# Patient Record
Sex: FEMALE | Race: WHITE | NOT HISPANIC OR LATINO | Employment: OTHER | URBAN - METROPOLITAN AREA
[De-identification: names, ages, dates, MRNs, and addresses within clinical notes are randomized per-mention and may not be internally consistent; named-entity substitution may affect disease eponyms.]

---

## 2017-01-13 ENCOUNTER — CONSULT (OUTPATIENT)
Dept: ORTHOPEDIC SURGERY | Facility: CLINIC | Age: 67
End: 2017-01-13

## 2017-01-13 VITALS — TEMPERATURE: 98.1 F | HEIGHT: 62 IN | WEIGHT: 177 LBS | BODY MASS INDEX: 32.57 KG/M2

## 2017-01-13 DIAGNOSIS — M75.121 COMPLETE TEAR OF RIGHT ROTATOR CUFF: Primary | ICD-10-CM

## 2017-01-13 PROCEDURE — 99214 OFFICE O/P EST MOD 30 MIN: CPT | Performed by: ORTHOPAEDIC SURGERY

## 2017-01-13 NOTE — PROGRESS NOTES
Patient: Kristan Galicia    YOB: 1950    Medical Record Number: [id number]    Chief Complaints:  Right shoulder pain and weakness    History of Present Illness:     66 y.o. female patient who presents for evaluation of her right shoulder.  She has a history of a previous rotator cuff repair a number of years ago.  She did well up until the past few years when she has developed gradual onset worsening shoulder pain and weakness.  Pain is now severe, constant, and aching.  She has trouble performing any tasks at or above shoulder level.  She has tried activity modifications, anti-inflammatories, and injections, none of which have provided her any significant or long-lasting relief.  She is referred to me to discuss the option of an arthroplasty.    Allergies:   Allergies   Allergen Reactions   • Aspirin Swelling   • Clarithromycin Other (See Comments)     BLISTERS AROUND MOTH  Also known as Bioxen    • Codeine Swelling   • Darvon [Propoxyphene] Swelling   • Gatifloxacin Other (See Comments)     BLISTERS AROUND MOUTH  Also known Tequine    • Levaquin [Levofloxacin] Other (See Comments)     BLISTERS AROUND MOUTH       Home Medications:    Current Outpatient Prescriptions:   •  alendronate (FOSAMAX) 70 MG tablet, Take 1 tablet by mouth Every 7 (Seven) Days., Disp: 12 tablet, Rfl: 3  •  amiodarone (PACERONE) 200 MG tablet, Take 100 mg by mouth., Disp: , Rfl:   •  atorvastatin (LIPITOR) 80 MG tablet, Take 1 tablet by mouth Daily., Disp: 30 tablet, Rfl: 5  •  baclofen (LIORESAL) 10 MG tablet, Take 1 tablet by mouth Every Night., Disp: 30 tablet, Rfl: 5  •  carvedilol (COREG) 12.5 MG tablet, Take 12.5 mg by mouth., Disp: , Rfl:   •  clindamycin (CLEOCIN) 300 MG capsule, TAKE 1 CAPSULE 3 TIMES DAILY FOR 10 DAYS., Disp: , Rfl: 0  •  dabigatran etexilate (PRADAXA) 150 MG capsu, TAKE 1 CAPSULE TWICE DAILY, Disp: , Rfl:   •  DULoxetine (CYMBALTA) 60 MG capsule, Take 1 capsule by mouth daily., Disp: 90 capsule,  Rfl: 2  •  ezetimibe (ZETIA) 10 MG tablet, Take 1 tablet by mouth Daily., Disp: 30 tablet, Rfl: 5  •  ibuprofen (ADVIL,MOTRIN) 800 MG tablet, Take 1 tablet by mouth Every 8 (Eight) Hours As Needed for moderate pain (4-6)., Disp: 60 tablet, Rfl: 1  •  levothyroxine (SYNTHROID) 50 MCG tablet, Take 1 tablet by mouth Daily., Disp: 30 tablet, Rfl: 1  •  lidocaine (LIDODERM) 5 %, Place 1 patch on the skin Every 12 (Twelve) Hours. Remove & Discard patch within 12 hours or as directed by MD, Disp: 60 patch, Rfl: 5  •  montelukast (SINGULAIR) 10 MG tablet, Take 1 tablet by mouth daily., Disp: 90 tablet, Rfl: 2  •  omeprazole (PriLOSEC) 20 MG capsule, Take 20 mg by mouth daily., Disp: , Rfl:   •  spironolactone-hydrochlorothiazide (ALDACTAZIDE) 25-25 MG tablet, Take 1 tablet by mouth daily., Disp: , Rfl:   •  traZODone (DESYREL) 50 MG tablet, Take 1 tablet by mouth every night., Disp: 30 tablet, Rfl: 5    Past Medical History   Diagnosis Date   • Anxiety    • Asthma    • Atrial fibrillation    • Cardiomyopathy      Hypertrophic Cardiomyopathy   • CHF (congestive heart failure)    • Depression    • GERD (gastroesophageal reflux disease)    • Heart attack    • Heart murmur    • History of transfusion    • Hyperlipidemia    • Migraines    • 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 Herbert Gonsales    • Appendectomy  1970   • Hysterectomy  1989     Total   • Cardiac defibrillator placement Left 1999     Dr. Ya   • Breast reconstruction Bilateral      Reduction   • Knee arthroscopy Bilateral 2014     Dr. Li   • Femur surgery Left      with metal implant   • Shoulder arthroscopy w/ rotator cuff repair Bilateral 05/20/2011    • Tonsillectomy     • Cataract extraction Bilateral    • Breast biopsy Bilateral    • Colonoscopy  8/16/2016     Procedure: COLONOSCOPY with cecum;  Surgeon: Rosalio Sheikh MD;  Location: [facility] ENDOSCOPY;  Service:    • Rotator cuff repair Right 06/10/2010   • Knee surgery Right 06/10/2010   • Cardiac catheterization  02/19/2007   • Neck surgery  08/23/2012     Anterior cervical diskectomy and fusion with VG2 allograft implants and eagle anterior plate fixation,C5-C6 C6-C7       Social History     Occupational History   •       Social History Main Topics   • Smoking status: Former Smoker     Packs/day: 0.50     Years: 17.00     Quit date: 9/8/1985   • Smokeless tobacco: Not on file   • Alcohol use No   • Drug use: No   • Sexual activity: Defer      Social History     Social History Narrative       Family History   Problem Relation Age of Onset   • Heart attack Brother    • Hyperthyroidism Mother    • Cancer Mother    • Heart disease Mother    • Osteoporosis Mother    • No Known Problems Father       car accident   • Cirrhosis Maternal Grandmother    • No Known Problems Maternal Grandfather    • No Known Problems Paternal Grandmother    • No Known Problems Paternal Grandfather    • Breast cancer Maternal Aunt        Review of Systems:      Constitutional: Denies fever, shaking or chills   Eyes: Denies change in visual acuity   HEENT: Denies nasal congestion or sore throat   Respiratory: Denies cough or shortness of breath   Cardiovascular: Denies chest pain or edema  Endocrine: Denies tremors, palpitations, intolerance of heat or cold, polyuria, polydipsia.  GI: Denies abdominal pain, nausea, vomiting, bloody stools or diarrhea  : Denies frequency, urgency, incontinence, retention, or nocturia.  Musculoskeletal: Denies numbness tingling or loss of motor function except as above  Integument: Denies rash, lesion or ulceration   Neurologic: Denies headache or focal weakness,  "deficits  Heme: Denies epistaxis, spontaneous or excessive bleeding, epistaxis, hematuria, melena, fatigue, enlarged or tender lymph nodes.      All other pertinent positives and negatives as noted above in HPI.    Physical Exam: 66 y.o. female  Vitals:    01/13/17 1058   Temp: 98.1 °F (36.7 °C)   TempSrc: Temporal Artery    Weight: 177 lb (80.3 kg)   Height: 62\" (157.5 cm)       General:  Patient is awake and alert.  Appears in no acute distress or discomfort.    Psych:  Affect and demeanor are appropriate.    Eyes:  Conjunctiva and sclera appear grossly normal.  Eyes track well and EOM seem to be intact.    Ears:  No gross abnormalities.  Hearing adequate for the exam.    Cardiovascular:  Regular rate and rhythm.    Lungs:  Good chest expansion.  Breathing unlabored.    Extremities: The right shoulder is examined.  Skin is benign.  Previous surgical scars are well-healed.  Trace bursal effusion.  No erythema or increased warmth.  Passive motion is well preserved.  Active motion is limited and uncomfortable.  4 out of 5 strength with elevation in the scapular plane and external rotation.  Negative Hornblower's and external rotation lag sign.  Good strength in the deltoid.  Normal axillary nerve sensation.  Palpable radial pulse.    Imaging:  Previous x-rays including AP, scapular Y, and axillary views of the right shoulder are reviewed.  The films are dated August 2016.  The films show her retained metal anchors in the humeral head consistent with her history of previous repair.  She has mild osteoarthritis.  Acromiohumeral interval measures normal.  CT arthrogram of the right shoulder is also reviewed along with the associated report.  She has what appears to be a large, retracted, recurrent rotator cuff tear with significant atrophy.  There is also mild associated rotator cuff tear arthropathy.    Assessment/Plan:  Right shoulder chronic irreparable rotator cuff tear with associated cuff tear arthropathy    I had " a long conversation with Mrs. Galicia about her options.  We discussed conservative treatment options as opposed to a reverse total shoulder arthroplasty and all that that would entail.  She is very frustrated by her persistent symptoms.  She has failed prolonged conservative treatment.  She wants to pursue the reverse arthroplasty.  The risks, benefits, and alternatives were discussed.  She wants to proceed with that.  I advised her that she should get cardiology clearance from Dr. Ch.  I would like to see her back for preoperative visit after she has gotten clearance.    Juan Antonio Anne MD    01/13/2017    krystle

## 2017-01-13 NOTE — MR AVS SNAPSHOT
[facility]  [phone]                    Kristan Galicia   1/13/2017 10:45 AM   Consult    Dept Phone:  [phone]   Encounter #:  [id number]    Provider:  Juan Antonio Anne MD   Department:  [facility]                Your Full Care Plan              Your Updated Medication List          This list is accurate as of: 1/13/17 11:24 AM.  Always use your most recent med list.                alendronate 70 MG tablet   Commonly known as:  FOSAMAX   Take 1 tablet by mouth Every 7 (Seven) Days.       amiodarone 200 MG tablet   Commonly known as:  PACERONE       atorvastatin 80 MG tablet   Commonly known as:  LIPITOR   Take 1 tablet by mouth Daily.       baclofen 10 MG tablet   Commonly known as:  LIORESAL   Take 1 tablet by mouth Every Night.       carvedilol 12.5 MG tablet   Commonly known as:  COREG       clindamycin 300 MG capsule   Commonly known as:  CLEOCIN       DULoxetine 60 MG capsule   Commonly known as:  CYMBALTA   Take 1 capsule by mouth daily.       ezetimibe 10 MG tablet   Commonly known as:  ZETIA   Take 1 tablet by mouth Daily.       ibuprofen 800 MG tablet   Commonly known as:  ADVIL,MOTRIN   Take 1 tablet by mouth Every 8 (Eight) Hours As Needed for moderate pain (4-6).       levothyroxine 50 MCG tablet   Commonly known as:  SYNTHROID   Take 1 tablet by mouth Daily.       lidocaine 5 %   Commonly known as:  LIDODERM   Place 1 patch on the skin Every 12 (Twelve) Hours. Remove & Discard patch within 12 hours or as directed by MD       montelukast 10 MG tablet   Commonly known as:  SINGULAIR   Take 1 tablet by mouth daily.       omeprazole 20 MG capsule   Commonly known as:  priLOSEC       PRADAXA 150 MG capsu   Generic drug:  dabigatran etexilate       spironolactone-hydrochlorothiazide 25-25 MG tablet   Commonly known as:  ALDACTAZIDE       traZODone 50 MG tablet   Commonly known as:   ROSALIA   Take 1 tablet by mouth every night.               Instructions     None    Patient Instructions History      Upcoming Appointments     Visit Type Date Time Department    CONSULT 1/13/2017 10:45 AM [facility]    FOLLOW UP 1/17/2017  3:15 PM [facility]    FOLLOW UP 2/13/2017  9:10 AM [facility]      MyChart Signup     Our records indicate that you have an active [facility] [employer] account.    You can view your After Visit Summary by going to [employer] and logging in with your [employer] username and password.  If you don't have a [employer] username and password but a parent or guardian has access to your record, the parent or guardian should login with their own [employer] username and password and access your record to view the After Visit Summary.    If you have questions, you can email [email or web address] or call [phone] to talk to our [employer] staff.  Remember, [employer] is NOT to be used for urgent needs.  For medical emergencies, dial 911.               Other Info from Your Visit           Your Appointments     Jan 17, 2017  3:15 PM EST   Follow Up with Bravo Pascual MD   [facility] (--)    [address] [place] [address]   [phone]           Arrive 15 minutes prior to appointment.            Feb 13, 2017  9:10 AM EST   Follow Up with Bravo Pascual MD   [facility] (--)    [address] [place] [address]   [phone]           Arrive 15 minutes prior to appointment.              Allergies     Aspirin Allergy Swelling    Clarithromycin Allergy Other (See Comments)    BLISTERS AROUND MOTH  Also known as Bioxen     Codeine Allergy Swelling    Darvon [Propoxyphene] Allergy Swelling    Gatifloxacin Allergy Other (See Comments)    BLISTERS AROUND MOUTH  Also known Tequine     Levaquin [Levofloxacin] Allergy Other (See Comments)  "   BLISTERS AROUND MOUTH      Reason for Visit     Right Shoulder - Pain           Vital Signs     Temperature Height Weight Body Mass Index Smoking Status       98.1 °F (36.7 °C) (Temporal Artery ) 62\" (157.5 cm) 177 lb (80.3 kg) 32.37 kg/m2 Former Smoker         "

## 2017-01-13 NOTE — LETTER
01/13/2017    Dear Dr. Ch,    This letter is in regards to Ms. Kristan Galicia whom I have been treating for a right shoulder chronic, irreparable rotator cuff tear.  She has failed conservative treatment and expressed interest in pursuing an arthroplasty.  I advised her to talk to you about getting medical clearance preoperatively given her cardiac history.  If you feel that she is an acceptable candidate for surgery, please provide a letter of medical clearance.  Thank you for your help with this matter and please feel free to contact me if you have any questions or concerns.    Sincerely,      Juan Antonio Anne M.D.

## 2017-01-16 RX ORDER — LEVOTHYROXINE SODIUM 0.05 MG/1
TABLET ORAL
Qty: 30 TABLET | Refills: 1 | Status: SHIPPED | OUTPATIENT
Start: 2017-01-16 | End: 2017-01-27 | Stop reason: SDUPTHER

## 2017-01-17 ENCOUNTER — OFFICE VISIT (OUTPATIENT)
Dept: ORTHOPEDIC SURGERY | Facility: CLINIC | Age: 67
End: 2017-01-17

## 2017-01-17 VITALS — TEMPERATURE: 97.9 F | BODY MASS INDEX: 33.42 KG/M2 | WEIGHT: 177 LBS | HEIGHT: 61 IN

## 2017-01-17 DIAGNOSIS — M17.11 ARTHRITIS OF RIGHT KNEE: ICD-10-CM

## 2017-01-17 DIAGNOSIS — IMO0002 BURSITIS/TENDONITIS, SHOULDER: ICD-10-CM

## 2017-01-17 DIAGNOSIS — M25.561 RIGHT KNEE PAIN, UNSPECIFIED CHRONICITY: Primary | ICD-10-CM

## 2017-01-17 PROCEDURE — 99213 OFFICE O/P EST LOW 20 MIN: CPT | Performed by: ORTHOPAEDIC SURGERY

## 2017-01-17 RX ORDER — MELOXICAM 7.5 MG/1
TABLET ORAL
Qty: 30 TABLET | Refills: 3 | Status: SHIPPED | OUTPATIENT
Start: 2017-01-17 | End: 2017-01-27

## 2017-01-17 RX ORDER — METHYLPREDNISOLONE 4 MG/1
TABLET ORAL
Qty: 21 TABLET | Refills: 0 | Status: SHIPPED | OUTPATIENT
Start: 2017-01-17 | End: 2017-01-27

## 2017-01-17 NOTE — MR AVS SNAPSHOT
Kristan Galicia   1/17/2017 3:15 PM   Office Visit    Dept Phone:  [phone]   Encounter #:  [id number]    Provider:  CHRISTIN Lilly   Department:  [facility]                Your Full Care Plan              Today's Medication Changes          These changes are accurate as of: 1/17/17  4:58 PM.  If you have any questions, ask your nurse or doctor.               New Medication(s)Ordered:     meloxicam 7.5 MG tablet   Commonly known as:  MOBIC   1 Oral Daily with food.   Started by:  CHRISTIN Lilly       MethylPREDNISolone 4 MG tablet   Commonly known as:  MEDROL (XU)   Use as directed by package instructions   Started by:  CHRISTIN Lilly            Where to Get Your Medications      These medications were sent to [facility]/pharmacy #[id number] - [place], [place] - [address] [phone]  - [phone]   [address], [facility] [address]     Phone:  [phone]     meloxicam 7.5 MG tablet    MethylPREDNISolone 4 MG tablet                  Your Updated Medication List          This list is accurate as of: 1/17/17  4:58 PM.  Always use your most recent med list.                alendronate 70 MG tablet   Commonly known as:  FOSAMAX   Take 1 tablet by mouth Every 7 (Seven) Days.       amiodarone 200 MG tablet   Commonly known as:  PACERONE       atorvastatin 80 MG tablet   Commonly known as:  LIPITOR   Take 1 tablet by mouth Daily.       baclofen 10 MG tablet   Commonly known as:  LIORESAL   Take 1 tablet by mouth Every Night.       carvedilol 12.5 MG tablet   Commonly known as:  COREG       clindamycin 300 MG capsule   Commonly known as:  CLEOCIN       DULoxetine 60 MG capsule   Commonly known as:  CYMBALTA   Take 1 capsule by mouth daily.       ezetimibe 10 MG tablet   Commonly known as:  ZETIA   Take 1 tablet by mouth Daily.       ibuprofen 800 MG tablet   Commonly known as:  ADVIL,MOTRIN   Take 1 tablet by mouth  Every 8 (Eight) Hours As Needed for moderate pain (4-6).       levothyroxine 50 MCG tablet   Commonly known as:  SYNTHROID, LEVOTHROID   TAKE 1 TABLET BY MOUTH DAILY.       lidocaine 5 %   Commonly known as:  LIDODERM   Place 1 patch on the skin Every 12 (Twelve) Hours. Remove & Discard patch within 12 hours or as directed by MD       meloxicam 7.5 MG tablet   Commonly known as:  MOBIC   1 Oral Daily with food.       MethylPREDNISolone 4 MG tablet   Commonly known as:  MEDROL (XU)   Use as directed by package instructions       montelukast 10 MG tablet   Commonly known as:  SINGULAIR   Take 1 tablet by mouth daily.       omeprazole 20 MG capsule   Commonly known as:  priLOSEC       PRADAXA 150 MG capsu   Generic drug:  dabigatran etexilate       spironolactone-hydrochlorothiazide 25-25 MG tablet   Commonly known as:  ALDACTAZIDE       traZODone 50 MG tablet   Commonly known as:  DESYREL   Take 1 tablet by mouth every night.               You Were Diagnosed With        Codes Comments    Right knee pain, unspecified chronicity    -  Primary ICD-10-CM: M25.561  ICD-9-CM: 719.46     Bursitis/tendonitis, shoulder     ICD-10-CM: M71.9, M67.919  ICD-9-CM: 726.10     Arthritis of right knee     ICD-10-CM: M19.90  ICD-9-CM: 716.96       Instructions     None    Patient Instructions History      Upcoming Appointments     Visit Type Date Time Department    FOLLOW UP 1/17/2017  3:15 PM [facility]    FOLLOW UP 2/13/2017  9:10 AM [facility]    INJECTION 2/17/2017  9:30 AM [facility]      [employer] Signup     Our records indicate that you have an active [employer] account.    You can view your After Visit Summary by going to [employer] and logging in with your [employer] username and password.  If you don't have a [employer] username and password but a parent or guardian has access to your record, the parent or guardian should login with their own [employer] username and password and access your record  "to view the After Visit Summary.    If you have questions, you can email [email or web address] or call [phone] to talk to our MyChart staff.  Remember, [employer] is NOT to be used for urgent needs.  For medical emergencies, dial 911.               Other Info from Your Visit           Your Appointments     Feb 13, 2017  9:10 AM EST   Follow Up with Bravo Pascual MD   [facility] (--)    [address] [place] [address]   [phone]           Arrive 15 minutes prior to appointment.            Feb 17, 2017  9:30 AM EST   Injection with Bravo Pascual MD   [facility] (--)    [address] [address]   [phone]              Allergies     Aspirin Allergy Swelling    Clarithromycin Allergy Other (See Comments)    BLISTERS AROUND MOTH  Also known as Bioxen     Codeine Allergy Swelling    Darvon [Propoxyphene] Allergy Swelling    Gatifloxacin Allergy Other (See Comments)    BLISTERS AROUND MOUTH  Also known Tequine     Levaquin [Levofloxacin] Allergy Other (See Comments)    BLISTERS AROUND MOUTH      Reason for Visit     Right Knee - Follow-up, Pain           Vital Signs     Temperature Height Weight Body Mass Index Smoking Status       97.9 °F (36.6 °C) 61\" (154.9 cm) 177 lb (80.3 kg) 33.44 kg/m2 Former Smoker       Problems and Diagnoses Noted     Arthritis of right knee    Knee pain, right    Disorder of bursae and tendons in shoulder region            "

## 2017-01-17 NOTE — PROGRESS NOTES
Patient Name: Kristan Galicia   YOB: 1950  Referring Primary Care Physician: No Known Provider      Chief Complaint:    Chief Complaint   Patient presents with   • Right Knee - Follow-up, Pain        Subjective:  This problem is not new to this examiner.     HPI:   Kristan Galicia is a pleasant 66 y.o. year old who presents today for evaluation of   Chief Complaint   Patient presents with   • Right Knee - Follow-up, Pain   .  TIMING:  The pain is described as ACUTE on CHRONIC.  LOCATION: medial joint line tenderness. AGGRAVATING FACTORS:  Is worsened by prolonged standing, sitting, walking and squatting activities.  ASSOCIATED SYMPTOMS:  swelling, tenderness, and aching. OTHER SYMPTOMS denies popping, locking or catching. RELIEVING FACTORS:  Previous treatment has included cortisone injections  OTC Tylenol  OTC meds/NSAIDS.    Medications:   Home Medications:  Current Outpatient Prescriptions on File Prior to Visit   Medication Sig   • alendronate (FOSAMAX) 70 MG tablet Take 1 tablet by mouth Every 7 (Seven) Days.   • amiodarone (PACERONE) 200 MG tablet Take 100 mg by mouth.   • atorvastatin (LIPITOR) 80 MG tablet Take 1 tablet by mouth Daily.   • baclofen (LIORESAL) 10 MG tablet Take 1 tablet by mouth Every Night.   • carvedilol (COREG) 12.5 MG tablet Take 12.5 mg by mouth.   • clindamycin (CLEOCIN) 300 MG capsule TAKE 1 CAPSULE 3 TIMES DAILY FOR 10 DAYS.   • dabigatran etexilate (PRADAXA) 150 MG capsu TAKE 1 CAPSULE TWICE DAILY   • DULoxetine (CYMBALTA) 60 MG capsule Take 1 capsule by mouth daily.   • ezetimibe (ZETIA) 10 MG tablet Take 1 tablet by mouth Daily.   • ibuprofen (ADVIL,MOTRIN) 800 MG tablet Take 1 tablet by mouth Every 8 (Eight) Hours As Needed for moderate pain (4-6).   • levothyroxine (SYNTHROID, LEVOTHROID) 50 MCG tablet TAKE 1 TABLET BY MOUTH DAILY.   • lidocaine (LIDODERM) 5 % Place 1 patch on the skin Every 12 (Twelve) Hours. Remove & Discard patch within 12 hours or as directed  by MD   • montelukast (SINGULAIR) 10 MG tablet Take 1 tablet by mouth daily.   • omeprazole (PriLOSEC) 20 MG capsule Take 20 mg by mouth daily.   • traZODone (DESYREL) 50 MG tablet Take 1 tablet by mouth every night.   • spironolactone-hydrochlorothiazide (ALDACTAZIDE) 25-25 MG tablet Take 1 tablet by mouth daily.     No current facility-administered medications on file prior to visit.      Current Medications:  Scheduled Meds:  Continuous Infusions:  No current facility-administered medications for this visit.   PRN Meds:.    I have reviewed the patient's medical history in detail and updated the computerized patient record.  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 Herbert Gonsales    • Appendectomy  1970   • Hysterectomy  1989     Total   • Cardiac defibrillator placement Left 1999     Dr. Ya   • Breast reconstruction Bilateral      Reduction   • Knee arthroscopy Bilateral 2014     Dr. Li   • Femur surgery Left      with metal implant   • Shoulder arthroscopy w/ rotator cuff repair Bilateral 05/20/2011   • Tonsillectomy     • Cataract extraction Bilateral    • Breast biopsy Bilateral    • Colonoscopy  8/16/2016     Procedure: COLONOSCOPY with cecum;  Surgeon: Rosalio Sheikh MD;  Location: [facility];   Service:    • Rotator cuff repair Right 06/10/2010   • Knee surgery Right 06/10/2010   • Cardiac catheterization  02/19/2007   • Neck surgery  08/23/2012     Anterior cervical diskectomy and fusion with VG2 allograft implants and eagle anterior plate fixation,C5-C6 C6-C7        Social History     Occupational History   •       Social History Main Topics   • Smoking status: Former Smoker     Packs/day: 0.50     Years: 17.00     Quit date: 9/8/1985   • Smokeless tobacco: Not on file   • Alcohol use No   • Drug use: No   • Sexual activity: Defer    Social History     Social History Narrative        Family History   Problem Relation Age of Onset   • Heart attack Brother    • Hyperthyroidism Mother    • Cancer Mother    • Heart disease Mother    • Osteoporosis Mother    • No Known Problems Father       car accident   • Cirrhosis Maternal Grandmother    • No Known Problems Maternal Grandfather    • No Known Problems Paternal Grandmother    • No Known Problems Paternal Grandfather    • Breast cancer Maternal Aunt          ROS: 14 point review of systems was performed and all other systems were reviewed and are negative except for documented findings in HPI and today's encounter.     Allergies:   Allergies   Allergen Reactions   • Aspirin Swelling   • Clarithromycin Other (See Comments)     BLISTERS AROUND MOTH  Also known as Bioxen    • Codeine Swelling   • Darvon [Propoxyphene] Swelling   • Gatifloxacin Other (See Comments)     BLISTERS AROUND MOUTH  Also known Tequine    • Levaquin [Levofloxacin] Other (See Comments)     BLISTERS AROUND MOUTH     Constitutional:  Denies fever, shaking or chills   Eyes:  Denies change in visual acuity   HENT:  Denies nasal congestion or sore throat   Respiratory:  Denies cough or shortness of breath   Cardiovascular:  Denies chest pain or severe LE edema   GI:  Denies abdominal pain, nausea, vomiting, bloody stools or diarrhea   Musculoskeletal:  Numbness,  tingling, or loss of motor function only as noted above in history of present illness.  : Denies painful urination or hematuria  Integument:  Denies rash, lesion or ulceration   Neurologic:  Denies headache or focal weakness  Endocrine:  Denies lymphadenopathy  Psych:  Denies confusion or change in mental status   Hem:  Denies active bleeding    Objective:    Physical Exam: 66 y.o. female  Body mass index is 33.44 kg/(m^2).  Vitals:    01/17/17 1626   Temp: 97.9 °F (36.6 °C)     Vital signs reviewed.   General Appearance:    Alert, cooperative, in no acute distress                  Eyes: conjunctiva clear  ENT: external ears and nose atraumatic  CV: no peripheral edema  Resp: normal respiratory effort  Skin: no rashes or wounds; normal turgor  Psych: mood and affect appropriate  Lymph: no nodes appreciated  Neuro: gross sensation intact  Vascular:  Palpable peripheral pulse in noted extremity  Musculoskeletal Extremities: KNEE Exam: medial joint line tenderness with crepitation, synovitis, swelling, and joint effusion right knee.    Radiology:   Imaging done previously in the office and discussed at length with the patient:    Indication: pain related symptoms,  Views: 3V AP, LAT & 40 degree PA right knee(s)   Findings: severe end-stage arthritis  Comparison views: viewed last xray done in the office.       Assessment:   Patient Active Problem List   Diagnosis   • Shoulder pain, right   • Knee pain, right   • Fall down stairs   • Atrial fibrillation   • S/P rotator cuff repair   • Vitamin D deficiency   • Vertigo   • Vasovagal syncope   • Thyroid disease   • Sleep apnea   • Palpitations   • Osteoporosis   • Ocular tumor   • Migraines   • Hyperlipidemia   • History of transfusion   • Heart murmur   • Heart attack   • GERD (gastroesophageal reflux disease)   • Depression   • CHF (congestive heart failure)   • Cardiomyopathy   • Asthma   • Anxiety   • Complete tear of right rotator cuff        Procedures       Plan:  Biomechanics of pertinent body area discussed.  Risks, benefits, alternatives, comparisons, and complications of accepted medicines, injections, recommendations, surgical procedures, and therapies explained and education provided in laymen's terms. The patient was given the opportunity to ask questions and they were answerved to their satisfaction.   Natural history and expected course discussed. Questions answered.  Advice on benefits of, and types of regular/moderate exercise.  Lifestyle measures for weight loss with biomechanical explanations for weight loss and how this affects orthopedic condition.  Medications per orders; safety, precautions, and warnings given.  30 min spent face to face with patient >25 min spent counseling about natural history and expected course of assessed complaint. Questions answered.  Rest, ice, compression, and elevation (RICE) therapy.  will send for approval for gel injection of right knee  Will try medrol dose pk for rightknee pain as it has been only 2 months since last cortisone inj. Instructed not to start meloxicam until after done with medrol dose pack, stomach precautions given.     1/17/2017  Patient was seen by CHRISTIN Carballo in the office today.

## 2017-01-26 RX ORDER — CEFAZOLIN SODIUM 2 G/100ML
2 INJECTION, SOLUTION INTRAVENOUS ONCE
Status: CANCELLED | OUTPATIENT
Start: 2017-01-26 | End: 2017-01-26

## 2017-01-27 ENCOUNTER — APPOINTMENT (OUTPATIENT)
Dept: PREADMISSION TESTING | Facility: HOSPITAL | Age: 67
End: 2017-01-27

## 2017-01-27 VITALS
TEMPERATURE: 98.3 F | OXYGEN SATURATION: 97 % | RESPIRATION RATE: 14 BRPM | HEIGHT: 61 IN | DIASTOLIC BLOOD PRESSURE: 76 MMHG | BODY MASS INDEX: 34.17 KG/M2 | WEIGHT: 181 LBS | SYSTOLIC BLOOD PRESSURE: 99 MMHG | HEART RATE: 62 BPM

## 2017-01-27 LAB
ANION GAP SERPL CALCULATED.3IONS-SCNC: 14 MMOL/L
BACTERIA UR QL AUTO: ABNORMAL /HPF
BASOPHILS # BLD AUTO: 0.06 10*3/MM3 (ref 0–0.2)
BASOPHILS NFR BLD AUTO: 0.6 % (ref 0–1.5)
BILIRUB UR QL STRIP: NEGATIVE
BUN BLD-MCNC: 20 MG/DL (ref 8–23)
BUN/CREAT SERPL: 21.3 (ref 7–25)
CALCIUM SPEC-SCNC: 8.6 MG/DL (ref 8.6–10.5)
CHLORIDE SERPL-SCNC: 101 MMOL/L (ref 98–107)
CLARITY UR: CLEAR
CO2 SERPL-SCNC: 26 MMOL/L (ref 22–29)
COLOR UR: YELLOW
CREAT BLD-MCNC: 0.94 MG/DL (ref 0.57–1)
DEPRECATED RDW RBC AUTO: 46.7 FL (ref 37–54)
EOSINOPHIL # BLD AUTO: 0.18 10*3/MM3 (ref 0–0.7)
EOSINOPHIL NFR BLD AUTO: 1.7 % (ref 0.3–6.2)
ERYTHROCYTE [DISTWIDTH] IN BLOOD BY AUTOMATED COUNT: 14.3 % (ref 11.7–13)
GFR SERPL CREATININE-BSD FRML MDRD: 60 ML/MIN/1.73
GLUCOSE BLD-MCNC: 151 MG/DL (ref 65–99)
GLUCOSE UR STRIP-MCNC: NEGATIVE MG/DL
HCT VFR BLD AUTO: 35.2 % (ref 35.6–45.5)
HGB BLD-MCNC: 11 G/DL (ref 11.9–15.5)
HGB UR QL STRIP.AUTO: NEGATIVE
HYALINE CASTS UR QL AUTO: ABNORMAL /LPF
IMM GRANULOCYTES # BLD: 0.06 10*3/MM3 (ref 0–0.03)
IMM GRANULOCYTES NFR BLD: 0.6 % (ref 0–0.5)
KETONES UR QL STRIP: NEGATIVE
LEUKOCYTE ESTERASE UR QL STRIP.AUTO: ABNORMAL
LYMPHOCYTES # BLD AUTO: 2.74 10*3/MM3 (ref 0.9–4.8)
LYMPHOCYTES NFR BLD AUTO: 25.4 % (ref 19.6–45.3)
MCH RBC QN AUTO: 28.1 PG (ref 26.9–32)
MCHC RBC AUTO-ENTMCNC: 31.3 G/DL (ref 32.4–36.3)
MCV RBC AUTO: 90 FL (ref 80.5–98.2)
MONOCYTES # BLD AUTO: 0.86 10*3/MM3 (ref 0.2–1.2)
MONOCYTES NFR BLD AUTO: 8 % (ref 5–12)
NEUTROPHILS # BLD AUTO: 6.88 10*3/MM3 (ref 1.9–8.1)
NEUTROPHILS NFR BLD AUTO: 63.7 % (ref 42.7–76)
NITRITE UR QL STRIP: NEGATIVE
PH UR STRIP.AUTO: 6 [PH] (ref 5–8)
PLATELET # BLD AUTO: 286 10*3/MM3 (ref 140–500)
PMV BLD AUTO: 9.3 FL (ref 6–12)
POTASSIUM BLD-SCNC: 3.9 MMOL/L (ref 3.5–5.2)
PROT UR QL STRIP: NEGATIVE
RBC # BLD AUTO: 3.91 10*6/MM3 (ref 3.9–5.2)
RBC # UR: ABNORMAL /HPF
REF LAB TEST METHOD: ABNORMAL
SODIUM BLD-SCNC: 141 MMOL/L (ref 136–145)
SP GR UR STRIP: 1.02 (ref 1–1.03)
SQUAMOUS #/AREA URNS HPF: ABNORMAL /HPF
UROBILINOGEN UR QL STRIP: ABNORMAL
WBC NRBC COR # BLD: 10.78 10*3/MM3 (ref 4.5–10.7)
WBC UR QL AUTO: ABNORMAL /HPF

## 2017-01-27 PROCEDURE — 36415 COLL VENOUS BLD VENIPUNCTURE: CPT

## 2017-01-27 PROCEDURE — 85025 COMPLETE CBC W/AUTO DIFF WBC: CPT | Performed by: ORTHOPAEDIC SURGERY

## 2017-01-27 PROCEDURE — 81001 URINALYSIS AUTO W/SCOPE: CPT | Performed by: ORTHOPAEDIC SURGERY

## 2017-01-27 PROCEDURE — 80048 BASIC METABOLIC PNL TOTAL CA: CPT | Performed by: ORTHOPAEDIC SURGERY

## 2017-01-27 PROCEDURE — 87086 URINE CULTURE/COLONY COUNT: CPT | Performed by: ORTHOPAEDIC SURGERY

## 2017-01-27 RX ORDER — TRAMADOL HYDROCHLORIDE 50 MG/1
50 TABLET ORAL
COMMUNITY
Start: 2017-01-13 | End: 2017-02-04 | Stop reason: HOSPADM

## 2017-01-27 RX ORDER — DABIGATRAN ETEXILATE 150 MG/1
150 CAPSULE ORAL 2 TIMES DAILY
Status: ON HOLD | COMMUNITY
End: 2017-02-03

## 2017-01-27 RX ORDER — GABAPENTIN 100 MG/1
100 CAPSULE ORAL 3 TIMES DAILY
COMMUNITY
Start: 2017-01-13 | End: 2017-06-07 | Stop reason: DRUGHIGH

## 2017-01-27 RX ORDER — LEVOTHYROXINE SODIUM 0.05 MG/1
50 TABLET ORAL EVERY MORNING
COMMUNITY
End: 2017-02-22 | Stop reason: DRUGHIGH

## 2017-01-27 NOTE — DISCHARGE INSTRUCTIONS
Take the following medications the morning of surgery with a small sip of water.        General Instructions:  • Do not eat or drink after midnight: includes water, mints, or gum. You may brush your teeth.  • Do not smoke, chew tobacco, or drink alcohol.  • Bring medications in original bottles, any inhalers and if applicable your C-PAP/ BI-PAP machine.  • Bring any papers given to you in the doctor’s office.  • Wear clean comfortable clothes and socks.  • Do not wear contact lenses or make-up.  Bring a case for your glasses if applicable.   • Bring crutches or walker if applicable.  • Leave all other valuables and jewelry at home.    If you were given a blood bank ID arm band remember to bring it with you the day of surgery.    Preventing a Surgical Site Infection:  Shower on the morning of surgery using a fresh bar of anti-bacterial soap (such as Dial) and clean washcloth.  Dry with a clean towel and dress in clean clothing.  For 2 to 3 days before surgery, avoid shaving with a razor near where you will have surgery because the razor can irritate skin and make it easier to develop an infection  Ask your surgeon if you will be receiving antibiotics prior to surgery  Make sure you, your family, and all healthcare providers clean their hands with soap and water or an alcohol based hand  before caring for you or your wound  If at all possible, quit smoking as many days before surgery as you can.    Day of surgery:  Upon arrival, a Pre-op nurse and Anesthesiologist will review your health history, obtain vital signs, and answer questions you may have.  The only belongings needed at this time will be your home medications and if applicable your C-PAP/BI-PAP machine.  If you are staying overnight your family can leave the rest of your belongings in the car and bring them to your room later.  A Pre-op nurse will start an IV and you may receive medication in preparation for surgery, including something to help you  relax.  Your family will be able to see you in the Pre-op area.  While you are in surgery your family should notify the waiting room  if they leave the waiting room area and provide a contact phone number.    Please be aware that surgery does come with discomfort.  We want to make every effort to control your discomfort so please discuss any uncontrolled symptoms with your nurse.   Your doctor will most likely have prescribed pain medications.      If you are going home after surgery you will receive individualized written care instructions before being discharged.  A responsible adult must drive you to and from the hospital on the day of your surgery and stay with you for 24 hours.    If you are staying overnight following surgery, you will be transported to your hospital room following the recovery period.  [facility] has all private rooms.    If you have any questions please call Pre-Admission Testing at [phone].  Deductibles and co-payments are collected on the day of service. Please be prepared to pay the required co-pay, deductible or deposit on the day of service as defined by your plan.    2% CHLORAHEXIDINE GLUCONATE* CLOTH  Preparing or “prepping” skin before surgery can reduce the risk of infection at the surgical site. To make the process easier, [facility] has chosen disposable cloths moistened with a rinse-free, 2% Chlorhexidine Gluconate (CHG) antiseptic solution. The steps below outline the prepping process and should be carefully followed.        Use the prep cloth on the area that is circled in the diagram             Directions Night before Surgery  1) Shower using a fresh bar of anti-bacterial soap (such as Dial) and clean washcloth.  Use a clean towel to completely dry your skin.  2) Do not use any lotions, oils or creams on your skin.  3) Open the package and remove 1 cloth, wipe your skin for 30 seconds in a circular motion.  Allow to dry for 3  minutes.  4) Repeat #3 with second cloth.  5) Do not touch your eyes, ears, or mouth with the prep cloth.  6) Allow the wet prep solution to air dry.  7) Discard the prep cloth and wash your hands with soap and water.   8) Dress in clean bed clothes and sleep on fresh clean bed sheets.   9) You may experience some temporary itching after the prep.    Directions Day of Surgery  1) Repeat steps 1,2,3,4,5,6,7, and 9.   2) Dress in clean clothes before coming to the hospital.    BACTROBAN NASAL OINTMENT  There are many germs normally in your nose. Bactroban is an ointment that will help reduce these germs. Please follow these instructions for Bactroban use:    ____Two days before surgery in the evening Date________    ____The day before surgery in the morning  Date________    ____The day before surgery in the evening              Date________    ____The day of surgery in the morning    Date________    **Squirt ½ package of Bactroban Ointment onto a cotton applicator and apply to inside of 1st nostril.  Squirt the remaining Bactroban and apply to the inside of the other nostril.    PERIDEX- ORAL:  Use only if your surgeon has ordered  Use the night before and morning of surgery - Swish, gargle, and spit - do not swallow.

## 2017-01-27 NOTE — MR AVS SNAPSHOT
Kristan Worleyner   1/27/2017 8:00 AM   Appointment    Provider:  ROSE MALCOLM 5   Department:  [facility] PREADMISSION T   Dept Phone:  [phone]                Your Full Care Plan           To Do List     1/30/2017 11:00 AM     Appointment with Juan Antonio Anne MD at [facility] ([phone])   Arrive 15 minutes prior to appointment.   [address] [id number]       2/13/2017 9:10 AM     Appointment with Bravo Pascual MD at [facility] ([phone])   Arrive 15 minutes prior to appointment.   [address] [id number]       2/17/2017 9:30 AM     Appointment with Bravo Pascual MD at [facility] ([phone])   [address] [id number]            Your Updated Medication List          This list is accurate as of: 1/27/17  9:22 AM.  Always use your most recent med list.                alendronate 70 MG tablet   Commonly known as:  FOSAMAX   Take 1 tablet by mouth Every 7 (Seven) Days.       amiodarone 200 MG tablet   Commonly known as:  PACERONE       atorvastatin 80 MG tablet   Commonly known as:  LIPITOR   Take 1 tablet by mouth Daily.       baclofen 10 MG tablet   Commonly known as:  LIORESAL   Take 1 tablet by mouth Every Night.       carvedilol 12.5 MG tablet   Commonly known as:  COREG       dabigatran etexilate 150 MG capsu   Commonly known as:  PRADAXA       gabapentin 100 MG capsule   Commonly known as:  NEURONTIN       levothyroxine 50 MCG tablet   Commonly known as:  SYNTHROID, LEVOTHROID       lidocaine 5 %   Commonly known as:  LIDODERM   Place 1 patch on the skin Every 12 (Twelve) Hours. Remove & Discard patch within 12 hours or as directed by MD       montelukast 10 MG tablet   Commonly known as:  SINGULAIR   Take 1 tablet by mouth daily.  "      omeprazole 20 MG capsule   Commonly known as:  priLOSEC       spironolactone-hydrochlorothiazide 25-25 MG tablet   Commonly known as:  ALDACTAZIDE       traMADol 50 MG tablet   Commonly known as:  ULTRAM       traZODone 50 MG tablet   Commonly known as:  DESYREL   Take 1 tablet by mouth every night.               [employer] Signup     Our records indicate that your [facility] [employer] account has been deactivated. If you would like to reactivate your account, please email [email or web address] or call [phone] to talk to our [employer] staff.             Other Info from Your Visit           Allergies     Aspirin Swelling    Clarithromycin Other (See Comments)    BLISTERS AROUND MOTH  Also known as Bioxen     Codeine Swelling    Darvon [Propoxyphene] Swelling    Gatifloxacin Other (See Comments)    BLISTERS AROUND MOUTH  Also known Tequine     Levaquin [Levofloxacin] Other (See Comments)    BLISTERS AROUND MOUTH      Vital Signs     Blood Pressure Pulse Temperature Respirations Height Weight    99/76 (BP Location: Left arm, Patient Position: Sitting) 62 98.3 °F (36.8 °C) (Oral) 14 61\" (154.9 cm) 181 lb (82.1 kg)    Oxygen Saturation Body Mass Index Smoking Status             97% 34.2 kg/m2 Former Smoker           Discharge Instructions       Take the following medications the morning of surgery with a small sip of water.        General Instructions:  • Do not eat or drink after midnight: includes water, mints, or gum. You may brush your teeth.  • Do not smoke, chew tobacco, or drink alcohol.  • Bring medications in original bottles, any inhalers and if applicable your C-PAP/ BI-PAP machine.  • Bring any papers given to you in the doctor’s office.  • Wear clean comfortable clothes and socks.  • Do not wear contact lenses or make-up.  Bring a case for your glasses if applicable.   • Bring crutches or walker if applicable.  • Leave all other valuables and jewelry at home.    If you were given a blood bank " ID arm band remember to bring it with you the day of surgery.    Preventing a Surgical Site Infection:  Shower on the morning of surgery using a fresh bar of anti-bacterial soap (such as Dial) and clean washcloth.  Dry with a clean towel and dress in clean clothing.  For 2 to 3 days before surgery, avoid shaving with a razor near where you will have surgery because the razor can irritate skin and make it easier to develop an infection  Ask your surgeon if you will be receiving antibiotics prior to surgery  Make sure you, your family, and all healthcare providers clean their hands with soap and water or an alcohol based hand  before caring for you or your wound  If at all possible, quit smoking as many days before surgery as you can.    Day of surgery:  Upon arrival, a Pre-op nurse and Anesthesiologist will review your health history, obtain vital signs, and answer questions you may have.  The only belongings needed at this time will be your home medications and if applicable your C-PAP/BI-PAP machine.  If you are staying overnight your family can leave the rest of your belongings in the car and bring them to your room later.  A Pre-op nurse will start an IV and you may receive medication in preparation for surgery, including something to help you relax.  Your family will be able to see you in the Pre-op area.  While you are in surgery your family should notify the waiting room  if they leave the waiting room area and provide a contact phone number.    Please be aware that surgery does come with discomfort.  We want to make every effort to control your discomfort so please discuss any uncontrolled symptoms with your nurse.   Your doctor will most likely have prescribed pain medications.      If you are going home after surgery you will receive individualized written care instructions before being discharged.  A responsible adult must drive you to and from the hospital on the day of your surgery  and stay with you for 24 hours.    If you are staying overnight following surgery, you will be transported to your hospital room following the recovery period.  [facility] has all private rooms.    If you have any questions please call Pre-Admission Testing at [phone].  Deductibles and co-payments are collected on the day of service. Please be prepared to pay the required co-pay, deductible or deposit on the day of service as defined by your plan.    2% CHLORAHEXIDINE GLUCONATE* CLOTH  Preparing or “prepping” skin before surgery can reduce the risk of infection at the surgical site. To make the process easier, [facility] has chosen disposable cloths moistened with a rinse-free, 2% Chlorhexidine Gluconate (CHG) antiseptic solution. The steps below outline the prepping process and should be carefully followed.        Use the prep cloth on the area that is circled in the diagram             Directions Night before Surgery  1) Shower using a fresh bar of anti-bacterial soap (such as Dial) and clean washcloth.  Use a clean towel to completely dry your skin.  2) Do not use any lotions, oils or creams on your skin.  3) Open the package and remove 1 cloth, wipe your skin for 30 seconds in a circular motion.  Allow to dry for 3 minutes.  4) Repeat #3 with second cloth.  5) Do not touch your eyes, ears, or mouth with the prep cloth.  6) Allow the wet prep solution to air dry.  7) Discard the prep cloth and wash your hands with soap and water.   8) Dress in clean bed clothes and sleep on fresh clean bed sheets.   9) You may experience some temporary itching after the prep.    Directions Day of Surgery  1) Repeat steps 1,2,3,4,5,6,7, and 9.   2) Dress in clean clothes before coming to the hospital.    BACTROBAN NASAL OINTMENT  There are many germs normally in your nose. Bactroban is an ointment that will help reduce these germs. Please follow these instructions for Bactroban use:    ____Two days  before surgery in the evening Date________    ____The day before surgery in the morning  Date________    ____The day before surgery in the evening              Date________    ____The day of surgery in the morning    Date________    **Squirt ½ package of Bactroban Ointment onto a cotton applicator and apply to inside of 1st nostril.  Squirt the remaining Bactroban and apply to the inside of the other nostril.    PERIDEX- ORAL:  Use only if your surgeon has ordered  Use the night before and morning of surgery - Swish, gargle, and spit - do not swallow.       SYMPTOMS OF A STROKE    Call 911 or have someone take you to the Emergency Department if you have any of the following:    · Sudden numbness or weakness of your face, arm or leg especially on one side of the body  · Sudden confusion, diffiiculty speaking or trouble understanding   · Changes in your vision or loss of sight in one eye  · Sudden severe headache with no known cause  · sudden dizziness, trouble walking, loss of balance or coordination    It is important to seek emergency care right away if you have further stroke symptoms. If you get emergency help quickly, the powerful clot-dissolving medicines can reduce the disabilities caused by a stroke.     For more information:    American Stroke Association  5-589-2-STROKE  www.strokeassociation.org           IF YOU SMOKE OR USE TOBACCO PLEASE READ THE FOLLOWING:    Why is smoking bad for me?  Smoking increases the risk of heart disease, lung disease, vascular disease, stroke, and cancer.     If you smoke, STOP!    If you would like more information on quitting smoking, please visit the [employer] website: [email or web address]/[email or web address]   This link will provide additional resources including the QUIT line and the Beat the Pack support groups.     For more information:    American Cancer Society  ([phone] 145-5244    American Heart Association  1-994.453.7562

## 2017-01-28 LAB — BACTERIA SPEC AEROBE CULT: NORMAL

## 2017-01-30 ENCOUNTER — OFFICE VISIT (OUTPATIENT)
Dept: ORTHOPEDIC SURGERY | Facility: CLINIC | Age: 67
End: 2017-01-30

## 2017-01-30 VITALS — TEMPERATURE: 97.5 F | WEIGHT: 177 LBS | BODY MASS INDEX: 33.42 KG/M2 | HEIGHT: 61 IN

## 2017-01-30 DIAGNOSIS — M75.121 COMPLETE TEAR OF RIGHT ROTATOR CUFF: Primary | ICD-10-CM

## 2017-01-30 PROCEDURE — S0260 H&P FOR SURGERY: HCPCS | Performed by: ORTHOPAEDIC SURGERY

## 2017-01-30 NOTE — MR AVS SNAPSHOT
Kristan Galicia   1/30/2017 11:00 AM   Office Visit    Dept Phone:  [phone]   Encounter #:  [id number]    Provider:  Juan Antonio Anne MD   Department:  [facility]                Your Full Care Plan              Your Updated Medication List          This list is accurate as of: 1/30/17 11:55 AM.  Always use your most recent med list.                alendronate 70 MG tablet   Commonly known as:  FOSAMAX   Take 1 tablet by mouth Every 7 (Seven) Days.       amiodarone 200 MG tablet   Commonly known as:  PACERONE       atorvastatin 80 MG tablet   Commonly known as:  LIPITOR   Take 1 tablet by mouth Daily.       baclofen 10 MG tablet   Commonly known as:  LIORESAL   Take 1 tablet by mouth Every Night.       BACTROBAN NASAL 2 % nasal ointment   Generic drug:  mupirocin       carvedilol 12.5 MG tablet   Commonly known as:  COREG       Chlorhexidine Gluconate 0.5 % misc       dabigatran etexilate 150 MG capsu   Commonly known as:  PRADAXA       gabapentin 100 MG capsule   Commonly known as:  NEURONTIN       levothyroxine 50 MCG tablet   Commonly known as:  SYNTHROID, LEVOTHROID       lidocaine 5 %   Commonly known as:  LIDODERM   Place 1 patch on the skin Every 12 (Twelve) Hours. Remove & Discard patch within 12 hours or as directed by MD       montelukast 10 MG tablet   Commonly known as:  SINGULAIR   Take 1 tablet by mouth daily.       omeprazole 20 MG capsule   Commonly known as:  priLOSEC       spironolactone-hydrochlorothiazide 25-25 MG tablet   Commonly known as:  ALDACTAZIDE       traMADol 50 MG tablet   Commonly known as:  ULTRAM       traZODone 50 MG tablet   Commonly known as:  DESYREL   Take 1 tablet by mouth every night.               Instructions     None    Patient Instructions History      Upcoming Appointments     Visit Type Date Time Department    FOLLOW UP 1/30/2017 11:00 AM [facility]    FOLLOW UP 2/13/2017  9:10 AM  "[facility]    INJECTION 2/17/2017  9:30 AM [facility]      MyChart Signup     Our records indicate that your [facility] [employer] account has been deactivated. If you would like to reactivate your account, please email [email or web address] or call [phone] to talk to our [email or web address] staff.             Other Info from Your Visit           Your Appointments     Feb 13, 2017  9:10 AM EST   Follow Up with Bravo Pascual MD   [facility] (--)    [address] [address]   [phone]           Arrive 15 minutes prior to appointment.            Feb 17, 2017  9:30 AM EST   Injection with Bravo Pascual MD   [facility] (--)    [address] [address]   [phone]              Allergies     Aspirin Allergy Swelling    Clarithromycin Allergy Other (See Comments)    BLISTERS AROUND MOTH  Also known as Bioxen     Codeine Allergy Swelling    Darvon [Propoxyphene] Allergy Swelling    Gatifloxacin Allergy Other (See Comments)    BLISTERS AROUND MOUTH  Also known Tequine     Levaquin [Levofloxacin] Allergy Other (See Comments)    BLISTERS AROUND MOUTH      Reason for Visit     Right Shoulder - Follow-up, Pain           Vital Signs     Temperature Height Weight Body Mass Index Smoking Status       97.5 °F (36.4 °C) (Temporal Artery ) 61\" (154.9 cm) 177 lb (80.3 kg) 33.44 kg/m2 Former Smoker         "

## 2017-01-30 NOTE — PROGRESS NOTES
History & Physical         Patient: Kristan Galicia    YOB: 1950    Medical Record Number: [id number]    Chief Complaints:   Chief Complaint   Patient presents with   • Right Shoulder - Follow-up, Pain       History of Present Illness: 66 y.o. female presents with   Chief Complaint   Patient presents with   • Right Shoulder - Follow-up, Pain   . Reports a long history of progressively worsening pain, motion loss, and dysfunction.  Describes current pain as severe.  Has failed prolonged conservative treatment.  Denies any new complaints or issues.    Allergies:   Allergies   Allergen Reactions   • Aspirin Swelling   • Clarithromycin Other (See Comments)     BLISTERS AROUND MOTH  Also known as Bioxen    • Codeine Swelling   • Darvon [Propoxyphene] Swelling   • Gatifloxacin Other (See Comments)     BLISTERS AROUND MOUTH  Also known Tequine    • Levaquin [Levofloxacin] Other (See Comments)     BLISTERS AROUND MOUTH       Medications:   Home Medications:    Current Outpatient Prescriptions:   •  alendronate (FOSAMAX) 70 MG tablet, Take 1 tablet by mouth Every 7 (Seven) Days., Disp: 12 tablet, Rfl: 3  •  amiodarone (PACERONE) 200 MG tablet, Take 100 mg by mouth Every Night., Disp: , Rfl:   •  atorvastatin (LIPITOR) 80 MG tablet, Take 1 tablet by mouth Daily. (Patient taking differently: Take 80 mg by mouth Every Night.), Disp: 30 tablet, Rfl: 5  •  carvedilol (COREG) 12.5 MG tablet, Take 12.5 mg by mouth 2 (Two) Times a Day., Disp: , Rfl:   •  dabigatran etexilate (PRADAXA) 150 MG capsu, Take 150 mg by mouth 2 (Two) Times a Day., Disp: , Rfl:   •  gabapentin (NEURONTIN) 100 MG capsule, Take 100 mg by mouth., Disp: , Rfl:   •  levothyroxine (SYNTHROID, LEVOTHROID) 50 MCG tablet, Take 50 mcg by mouth Every Morning., Disp: , Rfl:   •  lidocaine (LIDODERM) 5 %, Place 1 patch on the skin Every 12 (Twelve) Hours. Remove & Discard patch within 12 hours or as directed by MD (Patient taking differently: Place 1  patch on the skin Daily. On 12 hrs, off 12 hr), Disp: 60 patch, Rfl: 5  •  montelukast (SINGULAIR) 10 MG tablet, Take 1 tablet by mouth daily. (Patient taking differently: Take 10 mg by mouth Every Night.), Disp: 90 tablet, Rfl: 2  •  mupirocin (BACTROBAN NASAL) 2 % nasal ointment, into each nostril. As directed pre-op, Disp: , Rfl:   •  omeprazole (PriLOSEC) 20 MG capsule, Take 20 mg by mouth Every Morning., Disp: , Rfl:   •  traMADol (ULTRAM) 50 MG tablet, Take 50 mg by mouth., Disp: , Rfl:   •  traZODone (DESYREL) 50 MG tablet, Take 1 tablet by mouth every night. (Patient taking differently: Take 50 mg by mouth At Night As Needed.), Disp: 30 tablet, Rfl: 5  •  baclofen (LIORESAL) 10 MG tablet, Take 1 tablet by mouth Every Night. (Patient taking differently: Take 10 mg by mouth At Night As Needed.), Disp: 30 tablet, Rfl: 5  •  Chlorhexidine Gluconate 0.5 % misc, Apply  topically. As directed pre-op, Disp: , Rfl:   •  spironolactone-hydrochlorothiazide (ALDACTAZIDE) 25-25 MG tablet, Take 1 tablet by mouth Every Morning., Disp: , Rfl:     Past Medical History   Diagnosis Date   • Atrial fibrillation    • Cardiac defibrillator in place 01/2009     medtronic    • Cardiomyopathy      Hypertrophic Cardiomyopathy   • CHF (congestive heart failure)    • Diverticulosis      hx diverticulitis   • GERD (gastroesophageal reflux disease)    • Heart murmur    • History of depression    • History of tumor      left ocular tumor, treated with steroids   • Hyperlipidemia    • Migraines    • Osteoporosis    • Palpitations    • Sleep apnea      does not use CPAP or BiPAP, used nose strips   • Thyroid disease    • Vasovagal syncope    • Vertigo    • Vitamin D deficiency           Past Surgical History   Procedure Laterality Date   • Laparoscopic cholecystectomy  1985   • Colonoscopy  1996     Dr. Herbert Gonsales    • Appendectomy  1970   • Hysterectomy  1989     Total   • Cardiac defibrillator placement Left 1999     Dr. Ya   •  Breast reconstruction Bilateral      Reduction   • Knee arthroscopy Bilateral 2014     Dr. Li   • Femur surgery Left      with metal implant  x3   • Shoulder arthroscopy w/ rotator cuff repair Bilateral 05/20/2011   • Tonsillectomy     • Cataract extraction Bilateral    • Breast biopsy Bilateral    • Colonoscopy  8/16/2016     Procedure: COLONOSCOPY with cecum;  Surgeon: Rosalio Sheikh MD;  [facility]: [facility] ENDOSCOPY;  Service:    • Rotator cuff repair Right 06/10/2010   • Knee surgery Right 06/10/2010   • Cardiac catheterization  02/19/2007   • Neck surgery  08/23/2012     Anterior cervical diskectomy and fusion with VG2 allograft implants and eagle anterior plate fixation,C5-C6 C6-C7   • First rib resection Bilateral      and scalenectomy          Social History     Occupational History   •       Social History Main Topics   • Smoking status: Former Smoker     Packs/day: 0.50     Years: 17.00     Quit date: 9/8/1985   • Smokeless tobacco: Never Used   • Alcohol use No   • Drug use: No   • Sexual activity: Defer      Social History     Social History Narrative          Family History   Problem Relation Age of Onset   • Heart attack Brother    • Hyperthyroidism Mother    • Cancer Mother    • Heart disease Mother    • Osteoporosis Mother    • No Known Problems Father       car accident   • Cirrhosis Maternal Grandmother    • No Known Problems Maternal Grandfather    • No Known Problems Paternal Grandmother    • No Known Problems Paternal Grandfather    • Breast cancer Maternal Aunt        Review of Systems:     Constitutional:  Denies fever, shaking or chills   Eyes:  Denies change in visual acuity   HEENT:  Denies nasal congestion or sore throat   Respiratory:  Denies cough or shortness of breath   Cardiovascular:  Denies chest pain or edema   GI:  Denies abdominal pain, nausea, vomiting, bloody stools or diarrhea   Musculoskeletal:  Denies numbness tingling or loss of motor function  except as outlined above in history of present illness.  Integument:  Denies rash, lesion or ulceration   Neurologic:  Denies headache or focal weakness, deficits      All other pertinent positives and negatives as noted above in HPI.    Physical Exam: 66 y.o. female  General:  Patient is awake and alert.  Appears in no acute distress or discomfort.    Psych:  Affect and demeanor are appropriate.    Eyes:  Conjunctiva and sclera appear grossly normal.  Eyes track well and EOM seem to be intact.    Ears:  No gross abnormalities.  Hearing adequate for the exam.    Cardiovascular:  Regular rate and rhythm.    Lungs:  Good chest expansion.  Breathing unlabored.    Lymph:  No palpable adenopathy about neck or axilla.    Neck:  Supple.  Normal ROM.  Negative Spurling's for shoulder or arm pain.    Right upper extremity:  Skin benign and intact without evidence for swelling, masses or atrophy.  No palpable masses.  No focal areas of exquisite tenderness.  Shoulder ROM limited and uncomfortable, forward elevation is to 60°, external rotation 25°, internal rotation to back pocket.  Passive motion is better and she can maintain her arm at about 160° if I raise it passively..  No evident instability or apprehension.  Hornblowers negative.  ER lag sign positive.  Good strength in deltoid, wrist and hand.  Intact sensation in arm, hand.  Palpable radial pulse with brisk cap refill.    Diagnostic Tests:  Lab Results   Component Value Date    GLUCOSE 151 (H) 01/27/2017    CALCIUM 8.6 01/27/2017     01/27/2017    K 3.9 01/27/2017    CO2 26.0 01/27/2017     01/27/2017    BUN 20 01/27/2017    CREATININE 0.94 01/27/2017    EGFRIFNONA 60 (L) 01/27/2017    BCR 21.3 01/27/2017    ANIONGAP 14.0 01/27/2017     Lab Results   Component Value Date    WBC 10.78 (H) 01/27/2017    HGB 11.0 (L) 01/27/2017    HCT 35.2 (L) 01/27/2017    MCV 90.0 01/27/2017     01/27/2017     X-rays: Previous x-rays and CT scan of the right  shoulder are again reviewed.  The x-rays show a retained metal anchor consistent with her history of previous rotator cuff repair.  There are no significant glenohumeral degenerative changes.  Acromiohumeral interval looks normal.  The CT scan really tells the story.  She has what appears to be a recurrent massive retracted rotator cuff tear with moderate atrophy.    Assessment:  Right shoulder chronic, irreparable rotator cuff tear    Plan: We had a thorough discussion regarding the risks, benefits and alternatives to an arthroplasty and non-surgical management versus surgery.  I explained that surgical risks include infection, hematoma, hardware related complications including failure of fixation, loosening, fracture, persistent pain and/or loss of motion, iatrogenic nerve and/or blood vessel injury resulting in permanent weakness, numbness or dysfunction, DVT, PE, positioning related neuropraxia, and anesthesia related complications resulting in death.  We discussed the indication for a reverse as opposed to a standard total shoulder and the risks inherent to the reverse including notching, glenoid loosening, instability, and traction related neuropraxia, any of which could result in persistent pain or problems requiring further surgery.  Lastly, we discussed the rehab and all that will be expected of the patient post operatively to ensure an optimal outcome.  The patient voiced understanding of the risks, benefits, and alternative forms of treatment that were discussed and the patient consents to proceed with the reverse arthroplasty.        Date: 1/30/2017    Juan Antonio Anne MD

## 2017-02-02 ENCOUNTER — ANESTHESIA (OUTPATIENT)
Dept: PERIOP | Facility: HOSPITAL | Age: 67
End: 2017-02-02

## 2017-02-02 ENCOUNTER — ANESTHESIA EVENT (OUTPATIENT)
Dept: PERIOP | Facility: HOSPITAL | Age: 67
End: 2017-02-02

## 2017-02-02 ENCOUNTER — APPOINTMENT (OUTPATIENT)
Dept: GENERAL RADIOLOGY | Facility: HOSPITAL | Age: 67
End: 2017-02-02

## 2017-02-02 ENCOUNTER — HOSPITAL ENCOUNTER (INPATIENT)
Facility: HOSPITAL | Age: 67
LOS: 2 days | Discharge: HOME | End: 2017-02-04
Attending: ORTHOPAEDIC SURGERY | Admitting: ORTHOPAEDIC SURGERY

## 2017-02-02 DIAGNOSIS — M75.101 TEAR OF RIGHT ROTATOR CUFF, UNSPECIFIED TEAR EXTENT: Primary | ICD-10-CM

## 2017-02-02 PROCEDURE — C1776 JOINT DEVICE (IMPLANTABLE): HCPCS | Performed by: ORTHOPAEDIC SURGERY

## 2017-02-02 PROCEDURE — 0RRJ00Z REPLACEMENT OF RIGHT SHOULDER JOINT WITH REVERSE BALL AND SOCKET SYNTHETIC SUBSTITUTE, OPEN APPROACH: ICD-10-PCS | Performed by: ORTHOPAEDIC SURGERY

## 2017-02-02 PROCEDURE — 25010000002 KETOROLAC TROMETHAMINE PER 15 MG: Performed by: ORTHOPAEDIC SURGERY

## 2017-02-02 PROCEDURE — 23472 RECONSTRUCT SHOULDER JOINT: CPT | Performed by: ORTHOPAEDIC SURGERY

## 2017-02-02 PROCEDURE — 25010000003 CEFAZOLIN IN DEXTROSE 2-4 GM/100ML-% SOLUTION: Performed by: ORTHOPAEDIC SURGERY

## 2017-02-02 PROCEDURE — 25010000002 PHENYLEPHRINE PER 1 ML: Performed by: NURSE ANESTHETIST, CERTIFIED REGISTERED

## 2017-02-02 PROCEDURE — A4565 SLINGS: HCPCS | Performed by: ORTHOPAEDIC SURGERY

## 2017-02-02 PROCEDURE — 25010000002 FENTANYL CITRATE (PF) 100 MCG/2ML SOLUTION: Performed by: NURSE ANESTHETIST, CERTIFIED REGISTERED

## 2017-02-02 PROCEDURE — 25010000002 VANCOMYCIN: Performed by: ORTHOPAEDIC SURGERY

## 2017-02-02 PROCEDURE — 73020 X-RAY EXAM OF SHOULDER: CPT

## 2017-02-02 PROCEDURE — 25010000002 FENTANYL CITRATE (PF) 100 MCG/2ML SOLUTION: Performed by: ANESTHESIOLOGY

## 2017-02-02 PROCEDURE — 25010000002 MIDAZOLAM PER 1 MG: Performed by: ANESTHESIOLOGY

## 2017-02-02 PROCEDURE — 25010000002 NEOSTIGMINE 10 MG/10ML SOLUTION: Performed by: NURSE ANESTHETIST, CERTIFIED REGISTERED

## 2017-02-02 PROCEDURE — 25010000002 PROPOFOL 10 MG/ML EMULSION: Performed by: NURSE ANESTHETIST, CERTIFIED REGISTERED

## 2017-02-02 DEVICE — BASEPLT GLEN COMPR MINI W TPR ADAPTR 25: Type: IMPLANTABLE DEVICE | Status: FUNCTIONAL

## 2017-02-02 DEVICE — SCRW FIX LK HEX 4.75X20MM: Type: IMPLANTABLE DEVICE | Status: FUNCTIONAL

## 2017-02-02 DEVICE — SCRW FIX LK HEX 4.75X25MM: Type: IMPLANTABLE DEVICE | Status: FUNCTIONAL

## 2017-02-02 DEVICE — STEM HUM/SHLDR COMPREHENSIVE MINI 7X83MM: Type: IMPLANTABLE DEVICE | Status: FUNCTIONAL

## 2017-02-02 DEVICE — TRY HUM STD COBLT 44MM: Type: IMPLANTABLE DEVICE | Status: FUNCTIONAL

## 2017-02-02 DEVICE — IMPLANTABLE DEVICE: Type: IMPLANTABLE DEVICE | Status: FUNCTIONAL

## 2017-02-02 DEVICE — GLENOSPHERE VERSA DIAL FIX STD 36MM: Type: IMPLANTABLE DEVICE | Status: FUNCTIONAL

## 2017-02-02 DEVICE — SCRW COMPRNSV CNTRL 6.5X30MM REUS: Type: IMPLANTABLE DEVICE | Status: FUNCTIONAL

## 2017-02-02 DEVICE — BEAR HUM COMPRNSV ARCOMXL STD 44 36: Type: IMPLANTABLE DEVICE | Status: FUNCTIONAL

## 2017-02-02 DEVICE — SCRW FIX LK HEX 4.75X15MM: Type: IMPLANTABLE DEVICE | Status: FUNCTIONAL

## 2017-02-02 RX ORDER — GLYCOPYRROLATE 0.2 MG/ML
INJECTION INTRAMUSCULAR; INTRAVENOUS AS NEEDED
Status: DISCONTINUED | OUTPATIENT
Start: 2017-02-02 | End: 2017-02-02 | Stop reason: SURG

## 2017-02-02 RX ORDER — AMIODARONE HYDROCHLORIDE 100 MG/1
100 TABLET ORAL NIGHTLY
Status: DISCONTINUED | OUTPATIENT
Start: 2017-02-02 | End: 2017-02-04 | Stop reason: HOSPADM

## 2017-02-02 RX ORDER — ATORVASTATIN CALCIUM 80 MG/1
80 TABLET, FILM COATED ORAL NIGHTLY
Status: DISCONTINUED | OUTPATIENT
Start: 2017-02-02 | End: 2017-02-04 | Stop reason: HOSPADM

## 2017-02-02 RX ORDER — FENTANYL CITRATE 50 UG/ML
50 INJECTION, SOLUTION INTRAMUSCULAR; INTRAVENOUS
Status: DISCONTINUED | OUTPATIENT
Start: 2017-02-02 | End: 2017-02-02 | Stop reason: HOSPADM

## 2017-02-02 RX ORDER — BISACODYL 10 MG
10 SUPPOSITORY, RECTAL RECTAL DAILY PRN
Status: DISCONTINUED | OUTPATIENT
Start: 2017-02-02 | End: 2017-02-04 | Stop reason: HOSPADM

## 2017-02-02 RX ORDER — MAGNESIUM HYDROXIDE 1200 MG/15ML
LIQUID ORAL AS NEEDED
Status: DISCONTINUED | OUTPATIENT
Start: 2017-02-02 | End: 2017-02-02 | Stop reason: HOSPADM

## 2017-02-02 RX ORDER — MIDAZOLAM HYDROCHLORIDE 1 MG/ML
2 INJECTION INTRAMUSCULAR; INTRAVENOUS
Status: DISCONTINUED | OUTPATIENT
Start: 2017-02-02 | End: 2017-02-02 | Stop reason: HOSPADM

## 2017-02-02 RX ORDER — PROMETHAZINE HYDROCHLORIDE 25 MG/1
25 SUPPOSITORY RECTAL ONCE AS NEEDED
Status: DISCONTINUED | OUTPATIENT
Start: 2017-02-02 | End: 2017-02-02 | Stop reason: HOSPADM

## 2017-02-02 RX ORDER — PROMETHAZINE HYDROCHLORIDE 25 MG/ML
12.5 INJECTION, SOLUTION INTRAMUSCULAR; INTRAVENOUS ONCE AS NEEDED
Status: DISCONTINUED | OUTPATIENT
Start: 2017-02-02 | End: 2017-02-02 | Stop reason: HOSPADM

## 2017-02-02 RX ORDER — LEVOTHYROXINE SODIUM 0.05 MG/1
50 TABLET ORAL
Status: DISCONTINUED | OUTPATIENT
Start: 2017-02-03 | End: 2017-02-04 | Stop reason: HOSPADM

## 2017-02-02 RX ORDER — BUPIVACAINE HYDROCHLORIDE AND EPINEPHRINE 5; 5 MG/ML; UG/ML
INJECTION, SOLUTION EPIDURAL; INTRACAUDAL; PERINEURAL AS NEEDED
Status: DISCONTINUED | OUTPATIENT
Start: 2017-02-02 | End: 2017-02-02 | Stop reason: SURG

## 2017-02-02 RX ORDER — DIPHENHYDRAMINE HYDROCHLORIDE 50 MG/ML
25 INJECTION INTRAMUSCULAR; INTRAVENOUS EVERY 6 HOURS PRN
Status: DISCONTINUED | OUTPATIENT
Start: 2017-02-02 | End: 2017-02-04 | Stop reason: HOSPADM

## 2017-02-02 RX ORDER — SODIUM CHLORIDE 9 MG/ML
100 INJECTION, SOLUTION INTRAVENOUS CONTINUOUS
Status: DISCONTINUED | OUTPATIENT
Start: 2017-02-02 | End: 2017-02-04 | Stop reason: HOSPADM

## 2017-02-02 RX ORDER — PROPOFOL 10 MG/ML
VIAL (ML) INTRAVENOUS AS NEEDED
Status: DISCONTINUED | OUTPATIENT
Start: 2017-02-02 | End: 2017-02-02 | Stop reason: SURG

## 2017-02-02 RX ORDER — ONDANSETRON 2 MG/ML
4 INJECTION INTRAMUSCULAR; INTRAVENOUS EVERY 6 HOURS PRN
Status: DISCONTINUED | OUTPATIENT
Start: 2017-02-02 | End: 2017-02-04 | Stop reason: HOSPADM

## 2017-02-02 RX ORDER — OXYCODONE AND ACETAMINOPHEN 7.5; 325 MG/1; MG/1
2 TABLET ORAL EVERY 4 HOURS PRN
Status: DISCONTINUED | OUTPATIENT
Start: 2017-02-02 | End: 2017-02-03

## 2017-02-02 RX ORDER — MONTELUKAST SODIUM 10 MG/1
10 TABLET ORAL NIGHTLY
Status: DISCONTINUED | OUTPATIENT
Start: 2017-02-02 | End: 2017-02-04 | Stop reason: HOSPADM

## 2017-02-02 RX ORDER — GABAPENTIN 100 MG/1
100 CAPSULE ORAL NIGHTLY
Status: DISCONTINUED | OUTPATIENT
Start: 2017-02-02 | End: 2017-02-04 | Stop reason: HOSPADM

## 2017-02-02 RX ORDER — ONDANSETRON 4 MG/1
4 TABLET, FILM COATED ORAL EVERY 6 HOURS PRN
Status: DISCONTINUED | OUTPATIENT
Start: 2017-02-02 | End: 2017-02-04 | Stop reason: HOSPADM

## 2017-02-02 RX ORDER — SODIUM CHLORIDE, SODIUM LACTATE, POTASSIUM CHLORIDE, CALCIUM CHLORIDE 600; 310; 30; 20 MG/100ML; MG/100ML; MG/100ML; MG/100ML
9 INJECTION, SOLUTION INTRAVENOUS CONTINUOUS
Status: DISCONTINUED | OUTPATIENT
Start: 2017-02-02 | End: 2017-02-02 | Stop reason: HOSPADM

## 2017-02-02 RX ORDER — CARVEDILOL 12.5 MG/1
12.5 TABLET ORAL 2 TIMES DAILY
Status: DISCONTINUED | OUTPATIENT
Start: 2017-02-02 | End: 2017-02-04 | Stop reason: HOSPADM

## 2017-02-02 RX ORDER — CEFAZOLIN SODIUM 2 G/100ML
2 INJECTION, SOLUTION INTRAVENOUS ONCE
Status: COMPLETED | OUTPATIENT
Start: 2017-02-02 | End: 2017-02-02

## 2017-02-02 RX ORDER — ACETAMINOPHEN 325 MG/1
325 TABLET ORAL EVERY 4 HOURS PRN
Status: DISCONTINUED | OUTPATIENT
Start: 2017-02-02 | End: 2017-02-04 | Stop reason: HOSPADM

## 2017-02-02 RX ORDER — PROMETHAZINE HYDROCHLORIDE 25 MG/1
25 TABLET ORAL ONCE AS NEEDED
Status: DISCONTINUED | OUTPATIENT
Start: 2017-02-02 | End: 2017-02-02 | Stop reason: HOSPADM

## 2017-02-02 RX ORDER — DIPHENHYDRAMINE HCL 25 MG
25 CAPSULE ORAL EVERY 6 HOURS PRN
Status: DISCONTINUED | OUTPATIENT
Start: 2017-02-02 | End: 2017-02-04 | Stop reason: HOSPADM

## 2017-02-02 RX ORDER — MIDAZOLAM HYDROCHLORIDE 1 MG/ML
1 INJECTION INTRAMUSCULAR; INTRAVENOUS
Status: DISCONTINUED | OUTPATIENT
Start: 2017-02-02 | End: 2017-02-02 | Stop reason: HOSPADM

## 2017-02-02 RX ORDER — CEFAZOLIN SODIUM 2 G/100ML
2 INJECTION, SOLUTION INTRAVENOUS EVERY 8 HOURS
Status: COMPLETED | OUTPATIENT
Start: 2017-02-02 | End: 2017-02-03

## 2017-02-02 RX ORDER — LABETALOL HYDROCHLORIDE 5 MG/ML
5 INJECTION, SOLUTION INTRAVENOUS
Status: DISCONTINUED | OUTPATIENT
Start: 2017-02-02 | End: 2017-02-02 | Stop reason: HOSPADM

## 2017-02-02 RX ORDER — ONDANSETRON 2 MG/ML
4 INJECTION INTRAMUSCULAR; INTRAVENOUS ONCE AS NEEDED
Status: DISCONTINUED | OUTPATIENT
Start: 2017-02-02 | End: 2017-02-02 | Stop reason: HOSPADM

## 2017-02-02 RX ORDER — KETOROLAC TROMETHAMINE 30 MG/ML
30 INJECTION, SOLUTION INTRAMUSCULAR; INTRAVENOUS EVERY 6 HOURS PRN
Status: DISPENSED | OUTPATIENT
Start: 2017-02-02 | End: 2017-02-03

## 2017-02-02 RX ORDER — HYDRALAZINE HYDROCHLORIDE 20 MG/ML
5 INJECTION INTRAMUSCULAR; INTRAVENOUS
Status: DISCONTINUED | OUTPATIENT
Start: 2017-02-02 | End: 2017-02-02 | Stop reason: HOSPADM

## 2017-02-02 RX ORDER — SODIUM CHLORIDE 0.9 % (FLUSH) 0.9 %
1-10 SYRINGE (ML) INJECTION AS NEEDED
Status: DISCONTINUED | OUTPATIENT
Start: 2017-02-02 | End: 2017-02-02 | Stop reason: HOSPADM

## 2017-02-02 RX ORDER — ROCURONIUM BROMIDE 10 MG/ML
INJECTION, SOLUTION INTRAVENOUS AS NEEDED
Status: DISCONTINUED | OUTPATIENT
Start: 2017-02-02 | End: 2017-02-02 | Stop reason: SURG

## 2017-02-02 RX ORDER — BACLOFEN 10 MG/1
10 TABLET ORAL NIGHTLY
Status: DISCONTINUED | OUTPATIENT
Start: 2017-02-02 | End: 2017-02-04 | Stop reason: HOSPADM

## 2017-02-02 RX ORDER — SPIRONOLACTONE AND HYDROCHLOROTHIAZIDE 25; 25 MG/1; MG/1
1 TABLET ORAL EVERY MORNING
Status: DISCONTINUED | OUTPATIENT
Start: 2017-02-03 | End: 2017-02-04 | Stop reason: HOSPADM

## 2017-02-02 RX ORDER — FAMOTIDINE 10 MG/ML
20 INJECTION, SOLUTION INTRAVENOUS ONCE
Status: COMPLETED | OUTPATIENT
Start: 2017-02-02 | End: 2017-02-02

## 2017-02-02 RX ORDER — TRAZODONE HYDROCHLORIDE 50 MG/1
50 TABLET ORAL NIGHTLY
Status: DISCONTINUED | OUTPATIENT
Start: 2017-02-02 | End: 2017-02-04 | Stop reason: HOSPADM

## 2017-02-02 RX ORDER — NALOXONE HCL 0.4 MG/ML
0.1 VIAL (ML) INJECTION
Status: DISCONTINUED | OUTPATIENT
Start: 2017-02-02 | End: 2017-02-04 | Stop reason: HOSPADM

## 2017-02-02 RX ORDER — DOCUSATE SODIUM 100 MG/1
100 CAPSULE, LIQUID FILLED ORAL 2 TIMES DAILY PRN
Status: DISCONTINUED | OUTPATIENT
Start: 2017-02-02 | End: 2017-02-04 | Stop reason: HOSPADM

## 2017-02-02 RX ORDER — FLUMAZENIL 0.1 MG/ML
0.2 INJECTION INTRAVENOUS AS NEEDED
Status: DISCONTINUED | OUTPATIENT
Start: 2017-02-02 | End: 2017-02-02 | Stop reason: HOSPADM

## 2017-02-02 RX ORDER — ACETAMINOPHEN 325 MG/1
650 TABLET ORAL EVERY 4 HOURS PRN
Status: DISCONTINUED | OUTPATIENT
Start: 2017-02-02 | End: 2017-02-04 | Stop reason: HOSPADM

## 2017-02-02 RX ORDER — NALOXONE HCL 0.4 MG/ML
0.2 VIAL (ML) INJECTION AS NEEDED
Status: DISCONTINUED | OUTPATIENT
Start: 2017-02-02 | End: 2017-02-02 | Stop reason: HOSPADM

## 2017-02-02 RX ORDER — PANTOPRAZOLE SODIUM 40 MG/1
40 TABLET, DELAYED RELEASE ORAL
Status: DISCONTINUED | OUTPATIENT
Start: 2017-02-03 | End: 2017-02-04 | Stop reason: HOSPADM

## 2017-02-02 RX ORDER — NEOSTIGMINE METHYLSULFATE 1 MG/ML
INJECTION, SOLUTION INTRAVENOUS AS NEEDED
Status: DISCONTINUED | OUTPATIENT
Start: 2017-02-02 | End: 2017-02-02 | Stop reason: SURG

## 2017-02-02 RX ORDER — LIDOCAINE HYDROCHLORIDE 20 MG/ML
INJECTION, SOLUTION INFILTRATION; PERINEURAL AS NEEDED
Status: DISCONTINUED | OUTPATIENT
Start: 2017-02-02 | End: 2017-02-02 | Stop reason: SURG

## 2017-02-02 RX ORDER — DIPHENHYDRAMINE HYDROCHLORIDE 50 MG/ML
12.5 INJECTION INTRAMUSCULAR; INTRAVENOUS
Status: DISCONTINUED | OUTPATIENT
Start: 2017-02-02 | End: 2017-02-02 | Stop reason: HOSPADM

## 2017-02-02 RX ADMIN — PROPOFOL 170 MG: 10 INJECTION, EMULSION INTRAVENOUS at 14:23

## 2017-02-02 RX ADMIN — KETOROLAC TROMETHAMINE 30 MG: 30 INJECTION, SOLUTION INTRAMUSCULAR at 22:32

## 2017-02-02 RX ADMIN — PHENYLEPHRINE HYDROCHLORIDE 200 MCG: 10 INJECTION INTRAVENOUS at 15:21

## 2017-02-02 RX ADMIN — LIDOCAINE HYDROCHLORIDE 80 MG: 20 INJECTION, SOLUTION INFILTRATION; PERINEURAL at 14:23

## 2017-02-02 RX ADMIN — OXYCODONE HYDROCHLORIDE AND ACETAMINOPHEN 2 TABLET: 7.5; 325 TABLET ORAL at 22:32

## 2017-02-02 RX ADMIN — GLYCOPYRROLATE 0.4 MG: 0.2 INJECTION INTRAMUSCULAR; INTRAVENOUS at 15:34

## 2017-02-02 RX ADMIN — OXYCODONE HYDROCHLORIDE AND ACETAMINOPHEN 2 TABLET: 7.5; 325 TABLET ORAL at 19:00

## 2017-02-02 RX ADMIN — FENTANYL CITRATE 50 MCG: 50 INJECTION INTRAMUSCULAR; INTRAVENOUS at 13:43

## 2017-02-02 RX ADMIN — AMIODARONE HYDROCHLORIDE 100 MG: 100 TABLET ORAL at 22:35

## 2017-02-02 RX ADMIN — EPHEDRINE SULFATE 10 MG: 50 INJECTION INTRAMUSCULAR; INTRAVENOUS; SUBCUTANEOUS at 14:41

## 2017-02-02 RX ADMIN — SODIUM CHLORIDE, POTASSIUM CHLORIDE, SODIUM LACTATE AND CALCIUM CHLORIDE 9 ML/HR: 600; 310; 30; 20 INJECTION, SOLUTION INTRAVENOUS at 13:19

## 2017-02-02 RX ADMIN — TRAZODONE HYDROCHLORIDE 50 MG: 50 TABLET ORAL at 22:33

## 2017-02-02 RX ADMIN — EPHEDRINE SULFATE 10 MG: 50 INJECTION INTRAMUSCULAR; INTRAVENOUS; SUBCUTANEOUS at 14:29

## 2017-02-02 RX ADMIN — NEOSTIGMINE METHYLSULFATE 2 MG: 1 INJECTION INTRAVENOUS at 15:34

## 2017-02-02 RX ADMIN — FENTANYL CITRATE 50 MCG: 50 INJECTION INTRAMUSCULAR; INTRAVENOUS at 17:14

## 2017-02-02 RX ADMIN — CEFAZOLIN SODIUM 2 G: 2 INJECTION, SOLUTION INTRAVENOUS at 14:43

## 2017-02-02 RX ADMIN — ATORVASTATIN CALCIUM 80 MG: 80 TABLET, FILM COATED ORAL at 22:33

## 2017-02-02 RX ADMIN — FENTANYL CITRATE 50 MCG: 50 INJECTION INTRAMUSCULAR; INTRAVENOUS at 13:49

## 2017-02-02 RX ADMIN — CEFAZOLIN SODIUM 2 G: 2 INJECTION, SOLUTION INTRAVENOUS at 22:35

## 2017-02-02 RX ADMIN — VANCOMYCIN HYDROCHLORIDE 1250 MG: 1 INJECTION, POWDER, LYOPHILIZED, FOR SOLUTION INTRAVENOUS at 13:54

## 2017-02-02 RX ADMIN — PHENYLEPHRINE HYDROCHLORIDE 100 MCG: 10 INJECTION INTRAVENOUS at 15:27

## 2017-02-02 RX ADMIN — BUPIVACAINE HYDROCHLORIDE AND EPINEPHRINE BITARTRATE 30 ML: 5; .0091 INJECTION, SOLUTION EPIDURAL; INTRACAUDAL; PERINEURAL at 13:41

## 2017-02-02 RX ADMIN — MIDAZOLAM 2 MG: 1 INJECTION INTRAMUSCULAR; INTRAVENOUS at 13:19

## 2017-02-02 RX ADMIN — EPHEDRINE SULFATE 10 MG: 50 INJECTION INTRAMUSCULAR; INTRAVENOUS; SUBCUTANEOUS at 15:02

## 2017-02-02 RX ADMIN — ROCURONIUM BROMIDE 30 MG: 10 INJECTION INTRAVENOUS at 14:23

## 2017-02-02 RX ADMIN — MONTELUKAST 10 MG: 10 TABLET, FILM COATED ORAL at 22:33

## 2017-02-02 RX ADMIN — MIDAZOLAM 1 MG: 1 INJECTION INTRAMUSCULAR; INTRAVENOUS at 13:43

## 2017-02-02 RX ADMIN — PHENYLEPHRINE HYDROCHLORIDE 100 MCG: 10 INJECTION INTRAVENOUS at 15:17

## 2017-02-02 RX ADMIN — PHENYLEPHRINE HYDROCHLORIDE 200 MCG: 10 INJECTION INTRAVENOUS at 15:06

## 2017-02-02 RX ADMIN — PHENYLEPHRINE HYDROCHLORIDE 200 MCG: 10 INJECTION INTRAVENOUS at 15:11

## 2017-02-02 RX ADMIN — EPHEDRINE SULFATE 10 MG: 50 INJECTION INTRAMUSCULAR; INTRAVENOUS; SUBCUTANEOUS at 14:51

## 2017-02-02 RX ADMIN — FAMOTIDINE 20 MG: 10 INJECTION, SOLUTION INTRAVENOUS at 13:19

## 2017-02-02 RX ADMIN — MIDAZOLAM 1 MG: 1 INJECTION INTRAMUSCULAR; INTRAVENOUS at 13:49

## 2017-02-02 RX ADMIN — GABAPENTIN 100 MG: 100 CAPSULE ORAL at 22:33

## 2017-02-02 NOTE — BRIEF OP NOTE
TOTAL SHOULDER REVERSE ARTHROPLASTY  Procedure Note    Kristan Worleyner  2/2/2017    Pre-op Diagnosis:   Complete tear of right rotator cuff [M75.121]    Post-op Diagnosis:     Post-Op Diagnosis Codes:     * Complete tear of right rotator cuff [M75.121]    Procedure/CPT® Codes:      Procedure(s):  TOTAL SHOULDER REVERSE ARTHROPLASTY    Surgeon(s):  Juan Antonio Anne MD    Anesthesia: General with Block    Staff:   Circulator: Pacheco Edgar RN; Florence Earl RN  Scrub Person: Munira Emerson  Vendor Representative: Mago Naqvi  Assistant: Nabeel Noel CSA    Estimated Blood Loss: * No values recorded between 2/2/2017  2:14 PM and 2/2/2017  3:30 PM *    Specimens:                * No specimens in log *      Drains:           Findings: see dictation    Complications: none      Juan Antonio Anne MD     Date: 2/2/2017  Time: 3:30 PM

## 2017-02-02 NOTE — OP NOTE
Orthopaedic Operative Note    Facility: [facility]    Patient: Kristan Galicia  Medical Record Number: [id number]  YOB: 1950  Dictating Surgeon: Juan Antonio Anne M.D.  Primary Care Physician: No Known Provider    Date of Operation:  2/2/2017    PREOPERATIVE DIAGNOSIS:  Right shoulder chronic irreparable rotator cuff tear         POSTOPERATIVE DIAGNOSIS: Right shoulder chronic irreparable rotator cuff tear          PROCEDURES PERFORMED:     1.  Right reverse total shoulder arthroplasty     2. Tenodesis of the long head of the biceps tendon.       SURGEON: Juan Antonio Anne MD      ASSISTANT: Nabeel Noel (First Assist)      ANESTHESIA:    Regional followed by General.          COMPLICATIONS: None.       SPECIMEN: None.       ESTIMATED BLOOD LOSS: Less than 100 mL.       IMPLANTS:    1. Biomet 7 mm mini comprehensive humeral stem with 44 standard tray and 36 standard   humeral bearing.    2. Size 25 mm mini glenoid baseplate with one 6.5 mm central compression screw   and 4 peripheral 4.75 mm locking screws.    3. Size 36 mm Glenosphere.      INDICATIONS: The patient has a long history of shoulder pain and weakness   which has been nonresponse to conservative treatment.  I explained that surgical risks include infection, hematoma, hardware related complications including failure of fixation, loosening, fracture, persistent pain and/or loss of motion, iatrogenic nerve and/or blood vessel injury resulting in permanent weakness, numbness or dysfunction, DVT, PE, positioning related neuropraxia, and anesthesia related complications resulting in death.  We discussed the indication for a reverse as opposed to a standard total shoulder and the risks inherent to the reverse including notching, glenoid loosening, instability, and traction related neuropraxia, any of which could result in persistent pain or problems requiring further surgery.                        DESCRIPTION OF PROCEDURE: The  patient and operative site were identified in the   preoperative holding area. The surgical site was marked. Following this, adequate   regional anesthesia of the left upper extremity was administered. The patient   was then taken to the operating room and placed in the supine position.   Adequate general anesthesia was administered and then the patient was repositioned on   the operating table in the modified beach chair position. The head and neck were   placed in neutral alignment and all bony prominences were carefully padded and   protected. Once we had appropriate position, a timeout was taken, preoperative   antibiotics were administered. The extremity was cleaned with an alcohol   solution. A Hibiclens scrub was performed and then the extremity was prepped   with 2 ChloraPreps. I allowed this to dry for 3 minutes before the draping   procedure was carried out. Again, a timeout had been taken and preoperative   antibiotics administered prior to incision.     I fashioned an approximately 6 cm incision over the anterior aspect of the shoulder,   carried that down through the skin and subcutaneous tissues. Full-thickness medial and   lateral skin flaps were developed. The cephalic vein was dissected out and this structure   was retracted laterally. This was kept protected throughout the duration of the   case. The underlying clavipectoral fascia was divided. The deltoid muscle was   retracted laterally and the strap muscles retracted medially. The long head of   the biceps was dissected out of the groove. This was released off of its   attachment to the supraglenoid tubercle. The diseased intra-articular portion   of the biceps was removed and then the long head of the biceps was tenodesed to   the pectoralis tendon using multiple MaxBraid sutures.        The patient had a large, retracted rotator cuff tear involving the supraspinatus and infraspinatus along with the upper subscapularis.  There was also mild to  moderate arthrosis of the visible   portion of the humeral head.       The remaining subscapularis was released off the attachment to   the lesser tuberosity and the humeral head exposed. The periarticular   osteophytes were carefully removed with a rongeur. I then inserted the cutting   guide. This was pinned into position, set to 20 degrees of retroversion as referenced   off the forearm. The head cut was carried out in the typical fashion and the cut   portion of bone removed.     Once I completed that process, I directed my attention   to the glenoid. The axillary nerve was dissected out and this structure was   identified and protected. Once I had that structure protected, the anterior,   posterior, and inferior glenoid retractors were carefully positioned. Superior,   anterior, and inferior glenoid labral tissue was carefully removed to allow for   exposure of the caudal rim of the glenoid to ensure correct positioning of the   baseplate.     The centering guide for the baseplate was inserted, the center pin   was drilled. The glenoid was then reamed and prepared in typical fashion,   careful to maintain appropriate inferior tilt of the component. With the   glenoid sided preparations completed, the baseplate was impacted into position.   It seated well. It was secured with a single central compression screw along   with the 4 peripheral locking screws. The compression screw got great purchase   and the 4 locking screws were confirmed to lock very nicely into the plate. The   baseplate seemed to be well fixed.     I trialed with a 36 Glenosphere. It fit well.   I did have to adjust the offset to allow for adequate   inferior overhang to hopefully minimize the risk of notching. The trial   component was removed, final component impacted into position. It seated well.   I confirmed that was well seated by pulling up on it circumferentially with a   right angle clamp. When doing so, the entire scapula seemed to  move. With the   baseplate and glenoid well seated, I then directed my attention to the humerus.     The humerus was reamed and broached up to an 7 which fit well. The trial   component was removed, final component impacted into position, taking care to   maintain appropriate retroversion as referenced off the forearm. I trialed off   of the stem then placed the final implants. The 36 standard fit the best and restored excellent    motion and stability. There was no impingement and I did check the stability in multiple   positions of internal and external rotation with axial loading.  Once again,    the components were confirmed to be stable, and again, the shoulder   demonstrated excellent motion.       The wound was irrigated out with 500 ccs of a betadine-containing   saline solution. I then irrigated with 3 L of sterile saline mixed with bacitracin via    pulsatile lavage. I made sure that we had good hemostasis. The wound was then   sequentially closed in layers using interrupted Vicryl for the deep tissues and a running   subcuticular Monocryl stitch for the skin followed by Dermabond. Sterile   dressings were applied to the wound and the drapes withdrawn. The arm was   placed in a shoulder immobilizer. The patient was awakened, transferred to a   standard bed, and taken to the recovery room. The patient tolerated the procedure well.   There were no complications. The patient was taken to the recovery room in good   condition.          Juan Antonio Anne M.D.*   2/2/2017     cc: No Known Provider

## 2017-02-02 NOTE — H&P (VIEW-ONLY)
History & Physical         Patient: Kristan Galicia    YOB: 1950    Medical Record Number: [id number]    Chief Complaints:   Chief Complaint   Patient presents with   • Right Shoulder - Follow-up, Pain       History of Present Illness: 66 y.o. female presents with   Chief Complaint   Patient presents with   • Right Shoulder - Follow-up, Pain   . Reports a long history of progressively worsening pain, motion loss, and dysfunction.  Describes current pain as severe.  Has failed prolonged conservative treatment.  Denies any new complaints or issues.    Allergies:   Allergies   Allergen Reactions   • Aspirin Swelling   • Clarithromycin Other (See Comments)     BLISTERS AROUND MOTH  Also known as Bioxen    • Codeine Swelling   • Darvon [Propoxyphene] Swelling   • Gatifloxacin Other (See Comments)     BLISTERS AROUND MOUTH  Also known Tequine    • Levaquin [Levofloxacin] Other (See Comments)     BLISTERS AROUND MOUTH       Medications:   Home Medications:    Current Outpatient Prescriptions:   •  alendronate (FOSAMAX) 70 MG tablet, Take 1 tablet by mouth Every 7 (Seven) Days., Disp: 12 tablet, Rfl: 3  •  amiodarone (PACERONE) 200 MG tablet, Take 100 mg by mouth Every Night., Disp: , Rfl:   •  atorvastatin (LIPITOR) 80 MG tablet, Take 1 tablet by mouth Daily. (Patient taking differently: Take 80 mg by mouth Every Night.), Disp: 30 tablet, Rfl: 5  •  carvedilol (COREG) 12.5 MG tablet, Take 12.5 mg by mouth 2 (Two) Times a Day., Disp: , Rfl:   •  dabigatran etexilate (PRADAXA) 150 MG capsu, Take 150 mg by mouth 2 (Two) Times a Day., Disp: , Rfl:   •  gabapentin (NEURONTIN) 100 MG capsule, Take 100 mg by mouth., Disp: , Rfl:   •  levothyroxine (SYNTHROID, LEVOTHROID) 50 MCG tablet, Take 50 mcg by mouth Every Morning., Disp: , Rfl:   •  lidocaine (LIDODERM) 5 %, Place 1 patch on the skin Every 12 (Twelve) Hours. Remove & Discard patch within 12 hours or as directed by MD (Patient taking differently: Place 1  patch on the skin Daily. On 12 hrs, off 12 hr), Disp: 60 patch, Rfl: 5  •  montelukast (SINGULAIR) 10 MG tablet, Take 1 tablet by mouth daily. (Patient taking differently: Take 10 mg by mouth Every Night.), Disp: 90 tablet, Rfl: 2  •  mupirocin (BACTROBAN NASAL) 2 % nasal ointment, into each nostril. As directed pre-op, Disp: , Rfl:   •  omeprazole (PriLOSEC) 20 MG capsule, Take 20 mg by mouth Every Morning., Disp: , Rfl:   •  traMADol (ULTRAM) 50 MG tablet, Take 50 mg by mouth., Disp: , Rfl:   •  traZODone (DESYREL) 50 MG tablet, Take 1 tablet by mouth every night. (Patient taking differently: Take 50 mg by mouth At Night As Needed.), Disp: 30 tablet, Rfl: 5  •  baclofen (LIORESAL) 10 MG tablet, Take 1 tablet by mouth Every Night. (Patient taking differently: Take 10 mg by mouth At Night As Needed.), Disp: 30 tablet, Rfl: 5  •  Chlorhexidine Gluconate 0.5 % misc, Apply  topically. As directed pre-op, Disp: , Rfl:   •  spironolactone-hydrochlorothiazide (ALDACTAZIDE) 25-25 MG tablet, Take 1 tablet by mouth Every Morning., Disp: , Rfl:     Past Medical History   Diagnosis Date   • Atrial fibrillation    • Cardiac defibrillator in place 01/2009     medtronic    • Cardiomyopathy      Hypertrophic Cardiomyopathy   • CHF (congestive heart failure)    • Diverticulosis      hx diverticulitis   • GERD (gastroesophageal reflux disease)    • Heart murmur    • History of depression    • History of tumor      left ocular tumor, treated with steroids   • Hyperlipidemia    • Migraines    • Osteoporosis    • Palpitations    • Sleep apnea      does not use CPAP or BiPAP, used nose strips   • Thyroid disease    • Vasovagal syncope    • Vertigo    • Vitamin D deficiency           Past Surgical History   Procedure Laterality Date   • Laparoscopic cholecystectomy  1985   • Colonoscopy  1996     Dr. Herbert Gonsales    • Appendectomy  1970   • Hysterectomy  1989     Total   • Cardiac defibrillator placement Left 1999     Dr. Ya   •  Breast reconstruction Bilateral      Reduction   • Knee arthroscopy Bilateral 2014     Dr. Li   • Femur surgery Left      with metal implant  x3   • Shoulder arthroscopy w/ rotator cuff repair Bilateral 05/20/2011   • Tonsillectomy     • Cataract extraction Bilateral    • Breast biopsy Bilateral    • Colonoscopy  8/16/2016     Procedure: COLONOSCOPY with cecum;  Surgeon: Rosalio Sheikh MD;  Location: [facility];  Service:    • Rotator cuff repair Right 06/10/2010   • Knee surgery Right 06/10/2010   • Cardiac catheterization  02/19/2007   • Neck surgery  08/23/2012     Anterior cervical diskectomy and fusion with VG2 allograft implants and eagle anterior plate fixation,C5-C6 C6-C7   • First rib resection Bilateral      and scalenectomy          Social History     Occupational History   •       Social History Main Topics   • Smoking status: Former Smoker     Packs/day: 0.50     Years: 17.00     Quit date: 9/8/1985   • Smokeless tobacco: Never Used   • Alcohol use No   • Drug use: No   • Sexual activity: Defer      Social History     Social History Narrative          Family History   Problem Relation Age of Onset   • Heart attack Brother    • Hyperthyroidism Mother    • Cancer Mother    • Heart disease Mother    • Osteoporosis Mother    • No Known Problems Father       car accident   • Cirrhosis Maternal Grandmother    • No Known Problems Maternal Grandfather    • No Known Problems Paternal Grandmother    • No Known Problems Paternal Grandfather    • Breast cancer Maternal Aunt        Review of Systems:     Constitutional:  Denies fever, shaking or chills   Eyes:  Denies change in visual acuity   HEENT:  Denies nasal congestion or sore throat   Respiratory:  Denies cough or shortness of breath   Cardiovascular:  Denies chest pain or edema   GI:  Denies abdominal pain, nausea, vomiting, bloody stools or diarrhea   Musculoskeletal:  Denies numbness tingling or loss of motor function  except as outlined above in history of present illness.  Integument:  Denies rash, lesion or ulceration   Neurologic:  Denies headache or focal weakness, deficits      All other pertinent positives and negatives as noted above in HPI.    Physical Exam: 66 y.o. female  General:  Patient is awake and alert.  Appears in no acute distress or discomfort.    Psych:  Affect and demeanor are appropriate.    Eyes:  Conjunctiva and sclera appear grossly normal.  Eyes track well and EOM seem to be intact.    Ears:  No gross abnormalities.  Hearing adequate for the exam.    Cardiovascular:  Regular rate and rhythm.    Lungs:  Good chest expansion.  Breathing unlabored.    Lymph:  No palpable adenopathy about neck or axilla.    Neck:  Supple.  Normal ROM.  Negative Spurling's for shoulder or arm pain.    Right upper extremity:  Skin benign and intact without evidence for swelling, masses or atrophy.  No palpable masses.  No focal areas of exquisite tenderness.  Shoulder ROM limited and uncomfortable, forward elevation is to 60°, external rotation 25°, internal rotation to back pocket.  Passive motion is better and she can maintain her arm at about 160° if I raise it passively..  No evident instability or apprehension.  Hornblowers negative.  ER lag sign positive.  Good strength in deltoid, wrist and hand.  Intact sensation in arm, hand.  Palpable radial pulse with brisk cap refill.    Diagnostic Tests:  Lab Results   Component Value Date    GLUCOSE 151 (H) 01/27/2017    CALCIUM 8.6 01/27/2017     01/27/2017    K 3.9 01/27/2017    CO2 26.0 01/27/2017     01/27/2017    BUN 20 01/27/2017    CREATININE 0.94 01/27/2017    EGFRIFNONA 60 (L) 01/27/2017    BCR 21.3 01/27/2017    ANIONGAP 14.0 01/27/2017     Lab Results   Component Value Date    WBC 10.78 (H) 01/27/2017    HGB 11.0 (L) 01/27/2017    HCT 35.2 (L) 01/27/2017    MCV 90.0 01/27/2017     01/27/2017     X-rays: Previous x-rays and CT scan of the right  shoulder are again reviewed.  The x-rays show a retained metal anchor consistent with her history of previous rotator cuff repair.  There are no significant glenohumeral degenerative changes.  Acromiohumeral interval looks normal.  The CT scan really tells the story.  She has what appears to be a recurrent massive retracted rotator cuff tear with moderate atrophy.    Assessment:  Right shoulder chronic, irreparable rotator cuff tear    Plan: We had a thorough discussion regarding the risks, benefits and alternatives to an arthroplasty and non-surgical management versus surgery.  I explained that surgical risks include infection, hematoma, hardware related complications including failure of fixation, loosening, fracture, persistent pain and/or loss of motion, iatrogenic nerve and/or blood vessel injury resulting in permanent weakness, numbness or dysfunction, DVT, PE, positioning related neuropraxia, and anesthesia related complications resulting in death.  We discussed the indication for a reverse as opposed to a standard total shoulder and the risks inherent to the reverse including notching, glenoid loosening, instability, and traction related neuropraxia, any of which could result in persistent pain or problems requiring further surgery.  Lastly, we discussed the rehab and all that will be expected of the patient post operatively to ensure an optimal outcome.  The patient voiced understanding of the risks, benefits, and alternative forms of treatment that were discussed and the patient consents to proceed with the reverse arthroplasty.        Date: 1/30/2017    Juan Antonio Anne MD

## 2017-02-02 NOTE — ANESTHESIA PROCEDURE NOTES
Airway  Urgency: elective    Date/Time: 2/2/2017 2:26 PM  Airway not difficult    General Information and Staff    Patient location during procedure: OR  Anesthesiologist: YOSEF DODSON  CRNA: BREANNA POTTS    Indications and Patient Condition  Indications for airway management: airway protection    Preoxygenated: yes  Mask difficulty assessment: 1 - vent by mask    Final Airway Details  Final airway type: endotracheal airway      Successful airway: ETT  Cuffed: yes   Successful intubation technique: direct laryngoscopy  Endotracheal tube insertion site: oral  Blade: Vamsi  Blade size: #3  ETT size: 7.0 mm  Cormack-Lehane Classification: grade III - view of epiglottis only  Placement verified by: chest auscultation and capnometry   Cuff volume (mL): 4  Measured from: lips  ETT to lips (cm): 20  Number of attempts at approach: 1    Additional Comments  Smooth IV induction. Trachea intubated. Cuff up. Ett secured. BEBS. Dentition intact without injury. Pt with very small mouth, VC not seen.

## 2017-02-02 NOTE — PLAN OF CARE
Problem: Patient Care Overview (Adult)  Goal: Plan of Care Review  Outcome: Ongoing (interventions implemented as appropriate)    02/02/17 1303   Coping/Psychosocial Response Interventions   Plan Of Care Reviewed With patient   Patient Care Overview   Progress no change       Goal: Adult Individualization and Mutuality  Outcome: Ongoing (interventions implemented as appropriate)    02/02/17 1303   Individualization   Patient Specific Preferences Kristan       Goal: Discharge Needs Assessment  Outcome: Ongoing (interventions implemented as appropriate)    Problem: Perioperative Period (Adult)  Goal: Signs and Symptoms of Listed Potential Problems Will be Absent or Manageable (Perioperative Period)  Outcome: Ongoing (interventions implemented as appropriate)    02/02/17 1303   Perioperative Period   Problems Assessed (Perioperative Period) all   Problems Present (Perioperative Period) pain

## 2017-02-02 NOTE — ANESTHESIA POSTPROCEDURE EVALUATION
Patient: Kristan Galicia    Procedure Summary     Date Anesthesia Start Anesthesia Stop Room / Location    02/02/17 1414 1552  [facility] /  [facility]       Procedure Diagnosis Surgeon Provider    TOTAL SHOULDER REVERSE ARTHROPLASTY (Right Shoulder) Complete tear of right rotator cuff  (Complete tear of right rotator cuff [M75.121]) MD Tarik Eric MD          Anesthesia Type: general, regional  Last vitals  /58 (02/02/17 1605)    Temp 36.8 °C (98.2 °F) (02/02/17 1549)    Pulse 59 (02/02/17 1605)   Resp 16 (02/02/17 1605)    SpO2 98 % (02/02/17 1605)      Post Anesthesia Care and Evaluation    Patient location during evaluation: bedside  Level of consciousness: awake  Pain score: 1  Pain management: adequate  Airway patency: patent  Anesthetic complications: No anesthetic complications    Cardiovascular status: acceptable  Respiratory status: acceptable  Hydration status: acceptable

## 2017-02-02 NOTE — ANESTHESIA PREPROCEDURE EVALUATION
Anesthesia Evaluation     Patient summary reviewed and Nursing notes reviewed    Airway   Mallampati: II  Neck ROM: full  no difficulty expected  Dental    (+) poor dentation    Pulmonary    (+) hx of smoking, asthma, sleep apnea,   Cardiovascular   (+) valvular problems/murmurs, cardiac stents CHF,     ECG reviewed  Rhythm: regular  Rate: normal  ROS comment: Hypertrophic LV    Neuro/Psych  (+) headaches, dizziness/light headedness, syncope, psychiatric history,    GI/Hepatic/Renal/Endo    (+)  GERD,     Musculoskeletal     Abdominal    Substance History      OB/GYN          Other                             Anesthesia Plan    ASA 3     general and regional     intravenous induction   Anesthetic plan and risks discussed with patient.

## 2017-02-02 NOTE — ANESTHESIA PROCEDURE NOTES
Peripheral Block    Patient location during procedure: holding area  Start time: 2/2/2017 1:43 PM  Stop time: 2/2/2017 1:51 PM  Reason for block: at surgeon's request and post-op pain management  Performed by  Anesthesiologist: PACO LAN  Preanesthetic Checklist  Completed: patient identified, site marked, surgical consent, pre-op evaluation, timeout performed, IV checked, risks and benefits discussed and monitors and equipment checked  Peripheral Block Prep:  Sterile barriers:cap, gloves, mask and sterile barriers  Prep: ChloraPrep  Patient monitoring: blood pressure monitoring, continuous pulse oximetry and EKG  Peripheral Procedure  Sedation:yes  Guidance:ultrasound guided  Images:still images obtained  Laterality:rightBlock Type:interscalene  Injection Technique:single-shotNeedle Type:echogenic  Needle Gauge:20 G  ULTRASOUND INTERPRETATION. Using ultrasound guidance a 20 G gauge needle was placed in close proximity to the nerve, at which point, under ultrasound guidance anesthetic was injected in the area of the nerve and spread of the anesthesia was seen on ultrasound in close proximity thereto.  There were no abnormalities seen on ultrasound; a digital image was taken; and the patient tolerated the procedure with no complications.   Medications  Depomedrol:80  Local Injected:bupivacaine 0.5% with epinephrine Local Amount Injected:25mL  Post Assessment  Patient Tolerance:comfortable throughout block  Complications:no

## 2017-02-03 LAB
HCT VFR BLD AUTO: 29.9 % (ref 35.6–45.5)
HGB BLD-MCNC: 9.2 G/DL (ref 11.9–15.5)

## 2017-02-03 PROCEDURE — 25010000002 KETOROLAC TROMETHAMINE PER 15 MG: Performed by: ORTHOPAEDIC SURGERY

## 2017-02-03 PROCEDURE — 94799 UNLISTED PULMONARY SVC/PX: CPT

## 2017-02-03 PROCEDURE — 25010000003 CEFAZOLIN IN DEXTROSE 2-4 GM/100ML-% SOLUTION: Performed by: ORTHOPAEDIC SURGERY

## 2017-02-03 PROCEDURE — 85014 HEMATOCRIT: CPT | Performed by: ORTHOPAEDIC SURGERY

## 2017-02-03 PROCEDURE — 99024 POSTOP FOLLOW-UP VISIT: CPT | Performed by: ORTHOPAEDIC SURGERY

## 2017-02-03 PROCEDURE — 85018 HEMOGLOBIN: CPT | Performed by: ORTHOPAEDIC SURGERY

## 2017-02-03 PROCEDURE — 25010000002 VANCOMYCIN: Performed by: ORTHOPAEDIC SURGERY

## 2017-02-03 RX ORDER — ALENDRONATE SODIUM 70 MG/1
70 TABLET ORAL
Qty: 12 TABLET | Refills: 3
Start: 2017-02-03 | End: 2018-01-16 | Stop reason: SDUPTHER

## 2017-02-03 RX ORDER — OXYCODONE HYDROCHLORIDE 5 MG/1
15 TABLET ORAL
Status: DISCONTINUED | OUTPATIENT
Start: 2017-02-03 | End: 2017-02-04 | Stop reason: HOSPADM

## 2017-02-03 RX ORDER — DABIGATRAN ETEXILATE 150 MG/1
150 CAPSULE ORAL 2 TIMES DAILY
Qty: 60 CAPSULE
Start: 2017-02-03 | End: 2018-04-05

## 2017-02-03 RX ORDER — OXYCODONE HYDROCHLORIDE 5 MG/1
TABLET ORAL
Qty: 80 TABLET | Refills: 0
Start: 2017-02-03 | End: 2017-03-22

## 2017-02-03 RX ORDER — OXYCODONE HYDROCHLORIDE 5 MG/1
TABLET ORAL
Refills: 0
Start: 2017-02-03 | End: 2017-02-03

## 2017-02-03 RX ADMIN — LEVOTHYROXINE SODIUM 50 MCG: 50 TABLET ORAL at 06:37

## 2017-02-03 RX ADMIN — OXYCODONE HYDROCHLORIDE AND ACETAMINOPHEN 2 TABLET: 7.5; 325 TABLET ORAL at 06:34

## 2017-02-03 RX ADMIN — SPIRONOLACTONE AND HYDROCHLOROTHIAZIDE 1 TABLET: 25; 25 TABLET, FILM COATED ORAL at 08:32

## 2017-02-03 RX ADMIN — AMIODARONE HYDROCHLORIDE 100 MG: 100 TABLET ORAL at 20:34

## 2017-02-03 RX ADMIN — BACLOFEN 10 MG: 10 TABLET ORAL at 20:34

## 2017-02-03 RX ADMIN — CARVEDILOL 12.5 MG: 12.5 TABLET, FILM COATED ORAL at 08:32

## 2017-02-03 RX ADMIN — CEFAZOLIN SODIUM 2 G: 2 INJECTION, SOLUTION INTRAVENOUS at 06:34

## 2017-02-03 RX ADMIN — OXYCODONE HYDROCHLORIDE 15 MG: 5 TABLET ORAL at 20:34

## 2017-02-03 RX ADMIN — MONTELUKAST 10 MG: 10 TABLET, FILM COATED ORAL at 20:34

## 2017-02-03 RX ADMIN — MUPIROCIN: 20 OINTMENT TOPICAL at 08:32

## 2017-02-03 RX ADMIN — VANCOMYCIN HYDROCHLORIDE 1250 MG: 1 INJECTION, POWDER, LYOPHILIZED, FOR SOLUTION INTRAVENOUS at 01:59

## 2017-02-03 RX ADMIN — OXYCODONE HYDROCHLORIDE AND ACETAMINOPHEN 2 TABLET: 7.5; 325 TABLET ORAL at 02:43

## 2017-02-03 RX ADMIN — OXYCODONE HYDROCHLORIDE AND ACETAMINOPHEN 2 TABLET: 7.5; 325 TABLET ORAL at 10:39

## 2017-02-03 RX ADMIN — OXYCODONE HYDROCHLORIDE 15 MG: 5 TABLET ORAL at 13:46

## 2017-02-03 RX ADMIN — CARVEDILOL 12.5 MG: 12.5 TABLET, FILM COATED ORAL at 17:03

## 2017-02-03 RX ADMIN — ATORVASTATIN CALCIUM 80 MG: 80 TABLET, FILM COATED ORAL at 20:34

## 2017-02-03 RX ADMIN — PANTOPRAZOLE SODIUM 40 MG: 40 TABLET, DELAYED RELEASE ORAL at 06:37

## 2017-02-03 RX ADMIN — TRAZODONE HYDROCHLORIDE 50 MG: 50 TABLET ORAL at 20:34

## 2017-02-03 RX ADMIN — KETOROLAC TROMETHAMINE 30 MG: 30 INJECTION, SOLUTION INTRAMUSCULAR at 08:32

## 2017-02-03 RX ADMIN — OXYCODONE HYDROCHLORIDE 15 MG: 5 TABLET ORAL at 17:02

## 2017-02-03 RX ADMIN — GABAPENTIN 100 MG: 100 CAPSULE ORAL at 20:34

## 2017-02-03 NOTE — PLAN OF CARE
Problem: Patient Care Overview (Adult)  Goal: Plan of Care Review  Outcome: Ongoing (interventions implemented as appropriate)    02/03/17 1829   Coping/Psychosocial Response Interventions   Plan Of Care Reviewed With patient   Patient Care Overview   Progress improving   Outcome Evaluation   Outcome Summary/Follow up Plan pain better controlled today with switch to q3 pain pills, ambulating well, VSS, plan to DC       02/03/17 1829   Coping/Psychosocial Response Interventions   Plan Of Care Reviewed With patient   Patient Care Overview   Progress improving   Outcome Evaluation   Outcome Summary/Follow up Plan pain better controlled today with switch to q3 pain pills, ambulating well, VSS, plan to DC home tomorrow    home tomorrow       Goal: Adult Individualization and Mutuality  Outcome: Ongoing (interventions implemented as appropriate)    Problem: Self-Care Deficit (Adult,Obstetrics,Pediatric)  Goal: Improved Ability to Perform BADL and IADL  Outcome: Ongoing (interventions implemented as appropriate)    Problem: Fall Risk (Adult)  Goal: Absence of Falls  Outcome: Ongoing (interventions implemented as appropriate)

## 2017-02-03 NOTE — DISCHARGE SUMMARY
Date of Admission:  2/2/2017    Date of Discharge:  2/3/2017    Discharge Diagnosis: s/p Right reverse total shoulder arthroplasty    Admitting Physician: Juan Antonio Anne    Consults: none    DETAILS OF HOSPITAL STAY:  Patient is a 66 y.o. female was admitted to the floor following a reverse total shoulder arthroplasty.  Post-operatively the patient was transferred to the post-operative floor where the patient underwent physical therapy including both active and passive ROM exercises. Opioids were titrated to achieve appropriate pain management to allow for participation in mobilization exercises. Vital signs are now stable. The incision is benign without signs or symptoms of infection. Operative extremity neurovascular status remains intact. Appropriate education re: incision care, activity levels, medications, and follow up visits was completed and all questions were answered. The patient is now deemed stable for discharge to home.    Condition on Discharge:  Stable    Discharge Medications   Kristan Galicia   Home Medication Instructions ROBBIE:[id number]    Printed on:02/03/17 2841   Medication Information                      alendronate (FOSAMAX) 70 MG tablet  Take 1 tablet by mouth Every 7 (Seven) Days. Hold until can be discussed with Dr. Anne as to when to restart medication             amiodarone (PACERONE) 200 MG tablet  Take 100 mg by mouth Every Night.             atorvastatin (LIPITOR) 80 MG tablet  Take 1 tablet by mouth Daily.             baclofen (LIORESAL) 10 MG tablet  Take 1 tablet by mouth Every Night.             carvedilol (COREG) 12.5 MG tablet  Take 12.5 mg by mouth 2 (Two) Times a Day.             dabigatran etexilate (PRADAXA) 150 MG capsu  Take 1 capsule by mouth 2 (Two) Times a Day. Resume 2/4/17             gabapentin (NEURONTIN) 100 MG capsule  Take 100 mg by mouth.             levothyroxine (SYNTHROID, LEVOTHROID) 50 MCG tablet  Take 50 mcg by mouth Every Morning.              lidocaine (LIDODERM) 5 %  Place 1 patch on the skin Every 12 (Twelve) Hours. Remove & Discard patch within 12 hours or as directed by MD             montelukast (SINGULAIR) 10 MG tablet  Take 1 tablet by mouth daily.             omeprazole (PriLOSEC) 20 MG capsule  Take 20 mg by mouth Every Morning.             oxyCODONE (ROXICODONE) 5 MG immediate release tablet  Take 1-3 tabs po q 3-4 hours prn pain             spironolactone-hydrochlorothiazide (ALDACTAZIDE) 25-25 MG tablet  Take 1 tablet by mouth Every Morning.             traZODone (DESYREL) 50 MG tablet  Take 1 tablet by mouth every night.                 Discharge Diet: regular    Activity at Discharge: as tolerated    Discharge Instructions:   1)  Patient is to continue with physical therapy exercises daily and continue working with the physical therapist as ordered.  2)  Continue to follow precautions as instructed.   3)  Patient is instructed to continue use of the sling except when performing their physical therapy exercising and while dressing or showering.  4)  Continue RAFA hose daily and ice regularly.  Patient also instructed on incentive spirometer during hospitalization and encouraged to continue to use at home regularly.   5)  The dressing should be left in place. If waterproof dressing is intact the patient may shower immediately following discharge. If the dressing becomes disloged or saturated it should be changed and patient must wait until POD #5 to shower. If dressing is changed, apply dry sterile dressing after showering.  6)  Follow up appointment in 2 weeks - patient to call the office at [phone] to schedule.     Follow-up Appointments  2 weeks with Dr Omid Henry RN  02/03/17  2:07 PM     Juan Antonio Anne MD

## 2017-02-03 NOTE — PLAN OF CARE
Problem: Patient Care Overview (Adult)  Goal: Plan of Care Review  Outcome: Ongoing (interventions implemented as appropriate)    02/03/17 0503   Coping/Psychosocial Response Interventions   Plan Of Care Reviewed With patient   Patient Care Overview   Progress improving   Outcome Evaluation   Outcome Summary/Follow up Plan pt ambulates well with assist. pain well controlled.

## 2017-02-03 NOTE — PROGRESS NOTES
Orthopedic Progress Note        Patient: Kristan Galicia    Date of Admission: 2/2/2017 10:44 AM    YOB: 1950    Medical Record Number: [id number]    Attending Physician: Juan Antonio Anne MD    Systemic or Specific Complaints: Reports pain not adequately controlled.  Tolerating po well.  No complaints or problems.    Allergies:   Allergies   Allergen Reactions   • Adhesive Tape Other (See Comments)   • Aspirin Swelling   • Clarithromycin Other (See Comments)     BLISTERS AROUND MOTH  Also known as Bioxen    • Codeine Swelling   • Darvon [Propoxyphene] Swelling   • Gatifloxacin Other (See Comments)     BLISTERS AROUND MOUTH  Also known Tequine    • Levaquin [Levofloxacin] Other (See Comments)     BLISTERS AROUND MOUTH       Medications:   Current Medications:  Scheduled Meds:  amiodarone 100 mg Oral Nightly   atorvastatin 80 mg Oral Nightly   baclofen 10 mg Oral Nightly   carvedilol 12.5 mg Oral BID   gabapentin 100 mg Oral Nightly   levothyroxine 50 mcg Oral Q AM   montelukast 10 mg Oral Nightly   mupirocin  Each Nare BID   pantoprazole 40 mg Oral Q AM   spironolactone-hydrochlorothiazide 1 tablet Oral QAM   traZODone 50 mg Oral Nightly     Continuous Infusions:  sodium chloride 100 mL/hr     PRN Meds:.•  acetaminophen  •  acetaminophen  •  bisacodyl  •  diphenhydrAMINE  •  diphenhydrAMINE  •  docusate sodium  •  HYDROmorphone **AND** naloxone  •  ketorolac  •  ondansetron  •  ondansetron  •  oxyCODONE      Physical Exam: 66 y.o. female    General:  Patient is awake and alert.  Appears in no acute distress.    Extremities:  The dressing is clean, dry, and intact.  Contour of shoulder appears normal.  There is no significant edema or ecchymosis.  No evident drainage from the wound.  The arm and forearm are soft.  Able to flex fingers, but unable to extend wrist and fingers. Has sensation at the tip of thumb and along ulnar side of hand and fingers.  Nerve block still in effect.   Brisk cap  refill.    Assessment:  Percocet is helping pain, but only lasting 3 hours.  Will switch to Oxy IR.      Plan:    Continue pain control measures.  The patient seems to be doing well and can hopefully be discharged home later today if pain well controlled.  I will have physical therapy demonstrate the home exercises but they need to continue strict use of the sling until follow-up.    Date: 2/3/2017  Time: 11:54 AM    JUNE Rosenthal MD

## 2017-02-03 NOTE — PLAN OF CARE
Problem: Patient Care Overview (Adult)  Goal: Plan of Care Review  Outcome: Ongoing (interventions implemented as appropriate)    02/02/17 1911   Coping/Psychosocial Response Interventions   Plan Of Care Reviewed With patient   Patient Care Overview   Progress improving   Outcome Evaluation   Outcome Summary/Follow up Plan VSS, pain control has been an issue-block wearing off, plan to dc home tomorrow       Goal: Adult Individualization and Mutuality  Outcome: Ongoing (interventions implemented as appropriate)    Problem: Perioperative Period (Adult)  Goal: Signs and Symptoms of Listed Potential Problems Will be Absent or Manageable (Perioperative Period)  Outcome: Ongoing (interventions implemented as appropriate)    Problem: Self-Care Deficit (Adult,Obstetrics,Pediatric)  Goal: Identify Related Risk Factors and Signs and Symptoms  Outcome: Outcome(s) achieved Date Met:  02/02/17  Goal: Improved Ability to Perform BADL and IADL  Outcome: Ongoing (interventions implemented as appropriate)    Problem: Fall Risk (Adult)  Goal: Identify Related Risk Factors and Signs and Symptoms  Outcome: Outcome(s) achieved Date Met:  02/02/17  Goal: Absence of Falls  Outcome: Ongoing (interventions implemented as appropriate)

## 2017-02-03 NOTE — ADDENDUM NOTE
Addendum  created 02/02/17 3645 by Justin Koch MD    Anesthesia Review and Sign - Ready for Procedure

## 2017-02-04 VITALS
HEART RATE: 65 BPM | SYSTOLIC BLOOD PRESSURE: 108 MMHG | TEMPERATURE: 98.6 F | RESPIRATION RATE: 16 BRPM | BODY MASS INDEX: 33.42 KG/M2 | OXYGEN SATURATION: 93 % | DIASTOLIC BLOOD PRESSURE: 69 MMHG | WEIGHT: 177 LBS | HEIGHT: 61 IN

## 2017-02-04 LAB
HCT VFR BLD AUTO: 30.7 % (ref 35.6–45.5)
HGB BLD-MCNC: 9.7 G/DL (ref 11.9–15.5)

## 2017-02-04 PROCEDURE — 85018 HEMOGLOBIN: CPT | Performed by: ORTHOPAEDIC SURGERY

## 2017-02-04 PROCEDURE — 85014 HEMATOCRIT: CPT | Performed by: ORTHOPAEDIC SURGERY

## 2017-02-04 RX ADMIN — PANTOPRAZOLE SODIUM 40 MG: 40 TABLET, DELAYED RELEASE ORAL at 05:53

## 2017-02-04 RX ADMIN — LEVOTHYROXINE SODIUM 50 MCG: 50 TABLET ORAL at 05:53

## 2017-02-04 RX ADMIN — OXYCODONE HYDROCHLORIDE 15 MG: 5 TABLET ORAL at 00:02

## 2017-02-04 RX ADMIN — MUPIROCIN: 20 OINTMENT TOPICAL at 09:19

## 2017-02-04 RX ADMIN — SPIRONOLACTONE AND HYDROCHLOROTHIAZIDE 1 TABLET: 25; 25 TABLET, FILM COATED ORAL at 09:19

## 2017-02-04 RX ADMIN — CARVEDILOL 12.5 MG: 12.5 TABLET, FILM COATED ORAL at 09:19

## 2017-02-04 RX ADMIN — OXYCODONE HYDROCHLORIDE 15 MG: 5 TABLET ORAL at 09:19

## 2017-02-04 RX ADMIN — OXYCODONE HYDROCHLORIDE 15 MG: 5 TABLET ORAL at 04:17

## 2017-02-04 NOTE — NURSING NOTE
Spoke with Dr. Anne about Pt's daughters concerns. She was worried about the face and neck swelling and her breathing at discharge. Dr. Anne said to have them remove the sling and elevate arm with pillow when in bed and up in chair to reduce swelling. HE said to have pt  take 1-2 oxycodone so she wont be as sleepy. All communicated to pt and family.

## 2017-02-04 NOTE — PROGRESS NOTES
Orthopedic Progress Note        Patient: Kristan Galicia    Date of Admission: 2/2/2017 10:44 AM    YOB: 1950    Medical Record Number: [id number]    Attending Physician: Juan Antonio Anne MD    Systemic or Specific Complaints: Reports pain adequately controlled.  Tolerating po well.  No complaints or problems.  The block has worn off and she says that things are going well.    Allergies:   Allergies   Allergen Reactions   • Adhesive Tape Other (See Comments)   • Aspirin Swelling   • Clarithromycin Other (See Comments)     BLISTERS AROUND MOTH  Also known as Bioxen    • Codeine Swelling   • Darvon [Propoxyphene] Swelling   • Gatifloxacin Other (See Comments)     BLISTERS AROUND MOUTH  Also known Tequine    • Levaquin [Levofloxacin] Other (See Comments)     BLISTERS AROUND MOUTH       Medications:   Current Medications:  Scheduled Meds:  amiodarone 100 mg Oral Nightly   atorvastatin 80 mg Oral Nightly   baclofen 10 mg Oral Nightly   carvedilol 12.5 mg Oral BID   gabapentin 100 mg Oral Nightly   levothyroxine 50 mcg Oral Q AM   montelukast 10 mg Oral Nightly   mupirocin  Each Nare BID   pantoprazole 40 mg Oral Q AM   spironolactone-hydrochlorothiazide 1 tablet Oral QAM   traZODone 50 mg Oral Nightly     Continuous Infusions:  sodium chloride 100 mL/hr     PRN Meds:.•  acetaminophen  •  acetaminophen  •  bisacodyl  •  diphenhydrAMINE  •  diphenhydrAMINE  •  docusate sodium  •  HYDROmorphone **AND** naloxone  •  ondansetron  •  ondansetron  •  oxyCODONE      Physical Exam: 66 y.o. female    General:  Patient is awake and alert.  Appears in no acute distress.    Extremities:  The dressing is clean, dry, and intact.  Contour of shoulder appears normal.  There is no significant edema or ecchymosis.  No evident drainage from the wound.  The arm and forearm are soft.  Good motor and sensory function down into the lower arm and hand.  Brisk cap refill.    Assessment:  Postoperative day 2 status post right  reverse total shoulder arthroplasty    Plan:    Continue pain control measures.  The patient seems to be doing well and can hopefully be discharged home later today.  I will have physical therapy demonstrate the home exercises but they need to continue strict use of the sling until follow-up.    Date: 2/4/2017  Time: 11:39 AM    Juan Antonio Anne MD

## 2017-02-04 NOTE — NURSING NOTE
Paged Dr. Chase about daughter this having concern about her mothers breathing. Daughter informed me the pt has sleep apnea and uses a CPAP at home. Maren Chin called back and said to have family put CPAP on pt when they arrive home and spread out the time between medication. Pt and family informed.

## 2017-02-04 NOTE — PLAN OF CARE
Problem: Patient Care Overview (Adult)  Goal: Plan of Care Review  Outcome: Ongoing (interventions implemented as appropriate)    02/04/17 0456   Coping/Psychosocial Response Interventions   Plan Of Care Reviewed With patient   Patient Care Overview   Progress improving   Outcome Evaluation   Outcome Summary/Follow up Plan PAIN CONTROLLED- PT TAKING Q4H, PO INTAKE ADEQUATE, VOIDING, AMBULATING, VSS       Goal: Adult Individualization and Mutuality  Outcome: Ongoing (interventions implemented as appropriate)  Goal: Discharge Needs Assessment  Outcome: Ongoing (interventions implemented as appropriate)    Problem: Self-Care Deficit (Adult,Obstetrics,Pediatric)  Goal: Improved Ability to Perform BADL and IADL  Outcome: Ongoing (interventions implemented as appropriate)    Problem: Fall Risk (Adult)  Goal: Absence of Falls  Outcome: Ongoing (interventions implemented as appropriate)

## 2017-02-17 ENCOUNTER — OFFICE VISIT (OUTPATIENT)
Dept: ORTHOPEDIC SURGERY | Facility: CLINIC | Age: 67
End: 2017-02-17

## 2017-02-17 VITALS — WEIGHT: 177 LBS | BODY MASS INDEX: 33.42 KG/M2 | HEIGHT: 61 IN | TEMPERATURE: 97.5 F

## 2017-02-17 DIAGNOSIS — Z09 SURGERY FOLLOW-UP: ICD-10-CM

## 2017-02-17 DIAGNOSIS — Z96.611 S/P REVERSE TOTAL SHOULDER ARTHROPLASTY, RIGHT: Primary | ICD-10-CM

## 2017-02-17 PROCEDURE — 73010 X-RAY EXAM OF SHOULDER BLADE: CPT | Performed by: ORTHOPAEDIC SURGERY

## 2017-02-17 PROCEDURE — 99024 POSTOP FOLLOW-UP VISIT: CPT | Performed by: ORTHOPAEDIC SURGERY

## 2017-02-17 PROCEDURE — 73020 X-RAY EXAM OF SHOULDER: CPT | Performed by: ORTHOPAEDIC SURGERY

## 2017-02-17 RX ORDER — OXYCODONE HYDROCHLORIDE 5 MG/1
5 TABLET ORAL EVERY 4 HOURS PRN
Qty: 50 TABLET | Refills: 0 | Status: SHIPPED | OUTPATIENT
Start: 2017-02-17 | End: 2017-03-06 | Stop reason: HOSPADM

## 2017-02-17 NOTE — PROGRESS NOTES
"Kristan Galicia : 1950 MRN: [id number] DATE: 2017    DIAGNOSIS: 2 week follow up right shoulder arthroplasty      SUBJECTIVE:  Patient returns today for 2 week follow up of right shoulder replacement. Patient reports doing well with no unusual complaints.   OBJECTIVE:    Visit Vitals   • Temp 97.5 °F (36.4 °C) (Temporal Artery )   • Ht 61\" (154.9 cm)   • Wt 177 lb (80.3 kg)   • BMI 33.44 kg/m2     Family History   Problem Relation Age of Onset   • Heart attack Brother    • Hyperthyroidism Mother    • Cancer Mother    • Heart disease Mother    • Osteoporosis Mother    • No Known Problems Father       car accident   • Cirrhosis Maternal Grandmother    • No Known Problems Maternal Grandfather    • No Known Problems Paternal Grandmother    • No Known Problems Paternal Grandfather    • Breast cancer Maternal Aunt      Past Medical History   Diagnosis Date   • Atrial fibrillation    • Cardiac defibrillator in place 2009     medtronic    • Cardiomyopathy      Hypertrophic Cardiomyopathy   • CHF (congestive heart failure)    • Diverticulosis      hx diverticulitis   • GERD (gastroesophageal reflux disease)    • Heart murmur    • History of depression    • History of tumor      left ocular tumor, treated with steroids   • Hyperlipidemia    • Migraines    • Osteoporosis    • Palpitations    • Sleep apnea      does not use CPAP or BiPAP, used nose strips   • Thyroid disease    • Vasovagal syncope    • Vertigo    • Vitamin D deficiency      Past Surgical History   Procedure Laterality Date   • Laparoscopic cholecystectomy     • Colonoscopy       Dr. Herbert Gonsales    • Appendectomy  1970   • Hysterectomy       Total   • Cardiac defibrillator placement Left      Dr. Ya   • Breast reconstruction Bilateral      Reduction   • Knee arthroscopy Bilateral      Dr. Li   • Femur surgery Left      with metal implant  x3   • Shoulder arthroscopy w/ rotator cuff repair Bilateral 2011   • " Tonsillectomy     • Cataract extraction Bilateral    • Breast biopsy Bilateral    • Colonoscopy  8/16/2016     Procedure: COLONOSCOPY with cecum;  Surgeon: Rosalio Sheikh MD;  Location: [facility] ENDOSCOPY;  Service:    • Rotator cuff repair Right 06/10/2010   • Knee surgery Right 06/10/2010   • Cardiac catheterization  02/19/2007   • Neck surgery  08/23/2012     Anterior cervical diskectomy and fusion with VG2 allograft implants and eagle anterior plate fixation,C5-C6 C6-C7   • First rib resection Bilateral      and scalenectomy   • Total shoulder arthroplasty w/ distal clavicle excision Right 2/2/2017     Procedure: TOTAL SHOULDER REVERSE ARTHROPLASTY;  Surgeon: Juan Antonio Anne MD;  Location: [facility] MAIN OR;  Service:      Social History     Social History   • Marital status: Single     Spouse name: N/A   • Number of children: 1   • Years of education: N/A     Occupational History   •       Social History Main Topics   • Smoking status: Former Smoker     Packs/day: 0.50     Years: 17.00     Quit date: 9/8/1985   • Smokeless tobacco: Never Used   • Alcohol use No   • Drug use: No   • Sexual activity: Defer     Other Topics Concern   • Not on file     Social History Narrative     Exam:. The incision is well approximated. No erythema or drainage. Shoulder moves fluidly with pendulums . The arm is soft and nontender.  Good strength in hand and wrist. Intact to light touch with palpable distal pulses.     DIAGNOSTIC STUDIES  Xrays: 2 views of the right shoulder (AP, scapular Y) were ordered and reviewed for evaluation of shoulder replacement. They demonstrate a well positioned, well aligned replacement without complicating factors noted. In comparison with previous films there has been no change.    ASSESSMENT: 2 week follow up right shoulder replacement.    PLAN:    1.  Begin PT per protocol--prescription given along with 2 copies of my protocol.   2.  Continue sling until next visit.   3.   Counseled patient about appropriate activity modifications and restrictions, including no driving at this point.

## 2017-02-17 NOTE — PATIENT INSTRUCTIONS
BHAVYA Anne MD  Reverse Total Shoulder Arthroplasty      General Notes:    Please be aware that all timeframes and exercises described below are designed to serve as guidelines only.  While appropriate for the majority of patients, on occasion the therapy and progression to have to be tailored to meet certain individual needs.  Progression through the therapy protocol will be based upon the criteria described rather than adherence to a strict timeframe.  Progress will be agreed upon by the patient, therapist and physician to ensure that we are all in accordance.      Remember to ice 15-20 minutes after each therapy session.  All range of motion and strengthening exercises should be performed as tolerated.  Pain and/or increased swelling are signs that you are doing too much too soon.  If these occur, please decrease your activity level and ice.    Passive means someone will do this for you.  Active assist means you can use your other arm to perform the exercise.  Active means you can use the arm that was operated on to do this exercise.  The scapular plane is defined as 30 degrees of abduction and forward flexion with neutral rotation.    Please be aware:  There is a higher risk of dislocation following a reverse shoulder replacement than a standard shoulder replacement.  Shoulder extension past neutral and the combination of shoulder adduction and internal rotation should be avoided for 3 months after surgery.  The most common activities that cause a dislocation are tucking in a shirt or performing bathroom/personal hygiene with the operative arm.    ===================================================================    Post-operative Phase 1:  Weeks 0-6    Goals:  1.  Ensure wound healing  2.  Protect the repair  3.  Decrease pain              Page two    Brace:      Wear sling/pillow at all times except when showering or performing pendulum exercises.  You should remove the sling and perform  pendulum exercises 5 or 6 times per day for 5 to 10 minutes at a time.  For the first 6 weeks, when lying supine, please place a pillow or rolled towel behind your arm to limit extension, which places stress on the tissues on the front of your shoulder.  A good rule of thumb is that you should always be able to visualize your elbow when lying down.    Weight Bearing (WB) status:      No lifting of objects heavier than a coffee cup.  No shoulder extension or sudden reaching.  No shoulder motion behind the back.    Range of Motion (ROM):      NO ACTIVE ROM OF THE SHOULDER.  Full active ROM of elbow, wrist, and hand.  Passive supine forward flexion in scapular plane (arm moved up in front of the body) to 90 degrees for weeks 1 and 2.  Passive supine external rotation in the scapular plane (arm turned away from the side of the body) to 20 degrees.  Advance passive supine forward flexion to 140, as tolerated, at week 3.  Initiate active assist ROM at week 4.  No shoulder extension.    Therapy Exercises:      Full finger, wrist and elbow motion.  Shoulder pendulum exercises.  Supine external rotation in scapular plan to 20 degrees.  Supine passive arm elevation in scapular plane to 90 degrees (weeks 1 and 2) then progress to 140 degrees passive forward elevation in scapular plane as tolerated. Begin AAROM at week 4.  Ball squeeze exercises.  No shoulder extension.  Begin sub-maximal, sub-painful periscapular and deltoid isometrics in scapular plane at week 4.   Avoid shoulder extension when isolating posterior deltoid.    Modalities:      Icing, manual massage, galvanic stimulation for pain control.    Cardio:      Stationary bike, elliptical machine, stair stepper    Criteria for Progression to Phase 2:      Pain controlled.  Wounds healing well.  Tolerating shoulder PROM and isometrics.  Able to isometrically activate all components of deltoid and periscapular muscles in the scapular  plane.    ===================================================================      Page three    Post-operative Phase 2:  Weeks 7-8    Goals:  1.  Protect the shoulder arthroplasty  2.  Prevent shoulder stiffness  3.  Improve range of motion  4.  Begin strengthening    Brace:      Gradually discontinue use of the sling/pillow.      Weight Bearing (WB) status:      No lifting of objects heavier than a coffee cup.  No excessive stretching or sudden movements.  You may begin to use the arm for routine daily activities such as feeding, dressing, bathing and personal hygiene but continue to avoid shoulder extension.  You may raise the arm away from the body, but not while carrying anything heavier than a cup of coffee.  No forceful pushing or pulling.  No supporting your body weight with your hands.    Range of Motion (ROM):      Continue previous ROM exercises.   Advance AAROM as tolerated.  Progress from supine to sitting/standing.  Continue to avoid shoulder extension especially in adduction and internal rotation.  Begin passive internal rotation in the scapular plane but restrict to less than 45 degrees.    Therapy Exercises:      Continue previous exercises.  Advance passive and active assist ROM as described.  Periscapular, deltoid isometrics may be progressed to sub-maximal, sub-painful isotonic strengthening exercises by the end of the 8th week.  Begin gentle shoulder internal and external rotation isometrics.    Modalities:      Icing, manual massage, galvanic stimulation for pain control.    Cardio:      Stationary bike, elliptical machine, stair stepper.  Treadmill walking after week 6.    Criteria for Progression to Phase 3:      Pain controlled.  Wounds healed.  Should have 140 degrees of forward elevation and 30 to 45 degrees external rotation at side.    ===================================================================  Page four    Post-operative Phase 3:  Weeks 9-12    Goals:  1.  Protect the shoulder  arthroplasty  2.  Continue ROM gains  3.  Advance strengthening  4.  Increase functional activities    Brace:      None    Weight Bearing (WB) status:      No heavy lifting or manual labor.  No shoulder extension, excessive stretching or sudden movements.  No supporting body weight with the hands.    Range of Motion (ROM):      Continue previous passive, AAROM.  Advance to active range of motion supine forward flexion and elevation in the plane of the scapula.  Continue to avoid shoulder extension.    Therapy Exercises:      Continue all exercises listed above.  Advance ROM as described above.  Advance strengthening as tolerated with gradual progression from isometrics to bands to light weights.  Start with 1 lb. and progress to a maximum of 3 lbs.  Upper body ergometer.     Modalities:      Only as needed.  Continue to ice after therapy.    Cardio:      Can advance to straight line running by week 12.    Criteria for Progression to Phase 4:      Full non-painful range of motion.  Improving strength and ability to isotonically activate all components of the deltoid and periscapular muscles.    ===================================================================                Page five    Post-operative Phase 4:  Weeks 13-18    Goals:  1.  Protect the shoulder arthroplasty  2.  Continue gentle strengthening  3.  Add functional activities  4.  Improve neuromuscular control    Brace:  None    Weight Bearing (WB) status:      No forceful pushing or pulling.  No supporting your body weight with your hands.  No lifting greater than 5 lbs. with the operative arm.    Range of Motion (ROM):    Continue full motion with light stretching at extremes.    Therapy Exercises:      Continue previous exercises as necessary.  Advance strengthening as tolerated.  May gradually begin eccentrically resisted motion, plyometrics and proprioception exercises.    Modalities:      Icing as necessary.    Cardio:      May begin cutting  running.      Criteria for Progression to Phase 5:      Full painless ROM, strength steadily improving.    ===================================================================    Post-operative Phase 5:  3 months - 6 months    Goals:  1.  Continue to protect shoulder arthroplasty by avoiding excessively heavy weights or  excessive force  2.  Restore full strength  3.  Gradual return to full function and sport    Brace:  None  Page six    Weight Bearing (WB) status:      I recommend that reverse shoulder arthroplasty patients refrain from any heavy lifting or  impact sports long term.   This implant should be considered a salvage procedure designed to improve your pain and function for routine activities of daily living only.  Your shoulder has been replaced with a metal and plastic shoulder joint which has a finite lifetime.  This implant will wear out much faster if you participate in activities that place excessive stress on these materials.  Nonimpact activities including jogging/running, walking, and bicycling are fine.  If you have any questions as to whether or not a particular activity is safe, I will be happy to discuss this with you.    Range of Motion (ROM):  Full    Therapy Exercises:    Continue to advance strengthening, motion, and progress to advanced conditioning as tolerated.    Modalities:      As needed.    Cardio:      As tolerated.

## 2017-02-22 ENCOUNTER — OFFICE VISIT (OUTPATIENT)
Dept: FAMILY MEDICINE CLINIC | Facility: CLINIC | Age: 67
End: 2017-02-22

## 2017-02-22 ENCOUNTER — TELEPHONE (OUTPATIENT)
Dept: FAMILY MEDICINE CLINIC | Facility: CLINIC | Age: 67
End: 2017-02-22

## 2017-02-22 VITALS
BODY MASS INDEX: 35.13 KG/M2 | WEIGHT: 186.1 LBS | SYSTOLIC BLOOD PRESSURE: 116 MMHG | HEART RATE: 92 BPM | OXYGEN SATURATION: 97 % | TEMPERATURE: 98.5 F | DIASTOLIC BLOOD PRESSURE: 72 MMHG | HEIGHT: 61 IN

## 2017-02-22 DIAGNOSIS — E03.9 HYPOTHYROIDISM, UNSPECIFIED TYPE: ICD-10-CM

## 2017-02-22 DIAGNOSIS — I50.9 CHRONIC CONGESTIVE HEART FAILURE, UNSPECIFIED CONGESTIVE HEART FAILURE TYPE: ICD-10-CM

## 2017-02-22 DIAGNOSIS — E11.9 TYPE 2 DIABETES MELLITUS WITHOUT COMPLICATION, WITHOUT LONG-TERM CURRENT USE OF INSULIN (HCC): ICD-10-CM

## 2017-02-22 DIAGNOSIS — I48.91 ATRIAL FIBRILLATION, UNSPECIFIED TYPE (HCC): ICD-10-CM

## 2017-02-22 DIAGNOSIS — E78.5 HYPERLIPIDEMIA, UNSPECIFIED HYPERLIPIDEMIA TYPE: ICD-10-CM

## 2017-02-22 DIAGNOSIS — J18.9 PNEUMONIA DUE TO INFECTIOUS ORGANISM, UNSPECIFIED LATERALITY, UNSPECIFIED PART OF LUNG: ICD-10-CM

## 2017-02-22 DIAGNOSIS — M1A.0720 CHRONIC GOUT OF LEFT FOOT, UNSPECIFIED CAUSE: ICD-10-CM

## 2017-02-22 DIAGNOSIS — R06.02 SHORTNESS OF BREATH: Primary | ICD-10-CM

## 2017-02-22 DIAGNOSIS — I10 ESSENTIAL HYPERTENSION: ICD-10-CM

## 2017-02-22 LAB
ALBUMIN SERPL-MCNC: 4 G/DL (ref 3.5–5.2)
ALBUMIN/GLOB SERPL: 1.3 G/DL
ALP SERPL-CCNC: 64 U/L (ref 39–117)
ALT SERPL W P-5'-P-CCNC: 19 U/L (ref 1–33)
ANION GAP SERPL CALCULATED.3IONS-SCNC: 15.7 MMOL/L
AST SERPL-CCNC: 23 U/L (ref 1–32)
BILIRUB SERPL-MCNC: 0.6 MG/DL (ref 0.1–1.2)
BUN BLD-MCNC: 15 MG/DL (ref 8–23)
BUN/CREAT SERPL: 15 (ref 7–25)
CALCIUM SPEC-SCNC: 9.6 MG/DL (ref 8.6–10.5)
CHLORIDE SERPL-SCNC: 101 MMOL/L (ref 98–107)
CHOLEST SERPL-MCNC: 134 MG/DL (ref 0–200)
CO2 SERPL-SCNC: 25.3 MMOL/L (ref 22–29)
CREAT BLD-MCNC: 1 MG/DL (ref 0.57–1)
ERYTHROCYTE [DISTWIDTH] IN BLOOD BY AUTOMATED COUNT: 16.8 % (ref 4.5–15)
GFR SERPL CREATININE-BSD FRML MDRD: 56 ML/MIN/1.73
GLOBULIN UR ELPH-MCNC: 3.1 GM/DL
GLUCOSE BLD-MCNC: 187 MG/DL (ref 65–99)
HBA1C MFR BLD: 7.35 % (ref 4.8–5.6)
HCT VFR BLD AUTO: 30.6 % (ref 31–42)
HDLC SERPL-MCNC: 42 MG/DL (ref 40–60)
HGB BLD-MCNC: 9.6 G/DL (ref 12–18)
LDLC SERPL CALC-MCNC: 66 MG/DL (ref 0–100)
LDLC/HDLC SERPL: 1.57 {RATIO}
LYMPHOCYTES # BLD AUTO: 1.9 10*3/MM3 (ref 1.2–3.4)
LYMPHOCYTES NFR BLD AUTO: 20.1 % (ref 21–51)
MCH RBC QN AUTO: 25.4 PG (ref 26.1–33.1)
MCHC RBC AUTO-ENTMCNC: 31.3 G/DL (ref 33–37)
MCV RBC AUTO: 81.2 FL (ref 80–99)
MONOCYTES # BLD AUTO: 0.6 10*3/MM3 (ref 0.1–0.6)
MONOCYTES NFR BLD AUTO: 6.4 % (ref 2–9)
NEUTROPHILS # BLD AUTO: 6.9 10*3/MM3 (ref 1.4–6.5)
NEUTROPHILS NFR BLD AUTO: 73.5 % (ref 42–75)
NT-PROBNP SERPL-MCNC: 674.6 PG/ML (ref 5–900)
PLATELET # BLD AUTO: 533 10*3/MM3 (ref 150–450)
PMV BLD AUTO: 5.9 FL (ref 7.1–10.5)
POTASSIUM BLD-SCNC: 4.7 MMOL/L (ref 3.5–5.2)
PROT SERPL-MCNC: 7.1 G/DL (ref 6–8.5)
RBC # BLD AUTO: 3.77 10*6/MM3 (ref 4–6)
SODIUM BLD-SCNC: 142 MMOL/L (ref 136–145)
TRIGL SERPL-MCNC: 131 MG/DL (ref 0–150)
TSH SERPL DL<=0.05 MIU/L-ACNC: 24.76 MIU/ML (ref 0.27–4.2)
URATE SERPL-MCNC: 6.5 MG/DL (ref 2.4–5.7)
VLDLC SERPL-MCNC: 26.2 MG/DL (ref 5–40)
WBC NRBC COR # BLD: 9.4 10*3/MM3 (ref 4.5–10)

## 2017-02-22 PROCEDURE — 80061 LIPID PANEL: CPT | Performed by: NURSE PRACTITIONER

## 2017-02-22 PROCEDURE — 84443 ASSAY THYROID STIM HORMONE: CPT | Performed by: NURSE PRACTITIONER

## 2017-02-22 PROCEDURE — 80053 COMPREHEN METABOLIC PANEL: CPT | Performed by: NURSE PRACTITIONER

## 2017-02-22 PROCEDURE — 83036 HEMOGLOBIN GLYCOSYLATED A1C: CPT | Performed by: NURSE PRACTITIONER

## 2017-02-22 PROCEDURE — 99214 OFFICE O/P EST MOD 30 MIN: CPT | Performed by: NURSE PRACTITIONER

## 2017-02-22 PROCEDURE — 71010 XR CHEST PA: CPT | Performed by: NURSE PRACTITIONER

## 2017-02-22 PROCEDURE — 84550 ASSAY OF BLOOD/URIC ACID: CPT | Performed by: NURSE PRACTITIONER

## 2017-02-22 PROCEDURE — 83880 ASSAY OF NATRIURETIC PEPTIDE: CPT | Performed by: NURSE PRACTITIONER

## 2017-02-22 PROCEDURE — 85025 COMPLETE CBC W/AUTO DIFF WBC: CPT | Performed by: NURSE PRACTITIONER

## 2017-02-22 RX ORDER — CLINDAMYCIN HYDROCHLORIDE 300 MG/1
300 CAPSULE ORAL 3 TIMES DAILY
Qty: 30 CAPSULE | Refills: 0 | Status: SHIPPED | OUTPATIENT
Start: 2017-02-22 | End: 2017-02-22 | Stop reason: SDUPTHER

## 2017-02-22 RX ORDER — ALLOPURINOL 100 MG/1
100 TABLET ORAL DAILY
Qty: 30 TABLET | Refills: 1 | Status: SHIPPED | OUTPATIENT
Start: 2017-02-22 | End: 2017-02-24 | Stop reason: SDUPTHER

## 2017-02-22 RX ORDER — CLINDAMYCIN HYDROCHLORIDE 300 MG/1
300 CAPSULE ORAL 3 TIMES DAILY
Qty: 30 CAPSULE | Refills: 0 | Status: SHIPPED | OUTPATIENT
Start: 2017-02-22 | End: 2017-03-06 | Stop reason: HOSPADM

## 2017-02-22 RX ORDER — LEVOTHYROXINE SODIUM 0.07 MG/1
75 TABLET ORAL DAILY
Qty: 30 TABLET | Refills: 1 | Status: SHIPPED | OUTPATIENT
Start: 2017-02-22 | End: 2017-02-24 | Stop reason: SDUPTHER

## 2017-02-22 NOTE — TELEPHONE ENCOUNTER
Your thyroid level has improved but still high, increase levothyroxine 75 mcg daily and we can recheck in 6 wks erx to cecily. Chol much improved on lipitor. Your BS is still running high and I think we will need to start monitoring BS at home and discuss medication, please make FU apt 10-14 days to FU on breathing and discuss DM. Uric acid is slightly high 6.5, goal < 6 suspect pain in foot is gout, erx allopurinol 100 mg daily to help lower uric acid we can recheck level in 6 wks. Your fluid volume level with your CHF seems well controlled compared to your level in 04/16

## 2017-02-22 NOTE — PROGRESS NOTES
Subjective   Kristan Galicia is a 66 y.o. female.     History of Present Illness   C/o some wheezing worse after recently had R shoulder sgy Dr Anne approx 2 wks ago with pain 7/10 shoulder and back on gabapentin and oxycodone, thought oxycodone was originally causing breathing disruption and sedation d/t strength of medication and pain, slowly weaned down on dosage but cont SOA and wheezing, went home with daughter after sgy and used CPAP machine and improved sleeping and breathing x 1 wk, doesn't like using machine nightly d/t seal around nose and hasn't gone back to see sleep medicine specialist > 1 year, now back at her own house x 5 days with SOA wheezing no coughing, no fevers, no fevers sore throat or sinus tenderness or ear pain, with cont fatigue, weakness and dizziness, last tx bronchitis 12/16 here by me with clindamycin and tolerated well, allergic ASA, adhesive, clarithromycin, codein, darvon, gatifloxacin, levaquin  Sees cardiology Dr Ch and Dr Ya for pacemaker, with CHF and HTN, fasting today to check chol on lipitor 80 mg HS, with last labs A1C > 7 workign on diet controlled DM  With hypothyroid on levothyroxine 50 mcg daily new dx on > 6 wks due recheck today  With hx of gout on L foot now with pain in different toe and wondering if r/t gout, no falls injury or trigger event      The following portions of the patient's history were reviewed and updated as appropriate: allergies, current medications, past family history, past medical history, past social history, past surgical history and problem list.    Review of Systems   Constitutional: Positive for fatigue. Negative for fever.   HENT: Negative for congestion, ear discharge, ear pain, facial swelling, hearing loss, postnasal drip, sinus pressure, sneezing, sore throat and voice change.    Respiratory: Positive for apnea, shortness of breath and wheezing. Negative for cough and chest tightness.    Cardiovascular: Negative for chest  pain, palpitations and leg swelling.   Musculoskeletal: Positive for arthralgias and back pain.   Neurological: Positive for weakness. Negative for dizziness and headaches.   All other systems reviewed and are negative.      Objective   Physical Exam   Constitutional: She is oriented to person, place, and time. She appears well-developed and well-nourished.   HENT:   Head: Normocephalic and atraumatic.   Eyes: Conjunctivae and EOM are normal. Pupils are equal, round, and reactive to light.   Neck: Normal range of motion. Neck supple. No thyromegaly present.   Cardiovascular: Normal rate, regular rhythm and normal heart sounds.    Pulmonary/Chest: Effort normal and breath sounds normal. She has no wheezes.   Musculoskeletal: Normal range of motion. She exhibits tenderness (R shoulder with brace, L foot 2nd toe with pain, FROM).   Lymphadenopathy:     She has no cervical adenopathy.   Neurological: She is alert and oriented to person, place, and time.   Skin: Skin is warm and dry.   Psychiatric: She has a normal mood and affect. Her behavior is normal. Judgment and thought content normal.   Vitals reviewed.  xray chest 2v without comparison for SOA shows pacemaker, reconstructed R shoulder, congestion, suspect pneumonia, awaiting radiology review    Assessment/Plan   Kristan was seen today for follow-up and wheezing.    Diagnoses and all orders for this visit:    Shortness of breath  -     Cancel: XR Chest PA & Lateral  -     CBC & Differential  -     CBC Auto Differential  -     XR Chest Pa (In Office)    Pneumonia due to infectious organism, unspecified laterality, unspecified part of lung  -     Cancel: XR Chest PA & Lateral  -     XR Chest Pa (In Office)    Atrial fibrillation, unspecified type    Hypothyroidism, unspecified type  -     TSH    Type 2 diabetes mellitus without complication, without long-term current use of insulin  -     Hemoglobin A1c  -     Cancel: MicroAlbumin, Urine, Random    Chronic gout of  left foot, unspecified cause  -     Uric Acid    Hyperlipidemia, unspecified hyperlipidemia type  -     Lipid Panel    Essential hypertension  -     Comprehensive Metabolic Panel  -     Lipid Panel  -     Cancel: Urinalysis With Microscopic  -     Cancel: Urinalysis  -     Cancel: Urinalysis, Microscopic Only    Chronic congestive heart failure, unspecified congestive heart failure type  -     proBNP    Other orders  -     Discontinue: clindamycin (CLEOCIN) 300 MG capsule; Take 1 capsule by mouth 3 (Three) Times a Day. x10d  -     clindamycin (CLEOCIN) 300 MG capsule; Take 1 capsule by mouth 3 (Three) Times a Day. x10d    check labs and call with results, erx clindamycin 300 TID x 10 days and FU in clinic after abx, if breathing SOA or wheezing worsens go to ER

## 2017-02-22 NOTE — PATIENT INSTRUCTIONS
check labs and call with results, erx clindamycin 300 TID x 10 days and FU in clinic after abx, if breathing SOA or wheezing worsens go to ER

## 2017-02-23 PROCEDURE — 36415 COLL VENOUS BLD VENIPUNCTURE: CPT | Performed by: NURSE PRACTITIONER

## 2017-02-23 PROCEDURE — 83036 HEMOGLOBIN GLYCOSYLATED A1C: CPT | Performed by: NURSE PRACTITIONER

## 2017-02-24 RX ORDER — LEVOTHYROXINE SODIUM 0.07 MG/1
75 TABLET ORAL DAILY
Qty: 30 TABLET | Refills: 1 | Status: SHIPPED | OUTPATIENT
Start: 2017-02-24 | End: 2017-04-06

## 2017-02-24 RX ORDER — ALLOPURINOL 100 MG/1
100 TABLET ORAL DAILY
Qty: 30 TABLET | Refills: 1 | Status: SHIPPED | OUTPATIENT
Start: 2017-02-24 | End: 2017-04-24

## 2017-03-02 RX ORDER — TRAZODONE HYDROCHLORIDE 50 MG/1
TABLET ORAL
Qty: 30 TABLET | Refills: 2 | Status: SHIPPED | OUTPATIENT
Start: 2017-03-02 | End: 2017-12-13 | Stop reason: SDUPTHER

## 2017-03-03 ENCOUNTER — OFFICE VISIT (OUTPATIENT)
Dept: FAMILY MEDICINE CLINIC | Facility: CLINIC | Age: 67
End: 2017-03-03

## 2017-03-03 ENCOUNTER — HOSPITAL ENCOUNTER (INPATIENT)
Facility: HOSPITAL | Age: 67
LOS: 3 days | Discharge: HOME | End: 2017-03-06
Attending: INTERNAL MEDICINE | Admitting: INTERNAL MEDICINE

## 2017-03-03 ENCOUNTER — APPOINTMENT (OUTPATIENT)
Dept: GENERAL RADIOLOGY | Facility: HOSPITAL | Age: 67
End: 2017-03-03
Attending: INTERNAL MEDICINE

## 2017-03-03 VITALS
TEMPERATURE: 98.6 F | WEIGHT: 179 LBS | DIASTOLIC BLOOD PRESSURE: 70 MMHG | HEART RATE: 66 BPM | SYSTOLIC BLOOD PRESSURE: 110 MMHG | OXYGEN SATURATION: 95 % | BODY MASS INDEX: 32.94 KG/M2 | HEIGHT: 62 IN

## 2017-03-03 DIAGNOSIS — R26.2 DIFFICULTY WALKING: Primary | ICD-10-CM

## 2017-03-03 DIAGNOSIS — R06.02 SHORTNESS OF BREATH: ICD-10-CM

## 2017-03-03 DIAGNOSIS — I48.91 ATRIAL FIBRILLATION, UNSPECIFIED TYPE (HCC): ICD-10-CM

## 2017-03-03 DIAGNOSIS — E03.9 HYPOTHYROIDISM, UNSPECIFIED TYPE: ICD-10-CM

## 2017-03-03 DIAGNOSIS — R06.2 WHEEZING: ICD-10-CM

## 2017-03-03 DIAGNOSIS — Z98.890 S/P ROTATOR CUFF REPAIR: ICD-10-CM

## 2017-03-03 DIAGNOSIS — I50.9 CHRONIC CONGESTIVE HEART FAILURE, UNSPECIFIED CONGESTIVE HEART FAILURE TYPE: ICD-10-CM

## 2017-03-03 DIAGNOSIS — D64.9 LOW HEMOGLOBIN: ICD-10-CM

## 2017-03-03 DIAGNOSIS — J18.9 PNEUMONIA DUE TO INFECTIOUS ORGANISM, UNSPECIFIED LATERALITY, UNSPECIFIED PART OF LUNG: Primary | ICD-10-CM

## 2017-03-03 PROBLEM — R06.01 ORTHOPNEA: Status: ACTIVE | Noted: 2017-03-03

## 2017-03-03 PROBLEM — I42.1 IHSS (IDIOPATHIC HYPERTROPHIC SUBAORTIC STENOSIS) (HCC): Status: ACTIVE | Noted: 2017-03-03

## 2017-03-03 PROBLEM — R06.89 DYSPNEA AND RESPIRATORY ABNORMALITIES: Status: ACTIVE | Noted: 2017-03-03

## 2017-03-03 PROBLEM — R06.00 DYSPNEA AND RESPIRATORY ABNORMALITIES: Status: ACTIVE | Noted: 2017-03-03

## 2017-03-03 PROBLEM — Z95.810 AICD (AUTOMATIC CARDIOVERTER/DEFIBRILLATOR) PRESENT: Status: ACTIVE | Noted: 2017-03-03

## 2017-03-03 PROBLEM — Z79.01 ANTICOAGULANT LONG-TERM USE: Status: ACTIVE | Noted: 2017-03-03

## 2017-03-03 LAB
ALBUMIN SERPL-MCNC: 4 G/DL (ref 3.5–5.2)
ALBUMIN/GLOB SERPL: 1.3 G/DL
ALP SERPL-CCNC: 66 U/L (ref 39–117)
ALT SERPL W P-5'-P-CCNC: 32 U/L (ref 1–33)
ANION GAP SERPL CALCULATED.3IONS-SCNC: 17.3 MMOL/L
AST SERPL-CCNC: 32 U/L (ref 1–32)
BASOPHILS # BLD AUTO: 0.05 10*3/MM3 (ref 0–0.2)
BASOPHILS NFR BLD AUTO: 0.6 % (ref 0–1.5)
BILIRUB SERPL-MCNC: 0.4 MG/DL (ref 0.1–1.2)
BUN BLD-MCNC: 23 MG/DL (ref 8–23)
BUN/CREAT SERPL: 20.4 (ref 7–25)
CALCIUM SPEC-SCNC: 9.6 MG/DL (ref 8.6–10.5)
CHLORIDE SERPL-SCNC: 94 MMOL/L (ref 98–107)
CO2 SERPL-SCNC: 26.7 MMOL/L (ref 22–29)
CREAT BLD-MCNC: 1.13 MG/DL (ref 0.57–1)
DEPRECATED RDW RBC AUTO: 46.8 FL (ref 37–54)
EOSINOPHIL # BLD AUTO: 0.34 10*3/MM3 (ref 0–0.7)
EOSINOPHIL NFR BLD AUTO: 4.1 % (ref 0.3–6.2)
ERYTHROCYTE [DISTWIDTH] IN BLOOD BY AUTOMATED COUNT: 16.3 % (ref 11.7–13)
GFR SERPL CREATININE-BSD FRML MDRD: 48 ML/MIN/1.73
GLOBULIN UR ELPH-MCNC: 3.2 GM/DL
GLUCOSE BLD-MCNC: 192 MG/DL (ref 65–99)
HCT VFR BLD AUTO: 31.6 % (ref 35.6–45.5)
HGB BLD-MCNC: 9.8 G/DL (ref 11.9–15.5)
IMM GRANULOCYTES # BLD: 0.02 10*3/MM3 (ref 0–0.03)
IMM GRANULOCYTES NFR BLD: 0.2 % (ref 0–0.5)
LYMPHOCYTES # BLD AUTO: 3.14 10*3/MM3 (ref 0.9–4.8)
LYMPHOCYTES NFR BLD AUTO: 37.8 % (ref 19.6–45.3)
MCH RBC QN AUTO: 24.3 PG (ref 26.9–32)
MCHC RBC AUTO-ENTMCNC: 31 G/DL (ref 32.4–36.3)
MCV RBC AUTO: 78.2 FL (ref 80.5–98.2)
MONOCYTES # BLD AUTO: 0.44 10*3/MM3 (ref 0.2–1.2)
MONOCYTES NFR BLD AUTO: 5.3 % (ref 5–12)
NEUTROPHILS # BLD AUTO: 4.31 10*3/MM3 (ref 1.9–8.1)
NEUTROPHILS NFR BLD AUTO: 52 % (ref 42.7–76)
NRBC BLD MANUAL-RTO: 0 /100 WBC (ref 0–0)
NT-PROBNP SERPL-MCNC: 522.5 PG/ML (ref 5–900)
PLATELET # BLD AUTO: 328 10*3/MM3 (ref 140–500)
PMV BLD AUTO: 8.6 FL (ref 6–12)
POTASSIUM BLD-SCNC: 3 MMOL/L (ref 3.5–5.2)
PROCALCITONIN SERPL-MCNC: 0.07 NG/ML (ref 0.1–0.25)
PROT SERPL-MCNC: 7.2 G/DL (ref 6–8.5)
RBC # BLD AUTO: 4.04 10*6/MM3 (ref 3.9–5.2)
SODIUM BLD-SCNC: 138 MMOL/L (ref 136–145)
T4 FREE SERPL-MCNC: 1.32 NG/DL (ref 0.93–1.7)
TROPONIN T SERPL-MCNC: <0.01 NG/ML (ref 0–0.03)
TSH SERPL DL<=0.05 MIU/L-ACNC: 13.28 MIU/ML (ref 0.27–4.2)
WBC NRBC COR # BLD: 8.3 10*3/MM3 (ref 4.5–10.7)

## 2017-03-03 PROCEDURE — 84443 ASSAY THYROID STIM HORMONE: CPT | Performed by: INTERNAL MEDICINE

## 2017-03-03 PROCEDURE — 83880 ASSAY OF NATRIURETIC PEPTIDE: CPT | Performed by: INTERNAL MEDICINE

## 2017-03-03 PROCEDURE — 87040 BLOOD CULTURE FOR BACTERIA: CPT | Performed by: INTERNAL MEDICINE

## 2017-03-03 PROCEDURE — 84484 ASSAY OF TROPONIN QUANT: CPT | Performed by: INTERNAL MEDICINE

## 2017-03-03 PROCEDURE — 87581 M.PNEUMON DNA AMP PROBE: CPT | Performed by: INTERNAL MEDICINE

## 2017-03-03 PROCEDURE — 99213 OFFICE O/P EST LOW 20 MIN: CPT | Performed by: NURSE PRACTITIONER

## 2017-03-03 PROCEDURE — 87899 AGENT NOS ASSAY W/OPTIC: CPT | Performed by: INTERNAL MEDICINE

## 2017-03-03 PROCEDURE — 87449 NOS EACH ORGANISM AG IA: CPT | Performed by: INTERNAL MEDICINE

## 2017-03-03 PROCEDURE — 93005 ELECTROCARDIOGRAM TRACING: CPT | Performed by: INTERNAL MEDICINE

## 2017-03-03 PROCEDURE — 94799 UNLISTED PULMONARY SVC/PX: CPT

## 2017-03-03 PROCEDURE — 71020 HC CHEST PA AND LATERAL: CPT

## 2017-03-03 PROCEDURE — 25010000003 CEFTRIAXONE PER 250 MG: Performed by: INTERNAL MEDICINE

## 2017-03-03 PROCEDURE — 84145 PROCALCITONIN (PCT): CPT | Performed by: INTERNAL MEDICINE

## 2017-03-03 PROCEDURE — 87798 DETECT AGENT NOS DNA AMP: CPT | Performed by: INTERNAL MEDICINE

## 2017-03-03 PROCEDURE — 87633 RESP VIRUS 12-25 TARGETS: CPT | Performed by: INTERNAL MEDICINE

## 2017-03-03 PROCEDURE — 84439 ASSAY OF FREE THYROXINE: CPT | Performed by: INTERNAL MEDICINE

## 2017-03-03 PROCEDURE — 93010 ELECTROCARDIOGRAM REPORT: CPT | Performed by: INTERNAL MEDICINE

## 2017-03-03 PROCEDURE — 80053 COMPREHEN METABOLIC PANEL: CPT | Performed by: INTERNAL MEDICINE

## 2017-03-03 PROCEDURE — 85025 COMPLETE CBC W/AUTO DIFF WBC: CPT | Performed by: INTERNAL MEDICINE

## 2017-03-03 PROCEDURE — 87486 CHLMYD PNEUM DNA AMP PROBE: CPT | Performed by: INTERNAL MEDICINE

## 2017-03-03 PROCEDURE — 94640 AIRWAY INHALATION TREATMENT: CPT

## 2017-03-03 RX ORDER — TRAZODONE HYDROCHLORIDE 50 MG/1
50 TABLET ORAL NIGHTLY
Status: DISCONTINUED | OUTPATIENT
Start: 2017-03-03 | End: 2017-03-06 | Stop reason: HOSPADM

## 2017-03-03 RX ORDER — SODIUM CHLORIDE 0.9 % (FLUSH) 0.9 %
1-10 SYRINGE (ML) INJECTION AS NEEDED
Status: DISCONTINUED | OUTPATIENT
Start: 2017-03-03 | End: 2017-03-06 | Stop reason: HOSPADM

## 2017-03-03 RX ORDER — IPRATROPIUM BROMIDE AND ALBUTEROL SULFATE 2.5; .5 MG/3ML; MG/3ML
3 SOLUTION RESPIRATORY (INHALATION)
Status: DISCONTINUED | OUTPATIENT
Start: 2017-03-03 | End: 2017-03-05

## 2017-03-03 RX ORDER — DULOXETIN HYDROCHLORIDE 60 MG/1
60 CAPSULE, DELAYED RELEASE ORAL DAILY
COMMUNITY
Start: 2017-02-21 | End: 2017-05-29 | Stop reason: SDUPTHER

## 2017-03-03 RX ORDER — ONDANSETRON 2 MG/ML
4 INJECTION INTRAMUSCULAR; INTRAVENOUS EVERY 6 HOURS PRN
Status: DISCONTINUED | OUTPATIENT
Start: 2017-03-03 | End: 2017-03-06 | Stop reason: HOSPADM

## 2017-03-03 RX ORDER — LIDOCAINE 50 MG/G
1 PATCH TOPICAL DAILY PRN
Status: DISCONTINUED | OUTPATIENT
Start: 2017-03-03 | End: 2017-03-06 | Stop reason: HOSPADM

## 2017-03-03 RX ORDER — DABIGATRAN ETEXILATE 150 MG/1
150 CAPSULE ORAL 2 TIMES DAILY
Status: DISCONTINUED | OUTPATIENT
Start: 2017-03-03 | End: 2017-03-06 | Stop reason: HOSPADM

## 2017-03-03 RX ORDER — OXYCODONE HYDROCHLORIDE AND ACETAMINOPHEN 5; 325 MG/1; MG/1
1 TABLET ORAL EVERY 4 HOURS PRN
Status: DISCONTINUED | OUTPATIENT
Start: 2017-03-03 | End: 2017-03-06 | Stop reason: HOSPADM

## 2017-03-03 RX ORDER — MONTELUKAST SODIUM 10 MG/1
10 TABLET ORAL NIGHTLY
Status: DISCONTINUED | OUTPATIENT
Start: 2017-03-03 | End: 2017-03-06 | Stop reason: HOSPADM

## 2017-03-03 RX ORDER — DULOXETIN HYDROCHLORIDE 60 MG/1
60 CAPSULE, DELAYED RELEASE ORAL DAILY
Status: DISCONTINUED | OUTPATIENT
Start: 2017-03-03 | End: 2017-03-06 | Stop reason: HOSPADM

## 2017-03-03 RX ORDER — CARVEDILOL 12.5 MG/1
12.5 TABLET ORAL 2 TIMES DAILY
Status: DISCONTINUED | OUTPATIENT
Start: 2017-03-03 | End: 2017-03-06 | Stop reason: HOSPADM

## 2017-03-03 RX ORDER — CEFTRIAXONE SODIUM 1 G/50ML
1 INJECTION, SOLUTION INTRAVENOUS EVERY 24 HOURS
Status: DISCONTINUED | OUTPATIENT
Start: 2017-03-03 | End: 2017-03-04

## 2017-03-03 RX ORDER — ACETAMINOPHEN 325 MG/1
650 TABLET ORAL EVERY 4 HOURS PRN
Status: DISCONTINUED | OUTPATIENT
Start: 2017-03-03 | End: 2017-03-06 | Stop reason: HOSPADM

## 2017-03-03 RX ORDER — ALLOPURINOL 100 MG/1
100 TABLET ORAL DAILY
Status: DISCONTINUED | OUTPATIENT
Start: 2017-03-03 | End: 2017-03-06 | Stop reason: HOSPADM

## 2017-03-03 RX ORDER — BACLOFEN 10 MG/1
10 TABLET ORAL NIGHTLY PRN
Status: DISCONTINUED | OUTPATIENT
Start: 2017-03-03 | End: 2017-03-06 | Stop reason: HOSPADM

## 2017-03-03 RX ORDER — SPIRONOLACTONE AND HYDROCHLOROTHIAZIDE 25; 25 MG/1; MG/1
1 TABLET ORAL EVERY MORNING
Status: DISCONTINUED | OUTPATIENT
Start: 2017-03-04 | End: 2017-03-04

## 2017-03-03 RX ORDER — OXYCODONE HYDROCHLORIDE AND ACETAMINOPHEN 5; 325 MG/1; MG/1
2 TABLET ORAL EVERY 4 HOURS PRN
Status: DISCONTINUED | OUTPATIENT
Start: 2017-03-03 | End: 2017-03-06 | Stop reason: HOSPADM

## 2017-03-03 RX ORDER — IPRATROPIUM BROMIDE AND ALBUTEROL SULFATE 2.5; .5 MG/3ML; MG/3ML
3 SOLUTION RESPIRATORY (INHALATION) EVERY 4 HOURS PRN
Status: DISCONTINUED | OUTPATIENT
Start: 2017-03-03 | End: 2017-03-06 | Stop reason: HOSPADM

## 2017-03-03 RX ORDER — LEVOTHYROXINE SODIUM 0.07 MG/1
75 TABLET ORAL
Status: DISCONTINUED | OUTPATIENT
Start: 2017-03-04 | End: 2017-03-06 | Stop reason: HOSPADM

## 2017-03-03 RX ORDER — PANTOPRAZOLE SODIUM 40 MG/1
40 TABLET, DELAYED RELEASE ORAL
Status: DISCONTINUED | OUTPATIENT
Start: 2017-03-04 | End: 2017-03-06 | Stop reason: HOSPADM

## 2017-03-03 RX ORDER — AMIODARONE HYDROCHLORIDE 100 MG/1
100 TABLET ORAL NIGHTLY
Status: DISCONTINUED | OUTPATIENT
Start: 2017-03-03 | End: 2017-03-06 | Stop reason: HOSPADM

## 2017-03-03 RX ORDER — IPRATROPIUM BROMIDE AND ALBUTEROL SULFATE 2.5; .5 MG/3ML; MG/3ML
3 SOLUTION RESPIRATORY (INHALATION)
Status: DISCONTINUED | OUTPATIENT
Start: 2017-03-03 | End: 2017-03-06 | Stop reason: HOSPADM

## 2017-03-03 RX ORDER — NITROGLYCERIN 0.4 MG/1
0.4 TABLET SUBLINGUAL
Status: DISCONTINUED | OUTPATIENT
Start: 2017-03-03 | End: 2017-03-06 | Stop reason: HOSPADM

## 2017-03-03 RX ORDER — GABAPENTIN 100 MG/1
100 CAPSULE ORAL 3 TIMES DAILY
Status: DISCONTINUED | OUTPATIENT
Start: 2017-03-03 | End: 2017-03-06 | Stop reason: HOSPADM

## 2017-03-03 RX ORDER — ATORVASTATIN CALCIUM 80 MG/1
80 TABLET, FILM COATED ORAL NIGHTLY
Status: DISCONTINUED | OUTPATIENT
Start: 2017-03-03 | End: 2017-03-06 | Stop reason: HOSPADM

## 2017-03-03 RX ADMIN — DABIGATRAN ETEXILATE MESYLATE 150 MG: 150 CAPSULE ORAL at 21:00

## 2017-03-03 RX ADMIN — CEFTRIAXONE SODIUM 1 G: 1 INJECTION, SOLUTION INTRAVENOUS at 21:00

## 2017-03-03 RX ADMIN — DOXYCYCLINE 100 MG: 100 INJECTION, POWDER, LYOPHILIZED, FOR SOLUTION INTRAVENOUS at 20:00

## 2017-03-03 RX ADMIN — IPRATROPIUM BROMIDE AND ALBUTEROL SULFATE 3 ML: .5; 3 SOLUTION RESPIRATORY (INHALATION) at 22:58

## 2017-03-03 RX ADMIN — IPRATROPIUM BROMIDE AND ALBUTEROL SULFATE 3 ML: .5; 3 SOLUTION RESPIRATORY (INHALATION) at 20:34

## 2017-03-03 RX ADMIN — ATORVASTATIN CALCIUM 80 MG: 80 TABLET, FILM COATED ORAL at 21:13

## 2017-03-03 RX ADMIN — CARVEDILOL 12.5 MG: 12.5 TABLET, FILM COATED ORAL at 21:00

## 2017-03-03 RX ADMIN — TRAZODONE HYDROCHLORIDE 50 MG: 50 TABLET ORAL at 21:12

## 2017-03-03 RX ADMIN — AMIODARONE HYDROCHLORIDE 100 MG: 100 TABLET ORAL at 21:10

## 2017-03-03 RX ADMIN — GABAPENTIN 100 MG: 100 CAPSULE ORAL at 21:10

## 2017-03-03 RX ADMIN — MONTELUKAST 10 MG: 10 TABLET, FILM COATED ORAL at 21:10

## 2017-03-03 RX ADMIN — OXYCODONE HYDROCHLORIDE AND ACETAMINOPHEN 1 TABLET: 5; 325 TABLET ORAL at 21:06

## 2017-03-03 NOTE — PATIENT INSTRUCTIONS
called hospital will admit today and booked bed for failed outpatient pneumonia, pt to go straight to hospital, verbalized understanding and here with daughter who will drive her, will FU with her after hospital

## 2017-03-03 NOTE — PLAN OF CARE
Problem: Patient Care Overview (Adult)  Goal: Plan of Care Review  Outcome: Ongoing (interventions implemented as appropriate)    03/03/17 8604   Coping/Psychosocial Response Interventions   Plan Of Care Reviewed With patient   Patient Care Overview   Progress no change   Outcome Evaluation   Outcome Summary/Follow up Plan pt direct admit to  607, Dr. Vanegas to attend and write orders, IV placed, Regular diet , and Duo nebs orders, placed pt on o2 due to some SOA, pt up with assist x1, pt wears Cpap at night at home       Goal: Adult Individualization and Mutuality  Outcome: Ongoing (interventions implemented as appropriate)  Goal: Discharge Needs Assessment  Outcome: Ongoing (interventions implemented as appropriate)    Problem: Pneumonia (Adult)  Goal: Signs and Symptoms of Listed Potential Problems Will be Absent or Manageable (Pneumonia)  Outcome: Ongoing (interventions implemented as appropriate)    Problem: Fall Risk (Adult)  Goal: Identify Related Risk Factors and Signs and Symptoms  Outcome: Ongoing (interventions implemented as appropriate)  Goal: Absence of Falls  Outcome: Ongoing (interventions implemented as appropriate)

## 2017-03-03 NOTE — PROGRESS NOTES
Subjective   Kristan Galicia is a 66 y.o. female.     History of Present Illness   Here to FU on cont wheezing and SOA was seen 02/22/17 with CXR dx pneumonia clinically and started on clindamycin 300 TID, has taken all of medication without significant change in sx and cont fatigue, 02 now 94-95% here with daughter who is concerned, s/p R rotator cuff tear approx 1 mo ago wearing brace, states felt like retaining fluid and took water pill yesterday with weight loss 7 lbs as poor appetite, no fevers, no sinus tenderness or congestion, no sore throat, no ear pain, no one else sick at home, with INGRID not using machine, with afib and CHF  With last labs uric acid elevated now on allopurinol 100 mg and pain in foot resolved, with TSH elevated increased levothyroxine 75 mcg, with A1C 7.3 decreased from 7.5 doesn't want to start DM medication    The following portions of the patient's history were reviewed and updated as appropriate: allergies, current medications, past family history, past medical history, past social history, past surgical history and problem list.    Review of Systems   Constitutional: Positive for activity change, appetite change, fatigue and unexpected weight change. Negative for fever.   HENT: Positive for congestion. Negative for ear discharge, ear pain, facial swelling, hearing loss, postnasal drip, sinus pressure, sore throat, trouble swallowing and voice change.    Respiratory: Positive for cough, chest tightness, shortness of breath and wheezing.    Cardiovascular: Negative for chest pain, palpitations and leg swelling.   Endocrine: Negative for cold intolerance, heat intolerance, polydipsia, polyphagia and polyuria.   Musculoskeletal: Positive for arthralgias.   Neurological: Negative for dizziness and headaches.   All other systems reviewed and are negative.      Objective   Physical Exam   Constitutional: She is oriented to person, place, and time. She appears well-developed and  well-nourished.   HENT:   Head: Normocephalic and atraumatic.   Eyes: Conjunctivae and EOM are normal. Pupils are equal, round, and reactive to light.   Cardiovascular: Normal rate, regular rhythm and normal heart sounds.    Pulmonary/Chest: She has wheezes (right middle lobe with crackles throughout lung bases and short of breath after walking down hallway).   Musculoskeletal: Normal range of motion. She exhibits tenderness (R shoulder wearing brace).   Neurological: She is alert and oriented to person, place, and time.   Skin: Skin is warm and dry.   Psychiatric: She has a normal mood and affect. Her behavior is normal. Judgment and thought content normal.   Vitals reviewed.      Assessment/Plan   Kristan was seen today for wheezing.    Diagnoses and all orders for this visit:    Pneumonia due to infectious organism, unspecified laterality, unspecified part of lung    Wheezing    Shortness of breath    Low hemoglobin    Atrial fibrillation, unspecified type    Chronic congestive heart failure, unspecified congestive heart failure type    Hypothyroidism, unspecified type    S/P rotator cuff repair    called hospital will admit today and booked bed for failed outpatient pneumonia, pt to go straight to hospital, verbalized understanding and here with daughter who will drive her, will FU with her after hospital

## 2017-03-04 ENCOUNTER — APPOINTMENT (OUTPATIENT)
Dept: CARDIOLOGY | Facility: HOSPITAL | Age: 67
End: 2017-03-04

## 2017-03-04 LAB
ANION GAP SERPL CALCULATED.3IONS-SCNC: 17.6 MMOL/L
ASCENDING AORTA: 2.2 CM
B PERT DNA SPEC QL NAA+PROBE: NOT DETECTED
BH CV ECHO MEAS - ACS: 1.4 CM
BH CV ECHO MEAS - AO MAX PG (FULL): 8.1 MMHG
BH CV ECHO MEAS - AO MAX PG: 15.2 MMHG
BH CV ECHO MEAS - AO MEAN PG (FULL): 4 MMHG
BH CV ECHO MEAS - AO MEAN PG: 7 MMHG
BH CV ECHO MEAS - AO ROOT AREA (BSA CORRECTED): 1.3
BH CV ECHO MEAS - AO ROOT AREA: 4.5 CM^2
BH CV ECHO MEAS - AO ROOT DIAM: 2.4 CM
BH CV ECHO MEAS - AO V2 MAX: 195 CM/SEC
BH CV ECHO MEAS - AO V2 MEAN: 118 CM/SEC
BH CV ECHO MEAS - AO V2 VTI: 37.2 CM
BH CV ECHO MEAS - ASC AORTA: 2.2 CM
BH CV ECHO MEAS - AVA(I,A): 1.9 CM^2
BH CV ECHO MEAS - AVA(I,D): 1.9 CM^2
BH CV ECHO MEAS - AVA(V,A): 1.7 CM^2
BH CV ECHO MEAS - AVA(V,D): 1.7 CM^2
BH CV ECHO MEAS - BSA(HAYCOCK): 1.9 M^2
BH CV ECHO MEAS - BSA: 1.8 M^2
BH CV ECHO MEAS - BZI_BMI: 32.7 KILOGRAMS/M^2
BH CV ECHO MEAS - BZI_METRIC_HEIGHT: 157.5 CM
BH CV ECHO MEAS - BZI_METRIC_WEIGHT: 81.2 KG
BH CV ECHO MEAS - CONTRAST EF (2CH): 51.8 ML/M^2
BH CV ECHO MEAS - CONTRAST EF 4CH: 48.1 ML/M^2
BH CV ECHO MEAS - EDV(CUBED): 39.3 ML
BH CV ECHO MEAS - EDV(MOD-SP2): 85 ML
BH CV ECHO MEAS - EDV(MOD-SP4): 104 ML
BH CV ECHO MEAS - EDV(TEICH): 47.4 ML
BH CV ECHO MEAS - EF(CUBED): 55.3 %
BH CV ECHO MEAS - EF(MOD-SP2): 51.8 %
BH CV ECHO MEAS - EF(TEICH): 48.1 %
BH CV ECHO MEAS - ESV(CUBED): 17.6 ML
BH CV ECHO MEAS - ESV(MOD-SP2): 41 ML
BH CV ECHO MEAS - ESV(MOD-SP4): 54 ML
BH CV ECHO MEAS - ESV(TEICH): 24.6 ML
BH CV ECHO MEAS - FS: 23.5 %
BH CV ECHO MEAS - IVS/LVPW: 1.3
BH CV ECHO MEAS - IVSD: 1.9 CM
BH CV ECHO MEAS - LAT PEAK E' VEL: 5 CM/SEC
BH CV ECHO MEAS - LV DIASTOLIC VOL/BSA (35-75): 57 ML/M^2
BH CV ECHO MEAS - LV MASS(C)D: 229.5 GRAMS
BH CV ECHO MEAS - LV MASS(C)DI: 125.9 GRAMS/M^2
BH CV ECHO MEAS - LV MAX PG: 7.1 MMHG
BH CV ECHO MEAS - LV MEAN PG: 3 MMHG
BH CV ECHO MEAS - LV SYSTOLIC VOL/BSA (12-30): 29.6 ML/M^2
BH CV ECHO MEAS - LV V1 MAX: 133 CM/SEC
BH CV ECHO MEAS - LV V1 MEAN: 80.6 CM/SEC
BH CV ECHO MEAS - LV V1 VTI: 28 CM
BH CV ECHO MEAS - LVIDD: 3.4 CM
BH CV ECHO MEAS - LVIDS: 2.6 CM
BH CV ECHO MEAS - LVLD AP2: 9 CM
BH CV ECHO MEAS - LVLD AP4: 8.8 CM
BH CV ECHO MEAS - LVLS AP2: 8.4 CM
BH CV ECHO MEAS - LVLS AP4: 8.2 CM
BH CV ECHO MEAS - LVOT AREA (M): 2.5 CM^2
BH CV ECHO MEAS - LVOT AREA: 2.5 CM^2
BH CV ECHO MEAS - LVOT DIAM: 1.8 CM
BH CV ECHO MEAS - LVPWD: 1.5 CM
BH CV ECHO MEAS - MED PEAK E' VEL: 2 CM/SEC
BH CV ECHO MEAS - MV A DUR: 0.16 SEC
BH CV ECHO MEAS - MV A MAX VEL: 88.1 CM/SEC
BH CV ECHO MEAS - MV DEC SLOPE: 326 CM/SEC^2
BH CV ECHO MEAS - MV DEC TIME: 0.26 SEC
BH CV ECHO MEAS - MV E MAX VEL: 87.5 CM/SEC
BH CV ECHO MEAS - MV E/A: 0.99
BH CV ECHO MEAS - MV MAX PG: 3.6 MMHG
BH CV ECHO MEAS - MV MEAN PG: 1 MMHG
BH CV ECHO MEAS - MV P1/2T MAX VEL: 101 CM/SEC
BH CV ECHO MEAS - MV P1/2T: 90.7 MSEC
BH CV ECHO MEAS - MV V2 MAX: 94.8 CM/SEC
BH CV ECHO MEAS - MV V2 MEAN: 56.2 CM/SEC
BH CV ECHO MEAS - MV V2 VTI: 30.3 CM
BH CV ECHO MEAS - MVA P1/2T LCG: 2.2 CM^2
BH CV ECHO MEAS - MVA(P1/2T): 2.4 CM^2
BH CV ECHO MEAS - MVA(VTI): 2.4 CM^2
BH CV ECHO MEAS - PA ACC TIME: 0.1 SEC
BH CV ECHO MEAS - PA MAX PG (FULL): 3.6 MMHG
BH CV ECHO MEAS - PA MAX PG: 8.9 MMHG
BH CV ECHO MEAS - PA PR(ACCEL): 34.5 MMHG
BH CV ECHO MEAS - PA V2 MAX: 149 CM/SEC
BH CV ECHO MEAS - PULM A REVS DUR: 0.14 SEC
BH CV ECHO MEAS - PULM A REVS VEL: 25.2 CM/SEC
BH CV ECHO MEAS - PULM DIAS VEL: 62.6 CM/SEC
BH CV ECHO MEAS - PULM S/D: 0.72
BH CV ECHO MEAS - PULM SYS VEL: 45.1 CM/SEC
BH CV ECHO MEAS - PVA(V,A): 1.8 CM^2
BH CV ECHO MEAS - PVA(V,D): 1.8 CM^2
BH CV ECHO MEAS - QP/QS: 0.83
BH CV ECHO MEAS - RAP SYSTOLE: 3 MMHG
BH CV ECHO MEAS - RV MAX PG: 5.3 MMHG
BH CV ECHO MEAS - RV MEAN PG: 2 MMHG
BH CV ECHO MEAS - RV V1 MAX: 115 CM/SEC
BH CV ECHO MEAS - RV V1 MEAN: 70.2 CM/SEC
BH CV ECHO MEAS - RV V1 VTI: 25.9 CM
BH CV ECHO MEAS - RVOT AREA: 2.3 CM^2
BH CV ECHO MEAS - RVOT DIAM: 1.7 CM
BH CV ECHO MEAS - RVSP: 32.4 MMHG
BH CV ECHO MEAS - SI(AO): 92.3 ML/M^2
BH CV ECHO MEAS - SI(CUBED): 11.9 ML/M^2
BH CV ECHO MEAS - SI(LVOT): 39.1 ML/M^2
BH CV ECHO MEAS - SI(MOD-SP2): 24.1 ML/M^2
BH CV ECHO MEAS - SI(MOD-SP4): 27.4 ML/M^2
BH CV ECHO MEAS - SI(TEICH): 12.5 ML/M^2
BH CV ECHO MEAS - SUP REN AO DIAM: 1.98 CM
BH CV ECHO MEAS - SV(AO): 168.3 ML
BH CV ECHO MEAS - SV(CUBED): 21.7 ML
BH CV ECHO MEAS - SV(LVOT): 71.3 ML
BH CV ECHO MEAS - SV(MOD-SP2): 44 ML
BH CV ECHO MEAS - SV(MOD-SP4): 50 ML
BH CV ECHO MEAS - SV(RVOT): 58.8 ML
BH CV ECHO MEAS - SV(TEICH): 22.8 ML
BH CV ECHO MEAS - TAPSE (>1.6): 1.3 CM2
BH CV ECHO MEAS - TR MAX VEL: 271 CM/SEC
BH CV XLRA - RV BASE: 2.5 CM
BH CV XLRA - TDI S': 11 CM/SEC
BUN BLD-MCNC: 22 MG/DL (ref 8–23)
BUN/CREAT SERPL: 21 (ref 7–25)
C PNEUM DNA NPH QL NAA+NON-PROBE: NOT DETECTED
CALCIUM SPEC-SCNC: 9.5 MG/DL (ref 8.6–10.5)
CHLORIDE SERPL-SCNC: 90 MMOL/L (ref 98–107)
CO2 SERPL-SCNC: 26.4 MMOL/L (ref 22–29)
CREAT BLD-MCNC: 1.05 MG/DL (ref 0.57–1)
DEPRECATED RDW RBC AUTO: 48.4 FL (ref 37–54)
E/E' RATIO: 25
ERYTHROCYTE [DISTWIDTH] IN BLOOD BY AUTOMATED COUNT: 16.4 % (ref 11.7–13)
FLUAV H1 2009 PAND RNA NPH QL NAA+PROBE: NOT DETECTED
FLUAV H1 HA GENE NPH QL NAA+PROBE: NOT DETECTED
FLUAV H3 RNA NPH QL NAA+PROBE: NOT DETECTED
FLUAV SUBTYP SPEC NAA+PROBE: NOT DETECTED
FLUBV RNA ISLT QL NAA+PROBE: NOT DETECTED
GFR SERPL CREATININE-BSD FRML MDRD: 53 ML/MIN/1.73
GLUCOSE BLD-MCNC: 124 MG/DL (ref 65–99)
GLUCOSE BLDC GLUCOMTR-MCNC: 165 MG/DL (ref 70–130)
GLUCOSE BLDC GLUCOMTR-MCNC: 194 MG/DL (ref 70–130)
HADV DNA SPEC NAA+PROBE: NOT DETECTED
HCOV 229E RNA SPEC QL NAA+PROBE: NOT DETECTED
HCOV HKU1 RNA SPEC QL NAA+PROBE: NOT DETECTED
HCOV NL63 RNA SPEC QL NAA+PROBE: NOT DETECTED
HCOV OC43 RNA SPEC QL NAA+PROBE: NOT DETECTED
HCT VFR BLD AUTO: 31.6 % (ref 35.6–45.5)
HGB BLD-MCNC: 9.5 G/DL (ref 11.9–15.5)
HMPV RNA NPH QL NAA+NON-PROBE: NOT DETECTED
HPIV1 RNA SPEC QL NAA+PROBE: NOT DETECTED
HPIV2 RNA SPEC QL NAA+PROBE: NOT DETECTED
HPIV3 RNA NPH QL NAA+PROBE: NOT DETECTED
HPIV4 P GENE NPH QL NAA+PROBE: NOT DETECTED
L PNEUMO1 AG UR QL IA: NEGATIVE
LEFT ATRIUM VOLUME INDEX: 26 ML/M2
M PNEUMO IGG SER IA-ACNC: NOT DETECTED
MAGNESIUM SERPL-MCNC: 1.7 MG/DL (ref 1.6–2.4)
MCH RBC QN AUTO: 24.1 PG (ref 26.9–32)
MCHC RBC AUTO-ENTMCNC: 30.1 G/DL (ref 32.4–36.3)
MCV RBC AUTO: 80.2 FL (ref 80.5–98.2)
PLATELET # BLD AUTO: 331 10*3/MM3 (ref 140–500)
PMV BLD AUTO: 8.6 FL (ref 6–12)
POTASSIUM BLD-SCNC: 3.6 MMOL/L (ref 3.5–5.2)
RBC # BLD AUTO: 3.94 10*6/MM3 (ref 3.9–5.2)
RHINOVIRUS RNA SPEC NAA+PROBE: NOT DETECTED
RSV RNA NPH QL NAA+NON-PROBE: DETECTED
S PNEUM AG SPEC QL LA: NEGATIVE
SODIUM BLD-SCNC: 134 MMOL/L (ref 136–145)
TROPONIN T SERPL-MCNC: <0.01 NG/ML (ref 0–0.03)
WBC NRBC COR # BLD: 9.29 10*3/MM3 (ref 4.5–10.7)

## 2017-03-04 PROCEDURE — 63710000001 PREDNISONE PER 5 MG: Performed by: HOSPITALIST

## 2017-03-04 PROCEDURE — 80048 BASIC METABOLIC PNL TOTAL CA: CPT | Performed by: INTERNAL MEDICINE

## 2017-03-04 PROCEDURE — 93306 TTE W/DOPPLER COMPLETE: CPT

## 2017-03-04 PROCEDURE — 93306 TTE W/DOPPLER COMPLETE: CPT | Performed by: INTERNAL MEDICINE

## 2017-03-04 PROCEDURE — G8979 MOBILITY GOAL STATUS: HCPCS

## 2017-03-04 PROCEDURE — 94799 UNLISTED PULMONARY SVC/PX: CPT

## 2017-03-04 PROCEDURE — 84484 ASSAY OF TROPONIN QUANT: CPT | Performed by: NURSE PRACTITIONER

## 2017-03-04 PROCEDURE — 99222 1ST HOSP IP/OBS MODERATE 55: CPT | Performed by: INTERNAL MEDICINE

## 2017-03-04 PROCEDURE — 63710000001 INSULIN ASPART PER 5 UNITS: Performed by: HOSPITALIST

## 2017-03-04 PROCEDURE — 97162 PT EVAL MOD COMPLEX 30 MIN: CPT

## 2017-03-04 PROCEDURE — 83735 ASSAY OF MAGNESIUM: CPT | Performed by: NURSE PRACTITIONER

## 2017-03-04 PROCEDURE — 87070 CULTURE OTHR SPECIMN AEROBIC: CPT | Performed by: INTERNAL MEDICINE

## 2017-03-04 PROCEDURE — 85027 COMPLETE CBC AUTOMATED: CPT | Performed by: INTERNAL MEDICINE

## 2017-03-04 PROCEDURE — 87205 SMEAR GRAM STAIN: CPT | Performed by: INTERNAL MEDICINE

## 2017-03-04 PROCEDURE — 82962 GLUCOSE BLOOD TEST: CPT

## 2017-03-04 PROCEDURE — 25010000002 HYDROMORPHONE PER 4 MG: Performed by: HOSPITALIST

## 2017-03-04 PROCEDURE — G8978 MOBILITY CURRENT STATUS: HCPCS

## 2017-03-04 RX ORDER — POTASSIUM CHLORIDE 1.5 G/1.77G
40 POWDER, FOR SOLUTION ORAL ONCE
Status: COMPLETED | OUTPATIENT
Start: 2017-03-04 | End: 2017-03-04

## 2017-03-04 RX ORDER — SPIRONOLACTONE AND HYDROCHLOROTHIAZIDE 25; 25 MG/1; MG/1
1 TABLET ORAL
Status: DISCONTINUED | OUTPATIENT
Start: 2017-03-04 | End: 2017-03-06 | Stop reason: HOSPADM

## 2017-03-04 RX ORDER — DEXTROSE MONOHYDRATE 25 G/50ML
25 INJECTION, SOLUTION INTRAVENOUS
Status: DISCONTINUED | OUTPATIENT
Start: 2017-03-04 | End: 2017-03-06 | Stop reason: HOSPADM

## 2017-03-04 RX ORDER — POTASSIUM CHLORIDE 7.45 MG/ML
10 INJECTION INTRAVENOUS
Status: DISCONTINUED | OUTPATIENT
Start: 2017-03-04 | End: 2017-03-06 | Stop reason: HOSPADM

## 2017-03-04 RX ORDER — HYDROMORPHONE HYDROCHLORIDE 1 MG/ML
0.5 INJECTION, SOLUTION INTRAMUSCULAR; INTRAVENOUS; SUBCUTANEOUS
Status: DISCONTINUED | OUTPATIENT
Start: 2017-03-04 | End: 2017-03-06 | Stop reason: HOSPADM

## 2017-03-04 RX ORDER — POTASSIUM CHLORIDE 750 MG/1
40 CAPSULE, EXTENDED RELEASE ORAL AS NEEDED
Status: DISCONTINUED | OUTPATIENT
Start: 2017-03-04 | End: 2017-03-06 | Stop reason: HOSPADM

## 2017-03-04 RX ORDER — GUAIFENESIN 600 MG/1
600 TABLET, EXTENDED RELEASE ORAL 2 TIMES DAILY
Status: DISCONTINUED | OUTPATIENT
Start: 2017-03-04 | End: 2017-03-06 | Stop reason: HOSPADM

## 2017-03-04 RX ORDER — NICOTINE POLACRILEX 4 MG
15 LOZENGE BUCCAL
Status: DISCONTINUED | OUTPATIENT
Start: 2017-03-04 | End: 2017-03-06 | Stop reason: HOSPADM

## 2017-03-04 RX ORDER — PREDNISONE 20 MG/1
40 TABLET ORAL DAILY
Status: DISCONTINUED | OUTPATIENT
Start: 2017-03-04 | End: 2017-03-06 | Stop reason: HOSPADM

## 2017-03-04 RX ORDER — POTASSIUM CHLORIDE 1.5 G/1.77G
40 POWDER, FOR SOLUTION ORAL AS NEEDED
Status: DISCONTINUED | OUTPATIENT
Start: 2017-03-04 | End: 2017-03-06 | Stop reason: HOSPADM

## 2017-03-04 RX ADMIN — PREDNISONE 40 MG: 20 TABLET ORAL at 15:17

## 2017-03-04 RX ADMIN — PANTOPRAZOLE SODIUM 40 MG: 40 TABLET, DELAYED RELEASE ORAL at 06:16

## 2017-03-04 RX ADMIN — DOXYCYCLINE 100 MG: 100 INJECTION, POWDER, LYOPHILIZED, FOR SOLUTION INTRAVENOUS at 21:15

## 2017-03-04 RX ADMIN — HYDROMORPHONE HYDROCHLORIDE 0.5 MG: 1 INJECTION, SOLUTION INTRAMUSCULAR; INTRAVENOUS; SUBCUTANEOUS at 20:42

## 2017-03-04 RX ADMIN — TRAZODONE HYDROCHLORIDE 50 MG: 50 TABLET ORAL at 20:54

## 2017-03-04 RX ADMIN — IPRATROPIUM BROMIDE AND ALBUTEROL SULFATE 3 ML: .5; 3 SOLUTION RESPIRATORY (INHALATION) at 16:24

## 2017-03-04 RX ADMIN — IPRATROPIUM BROMIDE AND ALBUTEROL SULFATE 3 ML: .5; 3 SOLUTION RESPIRATORY (INHALATION) at 19:58

## 2017-03-04 RX ADMIN — GABAPENTIN 100 MG: 100 CAPSULE ORAL at 20:55

## 2017-03-04 RX ADMIN — ALLOPURINOL 100 MG: 100 TABLET ORAL at 08:15

## 2017-03-04 RX ADMIN — IPRATROPIUM BROMIDE AND ALBUTEROL SULFATE 3 ML: .5; 3 SOLUTION RESPIRATORY (INHALATION) at 12:34

## 2017-03-04 RX ADMIN — GUAIFENESIN 600 MG: 600 TABLET, EXTENDED RELEASE ORAL at 17:17

## 2017-03-04 RX ADMIN — OXYCODONE HYDROCHLORIDE AND ACETAMINOPHEN 2 TABLET: 5; 325 TABLET ORAL at 08:15

## 2017-03-04 RX ADMIN — GABAPENTIN 100 MG: 100 CAPSULE ORAL at 08:15

## 2017-03-04 RX ADMIN — DOXYCYCLINE 100 MG: 100 INJECTION, POWDER, LYOPHILIZED, FOR SOLUTION INTRAVENOUS at 08:15

## 2017-03-04 RX ADMIN — HYDROMORPHONE HYDROCHLORIDE 0.5 MG: 1 INJECTION, SOLUTION INTRAMUSCULAR; INTRAVENOUS; SUBCUTANEOUS at 10:39

## 2017-03-04 RX ADMIN — IPRATROPIUM BROMIDE AND ALBUTEROL SULFATE 3 ML: .5; 3 SOLUTION RESPIRATORY (INHALATION) at 08:37

## 2017-03-04 RX ADMIN — INSULIN ASPART 3 UNITS: 100 INJECTION, SOLUTION INTRAVENOUS; SUBCUTANEOUS at 21:16

## 2017-03-04 RX ADMIN — MONTELUKAST 10 MG: 10 TABLET, FILM COATED ORAL at 20:54

## 2017-03-04 RX ADMIN — LEVOTHYROXINE SODIUM 75 MCG: 75 TABLET ORAL at 06:16

## 2017-03-04 RX ADMIN — POTASSIUM CHLORIDE 40 MEQ: 1.5 POWDER, FOR SOLUTION ORAL at 13:35

## 2017-03-04 RX ADMIN — CARVEDILOL 12.5 MG: 12.5 TABLET, FILM COATED ORAL at 08:15

## 2017-03-04 RX ADMIN — INSULIN ASPART 2 UNITS: 100 INJECTION, SOLUTION INTRAVENOUS; SUBCUTANEOUS at 17:17

## 2017-03-04 RX ADMIN — DULOXETINE HYDROCHLORIDE 60 MG: 60 CAPSULE, DELAYED RELEASE ORAL at 08:15

## 2017-03-04 RX ADMIN — DABIGATRAN ETEXILATE MESYLATE 150 MG: 150 CAPSULE ORAL at 17:17

## 2017-03-04 RX ADMIN — SPIRONOLACTONE AND HYDROCHLOROTHIAZIDE 1 TABLET: 25; 25 TABLET, FILM COATED ORAL at 08:15

## 2017-03-04 RX ADMIN — GABAPENTIN 100 MG: 100 CAPSULE ORAL at 15:17

## 2017-03-04 RX ADMIN — CARVEDILOL 12.5 MG: 12.5 TABLET, FILM COATED ORAL at 17:17

## 2017-03-04 RX ADMIN — DABIGATRAN ETEXILATE MESYLATE 150 MG: 150 CAPSULE ORAL at 08:15

## 2017-03-04 RX ADMIN — AMIODARONE HYDROCHLORIDE 100 MG: 100 TABLET ORAL at 20:54

## 2017-03-04 RX ADMIN — OXYCODONE HYDROCHLORIDE AND ACETAMINOPHEN 2 TABLET: 5; 325 TABLET ORAL at 22:47

## 2017-03-04 RX ADMIN — OXYCODONE HYDROCHLORIDE AND ACETAMINOPHEN 2 TABLET: 5; 325 TABLET ORAL at 01:21

## 2017-03-04 RX ADMIN — ATORVASTATIN CALCIUM 80 MG: 80 TABLET, FILM COATED ORAL at 20:55

## 2017-03-04 NOTE — PLAN OF CARE
Problem: Patient Care Overview (Adult)  Goal: Plan of Care Review  Outcome: Ongoing (interventions implemented as appropriate)    03/04/17 1521   Coping/Psychosocial Response Interventions   Plan Of Care Reviewed With patient   Patient Care Overview   Progress progress toward functional goals is gradual   Outcome Evaluation   Outcome Summary/Follow up Plan ECHO done today; IV abx; vitals stable; added IV pain med; put on K protocol; H/H 9.5; orthostatic BP done; worked with PT       Goal: Adult Individualization and Mutuality  Outcome: Ongoing (interventions implemented as appropriate)  Goal: Discharge Needs Assessment  Outcome: Ongoing (interventions implemented as appropriate)    Problem: Pneumonia (Adult)  Goal: Signs and Symptoms of Listed Potential Problems Will be Absent or Manageable (Pneumonia)  Outcome: Ongoing (interventions implemented as appropriate)    Problem: Fall Risk (Adult)  Goal: Identify Related Risk Factors and Signs and Symptoms  Outcome: Ongoing (interventions implemented as appropriate)  Goal: Absence of Falls  Outcome: Ongoing (interventions implemented as appropriate)

## 2017-03-04 NOTE — CONSULTS
[facility]  Cardiology Consult Note  .  Patient Identification:  Name: Kristan Galicia  Age: 66 y.o.  Sex: female  :  1950  MRN: [id number]         Chief complaint Cough and wheezing      Requesting Physician: Dr.J Vanegas  Reason for Consultation:  CHF and IHSS      HPI  66-year-old  female who is normally followed by Dr. Ya and Dr. Hutchinson with [facility].  She reports that she began experiencing several weeks ago worsening cough and shortness of breath and was seen by her primary care provider and was diagnosed with pneumonia.  She was started on antibiotic therapy but on Friday she continued to feel poorly with worsening shortness of breath, chest heaviness, wheezing, cough, 3 pillow orthopnea, lower extremity swelling, congestion and feelings of dizziness upon standing.  Her PCP instructed her to report to [facility] for further evaluation and management.  Because of the orthopnea and swelling patient actually took an extra diuretic without much effect of her symptoms.  Recently went had anesthesia for right rotator cuff repair on 2017 and had been recovering well up until this upper respiratory infection.    Been followed by Dr. Ya for IHSS, paroxysmal A. fib with indwelling pacemaker and is on Pradaxa, vasopressor syncope, and heart failure.  It's review from the EMR reveal that she has had a history of a thin  showing normal coronary arteries.  Echo was in  showing hypertrophic cardiomyopathy with septal thickening noted without significant left ventricular outflow tract obstruction. Hx of AICD implantation  She states her last device check was 17 .  She was told that her device showed no tachycardic events however it did show some intrathoracic fluid buildup.  Her chest heaviness she felt was more due to her wheezing and shortness of breath.  It was nonradiating in nature and was not  associated with any other symptoms other than shortness of breath.  She reports feeling much improved today than on admit.  Orthopnea and swelling better.        Past Medical History:  Past Medical History   Diagnosis Date   • Atrial fibrillation    • Cardiac defibrillator in place 01/2009     medtronic    • Cardiomyopathy      Hypertrophic Cardiomyopathy   • CHF (congestive heart failure)    • Diverticulosis      hx diverticulitis   • GERD (gastroesophageal reflux disease)    • Heart murmur    • History of depression    • History of tumor      left ocular tumor, treated with steroids   • Hyperlipidemia    • Migraines    • Osteoporosis    • Palpitations    • Sleep apnea      does not use CPAP or BiPAP, used nose strips   • Thyroid disease    • Vasovagal syncope    • Vertigo    • Vitamin D deficiency      Past Surgical History:  Past Surgical History   Procedure Laterality Date   • Laparoscopic cholecystectomy  1985   • Colonoscopy  1996     Dr. Herbert Gonsales    • Appendectomy  1970   • Hysterectomy  1989     Total   • Cardiac defibrillator placement Left 1999     Dr. Ya   • Breast reconstruction Bilateral      Reduction   • Knee arthroscopy Bilateral 2014     Dr. Li   • Femur surgery Left      with metal implant  x3   • Shoulder arthroscopy w/ rotator cuff repair Bilateral 05/20/2011   • Tonsillectomy     • Cataract extraction Bilateral    • Breast biopsy Bilateral    • Colonoscopy  8/16/2016     Procedure: COLONOSCOPY with cecum;  Surgeon: Rosalio Sheikh MD;  Location: [facility] ENDOSCOPY;  Service:    • Rotator cuff repair Right 06/10/2010   • Knee surgery Right 06/10/2010   • Cardiac catheterization  02/19/2007   • Neck surgery  08/23/2012     Anterior cervical diskectomy and fusion with VG2 allograft implants and eagle anterior plate fixation,C5-C6 C6-C7   • First rib resection Bilateral      and scalenectomy   • Total shoulder arthroplasty w/ distal clavicle excision Right 2/2/2017     Procedure: TOTAL  SHOULDER REVERSE ARTHROPLASTY;  Surgeon: Juan Antonio Anne MD;  Location: [facility];  Service:    • Shoulder surgery Right       Current Meds:     Current Facility-Administered Medications:   •  acetaminophen (TYLENOL) tablet 650 mg, 650 mg, Oral, Q4H PRN, Luna Vanegas MD  •  allopurinol (ZYLOPRIM) tablet 100 mg, 100 mg, Oral, Daily, Luna Vanegas MD, 100 mg at 03/03/17 2123  •  amiodarone (PACERONE) tablet 100 mg, 100 mg, Oral, Nightly, Luna Vanegas MD, 100 mg at 03/03/17 2110  •  atorvastatin (LIPITOR) tablet 80 mg, 80 mg, Oral, Nightly, Luna Vanegas MD, 80 mg at 03/03/17 2113  •  baclofen (LIORESAL) tablet 10 mg, 10 mg, Oral, Nightly PRN, Luna Vanegas MD  •  carvedilol (COREG) tablet 12.5 mg, 12.5 mg, Oral, BID, Luna Vanegas MD, 12.5 mg at 03/03/17 2100  •  cefTRIAXone (ROCEPHIN) IVPB 1 g, 1 g, Intravenous, Q24H, 1 g at 03/03/17 2100 **AND** doxycycline (VIBRAMYCIN) 100 mg/100 mL 0.9% NS MBP, 100 mg, Intravenous, Q12H, Luna Vanegas MD, 100 mg at 03/03/17 2000  •  dabigatran etexilate (PRADAXA) capsule 150 mg, 150 mg, Oral, BID, Luna Vanegas MD, 150 mg at 03/03/17 2100  •  DULoxetine (CYMBALTA) DR capsule 60 mg, 60 mg, Oral, Daily, Luna Vanegas MD, 60 mg at 03/03/17 2124  •  gabapentin (NEURONTIN) capsule 100 mg, 100 mg, Oral, TID, Luna Vanegas MD, 100 mg at 03/03/17 2110  •  HYDROmorphone (DILAUDID) injection 0.5 mg, 0.5 mg, Intravenous, Q2H PRN, Niko Gonzalez MD  •  ipratropium-albuterol (DUO-NEB) nebulizer solution 3 mL, 3 mL, Nebulization, 4x Daily - RT, Luna Vanegas MD  •  ipratropium-albuterol (DUO-NEB) nebulizer solution 3 mL, 3 mL, Nebulization, Q4H PRN, Luna Vanegas MD  •  ipratropium-albuterol (DUO-NEB) nebulizer solution 3 mL, 3 mL, Nebulization, Q4H - RT, Luna Vanegas MD, 3 mL at 03/03/17 9586  •  levothyroxine (SYNTHROID, LEVOTHROID) tablet 75 mcg, 75 mcg, Oral, Q AM, Luna Vanegas MD, 75 mcg at 03/04/17 0616  •  lidocaine (LIDODERM) 5 % 1 patch, 1 patch,  Transdermal, Daily PRN, Luna Vanegas MD  •  montelukast (SINGULAIR) tablet 10 mg, 10 mg, Oral, Nightly, Luna Vanegas MD, 10 mg at 03/03/17 2110  •  nitroglycerin (NITROSTAT) SL tablet 0.4 mg, 0.4 mg, Sublingual, Q5 Min PRN, Luna Vanegas MD  •  ondansetron (ZOFRAN) injection 4 mg, 4 mg, Intravenous, Q6H PRN, Luna Vanegas MD  •  oxyCODONE-acetaminophen (PERCOCET) 5-325 MG per tablet 1 tablet, 1 tablet, Oral, Q4H PRN, 1 tablet at 03/03/17 2106 **OR** oxyCODONE-acetaminophen (PERCOCET) 5-325 MG per tablet 2 tablet, 2 tablet, Oral, Q4H PRN, Luna Vanegas MD, 2 tablet at 03/04/17 0121  •  pantoprazole (PROTONIX) EC tablet 40 mg, 40 mg, Oral, Q AM, Luna Vanegas MD, 40 mg at 03/04/17 0616  •  sodium chloride 0.9 % flush 1-10 mL, 1-10 mL, Intravenous, PRN, Luna Vanegas MD  •  spironolactone-hydrochlorothiazide (ALDACTAZIDE) 25-25 MG tablet 1 tablet, 1 tablet, Oral, QAM AC, Luna Vanegas MD  •  traZODone (DESYREL) tablet 50 mg, 50 mg, Oral, Nightly, Luna Vanegas MD, 50 mg at 03/03/17 2112    Allergies:  Allergies   Allergen Reactions   • Adhesive Tape Other (See Comments)   • Aspirin Swelling   • Clarithromycin Other (See Comments)     BLISTERS AROUND MOTH  Also known as Bioxen    • Codeine Swelling   • Darvon [Propoxyphene] Swelling   • Gatifloxacin Other (See Comments)     BLISTERS AROUND MOUTH  Also known Tequine    • Levaquin [Levofloxacin] Other (See Comments)     BLISTERS AROUND MOUTH     Social History:   Social History   Substance Use Topics   • Smoking status: Former Smoker     Packs/day: 0.50     Years: 17.00     Quit date: 9/8/1985   • Smokeless tobacco: Never Used   • Alcohol use No      Family History:  Family History   Problem Relation Age of Onset   • Heart attack Brother    • Hyperthyroidism Mother    • Cancer Mother    • Heart disease Mother    • Osteoporosis Mother    • No Known Problems Father       car accident   • Cirrhosis Maternal Grandmother    • No Known Problems Maternal  "Grandfather    • No Known Problems Paternal Grandmother    • No Known Problems Paternal Grandfather    • Breast cancer Maternal Aunt         Review of Systems   Constitution: Positive for decreased appetite, weakness and malaise/fatigue. Negative for chills and weight loss.   HENT: Positive for congestion. Negative for hoarse voice and nosebleeds.    Eyes: Negative for blurred vision and pain.   Cardiovascular: Positive for dyspnea on exertion, leg swelling, orthopnea and paroxysmal nocturnal dyspnea. Negative for chest pain, near-syncope, palpitations and syncope.   Respiratory: Positive for cough, shortness of breath, sleep disturbances due to breathing, sputum production and wheezing. Negative for hemoptysis.    Endocrine: Negative for cold intolerance and polydipsia.   Hematologic/Lymphatic: Negative for bleeding problem. Bruises/bleeds easily.   Skin: Negative for nail changes and rash.   Musculoskeletal: Positive for joint pain. Negative for joint swelling.   Gastrointestinal: Negative for bloating, abdominal pain, change in bowel habit, hematochezia, melena, nausea and vomiting.   Genitourinary: Negative for dysuria and hematuria.   Neurological: Positive for dizziness, light-headedness and loss of balance. Negative for focal weakness.   Psychiatric/Behavioral: Negative for altered mental status. The patient is not nervous/anxious.    Allergic/Immunologic: Negative for persistent infections.   All other systems reviewed and are negative.    Review of systems:  Pertenant positives are as mentioned in the HPI and all the other    review of systems are negative    Objective:  Visit Vitals   • /83 (BP Location: Left arm, Patient Position: Lying)   • Pulse 68   • Temp 98 °F (36.7 °C) (Oral)   • Resp 18   • Ht 62\" (157.5 cm)   • Wt 179 lb (81.2 kg)   • SpO2 98%   • BMI 32.74 kg/m2               Physical Examination: By     Physical Exam    General: No acute distress, well developed, well " nourished    Skin: Warm and dry, no diaphoresis noted;    no pallor or cyanosis   HEENT: Normal Pupills; no conjunctiva erythema; external ear and nose normal; oral mucosa moist, no xanthelasma, lips not cyanotic   Neck: Supple; no carotid bruits; no  JVD; thyroid not enlarged. Trachea mid line.   Heart: [place] not palpable, S1S2  regular rate and rhythm; + OTILIO RUSB , no rubs or gallops are auscultated.   Chest: Respirations regular, unlabored at rest, bilateral breath sounds have good air entry throughout all lung fields; fine crackles to right lobe,  No wheezing auscultated.  O2 in place.    Abdomen: Soft, obese non-tender, non-distended, positive bowel sounds, no hepatomegaly, No Bruit   Extremities: Bilateral lower extremities have no pre-tibial pitting edema; Radials pulses are palpable   Musculoskeletal:  Gait and station not observed.  MAEW.    No clubbing of the fingers   Neurological/  Psychological:  Alert and oriented; no new  motor deficits; speech and behavior appropriate;     Rest of the exam is stable     Lab Review:  Personally reviewed the labs, radiology imaging and other cardiac procedures.      Results from last 7 days  Lab Units 17  0643 17   SODIUM mmol/L 134* 138   POTASSIUM mmol/L 3.6 3.0*   CHLORIDE mmol/L 90* 94*   TOTAL CO2 mmol/L 26.4 26.7   BUN mg/dL 22 23   CREATININE mg/dL 1.05* 1.13*   CALCIUM mg/dL 9.5 9.6   BILIRUBIN mg/dL  --  0.4   ALK PHOS U/L  --  66   ALT (SGPT) U/L  --  32   AST (SGOT) U/L  --  32   GLUCOSE mg/dL 124* 192*       Results from last 7 days  Lab Units 17   TROPONIN T ng/mL <0.010         Results from last 7 days  Lab Units 1743 17   WBC 10*3/mm3 9.29 8.30   HEMOGLOBIN g/dL 9.5* 9.8*   HEMATOCRIT % 31.6* 31.6*   PLATELETS 10*3/mm3 331 328                ProBNP 522 on 3/3/17 (674 on 17)        I personally viewed and interpreted the patient's EKG/Telemetry data    EC/12/13            Echocardiogram:  Ordered and pending    4/6/16 at [facility]   Conclusions:  Severe concentric left ventricular hypertrophy is observed.1.6/1.8, HCM  Global left ventricular wall motion and contractility are within normal  limits.  The EF 4ch was calculated at 66%.  Abnormal left ventricular diastolic function is observed.  The left ventricular diastolic filling pattern is consistent with  elevated mean left atrial pressure.e/e' 28,25  A pacemaker wire is visualized in the right atrium.  There is a trace of mitral regurgitation.    10/22/12 Echo            [facility] on 2/16/07 By. Dr. Dwyer              XRAY:  )  Imaging Results (last 24 hours)     Procedure Component Value Units Date/Time    XR Chest 2 View [[id number]] Collected:  03/03/17 2000     Updated:  03/03/17 2005    Narrative:       XR CHEST 2 VW-     HISTORY: Female who is 66 years-old,  dyspnea     TECHNIQUE: Frontal and lateral views of the chest     COMPARISON: 09/17/2013     FINDINGS: Heart is mildly enlarged. Left-sided pacemaker and cardiac  leads are seen. Pulmonary vasculature is unremarkable. No focal  pulmonary consolidation, pleural effusion, or pneumothorax.  No acute  osseous process.       Impression:       No focal pulmonary consolidation. Mild cardiomegaly.  Follow-up as clinical indications persist.     This report was finalized on 3/3/2017 8:02 PM by Dr. Fabien Lynn MD.                Patient Active Problem List   Diagnosis   • Shoulder pain, right   • Knee pain, right   • Fall down stairs   • PAF (paroxysmal atrial fibrillation)   • S/P rotator cuff repair   • Vitamin D deficiency   • Vertigo   • Vasovagal syncope   • Thyroid disease   • Obstructive sleep apnea syndrome   • Palpitations   • Osteoporosis   • Ocular tumor   • Migraines   • Hyperlipidemia   • History of transfusion   • Heart murmur   • Heart attack   • GERD (gastroesophageal reflux disease)   • Depression   • CHF (congestive heart failure)   • Cardiomyopathy   •  Asthma   • Anxiety   • Arthritis of right knee   • Right rotator cuff tear   • Hypothyroidism   • Dyspnea and respiratory abnormalities   • Orthopnea   • IHSS (idiopathic hypertrophic subaortic stenosis)   • AICD (automatic cardioverter/defibrillator) present   • Anticoagulant long-term use          Assessment/ Plan :    IHSS: will recheck echo. Last one on record 2016 showing no outflow tract obstruction and EF 66%.  Will order repeat echo. Some reports of orthostatic dizziness.  Will have staff check ortho bp's.   Trop on admit < 0.01.  Will add trop to this am labs.      Hx of Hypothyroidism: Elevated TSH 13.28 on 3/3/17 with free T 4 1.32. Is on levothyroxine.  TSH Was 34.530 on 11/1/2016.     Volume overload: improved.  Hx of diastolic dysfunction.  She states less orthopnea and swelling today. Is on spironolactone and HCTZ combo from home.  Monitor labs.      Hypokalemia: K+ 3.0 on admit.  3.6 today 3/4/17.  Will need to monitor lytes in order to arrhythmias.  Check BMP and Mag in am.  Not on scheduled potassium at home.  Took lasix prior to admit.  Will give one time dose of KCL 20 meq today to try and keep K+ up around 4.0.  Labs in past as outpt showing normal K+. Will add mag to this am's lab.      PAF: is A pacing.  Is on home dose pradaxa  And amiodarone 100 mg daily and coreg 12.5 mg BID.   -130s / 70's.      Hx of ICD: device interrogation noted in care everywhere EMR 2/27/17showing no episode of Tachycardia.     PNA:  With viral respiratory virus.  IM following. Pt feeling better.        Thank you for requesting the consult and please call me if has any further questions on this consult      Discussed pt with Dr. Vazquez who saw and examined and his plan is outlined above.  CHRISTIN Kunz    Echo shows IHSS with no significant outflow obstruction.  Carmelo Vazquez MD,FACC  3/4/2017, 8:08 AM    Patient is seen and examined by me. All labs, radiology reports and cardiac procedures are  reviewed an or obtained by me. Asessment and plan is by me.  Carmelo Vazquez MD

## 2017-03-04 NOTE — H&P
Dictated    1. SOA, cough, wheeze- bacterial pneumonia, viral, chf component. Treated with clinda as outpt 2/22/17. Rales LLL>RLL    2. IHSS with orthopnea and increased LE edema. Took extra lasix at home today. Cardio is polly/tino  3. PAF, on pradaxa and amio  4. INGIRD on CPAP  5. Right rotator cuff repair 2/3/17  6. hypothy    84005

## 2017-03-04 NOTE — NURSING NOTE
Orthostatic BP completed:  Lying /50;  H 67  Standing (1 min) BP 95/56; HR 66  Standing (5 min) /60;  HR 67

## 2017-03-04 NOTE — H&P
CHIEF COMPLAINT: Cough and wheezing.     HISTORY OF PRESENT ILLNESS: This is 66-year-old woman who is unknown to our service. She was seen at the nurse practitioner office 02/22/2017 and diagnosed with pneumonia. A chest x-ray was done then. The patient cannot remember if the pneumonia was on the right or the left side. She was given clindamycin and completed that course. She felt a little bit better, but then worsened. She has had a cough productive of initially green mucus now yellow. She is coughing and wheezing. She is tired. Her O2 saturation was reported as low at 94% at the nurse practitioner office on room air. No fever or chills. No myalgias.  She is having some epigastric pain and some pain in the middle of her back with deep breathing. She has orthopnea which has developed over the last several days. She is sleeping on 3 pillows which is not normal for her. She has also noticed increased lower extremity edema in the last 5 days. She took an extra diuretic this morning, I believe she said, or possibly last night. No chest pain. No palpitations. She is having nasal drainage, which also was initially green and now yellow. She has a headache, which is worse when she coughs. It is mostly frontal.  No earache. She does not have wheezing typically. She feels short of breath. That also is not typical for her. No previous history of asthma or emphysema. No other associated symptoms or exacerbating or alleviating factors.     PAST MEDICAL HISTORY:   1. Idiopathic hypertrophic subaortic stenosis, her cardiologist is Dr. Ya and also Dr. Ch. She has an AICD device.    2. Sleep apnea on CPAP.    3. Paroxysmal atrial fibrillation.   4. Hypothyroidism.   5. She had a rotator cuff repair on the right on 02/03/2017.     HOME MEDICATIONS:  1. Fosamax 70 mg weekly.   2. Zyloprim 100 mg daily.   3. Amiodarone 100 mg at bedtime.   4. Lipitor 80 mg daily.   5. Baclofen 10 mg at bedtime p.r.n.   6. Coreg 12.5 mg  b.i.d.   7. Pradaxa 150 mg b.i.d.   8. Cymbalta 60 mg daily.   9. Neurontin 100 mg t.i.d.  10. Synthroid 75 mcg daily.   11. Lidoderm patch q.12 h.   12. Singulair 10 mg daily.   13. Prilosec 20 mg daily.   14. Oxycodone immediate release 5 mg 1-3 p.o. q.3-4 h. p.r.n. She had not used any in the last couple of days.    15. Aldactazide 25/25 take 1 daily. She took the extra dose as noted above.   16. Trazodone 50 mg at bedtime.      ALLERGIES:   1. ASPIRIN.    2. CLARITHROMYCIN.    3. CODEINE.    4. DARVON.    5. LEVAQUIN.    6. GATIFLOXACIN.     PAST SURGICAL HISTORY:  1. Cholecystectomy.    2. Hysterectomy.    3. Breast reconstruction.    4. Left hip surgery.    5. Tonsillectomy.    6. Colonoscopies.    7. Knee surgery.   8. Neck surgery (anterior cervical discectomy and fusion).     SOCIAL HISTORY: She lives alone. 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  She quit smoking in 1985 and before that had smoked about a pack a week. She does not drink alcohol.     FAMILY HISTORY: Positive for heart disease, cancer, yyperthyroidism, and cirrhosis.     REVIEW OF SYSTEMS: No sore throat. No earache. No vision changes. No hearing changes.  Headache, runny nose, and respiratory symptoms as above. Cardiac symptoms as above. No nausea or vomiting, but appetite is decreased. No abdominal pain. No heartburn. She does have arthritis aches and pains, but they are not severe. No rash or skin changes. No paresthesias. No dysuria. No polyuria. No polydipsia. Positive for orthopnea as noted above. No paresthesias. No rash. The rest of the 10-point review of systems is otherwise negative.     PHYSICAL EXAMINATION:   GENERAL: She looks older than her stated age.  Uncomfortable, but in no acute distress.  Appropriate, alert, cooperative, neatly groomed, and well-developed.   VITAL SIGNS: Temperature 98.1, pulse 62, respirations 18, blood pressure 119/54, weight is 179 pounds, O2  saturation 96% on 2 L.   HEENT: Normocephalic, atraumatic. Pupils equal, round, and reactive to light and accommodation. No nystagmus and extraocular movements intact. Lids and conjunctivae normal without ptosis or icterus. Oropharynx clear. No thrush. No masses or lesions. Dentition in fairly intact. External ears and nose are normal on inspection and hearing is intact.   NECK: Supple without lymphadenopathy, thyromegaly, JVD, or bruit. Trachea midline. Chirag JVD, but she became more short of breath lying at a 30°.    HEART: Irregular at times. She has grade 3/6 systolic murmur best heard right upper sternal border. AICD device upper lateral left chest.   LUNGS: Breath sounds are decreased. She has a few crackles at the bases, more on the left than the right. A few light rhonchi. She has bilateral light end-expiratory wheezing. No usage of accessory muscles of respiration.   ABDOMEN: Soft, nontender. Positive bowel sounds. No hepatosplenomegaly, rebound, guarding, or mass.     EXTREMITIES: Currently, no edema. No clubbing or cyanosis. No increased warmth, erythema, or effusions over the upper or lower extremity joints.   ABDOMEN: No hepatosplenomegaly, rebound, guarding, or mass.  Soft, nontender. Positive bowel sounds.   VASCULAR: Pedal, radial, and carotid pulses easily palpable and symmetric.   SKIN: No rash, ulceration, or nodule.   NEUROLOGIC: Cranial nerves are intact.  Sensation intact with the plastic filament.     DIAGNOSTIC DATA: Labs are all pending. EKG pending. Chest x-ray pending. I am unable to see the films from the chest x-ray 02/22/2017 done at the PCP office.     IMPRESSION AND PLAN:  1. Shortness of breath with cough, wheezing, and orthopnea. Differential diagnosis includes bacterial pneumonia, vital pneumonia/bronchitis, reactive airway disease, acute and chronic congestive heart failure exacerbation, or other. She does have the history of IHSS. She did take an extra diuretic earlier. There is no  edema right now. I have ordered routine labs including BNP and procalcitonin. Check chest x-ray. We will give IV diuretic if there is evidence of volume excess on chest x-ray. I have started her empirically on Rocephin and doxycycline. Check respiratory viral panel as well as sputum culture and Gram stain. Will ask her cardiologist to see her. Peak flow has been ordered. DuoNebs ordered.   2. Paroxysmal atrial fibrillation. She is on Pradaxa and amiodarone. EKG ordered.   3. Hypothyroidism. We will check TSH.   4. Sleep apnea, continue CPAP when someone brings it in for her.  5. Fairly recent right rotator cuff repair. She was to have started physical therapy last week, but could not because she was sick. I did ask PT to see her here.     Medical record reviewed.       Luna Vanegas M.D.  JH:sharlene  D:   03/03/2017 19:51:15  T:   03/03/2017 20:32:08  Job ID:   [id number]  Document ID:   [id number]  cc:   CHRISTIN Marquez

## 2017-03-04 NOTE — PROGRESS NOTES
Acute Care - Physical Therapy Initial Evaluation  [facility]     Patient Name: Kristan Galicia  : 1950  MRN: [id number]  Today's Date: 3/4/2017                Admit Date: 3/3/2017     Visit Dx:    ICD-10-CM ICD-9-CM   1. Difficulty walking R26.2 719.7     Patient Active Problem List   Diagnosis   • Shoulder pain, right   • Knee pain, right   • Fall down stairs   • PAF (paroxysmal atrial fibrillation)   • S/P rotator cuff repair   • Vitamin D deficiency   • Vertigo   • Vasovagal syncope   • Thyroid disease   • Obstructive sleep apnea syndrome   • Palpitations   • Osteoporosis   • Ocular tumor   • Migraines   • Hyperlipidemia   • History of transfusion   • Heart murmur   • Heart attack   • GERD (gastroesophageal reflux disease)   • Depression   • CHF (congestive heart failure)   • Cardiomyopathy   • Asthma   • Anxiety   • Arthritis of right knee   • Right rotator cuff tear   • Hypothyroidism   • Dyspnea and respiratory abnormalities   • Orthopnea   • IHSS (idiopathic hypertrophic subaortic stenosis)   • AICD (automatic cardioverter/defibrillator) present   • Anticoagulant long-term use     Past Medical History   Diagnosis Date   • Atrial fibrillation    • Cardiac defibrillator in place 2009     medtronic    • Cardiomyopathy      Hypertrophic Cardiomyopathy   • CHF (congestive heart failure)    • Diverticulosis      hx diverticulitis   • GERD (gastroesophageal reflux disease)    • Heart murmur    • History of depression    • History of tumor      left ocular tumor, treated with steroids   • Hyperlipidemia    • Migraines    • Osteoporosis    • Palpitations    • Sleep apnea      does not use CPAP or BiPAP, used nose strips   • Thyroid disease    • Vasovagal syncope    • Vertigo    • Vitamin D deficiency      Past Surgical History   Procedure Laterality Date   • Laparoscopic cholecystectomy     • Colonoscopy       Dr. Herbert Gonsales    • Appendectomy     • Hysterectomy       Total   • Cardiac  defibrillator placement Left 1999     Dr. Ya   • Breast reconstruction Bilateral      Reduction   • Knee arthroscopy Bilateral 2014     Dr. Li   • Femur surgery Left      with metal implant  x3   • Shoulder arthroscopy w/ rotator cuff repair Bilateral 05/20/2011   • Tonsillectomy     • Cataract extraction Bilateral    • Breast biopsy Bilateral    • Colonoscopy  8/16/2016     Procedure: COLONOSCOPY with cecum;  Surgeon: Rosalio Sheikh MD;  Location: [facility] ENDOSCOPY;  Service:    • Rotator cuff repair Right 06/10/2010   • Knee surgery Right 06/10/2010   • Cardiac catheterization  02/19/2007   • Neck surgery  08/23/2012     Anterior cervical diskectomy and fusion with VG2 allograft implants and eagle anterior plate fixation,C5-C6 C6-C7   • First rib resection Bilateral      and scalenectomy   • Total shoulder arthroplasty w/ distal clavicle excision Right 2/2/2017     Procedure: TOTAL SHOULDER REVERSE ARTHROPLASTY;  Surgeon: Juan Antonio Anne MD;  Location: [facility] OR;  Service:    • Shoulder surgery Right           PT ASSESSMENT (last 72 hours)      PT Evaluation       03/04/17 1000 03/04/17 0900    Rehab Evaluation    Document Type  evaluation  -MM    Subjective Information  pain  -MM    Patient Effort, Rehab Treatment  good  -MM    Symptoms Noted During/After Treatment  significant change in vital signs   O2 sats dropped to upper 80s after ambulation, 1 L O2  -MM    General Information    Equipment Currently Used at Home  respiratory supplies  -MM    Living Environment    Lives With  alone  -MM    Living Arrangements  condominium  -MM    Home Accessibility  no concerns  -MM    Stair Railings at Home  none  -MM    Type of Financial/Environmental Concern  none  -MM    Transportation Available  car  -MM    Clinical Impression    PT Diagnosis  Difficulty walking; s/p right reverse total shoulder.  -MM    Patient/Family Goals Statement  To return to living in her condo independently, and beginning  outpatient PT for right shoulder.  -MM    Rehab Potential  good, to achieve stated therapy goals  -MM    Vital Signs    Pre SpO2 (%)  92  -MM    O2 Delivery Pre Treatment  supplemental O2   1 L  -MM    Post SpO2 (%)  86  -MM    O2 Delivery Post Treatment  supplemental O2   1 L  -MM    Pain Assessment    Pain Assessment  0-10  -MM    Pain Score  7  -MM    Post Pain Score  7  -MM    Pain Type  Surgical pain  -MM    Pain Location  Shoulder  -MM    Pain Orientation  Right  -MM    Date Pain First Started  03/03/17   Reverse total shoulder surgery  -MM    Cognitive Assessment/Intervention    Current Cognitive/Communication Assessment  functional  -MM    Orientation Status  oriented x 4  -MM    Follows Commands/Answers Questions  100% of the time  -MM    Personal Safety  WNL/WFL  -MM    ROM (Range of Motion)    General ROM Detail  Right elbow and wrist WNLs.  Did not assess right shoulder due to recent reverse total shoulder replacement. Need Dr. Anne's protocol.  -MM    MMT (Manual Muscle Testing)    General MMT Assessment Detail  Grossly 4 to 5/5, but right shoulder not tested due to reverse total shoulder.   -MM    Bed Mobility, Assessment/Treatment    Bed Mobility, Roll Left, [place]  independent  -MM    Bed Mobility, Roll Right, [place]  independent  -MM    Bed Mob, Supine to Sit, [place]  independent  -MM    Transfer Assessment/Treatment    Transfers, Sit-Stand [place]  conditional independence  -MM    Gait Assessment/Treatment    Gait, [place] Level  contact guard assist  -MM    Gait, Distance (Feet)  160  -MM    Gait, Comment  Deviated from a straight path when ambulating.  -MM    Balance Skills Training    Sitting-Level of Assistance  Independent  -MM    Standing-Level of Assistance  Contact guard  -MM    Gait Balance-Level of Assistance  Contact guard  -MM    Positioning and Restraints    Pre-Treatment Position in bed  -MM     Post Treatment Position chair  -MM     In Chair notified  nsg;call light within reach  -MM       03/03/17 1856 03/03/17 1727    General Information    Equipment Currently Used at Home respiratory supplies   CPAP at night  -EG     Living Environment    Lives With  alone  -EG    Living Arrangements  condominium  -EG    Home Accessibility  no concerns  -EG    Stair Railings at Home  none  -EG    Type of Financial/Environmental Concern  none  -EG    Transportation Available car  -EG car  -EG      03/03/17 1718       General Information    Equipment Currently Used at Home respiratory supplies   cpap  -EG       User Key  (r) = Recorded By, (t) = Taken By, (c) = Cosigned By    Initials Name Provider Type    EG Shanae Tafoya, RN Registered Nurse     Raquel Meek, PT Physical Therapist          Physical Therapy Education     Title: PT OT SLP Therapies (Done)     Topic: Physical Therapy (Done)     Point: Mobility training (Done)    Learning Progress Summary    Learner Readiness Method Response Comment Documented by Status   Patient Acceptance E VU   03/04/17 1013 Done                      User Key     Initials Effective Dates Name Provider Type Discipline     07/05/16 -  Raquel Meek, PT Physical Therapist PT                PT Recommendation and Plan  Anticipated Discharge Disposition: home with outpatient services  Planned Therapy Interventions: gait training, strengthening  PT Frequency: daily  Plan of Care Review  Plan Of Care Reviewed With: patient  Outcome Summary/Follow up Plan: Patient received the initial PT eval.  She completed all transfers independently.  She ambulated for 160 feet without an assistive device, but exhibited  a minimal decrease in balalnce.  Contact guard assist provided.  She deviated from a straight path during ambulation.  No oxygen used during ambulation.  No particular shortness of air noted during session, but O2 sats dropped to upper 80s following the ambulation.  She rested in sitting following the ambulation with 1 L of oxygen in  place.  She would benefit from skilled physical therapy for lower extremity strengthening and gait training to enable her to return to her condo and live independently.          IP PT Goals       03/04/17 1017          Gait Training PT LTG    Gait Training Goal PT LTG, Date Established 03/04/17  -MM      Gait Training Goal PT LTG, Time to Achieve 1 wk  -MM      Gait Training Goal PT LTG, [place] Level independent  -MM      Gait Training Goal PT LTG, Distance to Achieve 400 feet  -MM      Strength Goal PT LTG    Strength Goal PT LTG, Date Established 03/04/17  -MM      Strength Goal PT LTG, Time to Achieve 1 wk  -MM      Strength Goal PT LTG, Measure to Achieve Strength of lower extremities 5/5 to enable her to return home independently.  -MM        User Key  (r) = Recorded By, (t) = Taken By, (c) = Cosigned By    Initials Name Provider Type    REJI Meek PT Physical Therapist                Outcome Measures       03/04/17 1000          How much help from another person do you currently need...    Turning from your back to your side while in flat bed without using bedrails? 4  -MM      Moving from lying on back to sitting on the side of a flat bed without bedrails? 4  -MM      Moving to and from a bed to a chair (including a wheelchair)? 3  -MM      Standing up from a chair using your arms (e.g., wheelchair, bedside chair)? 4  -MM      Climbing 3-5 steps with a railing? 3  -MM      To walk in hospital room? 3  -MM      AM-PAC 6 Clicks Score 21  -MM      Functional Assessment    Outcome Measure Options AM-PAC 6 Clicks Basic Mobility (PT)  -MM        User Key  (r) = Recorded By, (t) = Taken By, (c) = Cosigned By    Initials Name Provider Type    REJI Meek PT Physical Therapist           Time Calculation:         PT Charges       03/04/17 1023          Time Calculation    Start Time 0938  -MM      Stop Time 0958  -MM      Time Calculation (min) 20 min  -MM      PT Received On 03/04/17  -MM       PT - Next Appointment 03/05/17  -MM        User Key  (r) = Recorded By, (t) = Taken By, (c) = Cosigned By    Initials Name Provider Type    MM Raquel Meek, PT Physical Therapist          Therapy Charges for Today     Code Description Service Date Service Provider Modifiers Qty    [id number]  PT MOBILITY CURRENT 3/4/2017 Raquel Meek, PT GP, CJ 1    [id number] HC PT MOBILITY PROJECTED 3/4/2017 Raquel Meek, PT GP, CI 1    [id number]  PT EVAL MOD COMPLEXITY 1 3/4/2017 Raquel Meek, PT GP 1          PT G-Codes  PT Professional Judgement Used?: Yes  Outcome Measure Options: AM-PAC 6 Clicks Basic Mobility (PT)  Score: 21  Functional Limitation: Mobility: Walking and moving around  Mobility: Walking and Moving Around Current Status (): At least 20 percent but less than 40 percent impaired, limited or restricted  Mobility: Walking and Moving Around Goal Status (): At least 1 percent but less than 20 percent impaired, limited or restricted      Raquel Meek, PT  3/4/2017

## 2017-03-04 NOTE — PLAN OF CARE
Problem: Inpatient Physical Therapy  Goal: Gait Training Goal LTG- PT    03/04/17 1017   Gait Training PT LTG   Gait Training Goal PT LTG, Date Established 03/04/17   Gait Training Goal PT LTG, Time to Achieve 1 wk   Gait Training Goal PT LTG, [place] Level independent   Gait Training Goal PT LTG, Distance to Achieve 400 feet       Goal: Strength Goal LTG- PT    03/04/17 1017   Strength Goal PT LTG   Strength Goal PT LTG, Date Established 03/04/17   Strength Goal PT LTG, Time to Achieve 1 wk   Strength Goal PT LTG, Measure to Achieve Strength of lower extremities 5/5 to enable her to return home independently.

## 2017-03-04 NOTE — PLAN OF CARE
Problem: Patient Care Overview (Adult)  Goal: Plan of Care Review    03/04/17 1019   Coping/Psychosocial Response Interventions   Plan Of Care Reviewed With patient   Outcome Evaluation   Outcome Summary/Follow up Plan Patient received the initial PT eval. She completed all transfers independently. She ambulated for 160 feet without an assistive device, but exhibited a minimal decrease in balalnce. Contact guard assist provided. She deviated from a straight path during ambulation. No oxygen used during ambulation. No particular shortness of air noted during session, but O2 sats dropped to upper 80s following the ambulation. She rested in sitting following the ambulation with 1 L of oxygen in place. She would benefit from skilled physical therapy for lower extremity strengthening and gait training to enable her to return to her condo and live independently.

## 2017-03-04 NOTE — PROGRESS NOTES
" LOS: 1 day   Primary Care Physician: No Known Provider     Subjective  Cough little less than yesterday no sputum production, chest pain in back less.  No history of COPD or asthma.    Vital Signs  Body mass index is 32.74 kg/(m^2).  Temp:  [97.9 °F (36.6 °C)-98.6 °F (37 °C)] 98 °F (36.7 °C)  Heart Rate:  [59-68] 61  Resp:  [16-18] 18  BP: ()/(50-83) 110/60      Objective:  Vital signs: (most recent): Blood pressure 110/60, pulse 61, temperature 98 °F (36.7 °C), temperature source Oral, resp. rate 18, height 62\" (157.5 cm), weight 179 lb (81.2 kg), SpO2 95 %.    Lungs:  Normal effort.  There are wheezes (Both bases on expiration only).  No rhonchi.  (No retractions)  Heart: Irregular rhythm.  S1 normal and S2 normal.    Abdomen: Abdomen is soft.  Bowel sounds are normal.                 Results Review:    I reviewed the patient's new clinical results.      Results from last 7 days  Lab Units 03/04/17  0643 03/03/17 2038   WBC 10*3/mm3 9.29 8.30   HEMOGLOBIN g/dL 9.5* 9.8*   PLATELETS 10*3/mm3 331 328       Results from last 7 days  Lab Units 03/04/17  0643 03/03/17 2038   SODIUM mmol/L 134* 138   POTASSIUM mmol/L 3.6 3.0*   CHLORIDE mmol/L 90* 94*   TOTAL CO2 mmol/L 26.4 26.7   BUN mg/dL 22 23   CREATININE mg/dL 1.05* 1.13*   CALCIUM mg/dL 9.5 9.6   GLUCOSE mg/dL 124* 192*         Hemoglobin A1C:  Lab Results   Component Value Date    HGBA1C 7.35 (H) 02/22/2017       Glucose Range:No results found for: POCGLU    Medication Review: Yes    Physical Therapy:    Assessment/Plan     Active Hospital Problems (** Indicates Principal Problem)    Diagnosis Date Noted   • **Dyspnea and respiratory abnormalities [R06.00, R06.89] 03/03/2017   • Orthopnea [R06.01] 03/03/2017   • IHSS (idiopathic hypertrophic subaortic stenosis) [I42.1] 03/03/2017   • AICD (automatic cardioverter/defibrillator) present [Z95.810] 03/03/2017   • Anticoagulant long-term use [Z79.01] 03/03/2017   • Hypothyroidism [E03.9] 02/22/2017   • Right " rotator cuff tear [M75.101] 02/02/2017   • Obstructive sleep apnea syndrome [G47.33]    • CHF (congestive heart failure) [I50.9]    • Cardiomyopathy [I42.9]    • PAF (paroxysmal atrial fibrillation) [I48.0] 11/01/2016      Resolved Hospital Problems    Diagnosis Date Noted Date Resolved   No resolved problems to display.       Assessment & Plan  Dyspnea: RSV infection of viral panel, negative strep and Legionella, sputum looks like normal mare, currently on Rocephin and doxycycline with DuoNeb's, no evidence of pneumonia on chest x-ray, stop Rocephin continue doxycycline and prednisone at 40 mg a day and Mucinex, continue oxygen    IHSS with AICD and PAF with chronic anticoagulation: Stable no changes    Anemia:    DM 2: Will cover with correctional dose insulin as needed.    Hyperuricemia:      Disposition:    Quita Lantigua MD  03/04/17  1:02 PM

## 2017-03-05 LAB
ANION GAP SERPL CALCULATED.3IONS-SCNC: 15 MMOL/L
BUN BLD-MCNC: 19 MG/DL (ref 8–23)
BUN/CREAT SERPL: 20 (ref 7–25)
CALCIUM SPEC-SCNC: 9 MG/DL (ref 8.6–10.5)
CHLORIDE SERPL-SCNC: 94 MMOL/L (ref 98–107)
CO2 SERPL-SCNC: 25 MMOL/L (ref 22–29)
CREAT BLD-MCNC: 0.95 MG/DL (ref 0.57–1)
GFR SERPL CREATININE-BSD FRML MDRD: 59 ML/MIN/1.73
GLUCOSE BLD-MCNC: 178 MG/DL (ref 65–99)
GLUCOSE BLDC GLUCOMTR-MCNC: 178 MG/DL (ref 70–130)
GLUCOSE BLDC GLUCOMTR-MCNC: 222 MG/DL (ref 70–130)
GLUCOSE BLDC GLUCOMTR-MCNC: 260 MG/DL (ref 70–130)
GLUCOSE BLDC GLUCOMTR-MCNC: 309 MG/DL (ref 70–130)
POTASSIUM BLD-SCNC: 4.6 MMOL/L (ref 3.5–5.2)
SODIUM BLD-SCNC: 134 MMOL/L (ref 136–145)

## 2017-03-05 PROCEDURE — 99232 SBSQ HOSP IP/OBS MODERATE 35: CPT | Performed by: INTERNAL MEDICINE

## 2017-03-05 PROCEDURE — 97110 THERAPEUTIC EXERCISES: CPT

## 2017-03-05 PROCEDURE — 63710000001 PREDNISONE PER 5 MG: Performed by: HOSPITALIST

## 2017-03-05 PROCEDURE — 82962 GLUCOSE BLOOD TEST: CPT

## 2017-03-05 PROCEDURE — 80048 BASIC METABOLIC PNL TOTAL CA: CPT | Performed by: NURSE PRACTITIONER

## 2017-03-05 PROCEDURE — 63710000001 INSULIN ASPART PER 5 UNITS: Performed by: HOSPITALIST

## 2017-03-05 PROCEDURE — 94799 UNLISTED PULMONARY SVC/PX: CPT

## 2017-03-05 RX ADMIN — OXYCODONE HYDROCHLORIDE AND ACETAMINOPHEN 2 TABLET: 5; 325 TABLET ORAL at 12:03

## 2017-03-05 RX ADMIN — TRAZODONE HYDROCHLORIDE 50 MG: 50 TABLET ORAL at 20:49

## 2017-03-05 RX ADMIN — DOXYCYCLINE 100 MG: 100 INJECTION, POWDER, LYOPHILIZED, FOR SOLUTION INTRAVENOUS at 08:04

## 2017-03-05 RX ADMIN — GABAPENTIN 100 MG: 100 CAPSULE ORAL at 08:04

## 2017-03-05 RX ADMIN — IPRATROPIUM BROMIDE AND ALBUTEROL SULFATE 3 ML: .5; 3 SOLUTION RESPIRATORY (INHALATION) at 14:47

## 2017-03-05 RX ADMIN — PREDNISONE 40 MG: 20 TABLET ORAL at 08:04

## 2017-03-05 RX ADMIN — CARVEDILOL 12.5 MG: 12.5 TABLET, FILM COATED ORAL at 17:28

## 2017-03-05 RX ADMIN — GUAIFENESIN 600 MG: 600 TABLET, EXTENDED RELEASE ORAL at 08:04

## 2017-03-05 RX ADMIN — INSULIN ASPART 3 UNITS: 100 INJECTION, SOLUTION INTRAVENOUS; SUBCUTANEOUS at 08:14

## 2017-03-05 RX ADMIN — DABIGATRAN ETEXILATE MESYLATE 150 MG: 150 CAPSULE ORAL at 08:04

## 2017-03-05 RX ADMIN — INSULIN ASPART 10 UNITS: 100 INJECTION, SOLUTION INTRAVENOUS; SUBCUTANEOUS at 11:59

## 2017-03-05 RX ADMIN — CARVEDILOL 12.5 MG: 12.5 TABLET, FILM COATED ORAL at 08:04

## 2017-03-05 RX ADMIN — DULOXETINE HYDROCHLORIDE 60 MG: 60 CAPSULE, DELAYED RELEASE ORAL at 08:04

## 2017-03-05 RX ADMIN — LEVOTHYROXINE SODIUM 75 MCG: 75 TABLET ORAL at 05:37

## 2017-03-05 RX ADMIN — IPRATROPIUM BROMIDE AND ALBUTEROL SULFATE 3 ML: .5; 3 SOLUTION RESPIRATORY (INHALATION) at 06:58

## 2017-03-05 RX ADMIN — PANTOPRAZOLE SODIUM 40 MG: 40 TABLET, DELAYED RELEASE ORAL at 05:38

## 2017-03-05 RX ADMIN — IPRATROPIUM BROMIDE AND ALBUTEROL SULFATE 3 ML: .5; 3 SOLUTION RESPIRATORY (INHALATION) at 22:34

## 2017-03-05 RX ADMIN — ALLOPURINOL 100 MG: 100 TABLET ORAL at 08:04

## 2017-03-05 RX ADMIN — INSULIN ASPART 5 UNITS: 100 INJECTION, SOLUTION INTRAVENOUS; SUBCUTANEOUS at 17:31

## 2017-03-05 RX ADMIN — IPRATROPIUM BROMIDE AND ALBUTEROL SULFATE 3 ML: .5; 3 SOLUTION RESPIRATORY (INHALATION) at 10:56

## 2017-03-05 RX ADMIN — AMIODARONE HYDROCHLORIDE 100 MG: 100 TABLET ORAL at 20:49

## 2017-03-05 RX ADMIN — MONTELUKAST 10 MG: 10 TABLET, FILM COATED ORAL at 20:49

## 2017-03-05 RX ADMIN — OXYCODONE HYDROCHLORIDE AND ACETAMINOPHEN 2 TABLET: 5; 325 TABLET ORAL at 20:49

## 2017-03-05 RX ADMIN — SPIRONOLACTONE AND HYDROCHLOROTHIAZIDE 1 TABLET: 25; 25 TABLET, FILM COATED ORAL at 08:04

## 2017-03-05 RX ADMIN — INSULIN ASPART 8 UNITS: 100 INJECTION, SOLUTION INTRAVENOUS; SUBCUTANEOUS at 21:48

## 2017-03-05 RX ADMIN — DABIGATRAN ETEXILATE MESYLATE 150 MG: 150 CAPSULE ORAL at 17:28

## 2017-03-05 RX ADMIN — ATORVASTATIN CALCIUM 80 MG: 80 TABLET, FILM COATED ORAL at 20:49

## 2017-03-05 RX ADMIN — GABAPENTIN 100 MG: 100 CAPSULE ORAL at 15:15

## 2017-03-05 RX ADMIN — DOXYCYCLINE 100 MG: 100 INJECTION, POWDER, LYOPHILIZED, FOR SOLUTION INTRAVENOUS at 20:48

## 2017-03-05 RX ADMIN — GABAPENTIN 100 MG: 100 CAPSULE ORAL at 20:49

## 2017-03-05 RX ADMIN — GUAIFENESIN 600 MG: 600 TABLET, EXTENDED RELEASE ORAL at 17:28

## 2017-03-05 NOTE — PLAN OF CARE
Problem: Patient Care Overview (Adult)  Goal: Plan of Care Review  Outcome: Ongoing (interventions implemented as appropriate)    03/05/17 1436   Coping/Psychosocial Response Interventions   Plan Of Care Reviewed With patient   Patient Care Overview   Progress progress toward functional goals as expected   Outcome Evaluation   Outcome Summary/Follow up Plan IV Abx; K protocol; worked with PT; PT BG over 300- covered with insulin; stable vitals- monitored labs and pain       Goal: Adult Individualization and Mutuality  Outcome: Ongoing (interventions implemented as appropriate)  Goal: Discharge Needs Assessment  Outcome: Ongoing (interventions implemented as appropriate)    Problem: Pneumonia (Adult)  Goal: Signs and Symptoms of Listed Potential Problems Will be Absent or Manageable (Pneumonia)  Outcome: Ongoing (interventions implemented as appropriate)    Problem: Fall Risk (Adult)  Goal: Identify Related Risk Factors and Signs and Symptoms  Outcome: Ongoing (interventions implemented as appropriate)  Goal: Absence of Falls  Outcome: Ongoing (interventions implemented as appropriate)

## 2017-03-05 NOTE — PLAN OF CARE
Problem: Patient Care Overview (Adult)  Goal: Plan of Care Review    03/05/17 0908   Patient Care Overview   Progress progress toward functional goals as expected

## 2017-03-05 NOTE — PROGRESS NOTES
Acute Care - Physical Therapy Treatment Note  [facility]     Patient Name: Kristan Galicia  : 1950  MRN: [id number]  Today's Date: 3/5/2017             Admit Date: 3/3/2017    Visit Dx:    ICD-10-CM ICD-9-CM   1. Difficulty walking R26.2 719.7     Patient Active Problem List   Diagnosis   • Shoulder pain, right   • Knee pain, right   • Fall down stairs   • PAF (paroxysmal atrial fibrillation)   • S/P rotator cuff repair   • Vitamin D deficiency   • Vertigo   • Vasovagal syncope   • Thyroid disease   • Obstructive sleep apnea syndrome   • Palpitations   • Osteoporosis   • Ocular tumor   • Migraines   • Hyperlipidemia   • History of transfusion   • Heart murmur   • Heart attack   • GERD (gastroesophageal reflux disease)   • Depression   • CHF (congestive heart failure)   • Cardiomyopathy   • Asthma   • Anxiety   • Arthritis of right knee   • Right rotator cuff tear   • Hypothyroidism   • Dyspnea and respiratory abnormalities   • Orthopnea   • IHSS (idiopathic hypertrophic subaortic stenosis)   • AICD (automatic cardioverter/defibrillator) present   • Anticoagulant long-term use               Adult Rehabilitation Note       17 0904          Rehab Assessment/Intervention    Discipline physical therapy assistant  -      Document Type therapy note (daily note)  -      Subjective Information agree to therapy   Better than when I  came in  -      Specific Treatment Considerations recent R shoulder surgery  -JW      Recorded by [JW] Simran Coleman PTA      Vital Signs    Pre SpO2 (%) 94  -      O2 Delivery Pre Treatment room air  -      Post SpO2 (%) 94  -JW      O2 Delivery Post Treatment room air  -JW      Recorded by [JW] Simran Coleman PTA      Pain Assessment    Pain Assessment FLACC  -      Pain Score 4  -      Pain Type Surgical pain  -      Pain Location Shoulder  -      Recorded by [NOE] Simran Coleman PTA      Cognitive Assessment/Intervention    Personal Safety  Interventions nonskid shoes/slippers when out of bed  -JW      Recorded by [NOE] Simran Coleman PTA      Gait Assessment/Treatment    Gait, Distance (Feet) 350  -JW      Recorded by [NOE] Simran Coleman PTA      Positioning and Restraints    Pre-Treatment Position in bed  -JW      Post Treatment Position bed  -JW      In Bed supine;call light within reach  -JW      Recorded by [NOE] Simran Coleman PTA        User Key  (r) = Recorded By, (t) = Taken By, (c) = Cosigned By    Initials Name Effective Dates     Simran Coleman PTA 02/18/16 -                 IP PT Goals       03/04/17 1017          Gait Training PT LTG    Gait Training Goal PT LTG, Date Established 03/04/17  -MM      Gait Training Goal PT LTG, Time to Achieve 1 wk  -MM      Gait Training Goal PT LTG, [place] independent  -MM      Gait Training Goal PT LTG, Distance to Achieve 400 feet  -MM      Strength Goal PT LTG    Strength Goal PT LTG, Date Established 03/04/17  -MM      Strength Goal PT LTG, Time to Achieve 1 wk  -MM      Strength Goal PT LTG, Measure to Achieve Strength of lower extremities 5/5 to enable her to return home independently.  -MM        User Key  (r) = Recorded By, (t) = Taken By, (c) = Cosigned By    Initials Name Provider Type    REJI Meek, PT Physical Therapist          Physical Therapy Education     Title: PT OT SLP Therapies (Done)     Topic: Physical Therapy (Done)     Point: Mobility training (Done)    Learning Progress Summary    Learner Readiness Method Response Comment Documented by Status   Patient Acceptance E [facility] 03/05/17 0908 Done    Acceptance E [facility] 03/04/17 1013 Done                      User Key     Initials Effective Dates Name Provider Type [facility] 02/18/16 -  Simran Coleman PTA Physical Therapy Assistant PT     07/05/16 -  Raquel Meek PT Physical Therapist PT                    PT Recommendation and Plan  Anticipated Discharge Disposition: home with  outpatient services  Planned Therapy Interventions: gait training, strengthening  PT Frequency: daily  Plan of Care Review  Progress: progress toward functional goals as expected          Outcome Measures       03/05/17 0900 03/04/17 1000       How much help from another person do you currently need...    Turning from your back to your side while in flat bed without using bedrails? 4  -JW 4  -MM     Moving from lying on back to sitting on the side of a flat bed without bedrails? 4  -JW 4  -MM     Moving to and from a bed to a chair (including a wheelchair)? 3  -JW 3  -MM     Standing up from a chair using your arms (e.g., wheelchair, bedside chair)? 4  -JW 4  -MM     Climbing 3-5 steps with a railing? 3  -JW 3  -MM     To walk in hospital room? 3  -JW 3  -MM     AM-PAC 6 Clicks Score 21  - 21  -MM     Functional Assessment    Outcome Measure Options  AM-PAC 6 Clicks Basic Mobility (PT)  -MM       User Key  (r) = Recorded By, (t) = Taken By, (c) = Cosigned By    Initials Name Provider Type    NOE Coleman PTA Physical Therapy Assistant    MM Raquel Meek, PT Physical Therapist           Time Calculation:         PT Charges       03/05/17 0904          Time Calculation    Start Time 0903  -        User Key  (r) = Recorded By, (t) = Taken By, (c) = Cosigned By    Initials Name Provider Type    NOE Coleman PTA Physical Therapy Assistant          Therapy Charges for Today     Code Description Service Date Service Provider Modifiers Qty    29886280658 HC PT THER PROC EA 15 MIN 3/5/2017 Simran Coleman PTA GP 1          PT G-Codes  PT Professional Judgement Used?: Yes  Outcome Measure Options: AM-PAC 6 Clicks Basic Mobility (PT)  Score: 21  Functional Limitation: Mobility: Walking and moving around  Mobility: Walking and Moving Around Current Status (): At least 20 percent but less than 40 percent impaired, limited or restricted  Mobility: Walking and Moving Around Goal Status (): At  least 1 percent but less than 20 percent impaired, limited or restricted    Simran Coleman, PTA  3/5/2017

## 2017-03-05 NOTE — PROGRESS NOTES
" LOS: 2 days   Primary Care Physician: No Known Provider     Subjective  Cough still present but less, she ambulated with some mild shortness of breath.  No history of CHF.  No chest pain.  Still feels weak.    Vital Signs  Body mass index is 34.51 kg/(m^2).  Temp:  [96.7 °F (35.9 °C)-98 °F (36.7 °C)] 97.8 °F (36.6 °C)  Heart Rate:  [59-82] 59  Resp:  [18] 18  BP: ()/(46-85) 125/66      Objective:  Vital signs: (most recent): Blood pressure 125/66, pulse 59, temperature 97.8 °F (36.6 °C), temperature source Oral, resp. rate 18, height 62\" (157.5 cm), weight 188 lb 11.2 oz (85.6 kg), SpO2 93 %.    Lungs:  Normal effort.  There are wheezes (orced expiration only with some coughing).  (No retractions)  Heart: Irregular rhythm.  S1 normal and S2 normal.    Abdomen: Abdomen is soft.  Bowel sounds are normal.               Results Review:    I reviewed the patient's new clinical results.      Results from last 7 days  Lab Units 03/04/17  0643 03/03/17  2038   WBC 10*3/mm3 9.29 8.30   HEMOGLOBIN g/dL 9.5* 9.8*   PLATELETS 10*3/mm3 331 328       Results from last 7 days  Lab Units 03/05/17  0632 03/04/17  0643   SODIUM mmol/L 134* 134*   POTASSIUM mmol/L 4.6 3.6   CHLORIDE mmol/L 94* 90*   TOTAL CO2 mmol/L 25.0 26.4   BUN mg/dL 19 22   CREATININE mg/dL 0.95 1.05*   CALCIUM mg/dL 9.0 9.5   GLUCOSE mg/dL 178* 124*         Hemoglobin A1C:  Lab Results   Component Value Date    HGBA1C 7.35 (H) 02/22/2017       Glucose Range:  GLUCOSE   Date/Time Value Ref Range Status   03/05/2017 0739 178 (H) 70 - 130 mg/dL Final   03/04/2017 2100 194 (H) 70 - 130 mg/dL Final   03/04/2017 1643 165 (H) 70 - 130 mg/dL Final       Medication Review: Yes    Physical Therapy:    Assessment/Plan     Active Hospital Problems (** Indicates Principal Problem)    Diagnosis Date Noted   • **Dyspnea and respiratory abnormalities [R06.00, R06.89] 03/03/2017   • Orthopnea [R06.01] 03/03/2017   • IHSS (idiopathic hypertrophic subaortic stenosis) " [I42.1] 03/03/2017   • AICD (automatic cardioverter/defibrillator) present [Z95.810] 03/03/2017   • Anticoagulant long-term use [Z79.01] 03/03/2017   • Hypothyroidism [E03.9] 02/22/2017   • Right rotator cuff tear [M75.101] 02/02/2017   • Obstructive sleep apnea syndrome [G47.33]    • CHF (congestive heart failure) [I50.9]    • Cardiomyopathy [I42.9]    • PAF (paroxysmal atrial fibrillation) [I48.0] 11/01/2016      Resolved Hospital Problems    Diagnosis Date Noted Date Resolved   No resolved problems to display.       Assessment & Plan  Dyspnea: RSV infection of viral panel, negative strep and Legionella, sputum culture is normal mare, currently on doxycycline with DuoNeb's, no evidence of pneumonia on chest x-ray, stop  doxycycline and continue prednisone at 40 mg a day and Mucinex, off oxygen this morning.  We'll plan discharge from all foreseeing no issues.  Would have decreasing prednisone over the next week.    IHSS with AICD and PAF with chronic anticoagulation: Echo shows some left atrial dilatation with normal LV function and the IHSS as noted before.     Anemia:    DM 2: Will cover with correctional dose insulin as needed.    Hyperuricemia:    Hyponatremia chronic stable    Hypokalemia stable    Hypothyroidism: Would not change current medications even though TSH remains elevated it is likely related to euthyroid sick and needs to be checked and a noninfectious stay.      Disposition:    Quita Lantigua MD  03/05/17  11:24 AM

## 2017-03-05 NOTE — PROGRESS NOTES
[facility]  Cardiology Progress Note    Patient Identification:  Name:Kristan Galicia  Age:66 y.o.  Sex: female  :  1950  MRN: [id number]             Length of Stay: 2    Follow up for:  CHF and IHSS      Interval History :    Denies any further dizziness. Mild CHAWLA with walking during PT session out to nurses desk.  Denies cp/pressure, orthopnea, or PND.  Reports occ. Wheezing at times.      Tele: A - pacing on demand.  Ortho BP's neg.        HPI     66-year-old  female who is normally followed by Dr. Ya and Dr. Ch with [facility].  She reports that she began experiencing several weeks ago worsening cough and shortness of breath and was seen by her primary care provider and was diagnosed with pneumonia. She was started on antibiotic therapy but on Friday she continued to feel poorly with worsening shortness of breath, chest heaviness, wheezing, cough, 3 pillow orthopnea, lower extremity swelling, congestion and feelings of dizziness upon standing. Her PCP instructed her to report to [facility] for further evaluation and management. Because of the orthopnea and swelling patient actually took an extra diuretic without much effect of her symptoms. Recently went had anesthesia for right rotator cuff repair on 2017 and had been recovering well up until this upper respiratory infection.     Been followed by Dr. Ya for IHSS, paroxysmal A. fib with indwelling pacemaker and is on Pradaxa, vasopressor syncope, and heart failure. It's review from the EMR reveal that she has had a history of a thin  showing normal coronary arteries. Echo was in  showing hypertrophic cardiomyopathy with septal thickening noted without significant left ventricular outflow tract obstruction. Hx of AICD implantation She states her last device check was 17 . She was told that her device showed no tachycardic events however  it did show some intrathoracic fluid buildup. Her chest heaviness she felt was more due to her wheezing and shortness of breath. It was nonradiating in nature and was not associated with any other symptoms other than shortness of breath. She reports feeling much improved today than on admit. Orthopnea and swelling better.     Past Medical History   Diagnosis Date   • Atrial fibrillation    • Cardiac defibrillator in place 01/2009     medtronic    • Cardiomyopathy      Hypertrophic Cardiomyopathy   • CHF (congestive heart failure)    • Diverticulosis      hx diverticulitis   • GERD (gastroesophageal reflux disease)    • Heart murmur    • History of depression    • History of tumor      left ocular tumor, treated with steroids   • Hyperlipidemia    • Migraines    • Osteoporosis    • Palpitations    • Sleep apnea      does not use CPAP or BiPAP, used nose strips   • Thyroid disease    • Vasovagal syncope    • Vertigo    • Vitamin D deficiency        Scheduled Meds:    Current Facility-Administered Medications:   •  acetaminophen (TYLENOL) tablet 650 mg, 650 mg, Oral, Q4H PRN, Luna Vanegas MD  •  allopurinol (ZYLOPRIM) tablet 100 mg, 100 mg, Oral, Daily, Luna Vanegas MD, 100 mg at 03/05/17 0804  •  amiodarone (PACERONE) tablet 100 mg, 100 mg, Oral, Nightly, Luna Vanegas MD, 100 mg at 03/04/17 2054  •  atorvastatin (LIPITOR) tablet 80 mg, 80 mg, Oral, Nightly, Luna Vanegas MD, 80 mg at 03/04/17 2055  •  baclofen (LIORESAL) tablet 10 mg, 10 mg, Oral, Nightly PRN, Luna Vanegas MD  •  carvedilol (COREG) tablet 12.5 mg, 12.5 mg, Oral, BID, Luna Vanegas MD, 12.5 mg at 03/05/17 0804  •  dabigatran etexilate (PRADAXA) capsule 150 mg, 150 mg, Oral, BID, Luna Vanegas MD, 150 mg at 03/05/17 0804  •  dextrose (D50W) solution 25 g, 25 g, Intravenous, Q15 Min PRN, Quita Lantigua MD  •  dextrose (GLUTOSE) oral gel 15 g, 15 g, Oral, Q15 Min PRN, Quita Lantigua MD  •  [DISCONTINUED] cefTRIAXone  (ROCEPHIN) IVPB 1 g, 1 g, Intravenous, Q24H, 1 g at 03/03/17 2100 **AND** doxycycline (VIBRAMYCIN) 100 mg/100 mL 0.9% NS MBP, 100 mg, Intravenous, Q12H, Luna Vanegas MD, 100 mg at 03/05/17 0804  •  DULoxetine (CYMBALTA) DR capsule 60 mg, 60 mg, Oral, Daily, Luna Vanegas MD, 60 mg at 03/05/17 0804  •  gabapentin (NEURONTIN) capsule 100 mg, 100 mg, Oral, TID, Luna Vanegas MD, 100 mg at 03/05/17 0804  •  glucagon (human recombinant) (GLUCAGEN DIAGNOSTIC) injection 1 mg, 1 mg, Subcutaneous, Q15 Min PRN, Quita Lantigua MD  •  guaiFENesin (MUCINEX) 12 hr tablet 600 mg, 600 mg, Oral, BID, Quita Lantigua MD, 600 mg at 03/05/17 0804  •  HYDROmorphone (DILAUDID) injection 0.5 mg, 0.5 mg, Intravenous, Q2H PRN, Niko Gonzalez MD, 0.5 mg at 03/04/17 2042  •  insulin aspart (novoLOG) injection 0-14 Units, 0-14 Units, Subcutaneous, 4x Daily AC & at Bedtime, Quita Lantigua MD, 3 Units at 03/05/17 0814  •  ipratropium-albuterol (DUO-NEB) nebulizer solution 3 mL, 3 mL, Nebulization, Q4H PRN, Luna Vanegas MD  •  ipratropium-albuterol (DUO-NEB) nebulizer solution 3 mL, 3 mL, Nebulization, Q4H - RT, Luna Vanegas MD, 3 mL at 03/05/17 0658  •  levothyroxine (SYNTHROID, LEVOTHROID) tablet 75 mcg, 75 mcg, Oral, Q AM, Luna Vanegas MD, 75 mcg at 03/05/17 0537  •  lidocaine (LIDODERM) 5 % 1 patch, 1 patch, Transdermal, Daily PRN, Luna Vanegas MD  •  montelukast (SINGULAIR) tablet 10 mg, 10 mg, Oral, Nightly, Luna Vanegas MD, 10 mg at 03/04/17 2054  •  nitroglycerin (NITROSTAT) SL tablet 0.4 mg, 0.4 mg, Sublingual, Q5 Min PRN, Luna Vanegas MD  •  ondansetron (ZOFRAN) injection 4 mg, 4 mg, Intravenous, Q6H PRN, Luna Vanegas MD  •  oxyCODONE-acetaminophen (PERCOCET) 5-325 MG per tablet 1 tablet, 1 tablet, Oral, Q4H PRN, 1 tablet at 03/03/17 2106 **OR** oxyCODONE-acetaminophen (PERCOCET) 5-325 MG per tablet 2 tablet, 2 tablet, Oral, Q4H PRN, Luna Vanegas MD, 2 tablet at 03/04/17 2247  •   "pantoprazole (PROTONIX) EC tablet 40 mg, 40 mg, Oral, Q AM, Luna Vanegas MD, 40 mg at 03/05/17 0538  •  potassium chloride (MICRO-K) CR capsule 40 mEq, 40 mEq, Oral, PRN **OR** potassium chloride (KLOR-CON) packet 40 mEq, 40 mEq, Oral, PRN **OR** potassium chloride 10 mEq in 100 mL IVPB, 10 mEq, Intravenous, Q1H PRN, Niko Gonzalez MD  •  predniSONE (DELTASONE) tablet 40 mg, 40 mg, Oral, Daily, Quita Lantigua MD, 40 mg at 03/05/17 0804  •  sodium chloride 0.9 % flush 1-10 mL, 1-10 mL, Intravenous, PRN, Luna Vanegas MD  •  spironolactone-hydrochlorothiazide (ALDACTAZIDE) 25-25 MG tablet 1 tablet, 1 tablet, Oral, QAM AC, Luna Vanegas MD, 1 tablet at 03/05/17 0804  •  traZODone (DESYREL) tablet 50 mg, 50 mg, Oral, Nightly, Luna Vanegas MD, 50 mg at 03/04/17 2054      Review of Systems   Constitution: Negative for chills and weakness.   Cardiovascular: Positive for dyspnea on exertion. Negative for chest pain, leg swelling, near-syncope, orthopnea, palpitations, paroxysmal nocturnal dyspnea and syncope.   Respiratory: Negative for shortness of breath.        Visit Vitals   • /66 (BP Location: Left arm, Patient Position: Lying)   • Pulse 82   • Temp 97.8 °F (36.6 °C) (Oral)   • Resp 18   • Ht 62\" (157.5 cm)   • Wt 188 lb 11.2 oz (85.6 kg)   • SpO2 95%   • BMI 34.51 kg/m2      Ortho BP's 3/4/17 neg     Intake/Output Summary (Last 24 hours) at 03/05/17 0830  Last data filed at 03/05/17 0046   Gross per 24 hour   Intake    237 ml   Output   1300 ml   Net  -1063 ml          Wt Readings from Last 1 Encounters:   03/05/17 0046 188 lb 11.2 oz (85.6 kg)   03/03/17 1700 179 lb (81.2 kg)       Physical Examination: By   Physical Exam    General: No acute distress.    Skin: Warm and dry, no diaphoresis noted   HEENT: No ptosis;  oral mucosa moist   Neck: Supple; no carotid bruits; no JVD, Trachea mid line   Heart: S1S2  regular rate and rhythm;  + systolic murmur RUSB; no gallop or rub " appreciated, No thrills palpable   Chest: Respirations unlabored at rest, bilateral breath sounds have good air entry; no  wheezes auscultated.     Abdomen: Soft, non-tender, non-distended, positive bowel sounds  ,No aneurysms palpable   Extremities: Bilateral lower extremities have no pre-tibial pitting edema; Radials are palpable   Neurological: Alert and oriented ; no new motor deficits,       Lab Review:  Personally reviewed the labs, radiology imaging and other cardiac procedures.       Results from last 7 days  Lab Units 17  0632  17   SODIUM mmol/L 134*  < > 138   POTASSIUM mmol/L 4.6  < > 3.0*   CHLORIDE mmol/L 94*  < > 94*   TOTAL CO2 mmol/L 25.0  < > 26.7   BUN mg/dL 19  < > 23   CREATININE mg/dL 0.95  < > 1.13*   CALCIUM mg/dL 9.0  < > 9.6   BILIRUBIN mg/dL  --   --  0.4   ALK PHOS U/L  --   --  66   ALT (SGPT) U/L  --   --  32   AST (SGOT) U/L  --   --  32   GLUCOSE mg/dL 178*  < > 192*   < > = values in this interval not displayed.    Results from last 7 days  Lab Units 17  0643 17   TROPONIN T ng/mL <0.010 <0.010       Results from last 7 days  Lab Units 17  0643 17   WBC 10*3/mm3 9.29 8.30   HEMOGLOBIN g/dL 9.5* 9.8*   HEMATOCRIT % 31.6* 31.6*   PLATELETS 10*3/mm3 331 328           3/3/17 ProBNP 522 (674)    ProCall 0.07 on 3/3/17     Lipid Panel 17 Total 134, HDL 42, LDL 66, VLDL 26.2, Trigs 131    RSV positive    Estimated Creatinine Clearance: 59.9 mL/min (by C-G formula based on Cr of 0.95).    I personally viewed and interpreted the patient's EKG/Telemetry data    EC/17/13        Echocardiogram:       3/4/17     Interpretation Summary      · Left ventricular wall thickness is consistent with severe septal asymmetric hypertrophy.  · The findings are consistent with non-obstructive, hypertrophic cardiomyopathy.  · Left ventricular diastolic dysfunction (grade I a) consistent with impaired relaxation.  · Left atrial cavity size  is borderline dilated.  · Left ventricular function is normal.  · LVSF EF 51-55%           4/6/16 at [facility]   Conclusions:  Severe concentric left ventricular hypertrophy is observed.1.6/1.8, HCM  Global left ventricular wall motion and contractility are within normal  limits.  The EF 4ch was calculated at 66%.  Abnormal left ventricular diastolic function is observed.  The left ventricular diastolic filling pattern is consistent with  elevated mean left atrial pressure.e/e' 28,25  A pacemaker wire is visualized in the right atrium.  There is a trace of mitral regurgitation.                   3/3/17      Study Result      XR CHEST 2 VW-      HISTORY: Female who is 66 years-old, dyspnea      TECHNIQUE: Frontal and lateral views of the chest      COMPARISON: 09/17/2013      FINDINGS: Heart is mildly enlarged. Left-sided pacemaker and cardiac  leads are seen. Pulmonary vasculature is unremarkable. No focal  pulmonary consolidation, pleural effusion, or pneumothorax. No acute  osseous process.      IMPRESSION:  No focal pulmonary consolidation. Mild cardiomegaly.  Follow-up as clinical indications persist.             Patient Active Problem List   Diagnosis   • Shoulder pain, right   • Knee pain, right   • Fall down stairs   • PAF (paroxysmal atrial fibrillation)   • S/P rotator cuff repair   • Vitamin D deficiency   • Vertigo   • Vasovagal syncope   • Thyroid disease   • Obstructive sleep apnea syndrome   • Palpitations   • Osteoporosis   • Ocular tumor   • Migraines   • Hyperlipidemia   • History of transfusion   • Heart murmur   • Heart attack   • GERD (gastroesophageal reflux disease)   • Depression   • CHF (congestive heart failure)   • Cardiomyopathy   • Asthma   • Anxiety   • Arthritis of right knee   • Right rotator cuff tear   • Hypothyroidism   • Dyspnea and respiratory abnormalities   • Orthopnea   • IHSS (idiopathic hypertrophic subaortic stenosis)   • AICD (automatic cardioverter/defibrillator) present    • Anticoagulant long-term use         Assessment/ Plan :     IHSS: Echo 3/4/17 showing IHSS with no significant outflow obstruction with EF 51-55%. Last echo on record 2016 showing no outflow tract obstruction and EF 66%. Ortho bp's 3/4/17 neg. Trop  < 0.01 x 2.      Hx of Hypothyroidism: Elevated TSH 13.28 on 3/3/17 with free T 4 1.32. Is on levothyroxine. TSH Was 34.530 on 11/1/2016.      Volume overload: improved. Hx of diastolic dysfunction. She states less orthopnea and swelling today. Is on spironolactone and HCTZ combo from home.        Hypokalemia: Normal K+ 3/5/17 at 4.6.  at K+ 3.0 on admit. 3.6 today 3/4/17. Will need to monitor lytes in order to arrhythmias. Mag 1.7 3/4/17. Not on scheduled potassium at home. Given one time dose of KCL 20 meq 3/4/17 to try and keep K+ up around 4.0. Labs in past as outpt showing normal K+.      PAF: is A pacing. Is on home dose pradaxa and amiodarone 100 mg daily and coreg 12.5 mg BID. -130s / 50-70's.      Hx of ICD: device interrogation noted in care everywhere EMR 2/27/17showing no episodes of Tachycardia.      PNA:  With RSV. IM following. Pt feeling much better.    Echo stable. trops neg x 2.  No further Cardiac w/u needed at this time.  Recommend she f/u with her primary cardiology provider  Dr. Ch in 2-3 weeks with BMP.      Further evaluation and management per Dr. Vazquez. - CHRISTIN Kate MD, [facility]  3/5/42059:30 AM      Patient is personally examined by me. All labs, radiology reports and cardiac procedures are reviewed and or obtained by me. Asessment and plan is by me.-----Carmelo Vazquez MD

## 2017-03-06 VITALS
SYSTOLIC BLOOD PRESSURE: 142 MMHG | WEIGHT: 188.7 LBS | BODY MASS INDEX: 34.72 KG/M2 | RESPIRATION RATE: 18 BRPM | OXYGEN SATURATION: 98 % | TEMPERATURE: 96.6 F | HEART RATE: 61 BPM | DIASTOLIC BLOOD PRESSURE: 91 MMHG | HEIGHT: 62 IN

## 2017-03-06 PROBLEM — J21.0 RSV BRONCHIOLITIS: Status: ACTIVE | Noted: 2017-03-06

## 2017-03-06 PROBLEM — T50.905A HYPERGLYCEMIA, DRUG-INDUCED: Status: ACTIVE | Noted: 2017-03-06

## 2017-03-06 PROBLEM — R73.9 HYPERGLYCEMIA, DRUG-INDUCED: Status: ACTIVE | Noted: 2017-03-06

## 2017-03-06 LAB
BACTERIA SPEC RESP CULT: NORMAL
GLUCOSE BLDC GLUCOMTR-MCNC: 158 MG/DL (ref 70–130)
GLUCOSE BLDC GLUCOMTR-MCNC: 216 MG/DL (ref 70–130)
GRAM STN SPEC: NORMAL

## 2017-03-06 PROCEDURE — 94799 UNLISTED PULMONARY SVC/PX: CPT

## 2017-03-06 PROCEDURE — 63710000001 PREDNISONE PER 5 MG: Performed by: HOSPITALIST

## 2017-03-06 PROCEDURE — 82962 GLUCOSE BLOOD TEST: CPT

## 2017-03-06 PROCEDURE — 97110 THERAPEUTIC EXERCISES: CPT

## 2017-03-06 PROCEDURE — 63710000001 INSULIN ASPART PER 5 UNITS: Performed by: HOSPITALIST

## 2017-03-06 PROCEDURE — 25010000002 HYDROMORPHONE PER 4 MG: Performed by: HOSPITALIST

## 2017-03-06 RX ORDER — PREDNISONE 10 MG/1
TABLET ORAL
Qty: 13 TABLET | Refills: 0 | Status: SHIPPED | OUTPATIENT
Start: 2017-03-06 | End: 2017-04-24

## 2017-03-06 RX ADMIN — OXYCODONE HYDROCHLORIDE AND ACETAMINOPHEN 2 TABLET: 5; 325 TABLET ORAL at 05:49

## 2017-03-06 RX ADMIN — INSULIN ASPART 5 UNITS: 100 INJECTION, SOLUTION INTRAVENOUS; SUBCUTANEOUS at 12:35

## 2017-03-06 RX ADMIN — OXYCODONE HYDROCHLORIDE AND ACETAMINOPHEN 2 TABLET: 5; 325 TABLET ORAL at 10:18

## 2017-03-06 RX ADMIN — SPIRONOLACTONE AND HYDROCHLOROTHIAZIDE 1 TABLET: 25; 25 TABLET, FILM COATED ORAL at 08:21

## 2017-03-06 RX ADMIN — IPRATROPIUM BROMIDE AND ALBUTEROL SULFATE 3 ML: .5; 3 SOLUTION RESPIRATORY (INHALATION) at 06:52

## 2017-03-06 RX ADMIN — DULOXETINE HYDROCHLORIDE 60 MG: 60 CAPSULE, DELAYED RELEASE ORAL at 08:21

## 2017-03-06 RX ADMIN — IPRATROPIUM BROMIDE AND ALBUTEROL SULFATE 3 ML: .5; 3 SOLUTION RESPIRATORY (INHALATION) at 15:01

## 2017-03-06 RX ADMIN — GUAIFENESIN 600 MG: 600 TABLET, EXTENDED RELEASE ORAL at 08:21

## 2017-03-06 RX ADMIN — GABAPENTIN 100 MG: 100 CAPSULE ORAL at 08:21

## 2017-03-06 RX ADMIN — CARVEDILOL 12.5 MG: 12.5 TABLET, FILM COATED ORAL at 08:21

## 2017-03-06 RX ADMIN — OXYCODONE HYDROCHLORIDE AND ACETAMINOPHEN 2 TABLET: 5; 325 TABLET ORAL at 00:50

## 2017-03-06 RX ADMIN — INSULIN ASPART 3 UNITS: 100 INJECTION, SOLUTION INTRAVENOUS; SUBCUTANEOUS at 08:21

## 2017-03-06 RX ADMIN — GABAPENTIN 100 MG: 100 CAPSULE ORAL at 15:36

## 2017-03-06 RX ADMIN — DABIGATRAN ETEXILATE MESYLATE 150 MG: 150 CAPSULE ORAL at 08:21

## 2017-03-06 RX ADMIN — HYDROMORPHONE HYDROCHLORIDE 0.5 MG: 1 INJECTION, SOLUTION INTRAMUSCULAR; INTRAVENOUS; SUBCUTANEOUS at 02:19

## 2017-03-06 RX ADMIN — PREDNISONE 40 MG: 20 TABLET ORAL at 08:21

## 2017-03-06 RX ADMIN — IPRATROPIUM BROMIDE AND ALBUTEROL SULFATE 3 ML: .5; 3 SOLUTION RESPIRATORY (INHALATION) at 10:44

## 2017-03-06 RX ADMIN — LEVOTHYROXINE SODIUM 75 MCG: 75 TABLET ORAL at 05:45

## 2017-03-06 RX ADMIN — ALLOPURINOL 100 MG: 100 TABLET ORAL at 08:21

## 2017-03-06 RX ADMIN — PANTOPRAZOLE SODIUM 40 MG: 40 TABLET, DELAYED RELEASE ORAL at 05:45

## 2017-03-06 NOTE — PROGRESS NOTES
Continued Stay Note  [facility]     Patient Name: Kristan Galicia  MRN: [id number]  Today's Date: 3/6/2017    Admit Date: 3/3/2017          Discharge Plan       03/06/17 1017    Case Management/Social Work Plan    Additional Comments Patient has f/u with PCP on 03/13/17 @ 1300.       03/06/17 1005    Case Management/Social Work Plan    Plan return home    Patient/Family In Agreement With Plan yes    Additional Comments IMM letter signed.  Facesheet updated.  Spoke with patient in room.  Introduced self and explained role.  Patient lives in a 1st floor condo alone.  Patient IADLS prior to admit, but her daughter has been assisting as needed due to recent shoulder surgery.  Patient normally drives,  but her daughter has been taking her to appointments since surgery.  Only DME at home is a c-pap. She has used HH, but is unsure of the agency.  She has been to [facility] after a femur fracture. She was scheduled to begin outpatient PT last week for her shoulder, but plans to start once dc'd from hospital.  Patient denies any needs and plans to return home.  CCP will follow.              Discharge Codes     None            Gwendolyn Hu RN

## 2017-03-06 NOTE — PROGRESS NOTES
[facility]  Cardiology Progress Note    Patient Identification:  Name:Kristan Galicia  Age:66 y.o.  Sex: female  :  1950  MRN: [id number]             Length of Stay: 3    Follow up for:  CHF and IHSS      Interval History :    Denies any further dizziness. Mild CHAWLA with walking during PT session out to nurses desk.  Denies cp/pressure, orthopnea, or PND.        Tele: A - pacing on demand.          HPI     66-year-old  female who is normally followed by Dr. Ya and Dr. Ch with [facility].  She reports that she began experiencing several weeks ago worsening cough and shortness of breath and was seen by her primary care provider and was diagnosed with pneumonia. She was started on antibiotic therapy but on Friday she continued to feel poorly with worsening shortness of breath, chest heaviness, wheezing, cough, 3 pillow orthopnea, lower extremity swelling, congestion and feelings of dizziness upon standing. Her PCP instructed her to report to [facility] for further evaluation and management. Because of the orthopnea and swelling patient actually took an extra diuretic without much effect of her symptoms. Recently went had anesthesia for right rotator cuff repair on 2017 and had been recovering well up until this upper respiratory infection.     Been followed by Dr. Ya for IHSS, paroxysmal A. fib with indwelling pacemaker and is on Pradaxa, vasopressor syncope, and heart failure. It's review from the EMR reveal that she has had a history of a thin  showing normal coronary arteries. Echo was in  showing hypertrophic cardiomyopathy with septal thickening noted without significant left ventricular outflow tract obstruction. Hx of AICD implantation She states her last device check was 17 . She was told that her device showed no tachycardic events however it did show some intrathoracic fluid buildup.  Her chest heaviness she felt was more due to her wheezing and shortness of breath. It was nonradiating in nature and was not associated with any other symptoms other than shortness of breath. She reports feeling much improved today than on admit. Orthopnea and swelling better.         Past Medical History   Diagnosis Date   • Atrial fibrillation    • Cardiac defibrillator in place 01/2009     medtronic    • Cardiomyopathy      Hypertrophic Cardiomyopathy   • CHF (congestive heart failure)    • Diverticulosis      hx diverticulitis   • GERD (gastroesophageal reflux disease)    • Heart murmur    • History of depression    • History of tumor      left ocular tumor, treated with steroids   • Hyperlipidemia    • Migraines    • Osteoporosis    • Palpitations    • Sleep apnea      does not use CPAP or BiPAP, used nose strips   • Thyroid disease    • Vasovagal syncope    • Vertigo    • Vitamin D deficiency        Scheduled Meds:    Current Facility-Administered Medications:   •  acetaminophen (TYLENOL) tablet 650 mg, 650 mg, Oral, Q4H PRN, Luna Vanegas MD  •  allopurinol (ZYLOPRIM) tablet 100 mg, 100 mg, Oral, Daily, Luna Vanegas MD, 100 mg at 03/06/17 0821  •  amiodarone (PACERONE) tablet 100 mg, 100 mg, Oral, Nightly, Luna Vanegas MD, 100 mg at 03/05/17 2049  •  atorvastatin (LIPITOR) tablet 80 mg, 80 mg, Oral, Nightly, Luna Vanegas MD, 80 mg at 03/05/17 2049  •  baclofen (LIORESAL) tablet 10 mg, 10 mg, Oral, Nightly PRN, Luna Vanegas MD  •  carvedilol (COREG) tablet 12.5 mg, 12.5 mg, Oral, BID, Luna Vanegas MD, 12.5 mg at 03/06/17 0821  •  dabigatran etexilate (PRADAXA) capsule 150 mg, 150 mg, Oral, BID, Luna Vanegas MD, 150 mg at 03/06/17 0821  •  dextrose (D50W) solution 25 g, 25 g, Intravenous, Q15 Min PRN, Quita Lantigua MD  •  dextrose (GLUTOSE) oral gel 15 g, 15 g, Oral, Q15 Min PRN, Quita Lantigua MD  •  [DISCONTINUED] cefTRIAXone (ROCEPHIN) IVPB 1 g, 1 g, Intravenous, Q24H, 1 g  at 03/03/17 2100 **AND** doxycycline (VIBRAMYCIN) 100 mg/100 mL 0.9% NS MBP, 100 mg, Intravenous, Q12H, Luna Vanegas MD, Stopped at 03/06/17 0820  •  DULoxetine (CYMBALTA) DR capsule 60 mg, 60 mg, Oral, Daily, Luna Vanegas MD, 60 mg at 03/06/17 0821  •  gabapentin (NEURONTIN) capsule 100 mg, 100 mg, Oral, TID, Luna Vanegas MD, 100 mg at 03/06/17 1536  •  glucagon (human recombinant) (GLUCAGEN DIAGNOSTIC) injection 1 mg, 1 mg, Subcutaneous, Q15 Min PRN, Quita Lantigua MD  •  guaiFENesin (MUCINEX) 12 hr tablet 600 mg, 600 mg, Oral, BID, Quita Lantigua MD, 600 mg at 03/06/17 0821  •  HYDROmorphone (DILAUDID) injection 0.5 mg, 0.5 mg, Intravenous, Q2H PRN, Niko Gonzalez MD, 0.5 mg at 03/06/17 0219  •  insulin aspart (novoLOG) injection 0-14 Units, 0-14 Units, Subcutaneous, 4x Daily AC & at Bedtime, Quita Lantigua MD, 5 Units at 03/06/17 1235  •  ipratropium-albuterol (DUO-NEB) nebulizer solution 3 mL, 3 mL, Nebulization, Q4H PRN, Luna Vanegas MD  •  ipratropium-albuterol (DUO-NEB) nebulizer solution 3 mL, 3 mL, Nebulization, Q4H - RT, Luna Vanegas MD, 3 mL at 03/06/17 1501  •  levothyroxine (SYNTHROID, LEVOTHROID) tablet 75 mcg, 75 mcg, Oral, Q AM, Luna Vanegas MD, 75 mcg at 03/06/17 0545  •  lidocaine (LIDODERM) 5 % 1 patch, 1 patch, Transdermal, Daily PRN, Luna Vanegas MD  •  montelukast (SINGULAIR) tablet 10 mg, 10 mg, Oral, Nightly, Luna Vanegas MD, 10 mg at 03/05/17 2049  •  nitroglycerin (NITROSTAT) SL tablet 0.4 mg, 0.4 mg, Sublingual, Q5 Min PRN, Luna Vanegas MD  •  ondansetron (ZOFRAN) injection 4 mg, 4 mg, Intravenous, Q6H PRN, Luna Vanegas MD  •  oxyCODONE-acetaminophen (PERCOCET) 5-325 MG per tablet 1 tablet, 1 tablet, Oral, Q4H PRN, 1 tablet at 03/03/17 2106 **OR** oxyCODONE-acetaminophen (PERCOCET) 5-325 MG per tablet 2 tablet, 2 tablet, Oral, Q4H PRN, Luna Vanegas MD, 2 tablet at 03/06/17 1018  •  pantoprazole (PROTONIX) EC tablet 40 mg, 40 mg, Oral, Q  "AM, Luna Vanegas MD, 40 mg at 03/06/17 0545  •  potassium chloride (MICRO-K) CR capsule 40 mEq, 40 mEq, Oral, PRN **OR** potassium chloride (KLOR-CON) packet 40 mEq, 40 mEq, Oral, PRN **OR** potassium chloride 10 mEq in 100 mL IVPB, 10 mEq, Intravenous, Q1H PRN, Niko Gonzalez MD  •  predniSONE (DELTASONE) tablet 40 mg, 40 mg, Oral, Daily, Quita Lantigua MD, 40 mg at 03/06/17 0821  •  sodium chloride 0.9 % flush 1-10 mL, 1-10 mL, Intravenous, PRN, Luna Vanegas MD  •  spironolactone-hydrochlorothiazide (ALDACTAZIDE) 25-25 MG tablet 1 tablet, 1 tablet, Oral, QAM AC, Luna Vanegas MD, 1 tablet at 03/06/17 0821  •  traZODone (DESYREL) tablet 50 mg, 50 mg, Oral, Nightly, Luna Vanegas MD, 50 mg at 03/05/17 2049      Review of Systems   Constitution: Negative for chills and weakness.   Cardiovascular: Positive for dyspnea on exertion. Negative for chest pain, leg swelling, near-syncope, orthopnea, palpitations, paroxysmal nocturnal dyspnea and syncope.   Respiratory: Negative for shortness of breath.        Visit Vitals   • /91 (BP Location: Left arm, Patient Position: Sitting)   • Pulse 61   • Temp 96.6 °F (35.9 °C) (Oral)   • Resp 18   • Ht 62\" (157.5 cm)   • Wt 188 lb 11.2 oz (85.6 kg)   • SpO2 98%   • BMI 34.51 kg/m2      Ortho BP's 3/4/17 neg     Intake/Output Summary (Last 24 hours) at 03/06/17 1602  Last data filed at 03/05/17 2048   Gross per 24 hour   Intake    100 ml   Output      0 ml   Net    100 ml          Wt Readings from Last 1 Encounters:   03/05/17 0046 188 lb 11.2 oz (85.6 kg)   03/03/17 1700 179 lb (81.2 kg)       Physical Examination: By   Physical Exam    General: No acute distress.    Skin: Warm and dry, no diaphoresis noted   HEENT: No ptosis;  oral mucosa moist   Neck: Supple; no carotid bruits; no JVD, Trachea mid line   Heart: S1S2  regular rate and rhythm;  + systolic murmur RUSB; no gallop or rub appreciated, No thrills palpable   Chest: Respirations " unlabored at rest, bilateral breath sounds have good air entry; no  wheezes auscultated.     Abdomen: Soft, non-tender, non-distended, positive bowel sounds  ,No aneurysms palpable   Extremities: Bilateral lower extremities have no pre-tibial pitting edema; Radials are palpable   Neurological: Alert and oriented ; no new motor deficits,       Lab Review:  Personally reviewed the labs, radiology imaging and other cardiac procedures.       Results from last 7 days  Lab Units 17  0632  178   SODIUM mmol/L 134*  < > 138   POTASSIUM mmol/L 4.6  < > 3.0*   CHLORIDE mmol/L 94*  < > 94*   TOTAL CO2 mmol/L 25.0  < > 26.7   BUN mg/dL 19  < > 23   CREATININE mg/dL 0.95  < > 1.13*   CALCIUM mg/dL 9.0  < > 9.6   BILIRUBIN mg/dL  --   --  0.4   ALK PHOS U/L  --   --  66   ALT (SGPT) U/L  --   --  32   AST (SGOT) U/L  --   --  32   GLUCOSE mg/dL 178*  < > 192*   < > = values in this interval not displayed.    Results from last 7 days  Lab Units 17  0643 178   TROPONIN T ng/mL <0.010 <0.010       Results from last 7 days  Lab Units 17  0643 17   WBC 10*3/mm3 9.29 8.30   HEMOGLOBIN g/dL 9.5* 9.8*   HEMATOCRIT % 31.6* 31.6*   PLATELETS 10*3/mm3 331 328           3/3/17 ProBNP 522 (674)    ProCall 0.07 on 3/3/17     Lipid Panel 17 Total 134, HDL 42, LDL 66, VLDL 26.2, Trigs 131    RSV positive    Estimated Creatinine Clearance: 59.9 mL/min (by C-G formula based on Cr of 0.95).    I personally viewed and interpreted the patient's EKG/Telemetry data    EC/17/13        Echocardiogram:       3/4/17     Interpretation Summary      · Left ventricular wall thickness is consistent with severe septal asymmetric hypertrophy.  · The findings are consistent with non-obstructive, hypertrophic cardiomyopathy.  · Left ventricular diastolic dysfunction (grade I a) consistent with impaired relaxation.  · Left atrial cavity size is borderline dilated.  · Left ventricular function is  normal.  · LVSF EF 51-55%           4/6/16 at [facility]   Conclusions:  Severe concentric left ventricular hypertrophy is observed.1.6/1.8, HCM  Global left ventricular wall motion and contractility are within normal  limits.  The EF 4ch was calculated at 66%.  Abnormal left ventricular diastolic function is observed.  The left ventricular diastolic filling pattern is consistent with  elevated mean left atrial pressure.e/e' 28,25  A pacemaker wire is visualized in the right atrium.  There is a trace of mitral regurgitation.                   3/3/17      Study Result      XR CHEST 2 VW-      HISTORY: Female who is 66 years-old, dyspnea      TECHNIQUE: Frontal and lateral views of the chest      COMPARISON: 09/17/2013      FINDINGS: Heart is mildly enlarged. Left-sided pacemaker and cardiac  leads are seen. Pulmonary vasculature is unremarkable. No focal  pulmonary consolidation, pleural effusion, or pneumothorax. No acute  osseous process.      IMPRESSION:  No focal pulmonary consolidation. Mild cardiomegaly.  Follow-up as clinical indications persist.             Patient Active Problem List   Diagnosis   • Shoulder pain, right   • Knee pain, right   • Fall down stairs   • PAF (paroxysmal atrial fibrillation)   • S/P rotator cuff repair   • Vitamin D deficiency   • Vertigo   • Vasovagal syncope   • Thyroid disease   • Obstructive sleep apnea syndrome   • Palpitations   • Osteoporosis   • Ocular tumor   • Migraines   • Hyperlipidemia   • History of transfusion   • Heart murmur   • Heart attack   • GERD (gastroesophageal reflux disease)   • Depression   • CHF (congestive heart failure)   • Cardiomyopathy   • Asthma   • Anxiety   • Arthritis of right knee   • Right rotator cuff tear   • Hypothyroidism   • Dyspnea and respiratory abnormalities   • Orthopnea   • IHSS (idiopathic hypertrophic subaortic stenosis)   • AICD (automatic cardioverter/defibrillator) present   • Anticoagulant long-term use   • RSV bronchiolitis   •  Hyperglycemia, drug-induced         Assessment/ Plan :     IHSS: Echo 3/4/17 showing IHSS with no significant outflow obstruction with EF 51-55%. Last echo on record 2016 showing no outflow tract obstruction and EF 66%. Ortho bp's 3/4/17 neg. Trop  < 0.01 x 2.      Hx of Hypothyroidism: takes levothyroxine supp.     Volume overload: improved. +DD improved  orthopnea and swelling.  Is on spironolactone and HCTZ her home regimen.        Hypokalemia: resolved     PAF: is A pacing. Taking home regimen: pradaxa 150 mg PO BID, amiodarone  mg daily and coreg 12.5 mg PO BID. Blood pressure is controlled.       Hx of ICD: device interrogation noted in care everywhere EMR 2/27/17showing no episodes of Tachycardia.      PNA:  With RSV. IM following. Pt feeling much better.    No further Cardiac w/u needed at this time.  Recommend she f/u with her primary cardiology provider  Dr. Ch in 2-3 weeks with BMP.  Discussion for will be discharged today at some point waiting for her ride      Evaluation and management per Dr. Vazquez. Vanessa Vazquez MD, [facility]  3/6/85953:30 AM      Patient is personally examined by me. All labs, radiology reports and cardiac procedures are reviewed and or obtained by me. Asessment and plan is by me.-----Carmelo Vazquez MD

## 2017-03-06 NOTE — PLAN OF CARE
Problem: Patient Care Overview (Adult)  Goal: Plan of Care Review  Outcome: Ongoing (interventions implemented as appropriate)    03/06/17 0039 03/06/17 0429   Coping/Psychosocial Response Interventions   Plan Of Care Reviewed With patient --    Patient Care Overview   Progress --  no change   Outcome Evaluation   Outcome Summary/Follow up Plan --  IV abx, pain control, monitor blood glucose, vitals stable, will continue to monitor       Goal: Adult Individualization and Mutuality  Outcome: Ongoing (interventions implemented as appropriate)    Problem: Pneumonia (Adult)  Goal: Signs and Symptoms of Listed Potential Problems Will be Absent or Manageable (Pneumonia)  Outcome: Ongoing (interventions implemented as appropriate)    Problem: Fall Risk (Adult)  Goal: Identify Related Risk Factors and Signs and Symptoms  Outcome: Ongoing (interventions implemented as appropriate)  Goal: Absence of Falls  Outcome: Ongoing (interventions implemented as appropriate)

## 2017-03-06 NOTE — PROGRESS NOTES
Discharge Planning Assessment  [facility]     Patient Name: Kristan Galicia  MRN: [id number]  Today's Date: 3/6/2017    Admit Date: 3/3/2017          Discharge Needs Assessment       03/06/17 1004    Living Environment    Lives With alone    Living Arrangements condominium    Quality Of Family Relationships supportive    Able to Return to Prior Living Arrangements yes    Discharge Needs Assessment    Concerns To Be Addressed no discharge needs identified    Readmission Within The Last 30 Days no previous admission in last 30 days    Equipment Currently Used at Home respiratory supplies   c-pap    Equipment Needed After Discharge none    Transportation Available car;family or friend will provide    Discharge Disposition still a patient            Discharge Plan       03/06/17 1005    Case Management/Social Work Plan    Plan return home    Patient/Family In Agreement With Plan yes    Additional Comments IMM letter signed.  Facesheet updated.  Spoke with patient in room.  Introduced self and explained role.  Patient lives in a 1st floor condo alone.  Patient IADLS prior to admit, but her daughter has been assisting as needed due to recent shoulder surgery.  Patient normally drives,  but her daughter has been taking her to appointments since surgery.  Only DME at home is a c-pap. She has used HH, but is unsure of the agency.  She has been to [facility] after a femur fracture. She was scheduled to begin outpatient PT last week for her shoulder, but plans to start once dc'd from hospital.  Patient denies any needs and plans to return home.  CCP will follow.        Discharge Placement     No information found                Demographic Summary       03/06/17 1003    Referral Information    Admission Type inpatient    Arrived From admitted as an inpatient    Referral Source admission list    Reason For Consult discharge planning    Contact Information    Permission Granted to Share Information With family/designee     Primary Care Physician Information    Name Chelly RedCHRISTIN            Functional Status       03/06/17 1003    Functional Status Current    Ambulation 1-->assistive equipment    Transferring 1-->assistive equipment    Toileting 0-->independent    Bathing 0-->independent    Dressing 0-->independent    Eating 0-->independent    Communication 0-->understands/communicates without difficulty    Swallowing (if score 2 or more for any item, consult Rehab Services) 0-->swallows foods/liquids without difficulty    Change in Functional Status Since Onset of Current Illness/Injury yes    Functional Status Prior    Ambulation 0-->independent    Transferring 0-->independent    Toileting 0-->independent    Bathing 0-->independent    Dressing 0-->independent    Eating 0-->independent    Communication 0-->understands/communicates without difficulty    Swallowing 0-->swallows foods/liquids without difficulty    Cognitive/Perceptual/Developmental    Current Mental Status/Cognitive Functioning no deficits noted    Recent Changes in Mental Status/Cognitive Functioning no changes            Psychosocial     None            Abuse/Neglect     None            Legal     None            Substance Abuse     None            Patient Forms     None          Gwendolyn Hu, RN

## 2017-03-06 NOTE — THERAPY DISCHARGE NOTE
Acute Care - Physical Therapy Treatment Note/Discharge  [facility]     Patient Name: Kristan Glaicia  : 1950  MRN: [id number]  Today's Date: 3/6/2017             Admit Date: 3/3/2017    Visit Dx:    ICD-10-CM ICD-9-CM   1. Difficulty walking R26.2 719.7     Patient Active Problem List   Diagnosis   • Shoulder pain, right   • Knee pain, right   • Fall down stairs   • PAF (paroxysmal atrial fibrillation)   • S/P rotator cuff repair   • Vitamin D deficiency   • Vertigo   • Vasovagal syncope   • Thyroid disease   • Obstructive sleep apnea syndrome   • Palpitations   • Osteoporosis   • Ocular tumor   • Migraines   • Hyperlipidemia   • History of transfusion   • Heart murmur   • Heart attack   • GERD (gastroesophageal reflux disease)   • Depression   • CHF (congestive heart failure)   • Cardiomyopathy   • Asthma   • Anxiety   • Arthritis of right knee   • Right rotator cuff tear   • Hypothyroidism   • Dyspnea and respiratory abnormalities   • Orthopnea   • IHSS (idiopathic hypertrophic subaortic stenosis)   • AICD (automatic cardioverter/defibrillator) present   • Anticoagulant long-term use       Physical Therapy Education     Title: PT OT SLP Therapies (Resolved)     Topic: Physical Therapy (Resolved)     Point: Mobility training (Resolved)    Learning Progress Summary    Learner Readiness Method Response Comment Documented by Status   Patient Eager E,TB DU   17 0952 Done    Acceptance E [facility] 17 0908 Done    Acceptance E [facility] (formerly [facility]3] 17 1013 Done                      User Key     Initials Effective Dates Name Provider Type Discipline     16 -  Carolee Dorado PTA Physical Therapy Assistant PT    JW 16 -  Simran Coleman PTA Physical Therapy Assistant PT     16 -  Raquel Meek PT Physical Therapist PT                    IP PT Goals       17 0953 17 1017       Gait Training PT LTG    Gait Training Goal PT LTG, Date Established  17  -MM     Gait  Training Goal PT LTG, Time to Achieve  1 wk  -MM     Gait Training Goal PT LTG, [place] Level  independent  -MM     Gait Training Goal PT LTG, Distance to Achieve  400 feet  -MM     Gait Training Goal PT LTG, Outcome goal partially met   suggest cane at home initially due to knee pn and bal w/turn  -      Gait Training Goal PT LTG, Reason Goal Not Met discharged from facility  -      Strength Goal PT LTG    Strength Goal PT LTG, Date Established  03/04/17  -MM     Strength Goal PT LTG, Time to Achieve  1 wk  -MM     Strength Goal PT LTG, Measure to Achieve  Strength of lower extremities 5/5 to enable her to return home independently.  -MM     Strength Goal PT LTG, Outcome goal not met  -      Strength Goal PT LTG, Reason Goal Not Met discharged from facility  -        User Key  (r) = Recorded By, (t) = Taken By, (c) = Cosigned By    Initials Name Provider Type     Carolee Dorado PTA Physical Therapy Assistant     Raquel Meek, PT Physical Therapist              Adult Rehabilitation Note       03/06/17 0948 03/05/17 0904       Rehab Assessment/Intervention    Discipline physical therapy assistant  - physical therapy assistant  -     Document Type therapy note (daily note)  - therapy note (daily note)  -     Subjective Information agree to therapy;complains of;pain  - agree to therapy   Better than when I  came in  -     Specific Treatment Considerations rt shldr sx ~1 mo ago  - recent R shoulder surgery  -     Recorded by [] Carolee Dorado PTA [JW] Simran Coleman PTA     Vital Signs    Pre SpO2 (%)  94  -     O2 Delivery Pre Treatment  room air  -     Post SpO2 (%) 99  - 94  -     O2 Delivery Post Treatment room air  - room air  -     Recorded by [] Carolee Dorado PTA [JW] Simran Coleman PTA     Pain Assessment    Pain Assessment 0-10  - FLACC  -     Pain Score 5  - 4  -     Pain Type Chronic pain  - Surgical pain  -     Pain Location Knee   -JM Shoulder  -     Pain Orientation Right  -      Pain Intervention(s) Repositioned;Ambulation/increased activity;Elevated  -      Recorded by [] Carolee Dorado PTA [] Simran Coleman PTA     Cognitive Assessment/Intervention    Personal Safety Interventions  nonskid shoes/slippers when out of bed  -     Recorded by  [NOE] Simran Coleman PTA     Bed Mobility, Assessment/Treatment    Bed Mob, Supine to Sit, [place] independent  -      Bed Mob, Sit to Supine, [place] independent  -      Recorded by [] Carolee Dorado PTA      Transfer Assessment/Treatment    Transfers, Sit-Stand [place] independent  -      Recorded by [] Carolee Dorado PTA      Gait Assessment/Treatment    Gait, [place] Level stand by assist;contact guard assist  -      Gait, Distance (Feet) 450  - 350  -     Gait, Safety Issues balance decreased during turns  -      Gait, Comment only req assist on turns or if she got to close to walls    suggest she use her cane if needed at home  -      Recorded by [] Carolee Dorado PTA [] Simran Coleman PTA     Positioning and Restraints    Pre-Treatment Position in bed  - in bed  -     Post Treatment Position  bed  -     In Bed supine;call light within reach;encouraged to call for assist  - supine;call light within reach  -     Recorded by [] Carolee Dorado PTA [] Simran Coleman PTA       User Key  (r) = Recorded By, (t) = Taken By, (c) = Cosigned By    Initials Name Effective Dates    CALVIN Dorado PTA 02/18/16 -     JW Simran Coleman PTA 02/18/16 -           PT Recommendation and Plan  Anticipated Discharge Disposition: home with outpatient services  Planned Therapy Interventions: gait training, strengthening  PT Frequency: daily             Outcome Measures       03/06/17 0900 03/05/17 0900 03/04/17 1000    How much help from another person do you currently need...    Turning from your back to your side  while in flat bed without using bedrails? 4  - 4  -JW 4  -MM    Moving from lying on back to sitting on the side of a flat bed without bedrails? 4  -JM 4  -JW 4  -MM    Moving to and from a bed to a chair (including a wheelchair)? 4  -JM 3  -JW 3  -MM    Standing up from a chair using your arms (e.g., wheelchair, bedside chair)? 4  - 4  -JW 4  -MM    Climbing 3-5 steps with a railing? 3  -JM 3  -JW 3  -MM    To walk in hospital room? 4  -JM 3  -JW 3  -MM    AM-PAC 6 Clicks Score 23  - 21  -JW 21  -MM    Functional Assessment    Outcome Measure Options   AM-PAC 6 Clicks Basic Mobility (PT)  -MM      User Key  (r) = Recorded By, (t) = Taken By, (c) = Cosigned By    Initials Name Provider Type    CALVIN Dorado PTA Physical Therapy Assistant    NOE Coleman PTA Physical Therapy Assistant    MM Raquel Meek, PT Physical Therapist           Time Calculation:         PT Charges       03/06/17 0946          Time Calculation    Start Time 0929  -      Stop Time 0946  -      Time Calculation (min) 17 min  -      PT Received On 03/06/17  -        User Key  (r) = Recorded By, (t) = Taken By, (c) = Cosigned By    Initials Name Provider Type    CALVIN Dorado PTA Physical Therapy Assistant          Therapy Charges for Today     Code Description Service Date Service Provider Modifiers Qty    72065410602 HC PT THER PROC EA 15 MIN 3/6/2017 Carolee Dorado PTA GP 1          PT G-Codes  PT Professional Judgement Used?: Yes  Outcome Measure Options: AM-PAC 6 Clicks Basic Mobility (PT)  Score: 21  Functional Limitation: Mobility: Walking and moving around  Mobility: Walking and Moving Around Current Status (): At least 20 percent but less than 40 percent impaired, limited or restricted  Mobility: Walking and Moving Around Goal Status (): At least 1 percent but less than 20 percent impaired, limited or restricted         Carolee Dorado PTA  3/6/2017

## 2017-03-06 NOTE — DISCHARGE SUMMARY
Date of Admission: 3/3/2017  Date of Discharge:  3/6/2017  Primary Care Physician: Chelly Red, APRN     Discharge Diagnosis:  Active Hospital Problems (** Indicates Principal Problem)    Diagnosis Date Noted   • **RSV bronchiolitis [J21.0] 03/06/2017   • Hyperglycemia, drug-induced [R73.9] 03/06/2017   • Dyspnea and respiratory abnormalities [R06.00, R06.89] 03/03/2017   • Orthopnea [R06.01] 03/03/2017   • IHSS (idiopathic hypertrophic subaortic stenosis) [I42.1] 03/03/2017   • AICD (automatic cardioverter/defibrillator) present [Z95.810] 03/03/2017   • Anticoagulant long-term use [Z79.01] 03/03/2017   • Hypothyroidism [E03.9] 02/22/2017   • Right rotator cuff tear [M75.101] 02/02/2017   • Obstructive sleep apnea syndrome [G47.33]    • CHF (congestive heart failure) [I50.9]    • Cardiomyopathy [I42.9]    • PAF (paroxysmal atrial fibrillation) [I48.0] 11/01/2016      Resolved Hospital Problems    Diagnosis Date Noted Date Resolved   No resolved problems to display.       Presenting Problem/History of Present Illness  Dyspnea and respiratory abnormalities [R06.00, R06.89]     Hospital Course  Patient is a 66 y.o. female who presented with dyspnea without any associated chest pain.  She was evaluated for possible myocardial infarction and/or evidence of congestive heart failure.  Neither were found.  A chest x-ray did not reveal evidence of pneumonia.  A viral panel had RSV infection.  She experienced some mild bronchospasm associated with this infection.  She received oral steroids with marked decrease in cough.  There is no GI side effects from the steroids or from the illness.  Initially she received doxycycline and vancomycin.  Blood cultures, sputum culture, urine for streptococcal antigen and Legionella were negative.  Her pro-calcitonin was not elevated.  She received IV fluids due to weakness.  Over the course of the last 3 days there was marked improvement with only an occasional expiratory wheeze.   The patient denies any history of COPD but does have some mild asthma.  She is stable and able to be discharged home today.  She'll have a decreasing dose of prednisone over the next week.    She was seen by cardiology.  An echocardiogram was repeated which was consistent with her previous IHSS.  There was no LV dysfunction present.  Her BNP was normal.  Her TSH was elevated at 13.28 with a normal free T4.  This could be consistent with euthyroid sick.  I have recommended that it be rechecked in a non-illness status.    There was a mild decrease in sodium after hydration which was not significant and did not require further therapy.  On admission the patient had a glucose elevation of 192.  She received correctional dose insulin while here.  She denies any history of diabetes.  This needs to be followed as an outpatient and I would recommend hemoglobin A1c be done at the next office visit.    Patient had a recent shoulder replacement and is due to start physical therapy later this week.    Procedures Performed: None         Consults:   Consults     Date and Time Order Name Status Description    3/3/2017 1930 Inpatient Consult to Cardiology Completed              Condition on Discharge: Improved    Discharge Disposition  Home or Self Care    Discharge Medications:   Kristan Galicia   Home Medication Instructions ROBBIE:[id number]    Printed on:03/06/17 1214   Medication Information                      alendronate (FOSAMAX) 70 MG tablet  Take 1 tablet by mouth Every 7 (Seven) Days. Hold until can be discussed with Dr. Anne as to when to restart medication             allopurinol (ZYLOPRIM) 100 MG tablet  Take 1 tablet by mouth Daily.             amiodarone (PACERONE) 200 MG tablet  Take 100 mg by mouth Every Night.             atorvastatin (LIPITOR) 80 MG tablet  Take 1 tablet by mouth Daily.             baclofen (LIORESAL) 10 MG tablet  Take 1 tablet by mouth Every Night.             carvedilol (COREG) 12.5 MG  tablet  Take 12.5 mg by mouth 2 (Two) Times a Day.             dabigatran etexilate (PRADAXA) 150 MG capsu  Take 1 capsule by mouth 2 (Two) Times a Day. Resume 2/4/17             DULoxetine (CYMBALTA) 60 MG capsule  60 mg Daily.             gabapentin (NEURONTIN) 100 MG capsule  Take 100 mg by mouth 3 (Three) Times a Day.             levothyroxine (SYNTHROID) 75 MCG tablet  Take 1 tablet by mouth Daily.             lidocaine (LIDODERM) 5 %  Place 1 patch on the skin Every 12 (Twelve) Hours. Remove & Discard patch within 12 hours or as directed by MD maradiagast (SINGULAIR) 10 MG tablet  Take 1 tablet by mouth daily.             omeprazole (PriLOSEC) 20 MG capsule  Take 20 mg by mouth Every Morning.             oxyCODONE (ROXICODONE) 5 MG immediate release tablet  Take 1-3 tabs po q 3-4 hours prn pain             predniSONE (DELTASONE) 10 MG tablet  3 tablets × 2 days, 2 tablets × 2 days, 1 tablet × 2 days, half tablet × 2 days             spironolactone-hydrochlorothiazide (ALDACTAZIDE) 25-25 MG tablet  Take 1 tablet by mouth Every Morning.             traZODone (DESYREL) 50 MG tablet  TAKE 1 TABLET BY MOUTH AT BEDTIME                 Discharge Diet:  no limitation    Activity at Discharge:  rest at home for a few days and start PT at the end of the week    Follow-up Appointments:  Future Appointments  Date Time Provider Department Center   3/13/2017 9:30 AM MD SOLEDAD Eric [facility] None   3/13/2017 1:00 PM CHRISTIN Mckee [facility] None   3/17/2017 10:40 AM MD SOLEDAD Newell [facility] None         Test Results Pending at Discharge   Order Current Status    Blood Culture Preliminary result    Blood Culture Preliminary result           Quita Lantigua MD  03/06/17  12:14 PM    Time Spent on Discharge Activities: Greater than 40 minutes

## 2017-03-06 NOTE — PLAN OF CARE
Problem: Inpatient Physical Therapy  Goal: Gait Training Goal LTG- PT  Outcome: Outcome(s) achieved Date Met:  03/06/17 03/06/17 0953   Gait Training PT LTG   Gait Training Goal PT LTG, Outcome goal partially met  (suggest cane at home initially due to knee pn and bal w/turn)   Gait Training Goal PT LTG, Reason Goal Not Met discharged from facility       Goal: Strength Goal LTG- PT  Outcome: Unable to achieve outcome(s) by discharge Date Met:  03/06/17 03/06/17 0953   Strength Goal PT LTG   Strength Goal PT LTG, Outcome goal not met   Strength Goal PT LTG, Reason Goal Not Met discharged from facility

## 2017-03-08 LAB
BACTERIA SPEC AEROBE CULT: NORMAL
BACTERIA SPEC AEROBE CULT: NORMAL

## 2017-03-13 ENCOUNTER — OFFICE VISIT (OUTPATIENT)
Dept: ORTHOPEDIC SURGERY | Facility: CLINIC | Age: 67
End: 2017-03-13

## 2017-03-13 VITALS — TEMPERATURE: 98.6 F | BODY MASS INDEX: 34.17 KG/M2 | HEIGHT: 61 IN | WEIGHT: 181 LBS

## 2017-03-13 DIAGNOSIS — Z09 SURGERY FOLLOW-UP: ICD-10-CM

## 2017-03-13 DIAGNOSIS — Z96.611 STATUS POST REVERSE ARTHROPLASTY OF SHOULDER, RIGHT: Primary | ICD-10-CM

## 2017-03-13 PROCEDURE — 73020 X-RAY EXAM OF SHOULDER: CPT | Performed by: ORTHOPAEDIC SURGERY

## 2017-03-13 PROCEDURE — 99024 POSTOP FOLLOW-UP VISIT: CPT | Performed by: ORTHOPAEDIC SURGERY

## 2017-03-13 PROCEDURE — 73010 X-RAY EXAM OF SHOULDER BLADE: CPT | Performed by: ORTHOPAEDIC SURGERY

## 2017-03-13 RX ORDER — HYDROCODONE BITARTRATE AND ACETAMINOPHEN 7.5; 325 MG/1; MG/1
1 TABLET ORAL EVERY 4 HOURS PRN
Qty: 50 TABLET | Refills: 0 | Status: SHIPPED | OUTPATIENT
Start: 2017-03-13 | End: 2017-04-24

## 2017-03-13 NOTE — PROGRESS NOTES
"Kristan Galicia : 1950 MRN: [id number] DATE: 3/13/2017    DIAGNOSIS:  6 week follow up right shoulder arthroplasty      SUBJECTIVE:  Patient returns today for 6 week follow up of right shoulder replacement.  Patient reports doing well with no unusual complaints. Of note, she was admitted to [facility] for pneumonia.  She is now feeling much better.  Denies any problems with the shoulder.  She has not yet started PT.    OBJECTIVE:    Visit Vitals   • Temp 98.6 °F (37 °C)   • Ht 61\" (154.9 cm)   • Wt 181 lb (82.1 kg)   • BMI 34.2 kg/m2     Family History   Problem Relation Age of Onset   • Heart attack Brother    • Hyperthyroidism Mother    • Cancer Mother    • Heart disease Mother    • Osteoporosis Mother    • No Known Problems Father       car accident   • Cirrhosis Maternal Grandmother    • No Known Problems Maternal Grandfather    • No Known Problems Paternal Grandmother    • No Known Problems Paternal Grandfather    • Breast cancer Maternal Aunt      Past Medical History   Diagnosis Date   • Atrial fibrillation    • Cardiac defibrillator in place 2009     medtronic    • Cardiomyopathy      Hypertrophic Cardiomyopathy   • CHF (congestive heart failure)    • Diverticulosis      hx diverticulitis   • GERD (gastroesophageal reflux disease)    • Heart murmur    • History of depression    • History of tumor      left ocular tumor, treated with steroids   • Hyperlipidemia    • Migraines    • Osteoporosis    • Palpitations    • Sleep apnea      does not use CPAP or BiPAP, used nose strips   • Thyroid disease    • Vasovagal syncope    • Vertigo    • Vitamin D deficiency      Past Surgical History   Procedure Laterality Date   • Laparoscopic cholecystectomy     • Colonoscopy       Dr. Herbert Gonsales    • Appendectomy     • Hysterectomy       Total   • Cardiac defibrillator placement Left      Dr. Ya   • Breast reconstruction Bilateral      Reduction   • Knee arthroscopy Bilateral      " Dr. Li   • Femur surgery Left      with metal implant  x3   • Shoulder arthroscopy w/ rotator cuff repair Bilateral 05/20/2011   • Tonsillectomy     • Cataract extraction Bilateral    • Breast biopsy Bilateral    • Colonoscopy  8/16/2016     Procedure: COLONOSCOPY with cecum;  Surgeon: Rosalio Sheikh MD;  Location: [facility] ENDOSCOPY;  Service:    • Rotator cuff repair Right 06/10/2010   • Knee surgery Right 06/10/2010   • Cardiac catheterization  02/19/2007   • Neck surgery  08/23/2012     Anterior cervical diskectomy and fusion with VG2 allograft implants and eagle anterior plate fixation,C5-C6 C6-C7   • First rib resection Bilateral      and scalenectomy   • Total shoulder arthroplasty w/ distal clavicle excision Right 2/2/2017     Procedure: TOTAL SHOULDER REVERSE ARTHROPLASTY;  Surgeon: Juan Antonio Anne MD;  Location: [facility] OR;  Service:    • Shoulder surgery Right      Social History     Social History   • Marital status: Single     Spouse name: N/A   • Number of children: 1   • Years of education: N/A     Occupational History   •       Social History Main Topics   • Smoking status: Former Smoker     Packs/day: 0.50     Years: 17.00     Quit date: 9/8/1985   • Smokeless tobacco: Never Used   • Alcohol use No   • Drug use: No   • Sexual activity: Defer     Other Topics Concern   • Not on file     Social History Narrative     Exam:. The incision is well healed. No erythema or drainage.  Shoulder moves fluidly with pendulums.  Motion is on track per protocol.  The arm is soft and nontender.  Good motor and sensory function.  Palpable distal pulses.     DIAGNOSTIC STUDIES    Xrays: 2 views of the right shoulder (AP, scapular Y) were ordered and reviewed for evaluation of shoulder replacement. They demonstrate a well positioned, well aligned replacement without complicating factors noted. In comparison with previous films there has been no change.    ASSESSMENT: 6 week follow up  right shoulder replacement.    PLAN:    1.  Start PT per protocol.   2.  Discontinue sling and begin working on progressing ROM as tolerated.   3.  Counseled patient about appropriate activity modifications and restrictions--released to drive at this point.

## 2017-03-15 RX ORDER — LEVOTHYROXINE SODIUM 0.05 MG/1
TABLET ORAL
Qty: 30 TABLET | Refills: 1 | Status: SHIPPED | OUTPATIENT
Start: 2017-03-15 | End: 2017-04-06

## 2017-03-17 ENCOUNTER — CLINICAL SUPPORT (OUTPATIENT)
Dept: ORTHOPEDIC SURGERY | Facility: CLINIC | Age: 67
End: 2017-03-17

## 2017-03-17 VITALS — WEIGHT: 181 LBS | TEMPERATURE: 97.6 F | BODY MASS INDEX: 34.17 KG/M2 | HEIGHT: 61 IN

## 2017-03-17 DIAGNOSIS — G89.29 CHRONIC PAIN OF RIGHT KNEE: Primary | ICD-10-CM

## 2017-03-17 DIAGNOSIS — M25.561 CHRONIC PAIN OF RIGHT KNEE: Primary | ICD-10-CM

## 2017-03-17 DIAGNOSIS — M17.11 ARTHRITIS OF RIGHT KNEE: ICD-10-CM

## 2017-03-17 PROCEDURE — 20610 DRAIN/INJ JOINT/BURSA W/O US: CPT | Performed by: NURSE PRACTITIONER

## 2017-03-17 RX ORDER — LIDOCAINE HYDROCHLORIDE 10 MG/ML
4 INJECTION, SOLUTION INFILTRATION; PERINEURAL
Status: COMPLETED | OUTPATIENT
Start: 2017-03-17 | End: 2017-03-17

## 2017-03-17 RX ADMIN — LIDOCAINE HYDROCHLORIDE 4 ML: 10 INJECTION, SOLUTION INFILTRATION; PERINEURAL at 11:51

## 2017-03-17 NOTE — PATIENT INSTRUCTIONS
Hylan G-F 20 intra-articular injection  What is this medicine?  HYLAN G-F 20 (HI april G F 20) is used to treat osteoarthritis of the knee. It lubricates and cushions the joint, reducing pain in the knee.  This medicine may be used for other purposes; ask your health care provider or pharmacist if you have questions.  COMMON BRAND NAME(S): Synvisc, Synvisc-One  What should I tell my health care provider before I take this medicine?  They need to know if you have any of these conditions:  -severe knee inflammation  -skin conditions or sensitivity  -skin or joint infection  -venous stasis  -an unusual or allergic reaction to hylan G-F 20, hyaluronan (sodium hyaluronate), eggs, other medicines, foods, dyes, or preservatives  -pregnant or trying to get pregnant  -breast-feeding  How should I use this medicine?  This medicine is for injection into the knee joint. It is given by a health care professional in a hospital or clinic setting.  Talk to your pediatrician regarding the use of this medicine in children. This medicine is not approved for use in children.  Overdosage: If you think you have taken too much of this medicine contact a poison control center or emergency room at once.  NOTE: This medicine is only for you. Do not share this medicine with others.  What if I miss a dose?  Keep appointments for follow-up doses as directed. For Synvisc, you will need weekly injections for 3 doses. It is important not to miss your dose. If you will receive Synvisc-One, then only 1 injection will be needed. Call your doctor or health care professional if you are unable to keep an appointment.  What may interact with this medicine?  Do not take this medicine with any of the following medications:  -other injections for the joint like steroids or anesthetics  -certain skin disinfectants like benzalkonium chloride  This list may not describe all possible interactions. Give your health care provider a list of all the medicines, herbs,  non-prescription drugs, or dietary supplements you use. Also tell them if you smoke, drink alcohol, or use illegal drugs. Some items may interact with your medicine.  What should I watch for while using this medicine?  Tell your doctor or healthcare professional if your symptoms do not start to get better or if they get worse. Your condition will be monitored carefully while you are receiving this medicine. Most persons get pain relief for up to 6 months after treatment.  Avoid strenuous activities (high-impact sports, jogging) or major weight-bearing activities for 48 hours after the injection.  What side effects may I notice from receiving this medicine?  Side effects that you should report to your doctor or health care professional as soon as possible:  -allergic reactions like skin rash, itching or hives, swelling of the face, lips, or tongue  -difficulty breathing  -fever or chills  -severe joint pain or swelling  -unusual bleeding or bruising  Side effects that usually do not require medical attention (Report these to your doctor or health care professional if they continue or are bothersome.):  -dizziness  -flushing  -general ill feeling or flu-like symptoms  -headache  -minor joint pain or swelling  -muscle pain or cramps  -pain, redness, irritation or bruising at site of injection  This list may not describe all possible side effects. Call your doctor for medical advice about side effects. You may report side effects to FDA at 9-126-FDA-0959.  Where should I keep my medicine?  This drug is given in a hospital or clinic and will not be stored at home.  NOTE: This sheet is a summary. It may not cover all possible information. If you have questions about this medicine, talk to your doctor, pharmacist, or health care provider.     © 2016, [employer]. (2011-08-04 16:39:59)

## 2017-03-17 NOTE — PROGRESS NOTES
Knee Joint Injection      Patient: Kristan Galicia  YOB: 1950    Chief Complaints: Knee pain    Subjective:    History of Present Illness: Pt gets intermittent  injections with good relief. Is here for repeat injection. Understands options. The pain is a generalized joint line tenderness.  It has been progressive in nature but remains intermittent.  Worsened by prolonged standing or walking and squatting activities. Has had improvement in the past with ice/heat, rest, and injections. KNEE: TIMING:  The pain is described as ACUTE on CHRONIC.  LOCATION: medial joint line tenderness. AGGRAVATING FACTORS:  Is worsened by prolonged standing, sitting, walking and squatting activities.  ASSOCIATED SYMPTOMS:  swelling, tenderness, and aching. OTHER SYMPTOMS denies popping, locking or catching. RELIEVING FACTORS:  Previous treatment has included cortisone injections  OTC Tylenol  OTC meds/NSAIDS.    This problem is not new to this examiner.     Allergies:   Allergies   Allergen Reactions   • Adhesive Tape Other (See Comments)   • Aspirin Swelling   • Clarithromycin Other (See Comments)     BLISTERS AROUND MOTH  Also known as Bioxen    • Codeine Swelling   • Darvon [Propoxyphene] Swelling   • Gatifloxacin Other (See Comments)     BLISTERS AROUND MOUTH  Also known Tequine    • Levaquin [Levofloxacin] Other (See Comments)     BLISTERS AROUND MOUTH       Medications:   Home Medications:  Current Outpatient Prescriptions on File Prior to Visit   Medication Sig   • alendronate (FOSAMAX) 70 MG tablet Take 1 tablet by mouth Every 7 (Seven) Days. Hold until can be discussed with Dr. Anne as to when to restart medication   • allopurinol (ZYLOPRIM) 100 MG tablet Take 1 tablet by mouth Daily.   • amiodarone (PACERONE) 200 MG tablet Take 100 mg by mouth Every Night.   • atorvastatin (LIPITOR) 80 MG tablet Take 1 tablet by mouth Daily. (Patient taking differently: Take 80 mg by mouth Every Night.)   • baclofen  (LIORESAL) 10 MG tablet Take 1 tablet by mouth Every Night. (Patient taking differently: Take 10 mg by mouth At Night As Needed.)   • carvedilol (COREG) 12.5 MG tablet Take 12.5 mg by mouth 2 (Two) Times a Day.   • dabigatran etexilate (PRADAXA) 150 MG capsu Take 1 capsule by mouth 2 (Two) Times a Day. Resume 2/4/17   • DULoxetine (CYMBALTA) 60 MG capsule 60 mg Daily.   • gabapentin (NEURONTIN) 100 MG capsule Take 100 mg by mouth 3 (Three) Times a Day.   • HYDROcodone-acetaminophen (NORCO) 7.5-325 MG per tablet Take 1 tablet by mouth Every 4 (Four) Hours As Needed for moderate pain (4-6).   • levothyroxine (SYNTHROID) 75 MCG tablet Take 1 tablet by mouth Daily.   • levothyroxine (SYNTHROID, LEVOTHROID) 50 MCG tablet TAKE 1 TABLET BY MOUTH DAILY.   • lidocaine (LIDODERM) 5 % Place 1 patch on the skin Every 12 (Twelve) Hours. Remove & Discard patch within 12 hours or as directed by MD (Patient taking differently: Place 1 patch on the skin Daily As Needed. On 12 hrs, off 12 hr)   • montelukast (SINGULAIR) 10 MG tablet Take 1 tablet by mouth daily. (Patient taking differently: Take 10 mg by mouth Every Night.)   • omeprazole (PriLOSEC) 20 MG capsule Take 20 mg by mouth Every Morning.   • oxyCODONE (ROXICODONE) 5 MG immediate release tablet Take 1-3 tabs po q 3-4 hours prn pain   • predniSONE (DELTASONE) 10 MG tablet 3 tablets × 2 days, 2 tablets × 2 days, 1 tablet × 2 days, half tablet × 2 days   • spironolactone-hydrochlorothiazide (ALDACTAZIDE) 25-25 MG tablet Take 1 tablet by mouth Every Morning.   • traZODone (DESYREL) 50 MG tablet TAKE 1 TABLET BY MOUTH AT BEDTIME     No current facility-administered medications on file prior to visit.      Current Medications:  Scheduled Meds:  Continuous Infusions:  No current facility-administered medications for this visit.   PRN Meds:.    I have reviewed the patient's medical history in detail and updated the computerized patient record.  Review and summarization of old  records include:    Past Medical History   Diagnosis Date   • Atrial fibrillation    • Cardiac defibrillator in place 01/2009     medtronic    • Cardiomyopathy      Hypertrophic Cardiomyopathy   • CHF (congestive heart failure)    • Diverticulosis      hx diverticulitis   • GERD (gastroesophageal reflux disease)    • Heart murmur    • History of depression    • History of tumor      left ocular tumor, treated with steroids   • Hyperlipidemia    • Migraines    • Osteoporosis    • Palpitations    • Sleep apnea      does not use CPAP or BiPAP, used nose strips   • Thyroid disease    • Vasovagal syncope    • Vertigo    • Vitamin D deficiency         Past Surgical History   Procedure Laterality Date   • Laparoscopic cholecystectomy  1985   • Colonoscopy  1996     Dr. Herbert Gonsales    • Appendectomy  1970   • Hysterectomy  1989     Total   • Cardiac defibrillator placement Left 1999     Dr. Ya   • Breast reconstruction Bilateral      Reduction   • Knee arthroscopy Bilateral 2014     Dr. Li   • Femur surgery Left      with metal implant  x3   • Shoulder arthroscopy w/ rotator cuff repair Bilateral 05/20/2011   • Tonsillectomy     • Cataract extraction Bilateral    • Breast biopsy Bilateral    • Colonoscopy  8/16/2016     Procedure: COLONOSCOPY with cecum;  Surgeon: Rosalio Sheikh MD;  Location: [facility];  Service:    • Rotator cuff repair Right 06/10/2010   • Knee surgery Right 06/10/2010   • Cardiac catheterization  02/19/2007   • Neck surgery  08/23/2012     Anterior cervical diskectomy and fusion with VG2 allograft implants and eagle anterior plate fixation,C5-C6 C6-C7   • First rib resection Bilateral      and scalenectomy   • Total shoulder arthroplasty w/ distal clavicle excision Right 2/2/2017     Procedure: TOTAL SHOULDER REVERSE ARTHROPLASTY;  Surgeon: Juan Antonio Anne MD;  Location: [facility] OR;  Service:    • Shoulder surgery Right         Social History     Occupational History   •        Social History Main Topics   • Smoking status: Former Smoker     Packs/day: 0.50     Years: 17.00     Quit date: 9/8/1985   • Smokeless tobacco: Never Used   • Alcohol use No   • Drug use: No   • Sexual activity: Defer    Social History     Social History Narrative        Family History   Problem Relation Age of Onset   • Heart attack Brother    • Hyperthyroidism Mother    • Cancer Mother    • Heart disease Mother    • Osteoporosis Mother    • No Known Problems Father       car accident   • Cirrhosis Maternal Grandmother    • No Known Problems Maternal Grandfather    • No Known Problems Paternal Grandmother    • No Known Problems Paternal Grandfather    • Breast cancer Maternal Aunt        ROS: 14 point review of systems was performed and was negative except for documented findings in HPI and today's encounter.     Allergies:   Allergies   Allergen Reactions   • Adhesive Tape Other (See Comments)   • Aspirin Swelling   • Clarithromycin Other (See Comments)     BLISTERS AROUND MOTH  Also known as Bioxen    • Codeine Swelling   • Darvon [Propoxyphene] Swelling   • Gatifloxacin Other (See Comments)     BLISTERS AROUND MOUTH  Also known Tequine    • Levaquin [Levofloxacin] Other (See Comments)     BLISTERS AROUND MOUTH     Constitutional:  Denies fever, shaking or chills   Eyes:  Denies change in visual acuity   HENT:  Denies nasal congestion or sore throat   Respiratory:  Denies cough or shortness of breath   Cardiovascular:  Denies chest pain or severe LE edema   GI:  Denies abdominal pain, nausea, vomiting, bloody stools or diarrhea   Musculoskeletal:  Numbness, tingling, or loss of motor function only as noted above in history of present illness.  : Denies painful urination or hematuria  Integument:  Denies rash, lesion or ulceration   Neurologic:  Denies headache or focal weakness  Endocrine:  Denies lymphadenopathy  Psych:  Denies confusion or change in mental status   Hem:  Denies  active bleeding    Physical Exam:  Body mass index is 34.2 kg/(m^2).  Vitals:    03/17/17 1151   Temp: 97.6 °F (36.4 °C)     Vital Signs:  reviewed  Constitutional: Awake alert and oriented x3, well developed, no acute distress, non-toxic appearance.  EYES: symmetric, sclera clear  ENT:  Normocephalic, Atraumatic.   Respiratory:  No respiratory distress, No wheezing  CV: pulse regular, no palpitations or pallor.  GI:  Abdomen soft, non-tender.   Vascular:  Intact distal pulses, No cyanosis, no signs or symptoms of DVT.  Neurologic: Sensation grossly intact to the involved extremity, No focal deficits noted.   Neck: No tenderness, Supple.  Integument: warm, dry, no ulcerations.   Psychiatric:  Oriented, no pathological affect.  Musculoskeletal:    Affected knee(s):  Painful gait with a subtle limp, positive for synovitis, swelling, joint effusion with crepitation.  Lachman negative  Posterior drawer negative  Socrates's negative  Patellofemoral grind +  Sensation grossly intact to light touch throughout the lower extremity  Skin is intact  Distal pulses are palpable  No signs or symptoms of DVT        Diagnostic Data:     Imaging was done previously in the office and discussed at length with the patient:    Indication: pain related symptoms,  Views: 3V AP, LAT & 40 degree PA right knee(s)   Findings: severe end-stage arthritis (bone on bone, subchondral sclerosis/cysts, osteophytes), S/P left  Total Knee Replacement in good position and alignment  Comparison views: viewed last xray done in the office.     Procedure:  Large Joint Arthrocentesis  Date/Time: 3/17/2017 11:51 AM  Consent given by: patient  Site marked: site marked  Timeout: Immediately prior to procedure a time out was called to verify the correct patient, procedure, equipment, support staff and site/side marked as required   Supporting Documentation  Indications: pain and joint swelling   Procedure Details  Location: knee - R knee  Preparation: Patient  was prepped and draped in the usual sterile fashion  Needle size: 22 G  Approach: anterolateral  Medications administered: 4 mL lidocaine 1 %; 48 mg hylan 48 MG/6ML  Patient tolerance: patient tolerated the procedure well with no immediate complications          Assessment. Severe arthritis right knee(s).      Plan: Is to proceed with injection  Follow up as indicated.  Ice, elevate, and rest as needed.  Additional interventions include: Biomechanics of pertinent body area discussed.  Risks, benefits, alternatives, comparisons, and complications of accepted medicines, injections, recommendations, surgical procedures, and therapies explained and education provided in laymen's terms. The patient was given the opportunity to ask questions and they were answerved to their satisfaction.   Natural history and expected course discussed. Questions answered.  Advice on benefits of, and types of regular/moderate exercise.  Lifestyle measures for weight loss with biomechanical explanations for weight loss and how this affects orthopedic condition.  Cortisone Injection. See procedure note.  OTC analgesics as needed with dosage warning and instructions given.  Cryotherapy/brachy therapy as indicated with instructions.     3/17/2017  EUSEBIO

## 2017-03-22 ENCOUNTER — OFFICE VISIT (OUTPATIENT)
Dept: FAMILY MEDICINE CLINIC | Facility: CLINIC | Age: 67
End: 2017-03-22

## 2017-03-22 VITALS
WEIGHT: 182 LBS | DIASTOLIC BLOOD PRESSURE: 62 MMHG | OXYGEN SATURATION: 97 % | SYSTOLIC BLOOD PRESSURE: 106 MMHG | TEMPERATURE: 98.8 F | HEART RATE: 89 BPM | HEIGHT: 61 IN | BODY MASS INDEX: 34.36 KG/M2

## 2017-03-22 DIAGNOSIS — E03.9 HYPOTHYROIDISM, UNSPECIFIED TYPE: ICD-10-CM

## 2017-03-22 DIAGNOSIS — Z11.59 NEED FOR HEPATITIS C SCREENING TEST: ICD-10-CM

## 2017-03-22 DIAGNOSIS — J21.0 RSV BRONCHIOLITIS: Primary | ICD-10-CM

## 2017-03-22 DIAGNOSIS — E11.9 TYPE 2 DIABETES MELLITUS WITHOUT COMPLICATION, WITHOUT LONG-TERM CURRENT USE OF INSULIN (HCC): ICD-10-CM

## 2017-03-22 PROCEDURE — 99214 OFFICE O/P EST MOD 30 MIN: CPT | Performed by: NURSE PRACTITIONER

## 2017-03-22 RX ORDER — LANCETS
EACH MISCELLANEOUS
Qty: 60 EACH | Refills: 11 | Status: SHIPPED | OUTPATIENT
Start: 2017-03-22

## 2017-03-22 RX ORDER — BLOOD-GLUCOSE METER
EACH MISCELLANEOUS
Qty: 1 KIT | Refills: 0 | Status: SHIPPED | OUTPATIENT
Start: 2017-03-22

## 2017-03-22 NOTE — PROGRESS NOTES
Subjective   Kristan Galicia is a 66 y.o. female.     History of Present Illness   Here to FU [facility] admission 03/03/17-03/06/17 for RSV bronchiolitis was tx IV abx, steroids, and fluids, with labs low hemoglobin, elevated glu, sputum culture, CXR no infiltrate, dc on steroid PO and helped with SOA wheezing, no SOA wheezing coughing or chest tightness now, with elevated BS and was given insulin before meals in hospital, with last A1C 02/22/17 = 7.3 was working on DM diet but no recent exercise, s/p R rotator cuff tear has had FU with Dr Anne doing well no pain good ROM seeing PT, Recent vasovagal episode in kitchen got up from sitting and suddenly fainted, working on increasing fluids and not getting from sitting to standing or lying to sitting too quickly, no CP dizziness HA LE edema, no fevers, no prev DM meds, last dilated eye exam Yuval Ara 08/16 negative, no feet pain, recently increased levothyroxine 50 mcg daily TSH abnl 02/22/17 no missed doses    The following portions of the patient's history were reviewed and updated as appropriate: allergies, current medications, past family history, past medical history, past social history, past surgical history and problem list.    Review of Systems   Constitutional: Positive for fatigue. Negative for fever.   HENT: Negative for trouble swallowing and voice change.    Respiratory: Negative for cough, shortness of breath and wheezing.    Cardiovascular: Negative for chest pain, palpitations and leg swelling.   Endocrine: Negative for cold intolerance, heat intolerance, polydipsia, polyphagia and polyuria.   Musculoskeletal: Positive for arthralgias.   Neurological: Negative for dizziness, tremors, syncope, speech difficulty, weakness, light-headedness, numbness and headaches.   All other systems reviewed and are negative.      Objective   Physical Exam   Constitutional: She is oriented to person, place, and time. She appears well-developed and well-nourished.    HENT:   Head: Normocephalic and atraumatic.   Right Ear: Hearing and tympanic membrane normal.   Left Ear: Hearing and tympanic membrane normal.   Nose: Nose normal. Right sinus exhibits no maxillary sinus tenderness and no frontal sinus tenderness. Left sinus exhibits no maxillary sinus tenderness and no frontal sinus tenderness.   Mouth/Throat: No oropharyngeal exudate, posterior oropharyngeal edema or posterior oropharyngeal erythema.   Eyes: Conjunctivae and EOM are normal. Pupils are equal, round, and reactive to light.   Neck: Normal range of motion. Neck supple. No thyromegaly present.   Cardiovascular: Normal rate, regular rhythm and normal heart sounds.    Pulmonary/Chest: Effort normal and breath sounds normal.   Musculoskeletal: Normal range of motion.    Krsitan had a diabetic foot exam performed (sensation intact, pulses strong, no ulcers, well hydrated, no fungal toenails) today.  Lymphadenopathy:     She has no cervical adenopathy.   Neurological: She is alert and oriented to person, place, and time.   Skin: Skin is warm and dry.   Psychiatric: She has a normal mood and affect. Her behavior is normal. Judgment and thought content normal.   Vitals reviewed.      Assessment/Plan   Kristan was seen today for follow-up.    Diagnoses and all orders for this visit:    RSV bronchiolitis  -     CBC & Differential; Future    Type 2 diabetes mellitus without complication, without long-term current use of insulin    Need for hepatitis C screening test  -     Hepatitis C antibody; Future    Hypothyroidism, unspecified type  -     TSH; Future    Other orders  -     metFORMIN (GLUCOPHAGE) 500 MG tablet; 1 PO with largest meal  -     Blood Glucose Monitoring Suppl (ACCU-CHEK OTILIA PLUS) W/DEVICE kit; Dx e11.9 use to check BS BID, ok to substitute formulary preferred  -     glucose blood (ACCU-CHEK OTILIA PLUS) test strip; Dx e11.9 use to check BS BID, ok to substitute formulary preferred  -     Lancets (ACCU-CHEK  MULTICLIX) lancets; Dx e11.9 use to check BS BID, ok to substitute formulary preferred    reviewed hospital records and labs see above HPI, start metformin 500 mg with largest meal check BS at home and log, enc DM diet and regular exercise, DM foot exam today, UTD DM dilated eye exam Yuval Bullock 08/16, RTC 2 wks lab only recheck labs and call with results, FU 2 mo will recheck A1C and do medicare wellness

## 2017-03-22 NOTE — PATIENT INSTRUCTIONS
reviewed hospital records and labs see above HPI, start metformin 500 mg with largest meal check BS at home and log, enc DM diet and regular exercise, DM foot exam today, UTD DM dilated eye exam Yuval Bullock 08/16, RTC 2 wks lab only recheck labs and call with results, FU 2 mo will recheck A1C and do medicare wellness

## 2017-04-05 ENCOUNTER — HOSPITAL ENCOUNTER (OUTPATIENT)
Dept: PHYSICAL THERAPY | Facility: HOSPITAL | Age: 67
Setting detail: THERAPIES SERIES
Discharge: HOME | End: 2017-04-05

## 2017-04-05 ENCOUNTER — TELEPHONE (OUTPATIENT)
Dept: FAMILY MEDICINE CLINIC | Facility: CLINIC | Age: 67
End: 2017-04-05

## 2017-04-05 ENCOUNTER — LAB (OUTPATIENT)
Dept: FAMILY MEDICINE CLINIC | Facility: CLINIC | Age: 67
End: 2017-04-05

## 2017-04-05 DIAGNOSIS — Z11.59 NEED FOR HEPATITIS C SCREENING TEST: ICD-10-CM

## 2017-04-05 DIAGNOSIS — J21.0 RSV BRONCHIOLITIS: ICD-10-CM

## 2017-04-05 DIAGNOSIS — Z96.611 S/P REVERSE TOTAL SHOULDER ARTHROPLASTY, RIGHT: Primary | ICD-10-CM

## 2017-04-05 DIAGNOSIS — M25.511 RIGHT SHOULDER PAIN, UNSPECIFIED CHRONICITY: ICD-10-CM

## 2017-04-05 DIAGNOSIS — E03.9 HYPOTHYROIDISM, UNSPECIFIED TYPE: ICD-10-CM

## 2017-04-05 DIAGNOSIS — D64.9 LOW HEMOGLOBIN: Primary | ICD-10-CM

## 2017-04-05 LAB
ERYTHROCYTE [DISTWIDTH] IN BLOOD BY AUTOMATED COUNT: 18.6 % (ref 4.5–15)
HCT VFR BLD AUTO: 30.3 % (ref 31–42)
HCV AB SER DONR QL: NORMAL
HGB BLD-MCNC: 9.6 G/DL (ref 12–18)
LYMPHOCYTES # BLD AUTO: 3.4 10*3/MM3 (ref 1.2–3.4)
LYMPHOCYTES NFR BLD AUTO: 44.8 % (ref 21–51)
MCH RBC QN AUTO: 22.8 PG (ref 26.1–33.1)
MCHC RBC AUTO-ENTMCNC: 31.6 G/DL (ref 33–37)
MCV RBC AUTO: 72.3 FL (ref 80–99)
MONOCYTES # BLD AUTO: 0.7 10*3/MM3 (ref 0.1–0.6)
MONOCYTES NFR BLD AUTO: 8.7 % (ref 2–9)
NEUTROPHILS # BLD AUTO: 3.6 10*3/MM3 (ref 1.4–6.5)
NEUTROPHILS NFR BLD AUTO: 46.5 % (ref 42–75)
PLATELET # BLD AUTO: 348 10*3/MM3 (ref 150–450)
PMV BLD AUTO: 6.1 FL (ref 7.1–10.5)
RBC # BLD AUTO: 4.2 10*6/MM3 (ref 4–6)
TSH SERPL DL<=0.05 MIU/L-ACNC: 11.72 MIU/ML (ref 0.27–4.2)
WBC NRBC COR # BLD: 7.7 10*3/MM3 (ref 4.5–10)

## 2017-04-05 PROCEDURE — G8984 CARRY CURRENT STATUS: HCPCS | Performed by: PHYSICAL THERAPIST

## 2017-04-05 PROCEDURE — 97161 PT EVAL LOW COMPLEX 20 MIN: CPT | Performed by: PHYSICAL THERAPIST

## 2017-04-05 PROCEDURE — 85025 COMPLETE CBC W/AUTO DIFF WBC: CPT | Performed by: NURSE PRACTITIONER

## 2017-04-05 PROCEDURE — 86803 HEPATITIS C AB TEST: CPT | Performed by: NURSE PRACTITIONER

## 2017-04-05 PROCEDURE — 84443 ASSAY THYROID STIM HORMONE: CPT | Performed by: NURSE PRACTITIONER

## 2017-04-05 PROCEDURE — 97110 THERAPEUTIC EXERCISES: CPT | Performed by: PHYSICAL THERAPIST

## 2017-04-05 PROCEDURE — G8985 CARRY GOAL STATUS: HCPCS | Performed by: PHYSICAL THERAPIST

## 2017-04-05 RX ORDER — FERROUS SULFATE 325(65) MG
325 TABLET ORAL 2 TIMES DAILY WITH MEALS
Qty: 60 TABLET | Refills: 5 | Status: SHIPPED | OUTPATIENT
Start: 2017-04-05 | End: 2017-08-02

## 2017-04-05 NOTE — PROGRESS NOTES
Outpatient Physical Therapy Ortho Initial Evaluation  [facility]     Patient Name: Kristan Galicia  : 1950  MRN: [id number]  Today's Date: 2017      Visit Date: 2017    Patient Active Problem List   Diagnosis   • Shoulder pain, right   • Knee pain, right   • Fall down stairs   • PAF (paroxysmal atrial fibrillation)   • S/P rotator cuff repair   • Vitamin D deficiency   • Vertigo   • Vasovagal syncope   • Thyroid disease   • Obstructive sleep apnea syndrome   • Palpitations   • Osteoporosis   • Ocular tumor   • Migraines   • Hyperlipidemia   • History of transfusion   • Heart murmur   • Heart attack   • GERD (gastroesophageal reflux disease)   • Depression   • CHF (congestive heart failure)   • Cardiomyopathy   • Asthma   • Anxiety   • Arthritis of right knee   • Right rotator cuff tear   • Hypothyroidism   • Dyspnea and respiratory abnormalities   • Orthopnea   • IHSS (idiopathic hypertrophic subaortic stenosis)   • AICD (automatic cardioverter/defibrillator) present   • Anticoagulant long-term use   • RSV bronchiolitis   • Hyperglycemia, drug-induced   • Chronic pain of right knee        Past Medical History:   Diagnosis Date   • Atrial fibrillation    • Cardiac defibrillator in place 2009    medtronic    • Cardiomyopathy     Hypertrophic Cardiomyopathy   • CHF (congestive heart failure)    • Diverticulosis     hx diverticulitis   • GERD (gastroesophageal reflux disease)    • Heart murmur    • History of depression    • History of tumor     left ocular tumor, treated with steroids   • Hyperlipidemia    • Migraines    • Osteoporosis    • Palpitations    • Sleep apnea     does not use CPAP or BiPAP, used nose strips   • Thyroid disease    • Vasovagal syncope    • Vertigo    • Vitamin D deficiency         Past Surgical History:   Procedure Laterality Date   • APPENDECTOMY     • BREAST BIOPSY Bilateral    • BREAST RECONSTRUCTION Bilateral     Reduction   • CARDIAC CATHETERIZATION   02/19/2007   • CARDIAC DEFIBRILLATOR PLACEMENT Left 1999    Dr. Ya   • CATARACT EXTRACTION Bilateral    • COLONOSCOPY  1996    Dr. Herbert Gonsales    • COLONOSCOPY  8/16/2016    Procedure: COLONOSCOPY with cecum;  Surgeon: Rosalio Sheikh MD;  Location: [facility] ENDOSCOPY;  Service:    • FEMUR SURGERY Left     with metal implant  x3   • FIRST RIB RESECTION Bilateral     and scalenectomy   • HYSTERECTOMY  1989    Total   • KNEE ARTHROSCOPY Bilateral 2014    Dr. Li   • KNEE SURGERY Right 06/10/2010   • LAPAROSCOPIC CHOLECYSTECTOMY  1985   • NECK SURGERY  08/23/2012    Anterior cervical diskectomy and fusion with VG2 allograft implants and eagle anterior plate fixation,C5-C6 C6-C7   • ROTATOR CUFF REPAIR Right 06/10/2010   • SHOULDER ARTHROSCOPY W/ ROTATOR CUFF REPAIR Bilateral 05/20/2011   • SHOULDER SURGERY Right    • TONSILLECTOMY     • TOTAL SHOULDER ARTHROPLASTY W/ DISTAL CLAVICLE EXCISION Right 2/2/2017    Procedure: TOTAL SHOULDER REVERSE ARTHROPLASTY;  Surgeon: Juan Antonio Anne MD;  Location: [facility] OR;  Service:        Visit Dx:   No diagnosis found.          Patient History       04/05/17 1000          History    Chief Complaint Difficulty with daily activities;Joint stiffness;Muscle weakness;Pain  -      Type of Pain Shoulder pain  -      Date Current Problem(s) Began 06/05/16  -      Brief Description of Current Complaint Pt was having R shoulder pain for about 8 months prior to her R rev TSA. She reports a fall on the stairs in May of last yearShe had a previous RC repair many years ago ([place]). She also had pneumonia and had to stay in the hospital for awhile. She reports that it took her a while to get back to feeling normal, so that is why the delay in therapy.   -      Onset Date- PT 4/5/17  -      Patient/Caregiver Goals Relieve pain;Return to prior level of function;Improve mobility;Improve strength  -      Patient's Rating of General Health Good  -      Hand Dominance  right-handed  -      Surgery Date 1 02/02/17  -      Surgery/Hospitalization in hosp for pneumonia and RSV after her rev TSA  -      Pain     Pain Location Shoulder  -      Pain at Present 6  -      Pain at Best 4  -LH      Pain at Worst 8  -LH      Pain Frequency Constant/continuous  -      Pain Description Aching;Stabbing  -      What Performance Factors Make the Current Problem(s) WORSE? reaching, walking up the wall, hooking seat belt, donning bra, sleeping  -      What Performance Factors Make the Current Problem(s) BETTER? ice, pain meds PRN  -LH      Is your sleep disturbed? Yes  -LH      Fall Risk Assessment    Any falls in the past year: Yes  -      Number of falls reported in the last 12 months 1  -      Factors that contributed to the fall: Uneven surface   missed last 2 steps  -      Services    Prior Rehab/Home Health Experiences Yes  -      Daily Activities    Primary Language English  -      Teaching needs identified Home Exercise Program;Management of Condition  -      Pt Participated in POC and Goals Yes  -      Safety    Are you being hurt, hit, or frightened by anyone at home or in your life? No  -      Are you being neglected by a caregiver No  -        User Key  (r) = Recorded By, (t) = Taken By, (c) = Cosigned By    Initials Name Provider Type     Anna Rosa, PT Physical Therapist                PT Ortho       04/05/17 1000    Posture/Observations    Posture/Observations Comments incision healed  -LH    Special Tests/Palpation    Special Tests/Palpation --   tender over the subacromial area  -LH    Left Shoulder    Flexion ROM Details 155/163 deg  -LH    ABduction ROM Details 145/165 deg  -LH    External Rotation ROM Details T3/88 deg  -LH    Internal Rotation ROM Details L1/83 deg  -LH    Right Shoulder    Flexion ROM Details p=135 deg  -LH    ABduction ROM Details p=108 deg   scaption  -LH    External Rotation ROM Details p=50 deg  -LH    Internal  Rotation ROM Details p=75 deg  -LH    Left Shoulder    Flexion Gross Movement (4+/5) good plus  -LH    ABduction Gross Movement (4/5) good  -LH    Int Rotation Gross Movement (4+/5) good plus  -LH    Ext Rotation Gross Movement (4+/5) good plus  -LH    Right Shoulder    Flexion Gross Movement (3+/5) fair plus  -LH    ABduction Gross Movement (3+/5) fair plus  -LH    Int Rotation Gross Movement (3/5) fair  -LH    Ext Rotation Gross Movement (3/5) fair  -      User Key  (r) = Recorded By, (t) = Taken By, (c) = Cosigned By    Initials Name Provider Type     Anna Rosa PT Physical Therapist                            Therapy Education       04/05/17 1128          Therapy Education    Given HEP;Symptoms/condition management;Pain management  -      Program New  [facility]      How Provided Verbal;Demonstration;Written  -      Provided to Patient  -      Level of Understanding Teach back education performed;Verbalized;Demonstrated  -        User Key  (r) = Recorded By, (t) = Taken By, (c) = Cosigned By    Initials Name Provider Type     Anna Rosa PT Physical Therapist                PT OP Goals       04/05/17 1100       PT Short Term Goals    STG 1 Patient will be independent with education for symptom management, joint protection and strategies to minimize stress on affected tissues  -     STG 1 Progress [facility]     STG 2 Pt to improve R shoulder ROM in flex=125/140 deg, abd=110/125 deg, ER=neck/60 deg and IR=sacrum/80 deg  -     STG 2 Progress [facility]     Long Term Goals    LTG 1 Pt to improve R shoulder ROM in flex=135/150 deg, abd=125/140 deg, ER=T1/65 deg and IR=mid lumbar  -     LTG 1 Progress [facility]     LTG 2 Pt to improve pain complaints to no more than 3/10 with ADLs  -     LTG 2 Progress [facility]     LTG 3 Pt to improve shoulder strength to 4/5  -     LTG 3 Progress [facility]     LTG 4 Pt to improve DASH score by 10% for overall functional improvement  -     LTG 4 Progress New   -     LTG 5 Patient will be independent and compliant with advanced home exercise program to facilitate self-management of symptoms.  -     LTG 5 Progress New  -     Time Calculation    PT Goal Re-Cert Due Date 05/05/17  -       User Key  (r) = Recorded By, (t) = Taken By, (c) = Cosigned By    Initials Name Provider Type     Anna Rosa, PT Physical Therapist                PT Assessment/Plan       04/05/17 1132       PT Assessment    Functional Limitations Limitation in home management;Limitations in community activities;Performance in self-care ADL;Performance in leisure activities;Limitations in functional capacity and performance  -     Impairments Range of motion;Muscle strength;Pain;Joint mobility  -     Assessment Comments Pt presents to therapy s/p R reverse TSA on 2/2/17. She reports that a few weeks after her surgery she had to be hospitalized again for pneumonia and RSV. She said it has taken her awhile to get better. She is now 9 weeks post op. She has decreasd PROM in flex=135 deg, ccjybups=861 deg, Er=50 deg, and IR=75 deg, decreased strnegth, and tenderness over the subacromial area. She scored a 33% on the DASH with 0% being no disabiltiy. Pt would benefit from therapy to address these issues to help her improve function.   -     Please refer to paper survey for additional self-reported information Yes  -     Rehab Potential Good  -     Patient/caregiver participated in establishment of treatment plan and goals Yes  -     Patient would benefit from skilled therapy intervention Yes  -     PT Plan    PT Frequency 2x/week  -     Predicted Duration of Therapy Intervention (days/wks) 6-8 weeks  -     Planned CPT's? PT EVAL LOW COMPLEXITY: 87211;PT THER PROC EA 15 MIN: 35512;PT HOT OR COLD PACK TREAT MCARE;PT MANUAL THERAPY EA 15 MIN: 33056  -     Physical Therapy Interventions (Optional Details) ROM (Range of Motion);manual therapy  techniques;strengthening;modalities;stretching;home exercise program;joint mobilization;postural re-education;patient/family education  -     PT Plan Comments plan to see pt 2x week for ROM, strength, and pain control to improve function  -       User Key  (r) = Recorded By, (t) = Taken By, (c) = Cosigned By    Initials Name Provider Type     Anna Rosa PT Physical Therapist                  Exercises       04/05/17 1100          Exercise 1    Exercise Name 1 see chart for exercises performed  -        User Key  (r) = Recorded By, (t) = Taken By, (c) = Cosigned By    Initials Name Provider Type     Anna Rosa PT Physical Therapist                              Outcome Measures       04/05/17 1100          Functional Assessment    Outcome Measure Options Disabilities of the Arm, Shoulder, and Hand (DASH)   33%  -        User Key  (r) = Recorded By, (t) = Taken By, (c) = Cosigned By    Initials Name Provider Type     Anna Rosa PT Physical Therapist            Time Calculation:   Start Time: 1015  Stop Time: 1100  Time Calculation (min): 45 min     Therapy Charges for Today     Code Description Service Date Service Provider Modifiers Qty    50927776102  PT CARRY MOV HAND OBJ CURRENT 4/5/2017 Anna Rosa, PT GP, CK 1    05638478770 HC PT CARRY MOV HAND OBJ PROJECTED 4/5/2017 Anna Rosa, PT GP, CI 1    29706941348 HC PT EVAL LOW COMPLEXITY 2 4/5/2017 Anna Rosa PT GP 1    54037233205 HC PT THER PROC EA 15 MIN 4/5/2017 Anna Rosa PT GP 1          PT G-Codes  PT Professional Judgement Used?: Yes  Outcome Measure Options: Disabilities of the Arm, Shoulder, and Hand (DASH) (33%)  Functional Limitation: Carrying, moving and handling objects  Carrying, Moving and Handling Objects Current Status (): At least 40 percent but less than 60 percent impaired, limited or restricted  Carrying, Moving and Handling Objects Goal Status (): At least 1 percent but less  than 20 percent impaired, limited or restricted         Anna Rosa, PT  4/5/2017

## 2017-04-05 NOTE — TELEPHONE ENCOUNTER
Hep C normal, negative. Thyroid still abnormal, please verify she is taking levothyroxine 75 mcg no missed doses, recommend we increase levothyroxine 88 mcg and recheck in 6 wks. Your hemoglobin is still low, RTC lab only and start ferrous sulfate 325 mg BID to help increase. Have you noticed bloody stools or bowel changes?

## 2017-04-06 ENCOUNTER — TELEPHONE (OUTPATIENT)
Dept: FAMILY MEDICINE CLINIC | Facility: CLINIC | Age: 67
End: 2017-04-06

## 2017-04-06 RX ORDER — LEVOTHYROXINE SODIUM 88 UG/1
88 TABLET ORAL DAILY
Qty: 30 TABLET | Refills: 2 | Status: SHIPPED | OUTPATIENT
Start: 2017-04-06 | End: 2017-06-07 | Stop reason: SDUPTHER

## 2017-04-06 NOTE — TELEPHONE ENCOUNTER
Spoke with pt re results. Pt taking levothyroxine everyday no missed doses. Sent levothyroxine 88 mcg to [facility] for pt.

## 2017-04-12 LAB
FERRITIN SERPL-MCNC: 31.14 NG/ML (ref 13–150)
FOLATE SERPL-MCNC: 8.43 NG/ML (ref 4.78–24.2)
IRON 24H UR-MRATE: 45 MCG/DL (ref 37–145)
IRON SATN MFR SERPL: 9 % (ref 20–50)
TIBC SERPL-MCNC: 478 MCG/DL (ref 298–536)
TRANSFERRIN SERPL-MCNC: 321 MG/DL (ref 200–360)
VIT B12 BLD-MCNC: 240 PG/ML (ref 211–946)

## 2017-04-12 PROCEDURE — 83540 ASSAY OF IRON: CPT | Performed by: NURSE PRACTITIONER

## 2017-04-12 PROCEDURE — 82607 VITAMIN B-12: CPT | Performed by: NURSE PRACTITIONER

## 2017-04-12 PROCEDURE — 82728 ASSAY OF FERRITIN: CPT | Performed by: NURSE PRACTITIONER

## 2017-04-12 PROCEDURE — 84466 ASSAY OF TRANSFERRIN: CPT | Performed by: NURSE PRACTITIONER

## 2017-04-12 PROCEDURE — 82746 ASSAY OF FOLIC ACID SERUM: CPT | Performed by: NURSE PRACTITIONER

## 2017-04-17 RX ORDER — LEVOTHYROXINE SODIUM 0.07 MG/1
TABLET ORAL
Qty: 30 TABLET | Refills: 1 | Status: ON HOLD | OUTPATIENT
Start: 2017-04-17 | End: 2017-04-27

## 2017-04-18 ENCOUNTER — TELEPHONE (OUTPATIENT)
Dept: FAMILY MEDICINE CLINIC | Facility: CLINIC | Age: 67
End: 2017-04-18

## 2017-04-18 LAB — HEMOCCULT STL QL: POSITIVE

## 2017-04-18 PROCEDURE — 82274 ASSAY TEST FOR BLOOD FECAL: CPT | Performed by: NURSE PRACTITIONER

## 2017-04-18 NOTE — TELEPHONE ENCOUNTER
----- Message from CHRISTIN Mckee sent at 4/18/2017 12:49 PM EDT -----  Iron is slightly low, please return stool kit to check for occult blood    TRC      Pt informed of lab results

## 2017-04-18 NOTE — TELEPHONE ENCOUNTER
Your fecal occult is positive for blood, we need to get you over for a GI consult, have you seen someone recently? Your last hemoglobin was 9.6 and you are losing too much blood

## 2017-04-20 ENCOUNTER — TELEPHONE (OUTPATIENT)
Dept: FAMILY MEDICINE CLINIC | Facility: CLINIC | Age: 67
End: 2017-04-20

## 2017-04-24 ENCOUNTER — OFFICE VISIT (OUTPATIENT)
Dept: ORTHOPEDIC SURGERY | Facility: CLINIC | Age: 67
End: 2017-04-24

## 2017-04-24 ENCOUNTER — OFFICE VISIT (OUTPATIENT)
Dept: SURGERY | Facility: CLINIC | Age: 67
End: 2017-04-24

## 2017-04-24 VITALS — BODY MASS INDEX: 34.08 KG/M2 | WEIGHT: 185.2 LBS | TEMPERATURE: 98.1 F | HEIGHT: 62 IN

## 2017-04-24 VITALS — HEIGHT: 62 IN | WEIGHT: 185.2 LBS | BODY MASS INDEX: 34.08 KG/M2

## 2017-04-24 DIAGNOSIS — Z96.611 HISTORY OF RIGHT SHOULDER REPLACEMENT: Primary | ICD-10-CM

## 2017-04-24 DIAGNOSIS — G56.02 LEFT CARPAL TUNNEL SYNDROME: ICD-10-CM

## 2017-04-24 DIAGNOSIS — D50.9 IRON DEFICIENCY ANEMIA, UNSPECIFIED IRON DEFICIENCY ANEMIA TYPE: Primary | ICD-10-CM

## 2017-04-24 PROCEDURE — 99212 OFFICE O/P EST SF 10 MIN: CPT | Performed by: ORTHOPAEDIC SURGERY

## 2017-04-24 PROCEDURE — 99213 OFFICE O/P EST LOW 20 MIN: CPT | Performed by: SURGERY

## 2017-04-24 PROCEDURE — 73030 X-RAY EXAM OF SHOULDER: CPT | Performed by: ORTHOPAEDIC SURGERY

## 2017-04-24 RX ORDER — GABAPENTIN 300 MG/1
CAPSULE ORAL
Refills: 3 | COMMUNITY
Start: 2017-04-03 | End: 2018-04-05

## 2017-04-24 RX ORDER — TRAMADOL HYDROCHLORIDE 50 MG/1
TABLET ORAL
Refills: 0 | COMMUNITY
Start: 2017-04-03 | End: 2017-12-13

## 2017-04-24 RX ORDER — LEVOTHYROXINE SODIUM 0.05 MG/1
TABLET ORAL
COMMUNITY
Start: 2017-04-12 | End: 2017-04-24 | Stop reason: SDUPTHER

## 2017-04-24 RX ORDER — SODIUM CHLORIDE 0.9 % (FLUSH) 0.9 %
1-10 SYRINGE (ML) INJECTION AS NEEDED
Status: CANCELLED | OUTPATIENT
Start: 2017-04-24

## 2017-04-24 NOTE — PROGRESS NOTES
.Chief complaint: Anemia    History presenting illness: This is a very nice, 66-year-old lady with multiple medical issues who is on chronic anticoagulation and has been anemic with a hemoglobin around 9 for about 3 months.  Before this her hemoglobin and been in the normal range at around 13-14.  Patient denies any significant abdominal pain.  She denies passing any blood or vomiting any blood and she's had no other known blood loss.  Last fall she had a colonoscopy which was essentially negative.  She's had no significant weight loss that she is aware of.  She does complain of chronic reflux, and there has not been any change in that recently.    Past medical history:  Cardiomyopathy, congestive heart failure, atrial fibrillation    Past surgical history: Includes hysterectomy, appendectomy, cholecystectomy, first rib resection and multiple orthopedic procedures as well as pacemaker placement    Allergies: Reconciled with in Epic    Medications: Reconciled with in Epic, include per DEXA    Social history: Patient is a former smoker who quit more than 30 years ago    Review of systems:  Constitutional: Positive for fatigue, negative for weight loss or fever  Respiratory: Positive for shortness of air, negative for cough  Gastrointestinal: Negative for abdominal pain or blood per rectum, positive for reflux    Physical exam:  General: No acute distress, alert and oriented  Eyes: Sclerae are anicteric, extraocular it intact  Neck: Supple without lymphadenopathy or thyromegaly  Chest: Clear bilaterally without wheezes  Heart: Irregular, normal rate, no clear murmur appreciated by me  Abdomen: Soft and benign, no clear hernia, nontender  Extremities: Minimal bruising, normal muscle bulk bilaterally    Laboratory data is reviewed by me.  Hemoglobin has been between 9 and 9-1/2 for the last 3 months.  Most recent was 9.6 with an MCV of 72.  Hemoccult in her stools was also positive.    Assessment and plan:  Persistent  anemia in a patient on chronic anticoagulation and who has multiple chronic medical comorbid illnesses.  She had a colonoscopy just last year.  I do not think that repeating that at this point is likely to be beneficial.  I told her that I would recommend going ahead with an upper endoscopy.  If that is negative it may be worthwhile considering referring her to gastroenterology for consideration of capsule endoscopy.  It may also be worthwhile to observe her for a while on her iron therapy, and if she does not improve her hemoglobin after that at that point consider her for repeat colonoscopy.  The risks and benefits of the procedure were detailed and discussed with the patient.    Rosalio Sheikh MD  General and Endoscopic Surgery  [facility]    [address], [address]  [place], [place], [address]  P: [phone]  F: [phone]

## 2017-04-24 NOTE — PROGRESS NOTES
"Kristan Galicia : 1950 MRN: [id number] DATE: 2017    DIAGNOSIS:  3 month follow up right shoulder arthroplasty,  new complaint of intermittent left hand numbness    SUBJECTIVE:  Patient returns today for 3 month follow up of right shoulder replacement. Patient reports doing well with no unusual complaints. Still in PT and reports making good progress.    She mentions a new issue of some intermittent left hand numbness.  This is been an off again, on again issue for several years.  She tells me that she has been told she has carpal tunnel in the past.  Denies any weakness or constant numbness.    OBJECTIVE:    Temp 98.1 °F (36.7 °C)  Ht 62\" (157.5 cm)  Wt 185 lb 3.2 oz (84 kg)  BMI 33.87 kg/m2    Review of Systems negative except as above    Exam:  The incision is well healed.  No effusion.   Range of motion is measured at , ER 40, IR to back pocket.  The arm is soft and nontender.  Good strength with elevation and abduction.  Intact to light touch distally with a palpable radial pulse.     Left hand is examined.  Skin is benign.  No atrophy.  Positive Phalen's over the carpal tunnel.  Negative Tinel's.  Good  and pinch strength.  Intact sensation.  Brisk cap refill.    DIAGNOSTIC STUDIES    Xrays: 3 views of the right shoulder (AP, scapular Y and axillary) were ordered and reviewed for evaluation of shoulder replacement. They demonstrate a well positioned, well aligned replacement without complicating factors noted. In comparison with previous films there has been no change.    ASSESSMENT:   1.  3 month follow up right shoulder replacement  2.  Left carpal tunnel syndrome    PLAN: 1. Continue activities as tolerated.   2.  Continue PT per protocol.   3.  I explained that one can expect continued improvement in motion, function, and any residual discomfort for up to 1 year after surgery.   4.  Follow up at 1 year unless experiencing any new or concerning symptoms.   5.  I gave her a brace " for the carpal tunnel.  We will have her use that at night.  If her symptoms persist, I told her to let me know and we will be happy to see her back and reevaluate.

## 2017-04-27 ENCOUNTER — ANESTHESIA EVENT (OUTPATIENT)
Dept: GASTROENTEROLOGY | Facility: HOSPITAL | Age: 67
End: 2017-04-27

## 2017-04-27 ENCOUNTER — HOSPITAL ENCOUNTER (OUTPATIENT)
Facility: HOSPITAL | Age: 67
Setting detail: HOSPITAL OUTPATIENT SURGERY
Discharge: HOME | End: 2017-04-27
Attending: SURGERY | Admitting: SURGERY

## 2017-04-27 ENCOUNTER — ANESTHESIA (OUTPATIENT)
Dept: GASTROENTEROLOGY | Facility: HOSPITAL | Age: 67
End: 2017-04-27

## 2017-04-27 VITALS
HEART RATE: 60 BPM | HEIGHT: 62 IN | TEMPERATURE: 98 F | RESPIRATION RATE: 14 BRPM | OXYGEN SATURATION: 96 % | DIASTOLIC BLOOD PRESSURE: 72 MMHG | BODY MASS INDEX: 33.68 KG/M2 | SYSTOLIC BLOOD PRESSURE: 114 MMHG | WEIGHT: 183 LBS

## 2017-04-27 DIAGNOSIS — D50.9 IRON DEFICIENCY ANEMIA, UNSPECIFIED IRON DEFICIENCY ANEMIA TYPE: ICD-10-CM

## 2017-04-27 LAB — GLUCOSE BLDC GLUCOMTR-MCNC: 209 MG/DL (ref 70–130)

## 2017-04-27 PROCEDURE — 43239 EGD BIOPSY SINGLE/MULTIPLE: CPT | Performed by: SURGERY

## 2017-04-27 PROCEDURE — 88305 TISSUE EXAM BY PATHOLOGIST: CPT | Performed by: SURGERY

## 2017-04-27 PROCEDURE — 25010000002 FENTANYL CITRATE (PF) 100 MCG/2ML SOLUTION: Performed by: ANESTHESIOLOGY

## 2017-04-27 PROCEDURE — 25010000002 PROPOFOL 10 MG/ML EMULSION: Performed by: ANESTHESIOLOGY

## 2017-04-27 PROCEDURE — 88312 SPECIAL STAINS GROUP 1: CPT | Performed by: SURGERY

## 2017-04-27 PROCEDURE — 82962 GLUCOSE BLOOD TEST: CPT

## 2017-04-27 RX ORDER — PROPOFOL 10 MG/ML
VIAL (ML) INTRAVENOUS CONTINUOUS PRN
Status: DISCONTINUED | OUTPATIENT
Start: 2017-04-27 | End: 2017-04-27 | Stop reason: SURG

## 2017-04-27 RX ORDER — SODIUM CHLORIDE, SODIUM LACTATE, POTASSIUM CHLORIDE, CALCIUM CHLORIDE 600; 310; 30; 20 MG/100ML; MG/100ML; MG/100ML; MG/100ML
9 INJECTION, SOLUTION INTRAVENOUS CONTINUOUS PRN
Status: DISCONTINUED | OUTPATIENT
Start: 2017-04-27 | End: 2017-04-27 | Stop reason: HOSPADM

## 2017-04-27 RX ORDER — SODIUM CHLORIDE 0.9 % (FLUSH) 0.9 %
1-10 SYRINGE (ML) INJECTION AS NEEDED
Status: DISCONTINUED | OUTPATIENT
Start: 2017-04-27 | End: 2017-04-27 | Stop reason: HOSPADM

## 2017-04-27 RX ORDER — FENTANYL CITRATE 50 UG/ML
INJECTION, SOLUTION INTRAMUSCULAR; INTRAVENOUS AS NEEDED
Status: DISCONTINUED | OUTPATIENT
Start: 2017-04-27 | End: 2017-04-27 | Stop reason: SURG

## 2017-04-27 RX ORDER — LIDOCAINE HYDROCHLORIDE 20 MG/ML
INJECTION, SOLUTION INFILTRATION; PERINEURAL AS NEEDED
Status: DISCONTINUED | OUTPATIENT
Start: 2017-04-27 | End: 2017-04-27 | Stop reason: SURG

## 2017-04-27 RX ORDER — OMEPRAZOLE 40 MG/1
40 CAPSULE, DELAYED RELEASE ORAL DAILY
Qty: 30 CAPSULE | Refills: 2 | Status: SHIPPED | OUTPATIENT
Start: 2017-04-27 | End: 2017-11-07 | Stop reason: SDUPTHER

## 2017-04-27 RX ADMIN — SODIUM CHLORIDE, POTASSIUM CHLORIDE, SODIUM LACTATE AND CALCIUM CHLORIDE 9 ML/HR: 600; 310; 30; 20 INJECTION, SOLUTION INTRAVENOUS at 08:28

## 2017-04-27 RX ADMIN — LIDOCAINE HYDROCHLORIDE 60 MG: 20 INJECTION, SOLUTION INFILTRATION; PERINEURAL at 08:35

## 2017-04-27 RX ADMIN — PROPOFOL 140 MCG/KG/MIN: 10 INJECTION, EMULSION INTRAVENOUS at 08:35

## 2017-04-27 RX ADMIN — FENTANYL CITRATE 50 MCG: 50 INJECTION INTRAMUSCULAR; INTRAVENOUS at 08:35

## 2017-04-27 NOTE — ANESTHESIA POSTPROCEDURE EVALUATION
Patient: Kristan Galicia    Procedure Summary     Date Anesthesia Start Anesthesia Stop Room / Location    04/27/17 0836 0902  [facility]       Procedure Diagnosis Surgeon Provider    ESOPHAGOGASTRODUODENOSCOPY WITH BIOPSIES (N/A Esophagus) Iron deficiency anemia, unspecified iron deficiency anemia type  (Iron deficiency anemia, unspecified iron deficiency anemia type [D50.9]) MD Bravo Hernández MD          Anesthesia Type: MAC  Last vitals  BP 98/78 (04/27/17 0858)    Temp      Pulse 60 (04/27/17 0858)   Resp 16 (04/27/17 0858)    SpO2 94 % (04/27/17 0858)      Anesthesia Post Evaluation

## 2017-04-27 NOTE — ANESTHESIA PREPROCEDURE EVALUATION
Anesthesia Evaluation     NPO Status: > 8 hours   Airway   Mallampati: III  TM distance: >3 FB  Neck ROM: full  no difficulty expected  Dental      Pulmonary - normal exam   (+) asthma, shortness of breath, sleep apnea on CPAP,   Cardiovascular - normal exam    (+) valvular problems/murmurs (IHSS), CAD, dysrhythmias (also has a defib ), CHF, orthopnea,       Neuro/Psych  GI/Hepatic/Renal/Endo    (+) obesity,  GERD, diabetes mellitus type 2 poorly controlled,     Musculoskeletal     Abdominal   (+) obese,    Substance History      OB/GYN          Other                                    Anesthesia Plan    ASA 3     MAC

## 2017-04-28 LAB
CYTO UR: NORMAL
LAB AP CASE REPORT: NORMAL
Lab: NORMAL
PATH REPORT.FINAL DX SPEC: NORMAL
PATH REPORT.GROSS SPEC: NORMAL

## 2017-05-02 ENCOUNTER — CLINICAL SUPPORT (OUTPATIENT)
Dept: ORTHOPEDIC SURGERY | Facility: CLINIC | Age: 67
End: 2017-05-02

## 2017-05-02 VITALS — HEIGHT: 62 IN | WEIGHT: 182 LBS | TEMPERATURE: 97.6 F | BODY MASS INDEX: 33.49 KG/M2

## 2017-05-02 DIAGNOSIS — M17.11 ARTHRITIS OF RIGHT KNEE: Primary | ICD-10-CM

## 2017-05-02 PROCEDURE — 20610 DRAIN/INJ JOINT/BURSA W/O US: CPT | Performed by: ORTHOPAEDIC SURGERY

## 2017-05-02 RX ORDER — METHYLPREDNISOLONE ACETATE 80 MG/ML
80 INJECTION, SUSPENSION INTRA-ARTICULAR; INTRALESIONAL; INTRAMUSCULAR; SOFT TISSUE
Status: COMPLETED | OUTPATIENT
Start: 2017-05-02 | End: 2017-05-02

## 2017-05-02 RX ORDER — LIDOCAINE HYDROCHLORIDE 10 MG/ML
2 INJECTION, SOLUTION INFILTRATION; PERINEURAL
Status: COMPLETED | OUTPATIENT
Start: 2017-05-02 | End: 2017-05-02

## 2017-05-02 RX ORDER — BUPIVACAINE HYDROCHLORIDE 2.5 MG/ML
2 INJECTION, SOLUTION INFILTRATION; PERINEURAL
Status: COMPLETED | OUTPATIENT
Start: 2017-05-02 | End: 2017-05-02

## 2017-05-02 RX ADMIN — METHYLPREDNISOLONE ACETATE 80 MG: 80 INJECTION, SUSPENSION INTRA-ARTICULAR; INTRALESIONAL; INTRAMUSCULAR; SOFT TISSUE at 12:01

## 2017-05-02 RX ADMIN — BUPIVACAINE HYDROCHLORIDE 2 ML: 2.5 INJECTION, SOLUTION INFILTRATION; PERINEURAL at 12:01

## 2017-05-02 RX ADMIN — LIDOCAINE HYDROCHLORIDE 2 ML: 10 INJECTION, SOLUTION INFILTRATION; PERINEURAL at 12:01

## 2017-05-08 RX ORDER — SPIRONOLACTONE AND HYDROCHLOROTHIAZIDE 25; 25 MG/1; MG/1
TABLET ORAL
Qty: 90 TABLET | Refills: 1 | Status: SHIPPED | OUTPATIENT
Start: 2017-05-08 | End: 2017-06-01 | Stop reason: SDUPTHER

## 2017-05-09 ENCOUNTER — TELEPHONE (OUTPATIENT)
Dept: FAMILY MEDICINE CLINIC | Facility: CLINIC | Age: 67
End: 2017-05-09

## 2017-05-15 RX ORDER — ATORVASTATIN CALCIUM 80 MG/1
TABLET, FILM COATED ORAL
Qty: 90 TABLET | Refills: 1 | Status: SHIPPED | OUTPATIENT
Start: 2017-05-15 | End: 2017-06-07 | Stop reason: SDUPTHER

## 2017-05-22 RX ORDER — LEVOTHYROXINE SODIUM 0.05 MG/1
TABLET ORAL
Qty: 30 TABLET | Refills: 1 | Status: SHIPPED | OUTPATIENT
Start: 2017-05-22 | End: 2017-06-07

## 2017-05-26 NOTE — SIGNIFICANT NOTE
02/03/17 1039   Rehab Treatment   Discipline physical therapist   Rehab Evaluation   Evaluation Not Performed (Provided and reviewed written HEP with patient.  no questions.  dc home.)      oral

## 2017-05-30 ENCOUNTER — TELEPHONE (OUTPATIENT)
Dept: FAMILY MEDICINE CLINIC | Facility: CLINIC | Age: 67
End: 2017-05-30

## 2017-05-30 DIAGNOSIS — E11.9 TYPE 2 DIABETES MELLITUS WITHOUT COMPLICATION, WITHOUT LONG-TERM CURRENT USE OF INSULIN (HCC): ICD-10-CM

## 2017-05-30 DIAGNOSIS — E03.9 HYPOTHYROIDISM, UNSPECIFIED TYPE: ICD-10-CM

## 2017-05-30 DIAGNOSIS — D64.9 LOW HEMOGLOBIN: Primary | ICD-10-CM

## 2017-05-30 DIAGNOSIS — E78.5 HYPERLIPIDEMIA, UNSPECIFIED HYPERLIPIDEMIA TYPE: ICD-10-CM

## 2017-05-30 RX ORDER — DULOXETIN HYDROCHLORIDE 60 MG/1
CAPSULE, DELAYED RELEASE ORAL
Qty: 90 CAPSULE | Refills: 0 | Status: SHIPPED | OUTPATIENT
Start: 2017-05-30 | End: 2017-08-22 | Stop reason: SDUPTHER

## 2017-06-01 RX ORDER — SPIRONOLACTONE AND HYDROCHLOROTHIAZIDE 25; 25 MG/1; MG/1
TABLET ORAL
Qty: 90 TABLET | Refills: 1 | Status: SHIPPED | OUTPATIENT
Start: 2017-06-01 | End: 2017-12-10 | Stop reason: SDUPTHER

## 2017-06-02 LAB
ALBUMIN SERPL-MCNC: 4 G/DL (ref 3.5–5.2)
ALBUMIN/GLOB SERPL: 1.3 G/DL
ALP SERPL-CCNC: 64 U/L (ref 39–117)
ALT SERPL W P-5'-P-CCNC: 23 U/L (ref 1–33)
ANION GAP SERPL CALCULATED.3IONS-SCNC: 13 MMOL/L
AST SERPL-CCNC: 30 U/L (ref 1–32)
BILIRUB SERPL-MCNC: 0.4 MG/DL (ref 0.1–1.2)
BUN BLD-MCNC: 13 MG/DL (ref 8–23)
BUN/CREAT SERPL: 14.9 (ref 7–25)
CALCIUM SPEC-SCNC: 9.3 MG/DL (ref 8.6–10.5)
CHLORIDE SERPL-SCNC: 102 MMOL/L (ref 98–107)
CHOLEST SERPL-MCNC: 142 MG/DL (ref 0–200)
CO2 SERPL-SCNC: 27 MMOL/L (ref 22–29)
CREAT BLD-MCNC: 0.87 MG/DL (ref 0.57–1)
ERYTHROCYTE [DISTWIDTH] IN BLOOD BY AUTOMATED COUNT: 25.1 % (ref 4.5–15)
GFR SERPL CREATININE-BSD FRML MDRD: 65 ML/MIN/1.73
GLOBULIN UR ELPH-MCNC: 3 GM/DL
GLUCOSE BLD-MCNC: 141 MG/DL (ref 65–99)
HBA1C MFR BLD: 6.97 % (ref 4.8–5.6)
HCT VFR BLD AUTO: 38.7 % (ref 31–42)
HDLC SERPL-MCNC: 42 MG/DL (ref 40–60)
HGB BLD-MCNC: 12.2 G/DL (ref 12–18)
LDLC SERPL CALC-MCNC: 57 MG/DL (ref 0–100)
LDLC/HDLC SERPL: 1.37 {RATIO}
LYMPHOCYTES # BLD AUTO: 2 10*3/MM3 (ref 1.2–3.4)
LYMPHOCYTES NFR BLD AUTO: 22.9 % (ref 21–51)
MCH RBC QN AUTO: 24.4 PG (ref 26.1–33.1)
MCHC RBC AUTO-ENTMCNC: 31.5 G/DL (ref 33–37)
MCV RBC AUTO: 77.4 FL (ref 80–99)
MONOCYTES # BLD AUTO: 0.6 10*3/MM3 (ref 0.1–0.6)
MONOCYTES NFR BLD AUTO: 6.4 % (ref 2–9)
NEUTROPHILS # BLD AUTO: 6.1 10*3/MM3 (ref 1.4–6.5)
NEUTROPHILS NFR BLD AUTO: 70.7 % (ref 42–75)
PLATELET # BLD AUTO: 274 10*3/MM3 (ref 150–450)
PMV BLD AUTO: 7.8 FL (ref 7.1–10.5)
POTASSIUM BLD-SCNC: 4.4 MMOL/L (ref 3.5–5.2)
PROT SERPL-MCNC: 7 G/DL (ref 6–8.5)
RBC # BLD AUTO: 4.99 10*6/MM3 (ref 4–6)
SODIUM BLD-SCNC: 142 MMOL/L (ref 136–145)
TRIGL SERPL-MCNC: 213 MG/DL (ref 0–150)
TSH SERPL DL<=0.05 MIU/L-ACNC: 2.72 MIU/ML (ref 0.27–4.2)
VLDLC SERPL-MCNC: 42.6 MG/DL (ref 5–40)
WBC NRBC COR # BLD: 8.6 10*3/MM3 (ref 4.5–10)

## 2017-06-02 PROCEDURE — 85025 COMPLETE CBC W/AUTO DIFF WBC: CPT | Performed by: NURSE PRACTITIONER

## 2017-06-02 PROCEDURE — 80061 LIPID PANEL: CPT | Performed by: NURSE PRACTITIONER

## 2017-06-02 PROCEDURE — 84443 ASSAY THYROID STIM HORMONE: CPT | Performed by: NURSE PRACTITIONER

## 2017-06-02 PROCEDURE — 80053 COMPREHEN METABOLIC PANEL: CPT | Performed by: NURSE PRACTITIONER

## 2017-06-02 PROCEDURE — 83036 HEMOGLOBIN GLYCOSYLATED A1C: CPT | Performed by: NURSE PRACTITIONER

## 2017-06-07 ENCOUNTER — OFFICE VISIT (OUTPATIENT)
Dept: FAMILY MEDICINE CLINIC | Facility: CLINIC | Age: 67
End: 2017-06-07

## 2017-06-07 VITALS
WEIGHT: 182 LBS | HEART RATE: 87 BPM | DIASTOLIC BLOOD PRESSURE: 70 MMHG | TEMPERATURE: 98.4 F | SYSTOLIC BLOOD PRESSURE: 120 MMHG | OXYGEN SATURATION: 96 % | HEIGHT: 61 IN | BODY MASS INDEX: 34.36 KG/M2

## 2017-06-07 DIAGNOSIS — I48.0 PAF (PAROXYSMAL ATRIAL FIBRILLATION) (HCC): ICD-10-CM

## 2017-06-07 DIAGNOSIS — E78.5 HYPERLIPIDEMIA, UNSPECIFIED HYPERLIPIDEMIA TYPE: ICD-10-CM

## 2017-06-07 DIAGNOSIS — Z00.00 MEDICARE ANNUAL WELLNESS VISIT, INITIAL: Primary | ICD-10-CM

## 2017-06-07 DIAGNOSIS — E11.9 TYPE 2 DIABETES MELLITUS WITHOUT COMPLICATION, WITHOUT LONG-TERM CURRENT USE OF INSULIN (HCC): ICD-10-CM

## 2017-06-07 DIAGNOSIS — E03.9 HYPOTHYROIDISM, UNSPECIFIED TYPE: ICD-10-CM

## 2017-06-07 DIAGNOSIS — E66.09 NON MORBID OBESITY DUE TO EXCESS CALORIES: ICD-10-CM

## 2017-06-07 DIAGNOSIS — I50.9 CHRONIC CONGESTIVE HEART FAILURE, UNSPECIFIED CONGESTIVE HEART FAILURE TYPE: ICD-10-CM

## 2017-06-07 PROCEDURE — 99214 OFFICE O/P EST MOD 30 MIN: CPT | Performed by: NURSE PRACTITIONER

## 2017-06-07 PROCEDURE — G0438 PPPS, INITIAL VISIT: HCPCS | Performed by: NURSE PRACTITIONER

## 2017-06-07 RX ORDER — ALLOPURINOL 100 MG/1
TABLET ORAL
Refills: 1 | COMMUNITY
Start: 2017-05-29 | End: 2018-04-05

## 2017-06-07 RX ORDER — ATORVASTATIN CALCIUM 80 MG/1
80 TABLET, FILM COATED ORAL NIGHTLY
Qty: 90 TABLET | Refills: 3 | Status: SHIPPED | OUTPATIENT
Start: 2017-06-07 | End: 2018-06-17 | Stop reason: SDUPTHER

## 2017-06-07 RX ORDER — LEVOTHYROXINE SODIUM 88 UG/1
88 TABLET ORAL DAILY
Qty: 90 TABLET | Refills: 1 | Status: SHIPPED | OUTPATIENT
Start: 2017-06-07 | End: 2017-11-21 | Stop reason: SDUPTHER

## 2017-06-07 NOTE — PROGRESS NOTES
Subjective   Kristan Galicia is a 66 y.o. female.     History of Present Illness   Here to FU on recently labs with hypothyroid on levothyroxine 88 mcg daily no missed doses TSH nl 06/17, with A1C decreased from 7.3 to 6.9 on metformin 500 mg once daily with largest meal not checking BS no feet pain UTD dilated eye exam 08/16 Yuval Bullock, with chol on lipitor 80 mg HS elevated trigs 06/17 but states following DM low chol diet  Recently had FU with Munira Zaidi planning to switch pradaxa d/t cost has 30 day supply for now reviewed and had afib episode on mother's day otherwise doing well, no CP dizziness HA LE edema  Had EGD Dr Sheikh 04/17 d/t low hemoglobin, recheck 06/17 normal, stated had mild esophagitis and duodenitis and monitoring, UTD colonoscopy 08/16  Seeing PM [place] on gabapentin 300 mg HS and tramadol     The following portions of the patient's history were reviewed and updated as appropriate: allergies, current medications, past family history, past medical history, past social history, past surgical history and problem list.    Review of Systems   Constitutional: Negative for fatigue and fever.   HENT: Negative for trouble swallowing and voice change.    Respiratory: Negative for cough, shortness of breath and wheezing.    Cardiovascular: Negative for chest pain, palpitations and leg swelling.   Gastrointestinal: Negative for abdominal distention, abdominal pain, anal bleeding, blood in stool, constipation, diarrhea, nausea, rectal pain and vomiting.   Endocrine: Negative for cold intolerance, heat intolerance, polydipsia, polyphagia and polyuria.   Neurological: Negative for dizziness and headaches.   All other systems reviewed and are negative.      Objective   Physical Exam   Constitutional: She is oriented to person, place, and time. She appears well-developed and well-nourished.   HENT:   Head: Normocephalic and atraumatic.   Eyes: Conjunctivae and EOM are normal. Pupils are equal, round, and  reactive to light.   Cardiovascular: Normal rate, regular rhythm and normal heart sounds.    Pulmonary/Chest: Effort normal and breath sounds normal.   Musculoskeletal: Normal range of motion.   Neurological: She is alert and oriented to person, place, and time.   Skin: Skin is warm and dry.   Psychiatric: She has a normal mood and affect. Her behavior is normal. Judgment and thought content normal.   Vitals reviewed.      Assessment/Plan   Kristan was seen today for annual exam.    Diagnoses and all orders for this visit:    Medicare annual wellness visit, initial    Hypothyroidism, unspecified type    Type 2 diabetes mellitus without complication, without long-term current use of insulin    Hyperlipidemia, unspecified hyperlipidemia type    PAF (paroxysmal atrial fibrillation)    Chronic congestive heart failure, unspecified congestive heart failure type    Non morbid obesity due to excess calories    Other orders  -     atorvastatin (LIPITOR) 80 MG tablet; Take 1 tablet by mouth Every Night.  -     levothyroxine (SYNTHROID) 88 MCG tablet; Take 1 tablet by mouth Daily.  -     metFORMIN (GLUCOPHAGE) 500 MG tablet; 1 PO with largest meal    medicare wellness today, reviewed labs with her hgb improved, TSH nl, A1C improved, elevated trigs, refill metformin 500 mg once daily with largest meal enc healthy DM and low chol diet and regular exercise for wt loss and BS control, refill levothyroxine and lipitor 80 mg will recheck all labs in 6 mo, cont FU with specialists

## 2017-06-07 NOTE — PROGRESS NOTES
QUICK REFERENCE INFORMATION:  The ABCs of the Annual Wellness Visit    Initial Medicare Wellness Visit    HEALTH RISK ASSESSMENT    1950    Recent Hospitalizations:  Recently treated at the following:  [facility] 03/03/17 RSV bronchilitis admission, EGD Dr Sheikh 04/17 and colonoscopy 08/16, 02/17 R shoulder arthroplasty Dr Anne    Current Medical Providers:  Patient Care Team:  CHRISTIN Mckee as PCP - General (Family Medicine)  Minh Ya MD as Consulting Physician (Cardiac Electrophysiology)  CHRISTIN Dee (Nurse Practitioner)  Rosalio Sheikh MD as Consulting Physician (General Surgery)  Juan Antonio Anne MD as Consulting Physician (Orthopedic Surgery)    Smoking Status:  History   Smoking Status   • Former Smoker   • Packs/day: 0.50   • Years: 17.00   • Quit date: 9/8/1985   Smokeless Tobacco   • Never Used       Alcohol Consumption:  History   Alcohol Use No       Depression Screen:   PHQ-9 Depression Screening 6/7/2017   Little interest or pleasure in doing things 1   Feeling down, depressed, or hopeless 1   Trouble falling or staying asleep, or sleeping too much 1   Feeling tired or having little energy 1   Poor appetite or overeating 3   Feeling bad about yourself - or that you are a failure or have let yourself or your family down 0   Trouble concentrating on things, such as reading the newspaper or watching television 0   Moving or speaking so slowly that other people could have noticed. Or the opposite - being so fidgety or restless that you have been moving around a lot more than usual 0   Thoughts that you would be better off dead, or of hurting yourself in some way 0   PHQ-9 Total Score 7   If you checked off any problems, how difficult have these problems made it for you to do your work, take care of things at home, or get along with other people? Not difficult at all       Health Habits and Functional and Cognitive Screening:  Functional &  Cognitive Status 6/7/2017   Do you have difficulty preparing food and eating? No   Do you have difficulty bathing yourself? No   Do you have difficulty getting dressed? No   Do you have difficulty using the toilet? No   Do you have difficulty moving around from place to place? No   In the past year have you fallen or experienced a near fall? No   Do you need help using the phone?  No   Are you deaf or do you have serious difficulty hearing?  No   Do you need help with transportation? No   Do you need help shopping? No   Do you need help preparing meals?  No   Do you need help with housework?  No   Do you need help with laundry? No   Do you need help taking your medications? No   Do you need help managing money? No   Do you have difficulty concentrating, remembering or making decisions? No       Health Habits  Current Diet: Unhealthy Diet (drinks protein shake)  Dental Exam: Not up to date  Eye Exam: Up to date  Exercise (times per week): 6 times per week  Current Exercise Activities Include: Walking  Enc overdue dental      Does the patient have evidence of cognitive impairment? No    Asiprin use counseling: Does not need ASA (and currently is not on it) on pradaxa      Recent Lab Results: reviewed TSH, LIPID, CMP, CBC, A1C    Visual Acuity:  No exam data present    Age-appropriate Screening Schedule:  Refer to the list below for future screening recommendations based on patient's age, sex and/or medical conditions. Orders for these recommended tests are listed in the plan section. The patient has been provided with a written plan.    Health Maintenance   Topic Date Due   • TDAP/TD VACCINES (1 - Tdap) 08/31/1970   • URINE MICROALBUMIN  11/15/2016   • PNEUMOCOCCAL VACCINES (65+ LOW/MEDIUM RISK) (2 of 2 - PPSV23) 01/01/2017   • INFLUENZA VACCINE  08/01/2017   • DIABETIC EYE EXAM  08/16/2017   • HEMOGLOBIN A1C  12/02/2017   • DIABETIC FOOT EXAM  03/22/2018   • LIPID PANEL  06/02/2018   • MAMMOGRAM  11/12/2018   • DXA  SCAN  12/01/2018   • PAP SMEAR  11/15/2019   • COLONOSCOPY  08/16/2026   • ZOSTER VACCINE  Addressed        Subjective   History of Present Illness    Kristan Galicia is a 66 y.o. female who presents for an Annual Wellness Visit.    The following portions of the patient's history were reviewed and updated as appropriate: allergies, current medications, past family history, past medical history, past social history, past surgical history and problem list.    Outpatient Medications Prior to Visit   Medication Sig Dispense Refill   • alendronate (FOSAMAX) 70 MG tablet Take 1 tablet by mouth Every 7 (Seven) Days. Hold until can be discussed with Dr. Anne as to when to restart medication 12 tablet 3   • amiodarone (PACERONE) 200 MG tablet Take 100 mg by mouth Every Night.     • baclofen (LIORESAL) 10 MG tablet Take 1 tablet by mouth Every Night. (Patient taking differently: Take 10 mg by mouth At Night As Needed.) 30 tablet 5   • Blood Glucose Monitoring Suppl (ACCU-CHEK OTILIA PLUS) W/DEVICE kit Dx e11.9 use to check BS BID, ok to substitute formulary preferred 1 kit 0   • carvedilol (COREG) 12.5 MG tablet Take 12.5 mg by mouth 2 (Two) Times a Day.     • dabigatran etexilate (PRADAXA) 150 MG capsu Take 1 capsule by mouth 2 (Two) Times a Day. Resume 2/4/17 60 capsule    • DULoxetine (CYMBALTA) 60 MG capsule TAKE ONE CAPSULE BY MOUTH EVERY DAY 90 capsule 0   • ferrous sulfate 325 (65 FE) MG tablet Take 1 tablet by mouth 2 (Two) Times a Day With Meals. With food 60 tablet 5   • gabapentin (NEURONTIN) 300 MG capsule TAKE ONE CAPSULE BY MOUTH 3 TIMES A DAY  3   • glucose blood (ACCU-CHEK OTILIA PLUS) test strip Dx e11.9 use to check BS BID, ok to substitute formulary preferred 60 each 11   • Lancets (ACCU-CHEK MULTICLIX) lancets Dx e11.9 use to check BS BID, ok to substitute formulary preferred 60 each 11   • lidocaine (LIDODERM) 5 % Place 1 patch on the skin Every 12 (Twelve) Hours. Remove & Discard patch within 12 hours  or as directed by MD (Patient taking differently: Place 1 patch on the skin Daily As Needed. On 12 hrs, off 12 hr) 60 patch 5   • montelukast (SINGULAIR) 10 MG tablet Take 1 tablet by mouth daily. (Patient taking differently: Take 10 mg by mouth Every Night.) 90 tablet 2   • omeprazole (PRILOSEC) 40 MG capsule Take 1 capsule by mouth Daily. 30 capsule 2   • spironolactone-hydrochlorothiazide (ALDACTAZIDE) 25-25 MG tablet TAKE 1 TABLET EVERY DAY 90 tablet 1   • traMADol (ULTRAM) 50 MG tablet TAKE 1 TABLET BY MOUTH TWICE A DAY FOR PAIN  0   • atorvastatin (LIPITOR) 80 MG tablet TAKE 1 TABLET DAILY 90 tablet 1   • levothyroxine (SYNTHROID, LEVOTHROID) 50 MCG tablet TAKE 1 TABLET BY MOUTH DAILY. 30 tablet 1   • metFORMIN (GLUCOPHAGE) 500 MG tablet 1 PO with largest meal 30 tablet 2   • gabapentin (NEURONTIN) 100 MG capsule Take 100 mg by mouth 3 (Three) Times a Day.     • traZODone (DESYREL) 50 MG tablet TAKE 1 TABLET BY MOUTH AT BEDTIME 30 tablet 2   • levothyroxine (SYNTHROID) 88 MCG tablet Take 1 tablet by mouth Daily. 30 tablet 2     No facility-administered medications prior to visit.        Patient Active Problem List   Diagnosis   • Shoulder pain, right   • Knee pain, right   • Fall down stairs   • PAF (paroxysmal atrial fibrillation)   • S/P rotator cuff repair   • Vitamin D deficiency   • Vertigo   • Vasovagal syncope   • Thyroid disease   • Obstructive sleep apnea syndrome   • Palpitations   • Osteoporosis   • Ocular tumor   • Migraines   • Hyperlipidemia   • History of transfusion   • Heart murmur   • Heart attack   • GERD (gastroesophageal reflux disease)   • Depression   • CHF (congestive heart failure)   • Cardiomyopathy   • Asthma   • Anxiety   • Arthritis of right knee   • Right rotator cuff tear   • Hypothyroidism   • Dyspnea and respiratory abnormalities   • Orthopnea   • IHSS (idiopathic hypertrophic subaortic stenosis)   • AICD (automatic cardioverter/defibrillator) present   • Anticoagulant  "long-term use   • RSV bronchiolitis   • Hyperglycemia, drug-induced   • Chronic pain of right knee       Advance Care Planning:  has NO advance directive - information provided to the patient today    Identification of Risk Factors:  Risk factors include: weight , unhealthy diet, cardiovascular risk and chronic pain.    Review of Systems    Compared to one year ago, the patient feels her physical health is better.  Compared to one year ago, the patient feels her mental health is better.    Objective     Physical Exam    Vitals:    06/07/17 0958   BP: 120/70   BP Location: Left arm   Patient Position: Sitting   Cuff Size: Large Adult   Pulse: 87   Temp: 98.4 °F (36.9 °C)   TempSrc: Oral   SpO2: 96%   Weight: 182 lb (82.6 kg)   Height: 61\" (154.9 cm)   PainSc: 0-No pain       Body mass index is 34.39 kg/(m^2).  Discussed the patient's BMI with her. The BMI is above average; BMI management plan is completed.    Assessment/Plan   Patient Self-Management and Personalized Health Advice  The patient has been provided with information about: diet, exercise, weight management, prevention of cardiac or vascular disease and designing advance directives and preventive services including:   · Advance directive, Fall Risk assessment done, TdaP vaccine. UTD mammo and colonoscopy, Hep C screening negative    Visit Diagnoses:    ICD-10-CM ICD-9-CM   1. Medicare annual wellness visit, initial Z00.00 V70.0   2. Hypothyroidism, unspecified type E03.9 244.9   3. Type 2 diabetes mellitus without complication, without long-term current use of insulin E11.9 250.00   4. Hyperlipidemia, unspecified hyperlipidemia type E78.5 272.4   5. PAF (paroxysmal atrial fibrillation) I48.0 427.31   6. Chronic congestive heart failure, unspecified congestive heart failure type I50.9 428.0       No orders of the defined types were placed in this encounter.      Outpatient Encounter Prescriptions as of 6/7/2017   Medication Sig Dispense Refill   • " alendronate (FOSAMAX) 70 MG tablet Take 1 tablet by mouth Every 7 (Seven) Days. Hold until can be discussed with Dr. Anne as to when to restart medication 12 tablet 3   • amiodarone (PACERONE) 200 MG tablet Take 100 mg by mouth Every Night.     • atorvastatin (LIPITOR) 80 MG tablet Take 1 tablet by mouth Every Night. 90 tablet 3   • baclofen (LIORESAL) 10 MG tablet Take 1 tablet by mouth Every Night. (Patient taking differently: Take 10 mg by mouth At Night As Needed.) 30 tablet 5   • Blood Glucose Monitoring Suppl (ACCU-CHEK OTILIA PLUS) W/DEVICE kit Dx e11.9 use to check BS BID, ok to substitute formulary preferred 1 kit 0   • carvedilol (COREG) 12.5 MG tablet Take 12.5 mg by mouth 2 (Two) Times a Day.     • dabigatran etexilate (PRADAXA) 150 MG capsu Take 1 capsule by mouth 2 (Two) Times a Day. Resume 2/4/17 60 capsule    • DULoxetine (CYMBALTA) 60 MG capsule TAKE ONE CAPSULE BY MOUTH EVERY DAY 90 capsule 0   • ferrous sulfate 325 (65 FE) MG tablet Take 1 tablet by mouth 2 (Two) Times a Day With Meals. With food 60 tablet 5   • gabapentin (NEURONTIN) 300 MG capsule TAKE ONE CAPSULE BY MOUTH 3 TIMES A DAY  3   • glucose blood (ACCU-CHEK OTILIA PLUS) test strip Dx e11.9 use to check BS BID, ok to substitute formulary preferred 60 each 11   • Lancets (ACCU-CHEK MULTICLIX) lancets Dx e11.9 use to check BS BID, ok to substitute formulary preferred 60 each 11   • lidocaine (LIDODERM) 5 % Place 1 patch on the skin Every 12 (Twelve) Hours. Remove & Discard patch within 12 hours or as directed by MD (Patient taking differently: Place 1 patch on the skin Daily As Needed. On 12 hrs, off 12 hr) 60 patch 5   • metFORMIN (GLUCOPHAGE) 500 MG tablet 1 PO with largest meal 90 tablet 3   • montelukast (SINGULAIR) 10 MG tablet Take 1 tablet by mouth daily. (Patient taking differently: Take 10 mg by mouth Every Night.) 90 tablet 2   • omeprazole (PRILOSEC) 40 MG capsule Take 1 capsule by mouth Daily. 30 capsule 2   •  spironolactone-hydrochlorothiazide (ALDACTAZIDE) 25-25 MG tablet TAKE 1 TABLET EVERY DAY 90 tablet 1   • traMADol (ULTRAM) 50 MG tablet TAKE 1 TABLET BY MOUTH TWICE A DAY FOR PAIN  0   • [DISCONTINUED] atorvastatin (LIPITOR) 80 MG tablet TAKE 1 TABLET DAILY 90 tablet 1   • [DISCONTINUED] levothyroxine (SYNTHROID, LEVOTHROID) 50 MCG tablet TAKE 1 TABLET BY MOUTH DAILY. 30 tablet 1   • [DISCONTINUED] metFORMIN (GLUCOPHAGE) 500 MG tablet 1 PO with largest meal 30 tablet 2   • allopurinol (ZYLOPRIM) 100 MG tablet TAKE 1 TABLET BY MOUTH DAILY.  1   • gabapentin (NEURONTIN) 100 MG capsule Take 100 mg by mouth 3 (Three) Times a Day.     • levothyroxine (SYNTHROID) 88 MCG tablet Take 1 tablet by mouth Daily. 90 tablet 1   • traZODone (DESYREL) 50 MG tablet TAKE 1 TABLET BY MOUTH AT BEDTIME 30 tablet 2   • [DISCONTINUED] levothyroxine (SYNTHROID) 88 MCG tablet Take 1 tablet by mouth Daily. 30 tablet 2     No facility-administered encounter medications on file as of 6/7/2017.        Reviewed use of high risk medication in the elderly: yes  Reviewed for potential of harmful drug interactions in the elderly: yes    Follow Up:  Return in about 6 months (around 12/7/2017).     An After Visit Summary and PPPS with all of these plans were given to the patient.

## 2017-06-07 NOTE — PATIENT INSTRUCTIONS
medicare wellness today, reviewed labs with her hgb improved, TSH nl, A1C improved, elevated trigs, refill metformin 500 mg once daily with largest meal enc healthy DM and low chol diet and regular exercise for wt loss and BS control, refill levothyroxine and lipitor 80 mg will recheck all labs in 6 mo, cont FU with specialists  Medicare Wellness  Personal Prevention Plan of Service     Date of Office Visit:  2017  Encounter Provider:  CHRISTIN Mckee  Place of Service:  [facility]  Patient Name: Kristan Galicia  :  1950    As part of the Medicare Wellness portion of your visit today, we are providing you with this personalized preventive plan of services (PPPS). This plan is based upon recommendations of the [place] Preventive Services Task Force (USPSTF) and the Advisory Committee on Immunization Practices (ACIP).    This lists the preventive care services that should be considered, and provides dates of when you are due. Items listed as completed are up-to-date and do not require any further intervention.    Health Maintenance   Topic Date Due   • TDAP/TD VACCINES (1 - Tdap) 1970   • MEDICARE ANNUAL WELLNESS  2016   • URINE MICROALBUMIN  11/15/2016   • PNEUMOCOCCAL VACCINES (65+ LOW/MEDIUM RISK) (2 of 2 - PPSV23) 2017   • INFLUENZA VACCINE  2017   • DIABETIC EYE EXAM  2017   • HEMOGLOBIN A1C  2017   • DIABETIC FOOT EXAM  2018   • LIPID PANEL  2018   • MAMMOGRAM  2018   • DXA SCAN  2018   • PAP SMEAR  11/15/2019   • COLONOSCOPY  2026   • HEPATITIS C SCREENING  Completed   • ZOSTER VACCINE  Addressed       No orders of the defined types were placed in this encounter.      Return in about 6 months (around 2017).      Advance Directive  Advance directives are the legal documents that allow you to make choices about your health care and medical treatment if you cannot speak  for yourself. Advance directives are a way for you to communicate your wishes to family, friends, and health care providers. The specified people can then convey your decisions about end-of-life care to avoid confusion if you should become unable to communicate.  Ideally, the process of discussing and writing advance directives should happen over time rather than making decisions all at once. Advance directives can be modified as your situation changes, and you can change your mind at any time, even after you have signed the advance directives. Each state has its own laws regarding advance directives.  You may want to check with your health care provider, , or state representative about the law in your state. Below are some examples of advance directives.  HEALTH CARE PROXY AND DURABLE POWER OF  FOR HEALTH CARE  A health care proxy is a person (agent) appointed to make medical decisions for you if you cannot. Generally, people choose someone they know well and trust to represent their preferences when they can no longer do so. You should be sure to ask this person for agreement to act as your agent. An agent may have to exercise judgment in the event of a medical decision for which your wishes are not known.  A durable power of  for health care is a legal document that names your health care proxy. Depending on the laws in your state, after the document is written, it may also need to be:  · Signed.  · Notarized.  · Dated.  · Copied.  · Witnessed.  · Incorporated into your medical record.  You may also want to appoint someone to manage your financial affairs if you cannot. This is called a durable power of  for finances. It is a separate legal document from the durable power of  for health care. You may choose the same person or someone different from your health care proxy to act as your agent in financial matters.  LIVING WILL  A living will is a set of instructions  documenting your wishes about medical care when you cannot care for yourself. It is used if you become:  · Terminally ill.  · Incapacitated.  · Unable to communicate.  · Unable to make decisions.  Items to consider in your living will include:  · The use or non-use of life-sustaining equipment, such as dialysis machines and breathing machines (ventilators).  · A do not resuscitate (DNR) order, which is the instruction not to use cardiopulmonary resuscitation (CPR) if breathing or heartbeat stops.  · Tube feeding.  · Withholding of food and fluids.  · Comfort (palliative) care when the goal becomes comfort rather than a cure.  · Organ and tissue donation.  A living will does not give instructions about distribution of your money and property if you should pass away. It is advisable to seek the expert advice of a  in drawing up a will regarding your possessions. Decisions about taxes, beneficiaries, and asset distribution will be legally binding. This process can relieve your family and friends of any burdens surrounding disputes or questions that may come up about the allocation of your assets.  DO NOT RESUSCITATE (DNR)  A do not resuscitate (DNR) order is a request to not have CPR in the event that your heart stops beating or you stop breathing. Unless given other instructions, a health care provider will try to help any patient whose heart has stopped or who has stopped breathing.      This information is not intended to replace advice given to you by your health care provider. Make sure you discuss any questions you have with your health care provider.     Document Released: 03/26/2009 Document Revised: 04/10/2017 Document Reviewed: 05/07/2014  Elsevier Interactive Patient Education ©2017 [employer]

## 2017-06-15 ENCOUNTER — DOCUMENTATION (OUTPATIENT)
Dept: PHYSICAL THERAPY | Facility: HOSPITAL | Age: 67
End: 2017-06-15

## 2017-06-15 DIAGNOSIS — Z96.611 S/P REVERSE TOTAL SHOULDER ARTHROPLASTY, RIGHT: Primary | ICD-10-CM

## 2017-06-15 DIAGNOSIS — M25.511 RIGHT SHOULDER PAIN, UNSPECIFIED CHRONICITY: ICD-10-CM

## 2017-06-15 NOTE — THERAPY DISCHARGE NOTE
Outpatient Physical Therapy Discharge Summary         Patient Name: Kristan Galicia  : 1950  MRN: [id number]    Today's Date: 6/15/2017    Visit Dx:    ICD-10-CM ICD-9-CM   1. S/p reverse total shoulder arthroplasty, right Z96.611 V43.61   2. Right shoulder pain, unspecified chronicity M25.511 719.41             PT OP Goals       06/15/17 1300       PT Short Term Goals    STG 1 Patient will be independent with education for symptom management, joint protection and strategies to minimize stress on affected tissues  -     STG 1 Progress Not Met  -     STG 2 Pt to improve R shoulder ROM in flex=125/140 deg, abd=110/125 deg, ER=neck/60 deg and IR=sacrum/80 deg  -     STG 2 Progress Not Met  -     Long Term Goals    LTG 1 Pt to improve R shoulder ROM in flex=135/150 deg, abd=125/140 deg, ER=T1/65 deg and IR=mid lumbar  -     LTG 1 Progress Not Met  -     LTG 2 Pt to improve pain complaints to no more than 3/10 with ADLs  -     LTG 2 Progress Not Met  -     LTG 3 Pt to improve shoulder strength to 4/5  -     LTG 3 Progress Not Met  -     LTG 4 Pt to improve DASH score by 10% for overall functional improvement  -     LTG 4 Progress Not Met  -     LTG 5 Patient will be independent and compliant with advanced home exercise program to facilitate self-management of symptoms.  -     LTG 5 Progress Not Met  -       User Key  (r) = Recorded By, (t) = Taken By, (c) = Cosigned By    Initials Name Provider Type     Anna Rosa, PT Physical Therapist          OP PT Discharge Summary  Date of Discharge: 17  Reason for Discharge: Patient/Caregiver request  Outcomes Achieved: Other, Discharge from facility occurred on same date as evluation  Discharge Destination: Unknown  Discharge Instructions: Pt called to cancel all therapy appts due to her co-pay. She was advised that she could do a payment plan, but did not want to pursue that      Time Calculation:                    Anna ORTEGA  Moe, PT  6/15/2017

## 2017-06-27 RX ORDER — ALLOPURINOL 100 MG/1
TABLET ORAL
Qty: 30 TABLET | Refills: 1 | Status: SHIPPED | OUTPATIENT
Start: 2017-06-27 | End: 2017-08-02

## 2017-07-21 RX ORDER — MONTELUKAST SODIUM 10 MG/1
TABLET ORAL
Qty: 90 TABLET | Refills: 1 | Status: SHIPPED | OUTPATIENT
Start: 2017-07-21 | End: 2018-01-18 | Stop reason: SDUPTHER

## 2017-08-02 ENCOUNTER — CLINICAL SUPPORT (OUTPATIENT)
Dept: ORTHOPEDIC SURGERY | Facility: CLINIC | Age: 67
End: 2017-08-02

## 2017-08-02 VITALS — WEIGHT: 175 LBS | TEMPERATURE: 97.6 F | BODY MASS INDEX: 33.04 KG/M2 | HEIGHT: 61 IN

## 2017-08-02 DIAGNOSIS — M17.11 ARTHRITIS OF RIGHT KNEE: Primary | ICD-10-CM

## 2017-08-02 PROCEDURE — 20610 DRAIN/INJ JOINT/BURSA W/O US: CPT | Performed by: NURSE PRACTITIONER

## 2017-08-02 RX ORDER — METHYLPREDNISOLONE ACETATE 80 MG/ML
80 INJECTION, SUSPENSION INTRA-ARTICULAR; INTRALESIONAL; INTRAMUSCULAR; SOFT TISSUE
Status: COMPLETED | OUTPATIENT
Start: 2017-08-02 | End: 2017-08-02

## 2017-08-02 RX ORDER — LIDOCAINE HYDROCHLORIDE 10 MG/ML
2 INJECTION, SOLUTION INFILTRATION; PERINEURAL
Status: COMPLETED | OUTPATIENT
Start: 2017-08-02 | End: 2017-08-02

## 2017-08-02 RX ORDER — BUPIVACAINE HYDROCHLORIDE 5 MG/ML
2 INJECTION, SOLUTION PERINEURAL
Status: COMPLETED | OUTPATIENT
Start: 2017-08-02 | End: 2017-08-02

## 2017-08-02 RX ORDER — CEFDINIR 300 MG/1
300 CAPSULE ORAL
COMMUNITY
Start: 2017-07-29 | End: 2017-08-05

## 2017-08-02 RX ADMIN — METHYLPREDNISOLONE ACETATE 80 MG: 80 INJECTION, SUSPENSION INTRA-ARTICULAR; INTRALESIONAL; INTRAMUSCULAR; SOFT TISSUE at 13:06

## 2017-08-02 RX ADMIN — BUPIVACAINE HYDROCHLORIDE 2 ML: 5 INJECTION, SOLUTION PERINEURAL at 13:06

## 2017-08-02 RX ADMIN — LIDOCAINE HYDROCHLORIDE 2 ML: 10 INJECTION, SOLUTION INFILTRATION; PERINEURAL at 13:06

## 2017-08-02 NOTE — PROGRESS NOTES
Knee Follow Up Visit      Patient: Kristan Galicia  YOB: 1950    Chief Complaints:  right knee pain    Subjective:    History of Present Illness: Here today for knee pain. The pain is a generalized joint tenderness.  It has been progressive in nature but remains intermittent.  Worsened by prolonged standing or walking and squatting activities. Has had improvement in the past with ice/heat, rest, and injections.     This problem is not new to this examiner.     Allergies:   Allergies   Allergen Reactions   • Adhesive Tape Other (See Comments)   • Aspirin Swelling   • Biaxin [Clarithromycin] Other (See Comments)     BLISTERS AROUND MOTH  Also known as Bioxen    • Codeine Swelling   • Darvon [Propoxyphene] Swelling   • Levaquin [Levofloxacin] Other (See Comments)     BLISTERS AROUND MOUTH   • Tequin [Gatifloxacin] Other (See Comments)     BLISTERS AROUND MOUTH  Also known Tequine        Medications:   Home Medications:  Current Outpatient Prescriptions on File Prior to Visit   Medication Sig   • alendronate (FOSAMAX) 70 MG tablet Take 1 tablet by mouth Every 7 (Seven) Days. Hold until can be discussed with Dr. Anne as to when to restart medication   • allopurinol (ZYLOPRIM) 100 MG tablet TAKE 1 TABLET BY MOUTH DAILY.   • amiodarone (PACERONE) 200 MG tablet Take 100 mg by mouth Every Night.   • atorvastatin (LIPITOR) 80 MG tablet Take 1 tablet by mouth Every Night.   • baclofen (LIORESAL) 10 MG tablet Take 1 tablet by mouth Every Night. (Patient taking differently: Take 10 mg by mouth At Night As Needed.)   • Blood Glucose Monitoring Suppl (ACCU-CHEK OTILIA PLUS) W/DEVICE kit Dx e11.9 use to check BS BID, ok to substitute formulary preferred   • carvedilol (COREG) 12.5 MG tablet Take 12.5 mg by mouth 2 (Two) Times a Day.   • dabigatran etexilate (PRADAXA) 150 MG capsu Take 1 capsule by mouth 2 (Two) Times a Day. Resume 2/4/17   • DULoxetine (CYMBALTA) 60 MG capsule TAKE ONE CAPSULE BY MOUTH EVERY DAY    • gabapentin (NEURONTIN) 300 MG capsule TAKE ONE CAPSULE BY MOUTH 3 TIMES A DAY   • glucose blood (ACCU-CHEK OTILIA PLUS) test strip Dx e11.9 use to check BS BID, ok to substitute formulary preferred   • Lancets (ACCU-CHEK MULTICLIX) lancets Dx e11.9 use to check BS BID, ok to substitute formulary preferred   • levothyroxine (SYNTHROID) 88 MCG tablet Take 1 tablet by mouth Daily.   • lidocaine (LIDODERM) 5 % Place 1 patch on the skin Every 12 (Twelve) Hours. Remove & Discard patch within 12 hours or as directed by MD (Patient taking differently: Place 1 patch on the skin Daily As Needed. On 12 hrs, off 12 hr)   • metFORMIN (GLUCOPHAGE) 500 MG tablet 1 PO with largest meal   • montelukast (SINGULAIR) 10 MG tablet TAKE 1 TABLET BY MOUTH EVERY DAY   • omeprazole (PRILOSEC) 40 MG capsule Take 1 capsule by mouth Daily.   • spironolactone-hydrochlorothiazide (ALDACTAZIDE) 25-25 MG tablet TAKE 1 TABLET EVERY DAY   • traMADol (ULTRAM) 50 MG tablet TAKE 1 TABLET BY MOUTH TWICE A DAY FOR PAIN   • traZODone (DESYREL) 50 MG tablet TAKE 1 TABLET BY MOUTH AT BEDTIME   • [DISCONTINUED] ferrous sulfate 325 (65 FE) MG tablet Take 1 tablet by mouth 2 (Two) Times a Day With Meals. With food   • [DISCONTINUED] allopurinol (ZYLOPRIM) 100 MG tablet TAKE 1 TABLET BY MOUTH DAILY.     No current facility-administered medications on file prior to visit.      Current Medications:  Scheduled Meds:  Continuous Infusions:  No current facility-administered medications for this visit.   PRN Meds:.    I have reviewed the patient's medical history in detail and updated the computerized patient record.  Review and summarization of old records include:    Past Medical History:   Diagnosis Date   • Anemia    • Atrial fibrillation    • Cardiac defibrillator in place 01/2009    medtronic    • Cardiomyopathy     Hypertrophic Cardiomyopathy   • CHF (congestive heart failure)    • Coronary artery disease    • Diabetes mellitus     Type II   •  Diverticulitis    • Diverticulosis     hx diverticulitis   • GERD (gastroesophageal reflux disease)    • Heart murmur    • History of depression    • History of tumor     left ocular tumor, treated with steroids   • Hyperlipidemia    • Migraines    • Osteoporosis    • Palpitations    • Sleep apnea     does not use CPAP or BiPAP, used nose strips   • Thyroid disease    • Vasovagal syncope    • Vertigo    • Vitamin D deficiency         Past Surgical History:   Procedure Laterality Date   • APPENDECTOMY  1970   • BREAST BIOPSY Bilateral     Dr. Gonsales   • BREAST RECONSTRUCTION Bilateral 1988    Reduction   • CARDIAC CATHETERIZATION  02/19/2007   • CARDIAC DEFIBRILLATOR PLACEMENT Left 1999    Dr. Ya   • CATARACT EXTRACTION Bilateral    • COLONOSCOPY  1996    Dr. Herbert Gonsales    • COLONOSCOPY  8/16/2016    Procedure: COLONOSCOPY with cecum;  Surgeon: Rosalio Sheikh MD;  Location: [facility] ENDOSCOPY;  Service:    • ENDOSCOPY N/A 4/27/2017    Procedure: ESOPHAGOGASTRODUODENOSCOPY WITH BIOPSIES;  Surgeon: Rosalio Sheikh MD;  Location: [facility] ENDOSCOPY;  Service:    • FEMUR SURGERY Left     with metal implant  x3   • FIRST RIB RESECTION Bilateral     and scalenectomy   • HYSTERECTOMY  1989    Total   • KNEE ARTHROSCOPY Bilateral 2014    Dr. Li   • KNEE SURGERY Right 06/10/2010   • LAPAROSCOPIC CHOLECYSTECTOMY  1974   • NECK SURGERY  08/23/2012    Anterior cervical diskectomy and fusion with VG2 allograft implants and eagle anterior plate fixation,C5-C6 C6-C7   • ROTATOR CUFF REPAIR Right 06/10/2010   • SHOULDER ARTHROSCOPY W/ ROTATOR CUFF REPAIR Bilateral 05/20/2011   • SHOULDER SURGERY Right    • TONSILLECTOMY     • TOTAL SHOULDER ARTHROPLASTY W/ DISTAL CLAVICLE EXCISION Right 2/2/2017    Procedure: TOTAL SHOULDER REVERSE ARTHROPLASTY;  Surgeon: Juan Antonio Anne MD;  Location: [facility] MAIN OR;  Service:         Social History     Occupational History   •       Social History Main Topics    • Smoking status: Former Smoker     Packs/day: 0.50     Years: 17.00     Quit date: 9/8/1985   • Smokeless tobacco: Never Used   • Alcohol use No   • Drug use: No   • Sexual activity: Defer    Social History     Social History Narrative        Family History   Problem Relation Age of Onset   • Heart attack Brother    • Heart disease Brother    • Hyperthyroidism Mother    • Cancer Mother    • Heart disease Mother    • Osteoporosis Mother    • No Known Problems Father       car accident   • Cirrhosis Maternal Grandmother    • No Known Problems Maternal Grandfather    • No Known Problems Paternal Grandmother    • No Known Problems Paternal Grandfather    • Breast cancer Maternal Aunt        ROS: 14 point review of systems was performed and was negative except for documented findings in HPI and today's encounter.     Allergies:   Allergies   Allergen Reactions   • Adhesive Tape Other (See Comments)   • Aspirin Swelling   • Biaxin [Clarithromycin] Other (See Comments)     BLISTERS AROUND MOTH  Also known as Bioxen    • Codeine Swelling   • Darvon [Propoxyphene] Swelling   • Levaquin [Levofloxacin] Other (See Comments)     BLISTERS AROUND MOUTH   • Tequin [Gatifloxacin] Other (See Comments)     BLISTERS AROUND MOUTH  Also known Tequine      Constitutional:  Denies fever, shaking or chills   Eyes:  Denies change in visual acuity   HENT:  Denies nasal congestion or sore throat   Respiratory:  Denies cough or shortness of breath   Cardiovascular:  Denies chest pain or severe LE edema   GI:  Denies abdominal pain, nausea, vomiting, bloody stools or diarrhea   Musculoskeletal:  Numbness, tingling, or loss of motor function only as noted above in history of present illness.  : Denies painful urination or hematuria  Integument:  Denies rash, lesion or ulceration   Neurologic:  Denies headache or focal weakness  Endocrine:  Denies lymphadenopathy  Psych:  Denies confusion or change in mental status   Hem:  Denies active  bleeding    Physical Exam:  Body mass index is 33.07 kg/(m^2).  Vitals:    08/02/17 1303   Temp: 97.6 °F (36.4 °C)     Vital Signs:  reviewed  Constitutional: Awake alert and oriented x3, well developed, no acute distress, non-toxic appearance.  EYES: symmetric, sclera clear  ENT:  Normocephalic, Atraumatic.   Respiratory:  No respiratory distress, No wheezing  CV: pulse regular, no palpitations or pallor.  GI:  Abdomen soft, non-tender.   Vascular:  Intact distal pulses, No cyanosis, no signs or symptoms of DVT.  Neurologic: Sensation grossly intact to the involved extremity, No focal deficits noted.   Neck: No tenderness, Supple.  Integument: warm, dry, no ulcerations.   Psychiatric:  Oriented, no pathological affect.  Musculoskeletal:    Affected knee(s):  Painful gait with a subtle limp, positive for synovitis, swelling, joint effusion with crepitation.  Lachman negative  Posterior drawer negative  Socrates's negative  Patellofemoral grind +  Sensation grossly intact to light touch throughout the lower extremity  Skin is intact  Distal pulses are palpable  No signs or symptoms of DVT        Diagnostic Data:     Imaging was done previously in the office and discussed at length with the patient:    Indication: pain related symptoms,  Views: 3V AP, LAT & 40 degree PA right knee(s)   Findings: severe end-stage arthritis (bone on bone, subchondral sclerosis/cysts, osteophytes)  Comparison views: viewed last xray done in the office.     Procedure:  Large Joint Arthrocentesis  Date/Time: 8/2/2017 1:06 PM  Consent given by: patient  Site marked: site marked  Timeout: Immediately prior to procedure a time out was called to verify the correct patient, procedure, equipment, support staff and site/side marked as required   Supporting Documentation  Indications: pain and joint swelling   Procedure Details  Location: knee - R knee  Preparation: Patient was prepped and draped in the usual sterile fashion  Needle size: 22  G  Approach: anterolateral  Medications administered: 2 mL bupivacaine; 2 mL lidocaine 1 %; 80 mg methylPREDNISolone acetate 80 MG/ML  Patient tolerance: patient tolerated the procedure well with no immediate complications          Assessment. Severe arthritis right knee(s).      Plan: Is to proceed with injection  Follow up as indicated.  Ice, elevate, and rest as needed.  Additional interventions include: Biomechanics of pertinent body area discussed.  Risks, benefits, alternatives, comparisons, and complications of accepted medicines, injections, recommendations, surgical procedures, and therapies explained and education provided in laymen's terms. The patient was given the opportunity to ask questions and they were answerved to their satisfaction.   Natural history and expected course discussed. Questions answered.  Advice on benefits of, and types of regular/moderate exercise.  Lifestyle measures for weight loss with biomechanical explanations for weight loss and how this affects orthopedic condition.  Cortisone Injection. See procedure note.  OTC analgesics as needed with dosage warning and instructions given.  Cryotherapy/brachy therapy as indicated with instructions.   10 min spent face to face with patient >5 min spent counseling about natural history and expected course of assessed complaint. Questions answered.  Advised on knee strengthening exercises, stressed importance of these with progression of arthritis, demonstrated exercises to patient with repeat demonstration and verb of understanding.     8/2/2017  MJR

## 2017-08-02 NOTE — PATIENT INSTRUCTIONS
[facility]    KNEE HOME EXERCISES      It is important that you maintain and possibly improve your strength and range of motion of your knee.      •  Walk frequently for short intervals  •  Using a cane or walker to allow you to walk safely if needed  •  Avoid sitting for long periods of time to avoid cramping and swelling of your leg   •  Do exercises either on a bed or in a chair.  •  If any of the exercises cause you pain, either eliminate that exercise or decrease          the motion or repetitions      Start exercises with 10 repetitions and increase to 30 repetitions.  Do two sets of each exercise each day    ANKLE PUMPS    1. Move your feet up and down as if you are pumping on a gas pedal       STRAIGHT LEG RAISES    1. Lie flat with your injured leg straight.  Keep the other leg bent.  2. Tighten the injured leg's thigh muscle.  3. Lift leg, with knee locked in the straight position no higher than the other knee for  seconds  4. Lower leg slowly. Rest for 5 seconds      SHORT ARC QUAD    1. Lie with both knees bent over a pillow  2. Straighten both knees  3. Hold 5 seconds  4. Bend knees back to starting position and repeat sequence       QUADRICEPS     1. Lie flat with your injured let straight.  Keep the other leg bent.  2. Tighten the injured leg's thigh muscle by trying to move your kneecap toward the  thigh.  3. Make your leg as stiff as you can.  4. Hold for 5 seconds and relax for 5 seconds        HAMSTRING STRETCH    1. Lie flat with your injured leg straight. Keep the other leg bent  2. Press your heel of your injured leg into the floor for 5 seconds       OR  1. Sit with injured leg out straight.   2. Loop a sheet around the ball of foot and toes.  3. Gently pull on the sheet, keeping the leg straight  4. Hold for 10-30 seconds      KNEE FLEXION-EXTENSION SITTING     1. Sit with injured let bent as shown  2. Straighten leg at the knee  3. Hold 5 seconds  4. Bend knee back  to starting position   5. Rest for 2-5 seconds and repeat sequence       HEEL SLIDES     1. Lie on back with legs straight  2. Slide heels toward buttocks  3. Return to starting position       HEEL SLIDS WITH SHEET    1. Lie on your back with a sheet wrapped around your foot  2. Pull on the sheet to assist you in bending your knee and sliding your heel toward  your buttocks  3. Hold for 5 seconds        KNEE FLEXION STRETCH    1. Sit in a chair  2. Bend bad knee back as far as you can   3. Hold stretch for 5-10 seconds      CHAIR PUSH UPS    1. Sit in a chair with arm rests  2. Place hands on armrests  3. Straighten arms, raising buttocks up off of chair         WALL SLIDES    1. Stand with your back and head against a wall.    2. Keep your feet shoulder width apart and 6-12 inches from the wall  3. Slowly slide down the wall until your knees are slightly bent.    4. Hold for 5 seconds and then slowly slide back up  5. As you get stronger, then you can slowly increase the bend in your knees, but do  not drop your buttocks below the level of your knees       STEP UPS    1. Stand with your foot on your injured leg on a 3-5 inch support (such as a block of  wood)  2. Place other foot on the floor  3. Shift your weight onto the foot on the block and try to straighten the knee and  raising the other foot off of the floor  4. Slowly lower your foot until only the heel touches the floor

## 2017-08-07 ENCOUNTER — OFFICE VISIT (OUTPATIENT)
Dept: FAMILY MEDICINE CLINIC | Facility: CLINIC | Age: 67
End: 2017-08-07

## 2017-08-07 VITALS
DIASTOLIC BLOOD PRESSURE: 84 MMHG | SYSTOLIC BLOOD PRESSURE: 132 MMHG | BODY MASS INDEX: 33.79 KG/M2 | HEIGHT: 61 IN | WEIGHT: 179 LBS | HEART RATE: 94 BPM | OXYGEN SATURATION: 97 % | TEMPERATURE: 98.3 F

## 2017-08-07 DIAGNOSIS — E11.42 TYPE 2 DIABETES MELLITUS WITH DIABETIC POLYNEUROPATHY, WITHOUT LONG-TERM CURRENT USE OF INSULIN (HCC): ICD-10-CM

## 2017-08-07 DIAGNOSIS — N30.90 CYSTITIS: Primary | ICD-10-CM

## 2017-08-07 PROBLEM — I42.2 NON-OBSTRUCTIVE HYPERTROPHIC CARDIOMYOPATHY (HCC): Status: ACTIVE | Noted: 2017-08-07

## 2017-08-07 PROBLEM — E11.9 TYPE 2 DIABETES MELLITUS WITHOUT COMPLICATION, WITHOUT LONG-TERM CURRENT USE OF INSULIN: Status: ACTIVE | Noted: 2017-08-07

## 2017-08-07 PROBLEM — I25.10 CAD (CORONARY ARTERY DISEASE): Status: ACTIVE | Noted: 2017-08-07

## 2017-08-07 PROBLEM — Z78.0 MENOPAUSE: Status: ACTIVE | Noted: 2017-08-07

## 2017-08-07 LAB
BACTERIA UR QL AUTO: ABNORMAL /HPF
BILIRUB UR QL STRIP: NEGATIVE
CLARITY UR: CLEAR
COLOR UR: YELLOW
GLUCOSE BLDC GLUCOMTR-MCNC: 233 MG/DL (ref 70–130)
GLUCOSE UR STRIP-MCNC: ABNORMAL MG/DL
HGB UR QL STRIP.AUTO: NEGATIVE
KETONES UR QL STRIP: NEGATIVE
LEUKOCYTE ESTERASE UR QL STRIP.AUTO: ABNORMAL
NITRITE UR QL STRIP: NEGATIVE
PH UR STRIP.AUTO: 5.5 [PH] (ref 4.6–8)
PROT UR QL STRIP: NEGATIVE
RBC # UR: ABNORMAL /HPF
REF LAB TEST METHOD: ABNORMAL
SP GR UR STRIP: 1.02 (ref 1–1.03)
SQUAMOUS #/AREA URNS HPF: ABNORMAL /HPF
UROBILINOGEN UR QL STRIP: ABNORMAL
WBC UR QL AUTO: ABNORMAL /HPF

## 2017-08-07 PROCEDURE — 81001 URINALYSIS AUTO W/SCOPE: CPT | Performed by: NURSE PRACTITIONER

## 2017-08-07 PROCEDURE — 99213 OFFICE O/P EST LOW 20 MIN: CPT | Performed by: NURSE PRACTITIONER

## 2017-08-07 PROCEDURE — 82962 GLUCOSE BLOOD TEST: CPT | Performed by: NURSE PRACTITIONER

## 2017-08-07 RX ORDER — METFORMIN HYDROCHLORIDE EXTENDED-RELEASE TABLETS 1000 MG/1
1000 TABLET, FILM COATED, EXTENDED RELEASE ORAL
Qty: 30 TABLET | Refills: 2 | Status: SHIPPED | OUTPATIENT
Start: 2017-08-07 | End: 2018-02-26 | Stop reason: SDUPTHER

## 2017-08-07 NOTE — PATIENT INSTRUCTIONS
suspect r/t uncontrolled BS increase metformin ER 1000 mg daily and monitor GI upset and check BS BID and call with log in a week, Wipe front to back, increase H20 8 glasses day, enc freq toileting

## 2017-08-16 ENCOUNTER — APPOINTMENT (OUTPATIENT)
Dept: GENERAL RADIOLOGY | Facility: HOSPITAL | Age: 67
End: 2017-08-16

## 2017-08-16 ENCOUNTER — HOSPITAL ENCOUNTER (EMERGENCY)
Facility: HOSPITAL | Age: 67
Discharge: HOME | End: 2017-08-16
Attending: EMERGENCY MEDICINE | Admitting: EMERGENCY MEDICINE

## 2017-08-16 VITALS
TEMPERATURE: 98.2 F | HEIGHT: 62 IN | WEIGHT: 179 LBS | SYSTOLIC BLOOD PRESSURE: 123 MMHG | BODY MASS INDEX: 32.94 KG/M2 | RESPIRATION RATE: 16 BRPM | DIASTOLIC BLOOD PRESSURE: 64 MMHG | OXYGEN SATURATION: 96 % | HEART RATE: 88 BPM
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
## 2017-08-16 DIAGNOSIS — S40.022A ARM CONTUSION, LEFT, INITIAL ENCOUNTER: ICD-10-CM

## 2017-08-16 DIAGNOSIS — S80.02XA CONTUSION OF LEFT KNEE, INITIAL ENCOUNTER: ICD-10-CM

## 2017-08-16 DIAGNOSIS — S63.502A LEFT WRIST SPRAIN, INITIAL ENCOUNTER: ICD-10-CM

## 2017-08-16 DIAGNOSIS — S16.1XXA CERVICAL STRAIN, INITIAL ENCOUNTER: ICD-10-CM

## 2017-08-16 PROCEDURE — 73060 X-RAY EXAM OF HUMERUS: CPT

## 2017-08-16 PROCEDURE — 73130 X-RAY EXAM OF HAND: CPT

## 2017-08-16 PROCEDURE — 73110 X-RAY EXAM OF WRIST: CPT

## 2017-08-16 PROCEDURE — 72050 X-RAY EXAM NECK SPINE 4/5VWS: CPT

## 2017-08-16 PROCEDURE — 73562 X-RAY EXAM OF KNEE 3: CPT

## 2017-08-16 PROCEDURE — 99283 EMERGENCY DEPT VISIT LOW MDM: CPT

## 2017-08-16 RX ORDER — HYDROCODONE BITARTRATE AND ACETAMINOPHEN 5; 325 MG/1; MG/1
1 TABLET ORAL EVERY 6 HOURS PRN
Qty: 20 TABLET | Refills: 0 | Status: SHIPPED | OUTPATIENT
Start: 2017-08-16 | End: 2017-12-13

## 2017-08-16 RX ORDER — HYDROCODONE BITARTRATE AND ACETAMINOPHEN 7.5; 325 MG/1; MG/1
1 TABLET ORAL ONCE
Status: COMPLETED | OUTPATIENT
Start: 2017-08-16 | End: 2017-08-16

## 2017-08-16 RX ADMIN — HYDROCODONE BITARTRATE AND ACETAMINOPHEN 1 TABLET: 7.5; 325 TABLET ORAL at 18:26

## 2017-08-16 NOTE — ED PROVIDER NOTES
" EMERGENCY DEPARTMENT ENCOUNTER    CHIEF COMPLAINT  Chief Complaint: L shoulder pain  History given by: Pt  History limited by: Nothing  Room Number: 29/29  PMD: CHRISTIN Mckee      HPI:  Pt is a 66 y.o. female who presents complaining of L shoulder pain worsened with movement secondary to a mechanical fall one day ago. Pt also complains of L knee pain and L wrist pain but denies back, neck pain, generalized numbness, generalized tingling, or any other symptoms at this time.    Duration:  One day  Onset: sudden  Timing: constant  Location: L shoulder  Radiation: none  Quality: \"pain\"  Intensity/Severity: moderate  Progression: unchanged  Associated Symptoms:  L knee pain and L wrist pain  Aggravating Factors: movement  Alleviating Factors: none  Previous Episodes: none  Treatment before arrival: Pt received no treatment PTA.    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   • Migraines    • Hyperlipidemia    • History of transfusion    • Heart murmur    • Heart attack    • GERD (gastroesophageal reflux disease)    • Depression    • CHF (congestive heart failure)    • Cardiomyopathy    • Asthma    • Anxiety    • Arthritis of right knee 12/27/2016   • Right rotator cuff tear 02/02/2017   • Hypothyroidism 02/22/2017   • Dyspnea and respiratory abnormalities 03/03/2017   • Orthopnea 03/03/2017   • IHSS (idiopathic hypertrophic subaortic stenosis) 03/03/2017   • AICD (automatic cardioverter/defibrillator) present 03/03/2017   • Anticoagulant long-term use 03/03/2017   • RSV bronchiolitis 03/06/2017   • " Hyperglycemia, drug-induced 03/06/2017   • Chronic pain of right knee 03/17/2017   • Diverticulitis 04/05/2016   • CAD (coronary artery disease) 08/07/2017   • Hypertrophic nonobstructive cardiomyopathy 06/25/2013   • ICD (implantable cardioverter-defibrillator) discharge 04/04/2016   • Menopause 08/07/2017   • Non-obstructive hypertrophic cardiomyopathy 08/07/2017   • Type 2 diabetes mellitus without complication, without long-term current use of insulin 08/07/2017     Resolved Ambulatory Problems     Diagnosis Date Noted   • No Resolved Ambulatory Problems     Past Medical History:   Diagnosis Date   • Anemia    • Atrial fibrillation    • Cardiac defibrillator in place 01/2009   • Cardiomyopathy    • CHF (congestive heart failure)    • Coronary artery disease    • Diabetes mellitus    • Diverticulitis    • Diverticulosis    • GERD (gastroesophageal reflux disease)    • Heart murmur    • History of depression    • History of tumor    • Hyperlipidemia    • Migraines    • Osteoporosis    • Palpitations    • Sleep apnea    • Thyroid disease    • Vasovagal syncope    • Vertigo    • Vitamin D deficiency        PAST SURGICAL HISTORY  Past Surgical History:   Procedure Laterality Date   • APPENDECTOMY  1970   • BREAST BIOPSY Bilateral     Dr. Gonsales   • BREAST RECONSTRUCTION Bilateral 1988    Reduction   • CARDIAC CATHETERIZATION  02/19/2007   • CARDIAC DEFIBRILLATOR PLACEMENT Left 1999    Dr. Ya   • CATARACT EXTRACTION Bilateral    • COLONOSCOPY  1996    Dr. Herbert Gonsales    • COLONOSCOPY  8/16/2016    Procedure: COLONOSCOPY with cecum;  Surgeon: Rosalio Sheikh MD;  Location: [facility] ENDOSCOPY;  Service:    • ENDOSCOPY N/A 4/27/2017    Procedure: ESOPHAGOGASTRODUODENOSCOPY WITH BIOPSIES;  Surgeon: Rosalio Sheikh MD;  Location: [facility] ENDOSCOPY;  Service:    • FEMUR SURGERY Left     with metal implant  x3   • FIRST RIB RESECTION Bilateral     and scalenectomy   • HYSTERECTOMY  1989    Total   • KNEE ARTHROSCOPY  Bilateral 2014    Dr. Li   • KNEE SURGERY Right 06/10/2010   • LAPAROSCOPIC CHOLECYSTECTOMY  1974   • NECK SURGERY  08/23/2012    Anterior cervical diskectomy and fusion with VG2 allograft implants and eagle anterior plate fixation,C5-C6 C6-C7   • ROTATOR CUFF REPAIR Right 06/10/2010   • SHOULDER ARTHROSCOPY W/ ROTATOR CUFF REPAIR Bilateral 05/20/2011   • SHOULDER SURGERY Right    • TONSILLECTOMY     • TOTAL SHOULDER ARTHROPLASTY W/ DISTAL CLAVICLE EXCISION Right 2/2/2017    Procedure: TOTAL SHOULDER REVERSE ARTHROPLASTY;  Surgeon: Juan Antonio Anne MD;  Location: [facility] OR;  Service:        FAMILY HISTORY  Family History   Problem Relation Age of Onset   • Heart attack Brother    • Heart disease Brother    • Hyperthyroidism Mother    • Cancer Mother    • Heart disease Mother    • Osteoporosis Mother    • No Known Problems Father       car accident   • Cirrhosis Maternal Grandmother    • No Known Problems Maternal Grandfather    • No Known Problems Paternal Grandmother    • No Known Problems Paternal Grandfather    • Breast cancer Maternal Aunt        SOCIAL HISTORY  Social History     Social History   • Marital status: Single     Spouse name: N/A   • Number of children: 1   • Years of education: N/A     Occupational History   •       Social History Main Topics   • Smoking status: Former Smoker     Packs/day: 0.50     Years: 17.00     Quit date: 9/8/1985   • Smokeless tobacco: Never Used   • Alcohol use No   • Drug use: No   • Sexual activity: Defer     Other Topics Concern   • Not on file     Social History Narrative       ALLERGIES  Adhesive tape; Aspirin; Biaxin [clarithromycin]; Codeine; Darvon [propoxyphene]; Levaquin [levofloxacin]; and Tequin [gatifloxacin]    REVIEW OF SYSTEMS  Review of Systems   Constitutional: Negative for fever.   HENT: Negative for sore throat.    Eyes: Negative.    Respiratory: Negative for cough and shortness of breath.    Cardiovascular: Negative for  chest pain.   Gastrointestinal: Negative for abdominal pain, diarrhea and vomiting.   Genitourinary: Negative for dysuria.   Musculoskeletal: Negative for neck pain.        Pt complains of L shoulder pain, L wrist pain, and L knee pain.   Skin: Negative for rash.   Allergic/Immunologic: Negative.    Neurological: Negative for weakness, numbness and headaches.   Hematological: Negative.    Psychiatric/Behavioral: Negative.    All other systems reviewed and are negative.      PHYSICAL EXAM  ED Triage Vitals   Temp Heart Rate Resp BP SpO2   08/16/17 1626 08/16/17 1626 08/16/17 1626 08/16/17 1627 08/16/17 1626   98.2 °F (36.8 °C) 88 16 123/64 96 %      Temp src Heart Rate Source Patient Position BP Location FiO2 (%)   08/16/17 1626 08/16/17 1626 08/16/17 1627 08/16/17 1627 --   Tympanic Monitor Standing Right arm        Physical Exam   Constitutional: She is oriented to person, place, and time and well-developed, well-nourished, and in no distress. No distress.   HENT:   Head: Normocephalic and atraumatic.   Eyes: EOM are normal. Pupils are equal, round, and reactive to light.   Neck: Normal range of motion. Neck supple.   Cardiovascular: Normal rate, regular rhythm and normal heart sounds.    Pulmonary/Chest: Effort normal and breath sounds normal. No respiratory distress.   Abdominal: Soft. There is no tenderness. There is no rebound and no guarding.   Musculoskeletal: Normal range of motion. She exhibits no edema.        Left wrist: She exhibits tenderness (and pain increased with movement).        Left hand: She exhibits tenderness (and pain increased with movement).   Pt has tenderness and swelling to L proximal humeral area.   Neurological: She is alert and oriented to person, place, and time. She has normal sensation and normal strength.   Skin: Skin is warm and dry. No rash noted.   Psychiatric: Mood and affect normal.   Nursing note and vitals reviewed.      LAB RESULTS  Lab Results (last 24 hours)     ** No  results found for the last 24 hours. **          I ordered the above labs and reviewed the results    RADIOLOGY  XR Spine Cervical Complete 4 or 5 View   Final Result   XR C Spine shows NAD.   XR Humerus Left   Final Result   XR Humerus shows NAD.   XR Knee 3 View Left   Final Result       No acute fracture identified. Chronic, degenerative, postsurgical   changes. If there is further clinical concern, MRI could be considered   for further evaluation.       This report was finalized on 8/16/2017 5:38 PM by Dr. Fabien Lynn MD.          XR Wrist 3+ View Left   Final Result       No definite acute fracture is identified in the left hand or wrist.   Possible old injury of the second distal interphalangeal joint, as   described, correlate clinically.       If there is clinical suspicion for radiographically occult fracture, MRI   could be considered for further evaluation.       This report was finalized on 8/16/2017 5:36 PM by Dr. Fabien Lynn MD.          XR Hand 3+ View Left   Final Result       No definite acute fracture is identified in the left hand or wrist.   Possible old injury of the second distal interphalangeal joint, as   described, correlate clinically.       If there is clinical suspicion for radiographically occult fracture, MRI   could be considered for further evaluation.       This report was finalized on 8/16/2017 5:36 PM by Dr. Fabien Lynn MD.               I ordered the above noted radiological studies. Interpreted by radiologist. Reviewed by me in PACS.       PROCEDURES  Procedures      PROGRESS AND CONSULTS  ED Course     1636 Ordered XR C Spine, XR L Humerus, XR L Hand, XR L Wrist, and XR L Knee for further evaluation    1816 BP- 123/64 HR- 88 Temp- 98.2 °F (36.8 °C) (Tympanic) O2 sat- 96%. Rechecked the patient who is in NAD and is resting comfortably. Informed pt of negative imaging results. Suggested pt follow up with PCP. Will discharge with hydrocodone for pain  relief. Pt understands and agrees with the plan. All questions answered.      MEDICAL DECISION MAKING  Results were reviewed/discussed with the patient and they were also made aware of online access. Pt also made aware that some labs, such as cultures, will not be resulted during ER visit and follow up with PMD is necessary.     MDM  Number of Diagnoses or Management Options     Amount and/or Complexity of Data Reviewed  Tests in the radiology section of CPT®: reviewed and ordered (XR C Spine, XR L Humerus, XR L Hand XR L Wrist, and XR L Knee shows NAD.)  Decide to obtain previous medical records or to obtain history from someone other than the patient: yes  Review and summarize past medical records: yes  Independent visualization of images, tracings, or specimens: yes    Patient Progress  Patient progress: stable         DIAGNOSIS  Final diagnoses:   Fall, initial encounter   Arm contusion, left, initial encounter   Left wrist sprain, initial encounter   Cervical strain, initial encounter   Contusion of left knee, initial encounter       DISPOSITION  DISCHARGE    Patient discharged in stable condition.    Reviewed implications of results, diagnosis, meds, responsibility to follow up, warning signs and symptoms of possible worsening, potential complications and reasons to return to ER, including new or worsening symptoms.    Patient/Family voiced understanding of above instructions.    Discussed plan for discharge, as there is no emergent indication for admission.  Pt/family is agreeable and understands need for follow up and repeat testing.  Pt is aware that discharge does not mean that nothing is wrong but it indicates no emergency is present that requires admission and they must continue care with follow-up as given below or physician of their choice.     FOLLOW-UP  Chelly Red, APRN  [address] [facility] [address] [phone]    Schedule an appointment as soon as possible for a visit  If  symptoms worsen         Medication List      New Prescriptions          HYDROcodone-acetaminophen 5-325 MG per tablet   Commonly known as:  NORCO   Take 1 tablet by mouth Every 6 (Six) Hours As Needed for Moderate Pain .         Changed          baclofen 10 MG tablet   Commonly known as:  LIORESAL   Take 1 tablet by mouth Every Night.   What changed:    - when to take this  - reasons to take this       lidocaine 5 %   Commonly known as:  LIDODERM   Place 1 patch on the skin Every 12 (Twelve) Hours. Remove & Discard patch   within 12 hours or as directed by MD   What changed:    - when to take this  - reasons to take this  - additional instructions               Latest Documented Vital Signs:  As of 6:20 PM  BP- 123/64 HR- 88 Temp- 98.2 °F (36.8 °C) (Tympanic) O2 sat- 96%    --  Documentation assistance provided by branden Mcleod for Dr. Fischer.  Information recorded by the scribe was done at my direction and has been verified and validated by me.         Abrahan Mcleod  08/16/17 [id number]       Pedro Fischer MD  08/16/17 [id number]

## 2017-08-16 NOTE — ED NOTES
Wrist splint and sling applied by EDER Gilmore to left upper extremity and left wrist. Brisk cap refill noted to left hand and patient reports splint and sling as comfortable     Gwendolyn Nielsen RN  08/16/17 8202

## 2017-08-17 ENCOUNTER — TELEPHONE (OUTPATIENT)
Dept: SOCIAL WORK | Facility: HOSPITAL | Age: 67
End: 2017-08-17

## 2017-08-21 ENCOUNTER — TELEPHONE (OUTPATIENT)
Dept: ORTHOPEDIC SURGERY | Facility: CLINIC | Age: 67
End: 2017-08-21

## 2017-08-22 RX ORDER — DULOXETIN HYDROCHLORIDE 60 MG/1
CAPSULE, DELAYED RELEASE ORAL
Qty: 90 CAPSULE | Refills: 0 | Status: SHIPPED | OUTPATIENT
Start: 2017-08-22 | End: 2017-12-18 | Stop reason: SDUPTHER

## 2017-08-28 ENCOUNTER — OFFICE VISIT (OUTPATIENT)
Dept: ORTHOPEDIC SURGERY | Facility: CLINIC | Age: 67
End: 2017-08-28

## 2017-08-28 VITALS — WEIGHT: 180 LBS | BODY MASS INDEX: 33.99 KG/M2 | TEMPERATURE: 97.9 F | HEIGHT: 61 IN

## 2017-08-28 DIAGNOSIS — S49.92XA SHOULDER INJURY, LEFT, INITIAL ENCOUNTER: Primary | ICD-10-CM

## 2017-08-28 PROCEDURE — 99214 OFFICE O/P EST MOD 30 MIN: CPT | Performed by: ORTHOPAEDIC SURGERY

## 2017-08-28 PROCEDURE — 73030 X-RAY EXAM OF SHOULDER: CPT | Performed by: ORTHOPAEDIC SURGERY

## 2017-08-28 PROCEDURE — 23570 CLTX SCAPULAR FX W/O MNPJ: CPT | Performed by: ORTHOPAEDIC SURGERY

## 2017-08-28 RX ORDER — HYDROCODONE BITARTRATE AND ACETAMINOPHEN 5; 325 MG/1; MG/1
1 TABLET ORAL EVERY 4 HOURS PRN
Qty: 50 TABLET | Refills: 0 | Status: SHIPPED | OUTPATIENT
Start: 2017-08-28 | End: 2017-12-13 | Stop reason: SDUPTHER

## 2017-08-29 NOTE — PROGRESS NOTES
Patient: Kristan Galicia    YOB: 1950    Medical Record Number: [id number]    Chief Complaints:  Left shoulder injury    History of Present Illness:     66 y.o. female patient who presents for evaluation of her left shoulder.  She fell on August 15, landing awkwardly on her left side.  She has been experiencing severe pain over the top of the shoulder ever since.  She describes her pain as constant and aching.  The pain is stabbing when trying to reach or lift.  She has noticed associated swelling.  The pain is somewhat better with rest, ice, and anti-inflammatories.    Allergies:   Allergies   Allergen Reactions   • Adhesive Tape Other (See Comments)   • Aspirin Swelling   • Biaxin [Clarithromycin] Other (See Comments)     BLISTERS AROUND MOTH  Also known as Bioxen    • Codeine Swelling   • Darvon [Propoxyphene] Swelling   • Levaquin [Levofloxacin] Other (See Comments)     BLISTERS AROUND MOUTH   • Tequin [Gatifloxacin] Other (See Comments)     BLISTERS AROUND MOUTH  Also known Tequine        Home Medications:    Current Outpatient Prescriptions:   •  alendronate (FOSAMAX) 70 MG tablet, Take 1 tablet by mouth Every 7 (Seven) Days. Hold until can be discussed with Dr. Anne as to when to restart medication, Disp: 12 tablet, Rfl: 3  •  allopurinol (ZYLOPRIM) 100 MG tablet, TAKE 1 TABLET BY MOUTH DAILY., Disp: , Rfl: 1  •  amiodarone (PACERONE) 200 MG tablet, Take 100 mg by mouth Every Night., Disp: , Rfl:   •  atorvastatin (LIPITOR) 80 MG tablet, Take 1 tablet by mouth Every Night., Disp: 90 tablet, Rfl: 3  •  baclofen (LIORESAL) 10 MG tablet, Take 1 tablet by mouth Every Night. (Patient taking differently: Take 10 mg by mouth At Night As Needed.), Disp: 30 tablet, Rfl: 5  •  Blood Glucose Monitoring Suppl (ACCU-CHEK OTILIA PLUS) W/DEVICE kit, Dx e11.9 use to check BS BID, ok to substitute formulary preferred, Disp: 1 kit, Rfl: 0  •  carvedilol (COREG) 12.5 MG tablet, Take 12.5 mg by mouth 2  (Two) Times a Day., Disp: , Rfl:   •  dabigatran etexilate (PRADAXA) 150 MG capsu, Take 1 capsule by mouth 2 (Two) Times a Day. Resume 2/4/17, Disp: 60 capsule, Rfl:   •  DULoxetine (CYMBALTA) 60 MG capsule, TAKE ONE CAPSULE BY MOUTH EVERY DAY, Disp: 90 capsule, Rfl: 0  •  gabapentin (NEURONTIN) 300 MG capsule, TAKE ONE CAPSULE BY MOUTH 3 TIMES A DAY, Disp: , Rfl: 3  •  glucose blood (ACCU-CHEK OTILIA PLUS) test strip, Dx e11.9 use to check BS BID, ok to substitute formulary preferred, Disp: 60 each, Rfl: 11  •  HYDROcodone-acetaminophen (NORCO) 5-325 MG per tablet, Take 1 tablet by mouth Every 6 (Six) Hours As Needed for Moderate Pain ., Disp: 20 tablet, Rfl: 0  •  Lancets (ACCU-CHEK MULTICLIX) lancets, Dx e11.9 use to check BS BID, ok to substitute formulary preferred, Disp: 60 each, Rfl: 11  •  levothyroxine (SYNTHROID) 88 MCG tablet, Take 1 tablet by mouth Daily., Disp: 90 tablet, Rfl: 1  •  lidocaine (LIDODERM) 5 %, Place 1 patch on the skin Every 12 (Twelve) Hours. Remove & Discard patch within 12 hours or as directed by MD (Patient taking differently: Place 1 patch on the skin Daily As Needed. On 12 hrs, off 12 hr), Disp: 60 patch, Rfl: 5  •  metFORMIN (FORTAMET) 1000 MG (OSM) 24 hr tablet, Take 1 tablet by mouth Daily With Breakfast., Disp: 30 tablet, Rfl: 2  •  montelukast (SINGULAIR) 10 MG tablet, TAKE 1 TABLET BY MOUTH EVERY DAY, Disp: 90 tablet, Rfl: 1  •  omeprazole (PRILOSEC) 40 MG capsule, Take 1 capsule by mouth Daily., Disp: 30 capsule, Rfl: 2  •  spironolactone-hydrochlorothiazide (ALDACTAZIDE) 25-25 MG tablet, TAKE 1 TABLET EVERY DAY, Disp: 90 tablet, Rfl: 1  •  traMADol (ULTRAM) 50 MG tablet, TAKE 1 TABLET BY MOUTH TWICE A DAY FOR PAIN, Disp: , Rfl: 0  •  traZODone (DESYREL) 50 MG tablet, TAKE 1 TABLET BY MOUTH AT BEDTIME, Disp: 30 tablet, Rfl: 2  •  HYDROcodone-acetaminophen (NORCO) 5-325 MG per tablet, Take 1 tablet by mouth Every 4 (Four) Hours As Needed for Moderate Pain ., Disp: 50 tablet,  Rfl: 0    Past Medical History:   Diagnosis Date   • Anemia    • Atrial fibrillation    • Cardiac defibrillator in place 01/2009    medtronic    • Cardiomyopathy     Hypertrophic Cardiomyopathy   • CHF (congestive heart failure)    • Coronary artery disease    • Diabetes mellitus     Type II   • Diverticulitis    • Diverticulosis     hx diverticulitis   • GERD (gastroesophageal reflux disease)    • Heart murmur    • History of depression    • History of tumor     left ocular tumor, treated with steroids   • Hyperlipidemia    • Migraines    • Osteoporosis    • Palpitations    • Sleep apnea     does not use CPAP or BiPAP, used nose strips   • Thyroid disease    • Vasovagal syncope    • Vertigo    • Vitamin D deficiency        Past Surgical History:   Procedure Laterality Date   • APPENDECTOMY  1970   • BREAST BIOPSY Bilateral     Dr. Gonsales   • BREAST RECONSTRUCTION Bilateral 1988    Reduction   • CARDIAC CATHETERIZATION  02/19/2007   • CARDIAC DEFIBRILLATOR PLACEMENT Left 1999    Dr. Ya   • CATARACT EXTRACTION Bilateral    • COLONOSCOPY  1996    Dr. Herbert Gonsales    • COLONOSCOPY  8/16/2016    Procedure: COLONOSCOPY with cecum;  Surgeon: Rosalio Sheikh MD;  Location: [facility] ENDOSCOPY;  Service:    • ENDOSCOPY N/A 4/27/2017    Procedure: ESOPHAGOGASTRODUODENOSCOPY WITH BIOPSIES;  Surgeon: Rosalio Sheikh MD;  Location: [facility] ENDOSCOPY;  Service:    • FEMUR SURGERY Left     with metal implant  x3   • FIRST RIB RESECTION Bilateral     and scalenectomy   • HYSTERECTOMY  1989    Total   • KNEE ARTHROSCOPY Bilateral 2014    Dr. Li   • KNEE SURGERY Right 06/10/2010   • LAPAROSCOPIC CHOLECYSTECTOMY  1974   • NECK SURGERY  08/23/2012    Anterior cervical diskectomy and fusion with VG2 allograft implants and eagle anterior plate fixation,C5-C6 C6-C7   • ROTATOR CUFF REPAIR Right 06/10/2010   • SHOULDER ARTHROSCOPY W/ ROTATOR CUFF REPAIR Bilateral 05/20/2011   • SHOULDER SURGERY Right    • TONSILLECTOMY     • TOTAL  SHOULDER ARTHROPLASTY W/ DISTAL CLAVICLE EXCISION Right 2/2/2017    Procedure: TOTAL SHOULDER REVERSE ARTHROPLASTY;  Surgeon: Juan Antonio Anne MD;  Location: [facility] OR;  Service:        Social History     Occupational History   •       Social History Main Topics   • Smoking status: Former Smoker     Packs/day: 0.50     Years: 17.00     Quit date: 9/8/1985   • Smokeless tobacco: Never Used   • Alcohol use No   • Drug use: No   • Sexual activity: Defer      Social History     Social History Narrative       Family History   Problem Relation Age of Onset   • Heart attack Brother    • Heart disease Brother    • Hyperthyroidism Mother    • Cancer Mother    • Heart disease Mother    • Osteoporosis Mother    • No Known Problems Father       car accident   • Cirrhosis Maternal Grandmother    • No Known Problems Maternal Grandfather    • No Known Problems Paternal Grandmother    • No Known Problems Paternal Grandfather    • Breast cancer Maternal Aunt        Review of Systems:      Constitutional: Denies fever, shaking or chills   Eyes: Denies change in visual acuity   HEENT: Denies nasal congestion or sore throat   Respiratory: Denies cough or shortness of breath   Cardiovascular: Denies chest pain or edema  Endocrine: Denies tremors, palpitations, intolerance of heat or cold, polyuria, polydipsia.  GI: Denies abdominal pain, nausea, vomiting, bloody stools or diarrhea  : Denies frequency, urgency, incontinence, retention, or nocturia.  Musculoskeletal: Denies numbness tingling or loss of motor function except as above  Integument: Denies rash, lesion or ulceration   Neurologic: Denies headache or focal weakness, deficits  Heme: Denies epistaxis, spontaneous or excessive bleeding, epistaxis, hematuria, melena, fatigue, enlarged or tender lymph nodes.      All other pertinent positives and negatives as noted above in HPI.    Physical Exam: 66 y.o. female  Vitals:    08/28/17 1159   Temp: 97.9  "°F (36.6 °C)   TempSrc: Temporal Artery    Weight: 180 lb (81.6 kg)   Height: 61\" (154.9 cm)       General:  Patient is awake and alert.  Appears in no acute distress or discomfort.    Psych:  Affect and demeanor are appropriate.    Eyes:  Conjunctiva and sclera appear grossly normal.  Eyes track well and EOM seem to be intact.    Ears:  No gross abnormalities.  Hearing adequate for the exam.    Cardiovascular:  Regular rate and rhythm.    Lungs:  Good chest expansion.  Breathing unlabored.    Extremities: The left shoulder is examined.  Skin is benign.  Focal tenderness over the acromion without any palpable step-offs or defects.  Shoulder motion is limited and uncomfortable.  She struggles with abduction and elevation, both of which are weak.  She has good motor and sensory function down into her lower arm and hand.  Palpable radial pulse.  Brisk capillary refill.    Imaging:  AP, scapular Y and axillary views of the left shoulder are ordered and reviewed.  No comparison films are immediately available.  She has what appears to be moderate glenohumeral osteoarthritis.  There is a retained metal anchor in the humeral head consistent with her surgical history.  Her acromiohumeral interval measures 7 mm.  On the axillary view, I can appreciate a nondisplaced acromion fracture.    Assessment/Plan:  Right acromion fracture with pre-existing osteoarthritis and probable associated rotator cuff tear    The fracture is nondisplaced and I recommend conservative treatment.  I'm going to have her resume use of her sling.  We talked about appropriate activity modifications and restrictions.  I will see her back in 2 weeks for repeat x-rays and reevaluation.    Juan Antonio Anne MD    08/28/2017      "

## 2017-09-19 ENCOUNTER — TELEPHONE (OUTPATIENT)
Dept: FAMILY MEDICINE CLINIC | Facility: CLINIC | Age: 67
End: 2017-09-19

## 2017-10-13 ENCOUNTER — TELEPHONE (OUTPATIENT)
Dept: FAMILY MEDICINE CLINIC | Facility: CLINIC | Age: 67
End: 2017-10-13

## 2017-11-02 ENCOUNTER — CLINICAL SUPPORT (OUTPATIENT)
Dept: ORTHOPEDIC SURGERY | Facility: CLINIC | Age: 67
End: 2017-11-02

## 2017-11-02 VITALS — TEMPERATURE: 98 F | HEIGHT: 61 IN | BODY MASS INDEX: 33.04 KG/M2 | WEIGHT: 175 LBS

## 2017-11-02 DIAGNOSIS — M17.11 ARTHRITIS OF RIGHT KNEE: Primary | ICD-10-CM

## 2017-11-02 PROCEDURE — 99214 OFFICE O/P EST MOD 30 MIN: CPT | Performed by: NURSE PRACTITIONER

## 2017-11-02 RX ORDER — CEFAZOLIN SODIUM 2 G/100ML
2 INJECTION, SOLUTION INTRAVENOUS ONCE
Status: CANCELLED | OUTPATIENT
Start: 2018-04-18 | End: 2018-04-18

## 2017-11-02 NOTE — PATIENT INSTRUCTIONS
[facility]    KNEE HOME EXERCISES      It is important that you maintain and possibly improve your strength and range of motion of your knee.      •  Walk frequently for short intervals  •  Using a cane or walker to allow you to walk safely if needed  •  Avoid sitting for long periods of time to avoid cramping and swelling of your leg   •  Do exercises either on a bed or in a chair.  •  If any of the exercises cause you pain, either eliminate that exercise or decrease          the motion or repetitions      Start exercises with 10 repetitions and increase to 30 repetitions.  Do two sets of each exercise each day    ANKLE PUMPS    1. Move your feet up and down as if you are pumping on a gas pedal       STRAIGHT LEG RAISES    1. Lie flat with your injured leg straight.  Keep the other leg bent.  2. Tighten the injured leg's thigh muscle.  3. Lift leg, with knee locked in the straight position no higher than the other knee for  seconds  4. Lower leg slowly. Rest for 5 seconds      SHORT ARC QUAD    1. Lie with both knees bent over a pillow  2. Straighten both knees  3. Hold 5 seconds  4. Bend knees back to starting position and repeat sequence       QUADRICEPS     1. Lie flat with your injured let straight.  Keep the other leg bent.  2. Tighten the injured leg's thigh muscle by trying to move your kneecap toward the  thigh.  3. Make your leg as stiff as you can.  4. Hold for 5 seconds and relax for 5 seconds        HAMSTRING STRETCH    1. Lie flat with your injured leg straight. Keep the other leg bent  2. Press your heel of your injured leg into the floor for 5 seconds       OR  1. Sit with injured leg out straight.   2. Loop a sheet around the ball of foot and toes.  3. Gently pull on the sheet, keeping the leg straight  4. Hold for 10-30 seconds      KNEE FLEXION-EXTENSION SITTING     1. Sit with injured let bent as shown  2. Straighten leg at the knee  3. Hold 5 seconds  4. Bend knee back  to starting position   5. Rest for 2-5 seconds and repeat sequence       HEEL SLIDES     1. Lie on back with legs straight  2. Slide heels toward buttocks  3. Return to starting position       HEEL SLIDS WITH SHEET    1. Lie on your back with a sheet wrapped around your foot  2. Pull on the sheet to assist you in bending your knee and sliding your heel toward  your buttocks  3. Hold for 5 seconds        KNEE FLEXION STRETCH    1. Sit in a chair  2. Bend bad knee back as far as you can   3. Hold stretch for 5-10 seconds      CHAIR PUSH UPS    1. Sit in a chair with arm rests  2. Place hands on armrests  3. Straighten arms, raising buttocks up off of chair         WALL SLIDES    1. Stand with your back and head against a wall.    2. Keep your feet shoulder width apart and 6-12 inches from the wall  3. Slowly slide down the wall until your knees are slightly bent.    4. Hold for 5 seconds and then slowly slide back up  5. As you get stronger, then you can slowly increase the bend in your knees, but do  not drop your buttocks below the level of your knees       STEP UPS    1. Stand with your foot on your injured leg on a 3-5 inch support (such as a block of  wood)  2. Place other foot on the floor  3. Shift your weight onto the foot on the block and try to straighten the knee and  raising the other foot off of the floor  4. Slowly lower your foot until only the heel touches the floor

## 2017-11-02 NOTE — PROGRESS NOTES
Patient: Kristan Galicia  YOB: 1950    Chief Complaints:  right knee pain    Subjective:    History of Present Illness: Here today for knee pain. The pain is a generalized joint tenderness.  It has been progressive in nature but remains intermittent.  Worsened by prolonged standing or walking and squatting activities. Has had improvement in the past with ice/heat, rest, and injections.     This problem is not new to this examiner.     Allergies:   Allergies   Allergen Reactions   • Adhesive Tape Other (See Comments)   • Aspirin Swelling   • Biaxin [Clarithromycin] Other (See Comments)     BLISTERS AROUND MOTH  Also known as Bioxen    • Codeine Swelling   • Darvon [Propoxyphene] Swelling   • Levaquin [Levofloxacin] Other (See Comments)     BLISTERS AROUND MOUTH   • Tequin [Gatifloxacin] Other (See Comments)     BLISTERS AROUND MOUTH  Also known Tequine        Medications:   Home Medications:  Current Outpatient Prescriptions on File Prior to Visit   Medication Sig   • allopurinol (ZYLOPRIM) 100 MG tablet TAKE 1 TABLET BY MOUTH DAILY.   • amiodarone (PACERONE) 200 MG tablet Take 100 mg by mouth Every Night.   • atorvastatin (LIPITOR) 80 MG tablet Take 1 tablet by mouth Every Night.   • carvedilol (COREG) 12.5 MG tablet Take 12.5 mg by mouth 2 (Two) Times a Day.   • dabigatran etexilate (PRADAXA) 150 MG capsu Take 1 capsule by mouth 2 (Two) Times a Day. Resume 2/4/17   • DULoxetine (CYMBALTA) 60 MG capsule TAKE ONE CAPSULE BY MOUTH EVERY DAY   • gabapentin (NEURONTIN) 300 MG capsule TAKE ONE CAPSULE BY MOUTH 3 TIMES A DAY   • HYDROcodone-acetaminophen (NORCO) 5-325 MG per tablet Take 1 tablet by mouth Every 6 (Six) Hours As Needed for Moderate Pain .   • levothyroxine (SYNTHROID) 88 MCG tablet Take 1 tablet by mouth Daily.   • lidocaine (LIDODERM) 5 % Place 1 patch on the skin Every 12 (Twelve) Hours. Remove & Discard patch within 12 hours or as directed by MD (Patient taking differently: Place 1  patch on the skin Daily As Needed. On 12 hrs, off 12 hr)   • metFORMIN (FORTAMET) 1000 MG (OSM) 24 hr tablet Take 1 tablet by mouth Daily With Breakfast.   • montelukast (SINGULAIR) 10 MG tablet TAKE 1 TABLET BY MOUTH EVERY DAY   • omeprazole (PRILOSEC) 40 MG capsule Take 1 capsule by mouth Daily.   • spironolactone-hydrochlorothiazide (ALDACTAZIDE) 25-25 MG tablet TAKE 1 TABLET EVERY DAY   • alendronate (FOSAMAX) 70 MG tablet Take 1 tablet by mouth Every 7 (Seven) Days. Hold until can be discussed with Dr. Anne as to when to restart medication   • baclofen (LIORESAL) 10 MG tablet Take 1 tablet by mouth Every Night. (Patient taking differently: Take 10 mg by mouth At Night As Needed.)   • Blood Glucose Monitoring Suppl (ACCU-CHEK OTILIA PLUS) W/DEVICE kit Dx e11.9 use to check BS BID, ok to substitute formulary preferred   • glucose blood (ACCU-CHEK OTILIA PLUS) test strip Dx e11.9 use to check BS BID, ok to substitute formulary preferred   • HYDROcodone-acetaminophen (NORCO) 5-325 MG per tablet Take 1 tablet by mouth Every 4 (Four) Hours As Needed for Moderate Pain .   • Lancets (ACCU-CHEK MULTICLIX) lancets Dx e11.9 use to check BS BID, ok to substitute formulary preferred   • traMADol (ULTRAM) 50 MG tablet TAKE 1 TABLET BY MOUTH TWICE A DAY FOR PAIN   • traZODone (DESYREL) 50 MG tablet TAKE 1 TABLET BY MOUTH AT BEDTIME     No current facility-administered medications on file prior to visit.      Current Medications:  Scheduled Meds:  Continuous Infusions:  No current facility-administered medications for this visit.   PRN Meds:.    I have reviewed the patient's medical history in detail and updated the computerized patient record.  Review and summarization of old records include:    Past Medical History:   Diagnosis Date   • Anemia    • Atrial fibrillation    • Cardiac defibrillator in place 01/2009    medtronic    • Cardiomyopathy     Hypertrophic Cardiomyopathy   • CHF (congestive heart failure)    • Coronary  artery disease    • Diabetes mellitus     Type II   • Diverticulitis    • Diverticulosis     hx diverticulitis   • GERD (gastroesophageal reflux disease)    • Heart murmur    • History of depression    • History of tumor     left ocular tumor, treated with steroids   • Hyperlipidemia    • Migraines    • Osteoporosis    • Palpitations    • Sleep apnea     does not use CPAP or BiPAP, used nose strips   • Thyroid disease    • Vasovagal syncope    • Vertigo    • Vitamin D deficiency         Past Surgical History:   Procedure Laterality Date   • APPENDECTOMY  1970   • BREAST BIOPSY Bilateral     Dr. Gonsales   • BREAST RECONSTRUCTION Bilateral 1988    Reduction   • CARDIAC CATHETERIZATION  02/19/2007   • CARDIAC DEFIBRILLATOR PLACEMENT Left 1999    Dr. Ya   • CATARACT EXTRACTION Bilateral    • COLONOSCOPY  1996    Dr. Herbert Gonsales    • COLONOSCOPY  8/16/2016    Procedure: COLONOSCOPY with cecum;  Surgeon: Rosalio Sheikh MD;  Location: [facility] ENDOSCOPY;  Service:    • ENDOSCOPY N/A 4/27/2017    Procedure: ESOPHAGOGASTRODUODENOSCOPY WITH BIOPSIES;  Surgeon: Rosalio Sheikh MD;  Location: [facility] ENDOSCOPY;  Service:    • FEMUR SURGERY Left     with metal implant  x3   • FIRST RIB RESECTION Bilateral     and scalenectomy   • HYSTERECTOMY  1989    Total   • KNEE ARTHROSCOPY Bilateral 2014    Dr. Li   • KNEE SURGERY Right 06/10/2010   • LAPAROSCOPIC CHOLECYSTECTOMY  1974   • NECK SURGERY  08/23/2012    Anterior cervical diskectomy and fusion with VG2 allograft implants and eagle anterior plate fixation,C5-C6 C6-C7   • ROTATOR CUFF REPAIR Right 06/10/2010   • SHOULDER ARTHROSCOPY W/ ROTATOR CUFF REPAIR Bilateral 05/20/2011   • SHOULDER SURGERY Right    • TONSILLECTOMY     • TOTAL SHOULDER ARTHROPLASTY W/ DISTAL CLAVICLE EXCISION Right 2/2/2017    Procedure: TOTAL SHOULDER REVERSE ARTHROPLASTY;  Surgeon: Juan Antonio Anne MD;  Location: [facility] MAIN OR;  Service:         Social History     Occupational History   •        Social History Main Topics   • Smoking status: Former Smoker     Packs/day: 0.50     Years: 17.00     Quit date: 9/8/1985   • Smokeless tobacco: Never Used   • Alcohol use No   • Drug use: No   • Sexual activity: Defer    Social History     Social History Narrative        Family History   Problem Relation Age of Onset   • Heart attack Brother    • Heart disease Brother    • Hyperthyroidism Mother    • Cancer Mother    • Heart disease Mother    • Osteoporosis Mother    • No Known Problems Father       car accident   • Cirrhosis Maternal Grandmother    • No Known Problems Maternal Grandfather    • No Known Problems Paternal Grandmother    • No Known Problems Paternal Grandfather    • Breast cancer Maternal Aunt        ROS: 14 point review of systems was performed and was negative except for documented findings in HPI and today's encounter.     Allergies:   Allergies   Allergen Reactions   • Adhesive Tape Other (See Comments)   • Aspirin Swelling   • Biaxin [Clarithromycin] Other (See Comments)     BLISTERS AROUND MOTH  Also known as Bioxen    • Codeine Swelling   • Darvon [Propoxyphene] Swelling   • Levaquin [Levofloxacin] Other (See Comments)     BLISTERS AROUND MOUTH   • Tequin [Gatifloxacin] Other (See Comments)     BLISTERS AROUND MOUTH  Also known Tequine      Constitutional:  Denies fever, shaking or chills   Eyes:  Denies change in visual acuity   HENT:  Denies nasal congestion or sore throat   Respiratory:  Denies cough or shortness of breath   Cardiovascular:  Denies chest pain or severe LE edema   GI:  Denies abdominal pain, nausea, vomiting, bloody stools or diarrhea   Musculoskeletal:  Numbness, tingling, or loss of motor function only as noted above in history of present illness.  : Denies painful urination or hematuria  Integument:  Denies rash, lesion or ulceration   Neurologic:  Denies headache or focal weakness  Endocrine:  Denies lymphadenopathy  Psych:  Denies  confusion or change in mental status   Hem:  Denies active bleeding    Physical Exam:  Body mass index is 33.07 kg/(m^2).  Vitals:    11/02/17 0810   Temp: 98 °F (36.7 °C)     Vital Signs:  reviewed  Constitutional: Awake alert and oriented x3, well developed, no acute distress, non-toxic appearance.  EYES: symmetric, sclera clear  ENT:  Normocephalic, Atraumatic.   Respiratory:  No respiratory distress, No wheezing  CV: pulse regular, no palpitations or pallor.  GI:  Abdomen soft, non-tender.   Vascular:  Intact distal pulses, No cyanosis, no signs or symptoms of DVT.  Neurologic: Sensation grossly intact to the involved extremity, No focal deficits noted.   Neck: No tenderness, Supple.  Integument: warm, dry, no ulcerations.   Psychiatric:  Oriented, no pathological affect.  Musculoskeletal:    Affected knee(s):  Painful gait with a subtle limp, positive for synovitis, swelling, joint effusion with crepitation.  Lachman negative  Posterior drawer negative  Socrates's negative  Patellofemoral grind +  Sensation grossly intact to light touch throughout the lower extremity  Skin is intact  Distal pulses are palpable  No signs or symptoms of DVT        Diagnostic Data:     Imaging was done previously in the office, images were personally viewed and discussed at length with the patient:    Indication: pain related symptoms,  Views: 3V AP, LAT & 40 degree PA right knee(s)   Findings: severe end-stage arthritis (bone on bone, subchondral sclerosis/cysts, osteophytes)  Comparison views: viewed last xray done in the office.     Procedure:  Procedures    Assessment. Severe arthritis right knee(s).      Plan: Is to proceed with injection  Follow up as indicated.  Ice, elevate, and rest as needed.  Additional interventions include:  32 min spent face to face with patient 20 min spent counseling about natural history and expected course of assessed complaint and reviewed treatment options that have been tried and not tried and  those currently available. Questions answered.    Natural history and expected course of this patient's diagnosis discussed along with evaluation of therapies. Questions answered.  Advice on benefits of, and types of regular/moderate exercise including biomechanical forces involved as it pertains to this complaint, with a goal of 5 min at least 7 times a week.    Address and update of wt loss, physical exercises, use of assistive devices, and monitoring of Medications per orders to address ortho complaints; Evaluation and discussion of safety, precautions, side effects, and warnings given especially of long term NSAID therapy.  Lifestyle measures for weight loss, suggest starting at 20lbs, with biomechanical explanations for weight loss and how this affects orthopedic condition.  Advised on knee strengthening exercises, stressed importance of these with progression of arthritis, demonstrated exercises to patient with repeat demonstration and verb of understanding.   TKA: Obtain Cardiac clearance for surgery. Continuation of conservative management vs. TKA: I reviewed anatomy of a total knee arthroplasty in laymen's terms, as well as typical postoperative recovery and possibly 6-12 months for maximal recovery, and possible need for rehabilitation stay after hospitalization. We also discussed risks, benefits, alternatives, and limitations of procedure with risks including but not limited to neurovascular damage, bleeding, infection, malalignment, chronic pian, failure of implants, osteolysis, loosening of implants, loss of motion, weakness, stiffness, instability, DVT, pulmonary embolus, death, stroke, complex regional pain syndrome, myocardial infarction, and need for additional procedures. Concept of substitution vs. replacement discussed.  No guarantees were given regarding results of surgery.  Patient verbalized understanding, and was given the opportunity to ask and have all questions answered today.    Cardiologist is Dr. Ya. Has defibrillator, and  Is on pradaxa so she will talk with them about surgical clearance.   Wants to go ahead and schedule Right TKA . Does ok with Percocet despite multiple allergies.   11/2/2017  EUSEBIO

## 2017-11-07 RX ORDER — OMEPRAZOLE 40 MG/1
CAPSULE, DELAYED RELEASE ORAL
Qty: 30 CAPSULE | Refills: 2 | Status: SHIPPED | OUTPATIENT
Start: 2017-11-07 | End: 2017-12-19 | Stop reason: SDUPTHER

## 2017-11-08 RX ORDER — ATENOLOL 100 MG/1
TABLET ORAL
Qty: 180 CAPSULE | Refills: 2 | Status: SHIPPED | OUTPATIENT
Start: 2017-11-08 | End: 2018-04-05

## 2017-11-13 RX ORDER — ALLOPURINOL 100 MG/1
TABLET ORAL
Qty: 30 TABLET | Refills: 1 | Status: SHIPPED | OUTPATIENT
Start: 2017-11-13 | End: 2017-12-13 | Stop reason: SDUPTHER

## 2017-11-21 RX ORDER — LEVOTHYROXINE SODIUM 88 UG/1
TABLET ORAL
Qty: 90 TABLET | Refills: 1 | Status: SHIPPED | OUTPATIENT
Start: 2017-11-21 | End: 2017-12-20 | Stop reason: DRUGHIGH

## 2017-12-11 RX ORDER — SPIRONOLACTONE AND HYDROCHLOROTHIAZIDE 25; 25 MG/1; MG/1
TABLET ORAL
Qty: 90 TABLET | Refills: 1 | Status: SHIPPED | OUTPATIENT
Start: 2017-12-11 | End: 2018-04-05

## 2017-12-13 ENCOUNTER — OFFICE VISIT (OUTPATIENT)
Dept: FAMILY MEDICINE CLINIC | Facility: CLINIC | Age: 67
End: 2017-12-13

## 2017-12-13 VITALS
HEART RATE: 84 BPM | HEIGHT: 61 IN | WEIGHT: 182 LBS | SYSTOLIC BLOOD PRESSURE: 110 MMHG | OXYGEN SATURATION: 98 % | BODY MASS INDEX: 34.36 KG/M2 | DIASTOLIC BLOOD PRESSURE: 78 MMHG | TEMPERATURE: 98.1 F

## 2017-12-13 DIAGNOSIS — J06.9 ACUTE URI: Primary | ICD-10-CM

## 2017-12-13 DIAGNOSIS — J20.9 ACUTE BRONCHITIS, UNSPECIFIED ORGANISM: ICD-10-CM

## 2017-12-13 PROCEDURE — 99213 OFFICE O/P EST LOW 20 MIN: CPT | Performed by: NURSE PRACTITIONER

## 2017-12-13 RX ORDER — CLINDAMYCIN HYDROCHLORIDE 300 MG/1
300 CAPSULE ORAL 3 TIMES DAILY
Refills: 0 | COMMUNITY
End: 2017-12-13 | Stop reason: SDUPTHER

## 2017-12-13 RX ORDER — CLINDAMYCIN HYDROCHLORIDE 300 MG/1
300 CAPSULE ORAL 3 TIMES DAILY
Qty: 30 CAPSULE | Refills: 0 | Status: SHIPPED | OUTPATIENT
Start: 2017-12-13 | End: 2018-02-23

## 2017-12-13 RX ORDER — TRAZODONE HYDROCHLORIDE 50 MG/1
100 TABLET ORAL NIGHTLY
Qty: 60 TABLET | Refills: 5 | Status: SHIPPED | OUTPATIENT
Start: 2017-12-13 | End: 2018-04-05

## 2017-12-13 RX ORDER — BENZONATATE 100 MG/1
100 CAPSULE ORAL 3 TIMES DAILY PRN
Qty: 30 CAPSULE | Refills: 0 | Status: SHIPPED | OUTPATIENT
Start: 2017-12-13 | End: 2018-02-23

## 2017-12-13 NOTE — PROGRESS NOTES
Subjective   Kristan Galicia is a 67 y.o. female.     History of Present Illness   C/o B ear congestion, sinus tenderness and congestion, nasal congestion, sore throat x 5 days, with fever 101 took OTC tylenol helping, with wheezing and coughing and SOA, was last tx bronchitis 02/17 clindamycin failed admitted to [facility] with RSV and pneumonia, allergic ASA, biaxin, levaquin, tequin, codeine, darvon, no recent abx last 3 mo  C/o poor sleep, trazodone 50 mg used to help with sleep but recently stopped working, denies any worsening stressors in life, lives alone with supportive daughter in town    The following portions of the patient's history were reviewed and updated as appropriate: allergies, current medications, past family history, past medical history, past social history, past surgical history and problem list.    Review of Systems   Constitutional: Negative for fever.   HENT: Positive for congestion, ear pain, facial swelling, postnasal drip, rhinorrhea, sinus pain, sinus pressure, sore throat and voice change. Negative for dental problem, drooling, ear discharge, hearing loss, mouth sores, nosebleeds, sneezing, tinnitus and trouble swallowing.    Respiratory: Positive for cough, chest tightness, shortness of breath and wheezing.    Cardiovascular: Negative for chest pain, palpitations and leg swelling.   Neurological: Negative for dizziness and headaches.   Psychiatric/Behavioral: Positive for sleep disturbance. Negative for agitation, behavioral problems, confusion, decreased concentration, dysphoric mood, hallucinations, self-injury and suicidal ideas. The patient is not nervous/anxious and is not hyperactive.    All other systems reviewed and are negative.      Objective   Physical Exam   Constitutional: She is oriented to person, place, and time. She appears well-developed and well-nourished.   HENT:   Head: Normocephalic and atraumatic.   Right Ear: Hearing and tympanic membrane normal.    Left Ear: Hearing and tympanic membrane normal.   Nose: Mucosal edema present. Right sinus exhibits maxillary sinus tenderness and frontal sinus tenderness. Left sinus exhibits maxillary sinus tenderness and frontal sinus tenderness.   Mouth/Throat: Posterior oropharyngeal erythema present. No oropharyngeal exudate or posterior oropharyngeal edema. No tonsillar exudate.   Eyes: Conjunctivae and EOM are normal. Pupils are equal, round, and reactive to light.   Neck: Normal range of motion. Neck supple. No thyromegaly present.   Cardiovascular: Normal rate, regular rhythm and normal heart sounds.    Pulmonary/Chest: Effort normal. She has no wheezes. She has rales (LLL).   Musculoskeletal: Normal range of motion.   Lymphadenopathy:     She has no cervical adenopathy.   Neurological: She is alert and oriented to person, place, and time.   Skin: Skin is warm and dry.   Psychiatric: She has a normal mood and affect. Her behavior is normal. Judgment and thought content normal.   Vitals reviewed.      Assessment/Plan   Kristan was seen today for cough.    Diagnoses and all orders for this visit:    Acute URI    Acute bronchitis, unspecified organism    Other orders  -     traZODone (DESYREL) 50 MG tablet; Take 2 tablets by mouth Every Night.  -     benzonatate (TESSALON PERLES) 100 MG capsule; Take 1 capsule by mouth 3 (Three) Times a Day As Needed for Cough.  -     clindamycin (CLEOCIN) 300 MG capsule; Take 1 capsule by mouth 3 (Three) Times a Day. x10d    erx clindamycin 300 TID x 10 days, increase H20 and rest, erx tessalon perles 100 TID prn cough, if sx persist or worsen consider adding albuterol inhaler or medrol dose pack but hold today d/t diet controlled DM and concern for albuterol related tachycardia, call or RTC if sx persist or worsen or go to ER, increase trazodone 50 mg 2 PO HS and monitor insomnia, RTC 1 mo fasting for chronic dz FU

## 2017-12-13 NOTE — PATIENT INSTRUCTIONS
erx clindamycin 300 TID x 10 days, increase H20 and rest, erx tessalon perles 100 TID prn cough, if sx persist or worsen consider adding albuterol inhaler or medrol dose pack but hold today d/t diet controlled DM and concern for albuterol related tachycardia, call or RTC if sx persist or worsen or go to ER, increase trazodone 50 mg 2 PO HS and monitor insomnia, RTC 1 mo fasting for chronic dz FU

## 2017-12-18 ENCOUNTER — TELEPHONE (OUTPATIENT)
Dept: SURGERY | Facility: CLINIC | Age: 67
End: 2017-12-18

## 2017-12-18 RX ORDER — DULOXETIN HYDROCHLORIDE 60 MG/1
CAPSULE, DELAYED RELEASE ORAL
Qty: 30 CAPSULE | Refills: 0 | Status: SHIPPED | OUTPATIENT
Start: 2017-12-18 | End: 2018-01-19 | Stop reason: SDUPTHER

## 2017-12-19 RX ORDER — OMEPRAZOLE 40 MG/1
CAPSULE, DELAYED RELEASE ORAL
Qty: 30 CAPSULE | Refills: 3 | Status: SHIPPED | OUTPATIENT
Start: 2017-12-19 | End: 2018-01-25 | Stop reason: ALTCHOICE

## 2017-12-20 ENCOUNTER — TELEPHONE (OUTPATIENT)
Dept: FAMILY MEDICINE CLINIC | Facility: CLINIC | Age: 67
End: 2017-12-20

## 2017-12-20 RX ORDER — LEVOTHYROXINE SODIUM 0.07 MG/1
75 TABLET ORAL DAILY
Qty: 30 TABLET | Refills: 1 | Status: SHIPPED | OUTPATIENT
Start: 2017-12-20 | End: 2018-02-26 | Stop reason: DRUGHIGH

## 2017-12-20 NOTE — TELEPHONE ENCOUNTER
Reviewed care everywhere lab results, we can decrease levothyroxine 75 mcg daily erx new dose to pharmacy and recheck in 6 wks.

## 2017-12-20 NOTE — TELEPHONE ENCOUNTER
PATIENT HAD BLOOD WORK DONE AT HER HEART  AT [facility] AND HER THYROID IS HIGH ITS T FREE   2.43    6 MONTHS AGO 1.23    TSH 0.114      6 MONTHS AGO IT WAS 4.670

## 2018-01-12 ENCOUNTER — TELEPHONE (OUTPATIENT)
Dept: ORTHOPEDIC SURGERY | Facility: CLINIC | Age: 68
End: 2018-01-12

## 2018-01-12 DIAGNOSIS — G56.02 LEFT CARPAL TUNNEL SYNDROME: Primary | ICD-10-CM

## 2018-01-12 NOTE — TELEPHONE ENCOUNTER
Patient saw [facility] last August for her shoulder. Says she told [facility] she had been having some left hand numbness and [facility] told her if continued to call back and [facility] would schedule an EMG. Please advise.    **I do not see documentation in office note about this.

## 2018-01-12 NOTE — TELEPHONE ENCOUNTER
She was seen in April 2017 with a complaint of left hand numbness.  I have ordered the nerve studies.  Please tell her to call for the results.

## 2018-01-16 ENCOUNTER — TELEPHONE (OUTPATIENT)
Dept: FAMILY MEDICINE CLINIC | Facility: CLINIC | Age: 68
End: 2018-01-16

## 2018-01-16 DIAGNOSIS — M75.101 TEAR OF RIGHT ROTATOR CUFF, UNSPECIFIED TEAR EXTENT: ICD-10-CM

## 2018-01-16 RX ORDER — ALENDRONATE SODIUM 70 MG/1
70 TABLET ORAL
Qty: 12 TABLET | Refills: 3 | Status: SHIPPED | OUTPATIENT
Start: 2018-01-16 | End: 2019-02-26 | Stop reason: SDUPTHER

## 2018-01-18 RX ORDER — MONTELUKAST SODIUM 10 MG/1
TABLET ORAL
Qty: 90 TABLET | Refills: 1 | Status: SHIPPED | OUTPATIENT
Start: 2018-01-18 | End: 2018-04-05

## 2018-01-19 ENCOUNTER — TELEPHONE (OUTPATIENT)
Dept: SURGERY | Facility: CLINIC | Age: 68
End: 2018-01-19

## 2018-01-19 RX ORDER — DULOXETIN HYDROCHLORIDE 60 MG/1
60 CAPSULE, DELAYED RELEASE ORAL DAILY
Qty: 90 CAPSULE | Refills: 0 | Status: SHIPPED | OUTPATIENT
Start: 2018-01-19 | End: 2018-04-22 | Stop reason: SDUPTHER

## 2018-01-19 RX ORDER — DULOXETIN HYDROCHLORIDE 60 MG/1
CAPSULE, DELAYED RELEASE ORAL
Qty: 30 CAPSULE | Refills: 0 | Status: SHIPPED | OUTPATIENT
Start: 2018-01-19 | End: 2018-01-19 | Stop reason: SDUPTHER

## 2018-01-19 NOTE — TELEPHONE ENCOUNTER
Patient would like to speak with you regarding omeprazole medication. States she is still having heartburn and doesn't feel like dosage is working. #[phone]

## 2018-01-22 ENCOUNTER — HOSPITAL ENCOUNTER (OUTPATIENT)
Dept: INFUSION THERAPY | Facility: HOSPITAL | Age: 68
Discharge: HOME | End: 2018-01-22
Attending: ORTHOPAEDIC SURGERY

## 2018-01-25 RX ORDER — PANTOPRAZOLE SODIUM 40 MG/1
40 TABLET, DELAYED RELEASE ORAL DAILY
Qty: 30 TABLET | Refills: 2 | Status: SHIPPED | OUTPATIENT
Start: 2018-01-25 | End: 2018-04-26 | Stop reason: SDUPTHER

## 2018-01-25 NOTE — TELEPHONE ENCOUNTER
I called and spoke with the patient.  She says that lately she has been having some breakthrough symptoms.  She did say that adding a second dose during the day seem to help her, but I have recommended that we try changing therapy first before we double it up.  I also told her that it would be okay to use an over-the-counter H2 blocker on a when necessary basis.  We had a discussion about the potential risks of long-term proton pump inhibitor treatment, but she feels at this time that her symptoms are bad enough that she would wish to pursue this.  She will contact us again if the Protonix doesn't seem to be working as well.

## 2018-01-26 RX ORDER — LIDOCAINE 50 MG/G
PATCH TOPICAL
Qty: 60 PATCH | Refills: 2 | Status: SHIPPED | OUTPATIENT
Start: 2018-01-26 | End: 2018-04-05

## 2018-01-29 ENCOUNTER — TELEPHONE (OUTPATIENT)
Dept: FAMILY MEDICINE CLINIC | Facility: CLINIC | Age: 68
End: 2018-01-29

## 2018-01-30 ENCOUNTER — TELEPHONE (OUTPATIENT)
Dept: FAMILY MEDICINE CLINIC | Facility: CLINIC | Age: 68
End: 2018-01-30

## 2018-01-30 NOTE — TELEPHONE ENCOUNTER
TRYING TO GET NEW INSURANCE TO PAY FOR LIDOCAINE PATCHES BUT INSURANCE WONT COVER THEM UNTIL A PHONE CALL IS MADE WITH PRIOR AUTHORIZATION..

## 2018-02-05 ENCOUNTER — TELEPHONE (OUTPATIENT)
Dept: FAMILY MEDICINE CLINIC | Facility: CLINIC | Age: 68
End: 2018-02-05

## 2018-02-05 NOTE — TELEPHONE ENCOUNTER
Spoke with insurance there is an approval on file starting 1/29/18- until further notice case # [id number]

## 2018-02-08 ENCOUNTER — TELEPHONE (OUTPATIENT)
Dept: FAMILY MEDICINE CLINIC | Facility: CLINIC | Age: 68
End: 2018-02-08

## 2018-02-14 ENCOUNTER — HOSPITAL ENCOUNTER (OUTPATIENT)
Dept: INFUSION THERAPY | Facility: HOSPITAL | Age: 68
Discharge: HOME | End: 2018-02-14
Attending: ORTHOPAEDIC SURGERY

## 2018-02-23 ENCOUNTER — OFFICE VISIT (OUTPATIENT)
Dept: FAMILY MEDICINE CLINIC | Facility: CLINIC | Age: 68
End: 2018-02-23

## 2018-02-23 VITALS
DIASTOLIC BLOOD PRESSURE: 70 MMHG | SYSTOLIC BLOOD PRESSURE: 116 MMHG | OXYGEN SATURATION: 96 % | HEART RATE: 82 BPM | HEIGHT: 60 IN | BODY MASS INDEX: 35.34 KG/M2 | WEIGHT: 180 LBS | TEMPERATURE: 98.1 F

## 2018-02-23 DIAGNOSIS — E11.9 TYPE 2 DIABETES MELLITUS WITHOUT COMPLICATION, WITHOUT LONG-TERM CURRENT USE OF INSULIN (HCC): ICD-10-CM

## 2018-02-23 DIAGNOSIS — I48.0 PAF (PAROXYSMAL ATRIAL FIBRILLATION) (HCC): Primary | ICD-10-CM

## 2018-02-23 DIAGNOSIS — E03.9 HYPOTHYROIDISM, UNSPECIFIED TYPE: ICD-10-CM

## 2018-02-23 DIAGNOSIS — E55.9 VITAMIN D DEFICIENCY: ICD-10-CM

## 2018-02-23 DIAGNOSIS — IMO0001 CLASS 2 OBESITY DUE TO EXCESS CALORIES WITH SERIOUS COMORBIDITY AND BODY MASS INDEX (BMI) OF 35.0 TO 35.9 IN ADULT: ICD-10-CM

## 2018-02-23 DIAGNOSIS — G47.33 OBSTRUCTIVE SLEEP APNEA SYNDROME: ICD-10-CM

## 2018-02-23 DIAGNOSIS — G47.00 INSOMNIA, UNSPECIFIED TYPE: ICD-10-CM

## 2018-02-23 DIAGNOSIS — I25.10 CORONARY ARTERY DISEASE WITHOUT ANGINA PECTORIS, UNSPECIFIED VESSEL OR LESION TYPE, UNSPECIFIED WHETHER NATIVE OR TRANSPLANTED HEART: ICD-10-CM

## 2018-02-23 PROBLEM — J21.0 RSV BRONCHIOLITIS: Status: RESOLVED | Noted: 2017-03-06 | Resolved: 2018-02-23

## 2018-02-23 LAB
ALBUMIN UR-MCNC: 20 MG/L (ref 0–20)
BACTERIA UR QL AUTO: ABNORMAL /HPF
BILIRUB UR QL STRIP: NEGATIVE
CLARITY UR: CLEAR
COLOR UR: YELLOW
ERYTHROCYTE [DISTWIDTH] IN BLOOD BY AUTOMATED COUNT: 14.8 % (ref 4.5–15)
GLUCOSE UR STRIP-MCNC: NEGATIVE MG/DL
HCT VFR BLD AUTO: 38.2 % (ref 31–42)
HGB BLD-MCNC: 12.5 G/DL (ref 12–18)
HGB UR QL STRIP.AUTO: NEGATIVE
KETONES UR QL STRIP: NEGATIVE
LEUKOCYTE ESTERASE UR QL STRIP.AUTO: ABNORMAL
LYMPHOCYTES # BLD AUTO: 2 10*3/MM3 (ref 1.2–3.4)
LYMPHOCYTES NFR BLD AUTO: 35.4 % (ref 21–51)
MCH RBC QN AUTO: 27.7 PG (ref 26.1–33.1)
MCHC RBC AUTO-ENTMCNC: 32.9 G/DL (ref 33–37)
MCV RBC AUTO: 84.4 FL (ref 80–99)
MONOCYTES # BLD AUTO: 0.2 10*3/MM3 (ref 0.1–0.6)
MONOCYTES NFR BLD AUTO: 3.8 % (ref 2–9)
NEUTROPHILS # BLD AUTO: 3.5 10*3/MM3 (ref 1.4–6.5)
NEUTROPHILS NFR BLD AUTO: 60.8 % (ref 42–75)
NITRITE UR QL STRIP: NEGATIVE
PH UR STRIP.AUTO: 6 [PH] (ref 4.6–8)
PLATELET # BLD AUTO: 255 10*3/MM3 (ref 150–450)
PMV BLD AUTO: 7.5 FL (ref 7.1–10.5)
PROT UR QL STRIP: NEGATIVE
RBC # BLD AUTO: 4.52 10*6/MM3 (ref 4–6)
RBC # UR: ABNORMAL /HPF
REF LAB TEST METHOD: ABNORMAL
SP GR UR STRIP: 1.02 (ref 1–1.03)
SQUAMOUS #/AREA URNS HPF: ABNORMAL /HPF
UROBILINOGEN UR QL STRIP: ABNORMAL
WBC NRBC COR # BLD: 5.7 10*3/MM3 (ref 4.5–10)
WBC UR QL AUTO: ABNORMAL /HPF

## 2018-02-23 PROCEDURE — 82043 UR ALBUMIN QUANTITATIVE: CPT | Performed by: NURSE PRACTITIONER

## 2018-02-23 PROCEDURE — 85025 COMPLETE CBC W/AUTO DIFF WBC: CPT | Performed by: NURSE PRACTITIONER

## 2018-02-23 PROCEDURE — 81001 URINALYSIS AUTO W/SCOPE: CPT | Performed by: NURSE PRACTITIONER

## 2018-02-23 PROCEDURE — 84443 ASSAY THYROID STIM HORMONE: CPT | Performed by: NURSE PRACTITIONER

## 2018-02-23 PROCEDURE — 80053 COMPREHEN METABOLIC PANEL: CPT | Performed by: NURSE PRACTITIONER

## 2018-02-23 PROCEDURE — 99214 OFFICE O/P EST MOD 30 MIN: CPT | Performed by: NURSE PRACTITIONER

## 2018-02-23 PROCEDURE — 82306 VITAMIN D 25 HYDROXY: CPT | Performed by: NURSE PRACTITIONER

## 2018-02-23 PROCEDURE — 36415 COLL VENOUS BLD VENIPUNCTURE: CPT | Performed by: NURSE PRACTITIONER

## 2018-02-23 RX ORDER — HYDROCODONE BITARTRATE AND ACETAMINOPHEN 5; 325 MG/1; MG/1
TABLET ORAL
Refills: 0 | COMMUNITY
Start: 2018-01-30 | End: 2018-04-05

## 2018-02-23 NOTE — PATIENT INSTRUCTIONS
check labs and call with results, will call drug rep for samples and gave 5 tablets today, completed patient asst form for pradaxa, Enc healthy diet and regular exercise for wt loss, enc check BS at home, DM foot exam today, refer sleep medicine, hold trazodone

## 2018-02-23 NOTE — PROGRESS NOTES
Subjective   Kristan Galicia is a 67 y.o. female.     History of Present Illness   Here to FU on afib, CAD, has been off pradaxa 150 mg daily and needs form completed for patient asst. Now $350 monthly, doesn't want to change to other medication and prev INR never therapeutic, still taking amiodarone, coreg 12.5 mg BID, lipitor 80 mg and apironalactone/HCTZ 25/25 mg nonfasting today  With DM2 on metformin 1000 mg daily, last A1C 6.9 06/17, no feet pain, not checking BS at home, with hx of vit D def request recheck today  With hypothyroid on levothyroxine 75 mcg daily no missed doses last TSH 06/17 nl  With R shoulder improved after reverse replacement Dr Anne, With R knee OA pending sgy Dr Pascual 04/18  With insomnia, prev trazodone helped for a time and then stopped despite elevated dose, with INGRID last sleep study > 5 years not using machine wondering if needs another screen    The following portions of the patient's history were reviewed and updated as appropriate: allergies, current medications, past family history, past medical history, past social history, past surgical history and problem list.    Review of Systems   Constitutional: Negative for fever.   HENT: Negative for trouble swallowing and voice change.    Respiratory: Negative for cough, shortness of breath and wheezing.    Cardiovascular: Negative for chest pain, palpitations and leg swelling.   Endocrine: Negative for cold intolerance, heat intolerance, polydipsia, polyphagia and polyuria.   Neurological: Negative for dizziness and headaches.   Psychiatric/Behavioral: Positive for sleep disturbance. Negative for agitation, behavioral problems, confusion, decreased concentration, dysphoric mood, hallucinations, self-injury and suicidal ideas. The patient is not nervous/anxious and is not hyperactive.    All other systems reviewed and are negative.      Objective   Physical Exam   Constitutional: She is oriented to person, place, and time. She  appears well-developed and well-nourished.   HENT:   Head: Normocephalic and atraumatic.   Right Ear: Hearing and tympanic membrane normal.   Left Ear: Hearing and tympanic membrane normal.   Nose: Mucosal edema present. Right sinus exhibits no maxillary sinus tenderness and no frontal sinus tenderness. Left sinus exhibits no maxillary sinus tenderness and no frontal sinus tenderness.   Mouth/Throat: No oropharyngeal exudate, posterior oropharyngeal edema or posterior oropharyngeal erythema.   Eyes: Conjunctivae and EOM are normal. Pupils are equal, round, and reactive to light.   Neck: Normal range of motion. Neck supple. No thyromegaly present.   Cardiovascular: Normal rate, regular rhythm and normal heart sounds.    Pulmonary/Chest: Effort normal and breath sounds normal.   Musculoskeletal: Normal range of motion.    Kristan had a diabetic foot exam performed (sensation intact, pulses strong, well hydrated, no ulcers or fungal toenails) today.  Lymphadenopathy:     She has no cervical adenopathy.   Neurological: She is alert and oriented to person, place, and time.   Skin: Skin is warm and dry.   Psychiatric: She has a normal mood and affect. Her behavior is normal. Judgment and thought content normal.   Vitals reviewed.      Assessment/Plan   Kristan was seen today for insomnia.    Diagnoses and all orders for this visit:    PAF (paroxysmal atrial fibrillation)    Coronary artery disease without angina pectoris, unspecified vessel or lesion type, unspecified whether native or transplanted heart    Vitamin D deficiency  -     Vitamin D 25 Hydroxy    Hypothyroidism, unspecified type  -     CBC & Differential  -     Comprehensive Metabolic Panel  -     TSH    Type 2 diabetes mellitus without complication, without long-term current use of insulin  -     CBC & Differential  -     Comprehensive Metabolic Panel  -     TSH  -     Hemoglobin A1c  -     MicroAlbumin, Urine, Random - Urine, Clean Catch  -     Urinalysis  With Microscopic - Urine, Clean Catch    Obstructive sleep apnea syndrome  -     Ambulatory Referral to Sleep Medicine    Insomnia, unspecified type    check labs and call with results, will call drug rep for samples and gave 5 tablets today, completed patient asst form for pradaxa, Enc healthy diet and regular exercise for wt loss, enc check BS at home, DM foot exam today, refer sleep medicine, hold trazodone

## 2018-02-24 LAB
25(OH)D3 SERPL-MCNC: 38.9 NG/ML (ref 30–100)
ALBUMIN SERPL-MCNC: 4.4 G/DL (ref 3.5–5.2)
ALBUMIN/GLOB SERPL: 1.5 G/DL
ALP SERPL-CCNC: 58 U/L (ref 39–117)
ALT SERPL W P-5'-P-CCNC: 20 U/L (ref 1–33)
ANION GAP SERPL CALCULATED.3IONS-SCNC: 13.6 MMOL/L
AST SERPL-CCNC: 29 U/L (ref 1–32)
BILIRUB SERPL-MCNC: 0.6 MG/DL (ref 0.1–1.2)
BUN BLD-MCNC: 18 MG/DL (ref 8–23)
BUN/CREAT SERPL: 19.1 (ref 7–25)
CALCIUM SPEC-SCNC: 9.8 MG/DL (ref 8.6–10.5)
CHLORIDE SERPL-SCNC: 96 MMOL/L (ref 98–107)
CO2 SERPL-SCNC: 28.4 MMOL/L (ref 22–29)
CREAT BLD-MCNC: 0.94 MG/DL (ref 0.57–1)
GFR SERPL CREATININE-BSD FRML MDRD: 60 ML/MIN/1.73
GLOBULIN UR ELPH-MCNC: 3 GM/DL
GLUCOSE BLD-MCNC: 161 MG/DL (ref 65–99)
HBA1C MFR BLD: 7.8 % (ref 4.8–5.6)
POTASSIUM BLD-SCNC: 4.1 MMOL/L (ref 3.5–5.2)
PROT SERPL-MCNC: 7.4 G/DL (ref 6–8.5)
SODIUM BLD-SCNC: 138 MMOL/L (ref 136–145)
TSH SERPL DL<=0.05 MIU/L-ACNC: 6.18 MIU/ML (ref 0.27–4.2)

## 2018-02-26 ENCOUNTER — TELEPHONE (OUTPATIENT)
Dept: FAMILY MEDICINE CLINIC | Facility: CLINIC | Age: 68
End: 2018-02-26

## 2018-02-26 RX ORDER — METFORMIN HYDROCHLORIDE EXTENDED-RELEASE TABLETS 1000 MG/1
1000 TABLET, FILM COATED, EXTENDED RELEASE ORAL 2 TIMES DAILY WITH MEALS
Qty: 60 TABLET | Refills: 5 | Status: SHIPPED | OUTPATIENT
Start: 2018-02-26 | End: 2018-04-05

## 2018-02-26 RX ORDER — LEVOTHYROXINE SODIUM 88 UG/1
88 TABLET ORAL DAILY
Qty: 30 TABLET | Refills: 1 | Status: SHIPPED | OUTPATIENT
Start: 2018-02-26 | End: 2018-04-26 | Stop reason: SDUPTHER

## 2018-02-26 NOTE — TELEPHONE ENCOUNTER
Thyroid lab abnl 6 goal is closer to 1, recommend increase levothyroxine 88 mcg daily and recheck in 6 wks, unless you have missed many doses than we can resume same dose. Can I erx higher dose?Your A1C has increased from 7.3 to 7.8, showing less BS control, recommend increase metformin 1000 BID with meals and enc healthy DM diet and regular exercise for BS control and weight loss, can I erx higher dose?  Vit D, kidney, liver normal. No anemias.

## 2018-03-05 ENCOUNTER — TELEPHONE (OUTPATIENT)
Dept: FAMILY MEDICINE CLINIC | Facility: CLINIC | Age: 68
End: 2018-03-05

## 2018-03-07 ENCOUNTER — TELEPHONE (OUTPATIENT)
Dept: FAMILY MEDICINE CLINIC | Facility: CLINIC | Age: 68
End: 2018-03-07

## 2018-03-07 ENCOUNTER — OFFICE VISIT (OUTPATIENT)
Dept: FAMILY MEDICINE CLINIC | Facility: CLINIC | Age: 68
End: 2018-03-07

## 2018-03-07 VITALS
DIASTOLIC BLOOD PRESSURE: 68 MMHG | HEART RATE: 88 BPM | WEIGHT: 180.2 LBS | TEMPERATURE: 97.6 F | OXYGEN SATURATION: 99 % | HEIGHT: 60 IN | BODY MASS INDEX: 35.38 KG/M2 | SYSTOLIC BLOOD PRESSURE: 120 MMHG

## 2018-03-07 DIAGNOSIS — E03.9 HYPOTHYROIDISM, UNSPECIFIED TYPE: ICD-10-CM

## 2018-03-07 DIAGNOSIS — E11.9 TYPE 2 DIABETES MELLITUS WITHOUT COMPLICATION, WITHOUT LONG-TERM CURRENT USE OF INSULIN (HCC): ICD-10-CM

## 2018-03-07 DIAGNOSIS — IMO0001 CLASS 2 OBESITY DUE TO EXCESS CALORIES WITH SERIOUS COMORBIDITY AND BODY MASS INDEX (BMI) OF 35.0 TO 35.9 IN ADULT: ICD-10-CM

## 2018-03-07 DIAGNOSIS — E78.5 HYPERLIPIDEMIA, UNSPECIFIED HYPERLIPIDEMIA TYPE: Primary | ICD-10-CM

## 2018-03-07 DIAGNOSIS — Z12.31 ENCOUNTER FOR SCREENING MAMMOGRAM FOR BREAST CANCER: ICD-10-CM

## 2018-03-07 LAB
CHOLEST SERPL-MCNC: 139 MG/DL (ref 0–200)
HDLC SERPL-MCNC: 42 MG/DL (ref 40–60)
LDLC SERPL CALC-MCNC: 51 MG/DL (ref 0–100)
LDLC/HDLC SERPL: 1.22 {RATIO}
TRIGL SERPL-MCNC: 228 MG/DL (ref 0–150)
VLDLC SERPL-MCNC: 45.6 MG/DL (ref 5–40)

## 2018-03-07 PROCEDURE — 99213 OFFICE O/P EST LOW 20 MIN: CPT | Performed by: NURSE PRACTITIONER

## 2018-03-07 PROCEDURE — 36415 COLL VENOUS BLD VENIPUNCTURE: CPT | Performed by: NURSE PRACTITIONER

## 2018-03-07 PROCEDURE — 80061 LIPID PANEL: CPT | Performed by: NURSE PRACTITIONER

## 2018-03-07 NOTE — PATIENT INSTRUCTIONS
check labs and call with results, decrease metformin 500 BID with meals and trial januvia 100 mg daily gave samples, Enc healthy DM diet and regular exercise for wt loss, consider dc metformin if can get better BS control with januvia, cont levothyroxine and will recheck TSH 1 mo, order mammo defer CBE today

## 2018-03-07 NOTE — TELEPHONE ENCOUNTER
----- Message from CHRISTIN Mckee sent at 3/7/2018  3:56 PM EST -----  Chol well controlled but triglycerides are high 228 goal < 150, recommend fish oil 1000 mg daily and healthy diet and exercise    Pt informed

## 2018-03-07 NOTE — PROGRESS NOTES
Subjective   Kristan Galicia is a 67 y.o. female.     History of Present Illness   Here to FU on DM2, last A1C 7.8 but increased metformin 1000 BID but causing GI upset, wondering about another medication, never tried januvia, no feet pain, UTD dilated eye exam, request overdue mammo, last mammo > 1.5 years DMIA now closed, no breast pain, lumps, nipple dc, doesn't want CBE today, With chol fasting for labs, with last TSH abnl 02/18 and adjusted now taking levothyroxine 88 mcg daily tolerating well, with cont insomnia awaiting sleep study consult 04/18    The following portions of the patient's history were reviewed and updated as appropriate: allergies, current medications, past family history, past medical history, past social history, past surgical history and problem list.    Review of Systems   Constitutional: Negative for fever.   Respiratory: Negative for cough, shortness of breath and wheezing.    Cardiovascular: Negative for chest pain, palpitations and leg swelling.   Gastrointestinal: Positive for diarrhea and nausea. Negative for abdominal distention, abdominal pain, anal bleeding, blood in stool, constipation, rectal pain and vomiting.   Endocrine: Negative for cold intolerance, heat intolerance, polydipsia, polyphagia and polyuria.   Neurological: Negative for dizziness and headaches.   Psychiatric/Behavioral: Negative for agitation, behavioral problems, confusion, decreased concentration, dysphoric mood, hallucinations, self-injury, sleep disturbance and suicidal ideas. The patient is not nervous/anxious and is not hyperactive.    All other systems reviewed and are negative.      Objective   Physical Exam   Constitutional: She is oriented to person, place, and time. She appears well-developed and well-nourished.   HENT:   Head: Normocephalic and atraumatic.   Eyes: Conjunctivae and EOM are normal. Pupils are equal, round, and reactive to light.   Cardiovascular: Normal rate, regular rhythm and  normal heart sounds.    Pulmonary/Chest: Effort normal and breath sounds normal.   Musculoskeletal: Normal range of motion.   Neurological: She is alert and oriented to person, place, and time.   Skin: Skin is warm and dry.   Psychiatric: She has a normal mood and affect. Her behavior is normal. Judgment and thought content normal.   Vitals reviewed.      Assessment/Plan   Kristan was seen today for follow-up.    Diagnoses and all orders for this visit:    Hyperlipidemia, unspecified hyperlipidemia type  -     Lipid Panel    Type 2 diabetes mellitus without complication, without long-term current use of insulin    Hypothyroidism, unspecified type    Encounter for screening mammogram for breast cancer  -     Mammo Screening Bilateral With CAD; Future    Class 2 obesity due to excess calories with serious comorbidity and body mass index (BMI) of 35.0 to 35.9 in adult    Other orders  -     SITagliptin (JANUVIA) 100 MG tablet; Take 1 tablet by mouth Daily.    check labs and call with results, decrease metformin 500 BID with meals and trial januvia 100 mg daily gave samples, Enc healthy DM diet and regular exercise for wt loss, consider dc metformin if can get better BS control with januvia, cont levothyroxine and will recheck TSH 1 mo, order mammo defer CBE today

## 2018-03-08 ENCOUNTER — TRANSCRIBE ORDERS (OUTPATIENT)
Dept: ADMINISTRATIVE | Facility: HOSPITAL | Age: 68
End: 2018-03-08

## 2018-03-08 DIAGNOSIS — Z12.31 VISIT FOR SCREENING MAMMOGRAM: Primary | ICD-10-CM

## 2018-03-09 ENCOUNTER — HOSPITAL ENCOUNTER (OUTPATIENT)
Dept: INFUSION THERAPY | Facility: HOSPITAL | Age: 68
Discharge: HOME | End: 2018-03-09
Attending: ORTHOPAEDIC SURGERY | Admitting: PHYSICAL MEDICINE & REHABILITATION

## 2018-03-09 ENCOUNTER — TELEPHONE (OUTPATIENT)
Dept: ORTHOPEDIC SURGERY | Facility: CLINIC | Age: 68
End: 2018-03-09

## 2018-03-09 DIAGNOSIS — G56.02 LEFT CARPAL TUNNEL SYNDROME: ICD-10-CM

## 2018-03-09 PROCEDURE — 95860 NEEDLE EMG 1 EXTREMITY: CPT

## 2018-03-09 PROCEDURE — 95886 MUSC TEST DONE W/N TEST COMP: CPT

## 2018-03-09 NOTE — PROCEDURES
HISTORY:       The patient is a 67-year-old right hand dominant female who presents with a complaint of numbness and tingling of the left hand.  She states that she has been experiencing the symptoms for 6 months.    The patient has a history of chronic neck and shoulder pain.  She underwent cervical fusion in 2012.            I.  NERVE CONDUCTION STUDIES:       The distal latency of the left median motor nerve is 5.3 ms (upper limit of normal 4.3 ms).  The amplitude of evoked response is 2.2 mV.  The conduction velocity is 45 m/sec.       The distal latency of the left ulnar motor nerve is 3.7 ms.  The amplitude of evoked response is 7.0 mV.  The conduction velocity is 53 m/sec.       The distal latency of left median sensory nerve at 14 cm is 4.3 ms (upper limit of normal 3.6 ms).  The amplitude of evoked response is 12 microvolts.     The distal latency of left ulnar sensory nerve at 14 cm is 2.9 ms.  The amplitude of evoked response is 25 microvolts.     The distal latency of the left median sensory at 10 cm is 3.6 ms; the distal latency of the left radial sensory nerve at 10 cm is 2.2 ms (normal difference less than or equal to 0.4 ms).       II.  ELECTROMYOGRAPHY:       Electromyography was performed on the following muscles of the left upper extremity using a monopolar needle: Deltoid, biceps, brachioradialis, flexor carpi radialis, flexor carpi ulnaris, and abductor pollicis brevis.      There were no changes in insertional activity. Denervation potentials were present in the left abductor pollicis brevis.       The motor unit action potentials were of normal height and duration. The recruitment patterns were normal.       III.  IMPRESSION:        1. 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    2. There is no electrophysiologic evidence of a cervical radiculopathy, brachial plexopathy, or generalized peripheral neuropathy.      Leni Coleman,  M.D.*    3/9/2018  9:49 AM

## 2018-03-13 ENCOUNTER — TELEPHONE (OUTPATIENT)
Dept: ORTHOPEDIC SURGERY | Facility: CLINIC | Age: 68
End: 2018-03-13

## 2018-03-13 NOTE — TELEPHONE ENCOUNTER
I left another message for her and explained the findings.  I told her that I'll be happy to discuss this if there is a better number or better time she should let us know.  I also explained that I will be more than happy to see her in the office again to discuss the findings.

## 2018-03-26 RX ORDER — ALLOPURINOL 100 MG/1
TABLET ORAL
Qty: 30 TABLET | Refills: 1 | Status: SHIPPED | OUTPATIENT
Start: 2018-03-26 | End: 2018-04-05

## 2018-03-27 ENCOUNTER — OFFICE VISIT (OUTPATIENT)
Dept: SLEEP MEDICINE | Facility: HOSPITAL | Age: 68
End: 2018-03-27
Attending: INTERNAL MEDICINE

## 2018-03-27 VITALS
BODY MASS INDEX: 33.4 KG/M2 | HEIGHT: 62 IN | TEMPERATURE: 96.9 F | SYSTOLIC BLOOD PRESSURE: 127 MMHG | OXYGEN SATURATION: 99 % | DIASTOLIC BLOOD PRESSURE: 75 MMHG | WEIGHT: 181.5 LBS | HEART RATE: 73 BPM

## 2018-03-27 DIAGNOSIS — I50.40 COMBINED SYSTOLIC AND DIASTOLIC CONGESTIVE HEART FAILURE, UNSPECIFIED CONGESTIVE HEART FAILURE CHRONICITY: ICD-10-CM

## 2018-03-27 DIAGNOSIS — G47.00 INSOMNIA, UNSPECIFIED TYPE: ICD-10-CM

## 2018-03-27 DIAGNOSIS — G47.33 OBSTRUCTIVE SLEEP APNEA, ADULT: Primary | ICD-10-CM

## 2018-03-27 DIAGNOSIS — I48.0 PAF (PAROXYSMAL ATRIAL FIBRILLATION) (HCC): ICD-10-CM

## 2018-03-27 PROCEDURE — G0463 HOSPITAL OUTPT CLINIC VISIT: HCPCS

## 2018-03-27 NOTE — PROGRESS NOTES
Sleep Medicine initial Consultation    Kristan Galicia  : 1950  67 y.o. female   Date of Service: 3/27/2018  Referring provider: Chelly Red, *    History Of Present Illness:   Ms. Kristan Galicia  is a 67 y.o. 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     The patient c/o excessive daytime sleepiness, , chronic fatigue and There is no H/O sleep paralysis, hypnagogic hallucinations or cataplexy..    The patient complains of loud in all sleeping positions, has witnessed episodes of sleep apnea, awakened gasping for breath and has dry mouth or sore mouth when he wakes up.    The patient complains of discomfort on a creepy crawly feeling in legs when resting or relaxing and this may partially or completely improved by stretching or moving.    The patient complains of difficulty falling asleep, difficulty staying asleep, frequent awakenings due to pain or spontaneous awakenings, has restless sleep, Does not feel rested even after a long sleep and Still feels sleepy even when increasing sleep time.  The patient about that she hardly sleeps 2 or 3 hours and night.  She does not take any naps during the day.    Sleep schedule: Bedtime: 11 PM, gets out of bed at 11 AM, sleep latency: 1-3 hours, Gets about 2-3 hours of sleep.  She just lies down in the bed and simply tossing and turning.  She usually goes to sleep between 12 midnight to 3 AM and gets out of bed around 5:55 AM and goes to bathroom and goes back to bed and simply stays in the bed until she can go back to sleep around 7 AM gets another 3 hours of sleep and gets out of bed around 11 AM.    [place] Sleepiness Scale score: 6/24.    The patient was diagnosed as an obstructive sleep  apnea syndrome many years ago at chest medicine in and was prescribed CPAP therapy and use it for a short time and quit using it.  She reported that she lost weight and felt good and hence quit using it.  I do not have the results.    Past Medical History:   Diagnosis Date   • Anemia    • Atrial fibrillation    • Cardiac defibrillator in place 01/2009    medtronic    • Cardiomyopathy     Hypertrophic Cardiomyopathy   • CHF (congestive heart failure)    • Coronary artery disease    • Diabetes mellitus     Type II   • Diverticulitis    • Diverticulosis     hx diverticulitis   • GERD (gastroesophageal reflux disease)    • Heart murmur    • History of depression    • History of tumor     left ocular tumor, treated with steroids   • Hyperlipidemia    • Migraines    • Osteoporosis    • Palpitations    • Sleep apnea     does not use CPAP or BiPAP, used nose strips   • Thyroid disease    • Vasovagal syncope    • Vertigo    • Vitamin D deficiency      Past Surgical History:   Procedure Laterality Date   • APPENDECTOMY  1970   • BREAST BIOPSY Bilateral     Dr. Gonsales   • BREAST RECONSTRUCTION Bilateral 1988    Reduction   • CARDIAC CATHETERIZATION  02/19/2007   • CARDIAC DEFIBRILLATOR PLACEMENT Left 1999    Dr. Ya   • CATARACT EXTRACTION Bilateral    • COLONOSCOPY  1996    Dr. Herbert Gonsales    • COLONOSCOPY  8/16/2016    Procedure: COLONOSCOPY with cecum;  Surgeon: Rosalio Sheikh MD;  Location: [facility] ENDOSCOPY;  Service:    • ENDOSCOPY N/A 4/27/2017    Procedure: ESOPHAGOGASTRODUODENOSCOPY WITH BIOPSIES;  Surgeon: Rosalio Sheikh MD;  Location: [facility] ENDOSCOPY;  Service:    • FEMUR SURGERY Left     with metal implant  x3   • FIRST RIB RESECTION Bilateral     and scalenectomy   • HYSTERECTOMY  1989    Total   • KNEE ARTHROSCOPY Bilateral 2014    Dr. Li   • KNEE SURGERY Right 06/10/2010   • LAPAROSCOPIC CHOLECYSTECTOMY  1974   • NECK SURGERY  08/23/2012    Anterior cervical diskectomy and fusion with VG2 allograft  implants and eagle anterior plate fixation,C5-C6 C6-C7   • ROTATOR CUFF REPAIR Right 06/10/2010   • SHOULDER ARTHROSCOPY W/ ROTATOR CUFF REPAIR Bilateral 05/20/2011   • SHOULDER SURGERY Right    • TONSILLECTOMY     • TOTAL SHOULDER ARTHROPLASTY W/ DISTAL CLAVICLE EXCISION Right 2/2/2017    Procedure: TOTAL SHOULDER REVERSE ARTHROPLASTY;  Surgeon: Juan Antonio Anne MD;  Location: [facility];  Service:      Current Outpatient Prescriptions on File Prior to Visit   Medication Sig Dispense Refill   • alendronate (FOSAMAX) 70 MG tablet Take 1 tablet by mouth Every 7 (Seven) Days. 12 tablet 3   • allopurinol (ZYLOPRIM) 100 MG tablet TAKE 1 TABLET BY MOUTH DAILY.  1   • allopurinol (ZYLOPRIM) 100 MG tablet TAKE 1 TABLET BY MOUTH DAILY. 30 tablet 1   • amiodarone (PACERONE) 200 MG tablet Take 100 mg by mouth Every Night.     • atorvastatin (LIPITOR) 80 MG tablet Take 1 tablet by mouth Every Night. 90 tablet 3   • baclofen (LIORESAL) 10 MG tablet Take 1 tablet by mouth Every Night. (Patient taking differently: Take 10 mg by mouth At Night As Needed.) 30 tablet 5   • Blood Glucose Monitoring Suppl (ACCU-CHEK OTILIA PLUS) W/DEVICE kit Dx e11.9 use to check BS BID, ok to substitute formulary preferred 1 kit 0   • carvedilol (COREG) 12.5 MG tablet Take 12.5 mg by mouth 2 (Two) Times a Day.     • dabigatran etexilate (PRADAXA) 150 MG capsu Take 1 capsule by mouth 2 (Two) Times a Day. Resume 2/4/17 60 capsule    • DULoxetine (CYMBALTA) 60 MG capsule Take 1 capsule by mouth Daily. 90 capsule 0   • gabapentin (NEURONTIN) 300 MG capsule TAKE ONE CAPSULE BY MOUTH 3 TIMES A DAY  3   • glucose blood (ACCU-CHEK OTILIA PLUS) test strip Dx e11.9 use to check BS BID, ok to substitute formulary preferred 60 each 11   • HYDROcodone-acetaminophen (NORCO) 5-325 MG per tablet TAKE 1 TABLET BY MOUTH TWICE A DAY AS NEEDED FOR PAIN  0   • Lancets (ACCU-CHEK MULTICLIX) lancets Dx e11.9 use to check BS BID, ok to substitute formulary preferred 60  each 11   • levothyroxine (SYNTHROID) 88 MCG tablet Take 1 tablet by mouth Daily. 30 tablet 1   • lidocaine (LIDODERM) 5 % PLACE 1 PATCH ON SKIN FOR 12 HOURS FOLLOWED BY A 12 HOUR PATCH FREE INTERVAL AS DIRECTED BY MD Celeste patch 2   • metFORMIN (FORTAMET) 1000 MG (OSM) 24 hr tablet Take 1 tablet by mouth 2 (Two) Times a Day With Meals. 60 tablet 5   • montelukast (SINGULAIR) 10 MG tablet TAKE 1 TABLET BY MOUTH EVERY DAY 90 tablet 1   • pantoprazole (PROTONIX) 40 MG EC tablet Take 1 tablet by mouth Daily for 90 days. 30 tablet 2   • PRADAXA 150 MG capsu TAKE 1 CAPSULE BY MOUTH 2 (TWO) TIMES A DAY. 180 capsule 2   • SITagliptin (JANUVIA) 100 MG tablet Take 1 tablet by mouth Daily. 30 tablet 5   • spironolactone-hydrochlorothiazide (ALDACTAZIDE) 25-25 MG tablet TAKE 1 TABLET BY MOUTH EVERY DAY 90 tablet 1   • traZODone (DESYREL) 50 MG tablet Take 2 tablets by mouth Every Night. 60 tablet 5     No current facility-administered medications on file prior to visit.      Allergies   Allergen Reactions   • Adhesive Tape Other (See Comments)   • Aspirin Swelling   • Biaxin [Clarithromycin] Other (See Comments)     BLISTERS AROUND MOTH  Also known as Bioxen    • Codeine Swelling   • Darvon [Propoxyphene] Swelling   • Levaquin [Levofloxacin] Other (See Comments)     BLISTERS AROUND MOUTH   • Tequin [Gatifloxacin] Other (See Comments)     BLISTERS AROUND MOUTH  Also known Tequine      Family History   Problem Relation Age of Onset   • Heart attack Brother    • Heart disease Brother    • Hyperthyroidism Mother    • Cancer Mother    • Heart disease Mother    • Osteoporosis Mother    • No Known Problems Father       car accident   • Cirrhosis Maternal Grandmother    • No Known Problems Maternal Grandfather    • No Known Problems Paternal Grandmother    • No Known Problems Paternal Grandfather    • Breast cancer Maternal Aunt      Social History     Social History   • Marital status: Single     Spouse name: N/A   • Number of children:  "1   • Years of education: N/A     Occupational History   •       Social History Main Topics   • Smoking status: Former Smoker     Packs/day: 0.50     Years: 17.00     Quit date: 9/8/1985   • Smokeless tobacco: Never Used   • Alcohol use No   • Drug use: No   • Sexual activity: Defer     Other Topics Concern   • Not on file     Social History Narrative   • No narrative on file     Review of Systems   HENT: Positive for ear pain and postnasal drip.    Respiratory: Positive for cough and shortness of breath.    Cardiovascular: Positive for chest pain, palpitations and leg swelling.   Gastrointestinal:        Heart burn   Neurological: Positive for dizziness.   Psychiatric/Behavioral: Positive for dysphoric mood.   All other systems reviewed and are negative.    Patient examination:  Vitals:    03/27/18 1037   BP: 127/75   BP Location: Left arm   Patient Position: Sitting   Cuff Size: Adult   Pulse: 73   Temp: 96.9 °F (36.1 °C)   TempSrc: Oral   SpO2: 99%   Weight: 82.3 kg (181 lb 8 oz)   Height: 156.2 cm (61.5\")    Body mass index is 33.74 kg/m².  Neck Circumference: 13.5 inches   HEENT: Normal and Mallampati Class III: Soft palate, base of uvula visible   Neck: Supple no carotid bruits.  Lungs: Clear to auscultation.  Cardiac exam: Normal and regular rate and rhythm. No murmurs.  Extremities: No edema clubbing or cyanosis.    ASSESSMENT AND PLAN: Ms. Kristan Galicia  is a 67 y.o. 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     The patient has symptoms of obstructive sleep apnea syndrome with with chronic insomnia and irregular sleep-wake schedule and chronic fatigue and excessive daytime sleepiness.      The patient was diagnosed as an " obstructive sleep apnea syndrome many years ago at chest medicine in and was prescribed CPAP therapy and use it for a short time and quit using it.  She reported that she lost weight and felt good and hence quit using it.  I do not have the results.    We will proceed with polysomnography and treated with CPAP therapy if needed. Sleep hygiene techniques discussed. Exercise diet and weight loss discussed.    I'll also refer the patient to sleep psychologist Dr. Murphy.    Encounter Diagnoses   Name Primary?   • Obstructive sleep apnea, adult Yes   • Insomnia, unspecified type    • PAF (paroxysmal atrial fibrillation)    • Combined systolic and diastolic congestive heart failure, unspecified congestive heart failure chronicity        Orders Placed This Encounter   Procedures   • Ambulatory Referral to Psychology   • Polysomnography 4 or More Parameters       Return in about 3 months (around 6/27/2018).    Vaishali Astudillo MD  3/27/2018  11:14 AM

## 2018-04-05 ENCOUNTER — APPOINTMENT (OUTPATIENT)
Dept: PREADMISSION TESTING | Facility: HOSPITAL | Age: 68
End: 2018-04-05

## 2018-04-05 VITALS
TEMPERATURE: 98.4 F | WEIGHT: 178.2 LBS | BODY MASS INDEX: 33.64 KG/M2 | DIASTOLIC BLOOD PRESSURE: 68 MMHG | OXYGEN SATURATION: 96 % | RESPIRATION RATE: 20 BRPM | SYSTOLIC BLOOD PRESSURE: 100 MMHG | HEIGHT: 61 IN | HEART RATE: 61 BPM

## 2018-04-05 DIAGNOSIS — M17.11 ARTHRITIS OF RIGHT KNEE: ICD-10-CM

## 2018-04-05 LAB
ANION GAP SERPL CALCULATED.3IONS-SCNC: 14.6 MMOL/L
BACTERIA UR QL AUTO: ABNORMAL /HPF
BILIRUB UR QL STRIP: NEGATIVE
BUN BLD-MCNC: 19 MG/DL (ref 8–23)
BUN/CREAT SERPL: 18.8 (ref 7–25)
CALCIUM SPEC-SCNC: 10.4 MG/DL (ref 8.6–10.5)
CHLORIDE SERPL-SCNC: 98 MMOL/L (ref 98–107)
CLARITY UR: CLEAR
CO2 SERPL-SCNC: 27.4 MMOL/L (ref 22–29)
COLOR UR: YELLOW
CREAT BLD-MCNC: 1.01 MG/DL (ref 0.57–1)
DEPRECATED RDW RBC AUTO: 47.5 FL (ref 37–54)
ERYTHROCYTE [DISTWIDTH] IN BLOOD BY AUTOMATED COUNT: 14.7 % (ref 11.7–13)
GFR SERPL CREATININE-BSD FRML MDRD: 55 ML/MIN/1.73
GLUCOSE BLD-MCNC: 202 MG/DL (ref 65–99)
GLUCOSE UR STRIP-MCNC: NEGATIVE MG/DL
HCT VFR BLD AUTO: 40.4 % (ref 35.6–45.5)
HGB BLD-MCNC: 12.8 G/DL (ref 11.9–15.5)
HGB UR QL STRIP.AUTO: NEGATIVE
HYALINE CASTS UR QL AUTO: ABNORMAL /LPF
KETONES UR QL STRIP: NEGATIVE
LEUKOCYTE ESTERASE UR QL STRIP.AUTO: ABNORMAL
MCH RBC QN AUTO: 27.9 PG (ref 26.9–32)
MCHC RBC AUTO-ENTMCNC: 31.7 G/DL (ref 32.4–36.3)
MCV RBC AUTO: 88 FL (ref 80.5–98.2)
NITRITE UR QL STRIP: NEGATIVE
PH UR STRIP.AUTO: 6 [PH] (ref 5–8)
PLATELET # BLD AUTO: 351 10*3/MM3 (ref 140–500)
PMV BLD AUTO: 9.8 FL (ref 6–12)
POTASSIUM BLD-SCNC: 3.8 MMOL/L (ref 3.5–5.2)
PROT UR QL STRIP: NEGATIVE
RBC # BLD AUTO: 4.59 10*6/MM3 (ref 3.9–5.2)
RBC # UR: ABNORMAL /HPF
REF LAB TEST METHOD: ABNORMAL
SODIUM BLD-SCNC: 140 MMOL/L (ref 136–145)
SP GR UR STRIP: 1.01 (ref 1–1.03)
SQUAMOUS #/AREA URNS HPF: ABNORMAL /HPF
UROBILINOGEN UR QL STRIP: ABNORMAL
WBC NRBC COR # BLD: 7.91 10*3/MM3 (ref 4.5–10.7)
WBC UR QL AUTO: ABNORMAL /HPF

## 2018-04-05 PROCEDURE — A9270 NON-COVERED ITEM OR SERVICE: HCPCS | Performed by: ORTHOPAEDIC SURGERY

## 2018-04-05 PROCEDURE — 81001 URINALYSIS AUTO W/SCOPE: CPT | Performed by: ORTHOPAEDIC SURGERY

## 2018-04-05 PROCEDURE — 80048 BASIC METABOLIC PNL TOTAL CA: CPT | Performed by: ORTHOPAEDIC SURGERY

## 2018-04-05 PROCEDURE — 63710000001 MUPIROCIN 2 % OINTMENT 1 G TUBE: Performed by: ORTHOPAEDIC SURGERY

## 2018-04-05 PROCEDURE — 85027 COMPLETE CBC AUTOMATED: CPT | Performed by: ORTHOPAEDIC SURGERY

## 2018-04-05 PROCEDURE — 36415 COLL VENOUS BLD VENIPUNCTURE: CPT

## 2018-04-05 RX ORDER — TRAZODONE HYDROCHLORIDE 50 MG/1
100 TABLET ORAL NIGHTLY PRN
Status: ON HOLD | COMMUNITY
End: 2018-09-15

## 2018-04-05 RX ORDER — MONTELUKAST SODIUM 10 MG/1
10 TABLET ORAL NIGHTLY
Status: ON HOLD | COMMUNITY
End: 2018-09-15

## 2018-04-05 RX ORDER — GABAPENTIN 300 MG/1
300 CAPSULE ORAL 3 TIMES DAILY
Status: ON HOLD | COMMUNITY
End: 2018-09-17

## 2018-04-05 RX ORDER — HYDROCODONE BITARTRATE AND ACETAMINOPHEN 5; 325 MG/1; MG/1
1 TABLET ORAL 2 TIMES DAILY PRN
COMMUNITY
End: 2018-04-21 | Stop reason: HOSPADM

## 2018-04-05 RX ORDER — DABIGATRAN ETEXILATE 150 MG/1
150 CAPSULE ORAL 2 TIMES DAILY
Status: ON HOLD | COMMUNITY
End: 2022-05-06 | Stop reason: SDUPTHER

## 2018-04-05 RX ORDER — CHLORHEXIDINE GLUCONATE 500 MG/1
CLOTH TOPICAL
COMMUNITY
End: 2018-04-21 | Stop reason: HOSPADM

## 2018-04-05 RX ORDER — SPIRONOLACTONE AND HYDROCHLOROTHIAZIDE 25; 25 MG/1; MG/1
1 TABLET ORAL DAILY
COMMUNITY
End: 2018-07-17 | Stop reason: SDUPTHER

## 2018-04-05 RX ORDER — ALLOPURINOL 100 MG/1
100 TABLET ORAL DAILY
COMMUNITY
End: 2018-07-17 | Stop reason: SDUPTHER

## 2018-04-05 RX ORDER — LIDOCAINE 50 MG/G
1 PATCH TOPICAL DAILY PRN
COMMUNITY
End: 2020-02-26

## 2018-04-05 RX ORDER — BACLOFEN 10 MG/1
10 TABLET ORAL NIGHTLY PRN
COMMUNITY
End: 2020-10-15

## 2018-04-05 ASSESSMENT — KOOS JR
KOOS JR SCORE: 18
KOOS JR SCORE: 42.281

## 2018-04-05 NOTE — DISCHARGE INSTRUCTIONS
2% CHLORAHEXIDINE GLUCONATE* CLOTH  Preparing or “prepping” skin before surgery can reduce the risk of infection at the surgical site. To make the process easier, [facility] has chosen disposable cloths moistened with a rinse-free, 2% Chlorhexidine Gluconate (CHG) antiseptic solution. The steps below outline the prepping process and should be carefully followed.        Use the prep cloth on the area that is circled in the diagram             Directions Night before Surgery  1) Shower using a fresh bar of anti-bacterial soap (such as Dial) and clean washcloth.  Use a clean towel to completely dry your skin.  2) Do not use any lotions, oils or creams on your skin.  3) Open the package and remove 1 cloth, wipe your skin for 30 seconds in a circular motion.  Allow to dry for 3 minutes.  4) Repeat #3 with second cloth.  5) Do not touch your eyes, ears, or mouth with the prep cloth.  6) Allow the wet prep solution to air dry.  7) Discard the prep cloth and wash your hands with soap and water.   8) Dress in clean bed clothes and sleep on fresh clean bed sheets.   9) You may experience some temporary itching after the prep.    Directions Day of Surgery  1) Repeat steps 1,2,3,4,5,6,7, and 9.   2) Dress in clean clothes before coming to the hospital.    BACTROBAN NASAL OINTMENT  There are many germs normally in your nose. Bactroban is an ointment that will help reduce these germs. Please follow these instructions for Bactroban use:    ____Two days before surgery in the evening Date________    ____The day before surgery in the morning  Date________    ____The day before surgery in the evening              Date________    ____The day of surgery in the morning    Date________    **Squirt ½ package of Bactroban Ointment onto a cotton applicator and apply to inside of 1st nostril.  Squirt the remaining Bactroban and apply to the inside of the other nostril.      Take the following medications the morning of  surgery with a small sip of water:  CARVEDILOL, GABAPENTIN, AND PAIN PILLS AS NEEDED      FOLLOW MD INSTRUCTIONS REGARDING PRADAXA    ARRIVAL TIME 0700 TO MAIN OR       General Instructions:  • Do not eat solid food after midnight the night before surgery.  • You may drink clear liquids day of surgery but must stop at least one hour before your hospital arrival time.  • It is beneficial for you to have a clear drink that contains carbohydrates the day of surgery.  We suggest a 12 to 20 ounce bottle of Gatorade or Powerade for non-diabetic patients or a 12 to 20 ounce bottle of G2 or Powerade Zero for diabetic patients. (Pediatric patients, are not advised to drink a 12 to 20 ounce carbohydrate drink)    Clear liquids are liquids you can see through.  Nothing red in color.     Plain water                               Sports drinks  Sodas                                   Gelatin (Jell-O)  Fruit juices without pulp such as white grape juice and apple juice  Popsicles that contain no fruit or yogurt  Tea or coffee (no cream or milk added)  Gatorade / Powerade  G2 / Powerade Zero    • Infants may have breast milk up to four hours before surgery.  • Infants drinking formula may drink formula up to six hours before surgery.   • Patients who avoid smoking, chewing tobacco and alcohol for 4 weeks prior to surgery have a reduced risk of post-operative complications.  Quit smoking as many days before surgery as you can.  • Do not smoke, use chewing tobacco or drink alcohol the day of surgery.   • If applicable bring your C-PAP/ BI-PAP machine.  • Bring any papers given to you in the doctor’s office.  • Wear clean comfortable clothes and socks.  • Do not wear contact lenses or make-up.  Bring a case for your glasses.   • Bring crutches or walker if applicable.  • Remove all piercings.  Leave jewelry and any other valuables at home.  • Hair extensions with metal clips must be removed prior to surgery.  • The Pre-Admission  Testing nurse will instruct you to bring medications if unable to obtain an accurate list in Pre-Admission Testing.            Preventing a Surgical Site Infection:  • For 2 to 3 days before surgery, avoid shaving with a razor because the razor can irritate skin and make it easier to develop an infection.  • The night prior to surgery sleep in a clean bed with clean clothing.  Do not allow pets to sleep with you.  • Shower on the morning of surgery using a fresh bar of anti-bacterial soap (such as Dial) and clean washcloth.  Dry with a clean towel and dress in clean clothing.  • Ask your surgeon if you will be receiving antibiotics prior to surgery.  • Make sure you, your family, and all healthcare providers clean their hands with soap and water or an alcohol based hand  before caring for you or your wound.    Day of surgery:  Upon arrival, a Pre-op nurse and Anesthesiologist will review your health history, obtain vital signs, and answer questions you may have.  The only belongings needed at this time will be your home medications and if applicable your C-PAP/BI-PAP machine.  If you are staying overnight your family can leave the rest of your belongings in the car and bring them to your room later.  A Pre-op nurse will start an IV and you may receive medication in preparation for surgery, including something to help you relax.  Your family will be able to see you in the Pre-op area.  While you are in surgery your family should notify the waiting room  if they leave the waiting room area and provide a contact phone number.    Please be aware that surgery does come with discomfort.  We want to make every effort to control your discomfort so please discuss any uncontrolled symptoms with your nurse.   Your doctor will most likely have prescribed pain medications.      If you are going home after surgery you will receive individualized written care instructions before being discharged.  A  responsible adult must drive you to and from the hospital on the day of your surgery and stay with you for 24 hours.    If you are staying overnight following surgery, you will be transported to your hospital room following the recovery period.  [facility] has all private rooms.    If you have any questions please call Pre-Admission Testing at [phone].  Deductibles and co-payments are collected on the day of service. Please be prepared to pay the required co-pay, deductible or deposit on the day of service as defined by your plan.

## 2018-04-09 ENCOUNTER — OFFICE VISIT (OUTPATIENT)
Dept: ORTHOPEDIC SURGERY | Facility: CLINIC | Age: 68
End: 2018-04-09

## 2018-04-09 VITALS — WEIGHT: 179.2 LBS | TEMPERATURE: 97.5 F | BODY MASS INDEX: 33.83 KG/M2 | HEIGHT: 61 IN

## 2018-04-09 DIAGNOSIS — M17.11 ARTHRITIS OF RIGHT KNEE: Primary | ICD-10-CM

## 2018-04-09 PROCEDURE — 73562 X-RAY EXAM OF KNEE 3: CPT | Performed by: NURSE PRACTITIONER

## 2018-04-09 PROCEDURE — S0260 H&P FOR SURGERY: HCPCS | Performed by: NURSE PRACTITIONER

## 2018-04-09 NOTE — PROGRESS NOTES
"   History & Physical       Patient: Kristan Galicia  YOB: 1950  Medical Record Number: [id number]  Wt Readings from Last 3 Encounters:   04/09/18 81.3 kg (179 lb 3.2 oz)   04/05/18 80.8 kg (178 lb 3.2 oz)   03/27/18 82.3 kg (181 lb 8 oz)     Ht Readings from Last 3 Encounters:   04/09/18 154.9 cm (61\")   04/05/18 154.9 cm (61\")   03/27/18 156.2 cm (61.5\")     Body mass index is 33.86 kg/m².  Facility age limit for growth percentiles is 20 years.    Surgeon:  Dr. Bravo Pascual    Chief Complaints:   Chief Complaint   Patient presents with   • Right Knee - Pre-op Exam, Pain       Subjective:    History of Present Illness: 67 y.o. female presents with   Chief Complaint   Patient presents with   • Right Knee - Pre-op Exam, Pain   . Onset of symptoms was years ago and has been progressively worsening despite more conservative treatment measures.  Symptoms are associated with ability to move, exercise, and perform activities of daily living.  Symptoms are aggravated by weight bearing and ROM necessary for activities of daily living.   Symptoms improve with rest, ice and elevation only minimally.      Allergies:   Allergies   Allergen Reactions   • Adhesive Tape Other (See Comments)     SKIN BLISTERS   • Aspirin Swelling   • Biaxin [Clarithromycin] Other (See Comments)     BLISTERS AROUND MOTH  Also known as Bioxen    • Codeine Swelling   • Darvon [Propoxyphene] Swelling   • Levaquin [Levofloxacin] Other (See Comments)     BLISTERS AROUND MOUTH   • Tequin [Gatifloxacin] Other (See Comments)     BLISTERS AROUND MOUTH  Also known Tequine        Medications:   Home Medications:  Current Outpatient Prescriptions on File Prior to Visit   Medication Sig   • alendronate (FOSAMAX) 70 MG tablet Take 1 tablet by mouth Every 7 (Seven) Days.   • allopurinol (ZYLOPRIM) 100 MG tablet Take 100 mg by mouth Daily.   • amiodarone (PACERONE) 200 MG tablet Take 100 mg by mouth Every Night.   • atorvastatin (LIPITOR) 80 MG " tablet Take 1 tablet by mouth Every Night.   • baclofen (LIORESAL) 10 MG tablet Take 10 mg by mouth At Night As Needed for Muscle Spasms.   • Blood Glucose Monitoring Suppl (ACCU-CHEK OTILIA PLUS) W/DEVICE kit Dx e11.9 use to check BS BID, ok to substitute formulary preferred   • carvedilol (COREG) 12.5 MG tablet Take 12.5 mg by mouth 2 (Two) Times a Day.   • Chlorhexidine Gluconate Cloth 2 % pads Apply  topically. PRIOR TO OR   • dabigatran etexilate (PRADAXA) 150 MG capsu Take 150 mg by mouth 2 (Two) Times a Day. TO HOLD 5 DAYS PRIOR TO OR PER CARDIO   • DULoxetine (CYMBALTA) 60 MG capsule Take 1 capsule by mouth Daily.   • gabapentin (NEURONTIN) 300 MG capsule Take 300 mg by mouth 3 (Three) Times a Day.   • glucose blood (ACCU-CHEK OTILIA PLUS) test strip Dx e11.9 use to check BS BID, ok to substitute formulary preferred   • HYDROcodone-acetaminophen (NORCO) 5-325 MG per tablet Take 1 tablet by mouth 2 (Two) Times a Day As Needed.   • Lancets (ACCU-CHEK MULTICLIX) lancets Dx e11.9 use to check BS BID, ok to substitute formulary preferred   • levothyroxine (SYNTHROID) 88 MCG tablet Take 1 tablet by mouth Daily.   • lidocaine (LIDODERM) 5 % Place 1 patch on the skin Daily As Needed for Mild Pain . Remove & Discard patch within 12 hours or as directed by MD   • montelukast (SINGULAIR) 10 MG tablet Take 10 mg by mouth Every Night.   • mupirocin (BACTROBAN) 2 % nasal ointment into each nostril 2 (Two) Times a Day. PRIOR TO OR   • pantoprazole (PROTONIX) 40 MG EC tablet Take 1 tablet by mouth Daily for 90 days.   • SITagliptin (JANUVIA) 100 MG tablet Take 1 tablet by mouth Daily.   • spironolactone-hydrochlorothiazide (ALDACTAZIDE) 25-25 MG tablet Take 1 tablet by mouth Daily.   • traZODone (DESYREL) 50 MG tablet Take 100 mg by mouth At Night As Needed for Sleep.     No current facility-administered medications on file prior to visit.      Current Medications:  Scheduled Meds:  Continuous Infusions:  No current  facility-administered medications for this visit.   PRN Meds:.    I have reviewed the patient's medical history in detail and updated the computerized patient record.  Review and summarization of old records include:    Past Medical History:   Diagnosis Date   • Anemia    • Arthritis    • Atrial fibrillation    • Cardiac defibrillator in place 01/2009    medtronic    • Cardiomyopathy     Hypertrophic Cardiomyopathy   • CHF (congestive heart failure)    • Chronic pain    • Coronary artery disease    • Diabetes mellitus     Type II   • Diverticulosis     hx diverticulitis   • GERD (gastroesophageal reflux disease)    • Heart murmur    • History of depression    • History of tumor     left ocular tumor, treated with steroids   • Hyperlipidemia    • Migraines    • On anticoagulant therapy    • Osteoporosis    • Palpitations    • Sleep apnea     does not use CPAP or BiPAP, used nose strips   • Thyroid disease    • Vasovagal syncope    • Vertigo         Past Surgical History:   Procedure Laterality Date   • ANTERIOR CERVICAL DISCECTOMY W/ FUSION  2012    C5-7   • APPENDECTOMY  1970   • BILATERAL BREAST REDUCTION     • CARDIAC CATHETERIZATION  02/19/2007   • CARDIAC DEFIBRILLATOR PLACEMENT Left 1999, 2013    Dr. Ya   • CATARACT EXTRACTION Bilateral    • COLONOSCOPY  1996    Dr. Herbert Gonsales    • COLONOSCOPY  8/16/2016    Procedure: COLONOSCOPY with cecum;  Surgeon: Rosalio Sheikh MD;  Location: [facility] ENDOSCOPY;  Service:    • ENDOSCOPY N/A 4/27/2017    Procedure: ESOPHAGOGASTRODUODENOSCOPY WITH BIOPSIES;  Surgeon: Rosalio Sheikh MD;  Location: [facility] ENDOSCOPY;  Service:    • FEMUR SURGERY Left     with metal implant  x3   • FIRST RIB RESECTION Bilateral     and scalenectomy   • HYSTERECTOMY  1989    Total   • KNEE ARTHROSCOPY Bilateral 2014    Dr. Li   • KNEE SURGERY Right 06/10/2010   • LAPAROSCOPIC CHOLECYSTECTOMY  1974   • ROTATOR CUFF REPAIR Right 06/10/2010   • SHOULDER ARTHROSCOPY W/ ROTATOR CUFF  REPAIR Bilateral 05/20/2011   • SHOULDER SURGERY Right    • TONSILLECTOMY     • TOTAL SHOULDER ARTHROPLASTY W/ DISTAL CLAVICLE EXCISION Right 2/2/2017    Procedure: TOTAL SHOULDER REVERSE ARTHROPLASTY;  Surgeon: Juan Antonio Anne MD;  Location[facility] OR;  Service:         Social History     Occupational History   •       Social History Main Topics   • Smoking status: Former Smoker     Packs/day: 0.50     Years: 17.00     Types: Cigarettes     Quit date: 9/8/1985   • Smokeless tobacco: Never Used   • Alcohol use No   • Drug use: No   • Sexual activity: Defer    Social History     Social History Narrative   • No narrative on file        Family History   Problem Relation Age of Onset   • Heart attack Brother    • Heart disease Brother    • Hyperthyroidism Mother    • Cancer Mother    • Heart disease Mother    • Osteoporosis Mother    • No Known Problems Father       car accident   • Cirrhosis Maternal Grandmother    • No Known Problems Maternal Grandfather    • No Known Problems Paternal Grandmother    • No Known Problems Paternal Grandfather    • Breast cancer Maternal Aunt    • Malig Hyperthermia Neg Hx        ROS: 14 point review of systems was performed and was negative except for documented findings in HPI and today's encounter.     Allergies:   Allergies   Allergen Reactions   • Adhesive Tape Other (See Comments)     SKIN BLISTERS   • Aspirin Swelling   • Biaxin [Clarithromycin] Other (See Comments)     BLISTERS AROUND MOTH  Also known as Bioxen    • Codeine Swelling   • Darvon [Propoxyphene] Swelling   • Levaquin [Levofloxacin] Other (See Comments)     BLISTERS AROUND MOUTH   • Tequin [Gatifloxacin] Other (See Comments)     BLISTERS AROUND MOUTH  Also known Tequine      Constitutional:  Denies fever, shaking or chills   Eyes:  Denies change in visual acuity   HENT:  Denies nasal congestion or sore throat   Respiratory:  Denies cough or shortness of breath   Cardiovascular:  Denies  "chest pain or severe LE edema   GI:  Denies abdominal pain, nausea, vomiting, bloody stools or diarrhea   Musculoskeletal:  Denies numbness tingling or loss of motor function except as outlined above in history of present illness.  : Denies painful urination or hematuria  Integument:  Denies rash, lesion or ulceration   Neurologic:  Denies headache or focal weakness  Endocrine:  Denies lymphadenopathy  Psych:  Denies confusion or change in mental status   Hem:  Denies active bleeding    Physical Exam: 67 y.o. female  Wt Readings from Last 3 Encounters:   04/09/18 81.3 kg (179 lb 3.2 oz)   04/05/18 80.8 kg (178 lb 3.2 oz)   03/27/18 82.3 kg (181 lb 8 oz)     Ht Readings from Last 3 Encounters:   04/09/18 154.9 cm (61\")   04/05/18 154.9 cm (61\")   03/27/18 156.2 cm (61.5\")     Body mass index is 33.86 kg/m².  Facility age limit for growth percentiles is 20 years.  Vitals:    04/09/18 0910   Temp: 97.5 °F (36.4 °C)       Vital signs reviewed.   General Appearance:    Alert, cooperative, in no acute distress                  Eyes: conjunctiva clear  ENT: external ears and nose atraumatic  CV: no peripheral edema  Resp: normal respiratory effort  Skin: no rashes or wounds; normal turgor  Psych: mood and affect appropriate  Lymph: no nodes appreciated  Neuro: gross sensation intact  Vascular:  Palpable peripheral pulse in noted extremity  Musculoskeletal Extremities: DETAILED KNEE Exam: Right knee: Painful gait w/wo limp, muscle atrophy, erythema, ecchymosis, or gross deformity noted, Large knee effusion, + medial joint line tenderness, Active range of motion normal, 5/5 strength flexion and extension, The knee is stable to varus and valgus stress testing, VARUS VALGUS NEUTRAL: varus alignment of the limb, Lachman negative, Posterior drawer negative, Socrates's negative, Patellofemoral grind +, Sensation grossly intact to light tough throughout the lower extremity, Skin is intact, Distal pulses are palpable, No signs or " symptoms of DVT          Diagnostic Tests:  Appointment on 04/05/2018   Component Date Value Ref Range Status   • Glucose 04/05/2018 202* 65 - 99 mg/dL Final   • BUN 04/05/2018 19  8 - 23 mg/dL Final   • Creatinine 04/05/2018 1.01* 0.57 - 1.00 mg/dL Final   • Sodium 04/05/2018 140  136 - 145 mmol/L Final   • Potassium 04/05/2018 3.8  3.5 - 5.2 mmol/L Final   • Chloride 04/05/2018 98  98 - 107 mmol/L Final   • CO2 04/05/2018 27.4  22.0 - 29.0 mmol/L Final   • Calcium 04/05/2018 10.4  8.6 - 10.5 mg/dL Final   • eGFR Non African Amer 04/05/2018 55* >60 mL/min/1.73 Final   • BUN/Creatinine Ratio 04/05/2018 18.8  7.0 - 25.0 Final   • Anion Gap 04/05/2018 14.6  mmol/L Final   • WBC 04/05/2018 7.91  4.50 - 10.70 10*3/mm3 Final   • RBC 04/05/2018 4.59  3.90 - 5.20 10*6/mm3 Final   • Hemoglobin 04/05/2018 12.8  11.9 - 15.5 g/dL Final   • Hematocrit 04/05/2018 40.4  35.6 - 45.5 % Final   • MCV 04/05/2018 88.0  80.5 - 98.2 fL Final   • MCH 04/05/2018 27.9  26.9 - 32.0 pg Final   • MCHC 04/05/2018 31.7* 32.4 - 36.3 g/dL Final   • RDW 04/05/2018 14.7* 11.7 - 13.0 % Final   • RDW-SD 04/05/2018 47.5  37.0 - 54.0 fl Final   • MPV 04/05/2018 9.8  6.0 - 12.0 fL Final   • Platelets 04/05/2018 351  140 - 500 10*3/mm3 Final   • Color, UA 04/05/2018 Yellow  Yellow, Straw Final   • Appearance, UA 04/05/2018 Clear  Clear Final   • pH, UA 04/05/2018 6.0  5.0 - 8.0 Final   • Specific Gravity, UA 04/05/2018 1.013  1.005 - 1.030 Final   • Glucose, UA 04/05/2018 Negative  Negative Final   • Ketones, UA 04/05/2018 Negative  Negative Final   • Bilirubin, UA 04/05/2018 Negative  Negative Final   • Blood, UA 04/05/2018 Negative  Negative Final   • Protein, UA 04/05/2018 Negative  Negative Final   • Leuk Esterase, UA 04/05/2018 Trace* Negative Final   • Nitrite, UA 04/05/2018 Negative  Negative Final   • Urobilinogen, UA 04/05/2018 0.2 E.U./dL  0.2 - 1.0 E.U./dL Final   • RBC, UA 04/05/2018 0-2  None Seen, 0-2 /HPF Final   • WBC, UA 04/05/2018 0-2   None Seen, 0-2 /HPF Final   • Bacteria, UA 04/05/2018 None Seen  None Seen /HPF Final   • Squamous Epithelial Cells,  04/05/2018 3-6* None Seen, 0-2 /HPF Final   • Hyaline Casts,  04/05/2018 0-2  None Seen /LPF Final   • Methodology 04/05/2018 Automated Microscopy   Final       Imaging was done today, images were personally viewed, viewed images and discussed with the patient:    Indication: pain related symptoms,  Views: 3V AP, LAT & 40 degree PA right knee(s)   Findings: severe end-stage arthritis (bone on bone, subchondral sclerosis/cysts, osteophytes)  Comparison views: viewed last xray done in the office.     Assessment:  Patient Active Problem List   Diagnosis   • Shoulder pain, right   • Knee pain, right   • Fall down stairs   • PAF (paroxysmal atrial fibrillation)   • S/P rotator cuff repair   • Vitamin D deficiency   • Vertigo   • Vasovagal syncope   • Thyroid disease   • Obstructive sleep apnea, adult   • Palpitations   • Osteoporosis   • Ocular tumor   • Migraines   • Hyperlipidemia   • History of transfusion   • Heart murmur   • Heart attack   • GERD (gastroesophageal reflux disease)   • Depression   • CHF (congestive heart failure)   • Cardiomyopathy   • Asthma   • Anxiety   • Arthritis of right knee   • Right rotator cuff tear   • Hypothyroidism   • Dyspnea and respiratory abnormalities   • Orthopnea   • IHSS (idiopathic hypertrophic subaortic stenosis)   • AICD (automatic cardioverter/defibrillator) present   • Anticoagulant long-term use   • Hyperglycemia, drug-induced   • Chronic pain of right knee   • Diverticulitis   • CAD (coronary artery disease)   • Hypertrophic nonobstructive cardiomyopathy   • ICD (implantable cardioverter-defibrillator) discharge   • Menopause   • Non-obstructive hypertrophic cardiomyopathy   • Type 2 diabetes mellitus without complication, without long-term current use of insulin   • Insomnia       Plan:  Dr. Bravo Pascual reviewed anatomy of a total joint  arthroplasty in laymen's terms, as well as typical postoperative recovery and possibly 6-12 months for maximal recovery, and possible need for rehabilitation stay after hospitalization. We also discussed risks, benefits, alternatives, and limitations of procedure with risks including but not limited to neurovascular damage, bleeding, infection, malalignment, chronic pian, failure of implants, osteolysis, loosening of implants, loss of motion, weakness, stiffness, instability, DVT, pulmonary embolus, death, stroke, complex regional pain syndrome, myocardial infarction, and need for additional procedures. Concept of substitution vs. replacement discussed.  No guarantees were given regarding results of surgery.      Kristan Galicia was given the opportunity to ask and have all questions answered today.  The patient voiced understanding of the risks, benefits, and alternative forms of treatment that were discussed and the patient consents to proceed with surgery.     Patient's blood clot history is negative.  Planned DVT prophylaxis for surgery:  pradaxa 150mg bid, Cardiologist is Dr. Ya. Has defibrillator (ICD), and was instructed by Dr. Ya to come off pradaxa 5 days prior to surgery.     Discharge Plan: POD 2-3 to home and home health, will need hydrocodone (does not do well on percocet).     Patient was seen by CHRISTIN Carballo in the office today.    Date: 4/9/2018  CHRISTIN Porter

## 2018-04-18 ENCOUNTER — HOSPITAL ENCOUNTER (INPATIENT)
Facility: HOSPITAL | Age: 68
LOS: 3 days | Discharge: NURSING, REHAB OR LONG-TERM CARE | End: 2018-04-21
Attending: ORTHOPAEDIC SURGERY | Admitting: ORTHOPAEDIC SURGERY

## 2018-04-18 ENCOUNTER — ANESTHESIA EVENT (OUTPATIENT)
Dept: PERIOP | Facility: HOSPITAL | Age: 68
End: 2018-04-18

## 2018-04-18 ENCOUNTER — ANESTHESIA (OUTPATIENT)
Dept: PERIOP | Facility: HOSPITAL | Age: 68
End: 2018-04-18

## 2018-04-18 ENCOUNTER — APPOINTMENT (OUTPATIENT)
Dept: GENERAL RADIOLOGY | Facility: HOSPITAL | Age: 68
End: 2018-04-18

## 2018-04-18 DIAGNOSIS — R26.89 DECREASED MOBILITY: Primary | ICD-10-CM

## 2018-04-18 DIAGNOSIS — M17.11 ARTHRITIS OF RIGHT KNEE: ICD-10-CM

## 2018-04-18 LAB
GLUCOSE BLDC GLUCOMTR-MCNC: 147 MG/DL (ref 70–130)
GLUCOSE BLDC GLUCOMTR-MCNC: 181 MG/DL (ref 70–130)
GLUCOSE BLDC GLUCOMTR-MCNC: 190 MG/DL (ref 70–130)
HBA1C MFR BLD: 8.49 % (ref 4.8–5.6)

## 2018-04-18 PROCEDURE — 25010000002 ROPIVACAINE PER 1 MG: Performed by: ORTHOPAEDIC SURGERY

## 2018-04-18 PROCEDURE — 73560 X-RAY EXAM OF KNEE 1 OR 2: CPT

## 2018-04-18 PROCEDURE — 25010000003 CEFAZOLIN IN DEXTROSE 2-4 GM/100ML-% SOLUTION: Performed by: NURSE ANESTHETIST, CERTIFIED REGISTERED

## 2018-04-18 PROCEDURE — 97162 PT EVAL MOD COMPLEX 30 MIN: CPT

## 2018-04-18 PROCEDURE — C1713 ANCHOR/SCREW BN/BN,TIS/BN: HCPCS | Performed by: ORTHOPAEDIC SURGERY

## 2018-04-18 PROCEDURE — 25010000002 MIDAZOLAM PER 1 MG: Performed by: ANESTHESIOLOGY

## 2018-04-18 PROCEDURE — 25010000002 EPINEPHRINE PER 0.1 MG: Performed by: ORTHOPAEDIC SURGERY

## 2018-04-18 PROCEDURE — 25010000002 FENTANYL CITRATE (PF) 100 MCG/2ML SOLUTION: Performed by: NURSE ANESTHETIST, CERTIFIED REGISTERED

## 2018-04-18 PROCEDURE — 82962 GLUCOSE BLOOD TEST: CPT

## 2018-04-18 PROCEDURE — 25010000002 PROPOFOL 10 MG/ML EMULSION: Performed by: NURSE ANESTHETIST, CERTIFIED REGISTERED

## 2018-04-18 PROCEDURE — 25010000002 ONDANSETRON PER 1 MG: Performed by: NURSE ANESTHETIST, CERTIFIED REGISTERED

## 2018-04-18 PROCEDURE — 25010000002 PHENYLEPHRINE PER 1 ML: Performed by: NURSE ANESTHETIST, CERTIFIED REGISTERED

## 2018-04-18 PROCEDURE — 20985 CPTR-ASST DIR MS PX: CPT | Performed by: ORTHOPAEDIC SURGERY

## 2018-04-18 PROCEDURE — 97110 THERAPEUTIC EXERCISES: CPT

## 2018-04-18 PROCEDURE — 27447 TOTAL KNEE ARTHROPLASTY: CPT | Performed by: ORTHOPAEDIC SURGERY

## 2018-04-18 PROCEDURE — 25010000002 KETOROLAC TROMETHAMINE PER 15 MG: Performed by: ORTHOPAEDIC SURGERY

## 2018-04-18 PROCEDURE — 25010000003 CEFAZOLIN IN DEXTROSE 2-4 GM/100ML-% SOLUTION: Performed by: ORTHOPAEDIC SURGERY

## 2018-04-18 PROCEDURE — C1776 JOINT DEVICE (IMPLANTABLE): HCPCS | Performed by: ORTHOPAEDIC SURGERY

## 2018-04-18 PROCEDURE — 25010000002 HYDROMORPHONE PER 4 MG: Performed by: NURSE ANESTHETIST, CERTIFIED REGISTERED

## 2018-04-18 PROCEDURE — 25010000002 SUCCINYLCHOLINE PER 20 MG: Performed by: NURSE ANESTHETIST, CERTIFIED REGISTERED

## 2018-04-18 PROCEDURE — 0SRC0J9 REPLACEMENT OF RIGHT KNEE JOINT WITH SYNTHETIC SUBSTITUTE, CEMENTED, OPEN APPROACH: ICD-10-PCS | Performed by: ORTHOPAEDIC SURGERY

## 2018-04-18 PROCEDURE — 83036 HEMOGLOBIN GLYCOSYLATED A1C: CPT | Performed by: HOSPITALIST

## 2018-04-18 PROCEDURE — 27447 TOTAL KNEE ARTHROPLASTY: CPT | Performed by: NURSE PRACTITIONER

## 2018-04-18 DEVICE — IMPLANTABLE DEVICE: Type: IMPLANTABLE DEVICE | Site: KNEE | Status: FUNCTIONAL

## 2018-04-18 DEVICE — SIGMA TIBIAL INSERT FIXED BEARING CURVED PLUS 2.5 10MM XLK
Type: IMPLANTABLE DEVICE | Site: KNEE | Status: FUNCTIONAL
Brand: SIGMA

## 2018-04-18 DEVICE — P.F.C. SIGMA OVAL DOME PATELLA 3-PEG 35MM CEMENTED
Type: IMPLANTABLE DEVICE | Site: KNEE | Status: FUNCTIONAL
Brand: P.F.C. SIGMA

## 2018-04-18 DEVICE — SIGMA FEMORAL CRUCIATE RETAINING CEMENTED 2.5 RIGHT
Type: IMPLANTABLE DEVICE | Site: KNEE | Status: FUNCTIONAL
Brand: SIGMA

## 2018-04-18 DEVICE — SMARTSET HIGH PERFORMANCE MV MEDIUM VISCOSITY BONE CEMENT 40G
Type: IMPLANTABLE DEVICE | Site: KNEE | Status: FUNCTIONAL
Brand: SMARTSET

## 2018-04-18 DEVICE — P.F.C. SIGMA TIBIAL TRAY FIXED BEARING MODULAR COCR 2.5 CEMENTED
Type: IMPLANTABLE DEVICE | Site: KNEE | Status: FUNCTIONAL
Brand: P.F.C. SIGMA

## 2018-04-18 RX ORDER — CEFAZOLIN SODIUM 2 G/100ML
INJECTION, SOLUTION INTRAVENOUS AS NEEDED
Status: DISCONTINUED | OUTPATIENT
Start: 2018-04-18 | End: 2018-04-18 | Stop reason: SURG

## 2018-04-18 RX ORDER — NALOXONE HCL 0.4 MG/ML
0.1 VIAL (ML) INJECTION
Status: DISCONTINUED | OUTPATIENT
Start: 2018-04-18 | End: 2018-04-21 | Stop reason: HOSPADM

## 2018-04-18 RX ORDER — HYDRALAZINE HYDROCHLORIDE 20 MG/ML
5 INJECTION INTRAMUSCULAR; INTRAVENOUS
Status: DISCONTINUED | OUTPATIENT
Start: 2018-04-18 | End: 2018-04-18 | Stop reason: HOSPADM

## 2018-04-18 RX ORDER — CEFAZOLIN SODIUM 2 G/100ML
2 INJECTION, SOLUTION INTRAVENOUS EVERY 8 HOURS
Status: COMPLETED | OUTPATIENT
Start: 2018-04-18 | End: 2018-04-19

## 2018-04-18 RX ORDER — PROMETHAZINE HYDROCHLORIDE 25 MG/ML
12.5 INJECTION, SOLUTION INTRAMUSCULAR; INTRAVENOUS ONCE AS NEEDED
Status: DISCONTINUED | OUTPATIENT
Start: 2018-04-18 | End: 2018-04-18 | Stop reason: HOSPADM

## 2018-04-18 RX ORDER — MEPERIDINE HYDROCHLORIDE 25 MG/ML
12.5 INJECTION INTRAMUSCULAR; INTRAVENOUS; SUBCUTANEOUS
Status: DISCONTINUED | OUTPATIENT
Start: 2018-04-18 | End: 2018-04-18 | Stop reason: HOSPADM

## 2018-04-18 RX ORDER — MIDAZOLAM HYDROCHLORIDE 1 MG/ML
1 INJECTION INTRAMUSCULAR; INTRAVENOUS
Status: DISCONTINUED | OUTPATIENT
Start: 2018-04-18 | End: 2018-04-18 | Stop reason: HOSPADM

## 2018-04-18 RX ORDER — NALOXONE HCL 0.4 MG/ML
0.2 VIAL (ML) INJECTION AS NEEDED
Status: DISCONTINUED | OUTPATIENT
Start: 2018-04-18 | End: 2018-04-18 | Stop reason: HOSPADM

## 2018-04-18 RX ORDER — ALLOPURINOL 100 MG/1
100 TABLET ORAL DAILY
Status: DISCONTINUED | OUTPATIENT
Start: 2018-04-18 | End: 2018-04-21 | Stop reason: HOSPADM

## 2018-04-18 RX ORDER — SODIUM CHLORIDE 0.9 % (FLUSH) 0.9 %
1-10 SYRINGE (ML) INJECTION AS NEEDED
Status: DISCONTINUED | OUTPATIENT
Start: 2018-04-18 | End: 2018-04-18 | Stop reason: HOSPADM

## 2018-04-18 RX ORDER — PROMETHAZINE HYDROCHLORIDE 25 MG/ML
53 INJECTION, SOLUTION INTRAMUSCULAR; INTRAVENOUS 3 TIMES DAILY PRN
Status: DISCONTINUED | OUTPATIENT
Start: 2018-04-18 | End: 2018-04-18 | Stop reason: HOSPADM

## 2018-04-18 RX ORDER — ACETAMINOPHEN 325 MG/1
325 TABLET ORAL EVERY 4 HOURS PRN
Status: DISCONTINUED | OUTPATIENT
Start: 2018-04-18 | End: 2018-04-21 | Stop reason: HOSPADM

## 2018-04-18 RX ORDER — PANTOPRAZOLE SODIUM 40 MG/1
40 TABLET, DELAYED RELEASE ORAL DAILY
Status: DISCONTINUED | OUTPATIENT
Start: 2018-04-18 | End: 2018-04-21 | Stop reason: HOSPADM

## 2018-04-18 RX ORDER — LIDOCAINE HYDROCHLORIDE 10 MG/ML
0.5 INJECTION, SOLUTION EPIDURAL; INFILTRATION; INTRACAUDAL; PERINEURAL ONCE AS NEEDED
Status: DISCONTINUED | OUTPATIENT
Start: 2018-04-18 | End: 2018-04-18 | Stop reason: HOSPADM

## 2018-04-18 RX ORDER — EPHEDRINE SULFATE 50 MG/ML
5 INJECTION, SOLUTION INTRAVENOUS ONCE AS NEEDED
Status: DISCONTINUED | OUTPATIENT
Start: 2018-04-18 | End: 2018-04-18 | Stop reason: HOSPADM

## 2018-04-18 RX ORDER — LEVOTHYROXINE SODIUM 88 UG/1
88 TABLET ORAL DAILY
Status: DISCONTINUED | OUTPATIENT
Start: 2018-04-18 | End: 2018-04-21 | Stop reason: HOSPADM

## 2018-04-18 RX ORDER — BACLOFEN 10 MG/1
10 TABLET ORAL NIGHTLY PRN
Status: DISCONTINUED | OUTPATIENT
Start: 2018-04-18 | End: 2018-04-21 | Stop reason: HOSPADM

## 2018-04-18 RX ORDER — PROMETHAZINE HYDROCHLORIDE 12.5 MG/1
6.12 TABLET ORAL EVERY 6 HOURS PRN
Status: DISCONTINUED | OUTPATIENT
Start: 2018-04-18 | End: 2018-04-21 | Stop reason: HOSPADM

## 2018-04-18 RX ORDER — IPRATROPIUM BROMIDE AND ALBUTEROL SULFATE 2.5; .5 MG/3ML; MG/3ML
3 SOLUTION RESPIRATORY (INHALATION) ONCE AS NEEDED
Status: DISCONTINUED | OUTPATIENT
Start: 2018-04-18 | End: 2018-04-18 | Stop reason: HOSPADM

## 2018-04-18 RX ORDER — TRANEXAMIC ACID 100 MG/ML
INJECTION, SOLUTION INTRAVENOUS AS NEEDED
Status: DISCONTINUED | OUTPATIENT
Start: 2018-04-18 | End: 2018-04-18 | Stop reason: SURG

## 2018-04-18 RX ORDER — PROMETHAZINE HYDROCHLORIDE 25 MG/1
25 SUPPOSITORY RECTAL ONCE AS NEEDED
Status: DISCONTINUED | OUTPATIENT
Start: 2018-04-18 | End: 2018-04-18 | Stop reason: HOSPADM

## 2018-04-18 RX ORDER — FAMOTIDINE 10 MG/ML
20 INJECTION, SOLUTION INTRAVENOUS ONCE
Status: COMPLETED | OUTPATIENT
Start: 2018-04-18 | End: 2018-04-18

## 2018-04-18 RX ORDER — HYDROMORPHONE HCL 110MG/55ML
0.5 PATIENT CONTROLLED ANALGESIA SYRINGE INTRAVENOUS
Status: DISCONTINUED | OUTPATIENT
Start: 2018-04-18 | End: 2018-04-18 | Stop reason: HOSPADM

## 2018-04-18 RX ORDER — FENTANYL CITRATE 50 UG/ML
50 INJECTION, SOLUTION INTRAMUSCULAR; INTRAVENOUS
Status: DISCONTINUED | OUTPATIENT
Start: 2018-04-18 | End: 2018-04-18 | Stop reason: HOSPADM

## 2018-04-18 RX ORDER — HYDROCODONE BITARTRATE AND ACETAMINOPHEN 7.5; 325 MG/1; MG/1
2 TABLET ORAL
Status: DISCONTINUED | OUTPATIENT
Start: 2018-04-18 | End: 2018-04-21

## 2018-04-18 RX ORDER — DIPHENHYDRAMINE HCL 25 MG
25 CAPSULE ORAL EVERY 6 HOURS PRN
Status: DISCONTINUED | OUTPATIENT
Start: 2018-04-18 | End: 2018-04-21 | Stop reason: HOSPADM

## 2018-04-18 RX ORDER — PROPOFOL 10 MG/ML
VIAL (ML) INTRAVENOUS AS NEEDED
Status: DISCONTINUED | OUTPATIENT
Start: 2018-04-18 | End: 2018-04-18 | Stop reason: SURG

## 2018-04-18 RX ORDER — ROCURONIUM BROMIDE 10 MG/ML
INJECTION, SOLUTION INTRAVENOUS AS NEEDED
Status: DISCONTINUED | OUTPATIENT
Start: 2018-04-18 | End: 2018-04-18 | Stop reason: SURG

## 2018-04-18 RX ORDER — HYDROCODONE BITARTRATE AND ACETAMINOPHEN 7.5; 325 MG/1; MG/1
1 TABLET ORAL
Status: DISCONTINUED | OUTPATIENT
Start: 2018-04-18 | End: 2018-04-21

## 2018-04-18 RX ORDER — CARVEDILOL 12.5 MG/1
12.5 TABLET ORAL 2 TIMES DAILY WITH MEALS
Status: DISCONTINUED | OUTPATIENT
Start: 2018-04-18 | End: 2018-04-21 | Stop reason: HOSPADM

## 2018-04-18 RX ORDER — PROMETHAZINE HYDROCHLORIDE 12.5 MG/1
6.12 SUPPOSITORY RECTAL EVERY 6 HOURS PRN
Status: DISCONTINUED | OUTPATIENT
Start: 2018-04-18 | End: 2018-04-21 | Stop reason: HOSPADM

## 2018-04-18 RX ORDER — LIDOCAINE 50 MG/G
1 PATCH TOPICAL DAILY PRN
Status: DISCONTINUED | OUTPATIENT
Start: 2018-04-18 | End: 2018-04-21 | Stop reason: HOSPADM

## 2018-04-18 RX ORDER — EPHEDRINE SULFATE 50 MG/ML
INJECTION, SOLUTION INTRAVENOUS AS NEEDED
Status: DISCONTINUED | OUTPATIENT
Start: 2018-04-18 | End: 2018-04-18 | Stop reason: SURG

## 2018-04-18 RX ORDER — ONDANSETRON 2 MG/ML
INJECTION INTRAMUSCULAR; INTRAVENOUS AS NEEDED
Status: DISCONTINUED | OUTPATIENT
Start: 2018-04-18 | End: 2018-04-18 | Stop reason: SURG

## 2018-04-18 RX ORDER — FAMOTIDINE 10 MG/ML
20 INJECTION, SOLUTION INTRAVENOUS ONCE
Status: DISCONTINUED | OUTPATIENT
Start: 2018-04-18 | End: 2018-04-18 | Stop reason: HOSPADM

## 2018-04-18 RX ORDER — DIPHENHYDRAMINE HYDROCHLORIDE 50 MG/ML
25 INJECTION INTRAMUSCULAR; INTRAVENOUS EVERY 6 HOURS PRN
Status: DISCONTINUED | OUTPATIENT
Start: 2018-04-18 | End: 2018-04-21 | Stop reason: HOSPADM

## 2018-04-18 RX ORDER — MONTELUKAST SODIUM 10 MG/1
10 TABLET ORAL NIGHTLY
Status: DISCONTINUED | OUTPATIENT
Start: 2018-04-18 | End: 2018-04-21 | Stop reason: HOSPADM

## 2018-04-18 RX ORDER — DIPHENHYDRAMINE HYDROCHLORIDE 50 MG/ML
12.5 INJECTION INTRAMUSCULAR; INTRAVENOUS
Status: DISCONTINUED | OUTPATIENT
Start: 2018-04-18 | End: 2018-04-18 | Stop reason: HOSPADM

## 2018-04-18 RX ORDER — ACETAMINOPHEN 160 MG/5ML
650 SOLUTION ORAL EVERY 4 HOURS PRN
Status: DISCONTINUED | OUTPATIENT
Start: 2018-04-18 | End: 2018-04-21 | Stop reason: HOSPADM

## 2018-04-18 RX ORDER — FENTANYL CITRATE 50 UG/ML
INJECTION, SOLUTION INTRAMUSCULAR; INTRAVENOUS AS NEEDED
Status: DISCONTINUED | OUTPATIENT
Start: 2018-04-18 | End: 2018-04-18 | Stop reason: SURG

## 2018-04-18 RX ORDER — PROMETHAZINE HYDROCHLORIDE 12.5 MG/1
12.5 TABLET ORAL EVERY 6 HOURS PRN
Status: DISCONTINUED | OUTPATIENT
Start: 2018-04-18 | End: 2018-04-21 | Stop reason: HOSPADM

## 2018-04-18 RX ORDER — SUCCINYLCHOLINE CHLORIDE 20 MG/ML
INJECTION INTRAMUSCULAR; INTRAVENOUS AS NEEDED
Status: DISCONTINUED | OUTPATIENT
Start: 2018-04-18 | End: 2018-04-18 | Stop reason: SURG

## 2018-04-18 RX ORDER — TRAZODONE HYDROCHLORIDE 100 MG/1
100 TABLET ORAL NIGHTLY PRN
Status: DISCONTINUED | OUTPATIENT
Start: 2018-04-18 | End: 2018-04-21 | Stop reason: HOSPADM

## 2018-04-18 RX ORDER — DABIGATRAN ETEXILATE 150 MG/1
150 CAPSULE ORAL EVERY 12 HOURS SCHEDULED
Status: DISCONTINUED | OUTPATIENT
Start: 2018-04-18 | End: 2018-04-21 | Stop reason: HOSPADM

## 2018-04-18 RX ORDER — DOCUSATE SODIUM 100 MG/1
100 CAPSULE, LIQUID FILLED ORAL 2 TIMES DAILY
Status: DISCONTINUED | OUTPATIENT
Start: 2018-04-18 | End: 2018-04-21 | Stop reason: HOSPADM

## 2018-04-18 RX ORDER — GABAPENTIN 300 MG/1
300 CAPSULE ORAL 3 TIMES DAILY
Status: DISCONTINUED | OUTPATIENT
Start: 2018-04-18 | End: 2018-04-21 | Stop reason: HOSPADM

## 2018-04-18 RX ORDER — ACETAMINOPHEN 325 MG/1
650 TABLET ORAL EVERY 4 HOURS PRN
Status: DISCONTINUED | OUTPATIENT
Start: 2018-04-18 | End: 2018-04-21 | Stop reason: HOSPADM

## 2018-04-18 RX ORDER — BISACODYL 5 MG/1
10 TABLET, DELAYED RELEASE ORAL DAILY PRN
Qty: 30 TABLET | Refills: 3 | Status: SHIPPED | OUTPATIENT
Start: 2018-04-18 | End: 2019-06-18

## 2018-04-18 RX ORDER — SODIUM CHLORIDE, SODIUM LACTATE, POTASSIUM CHLORIDE, CALCIUM CHLORIDE 600; 310; 30; 20 MG/100ML; MG/100ML; MG/100ML; MG/100ML
100 INJECTION, SOLUTION INTRAVENOUS CONTINUOUS
Status: DISCONTINUED | OUTPATIENT
Start: 2018-04-18 | End: 2018-04-19

## 2018-04-18 RX ORDER — MIDAZOLAM HYDROCHLORIDE 1 MG/ML
2 INJECTION INTRAMUSCULAR; INTRAVENOUS
Status: DISCONTINUED | OUTPATIENT
Start: 2018-04-18 | End: 2018-04-18 | Stop reason: HOSPADM

## 2018-04-18 RX ORDER — SODIUM CHLORIDE, SODIUM LACTATE, POTASSIUM CHLORIDE, CALCIUM CHLORIDE 600; 310; 30; 20 MG/100ML; MG/100ML; MG/100ML; MG/100ML
100 INJECTION, SOLUTION INTRAVENOUS CONTINUOUS
Status: ACTIVE | OUTPATIENT
Start: 2018-04-18 | End: 2018-04-19

## 2018-04-18 RX ORDER — LIDOCAINE HYDROCHLORIDE 20 MG/ML
INJECTION, SOLUTION INFILTRATION; PERINEURAL AS NEEDED
Status: DISCONTINUED | OUTPATIENT
Start: 2018-04-18 | End: 2018-04-18 | Stop reason: SURG

## 2018-04-18 RX ORDER — PSEUDOEPHEDRINE HCL 30 MG
100 TABLET ORAL 2 TIMES DAILY
Qty: 60 CAPSULE | Refills: 2 | Status: ON HOLD | OUTPATIENT
Start: 2018-04-18 | End: 2018-09-15

## 2018-04-18 RX ORDER — CEFAZOLIN SODIUM 2 G/100ML
2 INJECTION, SOLUTION INTRAVENOUS ONCE
Status: DISCONTINUED | OUTPATIENT
Start: 2018-04-18 | End: 2018-04-18 | Stop reason: HOSPADM

## 2018-04-18 RX ORDER — PROMETHAZINE HYDROCHLORIDE 25 MG/ML
6.12 INJECTION, SOLUTION INTRAMUSCULAR; INTRAVENOUS EVERY 6 HOURS PRN
Status: DISCONTINUED | OUTPATIENT
Start: 2018-04-18 | End: 2018-04-21 | Stop reason: HOSPADM

## 2018-04-18 RX ORDER — HYDROMORPHONE HYDROCHLORIDE 1 MG/ML
0.5 INJECTION, SOLUTION INTRAMUSCULAR; INTRAVENOUS; SUBCUTANEOUS
Status: DISCONTINUED | OUTPATIENT
Start: 2018-04-18 | End: 2018-04-21 | Stop reason: HOSPADM

## 2018-04-18 RX ORDER — BISACODYL 5 MG/1
10 TABLET, DELAYED RELEASE ORAL DAILY PRN
Status: DISCONTINUED | OUTPATIENT
Start: 2018-04-18 | End: 2018-04-21 | Stop reason: HOSPADM

## 2018-04-18 RX ORDER — FLUMAZENIL 0.1 MG/ML
0.2 INJECTION INTRAVENOUS AS NEEDED
Status: DISCONTINUED | OUTPATIENT
Start: 2018-04-18 | End: 2018-04-18 | Stop reason: HOSPADM

## 2018-04-18 RX ORDER — SODIUM CHLORIDE, SODIUM LACTATE, POTASSIUM CHLORIDE, CALCIUM CHLORIDE 600; 310; 30; 20 MG/100ML; MG/100ML; MG/100ML; MG/100ML
9 INJECTION, SOLUTION INTRAVENOUS CONTINUOUS
Status: DISCONTINUED | OUTPATIENT
Start: 2018-04-18 | End: 2018-04-21 | Stop reason: HOSPADM

## 2018-04-18 RX ORDER — DULOXETIN HYDROCHLORIDE 60 MG/1
60 CAPSULE, DELAYED RELEASE ORAL DAILY
Status: DISCONTINUED | OUTPATIENT
Start: 2018-04-18 | End: 2018-04-21 | Stop reason: HOSPADM

## 2018-04-18 RX ORDER — SPIRONOLACTONE AND HYDROCHLOROTHIAZIDE 25; 25 MG/1; MG/1
1 TABLET ORAL DAILY
Status: DISCONTINUED | OUTPATIENT
Start: 2018-04-18 | End: 2018-04-21 | Stop reason: HOSPADM

## 2018-04-18 RX ORDER — ATORVASTATIN CALCIUM 80 MG/1
80 TABLET, FILM COATED ORAL NIGHTLY
Status: DISCONTINUED | OUTPATIENT
Start: 2018-04-18 | End: 2018-04-21 | Stop reason: HOSPADM

## 2018-04-18 RX ORDER — BISACODYL 10 MG
10 SUPPOSITORY, RECTAL RECTAL DAILY PRN
Status: DISCONTINUED | OUTPATIENT
Start: 2018-04-18 | End: 2018-04-21 | Stop reason: HOSPADM

## 2018-04-18 RX ORDER — AMIODARONE HYDROCHLORIDE 200 MG/1
100 TABLET ORAL NIGHTLY
Status: DISCONTINUED | OUTPATIENT
Start: 2018-04-18 | End: 2018-04-21 | Stop reason: HOSPADM

## 2018-04-18 RX ORDER — PROMETHAZINE HYDROCHLORIDE 25 MG/1
25 TABLET ORAL ONCE AS NEEDED
Status: DISCONTINUED | OUTPATIENT
Start: 2018-04-18 | End: 2018-04-18 | Stop reason: HOSPADM

## 2018-04-18 RX ORDER — ACETAMINOPHEN 650 MG/1
650 SUPPOSITORY RECTAL EVERY 4 HOURS PRN
Status: DISCONTINUED | OUTPATIENT
Start: 2018-04-18 | End: 2018-04-21 | Stop reason: HOSPADM

## 2018-04-18 RX ORDER — LABETALOL HYDROCHLORIDE 5 MG/ML
5 INJECTION, SOLUTION INTRAVENOUS
Status: DISCONTINUED | OUTPATIENT
Start: 2018-04-18 | End: 2018-04-18 | Stop reason: HOSPADM

## 2018-04-18 RX ORDER — MAGNESIUM HYDROXIDE 1200 MG/15ML
LIQUID ORAL AS NEEDED
Status: DISCONTINUED | OUTPATIENT
Start: 2018-04-18 | End: 2018-04-18 | Stop reason: HOSPADM

## 2018-04-18 RX ORDER — PROMETHAZINE HYDROCHLORIDE 25 MG/1
12.5 TABLET ORAL ONCE AS NEEDED
Status: DISCONTINUED | OUTPATIENT
Start: 2018-04-18 | End: 2018-04-18 | Stop reason: HOSPADM

## 2018-04-18 RX ORDER — ONDANSETRON 2 MG/ML
4 INJECTION INTRAMUSCULAR; INTRAVENOUS ONCE AS NEEDED
Status: DISCONTINUED | OUTPATIENT
Start: 2018-04-18 | End: 2018-04-18 | Stop reason: HOSPADM

## 2018-04-18 RX ORDER — HYDROCODONE BITARTRATE AND ACETAMINOPHEN 7.5; 325 MG/1; MG/1
TABLET ORAL
Qty: 100 TABLET | Refills: 0
Start: 2018-04-18 | End: 2018-05-02 | Stop reason: SDUPTHER

## 2018-04-18 RX ADMIN — DULOXETINE HYDROCHLORIDE 60 MG: 60 CAPSULE, DELAYED RELEASE ORAL at 14:22

## 2018-04-18 RX ADMIN — HYDROMORPHONE HYDROCHLORIDE 0.5 MG: 2 INJECTION INTRAMUSCULAR; INTRAVENOUS; SUBCUTANEOUS at 12:03

## 2018-04-18 RX ADMIN — SODIUM CHLORIDE, POTASSIUM CHLORIDE, SODIUM LACTATE AND CALCIUM CHLORIDE 9 ML/HR: 600; 310; 30; 20 INJECTION, SOLUTION INTRAVENOUS at 08:08

## 2018-04-18 RX ADMIN — ONDANSETRON 4 MG: 2 INJECTION INTRAMUSCULAR; INTRAVENOUS at 10:40

## 2018-04-18 RX ADMIN — POLYETHYLENE GLYCOL 3350 17 G: 17 POWDER, FOR SOLUTION ORAL at 14:22

## 2018-04-18 RX ADMIN — FENTANYL CITRATE 25 MCG: 50 INJECTION INTRAMUSCULAR; INTRAVENOUS at 10:05

## 2018-04-18 RX ADMIN — MUPIROCIN: 20 OINTMENT TOPICAL at 14:22

## 2018-04-18 RX ADMIN — FENTANYL CITRATE 25 MCG: 50 INJECTION INTRAMUSCULAR; INTRAVENOUS at 10:59

## 2018-04-18 RX ADMIN — HYDROMORPHONE HYDROCHLORIDE 0.5 MG: 2 INJECTION INTRAMUSCULAR; INTRAVENOUS; SUBCUTANEOUS at 11:49

## 2018-04-18 RX ADMIN — FENTANYL CITRATE 25 MCG: 50 INJECTION INTRAMUSCULAR; INTRAVENOUS at 10:49

## 2018-04-18 RX ADMIN — CEFAZOLIN SODIUM 2 G: 2 INJECTION, SOLUTION INTRAVENOUS at 20:00

## 2018-04-18 RX ADMIN — DOCUSATE SODIUM 100 MG: 100 CAPSULE, LIQUID FILLED ORAL at 20:00

## 2018-04-18 RX ADMIN — ATORVASTATIN CALCIUM 80 MG: 80 TABLET, FILM COATED ORAL at 20:00

## 2018-04-18 RX ADMIN — DOCUSATE SODIUM 100 MG: 100 CAPSULE, LIQUID FILLED ORAL at 14:22

## 2018-04-18 RX ADMIN — CEFAZOLIN SODIUM 2 G: 2 INJECTION, SOLUTION INTRAVENOUS at 09:40

## 2018-04-18 RX ADMIN — HYDROCODONE BITARTRATE AND ACETAMINOPHEN 2 TABLET: 7.5; 325 TABLET ORAL at 23:59

## 2018-04-18 RX ADMIN — FENTANYL CITRATE 50 MCG: 50 INJECTION, SOLUTION INTRAMUSCULAR; INTRAVENOUS at 11:53

## 2018-04-18 RX ADMIN — FENTANYL CITRATE 25 MCG: 50 INJECTION INTRAMUSCULAR; INTRAVENOUS at 09:51

## 2018-04-18 RX ADMIN — EPHEDRINE SULFATE 10 MG: 50 INJECTION INTRAMUSCULAR; INTRAVENOUS; SUBCUTANEOUS at 09:25

## 2018-04-18 RX ADMIN — FENTANYL CITRATE 25 MCG: 50 INJECTION INTRAMUSCULAR; INTRAVENOUS at 11:02

## 2018-04-18 RX ADMIN — PROPOFOL 100 MG: 10 INJECTION, EMULSION INTRAVENOUS at 09:12

## 2018-04-18 RX ADMIN — TRANEXAMIC ACID 1000 MG: 100 INJECTION, SOLUTION INTRAVENOUS at 10:20

## 2018-04-18 RX ADMIN — FENTANYL CITRATE 50 MCG: 50 INJECTION, SOLUTION INTRAMUSCULAR; INTRAVENOUS at 11:10

## 2018-04-18 RX ADMIN — GABAPENTIN 300 MG: 300 CAPSULE ORAL at 20:00

## 2018-04-18 RX ADMIN — MIDAZOLAM HYDROCHLORIDE 2 MG: 2 INJECTION, SOLUTION INTRAMUSCULAR; INTRAVENOUS at 08:53

## 2018-04-18 RX ADMIN — SUCCINYLCHOLINE CHLORIDE 100 MG: 20 INJECTION, SOLUTION INTRAMUSCULAR; INTRAVENOUS; PARENTERAL at 09:12

## 2018-04-18 RX ADMIN — PHENYLEPHRINE HYDROCHLORIDE 100 MCG: 10 INJECTION INTRAVENOUS at 09:35

## 2018-04-18 RX ADMIN — CEFAZOLIN SODIUM 2 G: 2 INJECTION, SOLUTION INTRAVENOUS at 11:10

## 2018-04-18 RX ADMIN — ALLOPURINOL 100 MG: 100 TABLET ORAL at 14:22

## 2018-04-18 RX ADMIN — FENTANYL CITRATE 50 MCG: 50 INJECTION INTRAMUSCULAR; INTRAVENOUS at 09:07

## 2018-04-18 RX ADMIN — EPHEDRINE SULFATE 10 MG: 50 INJECTION INTRAMUSCULAR; INTRAVENOUS; SUBCUTANEOUS at 09:30

## 2018-04-18 RX ADMIN — PANTOPRAZOLE SODIUM 40 MG: 40 TABLET, DELAYED RELEASE ORAL at 14:22

## 2018-04-18 RX ADMIN — ROCURONIUM BROMIDE 5 MG: 10 INJECTION INTRAVENOUS at 09:12

## 2018-04-18 RX ADMIN — MONTELUKAST 10 MG: 10 TABLET, FILM COATED ORAL at 20:00

## 2018-04-18 RX ADMIN — GABAPENTIN 300 MG: 300 CAPSULE ORAL at 15:54

## 2018-04-18 RX ADMIN — SODIUM CHLORIDE, POTASSIUM CHLORIDE, SODIUM LACTATE AND CALCIUM CHLORIDE: 600; 310; 30; 20 INJECTION, SOLUTION INTRAVENOUS at 09:05

## 2018-04-18 RX ADMIN — SODIUM CHLORIDE, POTASSIUM CHLORIDE, SODIUM LACTATE AND CALCIUM CHLORIDE 100 ML/HR: 600; 310; 30; 20 INJECTION, SOLUTION INTRAVENOUS at 17:13

## 2018-04-18 RX ADMIN — LEVOTHYROXINE SODIUM 88 MCG: 88 TABLET ORAL at 14:22

## 2018-04-18 RX ADMIN — HYDROCODONE BITARTRATE AND ACETAMINOPHEN 2 TABLET: 7.5; 325 TABLET ORAL at 13:08

## 2018-04-18 RX ADMIN — LINAGLIPTIN 5 MG: 5 TABLET, FILM COATED ORAL at 14:22

## 2018-04-18 RX ADMIN — HYDROMORPHONE HYDROCHLORIDE 0.5 MG: 2 INJECTION INTRAMUSCULAR; INTRAVENOUS; SUBCUTANEOUS at 11:20

## 2018-04-18 RX ADMIN — SODIUM CHLORIDE, POTASSIUM CHLORIDE, SODIUM LACTATE AND CALCIUM CHLORIDE 100 ML/HR: 600; 310; 30; 20 INJECTION, SOLUTION INTRAVENOUS at 11:02

## 2018-04-18 RX ADMIN — EPHEDRINE SULFATE 10 MG: 50 INJECTION INTRAMUSCULAR; INTRAVENOUS; SUBCUTANEOUS at 09:20

## 2018-04-18 RX ADMIN — HYDROCODONE BITARTRATE AND ACETAMINOPHEN 2 TABLET: 7.5; 325 TABLET ORAL at 18:58

## 2018-04-18 RX ADMIN — DABIGATRAN ETEXILATE MESYLATE 150 MG: 150 CAPSULE ORAL at 20:00

## 2018-04-18 RX ADMIN — TRANEXAMIC ACID 1000 MG: 100 INJECTION, SOLUTION INTRAVENOUS at 09:34

## 2018-04-18 RX ADMIN — FENTANYL CITRATE 25 MCG: 50 INJECTION INTRAMUSCULAR; INTRAVENOUS at 10:54

## 2018-04-18 RX ADMIN — MUPIROCIN 1 APPLICATION: 20 OINTMENT TOPICAL at 20:02

## 2018-04-18 RX ADMIN — FAMOTIDINE 20 MG: 10 INJECTION INTRAVENOUS at 08:53

## 2018-04-18 RX ADMIN — LIDOCAINE HYDROCHLORIDE 40 MG: 20 INJECTION, SOLUTION INFILTRATION; PERINEURAL at 09:12

## 2018-04-18 NOTE — CONSULTS
[facility]               [facility]    Inpatient Internal Medicine Consult  Consult performed by: CARLOZ JIMENES LOC  Consult ordered by: GENET AGUIRRE  Reason for consult: diabetes, management of medical problems      Date of Admit: 4/18/2018  Date of Consult: 04/18/18    Subjective   67 y.o. female admitted to orthopedic surgery and underwent right total knee replacement with [employer] today. we've been asked to see for management of her diabetes. she is not on insulin. she has had it for at least 5 years. she has a history of PAF on pradaxa pre-admission and also has hypertrophic cardiomyopathy followed by dr. ya. she has also seen dr. jiménez in the last. she denies any recent chest pain or shortness of breath. no fevers, chills, nausea, vomiting, diarrhea. appetite has been fine.     Past Medical History:   Diagnosis Date   • Anemia    • Arthritis    • Atrial fibrillation    • Cardiac defibrillator in place 01/2009    medtronic    • Cardiomyopathy     Hypertrophic Cardiomyopathy   • CHF (congestive heart failure)    • Chronic pain    • Coronary artery disease    • Diabetes mellitus     Type II   • Diverticulosis     hx diverticulitis   • GERD (gastroesophageal reflux disease)    • Heart murmur    • History of depression    • History of tumor     left ocular tumor, treated with steroids   • Hyperlipidemia    • Migraines    • On anticoagulant therapy    • Osteoporosis    • Palpitations    • Sleep apnea     does not use CPAP or BiPAP, used nose strips   • Thyroid disease    • Vasovagal syncope    • Vertigo      Past Surgical History:   Procedure Laterality Date   • ANTERIOR CERVICAL DISCECTOMY W/ FUSION  2012    C5-7   • APPENDECTOMY  1970   • BILATERAL BREAST REDUCTION     • CARDIAC CATHETERIZATION  02/19/2007   • CARDIAC DEFIBRILLATOR PLACEMENT Left 1999, 2013    Dr. Ya   • CATARACT EXTRACTION Bilateral    • COLONOSCOPY  1996    Dr. Herbert Gonsales    • COLONOSCOPY   8/16/2016    Procedure: COLONOSCOPY with cecum;  Surgeon: Rosalio Sheikh MD;  Location: [facility] ENDOSCOPY;  Service:    • ENDOSCOPY N/A 4/27/2017    Procedure: ESOPHAGOGASTRODUODENOSCOPY WITH BIOPSIES;  Surgeon: Rosalio Sheikh MD;  Location: [facility] ENDOSCOPY;  Service:    • FEMUR SURGERY Left     with metal implant  x3   • FIRST RIB RESECTION Bilateral     and scalenectomy   • HYSTERECTOMY  1989    Total   • KNEE ARTHROSCOPY Bilateral 2014    Dr. Li   • KNEE SURGERY Right 06/10/2010   • LAPAROSCOPIC CHOLECYSTECTOMY  1974   • ROTATOR CUFF REPAIR Right 06/10/2010   • SHOULDER ARTHROSCOPY W/ ROTATOR CUFF REPAIR Bilateral 05/20/2011   • SHOULDER SURGERY Right    • TONSILLECTOMY     • TOTAL SHOULDER ARTHROPLASTY W/ DISTAL CLAVICLE EXCISION Right 2/2/2017    Procedure: TOTAL SHOULDER REVERSE ARTHROPLASTY;  Surgeon: Juan Antonio Anne MD;  Location: [facility] MAIN OR;  Service:      Current medications reviewed.    Family History   Problem Relation Age of Onset   • Heart attack Brother    • Heart disease Brother    • Hyperthyroidism Mother    • Cancer Mother    • Heart disease Mother    • Osteoporosis Mother    • No Known Problems Father       car accident   • Cirrhosis Maternal Grandmother    • No Known Problems Maternal Grandfather    • No Known Problems Paternal Grandmother    • No Known Problems Paternal Grandfather    • Breast cancer Maternal Aunt    • Malig Hyperthermia Neg Hx      Social History   Substance Use Topics   • Smoking status: Former Smoker     Packs/day: 0.50     Years: 17.00     Types: Cigarettes     Quit date: 9/8/1985   • Smokeless tobacco: Never Used   • Alcohol use No     Review of Systems   Constitutional: Negative.  Negative for chills and fever.   HENT: Negative.    Eyes: Negative.    Respiratory: Negative.  Negative for chest tightness and shortness of breath.    Cardiovascular: Negative.  Negative for chest pain.   Gastrointestinal: Negative.  Negative for diarrhea, nausea and vomiting.    Endocrine: Negative.    Genitourinary: Negative.    Skin: Negative.    Allergic/Immunologic: Negative.    Neurological: Negative.    Hematological: Negative.    Psychiatric/Behavioral: Negative.      Objective      Vital Signs  Temp:  [97.3 °F (36.3 °C)-98.5 °F (36.9 °C)] 97.3 °F (36.3 °C)  Heart Rate:  [61-68] 62  Resp:  [14-18] 16  BP: ()/(50-66) 102/60    Physical Exam   Constitutional: She is oriented to person, place, and time. She appears well-developed and well-nourished. No distress.   Cardiovascular: Normal rate and regular rhythm.    Pulses:       Radial pulses are 2+ on the right side, and 2+ on the left side.   Pulmonary/Chest: Effort normal and breath sounds normal. No respiratory distress.   Abdominal: Soft. There is no tenderness.   Musculoskeletal: She exhibits no edema.   right knee post op   Neurological: She is alert and oriented to person, place, and time.   Skin: Skin is warm and dry.   Psychiatric: She has a normal mood and affect. Her behavior is normal.     Estimated Creatinine Clearance: 52.5 mL/min (by C-G formula based on SCr of 1.01 mg/dL (H)).    Assessment/Plan   Active Hospital Problems (** Indicates Principal Problem)    Diagnosis Date Noted   • **Arthritis of right knee [M17.11] 12/27/2016   • Type 2 diabetes mellitus without complication, without long-term current use of insulin [E11.9] 08/07/2017   • Non-obstructive hypertrophic cardiomyopathy [I42.2] 08/07/2017   • CAD (coronary artery disease) [I25.10] 08/07/2017   • Hypothyroidism [E03.9] 02/22/2017   • Obstructive sleep apnea, adult [G47.33]    • PAF (paroxysmal atrial fibrillation) [I48.0] 11/01/2016   • ICD (implantable cardioverter-defibrillator) discharge [Z45.02] 04/04/2016      Resolved Hospital Problems    Diagnosis Date Noted Date Resolved   No resolved problems to display.     · 67 y.o. female s/p right total knee replacement 4/18/18  · she is on pradaxa at home for afib and will be resumed tonight. will cover  for DVT-prophylaxis.  · monitor for acute blood loss anemia, hypotension post op  · DM 2 control OK based on last a1c 6.97 June 2017. will check here. SSI as needed.  · she is followed by dr matias for cardiac issues. monitor volume status.  · I discussed the patient's findings and my recommendations with patient and nursing staff.  · [facility]'s care. Will follow along with you.    Chacho Forte MD  04/18/18  1:44 PM

## 2018-04-18 NOTE — PLAN OF CARE
Problem: Patient Care Overview  Goal: Plan of Care Review   04/18/18 1367   Coping/Psychosocial   Plan of Care Reviewed With patient   OTHER   Outcome Summary Pt presents s/p R TKA demonstrating post op pain, R LE weakness, impaired R knee ROM, impaired gait mechanics, and decreased endurance limiting mobility. Pt was independent with mobility prior to admission; currently requiring use of walker and CGA for mobility. Pt would benefit from continued PT to address impairments and assist w/return to PLOF. Pt demonstrates good tolerance to HEP; encouraged pt to continue mobilizing with nsg this evening. Will plan to attempt stairs tomorrow in anticipation of DC.

## 2018-04-18 NOTE — PLAN OF CARE
Problem: Patient Care Overview  Goal: Plan of Care Review  Outcome: Ongoing (interventions implemented as appropriate)   04/18/18 1934   Coping/Psychosocial   Plan of Care Reviewed With patient   OTHER   Outcome Summary Pt arrived on floor 1240 from right total knee, BP running low, c/o pain '7', pt on RA, dressing c/d/i, worked with PT ambulated around room, voided, educated pt on using nose strips for INGRID    Plan of Care Review   Progress improving     Goal: Individualization and Mutuality  Outcome: Ongoing (interventions implemented as appropriate)      Problem: Fall Risk (Adult)  Goal: Identify Related Risk Factors and Signs and Symptoms  Outcome: Outcome(s) achieved Date Met: 04/18/18    Goal: Absence of Fall  Outcome: Ongoing (interventions implemented as appropriate)      Problem: Knee Arthroplasty (Total, Partial) (Adult)  Goal: Signs and Symptoms of Listed Potential Problems Will be Absent, Minimized or Managed (Knee Arthroplasty)  Outcome: Ongoing (interventions implemented as appropriate)    Goal: Anesthesia/Sedation Recovery  Outcome: Ongoing (interventions implemented as appropriate)

## 2018-04-18 NOTE — H&P (VIEW-ONLY)
"   History & Physical       Patient: Kristan Galicia  YOB: 1950  Medical Record Number: [id number]  Wt Readings from Last 3 Encounters:   04/09/18 81.3 kg (179 lb 3.2 oz)   04/05/18 80.8 kg (178 lb 3.2 oz)   03/27/18 82.3 kg (181 lb 8 oz)     Ht Readings from Last 3 Encounters:   04/09/18 154.9 cm (61\")   04/05/18 154.9 cm (61\")   03/27/18 156.2 cm (61.5\")     Body mass index is 33.86 kg/m².  Facility age limit for growth percentiles is 20 years.    Surgeon:  Dr. Bravo Pascual    Chief Complaints:   Chief Complaint   Patient presents with   • Right Knee - Pre-op Exam, Pain       Subjective:    History of Present Illness: 67 y.o. female presents with   Chief Complaint   Patient presents with   • Right Knee - Pre-op Exam, Pain   . Onset of symptoms was years ago and has been progressively worsening despite more conservative treatment measures.  Symptoms are associated with ability to move, exercise, and perform activities of daily living.  Symptoms are aggravated by weight bearing and ROM necessary for activities of daily living.   Symptoms improve with rest, ice and elevation only minimally.      Allergies:   Allergies   Allergen Reactions   • Adhesive Tape Other (See Comments)     SKIN BLISTERS   • Aspirin Swelling   • Biaxin [Clarithromycin] Other (See Comments)     BLISTERS AROUND MOTH  Also known as Bioxen    • Codeine Swelling   • Darvon [Propoxyphene] Swelling   • Levaquin [Levofloxacin] Other (See Comments)     BLISTERS AROUND MOUTH   • Tequin [Gatifloxacin] Other (See Comments)     BLISTERS AROUND MOUTH  Also known Tequine        Medications:   Home Medications:  Current Outpatient Prescriptions on File Prior to Visit   Medication Sig   • alendronate (FOSAMAX) 70 MG tablet Take 1 tablet by mouth Every 7 (Seven) Days.   • allopurinol (ZYLOPRIM) 100 MG tablet Take 100 mg by mouth Daily.   • amiodarone (PACERONE) 200 MG tablet Take 100 mg by mouth Every Night.   • atorvastatin (LIPITOR) 80 MG " tablet Take 1 tablet by mouth Every Night.   • baclofen (LIORESAL) 10 MG tablet Take 10 mg by mouth At Night As Needed for Muscle Spasms.   • Blood Glucose Monitoring Suppl (ACCU-CHEK OTILIA PLUS) W/DEVICE kit Dx e11.9 use to check BS BID, ok to substitute formulary preferred   • carvedilol (COREG) 12.5 MG tablet Take 12.5 mg by mouth 2 (Two) Times a Day.   • Chlorhexidine Gluconate Cloth 2 % pads Apply  topically. PRIOR TO OR   • dabigatran etexilate (PRADAXA) 150 MG capsu Take 150 mg by mouth 2 (Two) Times a Day. TO HOLD 5 DAYS PRIOR TO OR PER CARDIO   • DULoxetine (CYMBALTA) 60 MG capsule Take 1 capsule by mouth Daily.   • gabapentin (NEURONTIN) 300 MG capsule Take 300 mg by mouth 3 (Three) Times a Day.   • glucose blood (ACCU-CHEK OTILIA PLUS) test strip Dx e11.9 use to check BS BID, ok to substitute formulary preferred   • HYDROcodone-acetaminophen (NORCO) 5-325 MG per tablet Take 1 tablet by mouth 2 (Two) Times a Day As Needed.   • Lancets (ACCU-CHEK MULTICLIX) lancets Dx e11.9 use to check BS BID, ok to substitute formulary preferred   • levothyroxine (SYNTHROID) 88 MCG tablet Take 1 tablet by mouth Daily.   • lidocaine (LIDODERM) 5 % Place 1 patch on the skin Daily As Needed for Mild Pain . Remove & Discard patch within 12 hours or as directed by MD   • montelukast (SINGULAIR) 10 MG tablet Take 10 mg by mouth Every Night.   • mupirocin (BACTROBAN) 2 % nasal ointment into each nostril 2 (Two) Times a Day. PRIOR TO OR   • pantoprazole (PROTONIX) 40 MG EC tablet Take 1 tablet by mouth Daily for 90 days.   • SITagliptin (JANUVIA) 100 MG tablet Take 1 tablet by mouth Daily.   • spironolactone-hydrochlorothiazide (ALDACTAZIDE) 25-25 MG tablet Take 1 tablet by mouth Daily.   • traZODone (DESYREL) 50 MG tablet Take 100 mg by mouth At Night As Needed for Sleep.     No current facility-administered medications on file prior to visit.      Current Medications:  Scheduled Meds:  Continuous Infusions:  No current  facility-administered medications for this visit.   PRN Meds:.    I have reviewed the patient's medical history in detail and updated the computerized patient record.  Review and summarization of old records include:    Past Medical History:   Diagnosis Date   • Anemia    • Arthritis    • Atrial fibrillation    • Cardiac defibrillator in place 01/2009    medtronic    • Cardiomyopathy     Hypertrophic Cardiomyopathy   • CHF (congestive heart failure)    • Chronic pain    • Coronary artery disease    • Diabetes mellitus     Type II   • Diverticulosis     hx diverticulitis   • GERD (gastroesophageal reflux disease)    • Heart murmur    • History of depression    • History of tumor     left ocular tumor, treated with steroids   • Hyperlipidemia    • Migraines    • On anticoagulant therapy    • Osteoporosis    • Palpitations    • Sleep apnea     does not use CPAP or BiPAP, used nose strips   • Thyroid disease    • Vasovagal syncope    • Vertigo         Past Surgical History:   Procedure Laterality Date   • ANTERIOR CERVICAL DISCECTOMY W/ FUSION  2012    C5-7   • APPENDECTOMY  1970   • BILATERAL BREAST REDUCTION     • CARDIAC CATHETERIZATION  02/19/2007   • CARDIAC DEFIBRILLATOR PLACEMENT Left 1999, 2013    Dr. Ya   • CATARACT EXTRACTION Bilateral    • COLONOSCOPY  1996    Dr. Herbert Gonsales    • COLONOSCOPY  8/16/2016    Procedure: COLONOSCOPY with cecum;  Surgeon: Rosalio Sheikh MD;  Location: [facility] ENDOSCOPY;  Service:    • ENDOSCOPY N/A 4/27/2017    Procedure: ESOPHAGOGASTRODUODENOSCOPY WITH BIOPSIES;  Surgeon: Rosalio Sheikh MD;  Location: [facility] ENDOSCOPY;  Service:    • FEMUR SURGERY Left     with metal implant  x3   • FIRST RIB RESECTION Bilateral     and scalenectomy   • HYSTERECTOMY  1989    Total   • KNEE ARTHROSCOPY Bilateral 2014    Dr. Li   • KNEE SURGERY Right 06/10/2010   • LAPAROSCOPIC CHOLECYSTECTOMY  1974   • ROTATOR CUFF REPAIR Right 06/10/2010   • SHOULDER ARTHROSCOPY W/ ROTATOR CUFF  REPAIR Bilateral 05/20/2011   • SHOULDER SURGERY Right    • TONSILLECTOMY     • TOTAL SHOULDER ARTHROPLASTY W/ DISTAL CLAVICLE EXCISION Right 2/2/2017    Procedure: TOTAL SHOULDER REVERSE ARTHROPLASTY;  Surgeon: Juan Antonio Anne MD;  Location: [facility] OR;  Service:         Social History     Occupational History   •       Social History Main Topics   • Smoking status: Former Smoker     Packs/day: 0.50     Years: 17.00     Types: Cigarettes     Quit date: 9/8/1985   • Smokeless tobacco: Never Used   • Alcohol use No   • Drug use: No   • Sexual activity: Defer    Social History     Social History Narrative   • No narrative on file        Family History   Problem Relation Age of Onset   • Heart attack Brother    • Heart disease Brother    • Hyperthyroidism Mother    • Cancer Mother    • Heart disease Mother    • Osteoporosis Mother    • No Known Problems Father       car accident   • Cirrhosis Maternal Grandmother    • No Known Problems Maternal Grandfather    • No Known Problems Paternal Grandmother    • No Known Problems Paternal Grandfather    • Breast cancer Maternal Aunt    • Malig Hyperthermia Neg Hx        ROS: 14 point review of systems was performed and was negative except for documented findings in HPI and today's encounter.     Allergies:   Allergies   Allergen Reactions   • Adhesive Tape Other (See Comments)     SKIN BLISTERS   • Aspirin Swelling   • Biaxin [Clarithromycin] Other (See Comments)     BLISTERS AROUND MOTH  Also known as Bioxen    • Codeine Swelling   • Darvon [Propoxyphene] Swelling   • Levaquin [Levofloxacin] Other (See Comments)     BLISTERS AROUND MOUTH   • Tequin [Gatifloxacin] Other (See Comments)     BLISTERS AROUND MOUTH  Also known Tequine      Constitutional:  Denies fever, shaking or chills   Eyes:  Denies change in visual acuity   HENT:  Denies nasal congestion or sore throat   Respiratory:  Denies cough or shortness of breath   Cardiovascular:  Denies  "chest pain or severe LE edema   GI:  Denies abdominal pain, nausea, vomiting, bloody stools or diarrhea   Musculoskeletal:  Denies numbness tingling or loss of motor function except as outlined above in history of present illness.  : Denies painful urination or hematuria  Integument:  Denies rash, lesion or ulceration   Neurologic:  Denies headache or focal weakness  Endocrine:  Denies lymphadenopathy  Psych:  Denies confusion or change in mental status   Hem:  Denies active bleeding    Physical Exam: 67 y.o. female  Wt Readings from Last 3 Encounters:   04/09/18 81.3 kg (179 lb 3.2 oz)   04/05/18 80.8 kg (178 lb 3.2 oz)   03/27/18 82.3 kg (181 lb 8 oz)     Ht Readings from Last 3 Encounters:   04/09/18 154.9 cm (61\")   04/05/18 154.9 cm (61\")   03/27/18 156.2 cm (61.5\")     Body mass index is 33.86 kg/m².  Facility age limit for growth percentiles is 20 years.  Vitals:    04/09/18 0910   Temp: 97.5 °F (36.4 °C)       Vital signs reviewed.   General Appearance:    Alert, cooperative, in no acute distress                  Eyes: conjunctiva clear  ENT: external ears and nose atraumatic  CV: no peripheral edema  Resp: normal respiratory effort  Skin: no rashes or wounds; normal turgor  Psych: mood and affect appropriate  Lymph: no nodes appreciated  Neuro: gross sensation intact  Vascular:  Palpable peripheral pulse in noted extremity  Musculoskeletal Extremities: DETAILED KNEE Exam: Right knee: Painful gait w/wo limp, muscle atrophy, erythema, ecchymosis, or gross deformity noted, Large knee effusion, + medial joint line tenderness, Active range of motion normal, 5/5 strength flexion and extension, The knee is stable to varus and valgus stress testing, VARUS VALGUS NEUTRAL: varus alignment of the limb, Lachman negative, Posterior drawer negative, Socrates's negative, Patellofemoral grind +, Sensation grossly intact to light tough throughout the lower extremity, Skin is intact, Distal pulses are palpable, No signs or " symptoms of DVT          Diagnostic Tests:  Appointment on 04/05/2018   Component Date Value Ref Range Status   • Glucose 04/05/2018 202* 65 - 99 mg/dL Final   • BUN 04/05/2018 19  8 - 23 mg/dL Final   • Creatinine 04/05/2018 1.01* 0.57 - 1.00 mg/dL Final   • Sodium 04/05/2018 140  136 - 145 mmol/L Final   • Potassium 04/05/2018 3.8  3.5 - 5.2 mmol/L Final   • Chloride 04/05/2018 98  98 - 107 mmol/L Final   • CO2 04/05/2018 27.4  22.0 - 29.0 mmol/L Final   • Calcium 04/05/2018 10.4  8.6 - 10.5 mg/dL Final   • eGFR Non African Amer 04/05/2018 55* >60 mL/min/1.73 Final   • BUN/Creatinine Ratio 04/05/2018 18.8  7.0 - 25.0 Final   • Anion Gap 04/05/2018 14.6  mmol/L Final   • WBC 04/05/2018 7.91  4.50 - 10.70 10*3/mm3 Final   • RBC 04/05/2018 4.59  3.90 - 5.20 10*6/mm3 Final   • Hemoglobin 04/05/2018 12.8  11.9 - 15.5 g/dL Final   • Hematocrit 04/05/2018 40.4  35.6 - 45.5 % Final   • MCV 04/05/2018 88.0  80.5 - 98.2 fL Final   • MCH 04/05/2018 27.9  26.9 - 32.0 pg Final   • MCHC 04/05/2018 31.7* 32.4 - 36.3 g/dL Final   • RDW 04/05/2018 14.7* 11.7 - 13.0 % Final   • RDW-SD 04/05/2018 47.5  37.0 - 54.0 fl Final   • MPV 04/05/2018 9.8  6.0 - 12.0 fL Final   • Platelets 04/05/2018 351  140 - 500 10*3/mm3 Final   • Color, UA 04/05/2018 Yellow  Yellow, Straw Final   • Appearance, UA 04/05/2018 Clear  Clear Final   • pH, UA 04/05/2018 6.0  5.0 - 8.0 Final   • Specific Gravity, UA 04/05/2018 1.013  1.005 - 1.030 Final   • Glucose, UA 04/05/2018 Negative  Negative Final   • Ketones, UA 04/05/2018 Negative  Negative Final   • Bilirubin, UA 04/05/2018 Negative  Negative Final   • Blood, UA 04/05/2018 Negative  Negative Final   • Protein, UA 04/05/2018 Negative  Negative Final   • Leuk Esterase, UA 04/05/2018 Trace* Negative Final   • Nitrite, UA 04/05/2018 Negative  Negative Final   • Urobilinogen, UA 04/05/2018 0.2 E.U./dL  0.2 - 1.0 E.U./dL Final   • RBC, UA 04/05/2018 0-2  None Seen, 0-2 /HPF Final   • WBC, UA 04/05/2018 0-2   None Seen, 0-2 /HPF Final   • Bacteria, UA 04/05/2018 None Seen  None Seen /HPF Final   • Squamous Epithelial Cells,  04/05/2018 3-6* None Seen, 0-2 /HPF Final   • Hyaline Casts,  04/05/2018 0-2  None Seen /LPF Final   • Methodology 04/05/2018 Automated Microscopy   Final       Imaging was done today, images were personally viewed, viewed images and discussed with the patient:    Indication: pain related symptoms,  Views: 3V AP, LAT & 40 degree PA right knee(s)   Findings: severe end-stage arthritis (bone on bone, subchondral sclerosis/cysts, osteophytes)  Comparison views: viewed last xray done in the office.     Assessment:  Patient Active Problem List   Diagnosis   • Shoulder pain, right   • Knee pain, right   • Fall down stairs   • PAF (paroxysmal atrial fibrillation)   • S/P rotator cuff repair   • Vitamin D deficiency   • Vertigo   • Vasovagal syncope   • Thyroid disease   • Obstructive sleep apnea, adult   • Palpitations   • Osteoporosis   • Ocular tumor   • Migraines   • Hyperlipidemia   • History of transfusion   • Heart murmur   • Heart attack   • GERD (gastroesophageal reflux disease)   • Depression   • CHF (congestive heart failure)   • Cardiomyopathy   • Asthma   • Anxiety   • Arthritis of right knee   • Right rotator cuff tear   • Hypothyroidism   • Dyspnea and respiratory abnormalities   • Orthopnea   • IHSS (idiopathic hypertrophic subaortic stenosis)   • AICD (automatic cardioverter/defibrillator) present   • Anticoagulant long-term use   • Hyperglycemia, drug-induced   • Chronic pain of right knee   • Diverticulitis   • CAD (coronary artery disease)   • Hypertrophic nonobstructive cardiomyopathy   • ICD (implantable cardioverter-defibrillator) discharge   • Menopause   • Non-obstructive hypertrophic cardiomyopathy   • Type 2 diabetes mellitus without complication, without long-term current use of insulin   • Insomnia       Plan:  Dr. Bravo Pascual reviewed anatomy of a total joint  arthroplasty in laymen's terms, as well as typical postoperative recovery and possibly 6-12 months for maximal recovery, and possible need for rehabilitation stay after hospitalization. We also discussed risks, benefits, alternatives, and limitations of procedure with risks including but not limited to neurovascular damage, bleeding, infection, malalignment, chronic pian, failure of implants, osteolysis, loosening of implants, loss of motion, weakness, stiffness, instability, DVT, pulmonary embolus, death, stroke, complex regional pain syndrome, myocardial infarction, and need for additional procedures. Concept of substitution vs. replacement discussed.  No guarantees were given regarding results of surgery.      Kristan Galicia was given the opportunity to ask and have all questions answered today.  The patient voiced understanding of the risks, benefits, and alternative forms of treatment that were discussed and the patient consents to proceed with surgery.     Patient's blood clot history is negative.  Planned DVT prophylaxis for surgery:  pradaxa 150mg bid, Cardiologist is Dr. Ya. Has defibrillator (ICD), and was instructed by Dr. Ya to come off pradaxa 5 days prior to surgery.     Discharge Plan: POD 2-3 to home and home health, will need hydrocodone (does not do well on percocet).     Patient was seen by CHRISTIN Carballo in the office today.    Date: 4/9/2018  CHRISTIN Porter

## 2018-04-18 NOTE — OP NOTE
Operative Note    Name: Kristan Galicia  YOB: 1950  MRN: [id number]  BMI: Body mass index is 33.37 kg/m².    DATE OF SURGERY: 4/18/2018    PREOPERATIVE DIAGNOSIS: right knee end-stage osteoarthritis    POSTOPERATIVE DIAGNOSIS: right knee end-stage osteoarthritis    PROCEDURE PERFORMED: right total knee replacement with Dellroy navigation    SURGEON: Dr. Bravo Pascual    ASSISTANT: CHRISTIN Carballo    IMPLANTS: Depuy PFC size 2.5 femoral component, size 2.5 tibial baseplate, 10mm polyethylene insert, 35 mm patellar button    Estimated Blood Loss: 100cc  Specimens : none  Complications: none  22 Modifier:  increased BMI of 33 requiring increased time, effort, and physical exertion of the surgeon and the assistant    DESCRIPTION OF PROCEDURE: The patient was taken to the operating room and placed in the supine position. Preoperative antibiotics were administered. Surgical time out was performed. After adequate induction of anesthesia, the leg was prepped and draped in the usual sterile fashion.  Surgical time out was performed. The leg was exsanguinated with an Esmarch bandage and the tourniquet inflated to 250 mmHg. A midline incision was performed followed by a medial parapatellar arthrotomy. The patella was subluxed laterally.  A portion of the fat pad, ACL, and anterior horns of the meniscus were excised.  The Kindful navigation device was affixed and navigated. The distal cut was made. The femur was then sized with a sizing guide. The femoral cutting block was placed and all femoral cuts were performed. The proximal tibia was exposed. Antonio navigation protocol was accomplished.  10 millimeters were removed.  We used the extramedullary tibial cutting guide set for removal of 3mm of bone off the low side. The tibial cut was performed. The posterior horns of the menisci were excised. The posterior osteophytes were removed. Flexion extension blocks were then used to balance the knee. The  tibial cut surface was then sized with the sizing templates and the tibial and femoral trial were then placed. The tray was aligned with the middle third of the tubercle.    Attention was then placed to the patella. The patella was balanced to track centrally through range of motion.  It measured 23 and patella resurfacing was performed.   At this point all trial components were removed, the knee was copiously irrigated with pulsed lavage, and the knee was injected with anesthetic cocktail solution. The cut surfaces were then dried with clean lap sponges, and the components were cemented tibia, followed by femura. The knee was held in full extension and all excess cement was removed. The knee was held still until the cement had completely hardened. We then placed the trial polyethylene spacer which resulted in full extension and excellent flexion-extension balance. We placed the final polyethylene spacer.   The knee was then copiously irrigated with the full 3 liters. The tourniquet was then released. There was excellent hemostasis. We closed the knee in multiple layers in standard fashion. Sterile dressing were applied. At the end of the case, the sponge and needle counts were reported as being correct. There were no known complications. The patient was then transported to the recovery room.    Surgical assistant performed retraction, suction, hemostasis, and specific limb positioning and manipulation required for accuracy of joint placement, and assistance in wound closure and dressing application.       Bravo Pascual M.D.

## 2018-04-18 NOTE — ANESTHESIA PROCEDURE NOTES
Airway  Urgency: elective    Airway not difficult    General Information and Staff    Patient location during procedure: OR  Anesthesiologist: CORETTA DODSON  CRNA: JUAN RAMON PERKINS    Indications and Patient Condition  Indications for airway management: airway protection    Preoxygenated: yes  MILS maintained throughout  Mask difficulty assessment: 0 - not attempted    Final Airway Details  Final airway type: endotracheal airway      Successful airway: ETT  Cuffed: yes   Successful intubation technique: direct laryngoscopy  Facilitating devices/methods: cricoid pressure  Endotracheal tube insertion site: oral  Blade: Vamsi  Blade size: #3  ETT size: 7.0 mm  Cormack-Lehane Classification: grade I - full view of glottis  Placement verified by: chest auscultation and capnometry   Cuff volume (mL): 4  Measured from: lips  ETT to lips (cm): 19  Number of attempts at approach: 1    Additional Comments  Atraumatic ET Tube placement.  Teeth as pre-op. BLEBS.  -ABD sounds.  +ET CO2.  Secured to face

## 2018-04-18 NOTE — ANESTHESIA PREPROCEDURE EVALUATION
Anesthesia Evaluation     NPO Solid Status: > 8 hours             Airway   Mallampati: II  TM distance: >3 FB  Neck ROM: full  No difficulty expected  Dental          Pulmonary - normal exam   (+) asthma, shortness of breath, sleep apnea,   Cardiovascular - normal exam    Patient on routine beta blocker and Beta blocker given within 24 hours of surgery    (+) pacemaker pacemaker, ICD, valvular problems/murmurs, past MI , CAD, dysrhythmias Atrial Fib, CHF, orthopnea, hyperlipidemia,     ROS comment: IHSS cardiomyopathy  Pt denies MI.  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  Pt says she had been ill and was very dehydrated.    Neuro/Psych  (+) headaches, dizziness/light headedness, psychiatric history Depression,     GI/Hepatic/Renal/Endo    (+)  GERD,  diabetes mellitus, hypothyroidism,     Musculoskeletal     Abdominal    Substance History      OB/GYN          Other   (+) arthritis   history of cancer                    Anesthesia Plan    ASA 3     general     intravenous induction   Anesthetic plan and risks discussed with patient.

## 2018-04-18 NOTE — ANESTHESIA POSTPROCEDURE EVALUATION
"Patient: Kristan Galicia    Procedure Summary     Date:  04/18/18 Room / Location:  [facility] OR [address] [place] [address] MAIN OR    Anesthesia Start:  0905 Anesthesia Stop:  1102    Procedure:  RIGHT TOTAL KNEE ARTHROPLASTY WITH STEVE NAVIGATION (Right Knee) Diagnosis:       Arthritis of right knee      (Arthritis of right knee [M17.11])    Surgeon:  Bravo Pascual MD Provider:  Roxanne Nelson MD    Anesthesia Type:  general ASA Status:  3          Anesthesia Type: general  Last vitals  BP   104/59 (04/18/18 1205)   Temp   36.9 °C (98.5 °F) (04/18/18 1056)   Pulse   62 (04/18/18 1205)   Resp   16 (04/18/18 1205)     SpO2   95 % (04/18/18 1205)     Post Anesthesia Care and Evaluation    Patient location during evaluation: bedside  Patient participation: complete - patient participated  Level of consciousness: sleepy but conscious  Pain score: 0  Pain management: adequate  Airway patency: patent  Anesthetic complications: No anesthetic complications    Cardiovascular status: acceptable  Respiratory status: acceptable  Hydration status: acceptable    Comments: /59   Pulse 62   Temp 36.9 °C (98.5 °F) (Oral)   Resp 16   Ht 154.9 cm (61\")   Wt 80.1 kg (176 lb 9.4 oz)   SpO2 95%   BMI 33.37 kg/m²         "

## 2018-04-18 NOTE — THERAPY EVALUATION
Acute Care - Physical Therapy Initial Evaluation  [facility]     Patient Name: Kristan Galicia  : 1950  MRN: [id number]  Today's Date: 2018   Onset of Illness/Injury or Date of Surgery: 18     Referring Physician: Ankur      Admit Date: 2018    Visit Dx:     ICD-10-CM ICD-9-CM   1. Decreased mobility R26.89 781.99   2. Arthritis of right knee M17.11 716.96     Patient Active Problem List   Diagnosis   • Shoulder pain, right   • Knee pain, right   • Fall down stairs   • PAF (paroxysmal atrial fibrillation)   • S/P rotator cuff repair   • Vitamin D deficiency   • Vertigo   • Vasovagal syncope   • Thyroid disease   • Obstructive sleep apnea, adult   • Palpitations   • Osteoporosis   • Ocular tumor   • Migraines   • Hyperlipidemia   • History of transfusion   • Heart murmur   • Heart attack   • GERD (gastroesophageal reflux disease)   • Depression   • CHF (congestive heart failure)   • Cardiomyopathy   • Asthma   • Anxiety   • Arthritis of right knee   • Right rotator cuff tear   • Hypothyroidism   • Dyspnea and respiratory abnormalities   • Orthopnea   • IHSS (idiopathic hypertrophic subaortic stenosis)   • AICD (automatic cardioverter/defibrillator) present   • Anticoagulant long-term use   • Hyperglycemia, drug-induced   • Chronic pain of right knee   • Diverticulitis   • CAD (coronary artery disease)   • Hypertrophic nonobstructive cardiomyopathy   • ICD (implantable cardioverter-defibrillator) discharge   • Menopause   • Non-obstructive hypertrophic cardiomyopathy   • Type 2 diabetes mellitus without complication, without long-term current use of insulin   • Insomnia     Past Medical History:   Diagnosis Date   • Anemia    • Arthritis    • Atrial fibrillation    • Cardiac defibrillator in place 2009    medtronic    • Cardiomyopathy     Hypertrophic Cardiomyopathy   • CHF (congestive heart failure)    • Chronic pain    • Coronary artery disease    • Diabetes mellitus     Type II   •  Diverticulosis     hx diverticulitis   • GERD (gastroesophageal reflux disease)    • Heart murmur    • History of depression    • History of tumor     left ocular tumor, treated with steroids   • Hyperlipidemia    • Migraines    • On anticoagulant therapy    • Osteoporosis    • Palpitations    • Sleep apnea     does not use CPAP or BiPAP, used nose strips   • Thyroid disease    • Vasovagal syncope    • Vertigo      Past Surgical History:   Procedure Laterality Date   • ANTERIOR CERVICAL DISCECTOMY W/ FUSION  2012    C5-7   • APPENDECTOMY  1970   • BILATERAL BREAST REDUCTION     • CARDIAC CATHETERIZATION  02/19/2007   • CARDIAC DEFIBRILLATOR PLACEMENT Left 1999, 2013    Dr. Ya   • CATARACT EXTRACTION Bilateral    • COLONOSCOPY  1996    Dr. Herbert Gonsales    • COLONOSCOPY  8/16/2016    Procedure: COLONOSCOPY with cecum;  Surgeon: Rosalio Sheikh MD;  Location[facility] END[facility]COPY;  Service:    • ENDOSCOPY N/A 4/27/2017    Procedure: ESOPHAGOGASTRODUODENOSCOPY WITH BIOPSIES;  Surgeon: Rosalio Sheikh MD;  Location[facility] ENDOSCOPY;  Service:    • FEMUR SURGERY Left     with metal implant  x3   • FIRST RIB RESECTION Bilateral     and scalenectomy   • HYSTERECTOMY  1989    Total   • KNEE ARTHROSCOPY Bilateral 2014    Dr. Li   • KNEE SURGERY Right 06/10/2010   • LAPAROSCOPIC CHOLECYSTECTOMY  1974   • ROTATOR CUFF REPAIR Right 06/10/2010   • SHOULDER ARTHROSCOPY W/ ROTATOR CUFF REPAIR Bilateral 05/20/2011   • SHOULDER SURGERY Right    • TONSILLECTOMY     • TOTAL SHOULDER ARTHROPLASTY W/ DISTAL CLAVICLE EXCISION Right 2/2/2017    Procedure: TOTAL SHOULDER REVERSE ARTHROPLASTY;  Surgeon: Juan Antonio Anne MD;  Location: [facility] MAIN OR;  Service:         PT ASSESSMENT (last 12 hours)      Physical Therapy Evaluation     Row Name 04/18/18 0470          PT Evaluation Time/Intention    Subjective Information complains of;pain  -EE     Document Type evaluation  -EE     Patient Effort good  -EE     Symptoms Noted  "During/After Treatment none  -EE     Row Name 04/18/18 1340          General Information    Onset of Illness/Injury or Date of Surgery 04/18/18  -EE     Referring Physician Ankur  -EE     Patient Observations alert;cooperative;agree to therapy  -EE     Patient/Family Observations Supine in bed in no acute distress  -EE     Prior Level of Function independent:;all household mobility;community mobility  -EE     Equipment Currently Used at Home walker, rolling   not using prior  -EE     Pertinent History of Current Functional Problem s/p R TKA  -EE     Existing Precautions/Restrictions fall  -EE     Barriers to Rehab none identified  -EE     Row Name 04/18/18 1340          Relationship/Environment    Lives With alone   states daughter lives \"5 min away\"  -EE     Row Name 04/18/18 1340          Home Main Entrance    Number of Stairs, Main Entrance four  -EE     Stair Railings, Main Entrance railings on both sides of stairs  -EE     Row Name 04/18/18 1340          Cognitive Assessment/Interventions    Additional Documentation Cognitive Assessment/Intervention (Group)  -EE     Row Name 04/18/18 1340          Cognitive Assessment/Intervention- PT/OT    Orientation Status (Cognition) oriented x 4  -EE     Follows Commands (Cognition) WNL  -EE     Cognitive Function (Cognitive) WNL  -EE     Personal Safety Interventions gait belt;fall prevention program maintained;muscle strengthening facilitated;nonskid shoes/slippers when out of bed;supervised activity  -EE     Row Name 04/18/18 1340          Safety Issues, Functional Mobility    Impairments Affecting Function (Mobility) strength;pain  -EE     Row Name 04/18/18 1340          Bed Mobility Assessment/Treatment    Bed Mobility Assessment/Treatment supine-sit  -EE     Supine-Sit [place] (Bed Mobility) independent  -EE     Row Name 04/18/18 1340          Transfer Assessment/Treatment    Transfer Assessment/Treatment sit-stand transfer;stand-sit transfer  -EE     Comment " (Transfers) verbal cues required for hand placement  -EE     Sit-Stand [place] (Transfers) contact guard;verbal cues  -EE     Stand-Sit [place] (Transfers) contact guard;verbal cues  -EE     Row Name 04/18/18 1340          Sit-Stand Transfer    Assistive Device (Sit-Stand Transfers) walker, front-wheeled  -EE     Row Name 04/18/18 1340          Stand-Sit Transfer    Assistive Device (Stand-Sit Transfers) walker, front-wheeled  -EE     Row Name 04/18/18 1340          Gait/Stairs Assessment/Training    [place] Level (Gait) contact guard;verbal cues  -EE     Assistive Device (Gait) walker, front-wheeled  -EE     Distance in Feet (Gait) 30  -EE     Pattern (Gait) step-to  -EE     Deviations/Abnormal Patterns (Gait) antalgic;esmer decreased  -EE     Right Sided Gait Deviations heel strike decreased;weight shift ability decreased  -EE     Comment (Gait/Stairs) verbal cues for sequencing with walker  -EE     Row Name 04/18/18 1340          General ROM    GENERAL ROM COMMENTS R knee impaired ~50%; all others grossly WFL  -EE     Row Name 04/18/18 1340          General Assessment (Manual Muscle Testing)    General Manual Muscle Testing (MMT) Assessment lower extremity strength deficits identified  -EE     Comment, General Manual Muscle Testing (MMT) Assessment R LE limited by pain; L LE grossly WFL for age  -[facility] Name 04/18/18 1340          Motor Assessment/Intervention    Additional Documentation Therapeutic Exercise Interventions (Group)  -EE     Row Name 04/18/18 1340          Therapeutic Exercise    Comment (Therapeutic Exercise) 10 reps R TKA protocol completed  -EE     Row Name 04/18/18 1340          Pain Assessment    Additional Documentation Pain Scale: Numbers Pre/Post-Treatment (Group)  -EE     Row Name 04/18/18 1340          Pain Scale: Numbers Pre/Post-Treatment    Pain Scale: Numbers, Pretreatment 7/10  -EE     Pain Location - Side Right  -EE     Pain Location knee  -EE     Pre/Post  Treatment Pain Comment pre medicated ~20 min prior to tx  -EE     Pain Intervention(s) Repositioned;Ambulation/increased activity;Cold applied  -EE     Row Name             Wound 04/18/18 0953 Right knee incision    Wound - Properties Group Date first assessed: 04/18/18  -MB Time first assessed: 0953  -MB Side: Right  -MB Location: knee  -MB Type: incision  -MB    Row Name 04/18/18 1340          Plan of Care Review    Plan of Care Reviewed With patient  -EE     Row Name 04/18/18 1340          Physical Therapy Clinical Impression    Patient/Family Goals Statement (PT Clinical Impression) Go home  -EE     Criteria for Skilled Interventions Met (PT Clinical Impression) yes;treatment indicated  -EE     Pathology/Pathophysiology Noted (Describe Specifically for Each System) musculoskeletal  -EE     Impairments Found (describe specific impairments) gait, locomotion, and balance;ROM  -EE     Rehab Potential (PT Clinical Summary) good, to achieve stated therapy goals  -EE     Row Name 04/18/18 1340          Physical Therapy Goals    Transfer Goal Selection (PT) transfer, PT goal 1  -EE     Gait Training Goal Selection (PT) gait training, PT goal 1  -EE     ROM Goal Selection (PT) ROM, PT goal 1  -EE     Stairs Goal Selection (PT) stairs, PT goal 1  -EE     Additional Documentation ROM Goal Selection (PT) (Row);Stairs Goal Selection (PT) (Row)  -EE     Row Name 04/18/18 1340          Transfer Goal 1 (PT)    Activity/Assistive Device (Transfer Goal 1, PT) transfers, all;walker, rolling  -EE     [place] Level/Cues Needed (Transfer Goal 1, PT) supervision required  -EE     Time Frame (Transfer Goal 1, PT) 2 days  -EE     Progress/Outcome (Transfer Goal 1, PT) goal ongoing  -EE     Row Name 04/18/18 1340          Gait Training Goal 1 (PT)    Activity/Assistive Device (Gait Training Goal 1, PT) gait (walking locomotion);walker, rolling  -EE     [place] Level (Gait Training Goal 1, PT) standby assist  -EE     Distance  (Gait Goal 1, PT) 100  -EE     Time Frame (Gait Training Goal 1, PT) 2 days  -EE     Progress/Outcome (Gait Training Goal 1, PT) goal ongoing  -EE     Row Name 04/18/18 1340          ROM Goal 1 (PT)    ROM Goal 1 (PT) R knee 0-90  -EE     Time Frame (ROM Goal 1, PT) 2 days  -EE     Progress/Outcome (ROM Goal 1, PT) goal ongoing  -EE     Row Name 04/18/18 1340          Stairs Goal 1 (PT)    Activity/Assistive Device (Stairs Goal 1, PT) stairs, all skills;using handrail  -EE     [place] Level/Cues Needed (Stairs Goal 1, PT) contact guard assist  -EE     Number of Stairs (Stairs Goal 1, PT) 4  -EE     Time Frame (Stairs Goal 1, PT) 2 days  -EE     Progress/Outcome (Stairs Goal 1, PT) goal ongoing  -EE     Row Name 04/18/18 1340          Patient Education Goal (PT)    Activity (Patient Education Goal, PT) HEP  -EE     [place]/Cues/Accuracy (Memory Goal 2, PT) demonstrates adequately;verbalizes understanding  -EE     Time Frame (Patient Education Goal, PT) 2 days  -EE     Progress/Outcome (Patient Education Goal, PT) goal ongoing  -EE     Row Name 04/18/18 1340          Positioning and Restraints    Pre-Treatment Position in bed  -EE     Post Treatment Position chair  -EE     In Chair reclined;call light within reach;encouraged to call for assist;with family/caregiver;legs elevated;exit alarm on;with nsg   ice applied R knee  -EE     Row Name 04/18/18 1340          Living Environment    Home Accessibility stairs to enter home  -EE       User Key  (r) = Recorded By, (t) = Taken By, (c) = Cosigned By    Initials Name Provider Type    ARMAAN Allan, RN Registered Nurse    ESTEFANY Garcia, PT Physical Therapist          Physical Therapy Education     Title: PT OT SLP Therapies (Done)     Topic: Physical Therapy (Done)     Point: Mobility training (Done)    Learning Progress Summary     Learner Status Readiness Method Response Comment Documented by    Patient Done Acceptance E,TB,D SHANNON,NR  EE 04/18/18 1090           Point: Home exercise program (Done)    Learning Progress Summary     Learner Status Readiness Method Response Comment Documented by    Patient Done Acceptance E,TB,D VU,NR  EE 04/18/18 1417          Point: Body mechanics (Done)    Learning Progress Summary     Learner Status Readiness Method Response Comment Documented by    Patient Done Acceptance E,TB,D VU,NR  EE 04/18/18 1417          Point: Precautions (Done)    Learning Progress Summary     Learner Status Readiness Method Response Comment Documented by    Patient Done Acceptance E,TB,D VU,NR  EE 04/18/18 1417                      User Key     Initials Effective Dates Name Provider Type Discipline     04/03/18 -  Rossy Garcia, PT Physical Therapist PT                PT Recommendation and Plan  Anticipated Discharge Disposition (PT): home with home health care  Planned Therapy Interventions (PT Eval): balance training, bed mobility training, gait training, home exercise program, patient/family education, ROM (range of motion), stair training, strengthening, stretching, transfer training  Therapy Frequency (PT Clinical Impression): 2 times/day  Outcome Summary/Treatment Plan (PT)  Anticipated Discharge Disposition (PT): home with home health care  Plan of Care Reviewed With: patient  Outcome Summary: Pt presents s/p R TKA demonstrating post op pain, R LE weakness, impaired R knee ROM, impaired gait mechanics, and decreased endurance limiting mobility. Pt was independent with mobility prior to admission; currently requiring use of walker and CGA for mobility. Pt would benefit from continued PT to address impairments and assist w/return to PLOF. Pt demonstrates good tolerance to HEP; encouraged pt to continue mobilizing with nsg this evening. Will plan to attempt stairs tomorrow in anticipation of DC.           Outcome Measures     Row Name 04/18/18 1400             How much help from another person do you currently need...    Turning from your back to your side  while in flat bed without using bedrails? 4  -EE      Moving from lying on back to sitting on the side of a flat bed without bedrails? 4  -EE      Moving to and from a bed to a chair (including a wheelchair)? 3  -EE      Standing up from a chair using your arms (e.g., wheelchair, bedside chair)? 3  -EE      Climbing 3-5 steps with a railing? 2  -EE      To walk in hospital room? 3  -EE      AM-PAC 6 Clicks Score 19  -EE         Functional Assessment    Outcome Measure Options AM-PAC 6 Clicks Basic Mobility (PT)  -EE        User Key  (r) = Recorded By, (t) = Taken By, (c) = Cosigned By    Initials Name Provider Type    EE Rossy Garcia PT Physical Therapist           Time Calculation:         PT Charges     Row Name 04/18/18 1419             Time Calculation    Start Time 1340  -EE      Stop Time 1406  -EE      Time Calculation (min) 26 min  -EE      PT Received On 04/18/18  -EE      PT - Next Appointment 04/19/18  -EE      PT Goal Re-Cert Due Date 04/20/18  -EE        User Key  (r) = Recorded By, (t) = Taken By, (c) = Cosigned By    Initials Name Provider Type    EE Rossy Garcia PT Physical Therapist          Therapy Charges for Today     Code Description Service Date Service Provider Modifiers Qty    26128538325 HC PT EVAL MOD COMPLEXITY 2 4/18/2018 Rossy Garcia PT GP 1    28280666753 HC PT THER PROC EA 15 MIN 4/18/2018 Rossy Garcia PT GP 1    47527415391 HC PT THER SUPP EA 15 MIN 4/18/2018 Rossy Garcia, PT GP 1          PT G-Codes  Outcome Measure Options: AM-PAC 6 Clicks Basic Mobility (PT)      Rossy Garcia PT  4/18/2018

## 2018-04-19 LAB
ANION GAP SERPL CALCULATED.3IONS-SCNC: 14.3 MMOL/L
BUN BLD-MCNC: 26 MG/DL (ref 8–23)
BUN/CREAT SERPL: 21 (ref 7–25)
CALCIUM SPEC-SCNC: 8.3 MG/DL (ref 8.6–10.5)
CHLORIDE SERPL-SCNC: 96 MMOL/L (ref 98–107)
CO2 SERPL-SCNC: 25.7 MMOL/L (ref 22–29)
CREAT BLD-MCNC: 1.24 MG/DL (ref 0.57–1)
GFR SERPL CREATININE-BSD FRML MDRD: 43 ML/MIN/1.73
GLUCOSE BLD-MCNC: 154 MG/DL (ref 65–99)
GLUCOSE BLDC GLUCOMTR-MCNC: 171 MG/DL (ref 70–130)
GLUCOSE BLDC GLUCOMTR-MCNC: 195 MG/DL (ref 70–130)
GLUCOSE BLDC GLUCOMTR-MCNC: 219 MG/DL (ref 70–130)
GLUCOSE BLDC GLUCOMTR-MCNC: 252 MG/DL (ref 70–130)
HCT VFR BLD AUTO: 34.3 % (ref 35.6–45.5)
HGB BLD-MCNC: 10.6 G/DL (ref 11.9–15.5)
POTASSIUM BLD-SCNC: 3.6 MMOL/L (ref 3.5–5.2)
SODIUM BLD-SCNC: 136 MMOL/L (ref 136–145)

## 2018-04-19 PROCEDURE — 99024 POSTOP FOLLOW-UP VISIT: CPT | Performed by: ORTHOPAEDIC SURGERY

## 2018-04-19 PROCEDURE — 25010000003 CEFAZOLIN IN DEXTROSE 2-4 GM/100ML-% SOLUTION: Performed by: ORTHOPAEDIC SURGERY

## 2018-04-19 PROCEDURE — 85018 HEMOGLOBIN: CPT | Performed by: ORTHOPAEDIC SURGERY

## 2018-04-19 PROCEDURE — 25010000002 HYDROMORPHONE PER 4 MG: Performed by: ORTHOPAEDIC SURGERY

## 2018-04-19 PROCEDURE — 97110 THERAPEUTIC EXERCISES: CPT | Performed by: PHYSICAL THERAPIST

## 2018-04-19 PROCEDURE — 82962 GLUCOSE BLOOD TEST: CPT

## 2018-04-19 PROCEDURE — 80048 BASIC METABOLIC PNL TOTAL CA: CPT | Performed by: ORTHOPAEDIC SURGERY

## 2018-04-19 PROCEDURE — 97150 GROUP THERAPEUTIC PROCEDURES: CPT | Performed by: PHYSICAL THERAPIST

## 2018-04-19 PROCEDURE — 85014 HEMATOCRIT: CPT | Performed by: ORTHOPAEDIC SURGERY

## 2018-04-19 RX ADMIN — MONTELUKAST 10 MG: 10 TABLET, FILM COATED ORAL at 20:43

## 2018-04-19 RX ADMIN — ALLOPURINOL 100 MG: 100 TABLET ORAL at 08:26

## 2018-04-19 RX ADMIN — HYDROCODONE BITARTRATE AND ACETAMINOPHEN 2 TABLET: 7.5; 325 TABLET ORAL at 03:44

## 2018-04-19 RX ADMIN — CARVEDILOL 12.5 MG: 12.5 TABLET, FILM COATED ORAL at 17:34

## 2018-04-19 RX ADMIN — MUPIROCIN 1 APPLICATION: 20 OINTMENT TOPICAL at 20:43

## 2018-04-19 RX ADMIN — DABIGATRAN ETEXILATE MESYLATE 150 MG: 150 CAPSULE ORAL at 20:43

## 2018-04-19 RX ADMIN — HYDROCODONE BITARTRATE AND ACETAMINOPHEN 2 TABLET: 7.5; 325 TABLET ORAL at 20:43

## 2018-04-19 RX ADMIN — HYDROMORPHONE HYDROCHLORIDE 0.5 MG: 1 INJECTION, SOLUTION INTRAMUSCULAR; INTRAVENOUS; SUBCUTANEOUS at 01:02

## 2018-04-19 RX ADMIN — DABIGATRAN ETEXILATE MESYLATE 150 MG: 150 CAPSULE ORAL at 08:26

## 2018-04-19 RX ADMIN — GABAPENTIN 300 MG: 300 CAPSULE ORAL at 08:26

## 2018-04-19 RX ADMIN — LEVOTHYROXINE SODIUM 88 MCG: 88 TABLET ORAL at 08:26

## 2018-04-19 RX ADMIN — ATORVASTATIN CALCIUM 80 MG: 80 TABLET, FILM COATED ORAL at 20:43

## 2018-04-19 RX ADMIN — CEFAZOLIN SODIUM 2 G: 2 INJECTION, SOLUTION INTRAVENOUS at 03:45

## 2018-04-19 RX ADMIN — PANTOPRAZOLE SODIUM 40 MG: 40 TABLET, DELAYED RELEASE ORAL at 08:26

## 2018-04-19 RX ADMIN — DOCUSATE SODIUM 100 MG: 100 CAPSULE, LIQUID FILLED ORAL at 08:26

## 2018-04-19 RX ADMIN — DOCUSATE SODIUM 100 MG: 100 CAPSULE, LIQUID FILLED ORAL at 20:43

## 2018-04-19 RX ADMIN — HYDROMORPHONE HYDROCHLORIDE 0.5 MG: 1 INJECTION, SOLUTION INTRAMUSCULAR; INTRAVENOUS; SUBCUTANEOUS at 05:26

## 2018-04-19 RX ADMIN — LINAGLIPTIN 5 MG: 5 TABLET, FILM COATED ORAL at 08:26

## 2018-04-19 RX ADMIN — SPIRONOLACTONE AND HYDROCHLOROTHIAZIDE 1 TABLET: 25; 25 TABLET, FILM COATED ORAL at 08:26

## 2018-04-19 RX ADMIN — HYDROCODONE BITARTRATE AND ACETAMINOPHEN 2 TABLET: 7.5; 325 TABLET ORAL at 12:15

## 2018-04-19 RX ADMIN — MUPIROCIN: 20 OINTMENT TOPICAL at 08:27

## 2018-04-19 RX ADMIN — CARVEDILOL 12.5 MG: 12.5 TABLET, FILM COATED ORAL at 08:26

## 2018-04-19 RX ADMIN — HYDROCODONE BITARTRATE AND ACETAMINOPHEN 2 TABLET: 7.5; 325 TABLET ORAL at 08:26

## 2018-04-19 RX ADMIN — HYDROCODONE BITARTRATE AND ACETAMINOPHEN 2 TABLET: 7.5; 325 TABLET ORAL at 15:29

## 2018-04-19 RX ADMIN — GABAPENTIN 300 MG: 300 CAPSULE ORAL at 20:43

## 2018-04-19 RX ADMIN — POLYETHYLENE GLYCOL 3350 17 G: 17 POWDER, FOR SOLUTION ORAL at 08:27

## 2018-04-19 RX ADMIN — DULOXETINE HYDROCHLORIDE 60 MG: 60 CAPSULE, DELAYED RELEASE ORAL at 08:27

## 2018-04-19 RX ADMIN — GABAPENTIN 300 MG: 300 CAPSULE ORAL at 15:29

## 2018-04-19 NOTE — PLAN OF CARE
Problem: Patient Care Overview  Goal: Plan of Care Review   04/19/18 0942   OTHER   Outcome Summary Progressing well. Increased amulation distance, ROm and exercise tolerance.

## 2018-04-19 NOTE — PROGRESS NOTES
Discharge Planning Assessment  [facility]     Patient Name: Kristan Galicia  MRN: [id number]  Today's Date: 4/19/2018    Admit Date: 4/18/2018          Discharge Needs Assessment     Row Name 04/19/18 1143       Living Environment    Lives With alone    Current Living Arrangements home/apartment/condo       Discharge Needs Assessment    Readmission Within the Last 30 Days no previous admission in last 30 days    Concerns to be Addressed basic needs    Anticipated Changes Related to Illness inability to care for self    Discharge Facility/Level of Care Needs nursing facility, skilled            Discharge Plan     Row Name 04/19/18 1143       Plan    Plan SNF    Patient/Family in Agreement with Plan yes    Plan Comments Spoke with pt, verified facesheet and explained the role of CCP. Pt states she lives alone and has 6 steps entering the home. Pt would like to d/c to [facility] toño, referral given to Licha with [facility] who states they will review and check bed availability. Anticipated d/c Saturday 4/21/18. CCP to follow.        Destination     Service Request Status Selected Specialties Address Phone Number Fax Number    [facility] Pending - Request Sent N/A [address] [id number] [phone]      Durable Medical Equipment     No service coordination in this encounter.      Dialysis/Infusion     No service coordination in this encounter.      Home Medical Care     No service coordination in this encounter.      Social Care     No service coordination in this encounter.                Demographic Summary    No documentation.           Functional Status    No documentation.           Psychosocial    No documentation.           Abuse/Neglect    No documentation.           Legal    No documentation.           Substance Abuse    No documentation.           Patient Forms    No documentation.         Lalitha Mercedes RN

## 2018-04-19 NOTE — PLAN OF CARE
Problem: Patient Care Overview  Goal: Plan of Care Review   04/19/18 1409   OTHER   Outcome Summary INcreased ambulation distance and activity tolerance. Less assistance needed for exercises and standing.

## 2018-04-19 NOTE — PROGRESS NOTES
Orthopedic Progress Note      Patient: Kristan Galicia    YOB: 1950    Medical Record Number: [id number]    Attending Physician: Bravo Pascual MD    Date of Admission: 4/18/2018  7:15 AM    Admitting Dx:  Arthritis of right knee [M17.11]  Arthritis of right knee [M17.11]    Status Post: RIGHT TOTAL KNEE ARTHROPLASTY WITH STEVE NAVIGATION    Post Operative Day Number: 1    Current Problem List:   Patient Active Problem List   Diagnosis   • Shoulder pain, right   • Knee pain, right   • Fall down stairs   • PAF (paroxysmal atrial fibrillation)   • S/P rotator cuff repair   • Vitamin D deficiency   • Vertigo   • Vasovagal syncope   • Thyroid disease   • Obstructive sleep apnea, adult   • Palpitations   • Osteoporosis   • Ocular tumor   • Migraines   • Hyperlipidemia   • History of transfusion   • Heart murmur   • Heart attack   • GERD (gastroesophageal reflux disease)   • Depression   • CHF (congestive heart failure)   • Cardiomyopathy   • Asthma   • Anxiety   • Arthritis of right knee   • Right rotator cuff tear   • Hypothyroidism   • Dyspnea and respiratory abnormalities   • Orthopnea   • IHSS (idiopathic hypertrophic subaortic stenosis)   • AICD (automatic cardioverter/defibrillator) present   • Anticoagulant long-term use   • Hyperglycemia, drug-induced   • Chronic pain of right knee   • Diverticulitis   • CAD (coronary artery disease)   • Hypertrophic nonobstructive cardiomyopathy   • ICD (implantable cardioverter-defibrillator) discharge   • Menopause   • Non-obstructive hypertrophic cardiomyopathy   • Type 2 diabetes mellitus without complication, without long-term current use of insulin   • Insomnia         Past Medical History:   Diagnosis Date   • Anemia    • Arthritis    • Atrial fibrillation    • Cardiac defibrillator in place 01/2009    medtronic    • Cardiomyopathy     Hypertrophic Cardiomyopathy   • CHF (congestive heart failure)    • Chronic pain    • Coronary artery disease     • Diabetes mellitus     Type II   • Diverticulosis     hx diverticulitis   • GERD (gastroesophageal reflux disease)    • Heart murmur    • History of depression    • History of tumor     left ocular tumor, treated with steroids   • Hyperlipidemia    • Migraines    • On anticoagulant therapy    • Osteoporosis    • Palpitations    • Sleep apnea     does not use CPAP or BiPAP, used nose strips   • Thyroid disease    • Vasovagal syncope    • Vertigo        SUBJECTIVE: 67 y.o.  female. Awake and alert.  Moderate pain, but relieved with medication.     OBJECTIVE:   Vitals:    04/19/18 0359 04/19/18 0530 04/19/18 0717 04/19/18 1126   BP: 106/66  116/63 112/68   BP Location: Right arm  Right arm Right arm   Patient Position: Lying  Lying Lying   Pulse: 62  69 71   Resp: 16  16 18   Temp: 98.8 °F (37.1 °C)  97.8 °F (36.6 °C) 98.2 °F (36.8 °C)   TempSrc: Oral  Oral Oral   SpO2: 97%  96% 97%   Weight:  80.6 kg (177 lb 11.1 oz)     Height:         I/O last 3 completed shifts:  In: 1940 [P.O.:240; I.V.:1700]  Out: 550 [Urine:500; Blood:50]    Current Medications:  Scheduled Meds:  allopurinol 100 mg Oral Daily   amiodarone 100 mg Oral Nightly   atorvastatin 80 mg Oral Nightly   carvedilol 12.5 mg Oral BID With Meals   dabigatran etexilate 150 mg Oral Q12H   docusate sodium 100 mg Oral BID   DULoxetine 60 mg Oral Daily   gabapentin 300 mg Oral TID   levothyroxine 88 mcg Oral Daily   linagliptin 5 mg Oral Daily   montelukast 10 mg Oral Nightly   mupirocin  Each Nare BID   pantoprazole 40 mg Oral Daily   polyethylene glycol 17 g Oral Daily   spironolactone-hydrochlorothiazide 1 tablet Oral Daily     PRN Meds:.•  acetaminophen **OR** acetaminophen **OR** acetaminophen  •  acetaminophen  •  baclofen  •  bisacodyl  •  bisacodyl  •  diphenhydrAMINE **OR** diphenhydrAMINE  •  HYDROcodone-acetaminophen  •  HYDROcodone-acetaminophen  •  HYDROmorphone **AND** naloxone  •  lidocaine  •  magnesium hydroxide  •  promethazine **OR**  promethazine **OR** promethazine  •  promethazine  •  BH OR ANTIBIOTIC IRRIGATION BUILDER  •  traZODone    Diagnostic Tests:   Lab Results (last 24 hours)     Procedure Component Value Units Date/Time    POC Glucose Once [[id number]]  (Abnormal) Collected:  04/19/18 1052    Specimen:  Blood Updated:  04/19/18 1055     Glucose 252 (H) mg/dL     Narrative:       Meter: [id number] : [id number] Adrien Jaime CNA    POC Glucose Once [[id number]]  (Abnormal) Collected:  04/19/18 0536    Specimen:  Blood Updated:  04/19/18 0601     Glucose 171 (H) mg/dL     Narrative:       Meter: [id number] : [id number] Ferny DUTTA    Basic Metabolic Panel [[id number]]  (Abnormal) Collected:  04/19/18 0330    Specimen:  Blood Updated:  04/19/18 0440     Glucose 154 (H) mg/dL      BUN 26 (H) mg/dL      Creatinine 1.24 (H) mg/dL      Sodium 136 mmol/L      Potassium 3.6 mmol/L      Chloride 96 (L) mmol/L      CO2 25.7 mmol/L      Calcium 8.3 (L) mg/dL      eGFR Non African Amer 43 (L) mL/min/1.73      BUN/Creatinine Ratio 21.0     Anion Gap 14.3 mmol/L     Narrative:       GFR Normal >60  Chronic Kidney Disease <60  Kidney Failure <15    Hemoglobin & Hematocrit, Blood [[id number]]  (Abnormal) Collected:  04/19/18 0330    Specimen:  Blood Updated:  04/19/18 0426     Hemoglobin 10.6 (L) g/dL      Hematocrit 34.3 (L) %     POC Glucose Once [[id number]]  (Abnormal) Collected:  04/18/18 2105    Specimen:  Blood Updated:  04/18/18 2106     Glucose 147 (H) mg/dL     Narrative:       Meter: [id number] : [id number] Ana Laura DUTTA    POC Glucose Once [[id number]]  (Abnormal) Collected:  04/18/18 1625    Specimen:  Blood Updated:  04/18/18 1639     Glucose 181 (H) mg/dL     Narrative:       Meter: [id number] : [id number] Adrien Jaime CNA    Hemoglobin A1c [[id number]]  (Abnormal) Collected:  04/18/18 1508    Specimen:  Blood Updated:  04/18/18 1533     Hemoglobin A1C 8.49 (H) %     Narrative:       Hemoglobin A1C Ranges:    Increased  Risk for Diabetes  5.7% to 6.4%  Diabetes                     >= 6.5%  Diabetic Goal                < 7.0%          PHYSICAL EXAM:   Right knee dressing dry and intact.  Calf soft and nontender.  Good motion and sensation to right foot and ankle.  Encouraged use of IS.      ASSESSMENT & PLAN:   Patient lives alone and has now decided that she wants to go to rehab for a short stay.  Continue to work with PT.          Date: 4/19/2018    Kayleigh Henry RN

## 2018-04-19 NOTE — PROGRESS NOTES
[facility]     LOS: 1 day     Name: Kristan Galicia  Age: 67 y.o.  Sex: female  :  1950  MRN: [id number]         Primary Care Physician: CHRISTIN Mckee    Diet Regular    Subjective   no complaints. working with therapy.    Objective   Temp:  [97.3 °F (36.3 °C)-98.8 °F (37.1 °C)] 97.8 °F (36.6 °C)  Heart Rate:  [61-80] 69  Resp:  [14-18] 16  BP: ()/(47-66) 116/63  SpO2:  [94 %-99 %] 96 %  on  Flow (L/min):  [2-4] 2;   Device (Oxygen Therapy): room air  Body mass index is 33.57 kg/m².    Physical Exam   Constitutional: She is oriented to person, place, and time. No distress.   Cardiovascular: Normal rate and regular rhythm.    Pulmonary/Chest: Effort normal and breath sounds normal. No respiratory distress.   Abdominal: Soft. There is no tenderness.   Musculoskeletal: She exhibits no edema.   Neurological: She is alert and oriented to person, place, and time.   Skin: Skin is warm and dry.     Reviewed medications and new clinical results    allopurinol 100 mg Oral Daily   amiodarone 100 mg Oral Nightly   atorvastatin 80 mg Oral Nightly   carvedilol 12.5 mg Oral BID With Meals   dabigatran etexilate 150 mg Oral Q12H   docusate sodium 100 mg Oral BID   DULoxetine 60 mg Oral Daily   gabapentin 300 mg Oral TID   levothyroxine 88 mcg Oral Daily   linagliptin 5 mg Oral Daily   montelukast 10 mg Oral Nightly   mupirocin  Each Nare BID   pantoprazole 40 mg Oral Daily   polyethylene glycol 17 g Oral Daily   spironolactone-hydrochlorothiazide 1 tablet Oral Daily     lactated ringers 9 mL/hr    lactated ringers 100 mL/hr Last Rate: 100 mL/hr (18 1713)   lactated ringers 9 mL/hr Last Rate: 9 mL/hr (18 0808)   lactated ringers 100 mL/hr Last Rate: 100 mL/hr (18 1102)     Results from last 7 days  Lab Units 18  0330   HEMOGLOBIN g/dL 10.6*     Results from last 7 days  Lab Units 18  0330   SODIUM mmol/L 136    POTASSIUM mmol/L 3.6   CHLORIDE mmol/L 96*   CO2 mmol/L 25.7   BUN mg/dL 26*   CREATININE mg/dL 1.24*   CALCIUM mg/dL 8.3*   GLUCOSE mg/dL 154*     Lab Results   Component Value Date    ANIONGAP 14.3 04/19/2018     Glucose   Date/Time Value Ref Range Status   04/19/2018 0536 171 (H) 70 - 130 mg/dL Final   04/18/2018 2105 147 (H) 70 - 130 mg/dL Final   04/18/2018 1625 181 (H) 70 - 130 mg/dL Final   04/18/2018 0812 190 (H) 70 - 130 mg/dL Final     Hemoglobin A1C   Date Value Ref Range Status   04/18/2018 8.49 (H) 4.80 - 5.60 % Final     Estimated Creatinine Clearance: 42.9 mL/min (by C-G formula based on SCr of 1.24 mg/dL (H)).     1    04/18/18  0804 04/19/18  0530   Weight: 80.1 kg (176 lb 9.4 oz) 80.6 kg (177 lb 11.1 oz)     Intake/Output Summary (Last 24 hours) at 04/19/18 0851  Last data filed at 04/19/18 0013   Gross per 24 hour   Intake             1940 ml   Output              550 ml   Net             1390 ml     Assessment/Plan   Active Hospital Problems (** Indicates Principal Problem)    Diagnosis Date Noted   • **Arthritis of right knee [M17.11] 12/27/2016   • Type 2 diabetes mellitus without complication, without long-term current use of insulin [E11.9] 08/07/2017   • Non-obstructive hypertrophic cardiomyopathy [I42.2] 08/07/2017   • CAD (coronary artery disease) [I25.10] 08/07/2017   • Hypothyroidism [E03.9] 02/22/2017   • Obstructive sleep apnea, adult [G47.33]    • PAF (paroxysmal atrial fibrillation) [I48.0] 11/01/2016   • ICD (implantable cardioverter-defibrillator) discharge [Z45.02] 04/04/2016      Resolved Hospital Problems    Diagnosis Date Noted Date Resolved   No resolved problems to display.     · s/p right total knee replacement 4/18/18  · DVT prophylaxis: pradaxa  · DM 2 controlled here but a1c not ideal (8.49)  · monitor volume status  · I discussed the patient's findings and my recommendations with patient.    Chacho Forte MD   04/19/18  8:47 AM

## 2018-04-19 NOTE — DISCHARGE PLACEMENT REQUEST
"Kristan Galicia (67 y.o. Female)     Date of Birth Social Security Number Address Home Phone MRN    1950  [address]  [address] [place] [address] [phone] [id number]    [employer]ah[employer] Marital Status          None Single       Admission Date Admission Type Admitting Provider Attending Provider Department, Room/Bed    4/18/18 Elective Bravo Pascual MD Makk, Bravo MCGEE MD [address], P884/1    Discharge Date Discharge Disposition Discharge Destination                       Attending Provider:  Bravo Pascual MD    Allergies:  Adhesive Tape, Aspirin, Biaxin [Clarithromycin], Codeine, Darvon [Propoxyphene], Levaquin [Levofloxacin], Tequin [Gatifloxacin]    Isolation:  None   Infection:  None   Code Status:  FULL    Ht:  154.9 cm (61\")   Wt:  80.6 kg (177 lb 11.1 oz)    Admission Cmt:  None   Principal Problem:  Arthritis of right knee [M17.11]                 Active Insurance as of 4/18/2018     Primary Coverage     Payor Plan Insurance Group Employer/Plan Group    MEDICARE MEDICARE A & B      Payor Plan Address Payor Plan Phone Number Effective From Effective To    [address] [phone] 5/1/2015     [place] [address]       Subscriber Name Subscriber Birth Date Member ID       KRISTAN GALICIA 1950 [id number]           Secondary Coverage     Payor Plan Insurance Group Employer/Plan Group    [facility] SUPP [id number]     Payor Plan Address Payor Plan Phone Number Effective From Effective To    [address]  1/1/2018     [place] [address]       Subscriber Name Subscriber Birth Date Member ID       KRISTAN GALICIA 1950 [id number]                 Emergency Contacts      (Rel.) Home Phone Work Phone Mobile Phone    Steffanie Xavier (Daughter) [phone] -- [phone]          "

## 2018-04-19 NOTE — PLAN OF CARE
Problem: Patient Care Overview  Goal: Plan of Care Review  Outcome: Ongoing (interventions implemented as appropriate)   04/19/18 0310   Coping/Psychosocial   Plan of Care Reviewed With patient   OTHER   Outcome Summary pain controlled during shift with PO and IV pain meds; ambulates with assist; voiding; vss; educated pt about importance of clucose monitoring r/t hx of DM; plan to dc when ready   Plan of Care Review   Progress improving       Problem: Fall Risk (Adult)  Goal: Absence of Fall  Outcome: Ongoing (interventions implemented as appropriate)      Problem: Knee Arthroplasty (Total, Partial) (Adult)  Goal: Signs and Symptoms of Listed Potential Problems Will be Absent, Minimized or Managed (Knee Arthroplasty)  Outcome: Ongoing (interventions implemented as appropriate)    Goal: Anesthesia/Sedation Recovery  Outcome: Ongoing (interventions implemented as appropriate)

## 2018-04-19 NOTE — PLAN OF CARE
Problem: Patient Care Overview  Goal: Plan of Care Review   04/19/18 6718   Coping/Psychosocial   Plan of Care Reviewed With patient   OTHER   Outcome Summary Doing well POD1. pain controlled withpo pain med. Ambulated in hallway and room with assist of 1 and walker. Voiding. Tolerat ing dietl Instructed on need to check accu checks with diabetes on oral agent. Planning rehab on Sat    Plan of Care Review   Progress improving       Problem: Fall Risk (Adult)  Goal: Absence of Fall  Outcome: Ongoing (interventions implemented as appropriate)      Problem: Knee Arthroplasty (Total, Partial) (Adult)  Goal: Signs and Symptoms of Listed Potential Problems Will be Absent, Minimized or Managed (Knee Arthroplasty)  Outcome: Ongoing (interventions implemented as appropriate)

## 2018-04-19 NOTE — THERAPY TREATMENT NOTE
Acute Care - Physical Therapy Treatment Note  [facility]     Patient Name: Kristan Galicia  : 1950  MRN: [id number]  Today's Date: 2018  Onset of Illness/Injury or Date of Surgery: 18     Referring Physician: Ankur    Admit Date: 2018    Visit Dx:    ICD-10-CM ICD-9-CM   1. Decreased mobility R26.89 781.99   2. Arthritis of right knee M17.11 716.96     Patient Active Problem List   Diagnosis   • Shoulder pain, right   • Knee pain, right   • Fall down stairs   • PAF (paroxysmal atrial fibrillation)   • S/P rotator cuff repair   • Vitamin D deficiency   • Vertigo   • Vasovagal syncope   • Thyroid disease   • Obstructive sleep apnea, adult   • Palpitations   • Osteoporosis   • Ocular tumor   • Migraines   • Hyperlipidemia   • History of transfusion   • Heart murmur   • Heart attack   • GERD (gastroesophageal reflux disease)   • Depression   • CHF (congestive heart failure)   • Cardiomyopathy   • Asthma   • Anxiety   • Arthritis of right knee   • Right rotator cuff tear   • Hypothyroidism   • Dyspnea and respiratory abnormalities   • Orthopnea   • IHSS (idiopathic hypertrophic subaortic stenosis)   • AICD (automatic cardioverter/defibrillator) present   • Anticoagulant long-term use   • Hyperglycemia, drug-induced   • Chronic pain of right knee   • Diverticulitis   • CAD (coronary artery disease)   • Hypertrophic nonobstructive cardiomyopathy   • ICD (implantable cardioverter-defibrillator) discharge   • Menopause   • Non-obstructive hypertrophic cardiomyopathy   • Type 2 diabetes mellitus without complication, without long-term current use of insulin   • Insomnia       Therapy Treatment          Rehabilitation Treatment Summary     Row Name 18 1400 18 0940          Treatment Time/Intention    Discipline physical therapist  -LEIGHTON physical therapist  -LEIGHTON     Document Type therapy note (daily note)  -LEIGHTON therapy note (daily note)  -LEIGHTON     Subjective Information complains of;pain  -LEIGHTON  complains of;pain  -KH     Therapy Frequency (PT Clinical Impression) 2 times/day  -KH  --     Patient Effort good  -KH good  -KH     Existing Precautions/Restrictions fall  -KH  --     Recorded by [KH] Radha Martinez, PT 04/19/18 1407 04/19/18 1407 [KH] Radha Martinez, PT 04/19/18 0942 04/19/18 0942     Row Name 04/19/18 1400 04/19/18 0940          Bed Mobility Assessment/Treatment    Comment (Bed Mobility) up in chair  -KH up in chair  -KH     Recorded by [KH] Radha Martinez, PT 04/19/18 1407 04/19/18 1407 [KH] Radha Martinez, PT 04/19/18 0942 04/19/18 0942     Row Name 04/19/18 1400 04/19/18 0940          Transfer Assessment/Treatment    Sit-Stand [place] (Transfers) contact guard  -KH contact guard  -KH     Stand-Sit [place] (Transfers) contact guard  -KH contact guard  -KH     Recorded by [KH] Radha Martinez, PT 04/19/18 1407 04/19/18 1407 [KH] Radha Martinez, PT 04/19/18 0942 04/19/18 0942     Row Name 04/19/18 1400 04/19/18 0940          Sit-Stand Transfer    Assistive Device (Sit-Stand Transfers) walker, front-wheeled  -KH walker, front-wheeled  -KH     Recorded by [KH] Radha Martinez, PT 04/19/18 1407 04/19/18 1407 [KH] Radha Martinez, PT 04/19/18 0942 04/19/18 0942     Row Name 04/19/18 1400 04/19/18 0940          Stand-Sit Transfer    Assistive Device (Stand-Sit Transfers) walker, front-wheeled  -KH walker, front-wheeled  -KH     Recorded by [KH] Radha Martinez, PT 04/19/18 1407 04/19/18 1407 [KH] Radha Martinez, PT 04/19/18 0942 04/19/18 0942     Row Name 04/19/18 1400 04/19/18 0940          Gait/Stairs Assessment/Training    [place] Level (Gait) contact guard;stand by assist  -KH contact guard  -KH     Assistive Device (Gait) walker, front-wheeled  -KH walker, front-wheeled  -KH     Distance in Feet (Gait) 85  -KH 60  -KH     Pattern (Gait) step-through  -KH step-to  -KH     Deviations/Abnormal Patterns (Gait)  antalgic;esmer decreased;stride length decreased  -KH antalgic;esmer decreased  -KH     [place] Level (Stairs) contact guard  -KH  --     Handrail Location (Stairs) both sides  -KH  --     Number of Steps (Stairs) 4  -KH  --     Ascending Technique (Stairs) step-to-step  -KH  --     Descending Technique (Stairs) step-to-step  -KH  --     Recorded by [KH] Radha Martinez, PT 04/19/18 1407 04/19/18 1407 [KH] Radha Martinez, PT 04/19/18 0942 04/19/18 0942     Row Name 04/19/18 0940             General ROM    RT Lower Ext Rt Knee Extension/Flexion  -KH      Recorded by [KH] Radha Martinez, PT 04/19/18 0942 04/19/18 0942      Row Name 04/19/18 0940             Right Lower Ext    Rt Knee Extension/Flexion AROM 15-85  -KH      Recorded by [KH] Radha Martinez, PT 04/19/18 0942 04/19/18 0942      Row Name 04/19/18 1400 04/19/18 0940          Therapeutic Exercise    Comment (Therapeutic Exercise) R TKA protocol x 20 reps  -KH R TKA x 15 reps  -KH     Recorded by [KH] Radha Martinez, PT 04/19/18 1407 04/19/18 1407 [KH] Radha Martinez, PT 04/19/18 0942 04/19/18 0942     Row Name 04/19/18 1400 04/19/18 0940          Positioning and Restraints    Pre-Treatment Position sitting in chair/recliner  -KH in bed  -KH     Post Treatment Position chair  -KH chair  -KH     In Chair reclined;call light within reach;encouraged to call for assist  -KH reclined;call light within reach;encouraged to call for assist;exit alarm on;notified nsg  -KH     Recorded by [KH] Radha Martinez, PT 04/19/18 1407 04/19/18 1407 [KH] Radha Martinez, PT 04/19/18 0942 04/19/18 0942     Row Name 04/19/18 1400 04/19/18 0940          Pain Scale: Numbers Pre/Post-Treatment    Pain Scale: Numbers, Pretreatment 5/10  -KH 8/10  -KH     Pain Location - Side Right  -KH Right  -KH     Pain Location knee  -KH knee  -KH     Pain Intervention(s) Repositioned;Ambulation/increased activity  -  Repositioned;Ambulation/increased activity  -     Recorded by [KH] Radha Martinez, PT 04/19/18 1407 04/19/18 1407 [KH] Radha Martinez, PT 04/19/18 0942 04/19/18 0942     Row Name                Wound 04/18/18 0953 Right knee incision    Wound - Properties Group Date first assessed: 04/18/18 [MB] Time first assessed: 0953 [MB] Side: Right [MB] Location: knee [MB] Type: incision [MB] Recorded by:  [MB] Aminata Allan RN 04/18/18 0953 04/18/18 0953      User Key  (r) = Recorded By, (t) = Taken By, (c) = Cosigned By    Initials Name Effective Dates Discipline     Radha Martinez, PT 05/18/15 -  PT    ARMAAN Allan RN 06/16/16 -  Nurse          Wound 04/18/18 0953 Right knee incision (Active)   Dressing Appearance dry;intact;no drainage 4/19/2018 12:03 PM   Closure MIN 4/18/2018  4:15 PM   Drainage Amount none 4/18/2018  4:15 PM             Physical Therapy Education     Title: PT OT SLP Therapies (Done)     Topic: Physical Therapy (Done)     Point: Mobility training (Done)    Learning Progress Summary     Learner Status Readiness Method Response Comment Documented by    Patient Done Acceptance E,D VU,NR   04/19/18 1407     Done Acceptance E,D VU,NR   04/19/18 0942     Done Acceptance E,TB,D VU,NR  EE 04/18/18 1417          Point: Home exercise program (Done)    Learning Progress Summary     Learner Status Readiness Method Response Comment Documented by    Patient Done Acceptance E,D VU,NR   04/19/18 1407     Done Acceptance E,D VU,NR   04/19/18 0942     Done Acceptance E,TB,D VU,NR  EE 04/18/18 1417          Point: Body mechanics (Done)    Learning Progress Summary     Learner Status Readiness Method Response Comment Documented by    Patient Done Acceptance E,D VU,NR   04/19/18 1407     Done Acceptance E,D VU,NR   04/19/18 0942     Done Acceptance E,TB,D VU,NR  EE 04/18/18 1417          Point: Precautions (Done)    Learning Progress Summary     Learner Status Readiness Method  Response Comment Documented by    Patient Done Acceptance MADDIE WRIGHT,NR   04/19/18 1407     Done Acceptance MADDIE WRIGHT,NR   04/19/18 0942     Done Acceptance E,TBMADDIE,NR   04/18/18 1417                      User Key     Initials Effective Dates Name Provider Type [facility] 05/18/15 -  Radha Martinez, PT Physical Therapist PT    EE 04/03/18 -  Rossy Garcia, PT Physical Therapist PT                    PT Recommendation and Plan  Therapy Frequency (PT Clinical Impression): 2 times/day  Outcome Summary: INcreased ambulation distance and activity tolerance. Less assistance needed for exercises and standing.          Outcome Measures     Row Name 04/19/18 1400 04/19/18 0943 04/18/18 1400       How much help from another person do you currently need...    Turning from your back to your side while in flat bed without using bedrails? 4  -KH 4  -KH 4  -EE    Moving from lying on back to sitting on the side of a flat bed without bedrails? 4  -KH 4  -KH 4  -EE    Moving to and from a bed to a chair (including a wheelchair)? 3  -KH 3  -KH 3  -EE    Standing up from a chair using your arms (e.g., wheelchair, bedside chair)? 3  -KH 3  -KH 3  -EE    Climbing 3-5 steps with a railing? 3  -KH 3  -KH 2  -EE    To walk in hospital room? 3  -KH 3  -KH 3  -EE    AM-PAC 6 Clicks Score 20  -KH 20  -KH 19  -EE       Functional Assessment    Outcome Measure Options AM-PAC 6 Clicks Basic Mobility (PT)  -KH AM-PAC 6 Clicks Basic Mobility (PT)  -KH AM-PAC 6 Clicks Basic Mobility (PT)  -EE      User Key  (r) = Recorded By, (t) = Taken By, (c) = Cosigned By    Initials Name Provider Type    LEIGHTON Martinez, PT Physical Therapist    EE Rossy Garcia, PT Physical Therapist           Time Calculation:         PT Charges     Row Name 04/19/18 1405 04/19/18 0943          Time Calculation    Start Time 1300  -KH 0830  -KH     Stop Time 1336  -KH 0915  -KH     Time Calculation (min) 36 min  -KH 45 min  -KH     PT Received On  04/19/18  -LEIGHTON 04/19/18  -LEIGHTON     PT - Next Appointment 04/20/18  -LEIGHTON 04/19/18  -LEIGHTON       User Key  (r) = Recorded By, (t) = Taken By, (c) = Cosigned By    Initials Name Provider Type    LEIGHTON Martinez, PT Physical Therapist          Therapy Charges for Today     Code Description Service Date Service Provider Modifiers Qty    56588256285 HC PT THER PROC EA 15 MIN 4/19/2018 Radha Martinez, PT GP 1    87812331181 HC PT THER PROC GROUP 4/19/2018 Radha Martinez, PT GP 1    23968026064 HC PT THER PROC EA 15 MIN 4/19/2018 Radha Martinez, PT GP 1    79624124929 HC PT THER PROC GROUP 4/19/2018 Radha Martinez, PT GP 1          PT G-Codes  Outcome Measure Options: AM-PAC 6 Clicks Basic Mobility (PT)    Radha Martinez, PT  4/19/2018

## 2018-04-19 NOTE — THERAPY TREATMENT NOTE
Acute Care - Physical Therapy Treatment Note  [facility]     Patient Name: Kristan Galicia  : 1950  MRN: [id number]  Today's Date: 2018  Onset of Illness/Injury or Date of Surgery: 18     Referring Physician: Ankur    Admit Date: 2018    Visit Dx:    ICD-10-CM ICD-9-CM   1. Decreased mobility R26.89 781.99   2. Arthritis of right knee M17.11 716.96     Patient Active Problem List   Diagnosis   • Shoulder pain, right   • Knee pain, right   • Fall down stairs   • PAF (paroxysmal atrial fibrillation)   • S/P rotator cuff repair   • Vitamin D deficiency   • Vertigo   • Vasovagal syncope   • Thyroid disease   • Obstructive sleep apnea, adult   • Palpitations   • Osteoporosis   • Ocular tumor   • Migraines   • Hyperlipidemia   • History of transfusion   • Heart murmur   • Heart attack   • GERD (gastroesophageal reflux disease)   • Depression   • CHF (congestive heart failure)   • Cardiomyopathy   • Asthma   • Anxiety   • Arthritis of right knee   • Right rotator cuff tear   • Hypothyroidism   • Dyspnea and respiratory abnormalities   • Orthopnea   • IHSS (idiopathic hypertrophic subaortic stenosis)   • AICD (automatic cardioverter/defibrillator) present   • Anticoagulant long-term use   • Hyperglycemia, drug-induced   • Chronic pain of right knee   • Diverticulitis   • CAD (coronary artery disease)   • Hypertrophic nonobstructive cardiomyopathy   • ICD (implantable cardioverter-defibrillator) discharge   • Menopause   • Non-obstructive hypertrophic cardiomyopathy   • Type 2 diabetes mellitus without complication, without long-term current use of insulin   • Insomnia       Therapy Treatment          Rehabilitation Treatment Summary     Row Name 18 0940             Treatment Time/Intention    Discipline physical therapist  -      Document Type therapy note (daily note)  -LEIGHTON      Subjective Information complains of;pain  -KH      Patient Effort good  -LEIGHTON      Recorded by [LEIGHTON] Radha Perez  Juan, PT 04/19/18 0942 04/19/18 0942      Row Name 04/19/18 0940             Bed Mobility Assessment/Treatment    Comment (Bed Mobility) up in chair  -KH      Recorded by [KH] Radha Martinez, PT 04/19/18 0942 04/19/18 0942      Row Name 04/19/18 0940             Transfer Assessment/Treatment    Sit-Stand [place] (Transfers) contact guard  -KH      Stand-Sit [place] (Transfers) contact guard  -KH      Recorded by [KH] Radha Martinez, PT 04/19/18 0942 04/19/18 0942      Row Name 04/19/18 0940             Sit-Stand Transfer    Assistive Device (Sit-Stand Transfers) walker, front-wheeled  -KH      Recorded by [KH] Radha Martinez, PT 04/19/18 0942 04/19/18 0942      Row Name 04/19/18 0940             Stand-Sit Transfer    Assistive Device (Stand-Sit Transfers) walker, front-wheeled  -KH      Recorded by [KH] Radha Martinez, PT 04/19/18 0942 04/19/18 0942      Row Name 04/19/18 0940             Gait/Stairs Assessment/Training    [place] Level (Gait) contact guard  -KH      Assistive Device (Gait) walker, front-wheeled  -KH      Distance in Feet (Gait) 60  -KH      Pattern (Gait) step-to  -KH      Deviations/Abnormal Patterns (Gait) antalgic;esmer decreased  -KH      Recorded by [KH] Radha Martinez, PT 04/19/18 0942 04/19/18 0942      Row Name 04/19/18 0940             General ROM    RT Lower Ext Rt Knee Extension/Flexion  -KH      Recorded by [KH] Radha Martinez, PT 04/19/18 0942 04/19/18 0942      Row Name 04/19/18 0940             Right Lower Ext    Rt Knee Extension/Flexion AROM 15-85  -KH      Recorded by [KH] Radha Martinez, PT 04/19/18 0942 04/19/18 0942      Row Name 04/19/18 0940             Therapeutic Exercise    Comment (Therapeutic Exercise) R TKA x 15 reps  -KH      Recorded by [KH] Radha Martinez, PT 04/19/18 0942 04/19/18 0942      Row Name 04/19/18 0940             Positioning and Restraints    Pre-Treatment Position in  bed  -KH      Post Treatment Position chair  -KH      In Chair reclined;call light within reach;encouraged to call for assist;exit alarm on;notified nsg  -KH      Recorded by [] Radha Martinez, PT 04/19/18 0942 04/19/18 0942      Row Name 04/19/18 0940             Pain Scale: Numbers Pre/Post-Treatment    Pain Scale: Numbers, Pretreatment 8/10  -KH      Pain Location - Side Right  -KH      Pain Location knee  -KH      Pain Intervention(s) Repositioned;Ambulation/increased activity  -KH      Recorded by [KH] Radha Martinez, PT 04/19/18 0942 04/19/18 0942      Row Name                Wound 04/18/18 0953 Right knee incision    Wound - Properties Group Date first assessed: 04/18/18 [MB] Time first assessed: 0953 [MB] Side: Right [MB] Location: knee [MB] Type: incision [MB] Recorded by:  [MB] Aminata Allan RN 04/18/18 0953 04/18/18 0953      User Key  (r) = Recorded By, (t) = Taken By, (c) = Cosigned By    Initials Name Effective Dates Discipline     Radha Martinez, PT 05/18/15 -  PT    ARMAAN Allan RN 06/16/16 -  Nurse          Wound 04/18/18 0953 Right knee incision (Active)   Dressing Appearance dry;intact;no drainage 4/19/2018  8:25 AM   Closure MIN 4/18/2018  4:15 PM   Drainage Amount none 4/18/2018  4:15 PM   Dressing Care, Wound other (see comments);gauze 4/18/2018 12:40 PM             Physical Therapy Education     Title: PT OT SLP Therapies (Done)     Topic: Physical Therapy (Done)     Point: Mobility training (Done)    Learning Progress Summary     Learner Status Readiness Method Response Comment Documented by    Patient Done Acceptance MADDIE WRIGHT NR KH 04/19/18 0942     Done Acceptance NAEEM WRIGHT D VU,NR  EE 04/18/18 1417          Point: Home exercise program (Done)    Learning Progress Summary     Learner Status Readiness Method Response Comment Documented by    Patient Done Acceptance MADDIE WRIGHT,DACIA   04/19/18 0942     Done Acceptance NAEEM WRIGHT D VU,NR  EE 04/18/18 1417          Point:  Body mechanics (Done)    Learning Progress Summary     Learner Status Readiness Method Response Comment Documented by    Patient Done Acceptance E,D VU,NR   04/19/18 0942     Done Acceptance E,TB,D VU,NR   04/18/18 1417          Point: Precautions (Done)    Learning Progress Summary     Learner Status Readiness Method Response Comment Documented by    Patient Done Acceptance E,D VU,NR   04/19/18 0942     Done Acceptance E,TB,D VU,NR   04/18/18 1417                      User Key     Initials Effective Dates Name Provider Type [facility] 05/18/15 -  Radha Martinez, PT Physical Therapist PT     04/03/18 -  Rossy Garcia, PT Physical Therapist PT                    PT Recommendation and Plan     Outcome Summary: Progressing well. Increased amulation distance, ROm and exercise tolerance.           Outcome Measures     Row Name 04/19/18 0943 04/18/18 1400          How much help from another person do you currently need...    Turning from your back to your side while in flat bed without using bedrails? 4  -KH 4  -EE     Moving from lying on back to sitting on the side of a flat bed without bedrails? 4  -KH 4  -EE     Moving to and from a bed to a chair (including a wheelchair)? 3  -KH 3  -EE     Standing up from a chair using your arms (e.g., wheelchair, bedside chair)? 3  -KH 3  -EE     Climbing 3-5 steps with a railing? 3  -KH 2  -EE     To walk in hospital room? 3  -KH 3  -EE     AM-PAC 6 Clicks Score 20  - 19  -EE        Functional Assessment    Outcome Measure Options AM-PAC 6 Clicks Basic Mobility (PT)  -KH AM-PAC 6 Clicks Basic Mobility (PT)  -EE       User Key  (r) = Recorded By, (t) = Taken By, (c) = Cosigned By    Initials Name Provider Type     Radha Martinez, PT Physical Therapist    EE Rossy Garcia, PT Physical Therapist           Time Calculation:         PT Charges     Row Name 04/19/18 0943             Time Calculation    Start Time 0830  -      Stop Time 0915  -       Time Calculation (min) 45 min  -LEIGHTON      PT Received On 04/19/18  -LEIGHTON      PT - Next Appointment 04/19/18  -LEIGHTON        User Key  (r) = Recorded By, (t) = Taken By, (c) = Cosigned By    Initials Name Provider Type    LEIGHTON Martinez, PT Physical Therapist          Therapy Charges for Today     Code Description Service Date Service Provider Modifiers Qty    94039232422 HC PT THER PROC EA 15 MIN 4/19/2018 Radha Martinez, PT GP 1    30289077422 HC PT THER PROC GROUP 4/19/2018 Radha Martinez, PT GP 1          PT G-Codes  Outcome Measure Options: AM-PAC 6 Clicks Basic Mobility (PT)    Radha Martinez, PT  4/19/2018

## 2018-04-20 LAB
ANION GAP SERPL CALCULATED.3IONS-SCNC: 12.4 MMOL/L
BUN BLD-MCNC: 20 MG/DL (ref 8–23)
BUN/CREAT SERPL: 17.9 (ref 7–25)
CALCIUM SPEC-SCNC: 7.8 MG/DL (ref 8.6–10.5)
CHLORIDE SERPL-SCNC: 97 MMOL/L (ref 98–107)
CO2 SERPL-SCNC: 25.6 MMOL/L (ref 22–29)
CREAT BLD-MCNC: 1.12 MG/DL (ref 0.57–1)
GFR SERPL CREATININE-BSD FRML MDRD: 49 ML/MIN/1.73
GLUCOSE BLD-MCNC: 262 MG/DL (ref 65–99)
GLUCOSE BLDC GLUCOMTR-MCNC: 208 MG/DL (ref 70–130)
GLUCOSE BLDC GLUCOMTR-MCNC: 231 MG/DL (ref 70–130)
GLUCOSE BLDC GLUCOMTR-MCNC: 236 MG/DL (ref 70–130)
GLUCOSE BLDC GLUCOMTR-MCNC: 236 MG/DL (ref 70–130)
HCT VFR BLD AUTO: 33.7 % (ref 35.6–45.5)
HGB BLD-MCNC: 10.4 G/DL (ref 11.9–15.5)
POTASSIUM BLD-SCNC: 3.9 MMOL/L (ref 3.5–5.2)
SODIUM BLD-SCNC: 135 MMOL/L (ref 136–145)

## 2018-04-20 PROCEDURE — 80048 BASIC METABOLIC PNL TOTAL CA: CPT | Performed by: HOSPITALIST

## 2018-04-20 PROCEDURE — 97150 GROUP THERAPEUTIC PROCEDURES: CPT | Performed by: PHYSICAL THERAPIST

## 2018-04-20 PROCEDURE — 85014 HEMATOCRIT: CPT | Performed by: ORTHOPAEDIC SURGERY

## 2018-04-20 PROCEDURE — 97110 THERAPEUTIC EXERCISES: CPT | Performed by: PHYSICAL THERAPIST

## 2018-04-20 PROCEDURE — 25010000002 HYDROMORPHONE PER 4 MG: Performed by: ORTHOPAEDIC SURGERY

## 2018-04-20 PROCEDURE — 63710000001 INSULIN ASPART PER 5 UNITS: Performed by: HOSPITALIST

## 2018-04-20 PROCEDURE — 85018 HEMOGLOBIN: CPT | Performed by: ORTHOPAEDIC SURGERY

## 2018-04-20 PROCEDURE — 82962 GLUCOSE BLOOD TEST: CPT

## 2018-04-20 PROCEDURE — 63710000001 PROMETHAZINE PER 12.5 MG: Performed by: ORTHOPAEDIC SURGERY

## 2018-04-20 PROCEDURE — 99024 POSTOP FOLLOW-UP VISIT: CPT | Performed by: ORTHOPAEDIC SURGERY

## 2018-04-20 RX ORDER — DEXTROSE MONOHYDRATE 25 G/50ML
25 INJECTION, SOLUTION INTRAVENOUS
Status: DISCONTINUED | OUTPATIENT
Start: 2018-04-20 | End: 2018-04-21 | Stop reason: HOSPADM

## 2018-04-20 RX ORDER — NICOTINE POLACRILEX 4 MG
15 LOZENGE BUCCAL
Status: DISCONTINUED | OUTPATIENT
Start: 2018-04-20 | End: 2018-04-21 | Stop reason: HOSPADM

## 2018-04-20 RX ADMIN — DOCUSATE SODIUM 100 MG: 100 CAPSULE, LIQUID FILLED ORAL at 20:13

## 2018-04-20 RX ADMIN — HYDROMORPHONE HYDROCHLORIDE 0.5 MG: 1 INJECTION, SOLUTION INTRAMUSCULAR; INTRAVENOUS; SUBCUTANEOUS at 23:05

## 2018-04-20 RX ADMIN — CARVEDILOL 12.5 MG: 12.5 TABLET, FILM COATED ORAL at 08:22

## 2018-04-20 RX ADMIN — DABIGATRAN ETEXILATE MESYLATE 150 MG: 150 CAPSULE ORAL at 08:22

## 2018-04-20 RX ADMIN — CARVEDILOL 12.5 MG: 12.5 TABLET, FILM COATED ORAL at 18:26

## 2018-04-20 RX ADMIN — LINAGLIPTIN 5 MG: 5 TABLET, FILM COATED ORAL at 08:23

## 2018-04-20 RX ADMIN — HYDROCODONE BITARTRATE AND ACETAMINOPHEN 2 TABLET: 7.5; 325 TABLET ORAL at 06:26

## 2018-04-20 RX ADMIN — DABIGATRAN ETEXILATE MESYLATE 150 MG: 150 CAPSULE ORAL at 20:13

## 2018-04-20 RX ADMIN — GABAPENTIN 300 MG: 300 CAPSULE ORAL at 08:30

## 2018-04-20 RX ADMIN — DOCUSATE SODIUM 100 MG: 100 CAPSULE, LIQUID FILLED ORAL at 08:23

## 2018-04-20 RX ADMIN — HYDROCODONE BITARTRATE AND ACETAMINOPHEN 2 TABLET: 7.5; 325 TABLET ORAL at 20:13

## 2018-04-20 RX ADMIN — AMIODARONE HYDROCHLORIDE 100 MG: 200 TABLET ORAL at 20:13

## 2018-04-20 RX ADMIN — INSULIN ASPART 3 UNITS: 100 INJECTION, SOLUTION INTRAVENOUS; SUBCUTANEOUS at 21:44

## 2018-04-20 RX ADMIN — HYDROCODONE BITARTRATE AND ACETAMINOPHEN 2 TABLET: 7.5; 325 TABLET ORAL at 02:12

## 2018-04-20 RX ADMIN — ALLOPURINOL 100 MG: 100 TABLET ORAL at 08:22

## 2018-04-20 RX ADMIN — DULOXETINE HYDROCHLORIDE 60 MG: 60 CAPSULE, DELAYED RELEASE ORAL at 08:23

## 2018-04-20 RX ADMIN — GABAPENTIN 300 MG: 300 CAPSULE ORAL at 16:56

## 2018-04-20 RX ADMIN — PROMETHAZINE HYDROCHLORIDE 12.5 MG: 12.5 TABLET ORAL at 22:20

## 2018-04-20 RX ADMIN — ATORVASTATIN CALCIUM 80 MG: 80 TABLET, FILM COATED ORAL at 20:13

## 2018-04-20 RX ADMIN — SPIRONOLACTONE AND HYDROCHLOROTHIAZIDE 1 TABLET: 25; 25 TABLET, FILM COATED ORAL at 08:22

## 2018-04-20 RX ADMIN — MONTELUKAST 10 MG: 10 TABLET, FILM COATED ORAL at 20:13

## 2018-04-20 RX ADMIN — GABAPENTIN 300 MG: 300 CAPSULE ORAL at 21:43

## 2018-04-20 RX ADMIN — LEVOTHYROXINE SODIUM 88 MCG: 88 TABLET ORAL at 08:22

## 2018-04-20 RX ADMIN — PANTOPRAZOLE SODIUM 40 MG: 40 TABLET, DELAYED RELEASE ORAL at 08:23

## 2018-04-20 RX ADMIN — INSULIN ASPART 2 UNITS: 100 INJECTION, SOLUTION INTRAVENOUS; SUBCUTANEOUS at 16:55

## 2018-04-20 RX ADMIN — HYDROCODONE BITARTRATE AND ACETAMINOPHEN 2 TABLET: 7.5; 325 TABLET ORAL at 15:15

## 2018-04-20 RX ADMIN — HYDROCODONE BITARTRATE AND ACETAMINOPHEN 2 TABLET: 7.5; 325 TABLET ORAL at 10:56

## 2018-04-20 NOTE — THERAPY TREATMENT NOTE
Acute Care - Physical Therapy Treatment Note  [facility]     Patient Name: Kristan Galicia  : 1950  MRN: [id number]  Today's Date: 2018  Onset of Illness/Injury or Date of Surgery: 18     Referring Physician: Ankur    Admit Date: 2018    Visit Dx:    ICD-10-CM ICD-9-CM   1. Decreased mobility R26.89 781.99   2. Arthritis of right knee M17.11 716.96     Patient Active Problem List   Diagnosis   • Shoulder pain, right   • Knee pain, right   • Fall down stairs   • PAF (paroxysmal atrial fibrillation)   • S/P rotator cuff repair   • Vitamin D deficiency   • Vertigo   • Vasovagal syncope   • Thyroid disease   • Obstructive sleep apnea, adult   • Palpitations   • Osteoporosis   • Ocular tumor   • Migraines   • Hyperlipidemia   • History of transfusion   • Heart murmur   • Heart attack   • GERD (gastroesophageal reflux disease)   • Depression   • CHF (congestive heart failure)   • Cardiomyopathy   • Asthma   • Anxiety   • Arthritis of right knee   • Right rotator cuff tear   • Hypothyroidism   • Dyspnea and respiratory abnormalities   • Orthopnea   • IHSS (idiopathic hypertrophic subaortic stenosis)   • AICD (automatic cardioverter/defibrillator) present   • Anticoagulant long-term use   • Hyperglycemia, drug-induced   • Chronic pain of right knee   • Diverticulitis   • CAD (coronary artery disease)   • Hypertrophic nonobstructive cardiomyopathy   • ICD (implantable cardioverter-defibrillator) discharge   • Menopause   • Non-obstructive hypertrophic cardiomyopathy   • Type 2 diabetes mellitus without complication, without long-term current use of insulin   • Insomnia       Therapy Treatment          Rehabilitation Treatment Summary     Row Name 18 1441 18 0945          Treatment Time/Intention    Discipline physical therapist  -LEIGHTON physical therapist  -LEIGHTON     Document Type therapy note (daily note)  -LEIGHTON therapy note (daily note)  -LEIGHTON     Subjective Information complains of;pain  -LEIGHTON  complains of;pain  -KH     Patient Effort good  -KH good  -KH     Existing Precautions/Restrictions  -- fall  -KH     Recorded by [KH] Radha Martinez, PT 04/20/18 1443 04/20/18 1443 [KH] Radha Martinez, PT 04/20/18 1157 04/20/18 1157     Row Name 04/20/18 1441 04/20/18 0945          Bed Mobility Assessment/Treatment    Comment (Bed Mobility) up in chair  -KH up in chair  -KH     Recorded by [KH] Radha Martinez, PT 04/20/18 1443 04/20/18 1443 [KH] Radha Martinez, PT 04/20/18 1157 04/20/18 1157     Row Name 04/20/18 1441 04/20/18 0945          Transfer Assessment/Treatment    Sit-Stand [place] (Transfers) stand by assist  - stand by assist  -     Stand-Sit [place] (Transfers) stand by assist  - stand by assist  -     Recorded by [KH] Radha Martinez, PT 04/20/18 1443 04/20/18 1443 [KH] Radha Martinez, PT 04/20/18 1157 04/20/18 1157     Row Name 04/20/18 1441 04/20/18 0945          Sit-Stand Transfer    Assistive Device (Sit-Stand Transfers) walker, front-wheeled  -LEIGHTON walker, front-wheeled  -KH     Recorded by [KH] Radha Martinez, PT 04/20/18 1443 04/20/18 1443 [KH] Radha Martinez, PT 04/20/18 1157 04/20/18 1157     Row Name 04/20/18 1441 04/20/18 0945          Stand-Sit Transfer    Assistive Device (Stand-Sit Transfers) walker, front-wheeled  -KH walker, front-wheeled  -KH     Recorded by [KH] Radha Martinez, PT 04/20/18 1443 04/20/18 1443 [KH] Radha Martinez, PT 04/20/18 1157 04/20/18 1157     Row Name 04/20/18 1441 04/20/18 0945          Gait/Stairs Assessment/Training    [place] Level (Gait) stand by assist  -KH stand by assist  -KH     Assistive Device (Gait) walker, front-wheeled  -KH walker, front-wheeled  -KH     Distance in Feet (Gait) 110  -KH 85  -KH     Pattern (Gait) step-through  -KH step-through  -KH     Deviations/Abnormal Patterns (Gait) antalgic  -KH antalgic;esmer decreased  -KH     Recorded by  [KH] Radha Martinez, PT 04/20/18 1443 04/20/18 1443 [KH] Radha Martinez, PT 04/20/18 1157 04/20/18 1157     Row Name 04/20/18 0945             Right Lower Ext    Rt Knee Extension/Flexion AROM 6-86  -KH      Recorded by [KH] Radha Martinez, PT 04/20/18 1157 04/20/18 1157      Row Name 04/20/18 1441 04/20/18 0945          Therapeutic Exercise    Comment (Therapeutic Exercise) R TKA protocol x 25 reps  -KH R TKA protocol x 25 reps  -KH     Recorded by [KH] Radha Martinez, PT 04/20/18 1443 04/20/18 1443 [KH] Radha Mratinez, PT 04/20/18 1157 04/20/18 1157     Row Name 04/20/18 1441 04/20/18 0945          Positioning and Restraints    Pre-Treatment Position sitting in chair/recliner  -KH sitting in chair/recliner  -KH     Post Treatment Position chair  -KH chair  -KH     In Chair reclined;call light within reach;encouraged to call for assist;notified nsg  -KH reclined;call light within reach;encouraged to call for assist;notified nsg  -KH     Recorded by [KH] Radha Martinez, PT 04/20/18 1443 04/20/18 1443 [KH] Radha Martinez, PT 04/20/18 1157 04/20/18 1157     Row Name 04/20/18 1441 04/20/18 0945          Pain Scale: Numbers Pre/Post-Treatment    Pain Scale: Numbers, Pretreatment 4/10  -KH 5/10  -KH     Pain Location - Side Right  -KH Right  -KH     Pain Location knee  -KH knee  -KH     Pain Intervention(s) Repositioned;Ambulation/increased activity  -KH Cold applied;Repositioned;Ambulation/increased activity  -KH     Recorded by [KH] Radha Martinez, PT 04/20/18 1443 04/20/18 1443 [KH] Radha Martinez, PT 04/20/18 1157 04/20/18 1157     Row Name                Wound 04/18/18 0953 Right knee incision    Wound - Properties Group Date first assessed: 04/18/18 [MB] Time first assessed: 0953 [MB] Side: Right [MB] Location: knee [MB] Type: incision [MB] Recorded by:  [MB] Aminata Allan RN 04/18/18 0953 04/18/18 0953    Row Name 04/20/18 1441              Outcome Summary/Treatment Plan (PT)    Anticipated Discharge Disposition (PT) skilled nursing facility (SNF)  -      Recorded by [KH] Radha Martinez, PT 04/20/18 1443 04/20/18 1443        User Key  (r) = Recorded By, (t) = Taken By, (c) = Cosigned By    Initials Name Effective Dates Discipline     Radha Martinez, PT 05/18/15 -  PT    ARMAAN Allan, RN 06/16/16 -  Nurse          Wound 04/18/18 0953 Right knee incision (Active)   Dressing Appearance dry;intact;no drainage 4/20/2018  7:54 AM             Physical Therapy Education     Title: PT OT SLP Therapies (Done)     Topic: Physical Therapy (Done)     Point: Mobility training (Done)    Learning Progress Summary     Learner Status Readiness Method Response Comment Documented by    Patient Done Acceptance E,D VU,NR   04/20/18 1443     Done Acceptance E,D VU   04/20/18 1158     Done Acceptance E,D VU,NR   04/19/18 1407     Done Acceptance E,D VU,NR   04/19/18 0942     Done Acceptance E,TB,D VU,NR  EE 04/18/18 1417          Point: Home exercise program (Done)    Learning Progress Summary     Learner Status Readiness Method Response Comment Documented by    Patient Done Acceptance E,D VU,NR   04/20/18 1443     Done Acceptance E,D VU   04/20/18 1158     Done Acceptance E,D VU,NR   04/19/18 1407     Done Acceptance E,D VU,NR   04/19/18 0942     Done Acceptance E,TB,D VU,NR  EE 04/18/18 1417          Point: Body mechanics (Done)    Learning Progress Summary     Learner Status Readiness Method Response Comment Documented by    Patient Done Acceptance E,D VU,NR   04/20/18 1443     Done Acceptance E,D VU   04/20/18 1158     Done Acceptance E,D VU,NR   04/19/18 1407     Done Acceptance E,D VU,NR   04/19/18 0942     Done Acceptance E,TB,D VU,NR  EE 04/18/18 1417          Point: Precautions (Done)    Learning Progress Summary     Learner Status Readiness Method Response Comment Documented by    Patient Done Acceptance E,D VU,NR    04/20/18 1443     Done Acceptance E,D VU   04/20/18 1158     Done Acceptance E,D VU,NR  KH 04/19/18 1407     Done Acceptance E,D VU,NR  KH 04/19/18 0942     Done Acceptance E,TB,D VU,NR   04/18/18 1417                      User Key     Initials Effective Dates Name Provider Type Discipline     05/18/15 -  Radha Martinez, PT Physical Therapist PT     04/03/18 -  Rossy Garcia, PT Physical Therapist PT                    PT Recommendation and Plan  Anticipated Discharge Disposition (PT): skilled nursing facility (SNF)  Therapy Frequency (PT Clinical Impression): 2 times/day  Outcome Summary/Treatment Plan (PT)  Anticipated Discharge Disposition (PT): skilled nursing facility (SNF)  Outcome Summary: Progressing well. Increased ROM and ambulation distance. Reports less pain this afternoon. Plans to d/c to SNF tomorrow          Outcome Measures     Row Name 04/20/18 0945 04/19/18 1400 04/19/18 0943       How much help from another person do you currently need...    Turning from your back to your side while in flat bed without using bedrails? 4  -KH 4  -KH 4  -KH    Moving from lying on back to sitting on the side of a flat bed without bedrails? 4  -KH 4  -KH 4  -KH    Moving to and from a bed to a chair (including a wheelchair)? 3  -KH 3  -KH 3  -KH    Standing up from a chair using your arms (e.g., wheelchair, bedside chair)? 3  -KH 3  -KH 3  -KH    Climbing 3-5 steps with a railing? 3  -KH 3  -KH 3  -KH    To walk in hospital room? 3  -KH 3  -KH 3  -KH    AM-PAC 6 Clicks Score 20  -KH 20  -KH 20  -KH       Functional Assessment    Outcome Measure Options  -- AM-PAC 6 Clicks Basic Mobility (PT)  -KH AM-PAC 6 Clicks Basic Mobility (PT)  -KH    Row Name 04/18/18 1400             How much help from another person do you currently need...    Turning from your back to your side while in flat bed without using bedrails? 4  -EE      Moving from lying on back to sitting on the side of a flat bed without bedrails?  4  -EE      Moving to and from a bed to a chair (including a wheelchair)? 3  -EE      Standing up from a chair using your arms (e.g., wheelchair, bedside chair)? 3  -EE      Climbing 3-5 steps with a railing? 2  -EE      To walk in hospital room? 3  -EE      AM-PAC 6 Clicks Score 19  -EE         Functional Assessment    Outcome Measure Options AM-PAC 6 Clicks Basic Mobility (PT)  -EE        User Key  (r) = Recorded By, (t) = Taken By, (c) = Cosigned By    Initials Name Provider Type    LEIGHTON Martinez, PT Physical Therapist    EE Rossy Garcia, PT Physical Therapist           Time Calculation:         PT Charges     Row Name 04/20/18 1444 04/20/18 1155          Time Calculation    Start Time 1300  -KH 0830  -KH     Stop Time 1345  -KH 0915  -KH     Time Calculation (min) 45 min  -KH 45 min  -KH     PT Received On 04/20/18  - 04/20/18  -     PT - Next Appointment 04/21/18  - 04/20/18  -       User Key  (r) = Recorded By, (t) = Taken By, (c) = Cosigned By    Initials Name Provider Type    LEIGHTON Martinez, PT Physical Therapist          Therapy Charges for Today     Code Description Service Date Service Provider Modifiers Qty    95971397470 HC PT THER PROC EA 15 MIN 4/19/2018 Radha Martinez, PT GP 1    63758677792 HC PT THER PROC GROUP 4/19/2018 Radha Martinez, PT GP 1    36412363239 HC PT THER PROC EA 15 MIN 4/19/2018 Radha Martinez, PT GP 1    78200305350 HC PT THER PROC GROUP 4/19/2018 Radha Martinez, PT GP 1    99223050947 HC PT THER PROC EA 15 MIN 4/20/2018 Radha Martinez, PT GP 1    89570503544 HC PT THER PROC GROUP 4/20/2018 Radha Martinez, PT GP 1    22047037155 HC PT THER PROC EA 15 MIN 4/20/2018 Radha Martinez, PT GP 1    38332598484 HC PT THER PROC GROUP 4/20/2018 Radha Martinez, PT GP 1          PT G-Codes  Outcome Measure Options: AM-PAC 6 Clicks Basic Mobility (PT)    Radha Martinez, PT  4/20/2018

## 2018-04-20 NOTE — THERAPY TREATMENT NOTE
Acute Care - Physical Therapy Treatment Note  [facility]     Patient Name: Kristan Galicia  : 1950  MRN: [id number]  Today's Date: 2018  Onset of Illness/Injury or Date of Surgery: 18     Referring Physician: Ankur    Admit Date: 2018    Visit Dx:    ICD-10-CM ICD-9-CM   1. Decreased mobility R26.89 781.99   2. Arthritis of right knee M17.11 716.96     Patient Active Problem List   Diagnosis   • Shoulder pain, right   • Knee pain, right   • Fall down stairs   • PAF (paroxysmal atrial fibrillation)   • S/P rotator cuff repair   • Vitamin D deficiency   • Vertigo   • Vasovagal syncope   • Thyroid disease   • Obstructive sleep apnea, adult   • Palpitations   • Osteoporosis   • Ocular tumor   • Migraines   • Hyperlipidemia   • History of transfusion   • Heart murmur   • Heart attack   • GERD (gastroesophageal reflux disease)   • Depression   • CHF (congestive heart failure)   • Cardiomyopathy   • Asthma   • Anxiety   • Arthritis of right knee   • Right rotator cuff tear   • Hypothyroidism   • Dyspnea and respiratory abnormalities   • Orthopnea   • IHSS (idiopathic hypertrophic subaortic stenosis)   • AICD (automatic cardioverter/defibrillator) present   • Anticoagulant long-term use   • Hyperglycemia, drug-induced   • Chronic pain of right knee   • Diverticulitis   • CAD (coronary artery disease)   • Hypertrophic nonobstructive cardiomyopathy   • ICD (implantable cardioverter-defibrillator) discharge   • Menopause   • Non-obstructive hypertrophic cardiomyopathy   • Type 2 diabetes mellitus without complication, without long-term current use of insulin   • Insomnia       Therapy Treatment          Rehabilitation Treatment Summary     Row Name 18 0945             Treatment Time/Intention    Discipline physical therapist  -      Document Type therapy note (daily note)  -KH      Subjective Information complains of;pain  -KH      Patient Effort good  -      Existing  Precautions/Restrictions fall  -KH      Recorded by [KH] Radha Martinez, PT 04/20/18 1157 04/20/18 1157      Row Name 04/20/18 0945             Bed Mobility Assessment/Treatment    Comment (Bed Mobility) up in chair  -KH      Recorded by [KH] Radha Martinez, PT 04/20/18 1157 04/20/18 1157      Row Name 04/20/18 0945             Transfer Assessment/Treatment    Sit-Stand [place] (Transfers) stand by assist  -      Stand-Sit [place] (Transfers) stand by assist  -KH      Recorded by [KH] Radha Martinez, PT 04/20/18 1157 04/20/18 1157      Row Name 04/20/18 0945             Sit-Stand Transfer    Assistive Device (Sit-Stand Transfers) walker, front-wheeled  -KH      Recorded by [KH] Radha Martinez, PT 04/20/18 1157 04/20/18 1157      Row Name 04/20/18 0945             Stand-Sit Transfer    Assistive Device (Stand-Sit Transfers) walker, front-wheeled  -KH      Recorded by [KH] Radha Martniez, PT 04/20/18 1157 04/20/18 1157      Row Name 04/20/18 0945             Gait/Stairs Assessment/Training    [place] Level (Gait) stand by assist  -      Assistive Device (Gait) walker, front-wheeled  -KH      Distance in Feet (Gait) 85  -KH      Pattern (Gait) step-through  -KH      Deviations/Abnormal Patterns (Gait) antalgic;esmer decreased  -KH      Recorded by [KH] Radha Martinez, PT 04/20/18 1157 04/20/18 1157      Row Name 04/20/18 0945             Right Lower Ext    Rt Knee Extension/Flexion AROM 6-86  -KH      Recorded by [KH] Radha Martinez, PT 04/20/18 1157 04/20/18 1157      Row Name 04/20/18 0945             Therapeutic Exercise    Comment (Therapeutic Exercise) R TKA protocol x 25 reps  -KH      Recorded by [KH] Radha Martinez, PT 04/20/18 1157 04/20/18 1157      Row Name 04/20/18 0945             Positioning and Restraints    Pre-Treatment Position sitting in chair/recliner  -      Post Treatment Position chair  -      In Chair  reclined;call light within reach;encouraged to call for assist;notified nsg  -KH      Recorded by [KH] Radha Martinez, PT 04/20/18 1157 04/20/18 1157      Row Name 04/20/18 0945             Pain Scale: Numbers Pre/Post-Treatment    Pain Scale: Numbers, Pretreatment 5/10  -KH      Pain Location - Side Right  -KH      Pain Location knee  -KH      Pain Intervention(s) Cold applied;Repositioned;Ambulation/increased activity  -KH      Recorded by [KH] Radha Martinez, PT 04/20/18 1157 04/20/18 1157      Row Name                Wound 04/18/18 0953 Right knee incision    Wound - Properties Group Date first assessed: 04/18/18 [MB] Time first assessed: 0953 [MB] Side: Right [MB] Location: knee [MB] Type: incision [MB] Recorded by:  [MB] Aminata Allan RN 04/18/18 0953 04/18/18 0953      User Key  (r) = Recorded By, (t) = Taken By, (c) = Cosigned By    Initials Name Effective Dates Discipline     Radha Martinez, PT 05/18/15 -  PT    ARMAAN Allan RN 06/16/16 -  Nurse          Wound 04/18/18 0953 Right knee incision (Active)   Dressing Appearance dry;intact;no drainage 4/20/2018  7:54 AM             Physical Therapy Education     Title: PT OT SLP Therapies (Done)     Topic: Physical Therapy (Done)     Point: Mobility training (Done)    Learning Progress Summary     Learner Status Readiness Method Response Comment Documented by    Patient Done Acceptance E,D VU   04/20/18 1158     Done Acceptance E,D VU,NR   04/19/18 1407     Done Acceptance E,D VU,NR   04/19/18 0942     Done Acceptance E,TB,D VU,NR  EE 04/18/18 1417          Point: Home exercise program (Done)    Learning Progress Summary     Learner Status Readiness Method Response Comment Documented by    Patient Done Acceptance E,D VU   04/20/18 1158     Done Acceptance E,D VU,NR   04/19/18 1407     Done Acceptance E,D VU,NR   04/19/18 0942     Done Acceptance E,TB,D VU,NR  EE 04/18/18 1417          Point: Body mechanics (Done)     Learning Progress Summary     Learner Status Readiness Method Response Comment Documented by    Patient Done Acceptance E,D VU   04/20/18 1158     Done Acceptance E,D VU,NR  KH 04/19/18 1407     Done Acceptance E,D VU,NR  KH 04/19/18 0942     Done Acceptance E,TB,D VU,NR  EE 04/18/18 1417          Point: Precautions (Done)    Learning Progress Summary     Learner Status Readiness Method Response Comment Documented by    Patient Done Acceptance E,D VU   04/20/18 1158     Done Acceptance E,D VU,NR  KH 04/19/18 1407     Done Acceptance E,D VU,NR  KH 04/19/18 0942     Done Acceptance E,TB,D VU,NR  EE 04/18/18 1417                      User Key     Initials Effective Dates Name Provider Type Discipline     05/18/15 -  Radha Martinez, PT Physical Therapist PT     04/03/18 -  Rossy Garcia, PT Physical Therapist PT                    PT Recommendation and Plan  Therapy Frequency (PT Clinical Impression): 2 times/day  Outcome Summary: Progressing well. Has decided to d/c to rehab and plans to leave tomorrow. Will continue to progress as tolerated.          Outcome Measures     Row Name 04/20/18 0945 04/19/18 1400 04/19/18 0943       How much help from another person do you currently need...    Turning from your back to your side while in flat bed without using bedrails? 4  -KH 4  -KH 4  -KH    Moving from lying on back to sitting on the side of a flat bed without bedrails? 4  -KH 4  -KH 4  -KH    Moving to and from a bed to a chair (including a wheelchair)? 3  -KH 3  -KH 3  -KH    Standing up from a chair using your arms (e.g., wheelchair, bedside chair)? 3  -KH 3  -KH 3  -KH    Climbing 3-5 steps with a railing? 3  -KH 3  -KH 3  -KH    To walk in hospital room? 3  -KH 3  -KH 3  -KH    AM-PAC 6 Clicks Score 20  -KH 20  -KH 20  -KH       Functional Assessment    Outcome Measure Options  -- AM-PAC 6 Clicks Basic Mobility (PT)  -KH AM-PAC 6 Clicks Basic Mobility (PT)  -KH    Row Name 04/18/18 1400              How much help from another person do you currently need...    Turning from your back to your side while in flat bed without using bedrails? 4  -EE      Moving from lying on back to sitting on the side of a flat bed without bedrails? 4  -EE      Moving to and from a bed to a chair (including a wheelchair)? 3  -EE      Standing up from a chair using your arms (e.g., wheelchair, bedside chair)? 3  -EE      Climbing 3-5 steps with a railing? 2  -EE      To walk in hospital room? 3  -EE      AM-PAC 6 Clicks Score 19  -EE         Functional Assessment    Outcome Measure Options AM-PAC 6 Clicks Basic Mobility (PT)  -EE        User Key  (r) = Recorded By, (t) = Taken By, (c) = Cosigned By    Initials Name Provider Type    LEIGHTON Martinez, PT Physical Therapist    EE Rossy Garcia, PT Physical Therapist           Time Calculation:         PT Charges     Row Name 04/20/18 1155             Time Calculation    Start Time 0830  -      Stop Time 0915  -      Time Calculation (min) 45 min  -      PT Received On 04/20/18  -      PT - Next Appointment 04/20/18  -        User Key  (r) = Recorded By, (t) = Taken By, (c) = Cosigned By    Initials Name Provider Type    LEIGHTON Martinez, PT Physical Therapist          Therapy Charges for Today     Code Description Service Date Service Provider Modifiers Qty    67388664496 HC PT THER PROC EA 15 MIN 4/19/2018 Radha Martinez, PT GP 1    53209634111 HC PT THER PROC GROUP 4/19/2018 Radha Martinez, PT GP 1    47023630334 HC PT THER PROC EA 15 MIN 4/19/2018 Radha Martinez, PT GP 1    66398726261 HC PT THER PROC GROUP 4/19/2018 Radha Martinez, PT GP 1    35934590752 HC PT THER PROC EA 15 MIN 4/20/2018 Radha Martinez, PT GP 1    23193461610 HC PT THER PROC GROUP 4/20/2018 Radha Martinez, PT GP 1          PT G-Codes  Outcome Measure Options: AM-PAC 6 Clicks Basic Mobility (PT)    Radha Martinez, PT  4/20/2018

## 2018-04-20 NOTE — PROGRESS NOTES
[facility]     LOS: 2 days     Name: Kristan Galicia  Age: 67 y.o.  Sex: female  :  1950  MRN: [id number]         Primary Care Physician: CHRISTIN Mckee    Diet Regular    Subjective   no complaints. walking with therapy. no chest pain and no shortness of breath currently.    Objective   Temp:  [97 °F (36.1 °C)-98.2 °F (36.8 °C)] 97.6 °F (36.4 °C)  Heart Rate:  [66-80] 66  Resp:  [16-20] 18  BP: (106-119)/(61-72) 119/72  SpO2:  [92 %-98 %] 96 %  on   ;   Device (Oxygen Therapy): room air  Body mass index is 33.66 kg/m².    Physical Exam   Constitutional: She is oriented to person, place, and time. No distress.   Cardiovascular: Normal rate and regular rhythm.    Pulmonary/Chest: Effort normal and breath sounds normal. No respiratory distress. She has no decreased breath sounds. She has no wheezes. She has no rhonchi. She has no rales.   Abdominal: Soft. There is no tenderness.   Musculoskeletal: She exhibits no edema.   Neurological: She is alert and oriented to person, place, and time.   Skin: Skin is warm and dry.     Reviewed medications and new clinical results    allopurinol 100 mg Oral Daily   amiodarone 100 mg Oral Nightly   atorvastatin 80 mg Oral Nightly   carvedilol 12.5 mg Oral BID With Meals   dabigatran etexilate 150 mg Oral Q12H   docusate sodium 100 mg Oral BID   DULoxetine 60 mg Oral Daily   gabapentin 300 mg Oral TID   levothyroxine 88 mcg Oral Daily   linagliptin 5 mg Oral Daily   montelukast 10 mg Oral Nightly   pantoprazole 40 mg Oral Daily   polyethylene glycol 17 g Oral Daily   spironolactone-hydrochlorothiazide 1 tablet Oral Daily       lactated ringers 9 mL/hr    lactated ringers 9 mL/hr Last Rate: 9 mL/hr (18 0808)       Results from last 7 days  Lab Units 18  0339 18  0330   HEMOGLOBIN g/dL 10.4* 10.6*       Results from last 7 days  Lab Units 18  0330   SODIUM mmol/L 136   POTASSIUM  mmol/L 3.6   CHLORIDE mmol/L 96*   CO2 mmol/L 25.7   BUN mg/dL 26*   CREATININE mg/dL 1.24*   CALCIUM mg/dL 8.3*   GLUCOSE mg/dL 154*     Lab Results   Component Value Date    ANIONGAP 14.3 04/19/2018     Glucose   Date/Time Value Ref Range Status   04/20/2018 0608 236 (H) 70 - 130 mg/dL Final   04/19/2018 2116 219 (H) 70 - 130 mg/dL Final   04/19/2018 1645 195 (H) 70 - 130 mg/dL Final   04/19/2018 1052 252 (H) 70 - 130 mg/dL Final   04/19/2018 0536 171 (H) 70 - 130 mg/dL Final   04/18/2018 2105 147 (H) 70 - 130 mg/dL Final   04/18/2018 1625 181 (H) 70 - 130 mg/dL Final   04/18/2018 0812 190 (H) 70 - 130 mg/dL Final     Hemoglobin A1C   Date Value Ref Range Status   04/18/2018 8.49 (H) 4.80 - 5.60 % Final     Estimated Creatinine Clearance: 43 mL/min (by C-G formula based on SCr of 1.24 mg/dL (H)).     1    04/19/18  0530 04/20/18  0628   Weight: 80.6 kg (177 lb 11.1 oz) 80.8 kg (178 lb 2.1 oz)       Intake/Output Summary (Last 24 hours) at 04/20/18 0923  Last data filed at 04/20/18 0856   Gross per 24 hour   Intake              960 ml   Output                0 ml   Net              960 ml     Assessment/Plan   Active Hospital Problems (** Indicates Principal Problem)    Diagnosis Date Noted   • **Arthritis of right knee [M17.11] 12/27/2016   • Type 2 diabetes mellitus without complication, without long-term current use of insulin [E11.9] 08/07/2017   • Non-obstructive hypertrophic cardiomyopathy [I42.2] 08/07/2017   • CAD (coronary artery disease) [I25.10] 08/07/2017   • Hypothyroidism [E03.9] 02/22/2017   • Obstructive sleep apnea, adult [G47.33]    • PAF (paroxysmal atrial fibrillation) [I48.0] 11/01/2016   • ICD (implantable cardioverter-defibrillator) discharge [Z45.02] 04/04/2016      Resolved Hospital Problems    Diagnosis Date Noted Date Resolved   No resolved problems to display.     · s/p right total knee replacement 4/18/18  · DVT prophylaxis: pradaxa  · DM 2 control not terrible, a1c not ideal (8.49).  add correctional insulin  · monitoring volume status, recheck cr  · I discussed the patient's findings and my recommendations with patient and nursing staff.    Chacho Forte MD   04/20/18  9:23 AM

## 2018-04-20 NOTE — PLAN OF CARE
Problem: Patient Care Overview  Goal: Plan of Care Review   04/20/18 1443   OTHER   Outcome Summary Progressing well. Increased ROM and ambulation distance. Reports less pain this afternoon. Plans to d/c to SNF tomorrow

## 2018-04-20 NOTE — DISCHARGE SUMMARY
Orthopedic Discharge Summary      Patient: Kristan Galicia  YOB: 1950  Medical Record Number: [id number]    Attending Physician: Bravo Pascual MD  Consulting Physician(s): Chacho Forte MD    Agree with additional diagnoses per Consultant notes.    Date of Admission: 4/18/2018  7:15 AM  Date of Discharge: 4/21/2018    Discharge Diagnosis: RIGHT TOTAL KNEE ARTHROPLASTY WITH STEVE NAVIGATION,   Acute Blood Loss Anemia, stable  Post-operative Pain  Limited mobility, requires use of walker and assistance when OOB.    Presenting Problem/History of Present Illness: Arthritis of right knee [M17.11]  Arthritis of right knee [M17.11]        Allergies:   Allergies   Allergen Reactions   • Adhesive Tape Other (See Comments)     SKIN BLISTERS   • Aspirin Swelling   • Biaxin [Clarithromycin] Other (See Comments)     BLISTERS AROUND MOTH  Also known as Bioxen    • Codeine Swelling   • Darvon [Propoxyphene] Swelling   • Levaquin [Levofloxacin] Other (See Comments)     BLISTERS AROUND MOUTH   • Tequin [Gatifloxacin] Other (See Comments)     BLISTERS AROUND MOUTH  Also known Tequine        Discharge Medications     Kristan Galicia   Home Medication Instructions ROBBIE:[id number]    Printed on:04/20/18 1201   Medication Information                      alendronate (FOSAMAX) 70 MG tablet  Take 1 tablet by mouth Every 7 (Seven) Days.             allopurinol (ZYLOPRIM) 100 MG tablet  Take 100 mg by mouth Daily.             amiodarone (PACERONE) 200 MG tablet  Take 100 mg by mouth Every Night.             atorvastatin (LIPITOR) 80 MG tablet  Take 1 tablet by mouth Every Night.             baclofen (LIORESAL) 10 MG tablet  Take 10 mg by mouth At Night As Needed for Muscle Spasms.             bisacodyl (DULCOLAX) 5 MG EC tablet  Take 2 tablets by mouth Daily As Needed for Constipation.             Blood Glucose Monitoring Suppl (ACCU-CHEK OTILIA PLUS) W/DEVICE kit  Dx e11.9 use to check BS BID, ok to substitute  formulary preferred             carvedilol (COREG) 12.5 MG tablet  Take 12.5 mg by mouth 2 (Two) Times a Day.             dabigatran etexilate (PRADAXA) 150 MG capsu  Take 150 mg by mouth 2 (Two) Times a Day. TO HOLD 5 DAYS PRIOR TO OR PER CARDIO             docusate sodium 100 MG capsule  Take 100 mg by mouth 2 (Two) Times a Day.             DULoxetine (CYMBALTA) 60 MG capsule  Take 1 capsule by mouth Daily.             gabapentin (NEURONTIN) 300 MG capsule  Take 300 mg by mouth 3 (Three) Times a Day.             glucose blood (ACCU-CHEK OTILIA PLUS) test strip  Dx e11.9 use to check BS BID, ok to substitute formulary preferred             HYDROcodone-acetaminophen (NORCO) 7.5-325 MG per tablet  1-2 tabs po q 3-4 hr prn severe pain, wean as tolerated             Lancets (ACCU-CHEK MULTICLIX) lancets  Dx e11.9 use to check BS BID, ok to substitute formulary preferred             levothyroxine (SYNTHROID) 88 MCG tablet  Take 1 tablet by mouth Daily.             lidocaine (LIDODERM) 5 %  Place 1 patch on the skin Daily As Needed for Mild Pain . Remove & Discard patch within 12 hours or as directed by MD             montelukast (SINGULAIR) 10 MG tablet  Take 10 mg by mouth Every Night.             pantoprazole (PROTONIX) 40 MG EC tablet  Take 1 tablet by mouth Daily for 90 days.             polyethylene glycol (MIRALAX) pack packet  Take 17 g by mouth Daily.             SITagliptin (JANUVIA) 100 MG tablet  Take 1 tablet by mouth Daily.             spironolactone-hydrochlorothiazide (ALDACTAZIDE) 25-25 MG tablet  Take 1 tablet by mouth Daily.             traZODone (DESYREL) 50 MG tablet  Take 100 mg by mouth At Night As Needed for Sleep.                   Past Medical History:   Diagnosis Date   • Anemia    • Arthritis    • Atrial fibrillation    • Cardiac defibrillator in place 01/2009    medtronic    • Cardiomyopathy     Hypertrophic Cardiomyopathy   • CHF (congestive heart failure)    • Chronic pain    • Coronary  artery disease    • Diabetes mellitus     Type II   • Diverticulosis     hx diverticulitis   • GERD (gastroesophageal reflux disease)    • Heart murmur    • History of depression    • History of tumor     left ocular tumor, treated with steroids   • Hyperlipidemia    • Migraines    • On anticoagulant therapy    • Osteoporosis    • Palpitations    • Sleep apnea     does not use CPAP or BiPAP, used nose strips   • Thyroid disease    • Vasovagal syncope    • Vertigo         Past Surgical History:   Procedure Laterality Date   • ANTERIOR CERVICAL DISCECTOMY W/ FUSION  2012    C5-7   • APPENDECTOMY  1970   • BILATERAL BREAST REDUCTION     • CARDIAC CATHETERIZATION  02/19/2007   • CARDIAC DEFIBRILLATOR PLACEMENT Left 1999, 2013    Dr. Ya   • CATARACT EXTRACTION Bilateral    • COLONOSCOPY  1996    Dr. Herbert Gonsales    • COLONOSCOPY  8/16/2016    Procedure: COLONOSCOPY with cecum;  Surgeon: Rosalio Sheikh MD;  Location: [facility] ENDOSCOPY;  Service:    • ENDOSCOPY N/A 4/27/2017    Procedure: ESOPHAGOGASTRODUODENOSCOPY WITH BIOPSIES;  Surgeon: Rosalio Sheikh MD;  Location: [facility] ENDOSCOPY;  Service:    • FEMUR SURGERY Left     with metal implant  x3   • FIRST RIB RESECTION Bilateral     and scalenectomy   • HYSTERECTOMY  1989    Total   • KNEE ARTHROSCOPY Bilateral 2014    Dr. Li   • KNEE SURGERY Right 06/10/2010   • LAPAROSCOPIC CHOLECYSTECTOMY  1974   • ROTATOR CUFF REPAIR Right 06/10/2010   • SHOULDER ARTHROSCOPY W/ ROTATOR CUFF REPAIR Bilateral 05/20/2011   • SHOULDER SURGERY Right    • TONSILLECTOMY     • TOTAL KNEE ARTHROPLASTY Right 4/18/2018    Procedure: RIGHT TOTAL KNEE ARTHROPLASTY WITH STEVE NAVIGATION;  Surgeon: Bravo Pascual MD;  Location: [facility] OR;  Service: Orthopedics   • TOTAL SHOULDER ARTHROPLASTY W/ DISTAL CLAVICLE EXCISION Right 2/2/2017    Procedure: TOTAL SHOULDER REVERSE ARTHROPLASTY;  Surgeon: Juan Antonio Anne MD;  Location: [facility] OR;  Service:         Social History      Occupational History   •       Social History Main Topics   • Smoking status: Former Smoker     Packs/day: 0.50     Years: 17.00     Types: Cigarettes     Quit date: 9/8/1985   • Smokeless tobacco: Never Used   • Alcohol use No   • Drug use: No   • Sexual activity: Defer    Social History     Social History Narrative   • No narrative on file        Family History   Problem Relation Age of Onset   • Heart attack Brother    • Heart disease Brother    • Hyperthyroidism Mother    • Cancer Mother    • Heart disease Mother    • Osteoporosis Mother    • No Known Problems Father       car accident   • Cirrhosis Maternal Grandmother    • No Known Problems Maternal Grandfather    • No Known Problems Paternal Grandmother    • No Known Problems Paternal Grandfather    • Breast cancer Maternal Aunt    • Malig Hyperthermia Neg Hx          Physical Exam: 67 y.o. female   Body mass index is 33.66 kg/m².  Facility age limit for growth percentiles is 20 years.  Vitals:    04/20/18 1101   BP: 124/73   Pulse: 90   Resp: 20   Temp: 97.2 °F (36.2 °C)   SpO2: 97%         General Appearance:    Alert, cooperative, in no acute distress                    Vitals:    04/20/18 0216 04/20/18 0628 04/20/18 0713 04/20/18 1101   BP: 118/61  119/72 124/73   BP Location: Left arm  Right arm    Patient Position: Lying  Lying    Pulse: 80  66 90   Resp: 16  18 20   Temp: 98.2 °F (36.8 °C)  97.6 °F (36.4 °C) 97.2 °F (36.2 °C)   TempSrc: Oral  Oral Oral   SpO2: 96%  96% 97%   Weight:  80.8 kg (178 lb 2.1 oz)     Height:            DIAGNOSTIC TESTS:   Admission on 04/18/2018   Component Date Value Ref Range Status   • Glucose 04/18/2018 190* 70 - 130 mg/dL Final   • Hemoglobin A1C 04/18/2018 8.49* 4.80 - 5.60 % Final   • Glucose 04/18/2018 181* 70 - 130 mg/dL Final   • Glucose 04/18/2018 147* 70 - 130 mg/dL Final   • Glucose 04/19/2018 154* 65 - 99 mg/dL Final   • BUN 04/19/2018 26* 8 - 23 mg/dL Final   • Creatinine  04/19/2018 1.24* 0.57 - 1.00 mg/dL Final   • Sodium 04/19/2018 136  136 - 145 mmol/L Final   • Potassium 04/19/2018 3.6  3.5 - 5.2 mmol/L Final   • Chloride 04/19/2018 96* 98 - 107 mmol/L Final   • CO2 04/19/2018 25.7  22.0 - 29.0 mmol/L Final   • Calcium 04/19/2018 8.3* 8.6 - 10.5 mg/dL Final   • eGFR Non African Amer 04/19/2018 43* >60 mL/min/1.73 Final   • BUN/Creatinine Ratio 04/19/2018 21.0  7.0 - 25.0 Final   • Anion Gap 04/19/2018 14.3  mmol/L Final   • Hemoglobin 04/19/2018 10.6* 11.9 - 15.5 g/dL Final   • Hematocrit 04/19/2018 34.3* 35.6 - 45.5 % Final   • Glucose 04/19/2018 171* 70 - 130 mg/dL Final   • Glucose 04/19/2018 252* 70 - 130 mg/dL Final   • Glucose 04/19/2018 195* 70 - 130 mg/dL Final   • Glucose 04/19/2018 219* 70 - 130 mg/dL Final   • Hemoglobin 04/20/2018 10.4* 11.9 - 15.5 g/dL Final   • Hematocrit 04/20/2018 33.7* 35.6 - 45.5 % Final   • Glucose 04/20/2018 236* 70 - 130 mg/dL Final   • Glucose 04/20/2018 262* 65 - 99 mg/dL Final   • BUN 04/20/2018 20  8 - 23 mg/dL Final   • Creatinine 04/20/2018 1.12* 0.57 - 1.00 mg/dL Final   • Sodium 04/20/2018 135* 136 - 145 mmol/L Final   • Potassium 04/20/2018 3.9  3.5 - 5.2 mmol/L Final   • Chloride 04/20/2018 97* 98 - 107 mmol/L Final   • CO2 04/20/2018 25.6  22.0 - 29.0 mmol/L Final   • Calcium 04/20/2018 7.8* 8.6 - 10.5 mg/dL Final   • eGFR Non African Amer 04/20/2018 49* >60 mL/min/1.73 Final   • BUN/Creatinine Ratio 04/20/2018 17.9  7.0 - 25.0 Final   • Anion Gap 04/20/2018 12.4  mmol/L Final   • Glucose 04/20/2018 236* 70 - 130 mg/dL Final       Imaging Results (last 72 hours)     Procedure Component Value Units Date/Time    XR Knee 1 or 2 View Right [[id number]] Collected:  04/18/18 1123     Updated:  04/18/18 1126    Narrative:       PORTABLE VIEWS OF THE RIGHT KNEE     CLINICAL HISTORY: Postop knee arthroplasty     AP and crosstable lateral views demonstrate a total knee arthroplasty  that appears in satisfactory position.     This report was  finalized on 4/18/2018 11:23 AM by Dr. Delvis Maloney MD.             Hospital Course:  67 y.o. female admitted to [facility] to services of Bravo Pascual MD with Arthritis of right knee [M17.11]  Arthritis of right knee [M17.11] on 4/18/2018 and underwent RIGHT TOTAL KNEE ARTHROPLASTY WITH STEVE NAVIGATION  Per Bravo Pascual MD. Antibiotic and VTE prophylaxis were per SCIP protocols. Post-operatively the patient transferred to the post-operative floor where the patient underwent mobilization therapy that included active as well as passive ROM exercises. Opioids were titrated to achieve appropriate pain management to allow for participation in mobilization exercises. Vital signs are now stable. On the day of discharge the wound was clean, dry and intact and calf was soft and non tender and Homans sign was negative. Operative extremity neurovascular status remains intact.   Appropriate education re: incision care, activity levels, medications, and follow up visits was completed and all questions were answered. The patient is now deemed stable for discharge.    Condition on Discharge:  Stable    Discharge Instructions: Patient is to continue with physical therapy exercises twice daily and continue working with the physical therapist as ordered. Patient may weight bear as tolerated unless otherwise specified. Continue to ice regularly. Patient instructed on frequent calf pumping exercises.  Patient also instructed on incentive spirometer during hospitalization and encouraged to continue to use at home regularly.  See wound care discharge instructions.    Follow up Instructions:  Follow up in the office with Dr. Bravo Pascual in 2 weeks - patient to call the office at [phone] to schedule. Prescriptions were given for pain medication, constipation, and blood thinner therapy.    Follow-up Appointments  Future Appointments  Date Time Provider Department Center   5/2/2018 2:40 PM Bravo Pascual MD [facility]  [facility] None   5/7/2018 10:00 AM [facility]  [facility]   5/21/2018 7:30 PM  [facility] SLEEP [facility]  [facility]     Additional Instructions for the Follow-ups that You Need to Schedule     Ambulatory Referral to Home Health    As directed      Face to Face Visit Date:  4/18/2018    Follow-up Provider for Plan of Care?:  I will be treating the patient on an ongoing basis.  Please send me the Plan of Care for signature.    Follow-up Provider:  BRAVO AGUIRRE [[id number]]    Reason/Clinical Findings:  post operative pain with ambulation, requires use of walker for ambulation    Describe mobility limitations that make leaving home difficult:  requires assist of another to leave home    Nursing/Therapeutic Services Requested:  Skilled Nursing Physical Therapy    Skilled nursing orders:  Wound care dressing/changes    PT orders:  Total joint pathway    Frequency:  1 Week 1         Discharge Follow-up with Specified Provider: Bravo Aguirre M.D. at [facility] [phone]; 2 Weeks    As directed      To:  Bravo Aguirre M.D. at [facility] [phone]    Follow Up:  2 Weeks         Dressing Change Instructions    As directed      IV. INCISION CARE: May shower and let water run over the incision on post-operative day #5. Do not scrub or rub the incision. Simply let the water run over the incision and pat dry. Keep covered with clean dry dressing as long as there is drainage.    Wash your hands prior to dressing changes  Change the dressing daily as needed to keep incision clean and dry. Utilize dry gauze and paper tape. Avoid touching the side of the gauze that goes against the incision with your hands.  No creams or ointments to the incision  May remove dressing once the incision is free of drainage usually around 5 days post op.  Do not touch or pick at the incision  Check incision every day and notify surgeon immediately if any of the following signs or symptoms  are noted:  Increase in baseline redness (bruising is normal)  Increase in baseline swelling around the incision and of the entire extremity  Sudden increase in pain or inability to bend the knee  Drainage oozing from the incision more than 5 days out from surgery.  Pulling apart of the edges of the incision  Increase in overall body temperature (greater than 100.5 degrees) Make sure you are doing your incentive spirometer and deep breathing exercises every day while awake as this is the most frequent cause of low grade fever post operatively.  Your staple removal will be done in the office at your follow-up visit.    Order Comments:  IV. INCISION CARE: May shower and let water run over the incision on post-operative day #5. Do not scrub or rub the incision. Simply let the water run over the incision and pat dry. Keep covered with clean dry dressing as long as there is drainage. Wash your hands prior to dressing changes Change the dressing daily as needed to keep incision clean and dry. Utilize dry gauze and paper tape. Avoid touching the side of the gauze that goes against the incision with your hands. No creams or ointments to the incision May remove dressing once the incision is free of drainage usually around 5 days post op. Do not touch or pick at the incision Check incision every day and notify surgeon immediately if any of the following signs or symptoms are noted: Increase in baseline redness (bruising is normal) Increase in baseline swelling around the incision and of the entire extremity Sudden increase in pain or inability to bend the knee Drainage oozing from the incision more than 5 days out from surgery. Pulling apart of the edges of the incision Increase in overall body temperature (greater than 100.5 degrees) Make sure you are doing your incentive spirometer and deep breathing exercises every day while awake as this is the most frequent cause of low grade fever post operatively. Your staple removal  will be done in the office at your follow-up visit.                Discharge Disposition Plan:POD 2-3 to [facility]    Date: 4/20/2018    Kayleigh Henry RN

## 2018-04-20 NOTE — PLAN OF CARE
Problem: Patient Care Overview  Goal: Plan of Care Review  Outcome: Ongoing (interventions implemented as appropriate)   04/20/18 0329   Coping/Psychosocial   Plan of Care Reviewed With patient   OTHER   Outcome Summary pain controlled during shift with PO pain meds; ambulates well with assist; voiding; vss; dressing c/d/i; neurovasculars intact; educated pt on importance of HR monitoring r/t hx of a.fib; plan to dc to rehab on Saturday   Plan of Care Review   Progress improving       Problem: Fall Risk (Adult)  Goal: Absence of Fall  Outcome: Ongoing (interventions implemented as appropriate)      Problem: Knee Arthroplasty (Total, Partial) (Adult)  Goal: Signs and Symptoms of Listed Potential Problems Will be Absent, Minimized or Managed (Knee Arthroplasty)  Outcome: Ongoing (interventions implemented as appropriate)    Goal: Anesthesia/Sedation Recovery  Outcome: Ongoing (interventions implemented as appropriate)

## 2018-04-20 NOTE — PLAN OF CARE
Problem: Patient Care Overview  Goal: Plan of Care Review   04/20/18 1156   OTHER   Outcome Summary Progressing well. Has decided to d/c to rehab and plans to leave tomorrow. Will continue to progress as tolerated.

## 2018-04-20 NOTE — PLAN OF CARE
Problem: Patient Care Overview  Goal: Plan of Care Review  Outcome: Ongoing (interventions implemented as appropriate)   04/20/18 2628   Coping/Psychosocial   Plan of Care Reviewed With patient   OTHER   Outcome Summary Doing well POD 2 Dressing changed mepilex dressing applied. Good pain control with po pain med. Improved mobility. Expecting discharge to rehab tomorrow. Instructed on diabectic meds with hx of Type II diabestes   Plan of Care Review   Progress improving       Problem: Fall Risk (Adult)  Goal: Absence of Fall  Outcome: Ongoing (interventions implemented as appropriate)      Problem: Knee Arthroplasty (Total, Partial) (Adult)  Goal: Signs and Symptoms of Listed Potential Problems Will be Absent, Minimized or Managed (Knee Arthroplasty)  Outcome: Ongoing (interventions implemented as appropriate)

## 2018-04-21 VITALS
TEMPERATURE: 97 F | RESPIRATION RATE: 18 BRPM | HEIGHT: 61 IN | SYSTOLIC BLOOD PRESSURE: 117 MMHG | WEIGHT: 178.13 LBS | DIASTOLIC BLOOD PRESSURE: 61 MMHG | BODY MASS INDEX: 33.63 KG/M2 | OXYGEN SATURATION: 92 % | HEART RATE: 64 BPM

## 2018-04-21 LAB
ANION GAP SERPL CALCULATED.3IONS-SCNC: 10.5 MMOL/L
BUN BLD-MCNC: 18 MG/DL (ref 8–23)
BUN/CREAT SERPL: 19.6 (ref 7–25)
CALCIUM SPEC-SCNC: 8 MG/DL (ref 8.6–10.5)
CHLORIDE SERPL-SCNC: 98 MMOL/L (ref 98–107)
CO2 SERPL-SCNC: 28.5 MMOL/L (ref 22–29)
CREAT BLD-MCNC: 0.92 MG/DL (ref 0.57–1)
GFR SERPL CREATININE-BSD FRML MDRD: 61 ML/MIN/1.73
GLUCOSE BLD-MCNC: 179 MG/DL (ref 65–99)
GLUCOSE BLDC GLUCOMTR-MCNC: 184 MG/DL (ref 70–130)
GLUCOSE BLDC GLUCOMTR-MCNC: 204 MG/DL (ref 70–130)
HCT VFR BLD AUTO: 33.5 % (ref 35.6–45.5)
HGB BLD-MCNC: 10.1 G/DL (ref 11.9–15.5)
POTASSIUM BLD-SCNC: 3.9 MMOL/L (ref 3.5–5.2)
SODIUM BLD-SCNC: 137 MMOL/L (ref 136–145)

## 2018-04-21 PROCEDURE — 85014 HEMATOCRIT: CPT | Performed by: HOSPITALIST

## 2018-04-21 PROCEDURE — 80048 BASIC METABOLIC PNL TOTAL CA: CPT | Performed by: HOSPITALIST

## 2018-04-21 PROCEDURE — 97150 GROUP THERAPEUTIC PROCEDURES: CPT

## 2018-04-21 PROCEDURE — 85018 HEMOGLOBIN: CPT | Performed by: HOSPITALIST

## 2018-04-21 PROCEDURE — 25010000002 HYDROMORPHONE PER 4 MG: Performed by: ORTHOPAEDIC SURGERY

## 2018-04-21 PROCEDURE — 82962 GLUCOSE BLOOD TEST: CPT

## 2018-04-21 PROCEDURE — 63710000001 INSULIN ASPART PER 5 UNITS: Performed by: HOSPITALIST

## 2018-04-21 PROCEDURE — 97110 THERAPEUTIC EXERCISES: CPT

## 2018-04-21 RX ORDER — HYDROCODONE BITARTRATE AND ACETAMINOPHEN 10; 325 MG/1; MG/1
2 TABLET ORAL EVERY 4 HOURS PRN
Status: DISCONTINUED | OUTPATIENT
Start: 2018-04-21 | End: 2018-04-21 | Stop reason: HOSPADM

## 2018-04-21 RX ORDER — HYDROCODONE BITARTRATE AND ACETAMINOPHEN 10; 325 MG/1; MG/1
1 TABLET ORAL EVERY 4 HOURS PRN
Status: DISCONTINUED | OUTPATIENT
Start: 2018-04-21 | End: 2018-04-21 | Stop reason: HOSPADM

## 2018-04-21 RX ORDER — HYDROCODONE BITARTRATE AND ACETAMINOPHEN 10; 325 MG/1; MG/1
1-2 TABLET ORAL EVERY 4 HOURS PRN
Qty: 80 TABLET | Refills: 0 | Status: SHIPPED | OUTPATIENT
Start: 2018-04-21 | End: 2018-05-02 | Stop reason: SDUPTHER

## 2018-04-21 RX ADMIN — LEVOTHYROXINE SODIUM 88 MCG: 88 TABLET ORAL at 08:27

## 2018-04-21 RX ADMIN — POLYETHYLENE GLYCOL 3350 17 G: 17 POWDER, FOR SOLUTION ORAL at 08:33

## 2018-04-21 RX ADMIN — LINAGLIPTIN 5 MG: 5 TABLET, FILM COATED ORAL at 08:27

## 2018-04-21 RX ADMIN — HYDROCODONE BITARTRATE AND ACETAMINOPHEN 2 TABLET: 10; 325 TABLET ORAL at 12:37

## 2018-04-21 RX ADMIN — DOCUSATE SODIUM 100 MG: 100 CAPSULE, LIQUID FILLED ORAL at 08:33

## 2018-04-21 RX ADMIN — ALLOPURINOL 100 MG: 100 TABLET ORAL at 08:27

## 2018-04-21 RX ADMIN — DULOXETINE HYDROCHLORIDE 60 MG: 60 CAPSULE, DELAYED RELEASE ORAL at 08:27

## 2018-04-21 RX ADMIN — DABIGATRAN ETEXILATE MESYLATE 150 MG: 150 CAPSULE ORAL at 08:28

## 2018-04-21 RX ADMIN — HYDROMORPHONE HYDROCHLORIDE 0.5 MG: 1 INJECTION, SOLUTION INTRAMUSCULAR; INTRAVENOUS; SUBCUTANEOUS at 05:36

## 2018-04-21 RX ADMIN — HYDROCODONE BITARTRATE AND ACETAMINOPHEN 2 TABLET: 7.5; 325 TABLET ORAL at 00:41

## 2018-04-21 RX ADMIN — INSULIN ASPART 2 UNITS: 100 INJECTION, SOLUTION INTRAVENOUS; SUBCUTANEOUS at 08:33

## 2018-04-21 RX ADMIN — HYDROCODONE BITARTRATE AND ACETAMINOPHEN 2 TABLET: 7.5; 325 TABLET ORAL at 04:37

## 2018-04-21 RX ADMIN — HYDROCODONE BITARTRATE AND ACETAMINOPHEN 2 TABLET: 10; 325 TABLET ORAL at 08:26

## 2018-04-21 RX ADMIN — GABAPENTIN 300 MG: 300 CAPSULE ORAL at 08:33

## 2018-04-21 RX ADMIN — CARVEDILOL 12.5 MG: 12.5 TABLET, FILM COATED ORAL at 08:27

## 2018-04-21 RX ADMIN — HYDROCODONE BITARTRATE AND ACETAMINOPHEN 2 TABLET: 10; 325 TABLET ORAL at 15:53

## 2018-04-21 NOTE — PLAN OF CARE
Problem: Patient Care Overview  Goal: Plan of Care Review  Outcome: Ongoing (interventions implemented as appropriate)   04/21/18 1021   Coping/Psychosocial   Plan of Care Reviewed With patient   Plan of Care Review   Progress improving

## 2018-04-21 NOTE — PROGRESS NOTES
"Ortho POD 3    Patient Name:  Kristan Galicia  YOB: 1950  Medical Records Number:  [id number]    No complaints    /57 (BP Location: Left arm, Patient Position: Sitting)   Pulse 67   Temp 97.4 °F (36.3 °C) (Oral)   Resp 18   Ht 154.9 cm (61\")   Wt 80.8 kg (178 lb 2.1 oz)   SpO2 91%   BMI 33.66 kg/m²     RLE:  NVI, calf nontender, sensation intact  No signs of DVT    Incision: clean, no infection    Lab Results (last 24 hours)     Procedure Component Value Units Date/Time    POC Glucose Once [[id number]]  (Abnormal) Collected:  04/21/18 0634    Specimen:  Blood Updated:  04/21/18 0635     Glucose 184 (H) mg/dL     Narrative:       Meter: [id number] : [id number] Tank DUTTA    Basic Metabolic Panel [[id number]]  (Abnormal) Collected:  04/21/18 0319    Specimen:  Blood Updated:  04/21/18 0433     Glucose 179 (H) mg/dL      BUN 18 mg/dL      Creatinine 0.92 mg/dL      Sodium 137 mmol/L      Potassium 3.9 mmol/L      Chloride 98 mmol/L      CO2 28.5 mmol/L      Calcium 8.0 (L) mg/dL      eGFR Non African Amer 61 mL/min/1.73      BUN/Creatinine Ratio 19.6     Anion Gap 10.5 mmol/L     Narrative:       GFR Normal >60  Chronic Kidney Disease <60  Kidney Failure <15    Hemoglobin & Hematocrit, Blood [[id number]]  (Abnormal) Collected:  04/21/18 0319    Specimen:  Blood Updated:  04/21/18 0412     Hemoglobin 10.1 (L) g/dL      Hematocrit 33.5 (L) %     POC Glucose Once [[id number]]  (Abnormal) Collected:  04/20/18 2116    Specimen:  Blood Updated:  04/20/18 2129     Glucose 208 (H) mg/dL     Narrative:       Meter: [id number] : None    POC Glucose Once [[id number]]  (Abnormal) Collected:  04/20/18 1627    Specimen:  Blood Updated:  04/20/18 1633     Glucose 231 (H) mg/dL     Narrative:       Meter: [id number] : [id number] Adrien Jaime CNA    Basic Metabolic Panel [[id number]]  (Abnormal) Collected:  04/20/18 0959    Specimen:  Blood Updated:  04/20/18 1046     Glucose 262 (H) " mg/dL      BUN 20 mg/dL      Creatinine 1.12 (H) mg/dL      Sodium 135 (L) mmol/L      Potassium 3.9 mmol/L      Chloride 97 (L) mmol/L      CO2 25.6 mmol/L      Calcium 7.8 (L) mg/dL      eGFR Non African Amer 49 (L) mL/min/1.73      BUN/Creatinine Ratio 17.9     Anion Gap 12.4 mmol/L     Narrative:       GFR Normal >60  Chronic Kidney Disease <60  Kidney Failure <15    POC Glucose Once [[id number]]  (Abnormal) Collected:  04/20/18 1034    Specimen:  Blood Updated:  04/20/18 1035     Glucose 236 (H) mg/dL     Narrative:       Meter: [id number] : [id number] Adrien Jaime CNA          S/p Right TKA  WBAT with walker  ASA for DVT prophylaxis  Rehab today

## 2018-04-21 NOTE — PROGRESS NOTES
[facility]     LOS: 3 days     Name: Kristan Galicia  Age: 67 y.o.  Sex: female  :  1950  MRN: [id number]         Primary Care Physician: CHRISTIN Mckee    Diet Regular    Subjective   no complaints. denies shortness of breath and cough and chest pain    Objective   Temp:  [97 °F (36.1 °C)-98.5 °F (36.9 °C)] 97.4 °F (36.3 °C)  Heart Rate:  [61-90] 67  Resp:  [16-20] 18  BP: (102-124)/(49-73) 115/57  SpO2:  [91 %-97 %] 91 %  on   ;   Device (Oxygen Therapy): room air  Body mass index is 33.66 kg/m².    Physical Exam   Constitutional: She is oriented to person, place, and time. No distress.   Cardiovascular: Normal rate and regular rhythm.    Pulmonary/Chest: Effort normal and breath sounds normal. No respiratory distress. She has no decreased breath sounds. She has no wheezes. She has no rhonchi. She has no rales.   Abdominal: Soft. There is no tenderness.   Musculoskeletal: She exhibits no edema.   Neurological: She is alert and oriented to person, place, and time.   Skin: Skin is warm and dry.     Reviewed medications and new clinical results    allopurinol 100 mg Oral Daily   amiodarone 100 mg Oral Nightly   atorvastatin 80 mg Oral Nightly   carvedilol 12.5 mg Oral BID With Meals   dabigatran etexilate 150 mg Oral Q12H   docusate sodium 100 mg Oral BID   DULoxetine 60 mg Oral Daily   gabapentin 300 mg Oral TID   insulin aspart 0-7 Units Subcutaneous 4x Daily With Meals & Nightly   levothyroxine 88 mcg Oral Daily   linagliptin 5 mg Oral Daily   montelukast 10 mg Oral Nightly   pantoprazole 40 mg Oral Daily   polyethylene glycol 17 g Oral Daily   spironolactone-hydrochlorothiazide 1 tablet Oral Daily       lactated ringers 9 mL/hr    lactated ringers 9 mL/hr Last Rate: 9 mL/hr (18 0808)     Results from last 7 days  Lab Units 18  0319 18  0339 18  0330   HEMOGLOBIN g/dL 10.1* 10.4* 10.6*     Results from last 7  days  Lab Units 04/21/18  0319 04/20/18  0959 04/19/18  0330   SODIUM mmol/L 137 135* 136   POTASSIUM mmol/L 3.9 3.9 3.6   CHLORIDE mmol/L 98 97* 96*   CO2 mmol/L 28.5 25.6 25.7   BUN mg/dL 18 20 26*   CREATININE mg/dL 0.92 1.12* 1.24*   CALCIUM mg/dL 8.0* 7.8* 8.3*   GLUCOSE mg/dL 179* 262* 154*     Lab Results   Component Value Date    ANIONGAP 10.5 04/21/2018     Glucose   Date/Time Value Ref Range Status   04/21/2018 0634 184 (H) 70 - 130 mg/dL Final   04/20/2018 2116 208 (H) 70 - 130 mg/dL Final   04/20/2018 1627 231 (H) 70 - 130 mg/dL Final   04/20/2018 1034 236 (H) 70 - 130 mg/dL Final   04/20/2018 0608 236 (H) 70 - 130 mg/dL Final   04/19/2018 2116 219 (H) 70 - 130 mg/dL Final   04/19/2018 1645 195 (H) 70 - 130 mg/dL Final   04/19/2018 1052 252 (H) 70 - 130 mg/dL Final     Hemoglobin A1C   Date Value Ref Range Status   04/18/2018 8.49 (H) 4.80 - 5.60 % Final     Estimated Creatinine Clearance: 57.9 mL/min (by C-G formula based on SCr of 0.92 mg/dL).     1    04/19/18  0530 04/20/18  0628   Weight: 80.6 kg (177 lb 11.1 oz) 80.8 kg (178 lb 2.1 oz)       Intake/Output Summary (Last 24 hours) at 04/21/18 0909  Last data filed at 04/20/18 2100   Gross per 24 hour   Intake              960 ml   Output              650 ml   Net              310 ml     Assessment/Plan   Active Hospital Problems (** Indicates Principal Problem)    Diagnosis Date Noted   • **Arthritis of right knee [M17.11] 12/27/2016   • Type 2 diabetes mellitus without complication, without long-term current use of insulin [E11.9] 08/07/2017   • Non-obstructive hypertrophic cardiomyopathy [I42.2] 08/07/2017   • CAD (coronary artery disease) [I25.10] 08/07/2017   • Hypothyroidism [E03.9] 02/22/2017   • Obstructive sleep apnea, adult [G47.33]    • PAF (paroxysmal atrial fibrillation) [I48.0] 11/01/2016   • ICD (implantable cardioverter-defibrillator) discharge [Z45.02] 04/04/2016      Resolved Hospital Problems    Diagnosis Date Noted Date Resolved    No resolved problems to display.     · s/p right total knee replacement 4/18/18  · DVT prophylaxis: pradaxa  · DM 2 control not terrible, a1c not ideal (8.49). on correctional insulin  · creatinine normal  · volume status no CHF  · I discussed the patient's findings and my recommendations with patient.    Chacho Forte MD   04/21/18  9:09 AM

## 2018-04-21 NOTE — PLAN OF CARE
Problem: Patient Care Overview  Goal: Plan of Care Review  Outcome: Ongoing (interventions implemented as appropriate)   04/21/18 0523   Coping/Psychosocial   Plan of Care Reviewed With patient   OTHER   Outcome Summary Pt has been stable through the night. Ambulating with walker use and staff assistance. Working with PT and voiding per BRP. Pain increased this shift, Norco PRN given and 1 dose dialudid IV for breakthrough. Plans to d/c home today. Education provided on management of diabetes with glucose monitoring and insulin therapy.    Plan of Care Review   Progress improving     Goal: Individualization and Mutuality  Outcome: Ongoing (interventions implemented as appropriate)    Goal: Discharge Needs Assessment  Outcome: Ongoing (interventions implemented as appropriate)   04/21/18 0523   Discharge Needs Assessment   Readmission Within the Last 30 Days no previous admission in last 30 days   Concerns to be Addressed basic needs   Patient/Family Anticipates Transition to home with family;home with help/services   Patient/Family Anticipated Services at Transition home health care   Transportation Concerns car, none   Transportation Anticipated family or friend will provide   Anticipated Changes Related to Illness none   Equipment Needed After Discharge walker, standard;walker, rolling   Outpatient/Agency/Support Group Needs homecare agency   Discharge Facility/Level of Care Needs home with home health   Disability   Equipment Currently Used at Home none     Goal: Interprofessional Rounds/Family Conf  Outcome: Ongoing (interventions implemented as appropriate)   04/21/18 0523   Interdisciplinary Rounds/Family Conf   Participants nursing;patient       Problem: Fall Risk (Adult)  Goal: Absence of Fall  Outcome: Ongoing (interventions implemented as appropriate)   04/21/18 0523   Fall Risk (Adult)   Absence of Fall achieves outcome       Problem: Knee Arthroplasty (Total, Partial) (Adult)  Goal: Signs and Symptoms of  Listed Potential Problems Will be Absent, Minimized or Managed (Knee Arthroplasty)  Outcome: Ongoing (interventions implemented as appropriate)   04/21/18 0523   Goal/Outcome Evaluation   Problems Assessed (Knee Arthroplasty) pain;functional deficit;range of motion decreased;postoperative nausea and vomiting   Problems Present (Knee Arthroplasty) pain;range of motion decreased;postoperative nausea and vomiting     Goal: Anesthesia/Sedation Recovery  Outcome: Outcome(s) achieved Date Met: 04/21/18 04/21/18 0523   Goal/Outcome Evaluation   Anesthesia/Sedation Recovery recovered to baseline

## 2018-04-21 NOTE — THERAPY TREATMENT NOTE
Acute Care - Physical Therapy Treatment Note  [facility]     Patient Name: Kristan Galicia  : 1950  MRN: [id number]  Today's Date: 2018  Onset of Illness/Injury or Date of Surgery: 18     Referring Physician: Ankur    Admit Date: 2018    Visit Dx:    ICD-10-CM ICD-9-CM   1. Decreased mobility R26.89 781.99   2. Arthritis of right knee M17.11 716.96     Patient Active Problem List   Diagnosis   • Shoulder pain, right   • Knee pain, right   • Fall down stairs   • PAF (paroxysmal atrial fibrillation)   • S/P rotator cuff repair   • Vitamin D deficiency   • Vertigo   • Vasovagal syncope   • Thyroid disease   • Obstructive sleep apnea, adult   • Palpitations   • Osteoporosis   • Ocular tumor   • Migraines   • Hyperlipidemia   • History of transfusion   • Heart murmur   • Heart attack   • GERD (gastroesophageal reflux disease)   • Depression   • CHF (congestive heart failure)   • Cardiomyopathy   • Asthma   • Anxiety   • Arthritis of right knee   • Right rotator cuff tear   • Hypothyroidism   • Dyspnea and respiratory abnormalities   • Orthopnea   • IHSS (idiopathic hypertrophic subaortic stenosis)   • AICD (automatic cardioverter/defibrillator) present   • Anticoagulant long-term use   • Hyperglycemia, drug-induced   • Chronic pain of right knee   • Diverticulitis   • CAD (coronary artery disease)   • Hypertrophic nonobstructive cardiomyopathy   • ICD (implantable cardioverter-defibrillator) discharge   • Menopause   • Non-obstructive hypertrophic cardiomyopathy   • Type 2 diabetes mellitus without complication, without long-term current use of insulin   • Insomnia       Therapy Treatment          Rehabilitation Treatment Summary     Row Name 18 0830             Treatment Time/Intention    Discipline physical therapy assistant  -      Document Type therapy note (daily note)  -SM      Subjective Information complains of;pain  -SM      Mode of Treatment group therapy;physical therapy   -SM      Patient Effort good  -SM      Existing Precautions/Restrictions fall  -SM      Recorded by [] Conchita Dorado, PTA 04/21/18 1020 04/21/18 1020      Row Name 04/21/18 0830             Cognitive Assessment/Intervention    Additional Documentation Cognitive Assessment/Intervention (Group)  -SM      Recorded by [] Conchita Dorado, PTA 04/21/18 1020 04/21/18 1020      Row Name 04/21/18 0830             Cognitive Assessment/Intervention- PT/OT    Affect/Mental Status (Cognitive) WFL  -SM      Orientation Status (Cognition) oriented x 4  -SM      Follows Commands (Cognition) WFL  -SM      Cognitive Function (Cognitive) WFL  -SM      Personal Safety Interventions fall prevention program maintained;gait belt;nonskid shoes/slippers when out of bed  -SM      Recorded by [] Conchita Dorado, [facility] 04/21/18 1020 04/21/18 1020      Row Name 04/21/18 0830             Bed Mobility Assessment/Treatment    Bed Mobility Assessment/Treatment sit-supine  -      Supine-Sit [place] (Bed Mobility) not tested   up in chair  -SM      Sit-Supine [place] (Bed Mobility) supervision  -SM      Recorded by [] Conchita Dorado, [facility] 04/21/18 1020 04/21/18 1020      Row Name 04/21/18 0830             Transfer Assessment/Treatment    Transfer Assessment/Treatment sit-stand transfer;stand-sit transfer  -      Sit-Stand [place] (Transfers) stand by assist  -      Stand-Sit [place] (Transfers) stand by assist  -      Recorded by [] Conchita Dorado, PTA 04/21/18 1020 04/21/18 1020      Row Name 04/21/18 0830             Sit-Stand Transfer    Assistive Device (Sit-Stand Transfers) walker, front-wheeled  -      Recorded by [] Conchita Dorado, PTA 04/21/18 1020 04/21/18 1020      Row Name 04/21/18 0830             Stand-Sit Transfer    Assistive Device (Stand-Sit Transfers) walker, front-wheeled  -      Recorded by [] Conchita Droado, [facility] 04/21/18 1020 04/21/18 1020      Row Name  04/21/18 0830             Gait/Stairs Assessment/Training    Gait/Stairs Assessment/Training gait/ambulation independence;gait/ambulation assistive device;distance ambulated;gait deviations  -      [place] Level (Gait) stand by assist  -      Assistive Device (Gait) walker, front-wheeled  -      Distance in Feet (Gait) 50  -SM      Deviations/Abnormal Patterns (Gait) antalgic;esmer decreased;stride length decreased  -SM      Recorded by [] Conchita Dorado, \[facility]\"" 04/21/18 1020 04/21/18 1020      Row Name 04/21/18 0830             General ROM    RT Lower Ext Rt Knee Extension/Flexion  -SM      Recorded by [] Conchita Dorado, \[facility]\"" 04/21/18 1020 04/21/18 1020      Row Name 04/21/18 0830             Right Lower Ext    Rt Knee Extension/Flexion AROM 18-78  -SM      Recorded by [] Conchita Dorado, \[facility]\"" 04/21/18 1020 04/21/18 1020      Row Name 04/21/18 0830             Therapeutic Exercise    Comment (Therapeutic Exercise) R TKA x 30 reps  -SM      Recorded by [] Conchita Dorado, \[facility]\"" 04/21/18 1020 04/21/18 1020      Row Name 04/21/18 0830             Positioning and Restraints    Pre-Treatment Position sitting in chair/recliner  -      Post Treatment Position bed  -SM      In Bed supine;call light within reach;encouraged to call for assist  -SM      Recorded by [] Conchita Dorado, \[facility]\"" 04/21/18 1020 04/21/18 1020      Row Name 04/21/18 0830             Pain Assessment    Additional Documentation Pain Scale: Numbers Pre/Post-Treatment (Group)  -SM      Recorded by [] Conchita Dorado, \[facility]\"" 04/21/18 1020 04/21/18 1020      Row Name 04/21/18 0830             Pain Scale: Numbers Pre/Post-Treatment    Pain Scale: Numbers, Pretreatment 3/10  -SM      Pain Scale: Numbers, Post-Treatment 8/10  -SM      Pain Location - Side Right  -SM      Pain Location knee  -SM      Pain Intervention(s) Repositioned;Ambulation/increased activity;Rest  -SM      Recorded by [] Conchita Dorado, \[facility]\"" 04/21/18  1020 04/21/18 1020      Row Name                Wound 04/18/18 0953 Right knee incision    Wound - Properties Group Date first assessed: 04/18/18 [MB] Time first assessed: 0953 [MB] Side: Right [MB] Location: knee [MB] Type: incision [MB] Recorded by:  [MB] Aminata Allan RN 04/18/18 0953 04/18/18 0953      User Key  (r) = Recorded By, (t) = Taken By, (c) = Cosigned By    Initials Name Effective Dates Discipline    ARMAAN Allan RN 06/16/16 -  Nurse    IGNACIO Conchita Shira Dorado, PTA 03/07/18 -  PT          Wound 04/18/18 0953 Right knee incision (Active)   Dressing Appearance dry;intact;no drainage 4/20/2018  8:16 PM   Drainage Amount none 4/20/2018  8:16 PM   Dressing Care, Wound other (see comments) 4/21/2018  8:26 AM             Physical Therapy Education     Title: PT OT SLP Therapies (Done)     Topic: Physical Therapy (Done)     Point: Mobility training (Done)    Learning Progress Summary     Learner Status Readiness Method Response Comment Documented by    Patient Done Acceptance E,D VU,NR   04/21/18 1020     Done Acceptance E,D VU,NR   04/20/18 1443     Done Acceptance E,D VU   04/20/18 1158     Done Acceptance E,D VU,NR   04/19/18 1407     Done Acceptance E,D VU,NR   04/19/18 0942     Done Acceptance E,TB,D VU,NR  EE 04/18/18 1417          Point: Home exercise program (Done)    Learning Progress Summary     Learner Status Readiness Method Response Comment Documented by    Patient Done Acceptance E,D VU,NR   04/21/18 1020     Done Acceptance E,D VU,NR   04/20/18 1443     Done Acceptance E,D VU   04/20/18 1158     Done Acceptance E,D VU,NR   04/19/18 1407     Done Acceptance E,D VU,NR   04/19/18 0942     Done Acceptance E,TB,D VU,NR  EE 04/18/18 1417          Point: Body mechanics (Done)    Learning Progress Summary     Learner Status Readiness Method Response Comment Documented by    Patient Done Acceptance E,D VU,NR   04/21/18 1020     Done Acceptance E,D VU,NR   04/20/18 1440      Done Acceptance E,D VU   04/20/18 1158     Done Acceptance E,D VU,NR   04/19/18 1407     Done Acceptance E,D VU,NR  KH 04/19/18 0942     Done Acceptance E,TB,D VU,NR  EE 04/18/18 1417          Point: Precautions (Done)    Learning Progress Summary     Learner Status Readiness Method Response Comment Documented by    Patient Done Acceptance E,D VU,NR   04/21/18 1020     Done Acceptance E,D VU,NR   04/20/18 1443     Done Acceptance E,D VU   04/20/18 1158     Done Acceptance E,D VU,NR   04/19/18 1407     Done Acceptance E,D VU,NR   04/19/18 0942     Done Acceptance E,TB,D VU,NR   04/18/18 1417                      User Key     Initials Effective Dates Name Provider Type Discipline     05/18/15 -  Radha Martinez, PT Physical Therapist PT     04/03/18 -  Rossy Garcia, PT Physical Therapist PT     03/07/18 -  Conchita Dorado, PTA Physical Therapy Assistant PT                    PT Recommendation and Plan     Plan of Care Reviewed With: patient  Progress: improving          Outcome Measures     Row Name 04/21/18 1000 04/20/18 0945 04/19/18 1400       How much help from another person do you currently need...    Turning from your back to your side while in flat bed without using bedrails? 4  -SM 4  -KH 4  -KH    Moving from lying on back to sitting on the side of a flat bed without bedrails? 4  -SM 4  -KH 4  -KH    Moving to and from a bed to a chair (including a wheelchair)? 3  -SM 3  -KH 3  -KH    Standing up from a chair using your arms (e.g., wheelchair, bedside chair)? 4  -SM 3  -KH 3  -KH    Climbing 3-5 steps with a railing? 3  -SM 3  -KH 3  -KH    To walk in hospital room? 3  -SM 3  -KH 3  -KH    AM-PAC 6 Clicks Score 21  -SM 20  -KH 20  -KH       Functional Assessment    Outcome Measure Options AM-PAC 6 Clicks Basic Mobility (PT)  -  -- AM-PAC 6 Clicks Basic Mobility (PT)  -KH    Row Name 04/19/18 0943 04/18/18 1400          How much help from another person do you currently  need...    Turning from your back to your side while in flat bed without using bedrails? 4  -KH 4  -EE     Moving from lying on back to sitting on the side of a flat bed without bedrails? 4  -KH 4  -EE     Moving to and from a bed to a chair (including a wheelchair)? 3  -KH 3  -EE     Standing up from a chair using your arms (e.g., wheelchair, bedside chair)? 3  -KH 3  -EE     Climbing 3-5 steps with a railing? 3  -KH 2  -EE     To walk in hospital room? 3  -KH 3  -EE     AM-PAC 6 Clicks Score 20  -KH 19  -EE        Functional Assessment    Outcome Measure Options AM-PAC 6 Clicks Basic Mobility (PT)  -KH AM-PAC 6 Clicks Basic Mobility (PT)  -EE       User Key  (r) = Recorded By, (t) = Taken By, (c) = Cosigned By    Initials Name Provider Type     Radha Martinez, PT Physical Therapist     Rossy Garcia, PT Physical Therapist     Conchita Dorado PTA Physical Therapy Assistant           Time Calculation:         PT Charges     Row Name 04/21/18 1021             Time Calculation    Start Time 0830  -      Stop Time 0930  -      Time Calculation (min) 60 min  -      PT Received On 04/21/18  -      PT - Next Appointment 04/21/18  -        User Key  (r) = Recorded By, (t) = Taken By, (c) = Cosigned By    Initials Name Provider Type     Conchita Dorado PTA Physical Therapy Assistant          Therapy Charges for Today     Code Description Service Date Service Provider Modifiers Qty    97023719303 HC PT THER PROC EA 15 MIN 4/21/2018 Conchita Dorado PTA GP 1    74377491313 HC PT THER PROC GROUP 4/21/2018 Conchita Dorado PTA GP 1          PT G-Codes  Outcome Measure Options: AM-PAC 6 Clicks Basic Mobility (PT)    Conchita Dorado PTA  4/21/2018

## 2018-04-23 RX ORDER — DULOXETIN HYDROCHLORIDE 60 MG/1
60 CAPSULE, DELAYED RELEASE ORAL DAILY
Qty: 90 CAPSULE | Refills: 0 | Status: SHIPPED | OUTPATIENT
Start: 2018-04-23 | End: 2018-08-07 | Stop reason: SDUPTHER

## 2018-04-26 RX ORDER — PANTOPRAZOLE SODIUM 40 MG/1
40 TABLET, DELAYED RELEASE ORAL DAILY
Qty: 30 TABLET | Refills: 2 | Status: SHIPPED | OUTPATIENT
Start: 2018-04-26 | End: 2018-07-25

## 2018-04-26 RX ORDER — LEVOTHYROXINE SODIUM 88 UG/1
88 TABLET ORAL DAILY
Qty: 30 TABLET | Refills: 1 | Status: SHIPPED | OUTPATIENT
Start: 2018-04-26 | End: 2018-07-23 | Stop reason: SDUPTHER

## 2018-04-26 RX ORDER — PANTOPRAZOLE SODIUM 40 MG/1
40 TABLET, DELAYED RELEASE ORAL DAILY
Qty: 30 TABLET | Refills: 2 | Status: CANCELLED | OUTPATIENT
Start: 2018-04-26

## 2018-05-02 ENCOUNTER — OFFICE VISIT (OUTPATIENT)
Dept: ORTHOPEDIC SURGERY | Facility: CLINIC | Age: 68
End: 2018-05-02

## 2018-05-02 VITALS — TEMPERATURE: 97.7 F | WEIGHT: 199.4 LBS | BODY MASS INDEX: 37.65 KG/M2 | HEIGHT: 61 IN

## 2018-05-02 DIAGNOSIS — Z96.651 HISTORY OF TOTAL RIGHT KNEE REPLACEMENT: Primary | ICD-10-CM

## 2018-05-02 PROCEDURE — 99024 POSTOP FOLLOW-UP VISIT: CPT | Performed by: NURSE PRACTITIONER

## 2018-05-02 RX ORDER — HYDROCODONE BITARTRATE AND ACETAMINOPHEN 7.5; 325 MG/1; MG/1
TABLET ORAL
Qty: 100 TABLET | Refills: 0
Start: 2018-05-02 | End: 2018-05-02 | Stop reason: SDUPTHER

## 2018-05-02 RX ORDER — HYDROCODONE BITARTRATE AND ACETAMINOPHEN 7.5; 325 MG/1; MG/1
TABLET ORAL
Qty: 100 TABLET | Refills: 0 | Status: SHIPPED | OUTPATIENT
Start: 2018-05-02 | End: 2018-05-02

## 2018-05-02 RX ORDER — HYDROCODONE BITARTRATE AND ACETAMINOPHEN 10; 325 MG/1; MG/1
1-2 TABLET ORAL EVERY 4 HOURS PRN
Qty: 100 TABLET | Refills: 0 | Status: SHIPPED | OUTPATIENT
Start: 2018-05-02 | End: 2018-05-17 | Stop reason: ALTCHOICE

## 2018-05-02 NOTE — PATIENT INSTRUCTIONS
DIAGNOSIS: 2 week follow up right total knee arthroplasty     SUBJECTIVE:Patient returns today for 2 week follow up of right total knee replacement. Patient reports doing well with no unusual complaints. Appears to be progressing appropriately. Is being released from rehab tomorrow to go home.      OBJECTIVE:   Exam:. The incision is healing appropriately but mid incision is overlayed and healing from inside out.  Dr. Pascual in to check on incision. No sign of infection. Range of motion is progressing as expected -3/97. The calf is soft and nontender with a negative Homans sign.     DIAGNOSTIC STUDIES  2V AP&Lat of the right knee were done in the office today     Indication, findings and comparison: images were reviewed for evaluation of recent knee replacement. They demonstrate a well positioned, well aligned knee replacement without complicating factors noted. In comparison with previous films there has been interval implant placement.     ASSESSMENT: 2 week status post right total knee replacement.     PLAN: 1) Staples removed and steri strips applied, keep incision covered until all drainage has stopped, may shower and gently wash.               2) Order given for PT and pain medicine (as needed)              3) Continue ice PRN              4) You may now resume any preoperative medications or supplements that you were taking prior to surgery.               5) Follow up in 2 weeks               6) Continue with antibiotic prophylaxis with dental procedures for 2 yrs post total joint replacement.     Chiquita Kim, APRN  5/2/2018                 Pain Medications utilized after surgery are narcotics and the law requires that the following information be given to all patients that are prescribed narcotics:     CLASSIFICATION: Pain medications are called Opioids and are narcotics  LEGALITIES: It is illegal to share narcotics with others and to drive within 4 hours of taking narcotics  POTENTIAL SIDE EFFECTS:  Potential side effects of opioids include: nausea, vomiting, itching, dizziness, drowsiness, dry mouth, constipation, and difficulty urinating.  POTENTIAL ADVERSE EFFECTS:   Opioid tolerance can develop with use of pain medications and this simply means that it requires more and more of the medication to control pain; however, this is seen more in patients that use opioids for longer periods of time.  Opioid dependence can develop with use of Opioids and this simply means that to stop the medication can cause withdrawal symptoms so wean gradually (do not stop all at once); however, this is seen more with patients that use opioids for longer periods of time.  Opioid addiction can develop with use of Opioids and the incidence of this is very unlikely in patients who take the medications as ordered and stop the medications as instructed.  Opioid overdose can be dangerous, but is unlikely when the medication is taken as ordered and stopped when ordered. It is important not to mix opioids with alcohol or with and type of sedative such as Benadryl as this can lead to over sedation and respiratory difficulty.     DOSAGE:   Pain medications will need to be taken consistently for the first week to decrease pain and promote adequate pain relief and participation in physical therapy.     After the initial surgical pain begins to resolve, you may begin to decrease the pain medication. By the end of 8 weeks, you should be off of pain medications.     Refills will not be given by the office during evening hours, on weekends.  To seek refills on pain medications during the initial 6 week post-operative period, you must call the office 48 hours in advance to request the refill. The office will then notify you when to  the prescription. DO NOT wait until you are out of the medication to request a refill.     A VASYL check will be made on-line, and will be repeated if prescription is renewed after a 90 day period. The patient  agrees to adhering to the medication regimen as prescribed.

## 2018-05-02 NOTE — PROGRESS NOTES
Kristan Galicia : 1950 MRN: [id number] DATE: 2018  Body mass index is 37.68 kg/m².  Vitals:    18 1518   Temp: 97.7 °F (36.5 °C)       DIAGNOSIS: 2 week follow up right total knee arthroplasty    SUBJECTIVE:Patient returns today for 2 week follow up of right total knee replacement. Patient reports doing well with no unusual complaints. Appears to be progressing appropriately. Is being released from rehab tomorrow to go home.     OBJECTIVE:   Exam:. The incision is healing appropriately but mid incision is overlayed and healing from inside out.  Dr. Pascual in to check on incision. No sign of infection. Range of motion is progressing as expected -3/97. The calf is soft and nontender with a negative Homans sign.    DIAGNOSTIC STUDIES  2V AP&Lat of the right knee were done in the office today    Indication, findings and comparison: images were reviewed for evaluation of recent knee replacement. They demonstrate a well positioned, well aligned knee replacement without complicating factors noted. In comparison with previous films there has been interval implant placement.    ASSESSMENT: 2 week status post right total knee replacement.    PLAN: 1) Staples removed and steri strips applied, keep incision covered until all drainage has stopped, may shower and gently wash.    2) Order given for PT and pain medicine (as needed)   3) Continue ice PRN   4) You may now resume any preoperative medications or supplements that you were taking prior to surgery.    5) Follow up in 4 weeks    6) Continue with antibiotic prophylaxis with dental procedures for 2 yrs post total joint replacement.    Chiquita Kim, APRN  2018      Pain Medications utilized after surgery are narcotics and the law requires that the following information be given to all patients that are prescribed narcotics:    CLASSIFICATION: Pain medications are called Opioids and are narcotics  LEGALITIES: It is illegal to share narcotics with  others and to drive within 4 hours of taking narcotics  POTENTIAL SIDE EFFECTS: Potential side effects of opioids include: nausea, vomiting, itching, dizziness, drowsiness, dry mouth, constipation, and difficulty urinating.  POTENTIAL ADVERSE EFFECTS:   Opioid tolerance can develop with use of pain medications and this simply means that it requires more and more of the medication to control pain; however, this is seen more in patients that use opioids for longer periods of time.  Opioid dependence can develop with use of Opioids and this simply means that to stop the medication can cause withdrawal symptoms so wean gradually (do not stop all at once); however, this is seen more with patients that use opioids for longer periods of time.  Opioid addiction can develop with use of Opioids and the incidence of this is very unlikely in patients who take the medications as ordered and stop the medications as instructed.  Opioid overdose can be dangerous, but is unlikely when the medication is taken as ordered and stopped when ordered. It is important not to mix opioids with alcohol or with and type of sedative such as Benadryl as this can lead to over sedation and respiratory difficulty.    DOSAGE:   Pain medications will need to be taken consistently for the first week to decrease pain and promote adequate pain relief and participation in physical therapy.    After the initial surgical pain begins to resolve, you may begin to decrease the pain medication. By the end of 8 weeks, you should be off of pain medications.    Refills will not be given by the office during evening hours, on weekends.  To seek refills on pain medications during the initial 6 week post-operative period, you must call the office 48 hours in advance to request the refill. The office will then notify you when to  the prescription. DO NOT wait until you are out of the medication to request a refill.    A VASYL check will be made on-line, and  will be repeated if prescription is renewed after a 90 day period. The patient agrees to adhering to the medication regimen as prescribed.

## 2018-05-17 ENCOUNTER — OFFICE VISIT (OUTPATIENT)
Dept: ORTHOPEDIC SURGERY | Facility: CLINIC | Age: 68
End: 2018-05-17

## 2018-05-17 VITALS — BODY MASS INDEX: 33.53 KG/M2 | TEMPERATURE: 98 F | HEIGHT: 61 IN | WEIGHT: 177.6 LBS

## 2018-05-17 DIAGNOSIS — Z96.651 HISTORY OF TOTAL KNEE ARTHROPLASTY, RIGHT: Primary | ICD-10-CM

## 2018-05-17 PROCEDURE — 99024 POSTOP FOLLOW-UP VISIT: CPT | Performed by: NURSE PRACTITIONER

## 2018-05-17 PROCEDURE — 73562 X-RAY EXAM OF KNEE 3: CPT | Performed by: NURSE PRACTITIONER

## 2018-05-17 RX ORDER — HYDROCODONE BITARTRATE AND ACETAMINOPHEN 5; 325 MG/1; MG/1
TABLET ORAL
Qty: 60 TABLET | Refills: 0 | Status: SHIPPED | OUTPATIENT
Start: 2018-05-17 | End: 2018-09-17 | Stop reason: HOSPADM

## 2018-05-17 NOTE — PATIENT INSTRUCTIONS
Chief Complaint and HPI: 4 weeks follow up right total knee    SUBJECTIVE: Patient returns today for follow up of total joint replacement. Patient reports doing well with no unusual complaints. Appears to be progressing appropriately.    OBJECTIVE:   Exam:. The incision is healing appropriately. Suture granuloma visible, applied betadine but not loose yet, care instructions given.  No sign of infection. Range of motion is progressing as expected. The calf is soft and nontender with a negative Homans sign.    DIAGNOSTIC STUDIES  Imaging done today, images were personally viewed and discussed with the patient:    Indication, findings and comparison:2V AP&Lat of the operative joint were done in the office today  images were reviewed for evaluation of recent joint replacement. They demonstrate a well positioned, well aligned total joint replacement without complicating factors noted. In comparison with previous films there has been no alignment change.    ASSESSMENT: status post right total knee replacement expected healing.    PLAN: 1) Continue with PT exercises as prescribed   2) Follow up 1 MONTHS  WITH XRAYS (2 views of same joint).   3) Continue with antibiotic prophylaxis with dental procedures for 2 yrs post total joint replacement.   4) May discontinue anticoagulant therapy (such as aspirin or xarelto) from surgery at this time and resume medications, vitamins, and supplements you were taking before surgery.    5) Apply hydrogen peroxide to suture granuloma for a few min then pull the visible suture off, apply bedatine and antibiotic ointment and cover with bandaid until healed over.     Chiquita Kim, APRN  5/17/2018     Pain Medications utilized after surgery are narcotics and the law requires that the following information be given to all patients that are prescribed narcotics:    CLASSIFICATION: Pain medications are called Opioids and are narcotics  LEGALITIES: It is illegal to share narcotics with others  and to drive within 4 hours of taking narcotics  POTENTIAL SIDE EFFECTS: Potential side effects of opioids include: nausea, vomiting, itching, dizziness, drowsiness, dry mouth, constipation, and difficulty urinating.  POTENTIAL ADVERSE EFFECTS:   Opioid tolerance can develop with use of pain medications and this simply means that it requires more and more of the medication to control pain; however, this is seen more in patients that use opioids for longer periods of time.  Opioid dependence can develop with use of Opioids and this simply means that to stop the medication can cause withdrawal symptoms so wean gradually (do not stop all at once); however, this is seen more with patients that use opioids for longer periods of time.  Opioid addiction can develop with use of Opioids and the incidence of this is very unlikely in patients who take the medications as ordered and stop the medications as instructed.  Opioid overdose can be dangerous, but is unlikely when the medication is taken as ordered and stopped when ordered. It is important not to mix opioids with alcohol or with and type of sedative such as Benadryl as this can lead to over sedation and respiratory difficulty.    DOSAGE:   Pain medications will need to be taken consistently for the first week to decrease pain and promote adequate pain relief and participation in physical therapy.    After the initial surgical pain begins to resolve, you may begin to decrease the pain medication. By the end of 8 weeks, you should be off of pain medications.    Refills will not be given by the office during evening hours, on weekends.  To seek refills on pain medications during the initial 6 week post-operative period, you must call the office 48 hours in advance to request the refill. The office will then notify you when to  the prescription. DO NOT wait until you are out of the medication to request a refill.    A VASYL check will be made on-line, and will be  repeated if prescription is renewed after a 90 day period. The patient agrees to adhering to the medication regimen as prescribed.

## 2018-05-17 NOTE — PROGRESS NOTES
Kristan Galicia : 1950 MRN: [id number] DATE: 2018  Body mass index is 33.56 kg/m².  Vitals:    18 1024   Temp: 98 °F (36.7 °C)       Chief Complaint and HPI: 4 weeks follow up right total knee    SUBJECTIVE: Patient returns today for follow up of total joint replacement. Patient reports doing well with no unusual complaints. Appears to be progressing appropriately.    OBJECTIVE:   Exam:. The incision is healing appropriately. Suture granuloma visible, applied betadine but not loose yet, care instructions given.  No sign of infection. Range of motion is progressing as expected. The calf is soft and nontender with a negative Homans sign.    DIAGNOSTIC STUDIES  Imaging done today, images were personally viewed and discussed with the patient:    Indication, findings and comparison:2V AP&Lat of the operative joint were done in the office today  images were reviewed for evaluation of recent joint replacement. They demonstrate a well positioned, well aligned total joint replacement without complicating factors noted. In comparison with previous films there has been no alignment change.    ASSESSMENT: status post right total knee replacement expected healing.    PLAN: 1) Continue with PT exercises as prescribed   2) Follow up 1 MONTHS  WITH XRAYS (2 views of same joint).   3) Continue with antibiotic prophylaxis with dental procedures for 2 yrs post total joint replacement.   4) May discontinue anticoagulant therapy (such as aspirin or xarelto) from surgery at this time and resume medications, vitamins, and supplements you were taking before surgery.    5) Apply hydrogen peroxide to suture granuloma for a few min then pull the visible suture off, apply bedatine and antibiotic ointment and cover with bandaid until healed over.     Chiquita Kim, APRN  2018     Pain Medications utilized after surgery are narcotics and the law requires that the following information be given to all patients that are  prescribed narcotics:    CLASSIFICATION: Pain medications are called Opioids and are narcotics  LEGALITIES: It is illegal to share narcotics with others and to drive within 4 hours of taking narcotics  POTENTIAL SIDE EFFECTS: Potential side effects of opioids include: nausea, vomiting, itching, dizziness, drowsiness, dry mouth, constipation, and difficulty urinating.  POTENTIAL ADVERSE EFFECTS:   Opioid tolerance can develop with use of pain medications and this simply means that it requires more and more of the medication to control pain; however, this is seen more in patients that use opioids for longer periods of time.  Opioid dependence can develop with use of Opioids and this simply means that to stop the medication can cause withdrawal symptoms so wean gradually (do not stop all at once); however, this is seen more with patients that use opioids for longer periods of time.  Opioid addiction can develop with use of Opioids and the incidence of this is very unlikely in patients who take the medications as ordered and stop the medications as instructed.  Opioid overdose can be dangerous, but is unlikely when the medication is taken as ordered and stopped when ordered. It is important not to mix opioids with alcohol or with and type of sedative such as Benadryl as this can lead to over sedation and respiratory difficulty.    DOSAGE:   Pain medications will need to be taken consistently for the first week to decrease pain and promote adequate pain relief and participation in physical therapy.    After the initial surgical pain begins to resolve, you may begin to decrease the pain medication. By the end of 8 weeks, you should be off of pain medications.    Refills will not be given by the office during evening hours, on weekends.  To seek refills on pain medications during the initial 6 week post-operative period, you must call the office 48 hours in advance to request the refill. The office will then notify you  when to  the prescription. DO NOT wait until you are out of the medication to request a refill.    A VASYL check will be made on-line, and will be repeated if prescription is renewed after a 90 day period. The patient agrees to adhering to the medication regimen as prescribed.

## 2018-05-21 ENCOUNTER — HOSPITAL ENCOUNTER (OUTPATIENT)
Dept: SLEEP MEDICINE | Facility: HOSPITAL | Age: 68
End: 2018-05-21
Attending: PSYCHIATRY & NEUROLOGY

## 2018-06-01 RX ORDER — ALLOPURINOL 100 MG/1
TABLET ORAL
Qty: 30 TABLET | Refills: 1 | Status: SHIPPED | OUTPATIENT
Start: 2018-06-01 | End: 2019-01-18 | Stop reason: SDUPTHER

## 2018-06-15 ENCOUNTER — TELEPHONE (OUTPATIENT)
Dept: FAMILY MEDICINE CLINIC | Facility: CLINIC | Age: 68
End: 2018-06-15

## 2018-06-15 NOTE — TELEPHONE ENCOUNTER
Wants to know what she should do regarding [facility]. She is going to try to contact them today but if they are not back in the office she wants to know what to do.

## 2018-06-15 NOTE — TELEPHONE ENCOUNTER
She will have to make a FU apt to discuss, we may be able to write her medication until [facility] gets into [facility]

## 2018-06-18 RX ORDER — ATORVASTATIN CALCIUM 80 MG/1
80 TABLET, FILM COATED ORAL NIGHTLY
Qty: 90 TABLET | Refills: 1 | Status: SHIPPED | OUTPATIENT
Start: 2018-06-18 | End: 2019-02-26 | Stop reason: SDUPTHER

## 2018-06-21 ENCOUNTER — TELEPHONE (OUTPATIENT)
Dept: ORTHOPEDIC SURGERY | Facility: CLINIC | Age: 68
End: 2018-06-21

## 2018-06-21 NOTE — TELEPHONE ENCOUNTER
Patient had a Rt TKA on 4/18/18. She stated that she has been having a lot of radiating/shooting pains in her Rt knee. There is no swelling but is hot to touch on the rt side of her knee. She stated she has been icing, elevating, and applying pain patches on her knee and none of those are giving her relief. Next available appt is on July 25th, can she be worked in sooner? Please advise. Thank you!

## 2018-06-22 NOTE — TELEPHONE ENCOUNTER
Called and spoke with Kristan and answered questions.  She is having some nerve tingling and normal warmth following TKA.  Enc to do daily exercises for a min of 6 months. Verb under. Is doing well with bending and wt bearing. Will need f/u appt in July for 3 mo s/p TKA.  Please call her to set this up.  Thanks EUSEBIO

## 2018-06-25 RX ORDER — SPIRONOLACTONE AND HYDROCHLOROTHIAZIDE 25; 25 MG/1; MG/1
TABLET ORAL
Qty: 90 TABLET | Refills: 1 | Status: SHIPPED | OUTPATIENT
Start: 2018-06-25 | End: 2020-10-15

## 2018-07-10 ENCOUNTER — TELEPHONE (OUTPATIENT)
Dept: FAMILY MEDICINE CLINIC | Facility: CLINIC | Age: 68
End: 2018-07-10

## 2018-07-11 ENCOUNTER — HOSPITAL ENCOUNTER (OUTPATIENT)
Dept: SLEEP MEDICINE | Facility: HOSPITAL | Age: 68
Discharge: HOME | End: 2018-07-11
Attending: PSYCHIATRY & NEUROLOGY | Admitting: PSYCHIATRY & NEUROLOGY

## 2018-07-11 DIAGNOSIS — G47.33 OBSTRUCTIVE SLEEP APNEA, ADULT: ICD-10-CM

## 2018-07-11 DIAGNOSIS — I50.40 COMBINED SYSTOLIC AND DIASTOLIC CONGESTIVE HEART FAILURE, UNSPECIFIED CONGESTIVE HEART FAILURE CHRONICITY: ICD-10-CM

## 2018-07-11 DIAGNOSIS — G47.00 INSOMNIA, UNSPECIFIED TYPE: ICD-10-CM

## 2018-07-11 DIAGNOSIS — I48.0 PAF (PAROXYSMAL ATRIAL FIBRILLATION) (HCC): ICD-10-CM

## 2018-07-11 PROCEDURE — 95810 POLYSOM 6/> YRS 4/> PARAM: CPT

## 2018-07-17 ENCOUNTER — TELEPHONE (OUTPATIENT)
Dept: SLEEP MEDICINE | Facility: HOSPITAL | Age: 68
End: 2018-07-17

## 2018-07-17 ENCOUNTER — OFFICE VISIT (OUTPATIENT)
Dept: ORTHOPEDIC SURGERY | Facility: CLINIC | Age: 68
End: 2018-07-17

## 2018-07-17 VITALS — WEIGHT: 174.8 LBS | BODY MASS INDEX: 33 KG/M2 | HEIGHT: 61 IN | TEMPERATURE: 98.2 F

## 2018-07-17 DIAGNOSIS — Z96.651 HISTORY OF TOTAL KNEE ARTHROPLASTY, RIGHT: Primary | ICD-10-CM

## 2018-07-17 PROCEDURE — 73560 X-RAY EXAM OF KNEE 1 OR 2: CPT | Performed by: NURSE PRACTITIONER

## 2018-07-17 PROCEDURE — 99212 OFFICE O/P EST SF 10 MIN: CPT | Performed by: NURSE PRACTITIONER

## 2018-07-17 NOTE — PATIENT INSTRUCTIONS
Do Daily THIGH exercises as demonstrated in the office sitting up straight in a supportive chair and leaning forward as you lift the thigh.  Give assist with your hand under the knee to lift as needed. 15x on each thigh once a day.     Use topical Blue ice (generic menthol gel)  for areas of inflammation.

## 2018-07-17 NOTE — PROGRESS NOTES
NAME: Kristan Galicia  : 1950   MRN: [id number]   DATE: 2018    CC: 3 months Follow up status post total joint replacement    SUBJECTIVE:    HPI: Patient returns today for a follow up of total joint replacement. Patient reports doing well with no unusual complaints. Appears to be progressing appropriately.  HPI    This problem is not new to this examiner.     Allergies:   Allergies   Allergen Reactions   • Adhesive Tape Other (See Comments)     SKIN BLISTERS   • Aspirin Swelling   • Biaxin [Clarithromycin] Other (See Comments)     BLISTERS AROUND MOTH  Also known as Bioxen    • Codeine Swelling   • Darvon [Propoxyphene] Swelling   • Levaquin [Levofloxacin] Other (See Comments)     BLISTERS AROUND MOUTH   • Tequin [Gatifloxacin] Other (See Comments)     BLISTERS AROUND MOUTH  Also known Tequine        Medications:   Home Medications:  Current Outpatient Prescriptions on File Prior to Visit   Medication Sig   • alendronate (FOSAMAX) 70 MG tablet Take 1 tablet by mouth Every 7 (Seven) Days.   • allopurinol (ZYLOPRIM) 100 MG tablet TAKE 1 TABLET BY MOUTH DAILY.   • amiodarone (PACERONE) 200 MG tablet Take 100 mg by mouth Every Night.   • atorvastatin (LIPITOR) 80 MG tablet TAKE 1 TABLET BY MOUTH EVERY NIGHT.   • baclofen (LIORESAL) 10 MG tablet Take 10 mg by mouth At Night As Needed for Muscle Spasms.   • bisacodyl (DULCOLAX) 5 MG EC tablet Take 2 tablets by mouth Daily As Needed for Constipation.   • carvedilol (COREG) 12.5 MG tablet Take 12.5 mg by mouth 2 (Two) Times a Day.   • dabigatran etexilate (PRADAXA) 150 MG capsu Take 150 mg by mouth 2 (Two) Times a Day. TO HOLD 5 DAYS PRIOR TO OR PER CARDIO   • DULoxetine (CYMBALTA) 60 MG capsule TAKE 1 CAPSULE BY MOUTH DAILY.   • gabapentin (NEURONTIN) 300 MG capsule Take 300 mg by mouth 3 (Three) Times a Day.   • levothyroxine (SYNTHROID, LEVOTHROID) 88 MCG tablet TAKE 1 TABLET BY MOUTH DAILY.   • lidocaine (LIDODERM) 5 % Place 1 patch on the skin Daily As  Needed for Mild Pain . Remove & Discard patch within 12 hours or as directed by MD   • montelukast (SINGULAIR) 10 MG tablet Take 10 mg by mouth Every Night.   • pantoprazole (PROTONIX) 40 MG EC tablet Take 1 tablet by mouth Daily for 90 days.   • spironolactone-hydrochlorothiazide (ALDACTAZIDE) 25-25 MG tablet TAKE 1 TABLET BY MOUTH EVERY DAY   • traZODone (DESYREL) 50 MG tablet Take 100 mg by mouth At Night As Needed for Sleep.   • [DISCONTINUED] allopurinol (ZYLOPRIM) 100 MG tablet Take 100 mg by mouth Daily.   • [DISCONTINUED] spironolactone-hydrochlorothiazide (ALDACTAZIDE) 25-25 MG tablet Take 1 tablet by mouth Daily.   • Blood Glucose Monitoring Suppl (ACCU-CHEK OTILIA PLUS) W/DEVICE kit Dx e11.9 use to check BS BID, ok to substitute formulary preferred   • docusate sodium 100 MG capsule Take 100 mg by mouth 2 (Two) Times a Day.   • glucose blood (ACCU-CHEK OTILIA PLUS) test strip Dx e11.9 use to check BS BID, ok to substitute formulary preferred   • HYDROcodone-acetaminophen (NORCO) 5-325 MG per tablet 1-2 oral Q4H PRN severe pain   • Lancets (ACCU-CHEK MULTICLIX) lancets Dx e11.9 use to check BS BID, ok to substitute formulary preferred   • polyethylene glycol (MIRALAX) pack packet Take 17 g by mouth Daily.   • SITagliptin (JANUVIA) 100 MG tablet Take 1 tablet by mouth Daily.     No current facility-administered medications on file prior to visit.        Current Medications:  Scheduled Meds:  Continuous Infusions:  No current facility-administered medications for this visit.   PRN Meds:.    I have reviewed the patient's medical history in detail and updated the computerized patient record.  Review and summarization of old records include:    Past Medical History:   Diagnosis Date   • Anemia    • Arthritis    • Atrial fibrillation (CMS/HCC)    • Cardiac defibrillator in place 01/2009    medtronic    • Cardiomyopathy (CMS/HCC)     Hypertrophic Cardiomyopathy   • CHF (congestive heart failure) (CMS/HCC)    •  Chronic pain    • Coronary artery disease    • Diabetes mellitus (CMS/HCC)     Type II   • Diverticulosis     hx diverticulitis   • GERD (gastroesophageal reflux disease)    • Heart murmur    • History of depression    • History of tumor     left ocular tumor, treated with steroids   • Hyperlipidemia    • Migraines    • On anticoagulant therapy    • Osteoporosis    • Palpitations    • Sleep apnea     does not use CPAP or BiPAP, used nose strips   • Thyroid disease    • Vasovagal syncope    • Vertigo         Past Surgical History:   Procedure Laterality Date   • ANTERIOR CERVICAL DISCECTOMY W/ FUSION  2012    C5-7   • APPENDECTOMY  1970   • BILATERAL BREAST REDUCTION     • CARDIAC CATHETERIZATION  02/19/2007   • CARDIAC DEFIBRILLATOR PLACEMENT Left 1999, 2013    Dr. Ya   • CATARACT EXTRACTION Bilateral    • COLONOSCOPY  1996    Dr. Herbert Gonsales    • COLONOSCOPY  8/16/2016    Procedure: COLONOSCOPY with cecum;  Surgeon: Rosalio Sheikh MD;  Location: [facility] ENDOSCOPY;  Service:    • ENDOSCOPY N/A 4/27/2017    Procedure: ESOPHAGOGASTRODUODENOSCOPY WITH BIOPSIES;  Surgeon: Rosalio Sheikh MD;  Location: [facility] ENDOSCOPY;  Service:    • FEMUR SURGERY Left     with metal implant  x3   • FIRST RIB RESECTION Bilateral     and scalenectomy   • HYSTERECTOMY  1989    Total   • KNEE ARTHROSCOPY Bilateral 2014    Dr. Li   • KNEE SURGERY Right 06/10/2010   • LAPAROSCOPIC CHOLECYSTECTOMY  1974   • ROTATOR CUFF REPAIR Right 06/10/2010   • SHOULDER ARTHROSCOPY W/ ROTATOR CUFF REPAIR Bilateral 05/20/2011   • SHOULDER SURGERY Right    • TONSILLECTOMY     • TOTAL KNEE ARTHROPLASTY Right 4/18/2018    Procedure: RIGHT TOTAL KNEE ARTHROPLASTY WITH STEVE NAVIGATION;  Surgeon: Bravo Pascual MD;  Location: [facility] OR;  Service: Orthopedics   • TOTAL SHOULDER ARTHROPLASTY W/ DISTAL CLAVICLE EXCISION Right 2/2/2017    Procedure: TOTAL SHOULDER REVERSE ARTHROPLASTY;  Surgeon: Juan Antonio Anne MD;  Location: [facility] OR;   Service:         Social History     Occupational History   •       Social History Main Topics   • Smoking status: Former Smoker     Packs/day: 0.50     Years: 17.00     Types: Cigarettes     Quit date: 9/8/1985   • Smokeless tobacco: Never Used   • Alcohol use No   • Drug use: No   • Sexual activity: Defer    Social History     Social History Narrative   • No narrative on file        Family History   Problem Relation Age of Onset   • Heart attack Brother    • Heart disease Brother    • Hyperthyroidism Mother    • Cancer Mother    • Heart disease Mother    • Osteoporosis Mother    • No Known Problems Father          car accident   • Cirrhosis Maternal Grandmother    • No Known Problems Maternal Grandfather    • No Known Problems Paternal Grandmother    • No Known Problems Paternal Grandfather    • Breast cancer Maternal Aunt    • Malig Hyperthermia Neg Hx        ROS: 14 point review of systems was performed and was negative except for documented findings in HPI and today's encounter.     Allergies:   Allergies   Allergen Reactions   • Adhesive Tape Other (See Comments)     SKIN BLISTERS   • Aspirin Swelling   • Biaxin [Clarithromycin] Other (See Comments)     BLISTERS AROUND MOTH  Also known as Bioxen    • Codeine Swelling   • Darvon [Propoxyphene] Swelling   • Levaquin [Levofloxacin] Other (See Comments)     BLISTERS AROUND MOUTH   • Tequin [Gatifloxacin] Other (See Comments)     BLISTERS AROUND MOUTH  Also known Tequine      Constitutional:  Denies fever, shaking or chills   Eyes:  Denies change in visual acuity   HENT:  Denies nasal congestion or sore throat   Respiratory:  Denies cough or shortness of breath   Cardiovascular:  Denies chest pain or severe LE edema   GI:  Denies abdominal pain, nausea, vomiting, bloody stools or diarrhea   Musculoskeletal:  Denies numbness tingling or loss of motor function except as outlined above in history of present illness.  : Denies painful  "urination or hematuria  Integument:  Denies rash, lesion or ulceration   Neurologic:  Denies headache or focal weakness  Endocrine:  Denies lymphadenopathy  Psych:  Denies confusion or change in mental status   Hem:  Denies active bleeding        OBJECTIVE:     Physical Exam:  Vital Signs:    Wt Readings from Last 3 Encounters:   07/17/18 79.3 kg (174 lb 12.8 oz)   05/17/18 80.6 kg (177 lb 9.6 oz)   05/02/18 90.4 kg (199 lb 6.4 oz)     Ht Readings from Last 3 Encounters:   07/17/18 154.9 cm (60.98\")   05/17/18 154.9 cm (61\")   05/02/18 154.9 cm (61\")     Body mass index is 33.05 kg/m².  Facility age limit for growth percentiles is 20 years.  Vitals:    07/17/18 1043   Temp: 98.2 °F (36.8 °C)       Constitutional: Awake alert and oriented x3, well developed, well nourished, no acute distress, non-toxic appearance.  HEENT:  Normocephalic, Atraumatic, Bilateral external ears normal, Oropharynx moist, No oral exudates, Nose normal.   Respiratory:  No respiratory distress, No wheezing  CV: No palpitations  Vascular:  Brisk cap refill, Intact distal pulses, No cyanosis, all compartments soft with no signs or symptoms of compartment syndrome or DVT.  Neurologic: Sensation grossly intact to light touch throughout the involved extremity and bilaterally symmetric, deep tendon reflexes are 2+ and bilaterally symmetric, No focal deficits noted.   Neck:  Normal range of motion, No tenderness, Supple, Negative Spurlings.  Integument: Well hydrated, no rash, warm, dry, no lesions or ulceration.   Musculoskeletal:  Affected extremity post op incision is healed appropriately. No sign of infection. Range of motion is progressing as expected. The calf is soft and nontender with a negative Homans sign. Distal pulses intact. Normal amount of swelling present for recovery time.     DIAGNOSTIC STUDIES  Imaging done today, images were personally viewed and discussed with the patient:  Indication, findings and comparison: 2V AP&Lat of the " operative joint viewed for evaluation of past joint replacement and discussed with patient. They demonstrate a well positioned, well aligned total joint replacement without complicating factors noted. In comparison with the film from the last office visit, there has been no alignment change.    ASSESSMENT: Status post right total knee replacement with expected healing    PLAN: 1) Continue home PT exercises as needed.   2) Continue with antibiotic prophylaxis with dental procedures for 2 yrs post total joint replacement.   3) Follow up as ordered.    7/17/2018  Patient was seen by CHRISTIN Carballo in the office today.

## 2018-07-23 RX ORDER — LEVOTHYROXINE SODIUM 88 UG/1
88 TABLET ORAL DAILY
Qty: 30 TABLET | Refills: 1 | Status: SHIPPED | OUTPATIENT
Start: 2018-07-23 | End: 2018-09-24 | Stop reason: SDUPTHER

## 2018-08-07 RX ORDER — DULOXETIN HYDROCHLORIDE 60 MG/1
60 CAPSULE, DELAYED RELEASE ORAL DAILY
Qty: 90 CAPSULE | Refills: 1 | Status: SHIPPED | OUTPATIENT
Start: 2018-08-07 | End: 2019-03-08 | Stop reason: SDUPTHER

## 2018-09-13 RX ORDER — MONTELUKAST SODIUM 10 MG/1
TABLET ORAL
Qty: 90 TABLET | Refills: 1 | Status: SHIPPED | OUTPATIENT
Start: 2018-09-13 | End: 2019-05-25 | Stop reason: SDUPTHER

## 2018-09-14 ENCOUNTER — HOSPITAL ENCOUNTER (INPATIENT)
Facility: HOSPITAL | Age: 68
LOS: 3 days | Discharge: NURSING, REHAB OR LONG-TERM CARE | End: 2018-09-17
Attending: EMERGENCY MEDICINE | Admitting: INTERNAL MEDICINE

## 2018-09-14 ENCOUNTER — APPOINTMENT (OUTPATIENT)
Dept: GENERAL RADIOLOGY | Facility: HOSPITAL | Age: 68
End: 2018-09-14

## 2018-09-14 ENCOUNTER — APPOINTMENT (OUTPATIENT)
Dept: CT IMAGING | Facility: HOSPITAL | Age: 68
End: 2018-09-14

## 2018-09-14 DIAGNOSIS — R53.1 GENERALIZED WEAKNESS: ICD-10-CM

## 2018-09-14 DIAGNOSIS — S12.001A CLOSED NONDISPLACED FRACTURE OF FIRST CERVICAL VERTEBRA, UNSPECIFIED FRACTURE MORPHOLOGY, INITIAL ENCOUNTER (HCC): ICD-10-CM

## 2018-09-14 DIAGNOSIS — S12.100A CLOSED ODONTOID FRACTURE, INITIAL ENCOUNTER (HCC): Primary | ICD-10-CM
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
## 2018-09-14 DIAGNOSIS — S01.21XA LACERATION OF NOSE, INITIAL ENCOUNTER: ICD-10-CM

## 2018-09-14 LAB
ABO GROUP BLD: NORMAL
ALBUMIN SERPL-MCNC: 4.1 G/DL (ref 3.5–5.2)
ALBUMIN/GLOB SERPL: 1.3 G/DL
ALP SERPL-CCNC: 76 U/L (ref 39–117)
ALT SERPL W P-5'-P-CCNC: 16 U/L (ref 1–33)
ANION GAP SERPL CALCULATED.3IONS-SCNC: 12.4 MMOL/L
APTT PPP: 23.1 SECONDS (ref 22.7–35.4)
AST SERPL-CCNC: 18 U/L (ref 1–32)
BASOPHILS # BLD AUTO: 0.05 10*3/MM3 (ref 0–0.2)
BASOPHILS NFR BLD AUTO: 0.5 % (ref 0–1.5)
BILIRUB SERPL-MCNC: 0.7 MG/DL (ref 0.1–1.2)
BLD GP AB SCN SERPL QL: NEGATIVE
BUN BLD-MCNC: 18 MG/DL (ref 8–23)
BUN/CREAT SERPL: 18.9 (ref 7–25)
CALCIUM SPEC-SCNC: 9.6 MG/DL (ref 8.6–10.5)
CHLORIDE SERPL-SCNC: 103 MMOL/L (ref 98–107)
CO2 SERPL-SCNC: 26.6 MMOL/L (ref 22–29)
CREAT BLD-MCNC: 0.95 MG/DL (ref 0.57–1)
DEPRECATED RDW RBC AUTO: 50.1 FL (ref 37–54)
EOSINOPHIL # BLD AUTO: 0.13 10*3/MM3 (ref 0–0.7)
EOSINOPHIL NFR BLD AUTO: 1.2 % (ref 0.3–6.2)
ERYTHROCYTE [DISTWIDTH] IN BLOOD BY AUTOMATED COUNT: 15.5 % (ref 11.7–13)
GFR SERPL CREATININE-BSD FRML MDRD: 59 ML/MIN/1.73
GLOBULIN UR ELPH-MCNC: 3.2 GM/DL
GLUCOSE BLD-MCNC: 105 MG/DL (ref 65–99)
HCT VFR BLD AUTO: 42 % (ref 35.6–45.5)
HGB BLD-MCNC: 12.8 G/DL (ref 11.9–15.5)
IMM GRANULOCYTES # BLD: 0.02 10*3/MM3 (ref 0–0.03)
IMM GRANULOCYTES NFR BLD: 0.2 % (ref 0–0.5)
INR PPP: 1.05 (ref 0.9–1.1)
LYMPHOCYTES # BLD AUTO: 2.19 10*3/MM3 (ref 0.9–4.8)
LYMPHOCYTES NFR BLD AUTO: 21 % (ref 19.6–45.3)
MCH RBC QN AUTO: 26.7 PG (ref 26.9–32)
MCHC RBC AUTO-ENTMCNC: 30.5 G/DL (ref 32.4–36.3)
MCV RBC AUTO: 87.5 FL (ref 80.5–98.2)
MONOCYTES # BLD AUTO: 0.82 10*3/MM3 (ref 0.2–1.2)
MONOCYTES NFR BLD AUTO: 7.9 % (ref 5–12)
NEUTROPHILS # BLD AUTO: 7.22 10*3/MM3 (ref 1.9–8.1)
NEUTROPHILS NFR BLD AUTO: 69.2 % (ref 42.7–76)
PLATELET # BLD AUTO: 258 10*3/MM3 (ref 140–500)
PMV BLD AUTO: 9.5 FL (ref 6–12)
POTASSIUM BLD-SCNC: 4.4 MMOL/L (ref 3.5–5.2)
PROT SERPL-MCNC: 7.3 G/DL (ref 6–8.5)
PROTHROMBIN TIME: 13.5 SECONDS (ref 11.7–14.2)
RBC # BLD AUTO: 4.8 10*6/MM3 (ref 3.9–5.2)
RH BLD: POSITIVE
SODIUM BLD-SCNC: 142 MMOL/L (ref 136–145)
T&S EXPIRATION DATE: NORMAL
WBC NRBC COR # BLD: 10.43 10*3/MM3 (ref 4.5–10.7)

## 2018-09-14 PROCEDURE — 86850 RBC ANTIBODY SCREEN: CPT | Performed by: EMERGENCY MEDICINE

## 2018-09-14 PROCEDURE — 86901 BLOOD TYPING SEROLOGIC RH(D): CPT | Performed by: EMERGENCY MEDICINE

## 2018-09-14 PROCEDURE — 80053 COMPREHEN METABOLIC PANEL: CPT | Performed by: EMERGENCY MEDICINE

## 2018-09-14 PROCEDURE — 85730 THROMBOPLASTIN TIME PARTIAL: CPT | Performed by: EMERGENCY MEDICINE

## 2018-09-14 PROCEDURE — 85025 COMPLETE CBC W/AUTO DIFF WBC: CPT | Performed by: EMERGENCY MEDICINE

## 2018-09-14 PROCEDURE — 70450 CT HEAD/BRAIN W/O DYE: CPT

## 2018-09-14 PROCEDURE — 70486 CT MAXILLOFACIAL W/O DYE: CPT

## 2018-09-14 PROCEDURE — 86900 BLOOD TYPING SEROLOGIC ABO: CPT | Performed by: EMERGENCY MEDICINE

## 2018-09-14 PROCEDURE — 99285 EMERGENCY DEPT VISIT HI MDM: CPT

## 2018-09-14 PROCEDURE — 25010000002 HYDROMORPHONE PER 4 MG: Performed by: EMERGENCY MEDICINE

## 2018-09-14 PROCEDURE — 90715 TDAP VACCINE 7 YRS/> IM: CPT | Performed by: EMERGENCY MEDICINE

## 2018-09-14 PROCEDURE — 73130 X-RAY EXAM OF HAND: CPT

## 2018-09-14 PROCEDURE — 72125 CT NECK SPINE W/O DYE: CPT

## 2018-09-14 PROCEDURE — 25010000002 ONDANSETRON PER 1 MG: Performed by: EMERGENCY MEDICINE

## 2018-09-14 PROCEDURE — 25010000002 TDAP 5-2.5-18.5 LF-MCG/0.5 SUSPENSION: Performed by: EMERGENCY MEDICINE

## 2018-09-14 PROCEDURE — 72110 X-RAY EXAM L-2 SPINE 4/>VWS: CPT

## 2018-09-14 PROCEDURE — 90471 IMMUNIZATION ADMIN: CPT | Performed by: EMERGENCY MEDICINE

## 2018-09-14 PROCEDURE — 85610 PROTHROMBIN TIME: CPT | Performed by: EMERGENCY MEDICINE

## 2018-09-14 RX ORDER — SODIUM CHLORIDE 0.9 % (FLUSH) 0.9 %
10 SYRINGE (ML) INJECTION AS NEEDED
Status: DISCONTINUED | OUTPATIENT
Start: 2018-09-14 | End: 2018-09-17 | Stop reason: HOSPADM

## 2018-09-14 RX ORDER — ONDANSETRON 2 MG/ML
4 INJECTION INTRAMUSCULAR; INTRAVENOUS ONCE
Status: COMPLETED | OUTPATIENT
Start: 2018-09-14 | End: 2018-09-14

## 2018-09-14 RX ORDER — HYDROCODONE BITARTRATE AND ACETAMINOPHEN 5; 325 MG/1; MG/1
2 TABLET ORAL EVERY 6 HOURS PRN
Status: DISCONTINUED | OUTPATIENT
Start: 2018-09-14 | End: 2018-09-15

## 2018-09-14 RX ADMIN — HYDROMORPHONE HYDROCHLORIDE 1 MG: 1 INJECTION, SOLUTION INTRAMUSCULAR; INTRAVENOUS; SUBCUTANEOUS at 22:12

## 2018-09-14 RX ADMIN — HYDROCODONE BITARTRATE AND ACETAMINOPHEN 2 TABLET: 5; 325 TABLET ORAL at 20:29

## 2018-09-14 RX ADMIN — ONDANSETRON 4 MG: 2 INJECTION INTRAMUSCULAR; INTRAVENOUS at 22:12

## 2018-09-14 RX ADMIN — TETANUS TOXOID, REDUCED DIPHTHERIA TOXOID AND ACELLULAR PERTUSSIS VACCINE, ADSORBED 0.5 ML: 5; 2.5; 8; 8; 2.5 SUSPENSION INTRAMUSCULAR at 21:19

## 2018-09-15 LAB
ANION GAP SERPL CALCULATED.3IONS-SCNC: 13.2 MMOL/L
BUN BLD-MCNC: 18 MG/DL (ref 8–23)
BUN/CREAT SERPL: 19.4 (ref 7–25)
CALCIUM SPEC-SCNC: 9.2 MG/DL (ref 8.6–10.5)
CHLORIDE SERPL-SCNC: 101 MMOL/L (ref 98–107)
CO2 SERPL-SCNC: 24.8 MMOL/L (ref 22–29)
CREAT BLD-MCNC: 0.93 MG/DL (ref 0.57–1)
DEPRECATED RDW RBC AUTO: 50.7 FL (ref 37–54)
ERYTHROCYTE [DISTWIDTH] IN BLOOD BY AUTOMATED COUNT: 15.8 % (ref 11.7–13)
GFR SERPL CREATININE-BSD FRML MDRD: 60 ML/MIN/1.73
GLUCOSE BLD-MCNC: 109 MG/DL (ref 65–99)
HCT VFR BLD AUTO: 41.8 % (ref 35.6–45.5)
HGB BLD-MCNC: 12.7 G/DL (ref 11.9–15.5)
MAGNESIUM SERPL-MCNC: 2.1 MG/DL (ref 1.6–2.4)
MCH RBC QN AUTO: 26.6 PG (ref 26.9–32)
MCHC RBC AUTO-ENTMCNC: 30.4 G/DL (ref 32.4–36.3)
MCV RBC AUTO: 87.6 FL (ref 80.5–98.2)
PHOSPHATE SERPL-MCNC: 4.6 MG/DL (ref 2.5–4.5)
PLATELET # BLD AUTO: 260 10*3/MM3 (ref 140–500)
PMV BLD AUTO: 9.5 FL (ref 6–12)
POTASSIUM BLD-SCNC: 4.4 MMOL/L (ref 3.5–5.2)
RBC # BLD AUTO: 4.77 10*6/MM3 (ref 3.9–5.2)
SODIUM BLD-SCNC: 139 MMOL/L (ref 136–145)
WBC NRBC COR # BLD: 11.32 10*3/MM3 (ref 4.5–10.7)

## 2018-09-15 PROCEDURE — 97162 PT EVAL MOD COMPLEX 30 MIN: CPT

## 2018-09-15 PROCEDURE — 97110 THERAPEUTIC EXERCISES: CPT

## 2018-09-15 PROCEDURE — 25010000002 HYDROMORPHONE PER 4 MG: Performed by: HOSPITALIST

## 2018-09-15 PROCEDURE — 85027 COMPLETE CBC AUTOMATED: CPT | Performed by: HOSPITALIST

## 2018-09-15 PROCEDURE — 80048 BASIC METABOLIC PNL TOTAL CA: CPT | Performed by: HOSPITALIST

## 2018-09-15 PROCEDURE — 83735 ASSAY OF MAGNESIUM: CPT | Performed by: HOSPITALIST

## 2018-09-15 PROCEDURE — L0174 CERV SR 2PC THOR EXT PRE OTS: HCPCS

## 2018-09-15 PROCEDURE — 99222 1ST HOSP IP/OBS MODERATE 55: CPT | Performed by: NEUROLOGICAL SURGERY

## 2018-09-15 PROCEDURE — 84100 ASSAY OF PHOSPHORUS: CPT | Performed by: HOSPITALIST

## 2018-09-15 PROCEDURE — 25010000002 ONDANSETRON PER 1 MG: Performed by: HOSPITALIST

## 2018-09-15 RX ORDER — GABAPENTIN 300 MG/1
300 CAPSULE ORAL 3 TIMES DAILY
Status: DISCONTINUED | OUTPATIENT
Start: 2018-09-15 | End: 2018-09-17 | Stop reason: HOSPADM

## 2018-09-15 RX ORDER — ECHINACEA PURPUREA EXTRACT 125 MG
1 TABLET ORAL AS NEEDED
Status: DISCONTINUED | OUTPATIENT
Start: 2018-09-15 | End: 2018-09-17 | Stop reason: HOSPADM

## 2018-09-15 RX ORDER — BISACODYL 10 MG
10 SUPPOSITORY, RECTAL RECTAL DAILY PRN
Status: DISCONTINUED | OUTPATIENT
Start: 2018-09-15 | End: 2018-09-17 | Stop reason: HOSPADM

## 2018-09-15 RX ORDER — MONTELUKAST SODIUM 10 MG/1
10 TABLET ORAL DAILY
Status: DISCONTINUED | OUTPATIENT
Start: 2018-09-15 | End: 2018-09-17 | Stop reason: HOSPADM

## 2018-09-15 RX ORDER — ALLOPURINOL 100 MG/1
100 TABLET ORAL DAILY
Status: DISCONTINUED | OUTPATIENT
Start: 2018-09-15 | End: 2018-09-17 | Stop reason: HOSPADM

## 2018-09-15 RX ORDER — BISACODYL 5 MG/1
5 TABLET, DELAYED RELEASE ORAL DAILY PRN
Status: DISCONTINUED | OUTPATIENT
Start: 2018-09-15 | End: 2018-09-15 | Stop reason: SDUPTHER

## 2018-09-15 RX ORDER — NITROGLYCERIN 0.4 MG/1
0.4 TABLET SUBLINGUAL
Status: DISCONTINUED | OUTPATIENT
Start: 2018-09-15 | End: 2018-09-17 | Stop reason: HOSPADM

## 2018-09-15 RX ORDER — LIDOCAINE 50 MG/G
1 PATCH TOPICAL DAILY PRN
Status: DISCONTINUED | OUTPATIENT
Start: 2018-09-15 | End: 2018-09-17 | Stop reason: HOSPADM

## 2018-09-15 RX ORDER — ONDANSETRON 4 MG/1
4 TABLET, ORALLY DISINTEGRATING ORAL EVERY 6 HOURS PRN
Status: DISCONTINUED | OUTPATIENT
Start: 2018-09-15 | End: 2018-09-17 | Stop reason: HOSPADM

## 2018-09-15 RX ORDER — AMIODARONE HYDROCHLORIDE 200 MG/1
100 TABLET ORAL NIGHTLY
Status: DISCONTINUED | OUTPATIENT
Start: 2018-09-15 | End: 2018-09-17 | Stop reason: HOSPADM

## 2018-09-15 RX ORDER — ONDANSETRON 4 MG/1
4 TABLET, FILM COATED ORAL EVERY 6 HOURS PRN
Status: DISCONTINUED | OUTPATIENT
Start: 2018-09-15 | End: 2018-09-17 | Stop reason: HOSPADM

## 2018-09-15 RX ORDER — HYDROCODONE BITARTRATE AND ACETAMINOPHEN 10; 325 MG/1; MG/1
1 TABLET ORAL EVERY 4 HOURS PRN
Status: DISCONTINUED | OUTPATIENT
Start: 2018-09-15 | End: 2018-09-17

## 2018-09-15 RX ORDER — ACETAMINOPHEN 325 MG/1
650 TABLET ORAL EVERY 4 HOURS PRN
Status: DISCONTINUED | OUTPATIENT
Start: 2018-09-15 | End: 2018-09-17 | Stop reason: HOSPADM

## 2018-09-15 RX ORDER — ATORVASTATIN CALCIUM 80 MG/1
80 TABLET, FILM COATED ORAL NIGHTLY
Status: DISCONTINUED | OUTPATIENT
Start: 2018-09-15 | End: 2018-09-17 | Stop reason: HOSPADM

## 2018-09-15 RX ORDER — ONDANSETRON 2 MG/ML
4 INJECTION INTRAMUSCULAR; INTRAVENOUS EVERY 6 HOURS PRN
Status: DISCONTINUED | OUTPATIENT
Start: 2018-09-15 | End: 2018-09-17 | Stop reason: HOSPADM

## 2018-09-15 RX ORDER — BISACODYL 5 MG/1
10 TABLET, DELAYED RELEASE ORAL DAILY PRN
Status: DISCONTINUED | OUTPATIENT
Start: 2018-09-15 | End: 2018-09-17 | Stop reason: HOSPADM

## 2018-09-15 RX ORDER — CARVEDILOL 12.5 MG/1
12.5 TABLET ORAL 2 TIMES DAILY WITH MEALS
Status: DISCONTINUED | OUTPATIENT
Start: 2018-09-15 | End: 2018-09-17 | Stop reason: HOSPADM

## 2018-09-15 RX ORDER — SODIUM CHLORIDE 0.9 % (FLUSH) 0.9 %
1-10 SYRINGE (ML) INJECTION AS NEEDED
Status: DISCONTINUED | OUTPATIENT
Start: 2018-09-15 | End: 2018-09-17 | Stop reason: HOSPADM

## 2018-09-15 RX ORDER — SPIRONOLACTONE AND HYDROCHLOROTHIAZIDE 25; 25 MG/1; MG/1
1 TABLET ORAL DAILY
Status: DISCONTINUED | OUTPATIENT
Start: 2018-09-15 | End: 2018-09-17 | Stop reason: HOSPADM

## 2018-09-15 RX ORDER — HYDROCODONE BITARTRATE AND ACETAMINOPHEN 5; 325 MG/1; MG/1
2 TABLET ORAL EVERY 4 HOURS PRN
Status: DISCONTINUED | OUTPATIENT
Start: 2018-09-15 | End: 2018-09-15

## 2018-09-15 RX ORDER — MORPHINE SULFATE 2 MG/ML
2 INJECTION, SOLUTION INTRAMUSCULAR; INTRAVENOUS
Status: DISCONTINUED | OUTPATIENT
Start: 2018-09-15 | End: 2018-09-15

## 2018-09-15 RX ORDER — DULOXETIN HYDROCHLORIDE 60 MG/1
60 CAPSULE, DELAYED RELEASE ORAL DAILY
Status: DISCONTINUED | OUTPATIENT
Start: 2018-09-15 | End: 2018-09-17 | Stop reason: HOSPADM

## 2018-09-15 RX ORDER — LEVOTHYROXINE SODIUM 88 UG/1
88 TABLET ORAL EVERY MORNING
Status: DISCONTINUED | OUTPATIENT
Start: 2018-09-15 | End: 2018-09-17 | Stop reason: HOSPADM

## 2018-09-15 RX ORDER — BACLOFEN 10 MG/1
10 TABLET ORAL NIGHTLY PRN
Status: DISCONTINUED | OUTPATIENT
Start: 2018-09-15 | End: 2018-09-17 | Stop reason: HOSPADM

## 2018-09-15 RX ADMIN — HYDROMORPHONE HYDROCHLORIDE 0.25 MG: 1 INJECTION, SOLUTION INTRAMUSCULAR; INTRAVENOUS; SUBCUTANEOUS at 05:28

## 2018-09-15 RX ADMIN — CARVEDILOL 12.5 MG: 12.5 TABLET, FILM COATED ORAL at 17:09

## 2018-09-15 RX ADMIN — ONDANSETRON HYDROCHLORIDE 4 MG: 2 SOLUTION INTRAMUSCULAR; INTRAVENOUS at 16:57

## 2018-09-15 RX ADMIN — HYDROMORPHONE HYDROCHLORIDE 0.5 MG: 1 INJECTION, SOLUTION INTRAMUSCULAR; INTRAVENOUS; SUBCUTANEOUS at 14:43

## 2018-09-15 RX ADMIN — GABAPENTIN 300 MG: 300 CAPSULE ORAL at 21:27

## 2018-09-15 RX ADMIN — HYDROMORPHONE HYDROCHLORIDE 0.5 MG: 1 INJECTION, SOLUTION INTRAMUSCULAR; INTRAVENOUS; SUBCUTANEOUS at 21:27

## 2018-09-15 RX ADMIN — SPIRONOLACTONE AND HYDROCHLOROTHIAZIDE 1 TABLET: 25; 25 TABLET, FILM COATED ORAL at 09:37

## 2018-09-15 RX ADMIN — MONTELUKAST SODIUM 10 MG: 10 TABLET, FILM COATED ORAL at 09:38

## 2018-09-15 RX ADMIN — CARVEDILOL 12.5 MG: 12.5 TABLET, FILM COATED ORAL at 09:38

## 2018-09-15 RX ADMIN — HYDROMORPHONE HYDROCHLORIDE 0.25 MG: 1 INJECTION, SOLUTION INTRAMUSCULAR; INTRAVENOUS; SUBCUTANEOUS at 02:25

## 2018-09-15 RX ADMIN — HYDROCODONE BITARTRATE AND ACETAMINOPHEN 1 TABLET: 10; 325 TABLET ORAL at 22:49

## 2018-09-15 RX ADMIN — AMIODARONE HYDROCHLORIDE 100 MG: 200 TABLET ORAL at 21:27

## 2018-09-15 RX ADMIN — LIDOCAINE 1 PATCH: 50 PATCH CUTANEOUS at 16:25

## 2018-09-15 RX ADMIN — HYDROCODONE BITARTRATE AND ACETAMINOPHEN 1 TABLET: 10; 325 TABLET ORAL at 16:24

## 2018-09-15 RX ADMIN — ALLOPURINOL 100 MG: 100 TABLET ORAL at 09:38

## 2018-09-15 RX ADMIN — GABAPENTIN 300 MG: 300 CAPSULE ORAL at 16:25

## 2018-09-15 RX ADMIN — HYDROMORPHONE HYDROCHLORIDE 0.5 MG: 1 INJECTION, SOLUTION INTRAMUSCULAR; INTRAVENOUS; SUBCUTANEOUS at 17:09

## 2018-09-15 RX ADMIN — ATORVASTATIN CALCIUM 80 MG: 80 TABLET, FILM COATED ORAL at 21:27

## 2018-09-15 RX ADMIN — SALINE NASAL SPRAY 1 SPRAY: 1.5 SOLUTION NASAL at 16:25

## 2018-09-15 RX ADMIN — DULOXETINE HYDROCHLORIDE 60 MG: 60 CAPSULE, DELAYED RELEASE ORAL at 09:38

## 2018-09-15 RX ADMIN — HYDROMORPHONE HYDROCHLORIDE 0.25 MG: 1 INJECTION, SOLUTION INTRAMUSCULAR; INTRAVENOUS; SUBCUTANEOUS at 07:59

## 2018-09-15 RX ADMIN — HYDROMORPHONE HYDROCHLORIDE 0.5 MG: 1 INJECTION, SOLUTION INTRAMUSCULAR; INTRAVENOUS; SUBCUTANEOUS at 12:24

## 2018-09-15 RX ADMIN — HYDROMORPHONE HYDROCHLORIDE 0.5 MG: 1 INJECTION, SOLUTION INTRAMUSCULAR; INTRAVENOUS; SUBCUTANEOUS at 09:35

## 2018-09-15 RX ADMIN — GABAPENTIN 300 MG: 300 CAPSULE ORAL at 09:38

## 2018-09-15 RX ADMIN — LEVOTHYROXINE SODIUM 88 MCG: 88 TABLET ORAL at 09:38

## 2018-09-15 RX ADMIN — HYDROCODONE BITARTRATE AND ACETAMINOPHEN 2 TABLET: 5; 325 TABLET ORAL at 00:59

## 2018-09-15 NOTE — PLAN OF CARE
Problem: Patient Care Overview  Goal: Plan of Care Review  Outcome: Ongoing (interventions implemented as appropriate)   09/15/18 0633   Coping/Psychosocial   Plan of Care Reviewed With patient   Plan of Care Review   Progress improving   OTHER   Outcome Summary Admit from ER for cervical fx. C-collar in place. Pain managment is main goal at the moment, tx with PRN meds. Not much relief pain at 7/10 at best. Neuro sx to see. vss. will continue to monitor.

## 2018-09-15 NOTE — THERAPY EVALUATION
Acute Care - Physical Therapy Initial Evaluation  [facility]     Patient Name: Kristan Galicia  : 1950  MRN: [id number]  Today's Date: 9/15/2018      Date of Referral to PT: 18         Admit Date: 2018    Visit Dx:     ICD-10-CM ICD-9-CM   1. Closed odontoid fracture, initial encounter (CMS/HCC) S12.100A 805.02   2. Closed nondisplaced fracture of first cervical vertebra, unspecified fracture morphology, initial encounter (CMS/HCC) S12.001A 805.01   3. Laceration of nose, initial encounter S01.21XA 873.20   4. Fall, initial encounter W19.XXXA E888.9   5. Generalized weakness R53.1 780.79     Patient Active Problem List   Diagnosis   • Shoulder pain, right   • Knee pain, right   • Fall down stairs   • PAF (paroxysmal atrial fibrillation) (CMS/HCC)   • S/P rotator cuff repair   • Vitamin D deficiency   • Vertigo   • Vasovagal syncope   • Thyroid disease   • Obstructive sleep apnea, adult   • Palpitations   • Osteoporosis   • Ocular tumor   • Migraines   • Hyperlipidemia   • History of transfusion   • Heart murmur   • Heart attack   • GERD (gastroesophageal reflux disease)   • Depression   • CHF (congestive heart failure) (CMS/HCC)   • Cardiomyopathy (CMS/HCC)   • Asthma   • Anxiety   • Arthritis of right knee   • Right rotator cuff tear   • Hypothyroidism   • Dyspnea and respiratory abnormalities   • Orthopnea   • IHSS (idiopathic hypertrophic subaortic stenosis) (CMS/HCC)   • AICD (automatic cardioverter/defibrillator) present   • Anticoagulant long-term use   • Hyperglycemia, drug-induced   • Chronic pain of right knee   • Diverticulitis   • CAD (coronary artery disease)   • Hypertrophic nonobstructive cardiomyopathy (CMS/[facility])   • ICD (implantable cardioverter-defibrillator) discharge   • Menopause   • Non-obstructive hypertrophic cardiomyopathy (CMS/[facility])   • Type 2 diabetes mellitus without complication, without long-term current use of insulin (CMS/HCC)   • Insomnia   • History of total  knee arthroplasty, right   • Closed fracture of odontoid process of axis (CMS/HCC)     Past Medical History:   Diagnosis Date   • Anemia    • Arthritis    • Atrial fibrillation (CMS/HCC)    • Cardiac defibrillator in place 01/2009    medtronic    • Cardiomyopathy (CMS/HCC)     Hypertrophic Cardiomyopathy   • CHF (congestive heart failure) (CMS/HCC)    • Chronic pain    • Coronary artery disease    • Diabetes mellitus (CMS/HCC)     Type II   • Diverticulosis     hx diverticulitis   • GERD (gastroesophageal reflux disease)    • Heart murmur    • History of depression    • History of tumor     left ocular tumor, treated with steroids   • Hyperlipidemia    • Hypertension    • Migraines    • On anticoagulant therapy    • Osteoporosis    • Palpitations    • Sleep apnea     does not use CPAP or BiPAP, used nose strips   • Thyroid disease    • Vasovagal syncope    • Vertigo      Past Surgical History:   Procedure Laterality Date   • ANTERIOR CERVICAL DISCECTOMY W/ FUSION  2012    C5-7   • APPENDECTOMY  1970   • BILATERAL BREAST REDUCTION     • CARDIAC CATHETERIZATION  02/19/2007   • CARDIAC DEFIBRILLATOR PLACEMENT Left 1999, 2013    Dr. Ya   • CATARACT EXTRACTION Bilateral    • COLONOSCOPY  1996    Dr. Herbert Gonsales    • COLONOSCOPY  8/16/2016    Procedure: COLONOSCOPY with cecum;  Surgeon: Rosalio Sheikh MD;  Location: [facility] ENDOSCOPY;  Service:    • ENDOSCOPY N/A 4/27/2017    Procedure: ESOPHAGOGASTRODUODENOSCOPY WITH BIOPSIES;  Surgeon: Rosalio Sheikh MD;  Location: [facility] ENDOSCOPY;  Service:    • FEMUR SURGERY Left     with metal implant  x3   • FIRST RIB RESECTION Bilateral     and scalenectomy   • HYSTERECTOMY  1989    Total   • KNEE ARTHROSCOPY Bilateral 2014    Dr. Li   • KNEE SURGERY Right 06/10/2010   • LAPAROSCOPIC CHOLECYSTECTOMY  1974   • ROTATOR CUFF REPAIR Right 06/10/2010   • SHOULDER ARTHROSCOPY W/ ROTATOR CUFF REPAIR Bilateral 05/20/2011   • SHOULDER SURGERY Right    • TONSILLECTOMY     •  TOTAL KNEE ARTHROPLASTY Right 4/18/2018    Procedure: RIGHT TOTAL KNEE ARTHROPLASTY WITH STEVE NAVIGATION;  Surgeon: Bravo Pascual MD;  Location: [facility] MAIN OR;  Service: Orthopedics   • TOTAL SHOULDER ARTHROPLASTY W/ DISTAL CLAVICLE EXCISION Right 2/2/2017    Procedure: TOTAL SHOULDER REVERSE ARTHROPLASTY;  Surgeon: Juan Antonio Anne MD;  Location: [facility] MAIN OR;  Service:         PT ASSESSMENT (last 12 hours)      Physical Therapy Evaluation     Row Name 09/15/18 1430          PT Evaluation Time/Intention    Subjective Information complains of;weakness;pain  -AL     Document Type evaluation  -AL     Mode of Treatment physical therapy  -AL     Patient Effort good  -AL     Symptoms Noted During/After Treatment dizziness  -AL     Row Name 09/15/18 1430          General Information    Patient Profile Reviewed? yes  -AL     Prior Level of Function independent:;gait;transfer;bed mobility;ADL's  -AL     Equipment Currently Used at Home walker, rolling  -AL     Risks Reviewed patient:  -AL     Benefits Reviewed patient:  -AL     Barriers to Rehab none identified  -AL     Row Name 09/15/18 1430          Relationship/Environment    Lives With alone  -AL     Row Name 09/15/18 1430          Resource/Environmental Concerns    Current Living Arrangements home/apartment/condo  -AL     Resource/Environmental Concerns none  -AL     Transportation Concerns car, none  -AL     Row Name 09/15/18 1430          Cognitive Assessment/Intervention- PT/OT    Orientation Status (Cognition) oriented x 4  -AL     Follows Commands (Cognition) WFL  -AL     Safety Deficit (Cognitive) mild deficit  -AL     Row Name 09/15/18 1430          Bed Mobility Assessment/Treatment    Bed Mobility Assessment/Treatment supine-sit;sit-supine  -AL     Supine-Sit [place] (Bed Mobility) minimum assist (75% patient effort);verbal cues;nonverbal cues (demo/gesture)  -AL     Sit-Supine [place] (Bed Mobility) supervision;verbal cues  -AL     Bed  Mobility, Safety Issues decreased use of arms for pushing/pulling;decreased use of legs for bridging/pushing  -AL     Assistive Device (Bed Mobility) head of bed elevated  -AL     Row Name 09/15/18 1430          Transfer Assessment/Treatment    Transfer Assessment/Treatment sit-stand transfer;stand-sit transfer  -AL     Maintains Weight-bearing Status (Transfers) able to maintain  -AL     Sit-Stand [place] (Transfers) contact guard;verbal cues;nonverbal cues (demo/gesture)  -AL     Stand-Sit [place] (Transfers) contact guard;verbal cues;nonverbal cues (demo/gesture)  -AL     Row Name 09/15/18 1430          Sit-Stand Transfer    Assistive Device (Sit-Stand Transfers) walker, front-wheeled  -AL     Row Name 09/15/18 1430          Stand-Sit Transfer    Assistive Device (Stand-Sit Transfers) walker, front-wheeled  -AL     Row Name 09/15/18 1430          Gait/Stairs Assessment/Training    Gait/Stairs Assessment/Training gait/ambulation independence  -AL     [place] Level (Gait) moderate assist (50% patient effort);verbal cues;nonverbal cues (demo/gesture)  -AL     Assistive Device (Gait) walker, front-wheeled  -AL     Distance in Feet (Gait) 3   3 forward, 3 backward, 3 HOB  -AL     Pattern (Gait) step-to  -AL     Deviations/Abnormal Patterns (Gait) ataxic;esmer decreased;gait speed decreased;stride length decreased  -AL     Maintains Weight-bearing Status (Gait) able to maintain  -AL     Comment (Gait/Stairs) Unable to walk far secondary to increased dizziness.  -AL     Row Name 09/15/18 1430          General ROM    Head/Neck/Trunk --   Cervical spine AROM decreased  -AL     Row Name 09/15/18 1430          MMT (Manual Muscle Testing)    General MMT Comments General weakness  -AL     Row Name 09/15/18 1430          Vision Assessment/Intervention    Visual Impairment/Limitations WFL  -AL     Row Name 09/15/18 1430          Pain Assessment    Additional Documentation Pain Scale: Numbers Pre/Post-Treatment  (Group)  -[facility] Name 09/15/18 1430          Pain Scale: Numbers Pre/Post-Treatment    Pain Scale: Numbers, Pretreatment 5/10  -AL     Pain Scale: Numbers, Post-Treatment 7/10  -AL     Pain Location - Side Bilateral  -AL     Pain Location neck  -AL     Row Name 09/15/18 1430          Plan of Care Review    Plan of Care Reviewed With patient  -AL     [facility] Name 09/15/18 1430          Physical Therapy Clinical Impression    Date of Referral to PT 09/14/18  -AL     PT Diagnosis (PT Clinical Impression) general weakness, ataxic gait  -AL     Prognosis (PT Clinical Impression) good  -AL     Functional Level at Time of Evaluation (PT Clinical Impression) minimal assistance  -AL     Rehab Potential (PT Clinical Summary) good, to achieve stated therapy goals  -AL     Care Plan Review (PT) evaluation/treatment results reviewed  -[facility] Name 09/15/18 1430          Physical Therapy Goals    Bed Mobility Goal Selection (PT) bed mobility, PT goal 1  -AL     Transfer Goal Selection (PT) transfer, PT goal 1  -AL     Gait Training Goal Selection (PT) gait training, PT goal 1  -AL     Row Name 09/15/18 1430          Bed Mobility Goal 1 (PT)    Activity/Assistive Device (Bed Mobility Goal 1, PT) sit to supine;supine to sit  -AL     [place] Level/Cues Needed (Bed Mobility Goal 1, PT) supervision required  -AL     Time Frame (Bed Mobility Goal 1, PT) 1 week  -AL     [facility] Name 09/15/18 1430          Transfer Goal 1 (PT)    Activity/Assistive Device (Transfer Goal 1, PT) sit-to-stand/stand-to-sit;bed-to-chair/chair-to-bed  -AL     [place] Level/Cues Needed (Transfer Goal 1, PT) supervision required  -AL     Time Frame (Transfer Goal 1, PT) 1 week  -AL     [facility] Name 09/15/18 1430          Gait Training Goal 1 (PT)    Activity/Assistive Device (Gait Training Goal 1, PT) gait (walking locomotion)  -AL     [place] Level (Gait Training Goal 1, PT) contact guard assist  -AL     Distance (Gait Goal 1, PT) 75   with Rwx  -AL      Time Frame (Gait Training Goal 1, PT) 1 week  -AL     Row Name 09/15/18 1430          Positioning and Restraints    Pre-Treatment Position in bed  -AL     Post Treatment Position bed  -AL     In Bed supine;call light within reach;legs elevated  -AL       User Key  (r) = Recorded By, (t) = Taken By, (c) = Cosigned By    Initials Name Provider Type    AL Melba Michael, PT Physical Therapist          Physical Therapy Education     Title: PT OT SLP Therapies (Active)     Topic: Physical Therapy (Active)     Point: Mobility training (Done)    Learning Progress Summary     Learner Status Readiness Method Response Comment Documented by    Patient Done Acceptance E VU,NR  AL 09/15/18 1500          Point: Body mechanics (Done)    Learning Progress Summary     Learner Status Readiness Method Response Comment Documented by    Patient Done Acceptance E SHANNON,NR  AL 09/15/18 1500          Point: Precautions (Done)    Learning Progress Summary     Learner Status Readiness Method Response Comment Documented by    Patient Done Acceptance E VU,NR  AL 09/15/18 1500                      User Key     Initials Effective Dates Name Provider Type [facility] 04/03/18 -  Melba Michael, PT Physical Therapist PT                PT Recommendation and Plan  Anticipated Discharge Disposition (PT): inpatient rehabilitation facility, home with home health (patient wants [facility] if she has to go.)  Therapy Frequency (PT Clinical Impression): daily  Outcome Summary/Treatment Plan (PT)  Anticipated Discharge Disposition (PT): inpatient rehabilitation facility, home with home health (patient wants [facility] if she has to go.)  Plan of Care Reviewed With: patient  Outcome Summary: Patient is appropriate for skilled PT secondary to decreased strength and transfer ability. Patient lives alone. At this time, recommend Rehab after D/C from hospital; depends upon progress. Will continue to treat patient while in this facility.          Outcome Measures      Row Name 09/15/18 1500             How much help from another person do you currently need...    Turning from your back to your side while in flat bed without using bedrails? 3  -AL      Moving from lying on back to sitting on the side of a flat bed without bedrails? 3  -AL      Moving to and from a bed to a chair (including a wheelchair)? 3  -AL      Standing up from a chair using your arms (e.g., wheelchair, bedside chair)? 3  -AL      Climbing 3-5 steps with a railing? 2  -AL      To walk in hospital room? 3  -AL      AM-PAC 6 Clicks Score 17  -AL         Functional Assessment    Outcome Measure Options AM-PAC 6 Clicks Basic Mobility (PT)  -AL        User Key  (r) = Recorded By, (t) = Taken By, (c) = Cosigned By    Initials Name Provider Type    Melba Bass, PT Physical Therapist           Time Calculation:         PT Charges     Row Name 09/15/18 1503             Time Calculation    Start Time 1430  -AL      Stop Time 1449  -AL      Time Calculation (min) 19 min  -AL      PT Received On 09/15/18  -AL      PT - Next Appointment 09/16/18  -AL      PT Goal Re-Cert Due Date 09/22/18  -AL        User Key  (r) = Recorded By, (t) = Taken By, (c) = Cosigned By    Initials Name Provider Type    Melba Bass, PT Physical Therapist        Therapy Suggested Charges     Code   Minutes Charges    None           Therapy Charges for Today     Code Description Service Date Service Provider Modifiers Qty    73993156630 HC PT THER SUPP EA 15 MIN 9/15/2018 Melba Michael, PT GP 1    61098305807 HC PT THER PROC EA 15 MIN 9/15/2018 Melba Michael, PT GP 1    [id number]  PT EVAL MOD COMPLEXITY 2 9/15/2018 Melba Michael, PT GP 1          PT G-Codes  Outcome Measure Options: AM-PAC 6 Clicks Basic Mobility (PT)  AM-PAC 6 Clicks Score: 17      Melba Michael PT  9/15/2018

## 2018-09-15 NOTE — ED PROVIDER NOTES
" EMERGENCY DEPARTMENT ENCOUNTER    CHIEF COMPLAINT  Chief Complaint: Head injury  History given by: Patient  History limited by:   Room Number: N546/1  PMD: Chelly Red APRN      HPI:  Pt is a 68 y.o. female who presents complaining of head injury s/p mechanical fall that occurred at birthday party P[employer]. She reports falling onto concrete. Pt reports that worst pain is in the posterior neck. She denies any LOC. Pt also reports some back pain. Pt was placed on backboard and in C-collar by EMS. Pt reports some pain with closing jaw. She also reports some L hand pain. Pt takes Pradaxa for hx of Afib.     Duration:  PTA  Onset: sudden  Timing: constant  Location: head, neck, back  Radiation: none  Quality: \"pain\"  Intensity/Severity: moderate  Progression: unchanged  Associated Symptoms: abrasions  Aggravating Factors: none  Alleviating Factors: none  Previous Episodes: none  Treatment before arrival: placed in C-collar and on backboard by EMS.     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/2016   • Migraines    • Hyperlipidemia    • History of transfusion    • Heart murmur    • Heart attack    • GERD (gastroesophageal reflux disease)    • Depression    • CHF (congestive heart failure) (CMS/[facility])    • Cardiomyopathy (CMS/[facility])    • Asthma    • Anxiety    • Arthritis of right knee 12/27/2016   • Right rotator cuff tear 02/02/2017   • Hypothyroidism 02/22/2017   • Dyspnea and respiratory abnormalities 03/03/2017   • Orthopnea 03/03/2017   • IHSS (idiopathic hypertrophic subaortic stenosis) (CMS/[facility]) 03/03/2017 "   • AICD (automatic cardioverter/defibrillator) present 03/03/2017   • Anticoagulant long-term use 03/03/2017   • Hyperglycemia, drug-induced 03/06/2017   • Chronic pain of right knee 03/17/2017   • Diverticulitis 04/05/2016   • CAD (coronary artery disease) 08/07/2017   • Hypertrophic nonobstructive cardiomyopathy (CMS/HCC) 06/25/2013   • ICD (implantable cardioverter-defibrillator) discharge 04/04/2016   • Menopause 08/07/2017   • Non-obstructive hypertrophic cardiomyopathy (CMS/HCC) 08/07/2017   • Type 2 diabetes mellitus without complication, without long-term current use of insulin (CMS/HCC) 08/07/2017   • Insomnia 03/27/2018   • History of total knee arthroplasty, right 05/02/2018     Resolved Ambulatory Problems     Diagnosis Date Noted   • RSV bronchiolitis 03/06/2017     Past Medical History:   Diagnosis Date   • Anemia    • Arthritis    • Atrial fibrillation (CMS/HCC)    • Cardiac defibrillator in place 01/2009   • Cardiomyopathy (CMS/HCC)    • CHF (congestive heart failure) (CMS/[facility])    • Chronic pain    • Coronary artery disease    • Diabetes mellitus (CMS/HCC)    • Diverticulosis    • GERD (gastroesophageal reflux disease)    • Heart murmur    • History of depression    • History of tumor    • Hyperlipidemia    • Hypertension    • Migraines    • On anticoagulant therapy    • Osteoporosis    • Palpitations    • Sleep apnea    • Thyroid disease    • Vasovagal syncope    • Vertigo        PAST SURGICAL HISTORY  Past Surgical History:   Procedure Laterality Date   • ANTERIOR CERVICAL DISCECTOMY W/ FUSION  2012    C5-7   • APPENDECTOMY  1970   • BILATERAL BREAST REDUCTION     • CARDIAC CATHETERIZATION  02/19/2007   • CARDIAC DEFIBRILLATOR PLACEMENT Left 1999, 2013    Dr. Ya   • CATARACT EXTRACTION Bilateral    • COLONOSCOPY  1996    Dr. Herbert Gonsales    • COLONOSCOPY  8/16/2016    Procedure: COLONOSCOPY with cecum;  Surgeon: Rosalio Sheikh MD;  Location: [facility] ENDOSCOPY;  Service:    • ENDOSCOPY N/A  4/27/2017    Procedure: ESOPHAGOGASTRODUODENOSCOPY WITH BIOPSIES;  Surgeon: Rosalio Sheikh MD;  Location: [facility] ENDOSCOPY;  Service:    • FEMUR SURGERY Left     with metal implant  x3   • FIRST RIB RESECTION Bilateral     and scalenectomy   • HYSTERECTOMY  1989    Total   • KNEE ARTHROSCOPY Bilateral 2014    Dr. Li   • KNEE SURGERY Right 06/10/2010   • LAPAROSCOPIC CHOLECYSTECTOMY  1974   • ROTATOR CUFF REPAIR Right 06/10/2010   • SHOULDER ARTHROSCOPY W/ ROTATOR CUFF REPAIR Bilateral 05/20/2011   • SHOULDER SURGERY Right    • TONSILLECTOMY     • TOTAL KNEE ARTHROPLASTY Right 4/18/2018    Procedure: RIGHT TOTAL KNEE ARTHROPLASTY WITH STEVE NAVIGATION;  Surgeon: Bravo Pascual MD;  Location: [facility] MAIN OR;  Service: Orthopedics   • TOTAL SHOULDER ARTHROPLASTY W/ DISTAL CLAVICLE EXCISION Right 2/2/2017    Procedure: TOTAL SHOULDER REVERSE ARTHROPLASTY;  Surgeon: Juan Antonio Anne MD;  Location: [facility] MAIN OR;  Service:        FAMILY HISTORY  Family History   Problem Relation Age of Onset   • Heart attack Brother    • Heart disease Brother    • Hyperthyroidism Mother    • Cancer Mother    • Heart disease Mother    • Osteoporosis Mother    • No Known Problems Father          car accident   • Cirrhosis Maternal Grandmother    • No Known Problems Maternal Grandfather    • No Known Problems Paternal Grandmother    • No Known Problems Paternal Grandfather    • Breast cancer Maternal Aunt    • Malig Hyperthermia Neg Hx        SOCIAL HISTORY  Social History     Social History   • Marital status: Single     Spouse name: N/A   • Number of children: 1   • Years of education: N/A     Occupational History   •       Social History Main Topics   • Smoking status: Former Smoker     Packs/day: 0.50     Years: 17.00     Types: Cigarettes     Quit date: 9/8/1985   • Smokeless tobacco: Never Used   • Alcohol use No   • Drug use: No   • Sexual activity: Defer     Other Topics Concern   • Not on file      Social History Narrative   • No narrative on file       ALLERGIES  Adhesive tape; Aspirin; Biaxin [clarithromycin]; Codeine; Darvon [propoxyphene]; Levaquin [levofloxacin]; and Tequin [gatifloxacin]    REVIEW OF SYSTEMS  Review of Systems   Constitutional: Negative for fever.   Respiratory: Negative for shortness of breath.    Cardiovascular: Negative for chest pain.   Musculoskeletal: Positive for arthralgias (L hand, jaw), back pain and neck pain. Negative for neck stiffness.       PHYSICAL EXAM  ED Triage Vitals   Temp Heart Rate Resp BP SpO2   09/14/18 2003 09/14/18 1952 09/14/18 1952 09/14/18 1952 09/14/18 1952   99.2 °F (37.3 °C) 72 18 158/81 96 %      Temp src Heart Rate Source Patient Position BP Location FiO2 (%)   09/14/18 2003 09/14/18 1952 09/14/18 1952 09/14/18 1952 --   Oral Monitor Lying Right arm        Physical Exam   Constitutional: She is oriented to person, place, and time. No distress.   HENT:   Head: Normocephalic. Head is with laceration (1cm skin tear bridge of nose  ). Head is without raccoon's eyes. Head abrasion:     Eyes: Pupils are equal, round, and reactive to light. EOM are normal.   Neck:   C-collar in place   Cardiovascular: Normal rate, regular rhythm and normal heart sounds.    Pulmonary/Chest: Effort normal and breath sounds normal. No respiratory distress.   Abdominal: Soft. There is no tenderness. There is no rebound and no guarding.   Musculoskeletal: Normal range of motion. She exhibits no edema.        Right hip: Normal.        Left hip: Normal.        Right knee: Normal.        Left knee: Normal.        Lumbar back: She exhibits tenderness.        Left hand: She exhibits tenderness (over the 5th metacarpal).   Neurological: She is alert and oriented to person, place, and time. She has normal sensation and normal strength. GCS score is 15.   Skin: Skin is warm and dry. No rash noted.   Psychiatric: Mood and affect normal.   Nursing note and vitals reviewed.      LAB  RESULTS  Lab Results (last 24 hours)     Procedure Component Value Units Date/Time    CBC & Differential [[id number]] Collected:  09/14/18 2213    Specimen:  Blood Updated:  09/14/18 2235    Narrative:       The following orders were created for panel order CBC & Differential.  Procedure                               Abnormality         Status                     ---------                               -----------         ------                     CBC Auto Differential[[id number]]        Abnormal            Final result                 Please view results for these tests on the individual orders.    Comprehensive Metabolic Panel [[id number]]  (Abnormal) Collected:  09/14/18 2213    Specimen:  Blood Updated:  09/14/18 2256     Glucose 105 (H) mg/dL      BUN 18 mg/dL      Creatinine 0.95 mg/dL      Sodium 142 mmol/L      Potassium 4.4 mmol/L      Chloride 103 mmol/L      CO2 26.6 mmol/L      Calcium 9.6 mg/dL      Total Protein 7.3 g/dL      Albumin 4.10 g/dL      ALT (SGPT) 16 U/L      AST (SGOT) 18 U/L      Alkaline Phosphatase 76 U/L      Total Bilirubin 0.7 mg/dL      eGFR Non African Amer 59 (L) mL/min/1.73      Globulin 3.2 gm/dL      A/G Ratio 1.3 g/dL      BUN/Creatinine Ratio 18.9     Anion Gap 12.4 mmol/L     Protime-INR [[id number]]  (Normal) Collected:  09/14/18 2213    Specimen:  Blood Updated:  09/14/18 2245     Protime 13.5 Seconds      INR 1.05    aPTT [[id number]]  (Normal) Collected:  09/14/18 2213    Specimen:  Blood Updated:  09/14/18 2245     PTT 23.1 seconds     CBC Auto Differential [[id number]]  (Abnormal) Collected:  09/14/18 2213    Specimen:  Blood Updated:  09/14/18 2235     WBC 10.43 10*3/mm3      RBC 4.80 10*6/mm3      Hemoglobin 12.8 g/dL      Hematocrit 42.0 %      MCV 87.5 fL      MCH 26.7 (L) pg      MCHC 30.5 (L) g/dL      RDW 15.5 (H) %      RDW-SD 50.1 fl      MPV 9.5 fL      Platelets 258 10*3/mm3      Neutrophil % 69.2 %      Lymphocyte % 21.0 %      Monocyte % 7.9 %      Eosinophil %  1.2 %      Basophil % 0.5 %      Immature Grans % 0.2 %      Neutrophils, Absolute 7.22 10*3/mm3      Lymphocytes, Absolute 2.19 10*3/mm3      Monocytes, Absolute 0.82 10*3/mm3      Eosinophils, Absolute 0.13 10*3/mm3      Basophils, Absolute 0.05 10*3/mm3      Immature Grans, Absolute 0.02 10*3/mm3     Basic Metabolic Panel [[id number]]  (Abnormal) Collected:  09/15/18 0513    Specimen:  Blood Updated:  09/15/18 0554     Glucose 109 (H) mg/dL      BUN 18 mg/dL      Creatinine 0.93 mg/dL      Sodium 139 mmol/L      Potassium 4.4 mmol/L      Chloride 101 mmol/L      CO2 24.8 mmol/L      Calcium 9.2 mg/dL      eGFR Non African Amer 60 (L) mL/min/1.73      BUN/Creatinine Ratio 19.4     Anion Gap 13.2 mmol/L     Narrative:       GFR Normal >60  Chronic Kidney Disease <60  Kidney Failure <15    CBC (No Diff) [[id number]]  (Abnormal) Collected:  09/15/18 0513    Specimen:  Blood Updated:  09/15/18 0534     WBC 11.32 (H) 10*3/mm3      RBC 4.77 10*6/mm3      Hemoglobin 12.7 g/dL      Hematocrit 41.8 %      MCV 87.6 fL      MCH 26.6 (L) pg      MCHC 30.4 (L) g/dL      RDW 15.8 (H) %      RDW-SD 50.7 fl      MPV 9.5 fL      Platelets 260 10*3/mm3     Magnesium [[id number]]  (Normal) Collected:  09/15/18 0513    Specimen:  Blood Updated:  09/15/18 0554     Magnesium 2.1 mg/dL     Phosphorus [[id number]]  (Abnormal) Collected:  09/15/18 0513    Specimen:  Blood Updated:  09/15/18 0554     Phosphorus 4.6 (H) mg/dL           I ordered the above labs and reviewed the results    RADIOLOGY  XR Spine Lumbar 4+ View   Final Result   No fracture or subluxation of the lumbar spine.           ----------------------------------------------------------------       LEFT HAND 3 VIEWS.       HISTORY: Trauma. Hand pain.       COMPARISON: No prior studies for comparison.       FINDINGS:   There is no fracture or dislocation.           Soft tissue structures are unremarkable.       IMPRESSION:   No acute fracture or dislocation.            This  report was finalized on 9/15/2018 3:02 AM by Dr. Karan Banerjee M.D.          XR Hand 3+ View Left   Final Result   No fracture or subluxation of the lumbar spine.           ----------------------------------------------------------------       LEFT HAND 3 VIEWS.       HISTORY: Trauma. Hand pain.       COMPARISON: No prior studies for comparison.       FINDINGS:   There is no fracture or dislocation.           Soft tissue structures are unremarkable.       IMPRESSION:   No acute fracture or dislocation.            This report was finalized on 9/15/2018 3:02 AM by Dr. Karan Banerjee M.D.          CT Head Without Contrast   Preliminary Result   No definite acute intracranial pathology.           ----------------------------------------------------------------       CT CERVICAL SPINE WITHOUT CONTRAST.       TECHNIQUE: Radiation dose reduction techniques were utilized, including   automated exposure control and exposure modulation based on body size.   Routine axial images of the cervical spine with coronal and sagittal   reconstructed images.       HISTORY: Trauma, midline neck pain.       COMPARISON:  11/1/2011.       FINDINGS:    There is a slightly angulated apex anterior fracture of the dens.   Nondisplaced fracture of the anterior arch of C1.       3 mm anterolisthesis of C3 on C4 on the previous examination measured 2   mm. Anterior fusion of C5-C7.       Prevertebral soft tissue is unremarkable.            IMPRESSION:    Minimally anteriorly angulated fracture of the dens.   Nondisplaced fracture of the anterior arch of C1.           ----------------------------------------------------------------       CT SCAN OF FACIAL BONES WITHOUT CONTRAST.       TECHNIQUE: Radiation dose reduction techniques were utilized, including   automated exposure control and exposure modulation based on body size.   Multiple axial images of the facial bones with coronal and sagittal   reconstructions.       HISTORY: Trauma, fall,  left mandible and nose pain.       COMPARISON:  No prior studies for comparison.       FINDINGS:    Limited evaluation of the intracranial structures is unremarkable   Visualized portion of the calvarium is intact.       Nasal bones are intact. Orbits are unremarkable.       Paranasal sinuses are unremarkable, except for postsurgical changes of   the maxillary sinuses, no evidence for acute sinusitis.       Mandible and temporomandibular joints are within normal limits.       Fractures of the dens and anterior arch of C1.       Soft tissues are within normal limits.       IMPRESSION:    No acute fracture of the facial bones.       Findings called to Romero Carbone, 9:43 PM.                          CT Facial Bones Without Contrast   Preliminary Result   No definite acute intracranial pathology.           ----------------------------------------------------------------       CT CERVICAL SPINE WITHOUT CONTRAST.       TECHNIQUE: Radiation dose reduction techniques were utilized, including   automated exposure control and exposure modulation based on body size.   Routine axial images of the cervical spine with coronal and sagittal   reconstructed images.       HISTORY: Trauma, midline neck pain.       COMPARISON:  11/1/2011.       FINDINGS:    There is a slightly angulated apex anterior fracture of the dens.   Nondisplaced fracture of the anterior arch of C1.       3 mm anterolisthesis of C3 on C4 on the previous examination measured 2   mm. Anterior fusion of C5-C7.       Prevertebral soft tissue is unremarkable.            IMPRESSION:    Minimally anteriorly angulated fracture of the dens.   Nondisplaced fracture of the anterior arch of C1.           ----------------------------------------------------------------       CT SCAN OF FACIAL BONES WITHOUT CONTRAST.       TECHNIQUE: Radiation dose reduction techniques were utilized, including   automated exposure control and exposure modulation based on body size.   Multiple  axial images of the facial bones with coronal and sagittal   reconstructions.       HISTORY: Trauma, fall, left mandible and nose pain.       COMPARISON:  No prior studies for comparison.       FINDINGS:    Limited evaluation of the intracranial structures is unremarkable   Visualized portion of the calvarium is intact.       Nasal bones are intact. Orbits are unremarkable.       Paranasal sinuses are unremarkable, except for postsurgical changes of   the maxillary sinuses, no evidence for acute sinusitis.       Mandible and temporomandibular joints are within normal limits.       Fractures of the dens and anterior arch of C1.       Soft tissues are within normal limits.       IMPRESSION:    No acute fracture of the facial bones.       Findings called to Romero Carbone, 9:43 PM.                          CT Cervical Spine Without Contrast   Preliminary Result   No definite acute intracranial pathology.           ----------------------------------------------------------------       CT CERVICAL SPINE WITHOUT CONTRAST.       TECHNIQUE: Radiation dose reduction techniques were utilized, including   automated exposure control and exposure modulation based on body size.   Routine axial images of the cervical spine with coronal and sagittal   reconstructed images.       HISTORY: Trauma, midline neck pain.       COMPARISON:  11/1/2011.       FINDINGS:    There is a slightly angulated apex anterior fracture of the dens.   Nondisplaced fracture of the anterior arch of C1.       3 mm anterolisthesis of C3 on C4 on the previous examination measured 2   mm. Anterior fusion of C5-C7.       Prevertebral soft tissue is unremarkable.            IMPRESSION:    Minimally anteriorly angulated fracture of the dens.   Nondisplaced fracture of the anterior arch of C1.           ----------------------------------------------------------------       CT SCAN OF FACIAL BONES WITHOUT CONTRAST.       TECHNIQUE: Radiation dose reduction techniques  were utilized, including   automated exposure control and exposure modulation based on body size.   Multiple axial images of the facial bones with coronal and sagittal   reconstructions.       HISTORY: Trauma, fall, left mandible and nose pain.       COMPARISON:  No prior studies for comparison.       FINDINGS:    Limited evaluation of the intracranial structures is unremarkable   Visualized portion of the calvarium is intact.       Nasal bones are intact. Orbits are unremarkable.       Paranasal sinuses are unremarkable, except for postsurgical changes of   the maxillary sinuses, no evidence for acute sinusitis.       Mandible and temporomandibular joints are within normal limits.       Fractures of the dens and anterior arch of C1.       Soft tissues are within normal limits.       IMPRESSION:    No acute fracture of the facial bones.       Findings called to Romero Carbone, 9:43 PM.                               I ordered the above noted radiological studies. Interpreted by radiologist. Discussed with radiologist (Dr Banerjee). Reviewed by me in PACS.       PROCEDURES  Laceration Repair  Date/Time: 9/14/2018 10:36 PM  Performed by: MONI CARBONE III  Authorized by: [place] [facility]     Consent:     Consent obtained:  Verbal    Consent given by:  Patient  Laceration details:     Location:  Face    Face location:  Nose    Length (cm):  1  Repair type:     Repair type:  Simple  Treatment:     Area cleansed with:  Saline    Amount of cleaning:  Standard    Irrigation solution:  Sterile saline  Skin repair:     Repair method:  Tissue adhesive  Approximation:     Approximation:  Close  Post-procedure details:     Dressing:  Open (no dressing)    Patient tolerance of procedure:  Tolerated well, no immediate complications  Critical Care  Performed by: MONI CARBONE III  Authorized by: [place] [facility]     Critical care provider statement:     Critical care time (minutes):  30    Critical care time was  exclusive of:  Separately billable procedures and treating other patients    Critical care was necessary to treat or prevent imminent or life-threatening deterioration of the following conditions:  Trauma    Critical care was time spent personally by me on the following activities:  Ordering and performing treatments and interventions, ordering and review of laboratory studies, ordering and review of radiographic studies, pulse oximetry, re-evaluation of patient's condition, obtaining history from patient or surrogate, interpretation of cardiac output measurements, examination of patient, evaluation of patient's response to treatment, discussions with consultants and development of treatment plan with patient or surrogate          PROGRESS AND CONSULTS     8:23 PM  Ordered CT head. C-spine, and facial bones. Ordered XR L-spine and L hand. Norco ordered for pain.   8:33 PM  Reviewed pt's history and workup with Dr. Headley.  After a bedside evaluation; Dr Headley agrees with the plan of care.  9:56 PM  Rechecked with pt. Informed pt of her imaging showing cervical fx and plan to change collars. Informed the pt of plan to consult with neurosurgery. Will repeat pain medication and update tetanus.   10:25 PM  Discussed pt with Dr. Jackson (Neurosurgeon). Dr. Jackson agrees to see in consult and would like to have pt admitted to medicine.  10:31 PM  Aspen collar placed with inline c-spine traction held.  Time 10:33 PM  Discussed case with Dr Villeda (Hospitalist)  Reviewed history, exam, results and treatments.  Discussed concerns and plan of care. Dr Villeda accepts pt to be admitted to telemetry.      MEDICAL DECISION MAKING  Results were reviewed/discussed with the patient and they were also made aware of online access. Pt also made aware that some labs, such as cultures, will not be resulted during ER visit and follow up with PMD is necessary.     MDM  Number of Diagnoses or Management Options     Amount and/or  Complexity of Data Reviewed  Tests in the radiology section of CPT®: ordered and reviewed (CT C-spine: Minimally anteriorly angulated fracture of the dens. Nondisplaced fracture of the anterior arch of C1. )  Discussion of test results with the performing providers: yes (Dr Banerjee)  Discuss the patient with other providers: yes (Dr. Jackson, Dr. Villeda)           DIAGNOSIS  Final diagnoses:   Closed odontoid fracture, initial encounter (CMS/[facility])   Closed nondisplaced fracture of first cervical vertebra, unspecified fracture morphology, initial encounter (CMS/[facility])   Laceration of nose, initial encounter   Fall, initial encounter       DISPOSITION  ADMISSION    Discussed treatment plan and reason for admission with pt/family and admitting physician.  Pt/family voiced understanding of the plan for admission for further testing/treatment as needed.         Latest Documented Vital Signs:  As of 6:32 AM  BP- 125/70 HR- 62 Temp- 98.4 °F (36.9 °C) (Oral) O2 sat- 95%    --  Documentation assistance provided by branden Olivares for Romero Carbone PA-C.  Information recorded by the scribe was done at my direction and has been verified and validated by me.     Tarik Olivares  09/14/18 [id number]       Vignesh Carbone III, PA  09/15/18 [id number]

## 2018-09-15 NOTE — H&P
Name: Kristan Galicia ADMIT: 2018   : 1950  PCP: Chelly Red APRN    MRN: [id number] LOS: 1 days   AGE/SEX: 68 y.o. female  ROOM: [facility]     Chief Complaint   Patient presents with   • Fall     Pt was at a birthday party, tripped and fell. Head pain, neck pain and right knee, nose skin tear and facial bruising per EMS       Subjective   Patient is a 68 y.o. female who presents to [facility] with the above chief complaint.  Essentially she was in her normal state of health today and went to a birthday party with her granddaughter.  They were sitting at a picnic table and when she was getting up she got the first leg over the seat on the picnic table but if she tried to dissuade her other leg over the bandage she caught her foot and fell forward onto the ground she landed on her knee and her face.  She immediately felt a sharp pain in the back of her head at the base of her skull.  She had what she describes as an explosion in her head.  She had some tingling in her lower back but denied any numbness tingling or weakness in her arms or legs.  She had trouble getting up and had to be helped her feet and annulus was called and she was brought to the emergency room.  She recently had an right knee replacement and is also a shoulder replacement done in the past.  In the ER she was found to have a cervical fracture and she's been admitted our service for further evaluation and management.    History of Present Illness    Past Medical History:   Diagnosis Date   • Anemia    • Arthritis    • Atrial fibrillation (CMS/HCC)    • Cardiac defibrillator in place 2009    medtronic    • Cardiomyopathy (CMS/HCC)     Hypertrophic Cardiomyopathy   • CHF (congestive heart failure) (CMS/HCC)    • Chronic pain    • Coronary artery disease    • Diabetes mellitus (CMS/HCC)     Type II   • Diverticulosis     hx diverticulitis   • GERD (gastroesophageal reflux disease)    • Heart murmur    •  History of depression    • History of tumor     left ocular tumor, treated with steroids   • Hyperlipidemia    • Hypertension    • Migraines    • On anticoagulant therapy    • Osteoporosis    • Palpitations    • Sleep apnea     does not use CPAP or BiPAP, used nose strips   • Thyroid disease    • Vasovagal syncope    • Vertigo      Past Surgical History:   Procedure Laterality Date   • ANTERIOR CERVICAL DISCECTOMY W/ FUSION  2012    C5-7   • APPENDECTOMY  1970   • BILATERAL BREAST REDUCTION     • CARDIAC CATHETERIZATION  02/19/2007   • CARDIAC DEFIBRILLATOR PLACEMENT Left 1999, 2013    Dr. Ya   • CATARACT EXTRACTION Bilateral    • COLONOSCOPY  1996    Dr. Herbert Gonsales    • COLONOSCOPY  8/16/2016    Procedure: COLONOSCOPY with cecum;  Surgeon: Rosalio Sheikh MD;  Location: [facility];  Service:    • ENDOSCOPY N/A 4/27/2017    Procedure: ESOPHAGOGASTRODUODENOSCOPY WITH BIOPSIES;  Surgeon: Rosalio Sheikh MD;  Location: [facility];  Service:    • FEMUR SURGERY Left     with metal implant  x3   • FIRST RIB RESECTION Bilateral     and scalenectomy   • HYSTERECTOMY  1989    Total   • KNEE ARTHROSCOPY Bilateral 2014    Dr. Li   • KNEE SURGERY Right 06/10/2010   • LAPAROSCOPIC CHOLECYSTECTOMY  1974   • ROTATOR CUFF REPAIR Right 06/10/2010   • SHOULDER ARTHROSCOPY W/ ROTATOR CUFF REPAIR Bilateral 05/20/2011   • SHOULDER SURGERY Right    • TONSILLECTOMY     • TOTAL KNEE ARTHROPLASTY Right 4/18/2018    Procedure: RIGHT TOTAL KNEE ARTHROPLASTY WITH STEVE NAVIGATION;  Surgeon: Bravo Pascual MD;  Location: [facility];  Service: Orthopedics   • TOTAL SHOULDER ARTHROPLASTY W/ DISTAL CLAVICLE EXCISION Right 2/2/2017    Procedure: TOTAL SHOULDER REVERSE ARTHROPLASTY;  Surgeon: Juan Antonio Anne MD;  Location: [facility] OR;  Service:      Family History   Problem Relation Age of Onset   • Heart attack Brother    • Heart disease Brother    • Hyperthyroidism Mother    • Cancer Mother    • Heart disease  Mother    • Osteoporosis Mother    • No Known Problems Father          car accident   • Cirrhosis Maternal Grandmother    • No Known Problems Maternal Grandfather    • No Known Problems Paternal Grandmother    • No Known Problems Paternal Grandfather    • Breast cancer Maternal Aunt    • Malig Hyperthermia Neg Hx      Social History   Substance Use Topics   • Smoking status: Former Smoker     Packs/day: 0.50     Years: 17.00     Types: Cigarettes     Quit date: 9/8/1985   • Smokeless tobacco: Never Used   • Alcohol use No     Prescriptions Prior to Admission   Medication Sig Dispense Refill Last Dose   • alendronate (FOSAMAX) 70 MG tablet Take 1 tablet by mouth Every 7 (Seven) Days. 12 tablet 3 Taking   • allopurinol (ZYLOPRIM) 100 MG tablet TAKE 1 TABLET BY MOUTH DAILY. 30 tablet 1 Taking   • amiodarone (PACERONE) 200 MG tablet Take 100 mg by mouth Every Night.   Taking   • atorvastatin (LIPITOR) 80 MG tablet TAKE 1 TABLET BY MOUTH EVERY NIGHT. 90 tablet 1 Taking   • baclofen (LIORESAL) 10 MG tablet Take 10 mg by mouth At Night As Needed for Muscle Spasms.   Taking   • bisacodyl (DULCOLAX) 5 MG EC tablet Take 2 tablets by mouth Daily As Needed for Constipation. 30 tablet 3 Taking   • Blood Glucose Monitoring Suppl (ACCU-CHEK OTILIA PLUS) W/DEVICE kit Dx e11.9 use to check BS BID, ok to substitute formulary preferred 1 kit 0 Taking   • carvedilol (COREG) 12.5 MG tablet Take 12.5 mg by mouth 2 (Two) Times a Day.   Taking   • dabigatran etexilate (PRADAXA) 150 MG capsu Take 150 mg by mouth 2 (Two) Times a Day. TO HOLD 5 DAYS PRIOR TO OR PER CARDIO   Taking   • DULoxetine (CYMBALTA) 60 MG capsule TAKE 1 CAPSULE BY MOUTH DAILY. 90 capsule 1    • gabapentin (NEURONTIN) 300 MG capsule Take 300 mg by mouth 3 (Three) Times a Day.   Taking   • glucose blood (ACCU-CHEK OTILIA PLUS) test strip Dx e11.9 use to check BS BID, ok to substitute formulary preferred 60 each 11 Taking   • HYDROcodone-acetaminophen (NORCO) 5-325 MG per  tablet 1-2 oral Q4H PRN severe pain 60 tablet 0 Not Taking   • Lancets (ACCU-CHEK MULTICLIX) lancets Dx e11.9 use to check BS BID, ok to substitute formulary preferred 60 each 11 Taking   • levothyroxine (SYNTHROID, LEVOTHROID) 88 MCG tablet TAKE 1 TABLET BY MOUTH DAILY. 30 tablet 1    • lidocaine (LIDODERM) 5 % Place 1 patch on the skin Daily As Needed for Mild Pain . Remove & Discard patch within 12 hours or as directed by MD   Taking   • montelukast (SINGULAIR) 10 MG tablet TAKE 1 TABLET BY MOUTH EVERY DAY 90 tablet 1    • spironolactone-hydrochlorothiazide (ALDACTAZIDE) 25-25 MG tablet TAKE 1 TABLET BY MOUTH EVERY DAY 90 tablet 1 Taking     Allergies:  Adhesive tape; Aspirin; Biaxin [clarithromycin]; Codeine; Darvon [propoxyphene]; Levaquin [levofloxacin]; and Tequin [gatifloxacin]    Review of Systems   Constitutional: Negative for chills, fatigue and fever.   HENT: Negative for congestion, rhinorrhea and sinus pain.    Eyes: Negative for photophobia, redness and visual disturbance.   Respiratory: Negative for chest tightness, shortness of breath and wheezing.    Cardiovascular: Negative for chest pain, palpitations and leg swelling.   Gastrointestinal: Negative for abdominal distention, constipation and diarrhea.   Endocrine: Negative for polydipsia, polyphagia and polyuria.   Genitourinary: Negative for difficulty urinating, dysuria and hematuria.   Musculoskeletal: Positive for back pain, neck pain and neck stiffness.   Skin: Negative for pallor and rash.   Allergic/Immunologic: Negative for environmental allergies and immunocompromised state.   Neurological: Negative for dizziness, numbness and headaches.   Hematological: Negative for adenopathy. Does not bruise/bleed easily.   Psychiatric/Behavioral: Negative for agitation, behavioral problems and confusion.        Objective    Vital Signs  Temp:  [98.4 °F (36.9 °C)-99.2 °F (37.3 °C)] 98.4 °F (36.9 °C)  Heart Rate:  [61-72] 62  Resp:  [16-18] 16  BP:  (125-158)/(66-81) 125/70  SpO2:  [95 %-98 %] 95 %  on   ;   Device (Oxygen Therapy): room air  Body mass index is 2.17 kg/m².    Physical Exam   Constitutional: She is oriented to person, place, and time. She appears well-nourished. No distress.   HENT:   She has bruises and scratches and abrasions over her face and nasal bridge.  She's currently a hard cervical collar   Eyes: Conjunctivae and EOM are normal.   Neck: Neck supple.   Cardiovascular:   Murmur heard.  Irregularly irregular rate controlled   Pulmonary/Chest: Effort normal and breath sounds normal.   Abdominal: Soft. Bowel sounds are normal.   Musculoskeletal: Normal range of motion. She exhibits no edema.   Neurological: She is alert and oriented to person, place, and time.   Skin: Skin is warm and dry. Capillary refill takes less than 2 seconds.   Psychiatric: She has a normal mood and affect. Her behavior is normal.   Nursing note and vitals reviewed.      Results Review:   I reviewed the patient's new clinical results.    Results from last 7 days  Lab Units 09/14/18  2213   WBC 10*3/mm3 10.43   HEMOGLOBIN g/dL 12.8   PLATELETS 10*3/mm3 258     Results from last 7 days  Lab Units 09/14/18  2213   SODIUM mmol/L 142   POTASSIUM mmol/L 4.4   CHLORIDE mmol/L 103   CO2 mmol/L 26.6   BUN mg/dL 18   CREATININE mg/dL 0.95   GLUCOSE mg/dL 105*   ALBUMIN g/dL 4.10   BILIRUBIN mg/dL 0.7   ALK PHOS U/L 76   AST (SGOT) U/L 18   ALT (SGPT) U/L 16   Estimated Creatinine Clearance: 4.7 mL/min (by C-G formula based on SCr of 0.95 mg/dL).  Results from last 7 days  Lab Units 09/14/18  2213   INR  1.05         Invalid input(s): LDLCALC    XR Spine Lumbar 4+ View   Final Result   No fracture or subluxation of the lumbar spine.           ----------------------------------------------------------------       LEFT HAND 3 VIEWS.       HISTORY: Trauma. Hand pain.       COMPARISON: No prior studies for comparison.       FINDINGS:   There is no fracture or dislocation.            Soft tissue structures are unremarkable.       IMPRESSION:   No acute fracture or dislocation.            This report was finalized on 9/15/2018 3:02 AM by Dr. Karan Banerjee M.D.          XR Hand 3+ View Left   Final Result   No fracture or subluxation of the lumbar spine.           ----------------------------------------------------------------       LEFT HAND 3 VIEWS.       HISTORY: Trauma. Hand pain.       COMPARISON: No prior studies for comparison.       FINDINGS:   There is no fracture or dislocation.           Soft tissue structures are unremarkable.       IMPRESSION:   No acute fracture or dislocation.            This report was finalized on 9/15/2018 3:02 AM by Dr. Karan Banerjee M.D.          CT Head Without Contrast   Preliminary Result   No definite acute intracranial pathology.           ----------------------------------------------------------------       CT CERVICAL SPINE WITHOUT CONTRAST.       TECHNIQUE: Radiation dose reduction techniques were utilized, including   automated exposure control and exposure modulation based on body size.   Routine axial images of the cervical spine with coronal and sagittal   reconstructed images.       HISTORY: Trauma, midline neck pain.       COMPARISON:  11/1/2011.       FINDINGS:    There is a slightly angulated apex anterior fracture of the dens.   Nondisplaced fracture of the anterior arch of C1.       3 mm anterolisthesis of C3 on C4 on the previous examination measured 2   mm. Anterior fusion of C5-C7.       Prevertebral soft tissue is unremarkable.            IMPRESSION:    Minimally anteriorly angulated fracture of the dens.   Nondisplaced fracture of the anterior arch of C1.           ----------------------------------------------------------------       CT SCAN OF FACIAL BONES WITHOUT CONTRAST.       TECHNIQUE: Radiation dose reduction techniques were utilized, including   automated exposure control and exposure modulation based on body size.    Multiple axial images of the facial bones with coronal and sagittal   reconstructions.       HISTORY: Trauma, fall, left mandible and nose pain.       COMPARISON:  No prior studies for comparison.       FINDINGS:    Limited evaluation of the intracranial structures is unremarkable   Visualized portion of the calvarium is intact.       Nasal bones are intact. Orbits are unremarkable.       Paranasal sinuses are unremarkable, except for postsurgical changes of   the maxillary sinuses, no evidence for acute sinusitis.       Mandible and temporomandibular joints are within normal limits.       Fractures of the dens and anterior arch of C1.       Soft tissues are within normal limits.       IMPRESSION:    No acute fracture of the facial bones.       Findings called to Romero Carbone, 9:43 PM.                          CT Facial Bones Without Contrast   Preliminary Result   No definite acute intracranial pathology.           ----------------------------------------------------------------       CT CERVICAL SPINE WITHOUT CONTRAST.       TECHNIQUE: Radiation dose reduction techniques were utilized, including   automated exposure control and exposure modulation based on body size.   Routine axial images of the cervical spine with coronal and sagittal   reconstructed images.       HISTORY: Trauma, midline neck pain.       COMPARISON:  11/1/2011.       FINDINGS:    There is a slightly angulated apex anterior fracture of the dens.   Nondisplaced fracture of the anterior arch of C1.       3 mm anterolisthesis of C3 on C4 on the previous examination measured 2   mm. Anterior fusion of C5-C7.       Prevertebral soft tissue is unremarkable.            IMPRESSION:    Minimally anteriorly angulated fracture of the dens.   Nondisplaced fracture of the anterior arch of C1.           ----------------------------------------------------------------       CT SCAN OF FACIAL BONES WITHOUT CONTRAST.       TECHNIQUE: Radiation dose reduction  techniques were utilized, including   automated exposure control and exposure modulation based on body size.   Multiple axial images of the facial bones with coronal and sagittal   reconstructions.       HISTORY: Trauma, fall, left mandible and nose pain.       COMPARISON:  No prior studies for comparison.       FINDINGS:    Limited evaluation of the intracranial structures is unremarkable   Visualized portion of the calvarium is intact.       Nasal bones are intact. Orbits are unremarkable.       Paranasal sinuses are unremarkable, except for postsurgical changes of   the maxillary sinuses, no evidence for acute sinusitis.       Mandible and temporomandibular joints are within normal limits.       Fractures of the dens and anterior arch of C1.       Soft tissues are within normal limits.       IMPRESSION:    No acute fracture of the facial bones.       Findings called to Romero Carbone, 9:43 PM.                          CT Cervical Spine Without Contrast   Preliminary Result   No definite acute intracranial pathology.           ----------------------------------------------------------------       CT CERVICAL SPINE WITHOUT CONTRAST.       TECHNIQUE: Radiation dose reduction techniques were utilized, including   automated exposure control and exposure modulation based on body size.   Routine axial images of the cervical spine with coronal and sagittal   reconstructed images.       HISTORY: Trauma, midline neck pain.       COMPARISON:  11/1/2011.       FINDINGS:    There is a slightly angulated apex anterior fracture of the dens.   Nondisplaced fracture of the anterior arch of C1.       3 mm anterolisthesis of C3 on C4 on the previous examination measured 2   mm. Anterior fusion of C5-C7.       Prevertebral soft tissue is unremarkable.            IMPRESSION:    Minimally anteriorly angulated fracture of the dens.   Nondisplaced fracture of the anterior arch of C1.            ----------------------------------------------------------------       CT SCAN OF FACIAL BONES WITHOUT CONTRAST.       TECHNIQUE: Radiation dose reduction techniques were utilized, including   automated exposure control and exposure modulation based on body size.   Multiple axial images of the facial bones with coronal and sagittal   reconstructions.       HISTORY: Trauma, fall, left mandible and nose pain.       COMPARISON:  No prior studies for comparison.       FINDINGS:    Limited evaluation of the intracranial structures is unremarkable   Visualized portion of the calvarium is intact.       Nasal bones are intact. Orbits are unremarkable.       Paranasal sinuses are unremarkable, except for postsurgical changes of   the maxillary sinuses, no evidence for acute sinusitis.       Mandible and temporomandibular joints are within normal limits.       Fractures of the dens and anterior arch of C1.       Soft tissues are within normal limits.       IMPRESSION:    No acute fracture of the facial bones.       Findings called to Romero Carbone, 9:43 PM.                            Assessment/Plan     Active Problems:    PAF (paroxysmal atrial fibrillation) (CMS/HCC)    Obstructive sleep apnea, adult    Hyperlipidemia    Anticoagulant long-term use    Non-obstructive hypertrophic cardiomyopathy (CMS/HCC)    Type 2 diabetes mellitus without complication, without long-term current use of insulin (CMS/HCC)    Closed fracture of odontoid process of axis (CMS/HCC)      Assessment & Plan  She's being admitted to the hospital with a closed fracture of the odontoid process.  Neurosurgery is requested that she be in a hard collar and be monitored overnight.  They've been consulted to reevaluate in the morning.  We'll otherwise continue her home medications and continue medications for pain control.  She might need to have a different collar ordered and not sure this once fitting very well.  We'll defer that decision to  neurosurgery.      I discussed the patients findings and my recommendations with patient, family and nursing staff.          Bravo Villeda MD  [facility]  09/15/18  3:19 AM

## 2018-09-15 NOTE — ED PROVIDER NOTES
MD ATTESTATION NOTE    The RUMA and I have discussed this patient's history, physical exam, and treatment plan.  I have reviewed the documentation and personally had a face to face interaction with the patient. I affirm the documentation and agree with the treatment and plan.  The attached note describes my personal findings.      Pt presents to the ED c/o head injury, facial injury, neck pain, lower back pain, and left hand pain s/p mechanical fall sustained earlier today while pt was at a party. Pt denies LOC, abdominal pain, chest pain, dyspnea, nausea, vomiting, dizziness/lightheadedness, and visual difficulties.    On physical exam, pt is alert and oriented x3. There is a laceration to the nasal bridge. Abrasion on the forehead. There is diffuse left hand tenderness. C-Collar has been applied to the neck.      Ct of c spine shows dens fracture and fracture of c1.  Will be admitted.      Documentation assistance provided by Helena Foley. Information recorded by the scribe was done at my direction and has been verified and validated by me.     Entered by Helena Foley, acting as scribe for Dr. Kayode MD.        Helena Foley  09/14/18 [id number]       Yash Headley MD  09/14/18 [id number]

## 2018-09-15 NOTE — PROGRESS NOTES
Case discussed with RN was called for complaints of neck pain requesting more medication.  I feel this is likely compounded by a past history of fibromyalgia.  We'll continue with Dilaudid for when necessary pain control for just another 24 hours but need to be cautious.  Would like to trial Lidoderm patch if able to place with c-collar suggested per neurosurgery.  Patient is an after midnight patient in chart was reviewed but I will formally see her in a.m.  Neurosurgery recommended Toradol though this was not continued per pharmacy recommendations due to her past history of aspirin allergy.

## 2018-09-15 NOTE — PLAN OF CARE
Problem: Patient Care Overview  Goal: Plan of Care Review  Outcome: Ongoing (interventions implemented as appropriate)   09/15/18 1500   Coping/Psychosocial   Plan of Care Reviewed With patient   OTHER   Outcome Summary Patient is appropriate for skilled PT secondary to decreased strength and transfer ability. Patient lives alone. At this time, recommend Rehab after D/C from hospital; depends upon progress. Will continue to treat patient while in this facility.

## 2018-09-15 NOTE — CONSULTS
Consults    Patient Care Team:  Chelly Red APRN as PCP - General (Family Medicine)  Corey Charlton MD as PCP - Claims Attributed  Minh Ya MD as Consulting Physician (Cardiac Electrophysiology)  Munira Zaidi APRN (Nurse Practitioner)  Rosalio Sheikh MD as Consulting Physician (General Surgery)  Juan Antonio Anne MD as Consulting Physician (Orthopedic Surgery)  Tanja Pascual APRN as Nurse Practitioner (Family Medicine)    Chief complaint: neck pain    Subjective     History of Present Illness  Mrs. Galicia is a 68-year-old lady who yesterday was getting up out of a chair and she caught her leg as she was getting up, fell and struck her head.  There is no loss of consciousness.  She did experience neck pain at that time.  She was brought to the emergency department where CT scan of the head and neck were performed demonstrating no intracranial pathology.  She did have a nondisplaced dens fracture and anterior arch fracture of C1.  She denies any new weakness, gait dysfunction, urinary urgency or incontinence.  Pain medications have helped some.  Review of Systems   Review of systems only positive for the above-described otherwise negative  Past Medical History:   Diagnosis Date   • Anemia    • Arthritis    • Atrial fibrillation (CMS/HCC)    • Cardiac defibrillator in place 01/2009    medtronic    • Cardiomyopathy (CMS/HCC)     Hypertrophic Cardiomyopathy   • CHF (congestive heart failure) (CMS/HCC)    • Chronic pain    • Coronary artery disease    • Diabetes mellitus (CMS/HCC)     Type II   • Diverticulosis     hx diverticulitis   • GERD (gastroesophageal reflux disease)    • Heart murmur    • History of depression    • History of tumor     left ocular tumor, treated with steroids   • Hyperlipidemia    • Hypertension    • Migraines    • On anticoagulant therapy    • Osteoporosis    • Palpitations    • Sleep apnea     does not use CPAP or BiPAP, used nose strips   •  Thyroid disease    • Vasovagal syncope    • Vertigo    ,   Past Surgical History:   Procedure Laterality Date   • ANTERIOR CERVICAL DISCECTOMY W/ FUSION  2012    C5-7   • APPENDECTOMY  1970   • BILATERAL BREAST REDUCTION     • CARDIAC CATHETERIZATION  02/19/2007   • CARDIAC DEFIBRILLATOR PLACEMENT Left 1999, 2013    Dr. Ya   • CATARACT EXTRACTION Bilateral    • COLONOSCOPY  1996    Dr. Herbert Gonsales    • COLONOSCOPY  8/16/2016    Procedure: COLONOSCOPY with cecum;  Surgeon: Rosalio Sheikh MD;  Location: [facility] ENDOSCOPY;  Service:    • ENDOSCOPY N/A 4/27/2017    Procedure: ESOPHAGOGASTRODUODENOSCOPY WITH BIOPSIES;  Surgeon: Rosalio Sheikh MD;  Location: [facility] ENDOSCOPY;  Service:    • FEMUR SURGERY Left     with metal implant  x3   • FIRST RIB RESECTION Bilateral     and scalenectomy   • HYSTERECTOMY  1989    Total   • KNEE ARTHROSCOPY Bilateral 2014    Dr. Li   • KNEE SURGERY Right 06/10/2010   • LAPAROSCOPIC CHOLECYSTECTOMY  1974   • ROTATOR CUFF REPAIR Right 06/10/2010   • SHOULDER ARTHROSCOPY W/ ROTATOR CUFF REPAIR Bilateral 05/20/2011   • SHOULDER SURGERY Right    • TONSILLECTOMY     • TOTAL KNEE ARTHROPLASTY Right 4/18/2018    Procedure: RIGHT TOTAL KNEE ARTHROPLASTY WITH STEVE NAVIGATION;  Surgeon: Bravo Pascual MD;  Location: [facility] OR;  Service: Orthopedics   • TOTAL SHOULDER ARTHROPLASTY W/ DISTAL CLAVICLE EXCISION Right 2/2/2017    Procedure: TOTAL SHOULDER REVERSE ARTHROPLASTY;  Surgeon: Juan Antonio Anne MD;  Location: [facility] OR;  Service:    ,   Family History   Problem Relation Age of Onset   • Heart attack Brother    • Heart disease Brother    • Hyperthyroidism Mother    • Cancer Mother    • Heart disease Mother    • Osteoporosis Mother    • No Known Problems Father          car accident   • Cirrhosis Maternal Grandmother    • No Known Problems Maternal Grandfather    • No Known Problems Paternal Grandmother    • No Known Problems Paternal Grandfather    • Breast cancer  Maternal Aunt    • Malig Hyperthermia Neg Hx     and   Social History   Substance Use Topics   • Smoking status: Former Smoker     Packs/day: 0.50     Years: 17.00     Types: Cigarettes     Quit date: 9/8/1985   • Smokeless tobacco: Never Used   • Alcohol use No       Objective      Vital Signs  Temp:  [98.1 °F (36.7 °C)-99.2 °F (37.3 °C)] 98.1 °F (36.7 °C)  Heart Rate:  [61-72] 62  Resp:  [16-18] 16  BP: (125-158)/(66-81) 126/67    Physical Exam  Alert and oriented by 3  Speech is intact incoherent articulate with good content and production  Cranial nerves III through XII are intact symmetrically reactive pupils facial asymmetry  Motor exam is 5/5 both upper and lower extremities and symmetric  Sensation is intact to all modalities  Reflexes are 1+ in both lower and upper extremities  Valerio palpation and suboccipital spine    Results Review:    I reviewed the patient's new clinical results.  He scan of the cervical spine demonstrates a nondisplaced patellar fracture.  There is no evidence of listhesis and there is very close approximation of the fracture.  This is a type II fracture.  There is a associated anterior arch fracture of not much clinical significance      Assessment/Plan     Active Problems:    PAF (paroxysmal atrial fibrillation) (CMS/HCC)    Obstructive sleep apnea, adult    Hyperlipidemia    Anticoagulant long-term use    Non-obstructive hypertrophic cardiomyopathy (CMS/HCC)    Type 2 diabetes mellitus without complication, without long-term current use of insulin (CMS/HCC)    Closed fracture of odontoid process of axis (CMS/HCC)      Assessment & Plan  Mrs. Galicia suffered a type II odontoid fracture.  Nondisplaced in close approximation.  This time we'll plan just to treat conservatively in a collar.  She will be placed in a [place] J cervical collar for better comfort.  She is to remain in the collar all times except for 20-30 minute breaks 2-3 times per day.  I will follow up in clinic in 2  weeks.      I discussed the patients findings and my recommendations with patient    Ruben Jackson MD  09/15/18  9:28 AM    Time: More than 50% of time spent in counseling and coordination of care:  Total face-to-face/floor time 45 min.  Time spent in counseling 25 min. Counseling included the following topics: pathology and treatment

## 2018-09-16 ENCOUNTER — APPOINTMENT (OUTPATIENT)
Dept: GENERAL RADIOLOGY | Facility: HOSPITAL | Age: 68
End: 2018-09-16

## 2018-09-16 PROBLEM — J98.11 ATELECTASIS: Status: ACTIVE | Noted: 2018-09-16

## 2018-09-16 PROBLEM — R09.02 HYPOXIA: Status: ACTIVE | Noted: 2018-09-16

## 2018-09-16 LAB — GLUCOSE BLDC GLUCOMTR-MCNC: 195 MG/DL (ref 70–130)

## 2018-09-16 PROCEDURE — 94799 UNLISTED PULMONARY SVC/PX: CPT

## 2018-09-16 PROCEDURE — 82962 GLUCOSE BLOOD TEST: CPT

## 2018-09-16 PROCEDURE — 25010000002 HYDROMORPHONE PER 4 MG: Performed by: HOSPITALIST

## 2018-09-16 PROCEDURE — 71045 X-RAY EXAM CHEST 1 VIEW: CPT

## 2018-09-16 PROCEDURE — 97110 THERAPEUTIC EXERCISES: CPT

## 2018-09-16 PROCEDURE — 25010000002 ONDANSETRON PER 1 MG: Performed by: HOSPITALIST

## 2018-09-16 RX ORDER — SENNA AND DOCUSATE SODIUM 50; 8.6 MG/1; MG/1
2 TABLET, FILM COATED ORAL 2 TIMES DAILY
Status: DISCONTINUED | OUTPATIENT
Start: 2018-09-16 | End: 2018-09-17 | Stop reason: HOSPADM

## 2018-09-16 RX ADMIN — HYDROCODONE BITARTRATE AND ACETAMINOPHEN 1 TABLET: 10; 325 TABLET ORAL at 16:29

## 2018-09-16 RX ADMIN — HYDROMORPHONE HYDROCHLORIDE 0.5 MG: 1 INJECTION, SOLUTION INTRAMUSCULAR; INTRAVENOUS; SUBCUTANEOUS at 02:34

## 2018-09-16 RX ADMIN — GABAPENTIN 300 MG: 300 CAPSULE ORAL at 16:29

## 2018-09-16 RX ADMIN — HYDROMORPHONE HYDROCHLORIDE 0.5 MG: 1 INJECTION, SOLUTION INTRAMUSCULAR; INTRAVENOUS; SUBCUTANEOUS at 08:14

## 2018-09-16 RX ADMIN — ATORVASTATIN CALCIUM 80 MG: 80 TABLET, FILM COATED ORAL at 22:52

## 2018-09-16 RX ADMIN — GABAPENTIN 300 MG: 300 CAPSULE ORAL at 22:52

## 2018-09-16 RX ADMIN — HYDROCODONE BITARTRATE AND ACETAMINOPHEN 1 TABLET: 10; 325 TABLET ORAL at 22:52

## 2018-09-16 RX ADMIN — HYDROMORPHONE HYDROCHLORIDE 0.5 MG: 1 INJECTION, SOLUTION INTRAMUSCULAR; INTRAVENOUS; SUBCUTANEOUS at 04:14

## 2018-09-16 RX ADMIN — DULOXETINE HYDROCHLORIDE 60 MG: 60 CAPSULE, DELAYED RELEASE ORAL at 08:14

## 2018-09-16 RX ADMIN — ALLOPURINOL 100 MG: 100 TABLET ORAL at 08:14

## 2018-09-16 RX ADMIN — HYDROMORPHONE HYDROCHLORIDE 0.5 MG: 1 INJECTION, SOLUTION INTRAMUSCULAR; INTRAVENOUS; SUBCUTANEOUS at 06:15

## 2018-09-16 RX ADMIN — ONDANSETRON HYDROCHLORIDE 4 MG: 2 SOLUTION INTRAMUSCULAR; INTRAVENOUS at 04:15

## 2018-09-16 RX ADMIN — CARVEDILOL 12.5 MG: 12.5 TABLET, FILM COATED ORAL at 08:16

## 2018-09-16 RX ADMIN — CARVEDILOL 12.5 MG: 12.5 TABLET, FILM COATED ORAL at 17:17

## 2018-09-16 RX ADMIN — LEVOTHYROXINE SODIUM 88 MCG: 88 TABLET ORAL at 06:38

## 2018-09-16 RX ADMIN — GABAPENTIN 300 MG: 300 CAPSULE ORAL at 08:14

## 2018-09-16 RX ADMIN — HYDROMORPHONE HYDROCHLORIDE 0.5 MG: 1 INJECTION, SOLUTION INTRAMUSCULAR; INTRAVENOUS; SUBCUTANEOUS at 00:37

## 2018-09-16 RX ADMIN — HYDROCODONE BITARTRATE AND ACETAMINOPHEN 1 TABLET: 10; 325 TABLET ORAL at 05:30

## 2018-09-16 RX ADMIN — LIDOCAINE 1 PATCH: 50 PATCH CUTANEOUS at 16:29

## 2018-09-16 RX ADMIN — AMIODARONE HYDROCHLORIDE 100 MG: 200 TABLET ORAL at 22:51

## 2018-09-16 RX ADMIN — HYDROCODONE BITARTRATE AND ACETAMINOPHEN 1 TABLET: 10; 325 TABLET ORAL at 11:40

## 2018-09-16 RX ADMIN — DOCUSATE SODIUM -SENNOSIDES 2 TABLET: 50; 8.6 TABLET, COATED ORAL at 14:11

## 2018-09-16 RX ADMIN — DOCUSATE SODIUM -SENNOSIDES 2 TABLET: 50; 8.6 TABLET, COATED ORAL at 22:52

## 2018-09-16 RX ADMIN — HYDROCODONE BITARTRATE AND ACETAMINOPHEN 1 TABLET: 10; 325 TABLET ORAL at 01:55

## 2018-09-16 RX ADMIN — SPIRONOLACTONE AND HYDROCHLOROTHIAZIDE 1 TABLET: 25; 25 TABLET, FILM COATED ORAL at 08:16

## 2018-09-16 RX ADMIN — MONTELUKAST SODIUM 10 MG: 10 TABLET, FILM COATED ORAL at 08:14

## 2018-09-16 RX ADMIN — ACETAMINOPHEN 325 MG: 325 TABLET, FILM COATED ORAL at 01:55

## 2018-09-16 NOTE — PROGRESS NOTES
"    DAILY PROGRESS NOTE  [facility]    Patient Identification:  Name: Kristan Galicia  Age: 68 y.o.  Sex: female  :  1950  MRN: [id number]         Primary Care Physician: Chelly Red APRN    Subjective:  Interval History: tolerating brace - admits to some cough - denies cp/ams/lof    Objective:no fm - d/w rn     Scheduled Meds:    allopurinol 100 mg Oral Daily   amiodarone 100 mg Oral Nightly   atorvastatin 80 mg Oral Nightly   carvedilol 12.5 mg Oral BID With Meals   DULoxetine 60 mg Oral Daily   gabapentin 300 mg Oral TID   levothyroxine 88 mcg Oral QAM   montelukast 10 mg Oral Daily   sennosides-docusate sodium 2 tablet Oral BID   spironolactone-hydrochlorothiazide 1 tablet Oral Daily     Continuous Infusions:     Vital signs in last 24 hours:  Temp:  [97.8 °F (36.6 °C)-98.8 °F (37.1 °C)] 98.8 °F (37.1 °C)  Heart Rate:  [59-62] 62  Resp:  [16-18] 18  BP: (111-130)/(50-64) 129/64    Intake/Output:    Intake/Output Summary (Last 24 hours) at 18 1124  Last data filed at 09/15/18 1311   Gross per 24 hour   Intake              120 ml   Output                0 ml   Net              120 ml       Exam:  /64 (BP Location: Left arm, Patient Position: Sitting)   Pulse 62   Temp 98.8 °F (37.1 °C) (Oral)   Resp 18   Ht 154.9 cm (61\")   Wt 78.7 kg (173 lb 8 oz)   SpO2 91%   BMI 32.78 kg/m²     General Appearance:    Alert, cooperative, no distress, AOx3                         Throat:   Lips, tongue, gums normal; oral mucosa pink and moist                           Neck:   Hard c collar                          Lungs:    Decreased bases o/w Clear to auscultation bilaterally, respirations unlabored                 Chest Wall:    No tenderness or deformity                          Heart:    Regular rate and rhythm, S1 and S2 normal                  Abdomen:     Soft, non-tender, bowel sounds active                 Extremities:   Moving all, no cyanosis or edema             "               Data Review:  Labs in chart were reviewed.    Assessment:  Active Hospital Problems    Diagnosis Date Noted   • **Closed fracture of odontoid process of axis (CMS/[facility]) [S12.100A] 09/14/2018   • Hypoxia [R09.02] 09/16/2018   • Atelectasis [J98.11] 09/16/2018   • Non-obstructive hypertrophic cardiomyopathy (CMS/[facility]) [I42.2] 08/07/2017   • Anticoagulant long-term use [Z79.01] 03/03/2017   • Hyperlipidemia [E78.5]    • Obstructive sleep apnea, adult [G47.33]    • PAF (paroxysmal atrial fibrillation) (CMS/[facility]) [I48.0] 11/01/2016      Resolved Hospital Problems    Diagnosis Date Noted Date Resolved   No resolved problems to display.       Plan:  Nonoperable per AMADEO - hard C-Collar    Acute neck pain due to above w/ pain control compounded by chronic pain issues     -I allowed Dilaudid yesterday (has requested 10X) and I have allowed increased Norco to 10mg q4 prn (previous dosing was bid prn at home per pain mgnt)   -overnight hypoxia w/ developing atelectasis - IS/mobility endorsed - check pCXR   -DC IV Dilaudid - PO at current dosing should be more than sufficient    -increase bowel regimen     HTN/AF - controlled     INGRID - complicated by neck bracing     Dispo - CCP to organize rehab vs home health     Dominik Camarillo MD  9/16/2018  11:24 AM

## 2018-09-16 NOTE — THERAPY TREATMENT NOTE
Acute Care - Physical Therapy Treatment Note  [facility]     Patient Name: Kristan Galicia  : 1950  MRN: [id number]  Today's Date: 2018     Date of Referral to PT: 18       Admit Date: 2018    Visit Dx:    ICD-10-CM ICD-9-CM   1. Closed odontoid fracture, initial encounter (CMS/HCC) S12.100A 805.02   2. Closed nondisplaced fracture of first cervical vertebra, unspecified fracture morphology, initial encounter (CMS/HCC) S12.001A 805.01   3. Laceration of nose, initial encounter S01.21XA 873.20   4. Fall, initial encounter W19.XXXA E888.9   5. Generalized weakness R53.1 780.79     Patient Active Problem List   Diagnosis   • Shoulder pain, right   • Knee pain, right   • Fall down stairs   • PAF (paroxysmal atrial fibrillation) (CMS/HCC)   • S/P rotator cuff repair   • Vitamin D deficiency   • Vertigo   • Vasovagal syncope   • Thyroid disease   • Obstructive sleep apnea, adult   • Palpitations   • Osteoporosis   • Ocular tumor   • Migraines   • Hyperlipidemia   • History of transfusion   • Heart murmur   • Heart attack   • GERD (gastroesophageal reflux disease)   • Depression   • CHF (congestive heart failure) (CMS/[facility])   • Cardiomyopathy (CMS/[facility])   • Asthma   • Anxiety   • Arthritis of right knee   • Right rotator cuff tear   • Hypothyroidism   • Dyspnea and respiratory abnormalities   • Orthopnea   • IHSS (idiopathic hypertrophic subaortic stenosis) (CMS/HCC)   • AICD (automatic cardioverter/defibrillator) present   • Anticoagulant long-term use   • Hyperglycemia, drug-induced   • Chronic pain of right knee   • Diverticulitis   • CAD (coronary artery disease)   • Hypertrophic nonobstructive cardiomyopathy (CMS/[facility])   • ICD (implantable cardioverter-defibrillator) discharge   • Menopause   • Non-obstructive hypertrophic cardiomyopathy (CMS/[facility])   • Insomnia   • History of total knee arthroplasty, right   • Closed fracture of odontoid process of axis (CMS/[facility])   • Hypoxia   • Atelectasis        Therapy Treatment          Rehabilitation Treatment Summary     Row Name 09/16/18 1435             Treatment Time/Intention    Discipline physical therapist  -EE      Document Type therapy note (daily note)  -EE      Subjective Information complains of;dizziness;pain  -EE      Mode of Treatment physical therapy  -EE      Patient/Family Observations Pt sitting up in chair in no acute distress; [place] J collar applied  -EE      Patient Effort good  -EE      Existing Precautions/Restrictions fall;brace worn when out of bed;spinal   [place] J collar  -EE      Treatment Considerations/Comments Pt reports dizziness during standing and ambulation  -EE      Recorded by [EE] Rossy Garcia, PT 09/16/18 1448      [facility] Name 09/16/18 1435             Cognitive Assessment/Intervention    Additional Documentation Cognitive Assessment/Intervention (Group)  -EE      Recorded by [EE] Rossy Garcia, PT 09/16/18 1448      Row Name 09/16/18 1435             Cognitive Assessment/Intervention- PT/OT    Orientation Status (Cognition) oriented x 4  -EE      Follows Commands (Cognition) WFL  -EE      Personal Safety Interventions fall prevention program maintained;gait belt;muscle strengthening facilitated;nonskid shoes/slippers when out of bed;supervised activity  -EE      Recorded by [EE] Rossy Garcia, PT 09/16/18 1448      Row Name 09/16/18 1435             Safety Issues, Functional Mobility    Impairments Affecting Function (Mobility) balance;endurance/activity tolerance;pain;strength  -EE      Recorded by [EE] Rossy Garcia, PT 09/16/18 1448      Row Name 09/16/18 1435             Sit-Stand Transfer    Sit-Stand [place] (Transfers) contact guard;verbal cues  -EE      Assistive Device (Sit-Stand Transfers) walker, front-wheeled  -EE      Recorded by [EE] Rossy Garcia, PT 09/16/18 1448      Row Name 09/16/18 1435             Stand-Sit Transfer    Stand-Sit [place] (Transfers) contact guard;verbal cues  -EE      Assistive Device  (Stand-Sit Transfers) walker, front-wheeled  -EE      Recorded by [EE] Rossy Garcia, PT 09/16/18 1448      Row Name 09/16/18 1435             Gait/Stairs Assessment/Training    [place] Level (Gait) contact guard;minimum assist (75% patient effort);verbal cues  -EE      Assistive Device (Gait) walker, front-wheeled  -EE      Distance in Feet (Gait) 30  -EE      Deviations/Abnormal Patterns (Gait) esmer decreased;stride length decreased  -EE      Bilateral Gait Deviations forward flexed posture;weight shift ability decreased  -EE      Comment (Gait/Stairs) verbal cues for upright posture; dizziness limiting   -EE      Recorded by [EE] JoseRossy, PT 09/16/18 1448      Row Name 09/16/18 1435             Positioning and Restraints    Pre-Treatment Position sitting in chair/recliner  -EE      Post Treatment Position chair  -EE      In Chair sitting;call light within reach;encouraged to call for assist;notified nsg;with brace  -EE      Recorded by [EE] Rossy Garcia, PT 09/16/18 1448      Row Name 09/16/18 1435             Pain Scale: Numbers Pre/Post-Treatment    Pain Scale: Numbers, Pretreatment 4/10  -EE      Pain Scale: Numbers, Post-Treatment 4/10  -EE      Pain Location - Side Bilateral  -EE      Pain Location neck  -EE      Recorded by [EE] JoseLaryRossy, PT 09/16/18 1448        User Key  (r) = Recorded By, (t) = Taken By, (c) = Cosigned By    Initials Name Effective Dates Discipline    EE Rossy Garcia, PT 04/03/18 -  PT                     Physical Therapy Education     Title: PT OT SLP Therapies (Active)     Topic: Physical Therapy (Active)     Point: Mobility training (Done)    Learning Progress Summary     Learner Status Readiness Method Response Comment Documented by    Patient Done Acceptance E VU,NR  EE 09/16/18 1449     Done Acceptance E VU,NR  AL 09/15/18 1500          Point: Body mechanics (Done)    Learning Progress Summary     Learner Status Readiness Method Response Comment Documented by     Patient Done Acceptance E DACIA ORTEGA  AL 09/15/18 1500          Point: Precautions (Done)    Learning Progress Summary     Learner Status Readiness Method Response Comment Documented by    Patient Done Acceptance E DACIA ORTEGA  AL 09/15/18 1500                      User Key     Initials Effective Dates Name Provider Type Discipline    EE 04/03/18 -  Rossy Garcia, PT Physical Therapist PT    AL 04/03/18 -  Melba Michael, PT Physical Therapist PT                    PT Recommendation and Plan     Plan of Care Reviewed With: patient  Progress: improving  Outcome Summary: Pt demonstrates good progress w/mobility. Able to tolerate increased gait distance today despite complaint of dizziness while upright. No new concerns; will continue to increase activity as tolerated.           Outcome Measures     Row Name 09/16/18 1400 09/15/18 1500          How much help from another person do you currently need...    Turning from your back to your side while in flat bed without using bedrails? 3  -EE 3  -AL     Moving from lying on back to sitting on the side of a flat bed without bedrails? 3  -EE 3  -AL     Moving to and from a bed to a chair (including a wheelchair)? 3  -EE 3  -AL     Standing up from a chair using your arms (e.g., wheelchair, bedside chair)? 3  -EE 3  -AL     Climbing 3-5 steps with a railing? 2  -EE 2  -AL     To walk in hospital room? 3  -EE 3  -AL     AM-PAC 6 Clicks Score 17  -EE 17  -AL        Functional Assessment    Outcome Measure Options AM-PAC 6 Clicks Basic Mobility (PT)  -EE AM-PAC 6 Clicks Basic Mobility (PT)  -AL       User Key  (r) = Recorded By, (t) = Taken By, (c) = Cosigned By    Initials Name Provider Type    EE Rossy Garcia, PT Physical Therapist    AL Melba Michael, PT Physical Therapist           Time Calculation:         PT Charges     Row Name 09/16/18 1449             Time Calculation    Start Time 1435  -EE      Stop Time 1445  -EE      Time Calculation (min) 10 min  -EE      PT Received On 09/16/18   -EE      PT - Next Appointment 09/17/18  -EE         Time Calculation- PT    Total Timed Code Minutes- PT 10 minute(s)  -EE        User Key  (r) = Recorded By, (t) = Taken By, (c) = Cosigned By    Initials Name Provider Type    EE Rossy Garcia, PT Physical Therapist        Therapy Suggested Charges     Code   Minutes Charges    None           Therapy Charges for Today     Code Description Service Date Service Provider Modifiers Qty    11492549857 HC PT THER PROC EA 15 MIN 9/16/2018 Rossy Garcia, PT GP 1    80740058663 HC PT THER SUPP EA 15 MIN 9/16/2018 Rossy Garcia, PT GP 1          PT G-Codes  Outcome Measure Options: AM-PAC 6 Clicks Basic Mobility (PT)  AM-PAC 6 Clicks Score: 17    Rossy Garcia PT  9/16/2018

## 2018-09-16 NOTE — PLAN OF CARE
Problem: Patient Care Overview  Goal: Plan of Care Review  Outcome: Ongoing (interventions implemented as appropriate)   09/16/18 0511   Coping/Psychosocial   Plan of Care Reviewed With patient   Plan of Care Review   Progress improving   OTHER   Outcome Summary Pt doing about as good as can be expected.  Pt complains of pain to head/neck area, as well as her RT Hip/leg (due to a current case of sciatica).  Pt requesting anti-emetic medications as well.    Pt is still greatly unbalanced and therefor a significant falls risk.  Pt nearly fell backwards once on this shift; if not caught- would have potentially sustained sig injury.  Will continue to monitor.          Problem: Fall Risk (Adult)  Goal: Absence of Fall  Outcome: Ongoing (interventions implemented as appropriate)   09/16/18 0207   Fall Risk (Adult)   Absence of Fall making progress toward outcome       Problem: Skin Injury Risk (Adult)  Goal: Identify Related Risk Factors and Signs and Symptoms  Outcome: Ongoing (interventions implemented as appropriate)   09/16/18 0207   Skin Injury Risk (Adult)   Related Risk Factors (Skin Injury Risk) advanced age;body weight extremes;mobility impaired       Problem: Fracture Orthopaedic (Adult)  Goal: Signs and Symptoms of Listed Potential Problems Will be Absent, Minimized or Managed (Fracture Orthopaedic)   09/16/18 0207   Goal/Outcome Evaluation   Problems Assessed (Orthopaedic Fracture) all   Problems Present (Orthopaedic Fracture) pain;situational response;skin integrity impairment;respiratory compromise      09/16/18 0207   Goal/Outcome Evaluation   Problems Assessed (Orthopaedic Fracture) all   Problems Present (Orthopaedic Fracture) pain;situational response;skin integrity impairment;respiratory compromise

## 2018-09-17 VITALS
DIASTOLIC BLOOD PRESSURE: 55 MMHG | HEIGHT: 61 IN | OXYGEN SATURATION: 91 % | SYSTOLIC BLOOD PRESSURE: 130 MMHG | RESPIRATION RATE: 18 BRPM | WEIGHT: 175.1 LBS | BODY MASS INDEX: 33.06 KG/M2 | TEMPERATURE: 97.9 F | HEART RATE: 66 BPM

## 2018-09-17 PROBLEM — R09.02 HYPOXIA: Status: RESOLVED | Noted: 2018-09-16 | Resolved: 2018-09-17

## 2018-09-17 PROCEDURE — 97110 THERAPEUTIC EXERCISES: CPT

## 2018-09-17 PROCEDURE — 25010000002 MORPHINE SULFATE (PF) 2 MG/ML SOLUTION: Performed by: HOSPITALIST

## 2018-09-17 RX ORDER — SENNA AND DOCUSATE SODIUM 50; 8.6 MG/1; MG/1
2 TABLET, FILM COATED ORAL 2 TIMES DAILY
Start: 2018-09-17 | End: 2020-10-15

## 2018-09-17 RX ORDER — MORPHINE SULFATE 2 MG/ML
2 INJECTION, SOLUTION INTRAMUSCULAR; INTRAVENOUS ONCE
Status: COMPLETED | OUTPATIENT
Start: 2018-09-17 | End: 2018-09-17

## 2018-09-17 RX ORDER — HYDROCODONE BITARTRATE AND ACETAMINOPHEN 10; 325 MG/1; MG/1
2 TABLET ORAL EVERY 4 HOURS PRN
Status: DISCONTINUED | OUTPATIENT
Start: 2018-09-17 | End: 2018-09-17 | Stop reason: HOSPADM

## 2018-09-17 RX ORDER — BISACODYL 10 MG
10 SUPPOSITORY, RECTAL RECTAL DAILY PRN
Start: 2018-09-17 | End: 2019-06-18

## 2018-09-17 RX ORDER — MORPHINE SULFATE 10 MG/ML
2 INJECTION INTRAMUSCULAR; INTRAVENOUS; SUBCUTANEOUS ONCE
Status: DISCONTINUED | OUTPATIENT
Start: 2018-09-17 | End: 2018-09-17

## 2018-09-17 RX ORDER — HYDROCODONE BITARTRATE AND ACETAMINOPHEN 10; 325 MG/1; MG/1
1-2 TABLET ORAL EVERY 4 HOURS PRN
Qty: 36 TABLET | Refills: 0 | Status: SHIPPED | OUTPATIENT
Start: 2018-09-17 | End: 2020-10-23 | Stop reason: HOSPADM

## 2018-09-17 RX ORDER — GABAPENTIN 300 MG/1
300 CAPSULE ORAL 3 TIMES DAILY
Qty: 9 CAPSULE | Refills: 0 | Status: SHIPPED | OUTPATIENT
Start: 2018-09-17 | End: 2020-02-26

## 2018-09-17 RX ADMIN — HYDROCODONE BITARTRATE AND ACETAMINOPHEN 2 TABLET: 10; 325 TABLET ORAL at 14:16

## 2018-09-17 RX ADMIN — HYDROCODONE BITARTRATE AND ACETAMINOPHEN 2 TABLET: 10; 325 TABLET ORAL at 18:44

## 2018-09-17 RX ADMIN — ALLOPURINOL 100 MG: 100 TABLET ORAL at 09:04

## 2018-09-17 RX ADMIN — GABAPENTIN 300 MG: 300 CAPSULE ORAL at 16:23

## 2018-09-17 RX ADMIN — LEVOTHYROXINE SODIUM 88 MCG: 88 TABLET ORAL at 06:49

## 2018-09-17 RX ADMIN — MORPHINE SULFATE 2 MG: 2 INJECTION, SOLUTION INTRAMUSCULAR; INTRAVENOUS at 02:16

## 2018-09-17 RX ADMIN — BACLOFEN 10 MG: 10 TABLET ORAL at 02:47

## 2018-09-17 RX ADMIN — DULOXETINE HYDROCHLORIDE 60 MG: 60 CAPSULE, DELAYED RELEASE ORAL at 09:04

## 2018-09-17 RX ADMIN — MONTELUKAST SODIUM 10 MG: 10 TABLET, FILM COATED ORAL at 09:04

## 2018-09-17 RX ADMIN — CARVEDILOL 12.5 MG: 12.5 TABLET, FILM COATED ORAL at 09:04

## 2018-09-17 RX ADMIN — HYDROCODONE BITARTRATE AND ACETAMINOPHEN 1 TABLET: 10; 325 TABLET ORAL at 02:50

## 2018-09-17 RX ADMIN — SPIRONOLACTONE AND HYDROCHLOROTHIAZIDE 1 TABLET: 25; 25 TABLET, FILM COATED ORAL at 09:04

## 2018-09-17 RX ADMIN — HYDROCODONE BITARTRATE AND ACETAMINOPHEN 1 TABLET: 10; 325 TABLET ORAL at 06:49

## 2018-09-17 RX ADMIN — GABAPENTIN 300 MG: 300 CAPSULE ORAL at 09:04

## 2018-09-17 RX ADMIN — CARVEDILOL 12.5 MG: 12.5 TABLET, FILM COATED ORAL at 18:44

## 2018-09-17 RX ADMIN — DOCUSATE SODIUM -SENNOSIDES 2 TABLET: 50; 8.6 TABLET, COATED ORAL at 09:04

## 2018-09-17 NOTE — PROGRESS NOTES
Discharge Planning Assessment  [facility]     Patient Name: Kristan Galicia  MRN: [id number]  Today's Date: 9/17/2018    Admit Date: 9/14/2018          Discharge Needs Assessment     Row Name 09/17/18 1146       Living Environment    Lives With alone    Current Living Arrangements home/apartment/condo    Primary Care Provided by self    Provides Primary Care For no one       Resource/Environmental Concerns    Resource/Environmental Concerns none    Transportation Concerns car, none       Transition Planning    Patient/Family Anticipates Transition to inpatient rehabilitation facility    Patient/Family Anticipated Services at Transition rehabilitation services;skilled nursing    Transportation Anticipated family or friend will provide       Discharge Needs Assessment    Equipment Currently Used at Home none    Discharge Facility/Level of Care Needs nursing facility, skilled    Current Discharge Risk lives alone;physical impairment            Discharge Plan     Row Name 09/17/18 1148       Plan    Plan Referral to [facility] pending    Patient/Family in Agreement with Plan yes    Plan Comments Met w/ patient and her good friend Taylor in room.  Introduced self and explained role.  Facesheet verified; IMM checked.  Patient has been to rehab at [facility] in the past and prefers to go there again upon d/c.  Referral made in epic and CCP will follow & assist.          Destination     Service Request Status Selected Specialties Address Phone Number Fax Number    [facility] Pending - Request Sent N/A [address] [facility] [id number] [phone]      Durable Medical Equipment     No service coordination in this encounter.      Dialysis/Infusion     No service coordination in this encounter.      Home Medical Care     No service coordination in this encounter.      Social Care     No service coordination in this encounter.        Expected Discharge Date and Time     Expected  Discharge Date Expected Discharge Time    Sep 17, 2018               Demographic Summary     Row Name 09/17/18 1146       General Information    Admission Type inpatient    Arrived From home    Referral Source hospital clinician/department    Reason for Consult discharge planning    Preferred Language English       Contact Information    Permission Granted to Share Info With ;facility ;family/designee    Contact Information Obtained for             Functional Status     Row Name 09/17/18 1146       Functional Status    Usual Activity Tolerance excellent    Current Activity Tolerance moderate       Functional Status, IADL    Medications independent    Meal Preparation independent    Housekeeping independent    Laundry independent    Shopping independent       Mental Status    General Appearance WDL WDL       Mental Status Summary    Recent Changes in Mental Status/Cognitive Functioning no changes       Employment/    Employment Status retired                  Merline Giraldo, RN

## 2018-09-17 NOTE — PROGRESS NOTES
Continued Stay Note  [facility]     Patient Name: Kristan Galicia  MRN: [id number]  Today's Date: 9/17/2018    Admit Date: 9/14/2018          Discharge Plan     Row Name 09/17/18 1345       Plan    Plan [facility]    Plan Comments Per Licha, bed confirmed for today at [facility].  Message sent to Dr Bui per Grace advising him of this.            Expected Discharge Date and Time     Expected Discharge Date Expected Discharge Time    Sep 17, 2018             Merline Giraldo RN

## 2018-09-17 NOTE — PLAN OF CARE
Problem: Patient Care Overview  Goal: Plan of Care Review  Outcome: Ongoing (interventions implemented as appropriate)   09/17/18 1039   Coping/Psychosocial   Plan of Care Reviewed With patient   Plan of Care Review   Progress improving   OTHER   Outcome Summary Pt agreeable to PT, although reports pain at 8/1. She was able to slightly increase ambulation distance today. VC's provided for upright posture and correct use of R wx. Will continue to increase activity as tolerated.

## 2018-09-17 NOTE — DISCHARGE SUMMARY
Date of Admission: 9/14/2018  Date of Discharge:  9/17/2018  Primary Care Physician: Chelly Red, CHRISTIN     Discharge Diagnosis:  Active Hospital Problems    Diagnosis Date Noted   • **Closed fracture of odontoid process of axis (CMS/HCC) [S12.100A] 09/14/2018   • Atelectasis [J98.11] 09/16/2018   • Non-obstructive hypertrophic cardiomyopathy (CMS/[facility]) [I42.2] 08/07/2017   • Anticoagulant long-term use [Z79.01] 03/03/2017   • Hyperlipidemia [E78.5]    • Obstructive sleep apnea, adult [G47.33]    • PAF (paroxysmal atrial fibrillation) (CMS/[facility]) [I48.0] 11/01/2016      Resolved Hospital Problems    Diagnosis Date Noted Date Resolved   • Hypoxia [R09.02] 09/16/2018 09/17/2018       Presenting Problem/History of Present Illness:  Fall, initial encounter [W19.XXXA]  Laceration of nose, initial encounter [S01.21XA]  Closed nondisplaced fracture of first cervical vertebra, unspecified fracture morphology, initial encounter (CMS/[facility]) [S12.001A]  Closed odontoid fracture, initial encounter ([facility]) [S12.100A]     Hospital Course:  The patient is a 68 y.o. female who presented with neck pain following a mechanical fall.  She was found to have a type 2 odontoid fracture.  Please see admission H&P from 9/15/18 for further details.  She was placed in a c-collar. Dr. Jackson with neurosurgery evaluated the patient and recommended conservative management with a collar and pain control.  She tolerated this well.  She did develop some mild hypoxia which resolved at the time of discharge with incentive spirometry and mobilization.  Her chest xray was clear but poorly inflated-likely developing atelectasis.  She will need continued work on mobilization and aggressive incentive spirometry.  She does have INGRID and will need nocturnal oxygen PRN.  She will need to follow up with Dr. Jackson in 2 weeks. She is to wear the collar at all times except for 2-3 20-30 minute breaks daily.    Exam Today:  Blood pressure 116/68, pulse 62,  "temperature 97.9 °F (36.6 °C), temperature source Oral, resp. rate 18, height 154.9 cm (61\"), weight 79.4 kg (175 lb 1.6 oz), SpO2 90 %.  General Appearance:    Alert, cooperative, no distress, AOx3  Throat:   Lips, tongue, gums normal; oral mucosa pink and moist  Neck:    Hard c collar   Lungs:    Decreased bases, otherwise Clear to auscultation bilaterally, respirations unlabored  Heart:    Regular rate and rhythm, S1 and S2 normal  Abdomen:     Soft, non-tender, bowel sounds normoactive  Extremities:    Moving all, no cyanosis or edema    Procedures Performed:  CT CERVICAL SPINE WITHOUT CONTRAST-9/14/18  Minimally anteriorly angulated fracture of the dens.  Nondisplaced fracture of the anterior arch of C1.     Consults:   Consults     Date and Time Order Name Status Description    9/15/2018 0032 Inpatient Neurosurgery Consult      9/14/2018 2223 [facility] (on-call MD unless specified) Completed     9/14/2018 2202 Neurosurgery (on-call MD unless specified) Completed            Discharge Disposition:  Skilled Nursing Facility (DC - External)    Discharge Medications:     Discharge Medications      New Medications      Instructions Start Date   HYDROcodone-acetaminophen  MG per tablet  Commonly known as:  NORCO  Replaces:  HYDROcodone-acetaminophen 5-325 MG per tablet   1-2 tablets, Oral, Every 4 Hours PRN      sennosides-docusate sodium 8.6-50 MG tablet  Commonly known as:  SENOKOT-S   2 tablets, Oral, 2 Times Daily         Changes to Medications      Instructions Start Date   bisacodyl 5 MG EC tablet  Commonly known as:  DULCOLAX  What changed:  Another medication with the same name was added. Make sure you understand how and when to take each.   10 mg, Oral, Daily PRN      bisacodyl 10 MG suppository  Commonly known as:  DULCOLAX  What changed:  You were already taking a medication with the same name, and this prescription was added. Make sure you understand how and when to take each.   10 mg, Rectal, Daily PRN   "       Continue These Medications      Instructions Start Date   ACCU-CHEK OTILIA PLUS w/Device kit   Dx e11.9 use to check BS BID, ok to substitute formulary preferred      accu-chek multiclix lancets   Dx e11.9 use to check BS BID, ok to substitute formulary preferred      alendronate 70 MG tablet  Commonly known as:  FOSAMAX   70 mg, Oral, Every 7 Days      allopurinol 100 MG tablet  Commonly known as:  ZYLOPRIM   TAKE 1 TABLET BY MOUTH DAILY.      amiodarone 200 MG tablet  Commonly known as:  PACERONE   100 mg, Oral, Nightly      atorvastatin 80 MG tablet  Commonly known as:  LIPITOR   80 mg, Oral, Nightly      baclofen 10 MG tablet  Commonly known as:  LIORESAL   10 mg, Oral, Nightly PRN      carvedilol 12.5 MG tablet  Commonly known as:  COREG   12.5 mg, Oral, 2 Times Daily      dabigatran etexilate 150 MG capsu  Commonly known as:  PRADAXA   150 mg, Oral, 2 Times Daily, TO HOLD 5 DAYS PRIOR TO OR PER CARDIO       DULoxetine 60 MG capsule  Commonly known as:  CYMBALTA   60 mg, Oral, Daily      gabapentin 300 MG capsule  Commonly known as:  NEURONTIN   300 mg, Oral, 3 Times Daily      glucose blood test strip  Commonly known as:  ACCU-CHEK OTILIA PLUS   Dx e11.9 use to check BS BID, ok to substitute formulary preferred      levothyroxine 88 MCG tablet  Commonly known as:  SYNTHROID, LEVOTHROID   88 mcg, Oral, Daily      lidocaine 5 %  Commonly known as:  LIDODERM   1 patch, Transdermal, Daily PRN, Remove & Discard patch within 12 hours or as directed by MD       montelukast 10 MG tablet  Commonly known as:  SINGULAIR   TAKE 1 TABLET BY MOUTH EVERY DAY      spironolactone-hydrochlorothiazide 25-25 MG tablet  Commonly known as:  ALDACTAZIDE   TAKE 1 TABLET BY MOUTH EVERY DAY         Stop These Medications    HYDROcodone-acetaminophen 5-325 MG per tablet  Commonly known as:  NORCO  Replaced by:  HYDROcodone-acetaminophen  MG per tablet            Discharge Diet:   Diet Instructions     Diet: Consistent  Carbohydrate; Thin       Discharge Diet:  Consistent Carbohydrate    Fluid Consistency:  Thin          Activity at Discharge:   Activity Instructions     Activity as Tolerated       C-collar must be worn at all times except for a 20-30 minute break 2-3 times daily          Follow-up Appointments:  Future Appointments  Date Time Provider Department Center   10/19/2018 9:40 AM Bravo Pascual MD [facility] [address] None     Additional Instructions for the Follow-ups that You Need to Schedule     Discharge Follow-up with Specialty: House physician or medical director at SNF    As directed      Specialty:  House physician or medical director at SNF    Follow Up Details:  1-3d         Discharge Follow-up with Specified Provider: Dr. Jackson (Neurosurgery); 2 Weeks    As directed      To:  Dr. Jackson (Neurosurgery)    Follow Up:  2 Weeks                  Lowell Bui MD  09/17/18  2:18 PM    Time Spent on Discharge Activities: Greater than 30 minutes.

## 2018-09-17 NOTE — DISCHARGE PLACEMENT REQUEST
"Kristan Galicia (68 y.o. Female)     Date of Birth Social Security Number Address Home Phone MRN    1950  [id number]  [address] [place] [id number] [phone] [id number]    [employer] Marital Status          None Single       Admission Date Admission Type Admitting Provider Attending Provider Department, Room/Bed    9/14/18 Emergency Barvo Villeda MD Lykins, Matthew D, MD [address], P585/1    Discharge Date Discharge Disposition Discharge Destination                       Attending Provider:  Lowell Bui MD    Allergies:  Adhesive Tape, Aspirin, Biaxin [Clarithromycin], Codeine, Darvon [Propoxyphene], Levaquin [Levofloxacin], Tequin [Gatifloxacin]    Isolation:  None   Infection:  None   Code Status:  CPR    Ht:  154.9 cm (61\")   Wt:  79.4 kg (175 lb 1.6 oz)    Admission Cmt:  None   Principal Problem:  Closed fracture of odontoid process of axis (CMS/[facility]) [S12.100A]                 Active Insurance as of 9/14/2018     Primary Coverage     Payor Plan Insurance Group Employer/Plan Group    MEDICARE MEDICARE A & B      Payor Plan Address Payor Plan Phone Number Effective From Effective To    [address] [phone] 5/1/2015     [facility] [address]       Subscriber Name Subscriber Birth Date Member ID       KRISTAN GALICIA 1950 [id number]           Secondary Coverage     Payor Plan Insurance Group Employer/Plan Group    [facility] SUPP [id number]     Payor Plan Address Payor Plan Phone Number Effective From Effective To    [address]  1/1/2018     [facility] [id number]       Subscriber Name Subscriber Birth Date Member ID       KRISTAN GALICIA 1950 [id number]                 Emergency Contacts      (Rel.) Home Phone Work Phone Mobile Phone    Steffanie Xavier (Daughter) [phone] -- [phone]              "

## 2018-09-17 NOTE — THERAPY TREATMENT NOTE
Acute Care - Physical Therapy Treatment Note  [facility]     Patient Name: Kristan Galicia  : 1950  MRN: [id number]  Today's Date: 2018     Date of Referral to PT: 18       Admit Date: 2018    Visit Dx:    ICD-10-CM ICD-9-CM   1. Closed odontoid fracture, initial encounter (CMS/HCC) S12.100A 805.02   2. Closed nondisplaced fracture of first cervical vertebra, unspecified fracture morphology, initial encounter (CMS/HCC) S12.001A 805.01   3. Laceration of nose, initial encounter S01.21XA 873.20   4. Fall, initial encounter W19.XXXA E888.9   5. Generalized weakness R53.1 780.79     Patient Active Problem List   Diagnosis   • Shoulder pain, right   • Knee pain, right   • Fall down stairs   • PAF (paroxysmal atrial fibrillation) (CMS/HCC)   • S/P rotator cuff repair   • Vitamin D deficiency   • Vertigo   • Vasovagal syncope   • Thyroid disease   • Obstructive sleep apnea, adult   • Palpitations   • Osteoporosis   • Ocular tumor   • Migraines   • Hyperlipidemia   • History of transfusion   • Heart murmur   • Heart attack   • GERD (gastroesophageal reflux disease)   • Depression   • CHF (congestive heart failure) (CMS/[facility])   • Cardiomyopathy (CMS/[facility])   • Asthma   • Anxiety   • Arthritis of right knee   • Right rotator cuff tear   • Hypothyroidism   • Dyspnea and respiratory abnormalities   • Orthopnea   • IHSS (idiopathic hypertrophic subaortic stenosis) (CMS/[facility])   • AICD (automatic cardioverter/defibrillator) present   • Anticoagulant long-term use   • Hyperglycemia, drug-induced   • Chronic pain of right knee   • Diverticulitis   • CAD (coronary artery disease)   • Hypertrophic nonobstructive cardiomyopathy (CMS/[facility])   • ICD (implantable cardioverter-defibrillator) discharge   • Menopause   • Non-obstructive hypertrophic cardiomyopathy (CMS/[facility])   • Insomnia   • History of total knee arthroplasty, right   • Closed fracture of odontoid process of axis (CMS/[facility])   • Hypoxia   • Atelectasis        Therapy Treatment          Rehabilitation Treatment Summary     Row Name 09/17/18 1022             Treatment Time/Intention    Discipline physical therapist  -EJ      Document Type therapy note (daily note)  -EJ      Subjective Information complains of;pain;fatigue  -EJ      Mode of Treatment physical therapy  -EJ      Patient/Family Observations supine in bed, no acute distress, cervical collar on  -EJ      Patient Effort adequate  -EJ      Comment limited by pain and fatigue  -EJ      Existing Precautions/Restrictions fall;brace worn when out of bed   anaid KNPAP collar  -EJ      Recorded by [EJ] Deborah Dennison, PT 09/17/18 1039      Row Name 09/17/18 1022             Cognitive Assessment/Intervention- PT/OT    Personal Safety Interventions fall prevention program maintained;gait belt;nonskid shoes/slippers when out of bed;supervised activity  -EJ      Recorded by [EJ] Deborah Dennison, PT 09/17/18 1039      Row Name 09/17/18 1022             Bed Mobility Assessment/Treatment    Supine-Sit [place] (Bed Mobility) supervision  -EJ      Sit-Supine [place] (Bed Mobility) supervision  -EJ      Assistive Device (Bed Mobility) bed rails  -EJ      Recorded by [EJ] Deborah Dennison, PT 09/17/18 1039      Row Name 09/17/18 1022             Sit-Stand Transfer    Sit-Stand [place] (Transfers) verbal cues;contact guard;2 person assist  -EJ      Assistive Device (Sit-Stand Transfers) walker, front-wheeled  -EJ      Recorded by [EJ] Deborah Dennison, PT 09/17/18 1039      Row Name 09/17/18 1022             Stand-Sit Transfer    Stand-Sit [place] (Transfers) verbal cues;contact guard;2 person assist  -EJ      Recorded by [EJ] Deborah Dennison, PT 09/17/18 1039      Row Name 09/17/18 1022             Gait/Stairs Assessment/Training    [place] Level (Gait) verbal cues;contact guard;minimum assist (75% patient effort);2 person assist  -EJ      Assistive Device (Gait) walker, front-wheeled    ambulated with R wx, but then did 2nd half of walk with HHA   -EJ      Distance in Feet (Gait) 50  -EJ      Deviations/Abnormal Patterns (Gait) antalgic;esmer decreased;stride length decreased  -EJ      Bilateral Gait Deviations forward flexed posture  -EJ      Comment (Gait/Stairs) VC for upright posture, pt wanting to lean on wx, eventually pt agreeable to HHA.  -EJ      Recorded by [EJ] Deborah Dennison, PT 09/17/18 1039      Row Name 09/17/18 1022             Positioning and Restraints    Pre-Treatment Position in bed  -EJ      Post Treatment Position bed  -EJ      In Bed notified nsg;supine;call light within reach;encouraged to call for assist;exit alarm on  -EJ      Recorded by [EJ] Deborah Dennison, PT 09/17/18 1039      Row Name 09/17/18 1022             Pain Scale: Numbers Pre/Post-Treatment    Pain Scale: Numbers, Pretreatment 8/10  -EJ      Pain Scale: Numbers, Post-Treatment 8/10  -EJ      Pain Location neck  -EJ      Recorded by [EJ] Deborah Dennison, PT 09/17/18 1039      Row Name 09/17/18 1022             Plan of Care Review    Plan of Care Reviewed With patient  -EJ      Recorded by [EJ] Deborah Dennison, PT 09/17/18 1039        User Key  (r) = Recorded By, (t) = Taken By, (c) = Cosigned By    Initials Name Effective Dates Discipline    EJ Deborah Dennison, PT 04/03/18 -  PT                     Physical Therapy Education     Title: PT OT SLP Therapies (Active)     Topic: Physical Therapy (Active)     Point: Mobility training (Done)    Learning Progress Summary     Learner Status Readiness Method Response Comment Documented by    Patient Done Acceptance E,TB,D SHANNON,NR  EJ 09/17/18 1039     Done Acceptance E SHANNON,NR  EE 09/16/18 1449     Done Acceptance E VU,NR  AL 09/15/18 1500          Point: Body mechanics (Done)    Learning Progress Summary     Learner Status Readiness Method Response Comment Documented by    Patient Done Acceptance E SHANNON,NR  AL 09/15/18 1500          Point: Precautions  (Done)    Learning Progress Summary     Learner Status Readiness Method Response Comment Documented by    Patient Done Acceptance E VU,NR  AL 09/15/18 1500                      User Key     Initials Effective Dates Name Provider Type Discipline    EE 04/03/18 -  Rossy Garcia, PT Physical Therapist PT    AL 04/03/18 -  Melba Michael, PT Physical Therapist PT     04/03/18 -  Deborah Dennison, PT Physical Therapist PT                    PT Recommendation and Plan     Plan of Care Reviewed With: patient  Progress: improving  Outcome Summary: Pt agreeable to PT, although reports pain at 8/1. She was able to slightly increase ambulation distance today. VC's provided for upright posture and correct use of R wx. Will continue to increase activity as tolerated.          Outcome Measures     Row Name 09/17/18 1000 09/16/18 1400 09/15/18 1500       How much help from another person do you currently need...    Turning from your back to your side while in flat bed without using bedrails? 3  -EJ 3  -EE 3  -AL    Moving from lying on back to sitting on the side of a flat bed without bedrails? 3  -EJ 3  -EE 3  -AL    Moving to and from a bed to a chair (including a wheelchair)? 3  -EJ 3  -EE 3  -AL    Standing up from a chair using your arms (e.g., wheelchair, bedside chair)? 3  -EJ 3  -EE 3  -AL    Climbing 3-5 steps with a railing? 2  -EJ 2  -EE 2  -AL    To walk in hospital room? 3  -EJ 3  -EE 3  -AL    AM-PAC 6 Clicks Score 17  -EJ 17  -EE 17  -AL       Functional Assessment    Outcome Measure Options AM-PAC 6 Clicks Basic Mobility (PT)  -EJ AM-PAC 6 Clicks Basic Mobility (PT)  -EE AM-PAC 6 Clicks Basic Mobility (PT)  -AL      User Key  (r) = Recorded By, (t) = Taken By, (c) = Cosigned By    Initials Name Provider Type    EE Rossy Garcia, PT Physical Therapist    Melba Bass, PT Physical Therapist    Deborah Ramírez, PT Physical Therapist           Time Calculation:         PT Charges     Row Name 09/17/18 1040              Time Calculation    Start Time 0122  -EJ      Stop Time 0138  -EJ      Time Calculation (min) 16 min  -EJ      PT Received On 09/17/18  -EJ      PT - Next Appointment 09/18/18  -EJ        User Key  (r) = Recorded By, (t) = Taken By, (c) = Cosigned By    Initials Name Provider Type    Deborah Ramírez, PT Physical Therapist        Therapy Suggested Charges     Code   Minutes Charges    None           Therapy Charges for Today     Code Description Service Date Service Provider Modifiers Qty    62945416465 HC PT THER PROC EA 15 MIN 9/17/2018 Deborah Dennison, PT GP 1    17095844641 HC PT THER SUPP EA 15 MIN 9/17/2018 Deborah Dennison, PT GP 1          PT G-Codes  Outcome Measure Options: AM-PAC 6 Clicks Basic Mobility (PT)  AM-PAC 6 Clicks Score: 17    Deborah Dennison, PT  9/17/2018

## 2018-09-17 NOTE — PROGRESS NOTES
Case Management Discharge Note    Final Note: skilled bed at [facility]    Destination - Selection Complete     Service Request Status Selected Specialties Address Phone Number Fax Number    [facility] Selected Skilled Nursing Facility [address] [address] [phone]      Durable Medical Equipment     No service has been selected for the patient.      Dialysis/Infusion     No service has been selected for the patient.      Home Medical Care     No service has been selected for the patient.      Social Care     No service has been selected for the patient.        Other: Other (pvt car- dgt)    Final Discharge Disposition Code: 03 - skilled nursing facility (SNF)

## 2018-09-24 RX ORDER — LEVOTHYROXINE SODIUM 88 UG/1
88 TABLET ORAL DAILY
Qty: 30 TABLET | Refills: 1 | Status: SHIPPED | OUTPATIENT
Start: 2018-09-24 | End: 2019-01-22 | Stop reason: SDUPTHER

## 2018-09-27 ENCOUNTER — HOSPITAL ENCOUNTER (OUTPATIENT)
Dept: ORTHOPEDIC SURGERY | Facility: CLINIC | Age: 68
Discharge: HOME | End: 2018-09-27
Attending: NEUROLOGICAL SURGERY | Admitting: NEUROLOGICAL SURGERY

## 2018-09-27 ENCOUNTER — TRANSCRIBE ORDERS (OUTPATIENT)
Dept: ADMINISTRATIVE | Facility: HOSPITAL | Age: 68
End: 2018-09-27

## 2018-09-27 DIAGNOSIS — S12.090A FRACTURE OF ANTERIOR ARCH OF C1, CLOSED, INITIAL ENCOUNTER (HCC): Primary | ICD-10-CM

## 2018-09-28 ENCOUNTER — HOSPITAL ENCOUNTER (OUTPATIENT)
Dept: CT IMAGING | Facility: HOSPITAL | Age: 68
Discharge: HOME | End: 2018-09-28
Attending: NEUROLOGICAL SURGERY | Admitting: NEUROLOGICAL SURGERY

## 2018-09-28 DIAGNOSIS — S12.090A FRACTURE OF ANTERIOR ARCH OF C1, CLOSED, INITIAL ENCOUNTER (HCC): ICD-10-CM

## 2018-09-28 PROCEDURE — 72125 CT NECK SPINE W/O DYE: CPT

## 2018-09-28 PROCEDURE — 70450 CT HEAD/BRAIN W/O DYE: CPT

## 2018-10-01 ENCOUNTER — TRANSCRIBE ORDERS (OUTPATIENT)
Dept: ADMINISTRATIVE | Facility: HOSPITAL | Age: 68
End: 2018-10-01

## 2018-10-01 DIAGNOSIS — H53.10 EYE FATIGUE: Primary | ICD-10-CM

## 2018-10-01 DIAGNOSIS — R41.3 MEMORY LOSS: ICD-10-CM

## 2018-10-01 DIAGNOSIS — M54.2 NECK PAIN: ICD-10-CM

## 2018-10-01 DIAGNOSIS — H53.10 SUBJECTIVE VISUAL DISTURBANCES: ICD-10-CM

## 2018-10-01 DIAGNOSIS — H53.9 UNSPECIFIED VISUAL DISTURBANCE: ICD-10-CM

## 2018-10-01 DIAGNOSIS — R41.3 MEMORY LOSS: Primary | ICD-10-CM

## 2018-10-05 ENCOUNTER — HOSPITAL ENCOUNTER (OUTPATIENT)
Dept: CARDIOLOGY | Facility: HOSPITAL | Age: 68
Discharge: HOME | End: 2018-10-05
Attending: NEUROLOGICAL SURGERY | Admitting: NEUROLOGICAL SURGERY

## 2018-10-05 DIAGNOSIS — R41.3 MEMORY LOSS: ICD-10-CM

## 2018-10-05 DIAGNOSIS — M54.2 NECK PAIN: ICD-10-CM

## 2018-10-05 DIAGNOSIS — H53.10 EYE FATIGUE: ICD-10-CM

## 2018-10-05 LAB
BH CV XLRA MEAS LEFT CCA RATIO VEL: 71.5 CM/SEC
BH CV XLRA MEAS LEFT DIST CCA EDV: 17 CM/SEC
BH CV XLRA MEAS LEFT DIST CCA PSV: 71.5 CM/SEC
BH CV XLRA MEAS LEFT DIST ICA EDV: -30.5 CM/SEC
BH CV XLRA MEAS LEFT DIST ICA PSV: -76.8 CM/SEC
BH CV XLRA MEAS LEFT ICA RATIO VEL: -80.3 CM/SEC
BH CV XLRA MEAS LEFT ICA/CCA RATIO: -1.1
BH CV XLRA MEAS LEFT MID ICA EDV: -31.7 CM/SEC
BH CV XLRA MEAS LEFT MID ICA PSV: -80.3 CM/SEC
BH CV XLRA MEAS LEFT PROX CCA EDV: 28.3 CM/SEC
BH CV XLRA MEAS LEFT PROX CCA PSV: 94.3 CM/SEC
BH CV XLRA MEAS LEFT PROX ECA EDV: -10.6 CM/SEC
BH CV XLRA MEAS LEFT PROX ECA PSV: -76.2 CM/SEC
BH CV XLRA MEAS LEFT PROX ICA EDV: -25.4 CM/SEC
BH CV XLRA MEAS LEFT PROX ICA PSV: -64.2 CM/SEC
BH CV XLRA MEAS LEFT PROX SCLA EDV: 4.8 CM/SEC
BH CV XLRA MEAS LEFT PROX SCLA PSV: 114 CM/SEC
BH CV XLRA MEAS LEFT VERTEBRAL A EDV: -15.2 CM/SEC
BH CV XLRA MEAS LEFT VERTEBRAL A PSV: -71.5 CM/SEC
BH CV XLRA MEAS RIGHT CCA RATIO VEL: 58.1 CM/SEC
BH CV XLRA MEAS RIGHT DIST CCA EDV: 22.4 CM/SEC
BH CV XLRA MEAS RIGHT DIST CCA PSV: 58.1 CM/SEC
BH CV XLRA MEAS RIGHT DIST ICA EDV: -30.1 CM/SEC
BH CV XLRA MEAS RIGHT DIST ICA PSV: -87.2 CM/SEC
BH CV XLRA MEAS RIGHT ICA RATIO VEL: -87.2 CM/SEC
BH CV XLRA MEAS RIGHT ICA/CCA RATIO: -1.5
BH CV XLRA MEAS RIGHT MID ICA EDV: -27.6 CM/SEC
BH CV XLRA MEAS RIGHT MID ICA PSV: -77.4 CM/SEC
BH CV XLRA MEAS RIGHT PROX CCA EDV: 19.3 CM/SEC
BH CV XLRA MEAS RIGHT PROX CCA PSV: 78 CM/SEC
BH CV XLRA MEAS RIGHT PROX ECA EDV: -16.4 CM/SEC
BH CV XLRA MEAS RIGHT PROX ECA PSV: -90.3 CM/SEC
BH CV XLRA MEAS RIGHT PROX ICA EDV: -25.8 CM/SEC
BH CV XLRA MEAS RIGHT PROX ICA PSV: -81.5 CM/SEC
BH CV XLRA MEAS RIGHT PROX SCLA PSV: 163 CM/SEC
BH CV XLRA MEAS RIGHT VERTEBRAL A EDV: 13 CM/SEC
BH CV XLRA MEAS RIGHT VERTEBRAL A PSV: 40.9 CM/SEC
LEFT ARM BP: NORMAL MMHG
RIGHT ARM BP: NORMAL MMHG

## 2018-10-05 PROCEDURE — 93880 EXTRACRANIAL BILAT STUDY: CPT

## 2018-10-12 ENCOUNTER — TELEPHONE (OUTPATIENT)
Dept: FAMILY MEDICINE CLINIC | Facility: CLINIC | Age: 68
End: 2018-10-12

## 2018-10-18 ENCOUNTER — TELEPHONE (OUTPATIENT)
Dept: FAMILY MEDICINE CLINIC | Facility: CLINIC | Age: 68
End: 2018-10-18

## 2018-12-03 ENCOUNTER — HOSPITAL ENCOUNTER (OUTPATIENT)
Dept: ORTHOPEDIC SURGERY | Facility: CLINIC | Age: 68
Discharge: HOME | End: 2018-12-03
Attending: NEUROLOGICAL SURGERY | Admitting: NEUROLOGICAL SURGERY

## 2018-12-05 ENCOUNTER — TELEPHONE (OUTPATIENT)
Dept: SURGERY | Facility: CLINIC | Age: 68
End: 2018-12-05

## 2018-12-06 RX ORDER — PANTOPRAZOLE SODIUM 40 MG/1
40 TABLET, DELAYED RELEASE ORAL DAILY
Qty: 90 TABLET | Refills: 2 | Status: SHIPPED | OUTPATIENT
Start: 2018-12-06 | End: 2019-08-28 | Stop reason: SDUPTHER

## 2018-12-07 ENCOUNTER — TRANSCRIBE ORDERS (OUTPATIENT)
Dept: ADMINISTRATIVE | Facility: HOSPITAL | Age: 68
End: 2018-12-07

## 2018-12-07 DIAGNOSIS — M54.2 NECK PAIN: Primary | ICD-10-CM

## 2018-12-10 ENCOUNTER — OFFICE VISIT (OUTPATIENT)
Dept: FAMILY MEDICINE CLINIC | Facility: CLINIC | Age: 68
End: 2018-12-10

## 2018-12-10 VITALS
SYSTOLIC BLOOD PRESSURE: 110 MMHG | WEIGHT: 172 LBS | TEMPERATURE: 98.5 F | OXYGEN SATURATION: 96 % | DIASTOLIC BLOOD PRESSURE: 70 MMHG | HEIGHT: 61 IN | BODY MASS INDEX: 32.47 KG/M2 | HEART RATE: 90 BPM

## 2018-12-10 DIAGNOSIS — E78.2 MIXED HYPERLIPIDEMIA: ICD-10-CM

## 2018-12-10 DIAGNOSIS — E11.65 TYPE 2 DIABETES MELLITUS WITH HYPERGLYCEMIA, WITH LONG-TERM CURRENT USE OF INSULIN (HCC): ICD-10-CM

## 2018-12-10 DIAGNOSIS — E55.9 VITAMIN D DEFICIENCY: ICD-10-CM

## 2018-12-10 DIAGNOSIS — I48.0 PAF (PAROXYSMAL ATRIAL FIBRILLATION) (HCC): ICD-10-CM

## 2018-12-10 DIAGNOSIS — J20.9 ACUTE BRONCHITIS, UNSPECIFIED ORGANISM: ICD-10-CM

## 2018-12-10 DIAGNOSIS — E53.8 VITAMIN B12 DEFICIENCY: ICD-10-CM

## 2018-12-10 DIAGNOSIS — Z12.31 ENCOUNTER FOR SCREENING MAMMOGRAM FOR BREAST CANCER: ICD-10-CM

## 2018-12-10 DIAGNOSIS — E03.9 HYPOTHYROIDISM, UNSPECIFIED TYPE: ICD-10-CM

## 2018-12-10 DIAGNOSIS — M51.36 DDD (DEGENERATIVE DISC DISEASE), LUMBAR: ICD-10-CM

## 2018-12-10 DIAGNOSIS — J01.00 ACUTE MAXILLARY SINUSITIS, RECURRENCE NOT SPECIFIED: ICD-10-CM

## 2018-12-10 DIAGNOSIS — I25.10 CORONARY ARTERY DISEASE WITHOUT ANGINA PECTORIS, UNSPECIFIED VESSEL OR LESION TYPE, UNSPECIFIED WHETHER NATIVE OR TRANSPLANTED HEART: ICD-10-CM

## 2018-12-10 DIAGNOSIS — Z00.00 MEDICARE ANNUAL WELLNESS VISIT, SUBSEQUENT: Primary | ICD-10-CM

## 2018-12-10 DIAGNOSIS — Z79.4 TYPE 2 DIABETES MELLITUS WITH HYPERGLYCEMIA, WITH LONG-TERM CURRENT USE OF INSULIN (HCC): ICD-10-CM

## 2018-12-10 PROBLEM — I50.32 CHRONIC DIASTOLIC CHF (CONGESTIVE HEART FAILURE) (HCC): Status: ACTIVE | Noted: 2018-08-14

## 2018-12-10 LAB
25(OH)D3 SERPL-MCNC: 21.6 NG/ML (ref 30–100)
ALBUMIN SERPL-MCNC: 4.6 G/DL (ref 3.5–5.2)
ALBUMIN UR-MCNC: 50 MG/L (ref 0–20)
ALBUMIN/GLOB SERPL: 1.5 G/DL
ALP SERPL-CCNC: 67 U/L (ref 39–117)
ALT SERPL W P-5'-P-CCNC: 16 U/L (ref 1–33)
ANION GAP SERPL CALCULATED.3IONS-SCNC: 13.7 MMOL/L
AST SERPL-CCNC: 20 U/L (ref 1–32)
BACTERIA UR QL AUTO: ABNORMAL /HPF
BILIRUB SERPL-MCNC: 0.7 MG/DL (ref 0.1–1.2)
BILIRUB UR QL STRIP: ABNORMAL
BUN BLD-MCNC: 19 MG/DL (ref 8–23)
BUN/CREAT SERPL: 18.6 (ref 7–25)
CALCIUM SPEC-SCNC: 9.8 MG/DL (ref 8.6–10.5)
CHLORIDE SERPL-SCNC: 104 MMOL/L (ref 98–107)
CLARITY UR: CLEAR
CO2 SERPL-SCNC: 25.3 MMOL/L (ref 22–29)
COLOR UR: YELLOW
CREAT BLD-MCNC: 1.02 MG/DL (ref 0.57–1)
ERYTHROCYTE [DISTWIDTH] IN BLOOD BY AUTOMATED COUNT: 15.4 % (ref 4.5–15)
GFR SERPL CREATININE-BSD FRML MDRD: 54 ML/MIN/1.73
GLOBULIN UR ELPH-MCNC: 3 GM/DL
GLUCOSE BLD-MCNC: 144 MG/DL (ref 65–99)
GLUCOSE UR STRIP-MCNC: NEGATIVE MG/DL
HBA1C MFR BLD: 7.7 % (ref 4.8–5.6)
HCT VFR BLD AUTO: 41.5 % (ref 31–42)
HGB BLD-MCNC: 13.2 G/DL (ref 12–18)
HGB UR QL STRIP.AUTO: NEGATIVE
KETONES UR QL STRIP: ABNORMAL
LEUKOCYTE ESTERASE UR QL STRIP.AUTO: ABNORMAL
LYMPHOCYTES # BLD AUTO: 2.2 10*3/MM3 (ref 1.2–3.4)
LYMPHOCYTES NFR BLD AUTO: 20.2 % (ref 21–51)
MCH RBC QN AUTO: 27.3 PG (ref 26.1–33.1)
MCHC RBC AUTO-ENTMCNC: 31.9 G/DL (ref 33–37)
MCV RBC AUTO: 85.5 FL (ref 80–99)
MONOCYTES # BLD AUTO: 0.8 10*3/MM3 (ref 0.1–0.6)
MONOCYTES NFR BLD AUTO: 7.2 % (ref 2–9)
NEUTROPHILS # BLD AUTO: 8 10*3/MM3 (ref 1.4–6.5)
NEUTROPHILS NFR BLD AUTO: 72.6 % (ref 42–75)
NITRITE UR QL STRIP: NEGATIVE
PH UR STRIP.AUTO: 5.5 [PH] (ref 4.6–8)
PLATELET # BLD AUTO: 285 10*3/MM3 (ref 150–450)
PMV BLD AUTO: 7.4 FL (ref 7.1–10.5)
POTASSIUM BLD-SCNC: 4.7 MMOL/L (ref 3.5–5.2)
PROT SERPL-MCNC: 7.6 G/DL (ref 6–8.5)
PROT UR QL STRIP: NEGATIVE
RBC # BLD AUTO: 4.85 10*6/MM3 (ref 4–6)
RBC # UR: ABNORMAL /HPF
REF LAB TEST METHOD: ABNORMAL
SODIUM BLD-SCNC: 143 MMOL/L (ref 136–145)
SP GR UR STRIP: 1.02 (ref 1–1.03)
SQUAMOUS #/AREA URNS HPF: ABNORMAL /HPF
TSH SERPL DL<=0.05 MIU/L-ACNC: 2.34 MIU/ML (ref 0.27–4.2)
UROBILINOGEN UR QL STRIP: ABNORMAL
VIT B12 BLD-MCNC: 246 PG/ML (ref 211–946)
WBC NRBC COR # BLD: 11 10*3/MM3 (ref 4.5–10)
WBC UR QL AUTO: ABNORMAL /HPF

## 2018-12-10 PROCEDURE — 85025 COMPLETE CBC W/AUTO DIFF WBC: CPT | Performed by: NURSE PRACTITIONER

## 2018-12-10 PROCEDURE — G0439 PPPS, SUBSEQ VISIT: HCPCS | Performed by: NURSE PRACTITIONER

## 2018-12-10 PROCEDURE — 36415 COLL VENOUS BLD VENIPUNCTURE: CPT | Performed by: NURSE PRACTITIONER

## 2018-12-10 PROCEDURE — 99214 OFFICE O/P EST MOD 30 MIN: CPT | Performed by: NURSE PRACTITIONER

## 2018-12-10 PROCEDURE — 83036 HEMOGLOBIN GLYCOSYLATED A1C: CPT | Performed by: NURSE PRACTITIONER

## 2018-12-10 PROCEDURE — 82306 VITAMIN D 25 HYDROXY: CPT | Performed by: NURSE PRACTITIONER

## 2018-12-10 PROCEDURE — 84443 ASSAY THYROID STIM HORMONE: CPT | Performed by: NURSE PRACTITIONER

## 2018-12-10 PROCEDURE — 81001 URINALYSIS AUTO W/SCOPE: CPT | Performed by: NURSE PRACTITIONER

## 2018-12-10 PROCEDURE — 82043 UR ALBUMIN QUANTITATIVE: CPT | Performed by: NURSE PRACTITIONER

## 2018-12-10 PROCEDURE — 82607 VITAMIN B-12: CPT | Performed by: NURSE PRACTITIONER

## 2018-12-10 PROCEDURE — 84550 ASSAY OF BLOOD/URIC ACID: CPT | Performed by: NURSE PRACTITIONER

## 2018-12-10 PROCEDURE — 80053 COMPREHEN METABOLIC PANEL: CPT | Performed by: NURSE PRACTITIONER

## 2018-12-10 RX ORDER — GLIPIZIDE 5 MG/1
5 TABLET ORAL
Qty: 60 TABLET | Refills: 5 | Status: SHIPPED | OUTPATIENT
Start: 2018-12-10 | End: 2019-06-18

## 2018-12-10 RX ORDER — BENZONATATE 100 MG/1
100 CAPSULE ORAL 3 TIMES DAILY PRN
Qty: 30 CAPSULE | Refills: 0 | Status: SHIPPED | OUTPATIENT
Start: 2018-12-10 | End: 2019-06-18 | Stop reason: SDUPTHER

## 2018-12-10 RX ORDER — CLINDAMYCIN HYDROCHLORIDE 300 MG/1
300 CAPSULE ORAL 3 TIMES DAILY
Qty: 30 CAPSULE | Refills: 0 | Status: SHIPPED | OUTPATIENT
Start: 2018-12-10 | End: 2019-06-18 | Stop reason: SDUPTHER

## 2018-12-10 NOTE — PATIENT INSTRUCTIONS
medicare wellness today, check labs and call with results, gave samples pradaxa and cont FU with specialist, cont all chronic dz meds, trial ozempic 0.25 mg once weekly SQ injection stop januvia and metformin and monitor BS call with log in 2 wks, Enc healthy diet and regular exercise for wt loss, enc overdue dilated DM eye exam, check BP and BS at home, erx clindamycin 300 mg TID x 10 days, erx tessalon perles 100 mg TID prn cough, order overdue mammo defer CBE today, gave flector patch samples    Medicare Wellness  Personal Prevention Plan of Service     Date of Office Visit:  12/10/2018  Encounter Provider:  CHRISTIN Mckee  Place of Service:  [facility]  Patient Name: Kristan Galicia  :  1950    As part of the Medicare Wellness portion of your visit today, we are providing you with this personalized preventive plan of services (PPPS). This plan is based upon recommendations of the [place] Preventive Services Task Force (USPSTF) and the Advisory Committee on Immunization Practices (ACIP).    This lists the preventive care services that should be considered, and provides dates of when you are due. Items listed as completed are up-to-date and do not require any further intervention.    Health Maintenance   Topic Date Due   • HEPATITIS A VACCINE ADULT (1 of 2) 1969   • ZOSTER VACCINE (2 of 2) 2017   • DIABETIC EYE EXAM  2017   • MEDICARE ANNUAL WELLNESS  2018   • INFLUENZA VACCINE  2018   • MAMMOGRAM  2018   • DXA SCAN  2018   • DIABETIC FOOT EXAM  2019   • URINE MICROALBUMIN  2019   • LIPID PANEL  2019   • HEMOGLOBIN A1C  2019   • COLONOSCOPY  2026   • TDAP/TD VACCINES (2 - Td) 2028   • HEPATITIS C SCREENING  Completed   • PNEUMOCOCCAL VACCINES (65+ LOW/MEDIUM RISK)  Addressed       Orders Placed This Encounter   Procedures   • Mammo Screening Bilateral With CAD      Standing Status:   Future     Standing Expiration Date:   12/11/2019     Order Specific Question:   Reason for Exam:     Answer:   routine screening   • Comprehensive Metabolic Panel   • TSH   • Hemoglobin A1c   • MicroAlbumin, Urine, Random - Urine, Clean Catch   • Vitamin D 25 Hydroxy   • Vitamin B12   • CBC Auto Differential   • Urinalysis without microscopic (no culture) - Urine, Clean Catch   • Urinalysis, Microscopic Only - Urine, Clean Catch   • CBC & Differential     Order Specific Question:   Manual Differential     Answer:   No   • Urinalysis With Microscopic - Urine, Clean Catch       Return in about 6 months (around 6/10/2019).

## 2018-12-10 NOTE — PROGRESS NOTES
Subjective   Kristan Galicia is a 68 y.o. female.     History of Present Illness   Here to FU on chronic DM2, last A1C 8.49 04/18 was prev on metformin 500 mg and januvia 100 mg daily but stopped d/t cost off 2-3 weeks, checking  working on diet control, prev metformin 1000 mg BID causedGI upset, no feet pain, UTD dilated eye exam, With chronic chol fasting for labs on lipitor 80 mg HS with last chol 03/18 well controlled and triglycerides elevated but recommended fish oil not taking, nonfasting today, with chronic hypothyroid on levothyroxine 88 mcg daily with last TSH 03/18 well controlled, no missed doses  Last mammo > 1.5 years DMIA now closed, no breast pain, lumps, nipple dc, doesn't want CBE today request referral  Seeing Dr [facility] wearing brace s/t cervical fracture C1-2-3 09/18 and not healing well with pending CT and considering sgy, fell off bench and hit concrete, with last dexa scan 12/16 osteopenia on fosamax 70 mg weekly  With gout no recent flairs on allopurinol 100 mg daily, c/o worsening back pain on lidoderm patches but wondering if could try flector patches  With PAF on amiodarone, coreg 12.5 mg BID, spironolactone-HCTZ 25/25 mg daily, and pradaxa request samples today off x 2 wks, last saw cardiology Dr Tam with defibrillator, No CP dizziness, palpitations, but HA attributes to congestion x 3 days with voice hoarseness, sinus congestion and tenderness, sore throat and L ear pain, with cough, SOA, wheezing worse HS, no fevers NV diarrhea,  Allergic ASA, codein, darvon, levaquin, tequin, biaxin, codeine, no known sick exposure, last tx acute URI and bronchitis 12/17 clindamycin and tessalon perles and helped    The following portions of the patient's history were reviewed and updated as appropriate: allergies, current medications, past family history, past medical history, past social history, past surgical history and problem list.    Review of Systems   Constitutional:  Negative for fatigue and fever.   HENT: Positive for congestion, ear pain, facial swelling, postnasal drip, rhinorrhea, sinus pressure, sinus pain, sore throat and voice change. Negative for dental problem, drooling, ear discharge, hearing loss, mouth sores, nosebleeds, sneezing, tinnitus and trouble swallowing.    Respiratory: Positive for cough, chest tightness, shortness of breath and wheezing. Negative for apnea, choking and stridor.    Cardiovascular: Negative for chest pain, palpitations and leg swelling.   Endocrine: Negative for cold intolerance, heat intolerance, polydipsia, polyphagia and polyuria.   Musculoskeletal: Positive for arthralgias, back pain, neck pain and neck stiffness. Negative for gait problem, joint swelling and myalgias.   Neurological: Negative for dizziness and headaches.   Psychiatric/Behavioral: Positive for sleep disturbance. Negative for agitation, behavioral problems, confusion, decreased concentration, dysphoric mood, hallucinations, self-injury and suicidal ideas. The patient is not nervous/anxious and is not hyperactive.    All other systems reviewed and are negative.      Objective   Physical Exam   Constitutional: She is oriented to person, place, and time. She appears well-developed and well-nourished.   HENT:   Head: Normocephalic and atraumatic.   Right Ear: Hearing normal. A middle ear effusion is present.   Left Ear: Hearing normal. A middle ear effusion is present.   Nose: Mucosal edema present. Right sinus exhibits maxillary sinus tenderness and frontal sinus tenderness. Left sinus exhibits maxillary sinus tenderness and frontal sinus tenderness.   Mouth/Throat: Oropharynx is clear and moist and mucous membranes are normal.   Eyes: Conjunctivae and EOM are normal. Pupils are equal, round, and reactive to light.   Neck: Normal range of motion. Neck supple. No thyromegaly present.   Cardiovascular: Normal rate, regular rhythm and normal heart sounds.   Pulmonary/Chest:  Effort normal and breath sounds normal.   Musculoskeletal: Normal range of motion. She exhibits tenderness (cervical wearing brace).   Lymphadenopathy:     She has no cervical adenopathy.   Neurological: She is alert and oriented to person, place, and time.   Skin: Skin is warm. She is diaphoretic.   Psychiatric: She has a normal mood and affect. Her behavior is normal. Judgment and thought content normal.   Vitals reviewed.      Assessment/Plan   Kristan was seen today for cough.    Diagnoses and all orders for this visit:    Medicare annual wellness visit, subsequent    Acute bronchitis, unspecified organism    Acute maxillary sinusitis, recurrence not specified    PAF (paroxysmal atrial fibrillation) (CMS/[facility])    Coronary artery disease without angina pectoris, unspecified vessel or lesion type, unspecified whether native or transplanted heart  -     CBC & Differential  -     Comprehensive Metabolic Panel  -     TSH  -     CBC Auto Differential    Hypothyroidism, unspecified type  -     CBC & Differential  -     Comprehensive Metabolic Panel  -     TSH  -     CBC Auto Differential    Type 2 diabetes mellitus with hyperglycemia, with long-term current use of insulin (CMS/[facility])  -     CBC & Differential  -     Comprehensive Metabolic Panel  -     TSH  -     Urinalysis With Microscopic - Urine, Clean Catch  -     Hemoglobin A1c  -     MicroAlbumin, Urine, Random - Urine, Clean Catch  -     CBC Auto Differential  -     Urinalysis without microscopic (no culture) - Urine, Clean Catch  -     Urinalysis, Microscopic Only - Urine, Clean Catch    Mixed hyperlipidemia    Encounter for screening mammogram for breast cancer  -     Mammo Screening Bilateral With CAD; Future    Vitamin D deficiency  -     Vitamin D 25 Hydroxy    Vitamin B12 deficiency  -     Vitamin B12    DDD (degenerative disc disease), lumbar    Other orders  -     glipiZIDE (GLUCOTROL) 5 MG tablet; Take 1 tablet by mouth 2 (Two) Times a Day Before  Meals.  -     clindamycin (CLEOCIN) 300 MG capsule; Take 1 capsule by mouth 3 (Three) Times a Day. x10d  -     benzonatate (TESSALON PERLES) 100 MG capsule; Take 1 capsule by mouth 3 (Three) Times a Day As Needed for Cough.    medicare wellness today, check labs and call with results, gave samples pradaxa and cont FU with specialist, cont all chronic dz meds, trial ozempic 0.25 mg once weekly SQ injection stop januvia and metformin and monitor BS call with log in 2 wks, Enc healthy diet and regular exercise for wt loss, enc overdue dilated DM eye exam, check BP and BS at home, erx clindamycin 300 mg TID x 10 days, erx tessalon perles 100 mg TID prn cough, order overdue mammo defer CBE today, gave flector patch samples

## 2018-12-10 NOTE — PROGRESS NOTES
QUICK REFERENCE INFORMATION:  The ABCs of the Annual Wellness Visit    Subsequent Medicare Wellness Visit    HEALTH RISK ASSESSMENT    1950    Recent Hospitalizations:  Recently treated at the following:  [facility]     Current Medical Providers:  Patient Care Team:  Chelly Red APRN as PCP - General (Family Medicine)  Chelly Red APRN as PCP - Claims Attributed  Minh Ya MD as Consulting Physician (Cardiac Electrophysiology)  Munira Zaidi APRN (Nurse Practitioner)  Rosalio Sheikh MD as Consulting Physician (General Surgery)  Juan Antonio Anne MD as Consulting Physician (Orthopedic Surgery)  Tanja Pascual APRN as Nurse Practitioner (Family Medicine)  Ruben Jackson MD as Consulting Physician (Neurosurgery)    Smoking Status:  Social History     Tobacco Use   Smoking Status Former Smoker   • Packs/day: 0.50   • Years: 17.00   • Pack years: 8.50   • Types: Cigarettes   • Last attempt to quit: 1985   • Years since quittin.2   Smokeless Tobacco Never Used     Alcohol Consumption:  Social History     Substance and Sexual Activity   Alcohol Use No     Depression Screen:   PHQ-2/PHQ-9 Depression Screening 12/10/2018   Little interest or pleasure in doing things 0   Feeling down, depressed, or hopeless 0   Trouble falling or staying asleep, or sleeping too much 1   Feeling tired or having little energy 0   Poor appetite or overeating 0   Feeling bad about yourself - or that you are a failure or have let yourself or your family down 0   Trouble concentrating on things, such as reading the newspaper or watching television 0   Moving or speaking so slowly that other people could have noticed. Or the opposite - being so fidgety or restless that you have been moving around a lot more than usual 0   Thoughts that you would be better off dead, or of hurting yourself in some way 0   Total Score 1   If you checked off any problems, how difficult  have these problems made it for you to do your work, take care of things at home, or get along with other people? Not difficult at all     Health Habits and Functional and Cognitive Screening:  Functional & Cognitive Status 12/10/2018   Do you have difficulty preparing food and eating? No   Do you have difficulty bathing yourself, getting dressed or grooming yourself? No   Do you have difficulty using the toilet? No   Do you have difficulty moving around from place to place? Yes   Do you have trouble with steps or getting out of a bed or a chair? Yes   In the past year have you fallen or experienced a near fall? Yes   Current Diet Well Balanced Diet   Dental Exam Up to date   Eye Exam Not up to date   Exercise (times per week) 5 times per week   Current Exercise Activities Include Walking   Do you need help using the phone?  No   Are you deaf or do you have serious difficulty hearing?  No   Do you need help with transportation? Yes   Do you need help shopping? No   Do you need help preparing meals?  No   Do you need help with housework?  No   Do you need help with laundry? No   Do you need help taking your medications? No   Do you need help managing money? No   Do you ever drive or ride in a car without wearing a seat belt? No   Have you felt unusual stress, anger or loneliness in the last month? No   Who do you live with? Alone   If you need help, do you have trouble finding someone available to you? No   Have you been bothered in the last four weeks by sexual problems? No   Do you have difficulty concentrating, remembering or making decisions? No     Does the patient have evidence of cognitive impairment? No    Aspirin use counseling: Does not need ASA (and currently is not on it) allergic ASA and on pradaxa      Recent Lab Results:  CMP:  Lab Results   Component Value Date    BUN 18 09/15/2018    CREATININE 0.93 09/15/2018    EGFRIFNONA 60 (L) 09/15/2018    BCR 19.4 09/15/2018     09/15/2018    K 4.4  09/15/2018    CO2 24.8 09/15/2018    CALCIUM 9.2 09/15/2018    ALBUMIN 4.10 09/14/2018    BILITOT 0.7 09/14/2018    ALKPHOS 76 09/14/2018    AST 18 09/14/2018    ALT 16 09/14/2018     Lipid Panel:  Lab Results   Component Value Date    CHOL 139 03/07/2018    TRIG 228 (H) 03/07/2018    HDL 42 03/07/2018    VLDL 45.6 (H) 03/07/2018    LDLHDL 1.22 03/07/2018     HbA1c:  Lab Results   Component Value Date    HGBA1C 8.49 (H) 04/18/2018       Visual Acuity:  No exam data present    Age-appropriate Screening Schedule:  Refer to the list below for future screening recommendations based on patient's age, sex and/or medical conditions. Orders for these recommended tests are listed in the plan section. The patient has been provided with a written plan.    Health Maintenance   Topic Date Due   • ZOSTER VACCINE (2 of 2) 08/02/2017   • DIABETIC EYE EXAM  08/16/2017   • INFLUENZA VACCINE  08/01/2018   • MAMMOGRAM  11/12/2018   • DXA SCAN  12/01/2018   • DIABETIC FOOT EXAM  02/23/2019   • URINE MICROALBUMIN  02/23/2019   • LIPID PANEL  03/07/2019   • HEMOGLOBIN A1C  03/17/2019   • COLONOSCOPY  08/16/2026   • TDAP/TD VACCINES (2 - Td) 09/14/2028   • PNEUMOCOCCAL VACCINES (65+ LOW/MEDIUM RISK)  Addressed        Subjective   History of Present Illness    Kristan Galicia is a 68 y.o. female who presents for an Subsequent Wellness Visit.    The following portions of the patient's history were reviewed and updated as appropriate: allergies, current medications, past family history, past medical history, past social history, past surgical history and problem list.    Outpatient Medications Prior to Visit   Medication Sig Dispense Refill   • alendronate (FOSAMAX) 70 MG tablet Take 1 tablet by mouth Every 7 (Seven) Days. 12 tablet 3   • allopurinol (ZYLOPRIM) 100 MG tablet TAKE 1 TABLET BY MOUTH DAILY. 30 tablet 1   • amiodarone (PACERONE) 200 MG tablet Take 100 mg by mouth Every Night.     • atorvastatin (LIPITOR) 80 MG tablet TAKE 1  TABLET BY MOUTH EVERY NIGHT. 90 tablet 1   • baclofen (LIORESAL) 10 MG tablet Take 10 mg by mouth At Night As Needed for Muscle Spasms.     • bisacodyl (DULCOLAX) 10 MG suppository Insert 1 suppository into the rectum Daily As Needed for Constipation.     • Blood Glucose Monitoring Suppl (ACCU-CHEK OTILIA PLUS) W/DEVICE kit Dx e11.9 use to check BS BID, ok to substitute formulary preferred 1 kit 0   • carvedilol (COREG) 12.5 MG tablet Take 12.5 mg by mouth 2 (Two) Times a Day.     • dabigatran etexilate (PRADAXA) 150 MG capsu Take 150 mg by mouth 2 (Two) Times a Day. TO HOLD 5 DAYS PRIOR TO OR PER CARDIO     • DULoxetine (CYMBALTA) 60 MG capsule TAKE 1 CAPSULE BY MOUTH DAILY. 90 capsule 1   • gabapentin (NEURONTIN) 300 MG capsule Take 1 capsule by mouth 3 (Three) Times a Day. 9 capsule 0   • glucose blood (ACCU-CHEK OTILIA PLUS) test strip Dx e11.9 use to check BS BID, ok to substitute formulary preferred 60 each 11   • HYDROcodone-acetaminophen (NORCO)  MG per tablet Take 1-2 tablets by mouth Every 4 (Four) Hours As Needed for Moderate Pain . 36 tablet 0   • Lancets (ACCU-CHEK MULTICLIX) lancets Dx e11.9 use to check BS BID, ok to substitute formulary preferred 60 each 11   • levothyroxine (SYNTHROID, LEVOTHROID) 88 MCG tablet TAKE 1 TABLET BY MOUTH DAILY. 30 tablet 1   • montelukast (SINGULAIR) 10 MG tablet TAKE 1 TABLET BY MOUTH EVERY DAY 90 tablet 1   • pantoprazole (PROTONIX) 40 MG EC tablet Take 1 tablet by mouth Daily for 90 days. 90 tablet 2   • sennosides-docusate sodium (SENOKOT-S) 8.6-50 MG tablet Take 2 tablets by mouth 2 (Two) Times a Day.     • spironolactone-hydrochlorothiazide (ALDACTAZIDE) 25-25 MG tablet TAKE 1 TABLET BY MOUTH EVERY DAY 90 tablet 1   • bisacodyl (DULCOLAX) 5 MG EC tablet Take 2 tablets by mouth Daily As Needed for Constipation. 30 tablet 3   • lidocaine (LIDODERM) 5 % Place 1 patch on the skin Daily As Needed for Mild Pain . Remove & Discard patch within 12 hours or as directed  by MD       No facility-administered medications prior to visit.        Patient Active Problem List   Diagnosis   • Shoulder pain, right   • Knee pain, right   • Fall down stairs   • PAF (paroxysmal atrial fibrillation) (CMS/[facility])   • S/P rotator cuff repair   • Vitamin D deficiency   • Vertigo   • Vasovagal syncope   • Thyroid disease   • Obstructive sleep apnea, adult   • Palpitations   • Osteoporosis   • Ocular tumor   • Migraines   • Hyperlipidemia   • History of transfusion   • Heart murmur   • Heart attack (CMS/[facility])   • GERD (gastroesophageal reflux disease)   • Depression   • CHF (congestive heart failure) (CMS/[facility])   • Cardiomyopathy (CMS/[facility])   • Asthma   • Anxiety   • Arthritis of right knee   • Right rotator cuff tear   • Hypothyroidism   • Dyspnea and respiratory abnormalities   • Orthopnea   • IHSS (idiopathic hypertrophic subaortic stenosis) (CMS/[facility])   • AICD (automatic cardioverter/defibrillator) present   • Anticoagulant long-term use   • Hyperglycemia, drug-induced   • Chronic pain of right knee   • Diverticulitis   • CAD (coronary artery disease)   • Hypertrophic nonobstructive cardiomyopathy (CMS/[facility])   • ICD (implantable cardioverter-defibrillator) discharge   • Menopause   • Non-obstructive hypertrophic cardiomyopathy (CMS/[facility])   • Insomnia   • History of total knee arthroplasty, right   • Closed fracture of odontoid process of axis (CMS/[facility])   • Atelectasis   • Chronic diastolic CHF (congestive heart failure) (CMS/[facility])       Advance Care Planning:  has an advance directive - a copy has been provided and is in file    Identification of Risk Factors:  Risk factors include: weight , unhealthy diet, cardiovascular risk, increased fall risk and chronic pain.    Review of Systems    Compared to one year ago, the patient feels her physical health is worse.  Compared to one year ago, the patient feels her mental health is the same.    Objective     Physical Exam    Vitals:    12/10/18 1530   BP: 110/70  "  BP Location: Left arm   Patient Position: Sitting   Cuff Size: Small Adult   Pulse: 90   Temp: 98.5 °F (36.9 °C)   TempSrc: Oral   SpO2: 96%   Weight: 78 kg (172 lb)   Height: 154.9 cm (61\")   PainSc:   4   PainLoc: Chest  Comment: from coughing       Patient's Body mass index is 32.5 kg/m². BMI is above normal parameters. Recommendations include: exercise counseling and nutrition counseling.      Assessment/Plan   Patient Self-Management and Personalized Health Advice  The patient has been provided with information about: diet, exercise, weight management, prevention of cardiac or vascular disease and fall prevention and preventive services including:   · Fall Risk assessment done, Screening mammography, referral placed.    Visit Diagnoses:    ICD-10-CM ICD-9-CM   1. Medicare annual wellness visit, subsequent Z00.00 V70.0   2. Acute bronchitis, unspecified organism J20.9 466.0   3. Acute maxillary sinusitis, recurrence not specified J01.00 461.0   4. PAF (paroxysmal atrial fibrillation) (CMS/[facility]) I48.0 427.31   5. Coronary artery disease without angina pectoris, unspecified vessel or lesion type, unspecified whether native or transplanted heart I25.10 414.00   6. Hypothyroidism, unspecified type E03.9 244.9   7. Type 2 diabetes mellitus with hyperglycemia, with long-term current use of insulin (CMS/[facility]) E11.65 250.00    Z79.4 790.29     V58.67   8. Mixed hyperlipidemia E78.2 272.2   9. Encounter for screening mammogram for breast cancer Z12.31 V76.12   10. Vitamin D deficiency E55.9 268.9   11. Vitamin B12 deficiency E53.8 266.2   12. DDD (degenerative disc disease), lumbar M51.36 722.52       Orders Placed This Encounter   Procedures   • Mammo Screening Bilateral With CAD     Standing Status:   Future     Standing Expiration Date:   12/11/2019     Order Specific Question:   Reason for Exam:     Answer:   routine screening   • Comprehensive Metabolic Panel   • TSH   • Hemoglobin A1c   • MicroAlbumin, Urine, " Random - Urine, Clean Catch   • Vitamin D 25 Hydroxy   • Vitamin B12   • CBC Auto Differential   • Urinalysis without microscopic (no culture) - Urine, Clean Catch   • Urinalysis, Microscopic Only - Urine, Clean Catch   • CBC & Differential     Order Specific Question:   Manual Differential     Answer:   No   • Urinalysis With Microscopic - Urine, Clean Catch       Outpatient Encounter Medications as of 12/10/2018   Medication Sig Dispense Refill   • alendronate (FOSAMAX) 70 MG tablet Take 1 tablet by mouth Every 7 (Seven) Days. 12 tablet 3   • allopurinol (ZYLOPRIM) 100 MG tablet TAKE 1 TABLET BY MOUTH DAILY. 30 tablet 1   • amiodarone (PACERONE) 200 MG tablet Take 100 mg by mouth Every Night.     • atorvastatin (LIPITOR) 80 MG tablet TAKE 1 TABLET BY MOUTH EVERY NIGHT. 90 tablet 1   • baclofen (LIORESAL) 10 MG tablet Take 10 mg by mouth At Night As Needed for Muscle Spasms.     • bisacodyl (DULCOLAX) 10 MG suppository Insert 1 suppository into the rectum Daily As Needed for Constipation.     • Blood Glucose Monitoring Suppl (ACCU-CHEK OTILIA PLUS) W/DEVICE kit Dx e11.9 use to check BS BID, ok to substitute formulary preferred 1 kit 0   • carvedilol (COREG) 12.5 MG tablet Take 12.5 mg by mouth 2 (Two) Times a Day.     • dabigatran etexilate (PRADAXA) 150 MG capsu Take 150 mg by mouth 2 (Two) Times a Day. TO HOLD 5 DAYS PRIOR TO OR PER CARDIO     • DULoxetine (CYMBALTA) 60 MG capsule TAKE 1 CAPSULE BY MOUTH DAILY. 90 capsule 1   • gabapentin (NEURONTIN) 300 MG capsule Take 1 capsule by mouth 3 (Three) Times a Day. 9 capsule 0   • glucose blood (ACCU-CHEK OTILIA PLUS) test strip Dx e11.9 use to check BS BID, ok to substitute formulary preferred 60 each 11   • HYDROcodone-acetaminophen (NORCO)  MG per tablet Take 1-2 tablets by mouth Every 4 (Four) Hours As Needed for Moderate Pain . 36 tablet 0   • Lancets (ACCU-CHEK MULTICLIX) lancets Dx e11.9 use to check BS BID, ok to substitute formulary preferred 60 each 11    • levothyroxine (SYNTHROID, LEVOTHROID) 88 MCG tablet TAKE 1 TABLET BY MOUTH DAILY. 30 tablet 1   • montelukast (SINGULAIR) 10 MG tablet TAKE 1 TABLET BY MOUTH EVERY DAY 90 tablet 1   • pantoprazole (PROTONIX) 40 MG EC tablet Take 1 tablet by mouth Daily for 90 days. 90 tablet 2   • sennosides-docusate sodium (SENOKOT-S) 8.6-50 MG tablet Take 2 tablets by mouth 2 (Two) Times a Day.     • spironolactone-hydrochlorothiazide (ALDACTAZIDE) 25-25 MG tablet TAKE 1 TABLET BY MOUTH EVERY DAY 90 tablet 1   • benzonatate (TESSALON PERLES) 100 MG capsule Take 1 capsule by mouth 3 (Three) Times a Day As Needed for Cough. 30 capsule 0   • bisacodyl (DULCOLAX) 5 MG EC tablet Take 2 tablets by mouth Daily As Needed for Constipation. 30 tablet 3   • clindamycin (CLEOCIN) 300 MG capsule Take 1 capsule by mouth 3 (Three) Times a Day. x10d 30 capsule 0   • glipiZIDE (GLUCOTROL) 5 MG tablet Take 1 tablet by mouth 2 (Two) Times a Day Before Meals. 60 tablet 5   • lidocaine (LIDODERM) 5 % Place 1 patch on the skin Daily As Needed for Mild Pain . Remove & Discard patch within 12 hours or as directed by MD       No facility-administered encounter medications on file as of 12/10/2018.        Reviewed use of high risk medication in the elderly: yes  Reviewed for potential of harmful drug interactions in the elderly: yes    Follow Up:  Return in about 6 months (around 6/10/2019).     An After Visit Summary and PPPS with all of these plans were given to the patient.

## 2018-12-11 ENCOUNTER — TELEPHONE (OUTPATIENT)
Dept: FAMILY MEDICINE CLINIC | Facility: CLINIC | Age: 68
End: 2018-12-11

## 2018-12-11 DIAGNOSIS — M1A.9XX0 CHRONIC GOUT WITHOUT TOPHUS, UNSPECIFIED CAUSE, UNSPECIFIED SITE: Primary | ICD-10-CM

## 2018-12-11 LAB — URATE SERPL-MCNC: 4.6 MG/DL (ref 2.4–5.7)

## 2018-12-11 RX ORDER — ERGOCALCIFEROL 1.25 MG/1
50000 CAPSULE ORAL WEEKLY
Qty: 12 CAPSULE | Refills: 0 | Status: SHIPPED | OUTPATIENT
Start: 2018-12-11 | End: 2019-03-24 | Stop reason: SDUPTHER

## 2018-12-11 NOTE — TELEPHONE ENCOUNTER
Vit D is low, erx vit D 50,000 u once weekly x 12 wks. Thyroid and liver normal. BS sl elevated 144 A1C has decreased from 8.4 to 7.7, suspect will be controlled on ozempic 0.25 mg weekly injection, enc healthy diet and regular exercise and don't need to fill oral glipizide 5 mg sent to pharmacy. Kidney function sl decreased make sure you are drinking 8 glasses of H20 daily. B12 is normal but low end of normal recommend daily supplement 1000 mcg OTC. Uric acid well controlled on allopurinol.

## 2018-12-12 ENCOUNTER — HOSPITAL ENCOUNTER (OUTPATIENT)
Dept: CT IMAGING | Facility: HOSPITAL | Age: 68
Discharge: HOME | End: 2018-12-12
Attending: NEUROLOGICAL SURGERY | Admitting: NEUROLOGICAL SURGERY

## 2018-12-12 DIAGNOSIS — M54.2 NECK PAIN: ICD-10-CM

## 2018-12-12 PROCEDURE — 72125 CT NECK SPINE W/O DYE: CPT

## 2018-12-21 ENCOUNTER — HOSPITAL ENCOUNTER (OUTPATIENT)
Dept: PREADMISSION TESTING | Facility: HOSPITAL | Age: 68
Discharge: HOME | End: 2018-12-21
Attending: NEUROLOGICAL SURGERY | Admitting: NEUROLOGICAL SURGERY

## 2018-12-21 LAB
ABO + RH BLD: NORMAL
ANION GAP SERPL CALC-SCNC: 18.1 MMOL/L (ref 10–20)
APTT BLD: 44.6 SEC (ref 24–31)
ARMBAND: NORMAL
BASOPHILS # BLD AUTO: 0.1 10*3/UL (ref 0–0.2)
BASOPHILS NFR BLD AUTO: 1 % (ref 0–2)
BILIRUB UR QL STRIP: NEGATIVE MG/DL
BLD COMPONENT TYPE: NORMAL
BLD GP AB SCN SERPL QL: NEGATIVE
BUN SERPL-MCNC: 17 MG/DL (ref 8–20)
BUN/CREAT SERPL: 17 (ref 5.4–26.2)
CALCIUM SERPL-MCNC: 9.6 MG/DL (ref 8.9–10.3)
CASTS URNS QL MICRO: ABNORMAL /[LPF]
CHLORIDE SERPL-SCNC: 99 MMOL/L (ref 101–111)
COLOR UR: YELLOW
CONV BACTERIA IN URINE MICRO: NEGATIVE
CONV CLARITY OF URINE: CLEAR
CONV CO2: 21 MMOL/L (ref 22–32)
CONV HYALINE CASTS IN URINE MICRO: 1 /[LPF] (ref 0–5)
CONV PROTEIN IN URINE BY AUTOMATED TEST STRIP: NEGATIVE MG/DL
CONV SMALL ROUND CELLS: ABNORMAL /[HPF]
CONV UROBILINOGEN IN URINE BY AUTOMATED TEST STRIP: 0.2 MG/DL
CREAT UR-MCNC: 1 MG/DL (ref 0.4–1)
CROSSMATCH EXPIRATION: NORMAL
CULTURE INDICATED?: ABNORMAL
DIFFERENTIAL METHOD BLD: (no result)
EOSINOPHIL # BLD AUTO: 0.2 10*3/UL (ref 0–0.3)
EOSINOPHIL # BLD AUTO: 3 % (ref 0–3)
ERYTHROCYTE [DISTWIDTH] IN BLOOD BY AUTOMATED COUNT: 16.1 % (ref 11.5–14.5)
GLUCOSE SERPL-MCNC: 145 MG/DL (ref 65–99)
GLUCOSE UR QL: NEGATIVE MG/DL
HCT VFR BLD AUTO: 43.3 % (ref 35–49)
HGB BLD-MCNC: 13.7 G/DL (ref 12–15)
HGB UR QL STRIP: NEGATIVE
INR PPP: 1.4
KETONES UR QL STRIP: NEGATIVE MG/DL
LEUKOCYTE ESTERASE UR QL STRIP: ABNORMAL
LYMPHOCYTES # BLD AUTO: 2 10*3/UL (ref 0.8–4.8)
LYMPHOCYTES NFR BLD AUTO: 33 % (ref 18–42)
MCH RBC QN AUTO: 27.5 PG (ref 26–32)
MCHC RBC AUTO-ENTMCNC: 31.7 G/DL (ref 32–36)
MCV RBC AUTO: 86.6 FL (ref 80–94)
MICRO REPORT STATUS: NORMAL
MONOCYTES # BLD AUTO: 0.5 10*3/UL (ref 0.1–1.3)
MONOCYTES NFR BLD AUTO: 7 % (ref 2–11)
NEUTROPHILS # BLD AUTO: 3.5 10*3/UL (ref 2.3–8.6)
NEUTROPHILS NFR BLD AUTO: 56 % (ref 50–75)
NITRITE UR QL STRIP: NEGATIVE
NRBC BLD AUTO-RTO: 0 /100{WBCS}
NRBC/RBC NFR BLD MANUAL: 0 10*3/UL
PH UR STRIP.AUTO: 5.5 [PH] (ref 4.5–8)
PLATELET # BLD AUTO: 269 10*3/UL (ref 150–450)
PMV BLD AUTO: 7.6 FL (ref 7.4–10.4)
POTASSIUM SERPL-SCNC: 4.1 MMOL/L (ref 3.6–5.1)
PROTHROMBIN TIME: 13.6 SEC (ref 9.6–11.7)
RBC # BLD AUTO: 5 10*6/UL (ref 4–5.4)
RBC #/AREA URNS HPF: 2 /[HPF] (ref 0–3)
SODIUM SERPL-SCNC: 134 MMOL/L (ref 136–144)
SP GR UR: 1.02 (ref 1–1.03)
SPERM URNS QL MICRO: ABNORMAL /[HPF]
SQUAMOUS SPT QL MICRO: 4 /[HPF] (ref 0–5)
UNIDENT CRYS URNS QL MICRO: ABNORMAL /[HPF]
WBC # BLD AUTO: 6.2 10*3/UL (ref 4.5–11.5)
WBC #/AREA URNS HPF: 2 /[HPF] (ref 0–5)
YEAST SPEC QL WET PREP: ABNORMAL /[HPF]

## 2018-12-24 ENCOUNTER — TELEPHONE (OUTPATIENT)
Dept: FAMILY MEDICINE CLINIC | Facility: CLINIC | Age: 68
End: 2018-12-24

## 2018-12-24 NOTE — TELEPHONE ENCOUNTER
I have sent in flector patches, she can come get samples as well if she would like    Regarding: FW: Prescription Question  Contact: [phone]      ----- Message -----  From: Nadiya Ba MA  Sent: 12/24/2018   7:52 AM  To: Kathleen Umana MA  Subject: FW: Prescription Question                            ----- Message -----  From: Kristan Galicia  Sent: 12/24/2018   4:54 AM  To: [facility]  Subject: Prescription Question                            ----- Message from Mychart, Generic sent at 12/24/2018  4:54 AM EST -----    Andrea Spaulding, Can you pl wai blancas CD all me in a perscription for the Fel[employer] pain patches, to [facility]?    I hope you and your family have a very safe, blessed and Merry [place].     Best wishes with the little one.    Thank you,  Kristan Galicia   [phone]

## 2018-12-27 RX ORDER — DOCUSATE SODIUM 100 MG/1
CAPSULE, LIQUID FILLED ORAL
Qty: 60 CAPSULE | Refills: 2 | Status: SHIPPED | OUTPATIENT
Start: 2018-12-27 | End: 2019-08-13 | Stop reason: SDUPTHER

## 2018-12-27 RX ORDER — CLINDAMYCIN HYDROCHLORIDE 300 MG/1
CAPSULE ORAL
Qty: 30 CAPSULE | Refills: 0 | OUTPATIENT
Start: 2018-12-27

## 2018-12-28 ENCOUNTER — HOSPITAL ENCOUNTER (OUTPATIENT)
Dept: MAMMOGRAPHY | Facility: HOSPITAL | Age: 68
Discharge: HOME | End: 2018-12-28
Admitting: NURSE PRACTITIONER

## 2018-12-28 DIAGNOSIS — Z12.31 VISIT FOR SCREENING MAMMOGRAM: ICD-10-CM

## 2018-12-28 PROCEDURE — 77067 SCR MAMMO BI INCL CAD: CPT

## 2018-12-28 PROCEDURE — 77063 BREAST TOMOSYNTHESIS BI: CPT

## 2018-12-31 ENCOUNTER — TELEPHONE (OUTPATIENT)
Dept: FAMILY MEDICINE CLINIC | Facility: CLINIC | Age: 68
End: 2018-12-31

## 2018-12-31 NOTE — TELEPHONE ENCOUNTER
Lm on vm    ----- Message from CHRISTIN Mckee sent at 12/31/2018  8:00 AM EST -----  mammo normal, negative

## 2019-01-18 RX ORDER — ALLOPURINOL 100 MG/1
TABLET ORAL
Qty: 30 TABLET | Refills: 0 | Status: SHIPPED | OUTPATIENT
Start: 2019-01-18 | End: 2019-02-19 | Stop reason: SDUPTHER

## 2019-01-22 RX ORDER — LEVOTHYROXINE SODIUM 88 UG/1
88 TABLET ORAL DAILY
Qty: 30 TABLET | Refills: 1 | Status: SHIPPED | OUTPATIENT
Start: 2019-01-22 | End: 2019-03-26 | Stop reason: SDUPTHER

## 2019-02-19 RX ORDER — ALLOPURINOL 100 MG/1
TABLET ORAL
Qty: 30 TABLET | Refills: 3 | Status: SHIPPED | OUTPATIENT
Start: 2019-02-19 | End: 2019-07-22 | Stop reason: SDUPTHER

## 2019-02-26 DIAGNOSIS — M75.101 TEAR OF RIGHT ROTATOR CUFF, UNSPECIFIED TEAR EXTENT: ICD-10-CM

## 2019-02-26 RX ORDER — ATORVASTATIN CALCIUM 80 MG/1
80 TABLET, FILM COATED ORAL NIGHTLY
Qty: 90 TABLET | Refills: 1 | Status: SHIPPED | OUTPATIENT
Start: 2019-02-26 | End: 2019-08-13 | Stop reason: SDUPTHER

## 2019-02-26 RX ORDER — ALENDRONATE SODIUM 70 MG/1
70 TABLET ORAL
Qty: 12 TABLET | Refills: 3 | Status: SHIPPED | OUTPATIENT
Start: 2019-02-26 | End: 2020-05-18

## 2019-03-11 RX ORDER — DULOXETIN HYDROCHLORIDE 60 MG/1
60 CAPSULE, DELAYED RELEASE ORAL DAILY
Qty: 90 CAPSULE | Refills: 1 | Status: SHIPPED | OUTPATIENT
Start: 2019-03-11 | End: 2019-09-26 | Stop reason: SDUPTHER

## 2019-03-25 RX ORDER — ERGOCALCIFEROL 1.25 MG/1
50000 CAPSULE ORAL WEEKLY
Qty: 12 CAPSULE | Refills: 0 | Status: SHIPPED | OUTPATIENT
Start: 2019-03-25 | End: 2019-06-14 | Stop reason: SDUPTHER

## 2019-03-26 RX ORDER — LEVOTHYROXINE SODIUM 88 UG/1
88 TABLET ORAL DAILY
Qty: 30 TABLET | Refills: 1 | Status: SHIPPED | OUTPATIENT
Start: 2019-03-26 | End: 2019-06-19 | Stop reason: SDUPTHER

## 2019-05-28 RX ORDER — MONTELUKAST SODIUM 10 MG/1
TABLET ORAL
Qty: 90 TABLET | Refills: 1 | Status: SHIPPED | OUTPATIENT
Start: 2019-05-28 | End: 2019-11-20 | Stop reason: SDUPTHER

## 2019-06-17 RX ORDER — ERGOCALCIFEROL 1.25 MG/1
50000 CAPSULE ORAL WEEKLY
Qty: 12 CAPSULE | Refills: 0 | Status: SHIPPED | OUTPATIENT
Start: 2019-06-17 | End: 2019-09-06 | Stop reason: SDUPTHER

## 2019-06-18 ENCOUNTER — OFFICE VISIT (OUTPATIENT)
Dept: FAMILY MEDICINE CLINIC | Facility: CLINIC | Age: 69
End: 2019-06-18

## 2019-06-18 VITALS
OXYGEN SATURATION: 98 % | BODY MASS INDEX: 28.89 KG/M2 | SYSTOLIC BLOOD PRESSURE: 116 MMHG | WEIGHT: 153 LBS | HEIGHT: 61 IN | TEMPERATURE: 98.6 F | HEART RATE: 85 BPM | DIASTOLIC BLOOD PRESSURE: 62 MMHG

## 2019-06-18 DIAGNOSIS — J01.00 ACUTE MAXILLARY SINUSITIS, RECURRENCE NOT SPECIFIED: Primary | ICD-10-CM

## 2019-06-18 DIAGNOSIS — I25.10 CORONARY ARTERY DISEASE WITHOUT ANGINA PECTORIS, UNSPECIFIED VESSEL OR LESION TYPE, UNSPECIFIED WHETHER NATIVE OR TRANSPLANTED HEART: ICD-10-CM

## 2019-06-18 DIAGNOSIS — E03.9 HYPOTHYROIDISM, UNSPECIFIED TYPE: ICD-10-CM

## 2019-06-18 DIAGNOSIS — E11.65 TYPE 2 DIABETES MELLITUS WITH HYPERGLYCEMIA, WITH LONG-TERM CURRENT USE OF INSULIN (HCC): ICD-10-CM

## 2019-06-18 DIAGNOSIS — E78.2 MIXED HYPERLIPIDEMIA: ICD-10-CM

## 2019-06-18 DIAGNOSIS — I50.9 CONGESTIVE HEART FAILURE, UNSPECIFIED HF CHRONICITY, UNSPECIFIED HEART FAILURE TYPE (HCC): ICD-10-CM

## 2019-06-18 DIAGNOSIS — E55.9 VITAMIN D DEFICIENCY: ICD-10-CM

## 2019-06-18 DIAGNOSIS — Z79.899 LONG TERM CURRENT USE OF AMIODARONE: ICD-10-CM

## 2019-06-18 DIAGNOSIS — Z79.899 LONG-TERM USE OF HIGH-RISK MEDICATION: ICD-10-CM

## 2019-06-18 DIAGNOSIS — Z79.4 TYPE 2 DIABETES MELLITUS WITH HYPERGLYCEMIA, WITH LONG-TERM CURRENT USE OF INSULIN (HCC): ICD-10-CM

## 2019-06-18 DIAGNOSIS — M1A.9XX0 CHRONIC GOUT INVOLVING TOE OF LEFT FOOT WITHOUT TOPHUS, UNSPECIFIED CAUSE: ICD-10-CM

## 2019-06-18 PROBLEM — S12.090D: Status: ACTIVE | Noted: 2018-09-27

## 2019-06-18 PROBLEM — E11.9 DIABETES MELLITUS: Status: ACTIVE | Noted: 2019-06-18

## 2019-06-18 LAB
ALBUMIN SERPL-MCNC: 4.2 G/DL (ref 3.5–5.2)
ALBUMIN UR-MCNC: 50 MG/L (ref 0–20)
ALBUMIN/GLOB SERPL: 1.5 G/DL
ALP SERPL-CCNC: 51 U/L (ref 39–117)
ALT SERPL W P-5'-P-CCNC: 22 U/L (ref 1–33)
ANION GAP SERPL CALCULATED.3IONS-SCNC: 11.1 MMOL/L
AST SERPL-CCNC: 23 U/L (ref 1–32)
BACTERIA UR QL AUTO: ABNORMAL /HPF
BILIRUB SERPL-MCNC: 1.3 MG/DL (ref 0.2–1.2)
BILIRUB UR QL STRIP: ABNORMAL
BUN BLD-MCNC: 21 MG/DL (ref 8–23)
BUN/CREAT SERPL: 22.6 (ref 7–25)
CALCIUM SPEC-SCNC: 9.5 MG/DL (ref 8.6–10.5)
CHLORIDE SERPL-SCNC: 104 MMOL/L (ref 98–107)
CHOLEST SERPL-MCNC: 120 MG/DL (ref 0–200)
CLARITY UR: CLEAR
CO2 SERPL-SCNC: 24.9 MMOL/L (ref 22–29)
COLOR UR: YELLOW
CREAT BLD-MCNC: 0.93 MG/DL (ref 0.57–1)
ERYTHROCYTE [DISTWIDTH] IN BLOOD BY AUTOMATED COUNT: 15.3 % (ref 12.3–15.4)
GFR SERPL CREATININE-BSD FRML MDRD: 60 ML/MIN/1.73
GLOBULIN UR ELPH-MCNC: 2.8 GM/DL
GLUCOSE BLD-MCNC: 132 MG/DL (ref 65–99)
GLUCOSE UR STRIP-MCNC: NEGATIVE MG/DL
HBA1C MFR BLD: 6.5 % (ref 4.8–5.6)
HCT VFR BLD AUTO: 44.7 % (ref 34–46.6)
HDLC SERPL-MCNC: 46 MG/DL (ref 40–60)
HGB BLD-MCNC: 14.6 G/DL (ref 12–15.9)
HGB UR QL STRIP.AUTO: NEGATIVE
KETONES UR QL STRIP: ABNORMAL
LDLC SERPL CALC-MCNC: 50 MG/DL (ref 0–100)
LDLC/HDLC SERPL: 1.08 {RATIO}
LEUKOCYTE ESTERASE UR QL STRIP.AUTO: NEGATIVE
LYMPHOCYTES # BLD AUTO: 1.8 10*3/MM3 (ref 0.7–3.1)
LYMPHOCYTES NFR BLD AUTO: 22.2 % (ref 19.6–45.3)
MCH RBC QN AUTO: 29.7 PG (ref 26.6–33)
MCHC RBC AUTO-ENTMCNC: 32.7 G/DL (ref 31.5–35.7)
MCV RBC AUTO: 90.9 FL (ref 79–97)
MONOCYTES # BLD AUTO: 0.8 10*3/MM3 (ref 0.1–0.9)
MONOCYTES NFR BLD AUTO: 9.8 % (ref 5–12)
NEUTROPHILS # BLD AUTO: 5.5 10*3/MM3 (ref 1.7–7)
NEUTROPHILS NFR BLD AUTO: 68 % (ref 42.7–76)
NITRITE UR QL STRIP: NEGATIVE
PH UR STRIP.AUTO: 6 [PH] (ref 4.6–8)
PLATELET # BLD AUTO: 239 10*3/MM3 (ref 140–450)
PMV BLD AUTO: 7.1 FL (ref 6–12)
POTASSIUM BLD-SCNC: 4.8 MMOL/L (ref 3.5–5.2)
PROT SERPL-MCNC: 7 G/DL (ref 6–8.5)
PROT UR QL STRIP: ABNORMAL
RBC # BLD AUTO: 4.92 10*6/MM3 (ref 3.77–5.28)
RBC # UR: ABNORMAL /HPF
REF LAB TEST METHOD: ABNORMAL
SODIUM BLD-SCNC: 140 MMOL/L (ref 136–145)
SP GR UR STRIP: 1.02 (ref 1–1.03)
SQUAMOUS #/AREA URNS HPF: ABNORMAL /HPF
TRANS CELLS #/AREA URNS HPF: ABNORMAL /HPF
TRIGL SERPL-MCNC: 121 MG/DL (ref 0–150)
URATE SERPL-MCNC: 3.3 MG/DL (ref 2.4–5.7)
UROBILINOGEN UR QL STRIP: ABNORMAL
VLDLC SERPL-MCNC: 24.2 MG/DL
WBC NRBC COR # BLD: 8.1 10*3/MM3 (ref 3.4–10.8)
WBC UR QL AUTO: ABNORMAL /HPF

## 2019-06-18 PROCEDURE — 84550 ASSAY OF BLOOD/URIC ACID: CPT | Performed by: NURSE PRACTITIONER

## 2019-06-18 PROCEDURE — 83036 HEMOGLOBIN GLYCOSYLATED A1C: CPT | Performed by: NURSE PRACTITIONER

## 2019-06-18 PROCEDURE — 85025 COMPLETE CBC W/AUTO DIFF WBC: CPT | Performed by: NURSE PRACTITIONER

## 2019-06-18 PROCEDURE — 81001 URINALYSIS AUTO W/SCOPE: CPT | Performed by: NURSE PRACTITIONER

## 2019-06-18 PROCEDURE — 80053 COMPREHEN METABOLIC PANEL: CPT | Performed by: NURSE PRACTITIONER

## 2019-06-18 PROCEDURE — 82043 UR ALBUMIN QUANTITATIVE: CPT | Performed by: NURSE PRACTITIONER

## 2019-06-18 PROCEDURE — 99214 OFFICE O/P EST MOD 30 MIN: CPT | Performed by: NURSE PRACTITIONER

## 2019-06-18 PROCEDURE — 71046 X-RAY EXAM CHEST 2 VIEWS: CPT | Performed by: NURSE PRACTITIONER

## 2019-06-18 PROCEDURE — 80061 LIPID PANEL: CPT | Performed by: NURSE PRACTITIONER

## 2019-06-18 PROCEDURE — 36415 COLL VENOUS BLD VENIPUNCTURE: CPT | Performed by: NURSE PRACTITIONER

## 2019-06-18 RX ORDER — BENZONATATE 100 MG/1
100 CAPSULE ORAL 3 TIMES DAILY PRN
Qty: 30 CAPSULE | Refills: 0 | Status: SHIPPED | OUTPATIENT
Start: 2019-06-18 | End: 2019-08-30

## 2019-06-18 RX ORDER — CLINDAMYCIN HYDROCHLORIDE 300 MG/1
300 CAPSULE ORAL 3 TIMES DAILY
Qty: 30 CAPSULE | Refills: 0 | Status: SHIPPED | OUTPATIENT
Start: 2019-06-18 | End: 2019-08-30

## 2019-06-18 NOTE — PATIENT INSTRUCTIONS
erx clindamycin 300 mg TID x 10 days, erx tessalon perles 100 mg TID prn cough, increase H20 and rest, call or RTC if sx persist or worsen, check fasting labs and call with results, CXR today and will fax cardiology, cont all chronic dz meds, DM foot exam today, UTD DM eye exam, congratulated on weight loss cont healthy diet and regular exercise, check BP and BS at home

## 2019-06-18 NOTE — PROGRESS NOTES
Subjective   Kristan Galicia is a 68 y.o. female.     History of Present Illness   C/o sinus congestion, nasal congestion and ear congestion with dizziness x 1 wk, no wheezing but SOA and cough, no fevers, no known exposure, Last tx bronchitis and sinusitis 12/18 clindamycin and tolerated, allergic adhesive, ASA, biaxin, codeine, darvon, levaquin, tequin  Here to FU on chronic DM2, last A1C 7.7 12/18 was prev on metformin 500 mg and januvia 100 mg daily but stopped d/t cost, now on ozempic 1 mg daily and weight loss 25 lbs since starting, checking  working on diet control, prev metformin 1000 mg BID caused GI upset, no longer taking glipizide, no feet pain, UTD dilated eye exam, With chronic chol fasting for labs on lipitor 80 mg HS with last chol 03/18 well controlled and triglycerides elevated but recommended fish oil not taking, fasting today, with chronic hypothyroid on levothyroxine 88 mcg daily with last TSH 05/19 nl at [place] well controlled, no missed doses  Saw Dr [facility] s/t cervical fracture C1-2-3 09/18, with last dexa scan 12/16 osteopenia on fosamax 70 mg weekly  With gout L toe no recent flairs on allopurinol 100 mg daily with last uric acid 4.6 12/18, c/o chronic back pain prev lidoderm patches not covered now using flector patches, no longer using hydrocodone or gabapentin except for once weekly with weather change  With chronic CHF, HTN and PAF on amiodarone 200 mg, coreg 12.5 mg BID, spironolactone-HCTZ 25/25 mg daily, and pradaxa request samples, last saw cardiology Munira Zaidi 05/19 with defibrillator, No CP dizziness, palpitations, HA request update CXR due every 6 mo    The following portions of the patient's history were reviewed and updated as appropriate: allergies, current medications, past family history, past medical history, past social history, past surgical history and problem list.    Review of Systems   Constitutional: Positive for fatigue. Negative for fever.   HENT:  Positive for congestion, facial swelling, postnasal drip, rhinorrhea, sinus pressure and sinus pain. Negative for dental problem, drooling, ear discharge, ear pain, hearing loss, mouth sores, nosebleeds, sneezing, sore throat, tinnitus, trouble swallowing and voice change.    Respiratory: Positive for cough and shortness of breath. Negative for apnea, choking, chest tightness, wheezing and stridor.    Cardiovascular: Negative for chest pain, palpitations and leg swelling.   Endocrine: Negative for cold intolerance, heat intolerance, polydipsia, polyphagia and polyuria.   Neurological: Negative for dizziness and headaches.   All other systems reviewed and are negative.      Objective   Physical Exam   Constitutional: She is oriented to person, place, and time. She appears well-developed and well-nourished.   HENT:   Head: Normocephalic and atraumatic.   Right Ear: Hearing normal. A middle ear effusion is present.   Left Ear: Hearing normal. A middle ear effusion is present.   Nose: Mucosal edema present. Right sinus exhibits maxillary sinus tenderness and frontal sinus tenderness. Left sinus exhibits maxillary sinus tenderness and frontal sinus tenderness.   Mouth/Throat: Oropharynx is clear and moist.   Eyes: Conjunctivae and EOM are normal. Pupils are equal, round, and reactive to light.   Neck: Normal range of motion. Neck supple. No thyromegaly present.   Cardiovascular: Normal rate, regular rhythm and normal heart sounds.   Pulmonary/Chest: Effort normal and breath sounds normal.   Musculoskeletal: Normal range of motion.    Kristan had a diabetic foot exam performed (sensation intact, pulses strong, well hydrated, no ulcers or fungal toenails) today.  Lymphadenopathy:     She has no cervical adenopathy.   Neurological: She is alert and oriented to person, place, and time.   Skin: Skin is warm and dry.   Psychiatric: She has a normal mood and affect. Her behavior is normal. Judgment and thought content normal.    Vitals reviewed.  chest xray 2v without comparison for long term use high risk med amiodaraone shows NAD awaiting radiology review    Assessment/Plan   Kristan was seen today for uri.    Diagnoses and all orders for this visit:    Acute maxillary sinusitis, recurrence not specified    Type 2 diabetes mellitus with hyperglycemia, with long-term current use of insulin (CMS/[facility])  -     CBC & Differential  -     Comprehensive Metabolic Panel  -     Lipid Panel  -     Hemoglobin A1c  -     MicroAlbumin, Urine, Random - Urine, Clean Catch  -     Urinalysis With Microscopic - Urine, Clean Catch  -     CBC Auto Differential  -     Urinalysis without microscopic (no culture) - Urine, Clean Catch  -     Urinalysis, Microscopic Only - Urine, Clean Catch    Long-term use of high-risk medication  -     XR Chest PA & Lateral    Long term current use of amiodarone  -     XR Chest PA & Lateral    Coronary artery disease without angina pectoris, unspecified vessel or lesion type, unspecified whether native or transplanted heart  -     XR Chest PA & Lateral    Congestive heart failure, unspecified HF chronicity, unspecified heart failure type (CMS/[facility])  -     XR Chest PA & Lateral    Vitamin D deficiency    Hypothyroidism, unspecified type    Mixed hyperlipidemia  -     Comprehensive Metabolic Panel  -     Lipid Panel    Chronic gout involving 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, unspecified cause  -     Uric Acid    Other orders  -     benzonatate (TESSALON PERLES) 100 MG capsule; Take 1 capsule by mouth 3 (Three) Times a Day As Needed for Cough.  -     clindamycin (CLEOCIN) 300 MG capsule; Take 1 capsule by mouth 3 (Three) Times a Day. x10d    erx clindamycin 300 mg TID x 10 days, erx tessalon perles 100 mg TID prn cough, increase H20 and rest, call or RTC if sx persist or worsen, check fasting labs and call with results, CXR today and will fax cardiology, cont all chronic dz meds, DM foot exam today, UTD DM eye exam, congratulated  on weight loss cont healthy diet and regular exercise, check BP and BS at home

## 2019-06-19 ENCOUNTER — TELEPHONE (OUTPATIENT)
Dept: FAMILY MEDICINE CLINIC | Facility: CLINIC | Age: 69
End: 2019-06-19

## 2019-06-19 RX ORDER — LEVOTHYROXINE SODIUM 88 UG/1
88 TABLET ORAL DAILY
Qty: 30 TABLET | Refills: 1 | Status: SHIPPED | OUTPATIENT
Start: 2019-06-19 | End: 2019-08-28 | Stop reason: SDUPTHER

## 2019-06-19 NOTE — TELEPHONE ENCOUNTER
Pt informed    ----- Message from CHRISTIN Mckee sent at 6/19/2019 11:08 AM EDT -----  BS sl elevated 132 but A1C 6.5, now well controlled and best it has been-good job! Kidney and liver normal. Chol well controlled. Uric acid well controlled. No anemias

## 2019-06-19 NOTE — TELEPHONE ENCOUNTER
[facility]      ----- Message from CHRISTIN Mckee sent at 6/19/2019 11:08 AM EDT -----  BS sl elevated 132 but A1C 6.5, now well controlled and best it has been-good job! Kidney and liver normal. Chol well controlled. Uric acid well controlled. No anemias

## 2019-07-22 RX ORDER — ALLOPURINOL 100 MG/1
TABLET ORAL
Qty: 30 TABLET | Refills: 3 | Status: SHIPPED | OUTPATIENT
Start: 2019-07-22 | End: 2019-12-10 | Stop reason: SDUPTHER

## 2019-08-05 NOTE — TELEPHONE ENCOUNTER
PATIENT SPOKE WITH MELINDA LAST WEEK AND WAS TOLD TO CALL YOU TODAY    Melolabial Interpolation Flap Text: A decision was made to reconstruct the defect utilizing an interpolation axial flap and a staged reconstruction.  A telfa template was made of the defect.  This telfa template was then used to outline the melolabial interpolation flap.  The donor area for the pedicle flap was then injected with anesthesia.  The flap was excised through the skin and subcutaneous tissue down to the layer of the underlying musculature.  The pedicle flap was carefully excised within this deep plane to maintain its blood supply.  The edges of the donor site were undermined.   The donor site was closed in a primary fashion.  The pedicle was then rotated into position and sutured.  Once the tube was sutured into place, adequate blood supply was confirmed with blanching and refill.  The pedicle was then wrapped with xeroform gauze and dressed appropriately with a telfa and gauze bandage to ensure continued blood supply and protect the attached pedicle.

## 2019-08-13 DIAGNOSIS — Z96.651 HISTORY OF TOTAL KNEE ARTHROPLASTY, RIGHT: Primary | ICD-10-CM

## 2019-08-13 RX ORDER — ATORVASTATIN CALCIUM 80 MG/1
80 TABLET, FILM COATED ORAL NIGHTLY
Qty: 90 TABLET | Refills: 1 | Status: SHIPPED | OUTPATIENT
Start: 2019-08-13 | End: 2020-04-16

## 2019-08-13 RX ORDER — DOCUSATE SODIUM 100 MG/1
CAPSULE, LIQUID FILLED ORAL
Qty: 60 CAPSULE | Refills: 2 | Status: SHIPPED | OUTPATIENT
Start: 2019-08-13 | End: 2020-10-15

## 2019-08-28 RX ORDER — PANTOPRAZOLE SODIUM 40 MG/1
TABLET, DELAYED RELEASE ORAL
Qty: 90 TABLET | Refills: 2 | Status: SHIPPED | OUTPATIENT
Start: 2019-08-28 | End: 2020-05-18

## 2019-08-28 RX ORDER — LEVOTHYROXINE SODIUM 88 UG/1
88 TABLET ORAL DAILY
Qty: 30 TABLET | Refills: 1 | Status: SHIPPED | OUTPATIENT
Start: 2019-08-28 | End: 2019-11-14 | Stop reason: SDUPTHER

## 2019-08-30 ENCOUNTER — OFFICE VISIT (OUTPATIENT)
Dept: FAMILY MEDICINE CLINIC | Facility: CLINIC | Age: 69
End: 2019-08-30

## 2019-08-30 VITALS
HEART RATE: 87 BPM | BODY MASS INDEX: 29.27 KG/M2 | TEMPERATURE: 98.8 F | HEIGHT: 61 IN | WEIGHT: 155 LBS | SYSTOLIC BLOOD PRESSURE: 116 MMHG | DIASTOLIC BLOOD PRESSURE: 70 MMHG | OXYGEN SATURATION: 95 %

## 2019-08-30 DIAGNOSIS — F41.9 ANXIETY AND DEPRESSION: Primary | ICD-10-CM

## 2019-08-30 DIAGNOSIS — F51.04 CHRONIC INSOMNIA: ICD-10-CM

## 2019-08-30 DIAGNOSIS — F32.A ANXIETY AND DEPRESSION: Primary | ICD-10-CM

## 2019-08-30 DIAGNOSIS — G89.4 CHRONIC PAIN SYNDROME: ICD-10-CM

## 2019-08-30 PROCEDURE — 99213 OFFICE O/P EST LOW 20 MIN: CPT | Performed by: NURSE PRACTITIONER

## 2019-08-30 RX ORDER — AMITRIPTYLINE HYDROCHLORIDE 10 MG/1
10 TABLET, FILM COATED ORAL NIGHTLY
Qty: 30 TABLET | Refills: 1 | Status: SHIPPED | OUTPATIENT
Start: 2019-08-30 | End: 2019-09-10

## 2019-08-30 NOTE — PROGRESS NOTES
Subjective   Kristan Galicia is a 68 y.o. female.     History of Present Illness   Here to FU on depression and anxiety worsening over the last few mo, on cymbalta 60 mg for anxiety and depression many years and was also on for nerve pain, prev saw Dr [facility] s/t cervical fracture C1-2-3 09/18, with last dexa scan 12/16 osteopenia on fosamax 70 mg weekly, with chronic back pain prev lidoderm patches not covered now using flector patches, no longer using hydrocodone or gabapentin except for once weekly with weather change, notes sleeping worse and exhausted, has tried melatonin and trazodone no help, lives with daughter supportive, denies worsening mood trigger, working on healthy diet weight loss 20 lbs since last year and reluctant to try medicine that may cause weight gain, admits isolating and no regular exercise considering lance chi  PHQ-9=24 today with sx of anhedonia, depression, poor sleep and appetite, fatigue, feeling bad about herself, trouble concentrating, moving slowly  JEFFREY-7=13 today with sx of anxiety, worrying, trouble relaxing, irritable, feeling afraid    The following portions of the patient's history were reviewed and updated as appropriate: allergies, current medications, past family history, past medical history, past social history, past surgical history and problem list.    Review of Systems   Constitutional: Positive for activity change, appetite change and fatigue. Negative for chills, diaphoresis, fever and unexpected weight change.   Respiratory: Negative for cough, shortness of breath and wheezing.    Cardiovascular: Negative for chest pain, palpitations and leg swelling.   Neurological: Negative for dizziness and headaches.   Psychiatric/Behavioral: Positive for agitation, behavioral problems, decreased concentration, dysphoric mood and sleep disturbance. Negative for confusion, hallucinations, self-injury and suicidal ideas. The patient is nervous/anxious. The patient is not  hyperactive.    All other systems reviewed and are negative.      Objective   Physical Exam   Constitutional: She is oriented to person, place, and time. She appears well-developed and well-nourished.   HENT:   Head: Normocephalic and atraumatic.   Eyes: Conjunctivae and EOM are normal. Pupils are equal, round, and reactive to light.   Cardiovascular: Normal rate, regular rhythm and normal heart sounds.   Pulmonary/Chest: Effort normal and breath sounds normal.   Musculoskeletal: Normal range of motion.   Neurological: She is alert and oriented to person, place, and time.   Skin: Skin is warm and dry.   Psychiatric: Her behavior is normal. Judgment and thought content normal.   Flat, depressed   Vitals reviewed.      Assessment/Plan   Kristan was seen today for follow-up.    Diagnoses and all orders for this visit:    Anxiety and depression    Chronic insomnia    Chronic pain syndrome    Other orders  -     amitriptyline (ELAVIL) 10 MG tablet; Take 1 tablet by mouth Every Night.    cont cymbalta 60 mg discussed concern worsening pain if change, add amitriptyline 10 mg 1-2 PO HS and monitor, consider mirtazipine or seroquel if fail, FU 1 mo eval, enc regular exercise like walking, lance chi or yoga to help mood

## 2019-08-30 NOTE — PATIENT INSTRUCTIONS
cont cymbalta 60 mg discussed concern worsening pain if change, add amitriptyline 10 mg 1-2 PO HS and monitor, consider mirtazipine or seroquel if fail, FU 1 mo eval, enc regular exercise like walking, lance chi or yoga to help mood

## 2019-09-09 ENCOUNTER — TELEPHONE (OUTPATIENT)
Dept: FAMILY MEDICINE CLINIC | Facility: CLINIC | Age: 69
End: 2019-09-09

## 2019-09-09 RX ORDER — ERGOCALCIFEROL 1.25 MG/1
50000 CAPSULE ORAL WEEKLY
Qty: 4 CAPSULE | Refills: 0 | Status: SHIPPED | OUTPATIENT
Start: 2019-09-09 | End: 2019-12-13 | Stop reason: SDUPTHER

## 2019-09-09 NOTE — TELEPHONE ENCOUNTER
Please call to check on patient, there is a potential interaction with amitriptyline and amiodarone, she is on a low dose of amitriptyline. How is her mood on new medicine? If she feels like it is helping we may need to discuss with her cardiologist.

## 2019-09-10 RX ORDER — QUETIAPINE FUMARATE 50 MG/1
TABLET, FILM COATED ORAL
Qty: 60 TABLET | Refills: 1 | Status: SHIPPED | OUTPATIENT
Start: 2019-09-10 | End: 2020-02-26

## 2019-09-11 ENCOUNTER — HOSPITAL ENCOUNTER (OUTPATIENT)
Dept: URGENT CARE | Facility: CLINIC | Age: 69
Discharge: HOME | End: 2019-09-11
Attending: FAMILY MEDICINE

## 2019-09-13 ENCOUNTER — TELEPHONE (OUTPATIENT)
Dept: ORTHOPEDIC SURGERY | Facility: CLINIC | Age: 69
End: 2019-09-13

## 2019-09-13 NOTE — TELEPHONE ENCOUNTER
Patient has [facility] hand fracture appt TODAY with [facility]. She is out of town and had car trouble and now won't make it back for appt today. Does [facility] want to work patient in on Monday at  or [place] [facility] [place] [facility] [place]

## 2019-09-13 NOTE — TELEPHONE ENCOUNTER
Left answering machine message for patient to call me back for appt with [facility] on Monday, 9/16./[place]

## 2019-09-16 ENCOUNTER — OFFICE VISIT (OUTPATIENT)
Dept: ORTHOPEDIC SURGERY | Facility: CLINIC | Age: 69
End: 2019-09-16

## 2019-09-16 VITALS — TEMPERATURE: 97.6 F | BODY MASS INDEX: 29.45 KG/M2 | HEIGHT: 61 IN | WEIGHT: 156 LBS

## 2019-09-16 DIAGNOSIS — M79.642 LEFT HAND PAIN: Primary | ICD-10-CM

## 2019-09-16 PROCEDURE — 99214 OFFICE O/P EST MOD 30 MIN: CPT | Performed by: ORTHOPAEDIC SURGERY

## 2019-09-17 NOTE — PROGRESS NOTES
Patient: Kristan Galicia    YOB: 1950    Medical Record Number: [id number]    Chief Complaints: Left middle finger injury    History of Present Illness:     69 y.o. female patient who presents for evaluation of her left middle finger.  She fell in her daughter's yard on September 6.  She tried to break her fall with her left hand.  She suffered an injury to the DIP joint of her left middle finger.  She was seen at an outside immediate care in [facility] and placed in a splint.  She describes mild intermittent aching pain in the tip of the finger.  She has noticed that she is unable to extend the finger.  Denies other associated injury.  Denies any numbness or tingling.    Allergies:   Allergies   Allergen Reactions   • Adhesive Tape Other (See Comments)     SKIN BLISTERS   • Aspirin Swelling   • Biaxin [Clarithromycin] Other (See Comments)     BLISTERS AROUND MOTH  Also known as Bioxen    • Codeine Swelling   • Darvon [Propoxyphene] Swelling   • Levaquin [Levofloxacin] Other (See Comments)     BLISTERS AROUND MOUTH   • Tequin [Gatifloxacin] Other (See Comments)     BLISTERS AROUND MOUTH  Also known Tequine        Home Medications:    Current Outpatient Medications:   •  alendronate (FOSAMAX) 70 MG tablet, TAKE 1 TABLET BY MOUTH EVERY 7 (SEVEN) DAYS., Disp: 12 tablet, Rfl: 3  •  allopurinol (ZYLOPRIM) 100 MG tablet, TAKE 1 TABLET BY MOUTH DAILY., Disp: 30 tablet, Rfl: 3  •  amiodarone (PACERONE) 200 MG tablet, Take 100 mg by mouth Every Night., Disp: , Rfl:   •  atorvastatin (LIPITOR) 80 MG tablet, TAKE 1 TABLET BY MOUTH EVERY NIGHT., Disp: 90 tablet, Rfl: 1  •  baclofen (LIORESAL) 10 MG tablet, Take 10 mg by mouth At Night As Needed for Muscle Spasms., Disp: , Rfl:   •  Blood Glucose Monitoring Suppl (ACCU-CHEK OTILIA PLUS) W/DEVICE kit, Dx e11.9 use to check BS BID, ok to substitute formulary preferred, Disp: 1 kit, Rfl: 0  •  carvedilol (COREG) 12.5 MG tablet, Take 12.5 mg by mouth 2 (Two)  Times a Day., Disp: , Rfl:   •  dabigatran etexilate (PRADAXA) 150 MG capsu, Take 150 mg by mouth 2 (Two) Times a Day. TO HOLD 5 DAYS PRIOR TO OR PER CARDIO, Disp: , Rfl:   •  diclofenac (FLECTOR) 1.3 % patch patch, Apply 1 patch topically to the appropriate area as directed Every 12 (Twelve) Hours., Disp: 30 patch, Rfl: 5  •  docusate sodium (COLACE) 100 MG capsule, TAKE 1 CAPSULE BY MOUTH TWICE A DAY, Disp: 60 capsule, Rfl: 2  •  DULoxetine (CYMBALTA) 60 MG capsule, TAKE 1 CAPSULE BY MOUTH DAILY., Disp: 90 capsule, Rfl: 1  •  gabapentin (NEURONTIN) 300 MG capsule, Take 1 capsule by mouth 3 (Three) Times a Day., Disp: 9 capsule, Rfl: 0  •  glucose blood (ACCU-CHEK OTILIA PLUS) test strip, Dx e11.9 use to check BS BID, ok to substitute formulary preferred, Disp: 60 each, Rfl: 11  •  Lancets (ACCU-CHEK MULTICLIX) lancets, Dx e11.9 use to check BS BID, ok to substitute formulary preferred, Disp: 60 each, Rfl: 11  •  levothyroxine (SYNTHROID, LEVOTHROID) 88 MCG tablet, TAKE 1 TABLET BY MOUTH DAILY., Disp: 30 tablet, Rfl: 1  •  lidocaine (LIDODERM) 5 %, Place 1 patch on the skin Daily As Needed for Mild Pain . Remove & Discard patch within 12 hours or as directed by MD, Disp: , Rfl:   •  montelukast (SINGULAIR) 10 MG tablet, TAKE 1 TABLET BY MOUTH EVERY DAY, Disp: 90 tablet, Rfl: 1  •  pantoprazole (PROTONIX) 40 MG EC tablet, TAKE 1 TABLET BY MOUTH EVERY DAY, Disp: 90 tablet, Rfl: 2  •  QUEtiapine (SEROQUEL) 50 MG tablet, 1-2 PO HS, Disp: 60 tablet, Rfl: 1  •  sennosides-docusate sodium (SENOKOT-S) 8.6-50 MG tablet, Take 2 tablets by mouth 2 (Two) Times a Day., Disp: , Rfl:   •  spironolactone-hydrochlorothiazide (ALDACTAZIDE) 25-25 MG tablet, TAKE 1 TABLET BY MOUTH EVERY DAY, Disp: 90 tablet, Rfl: 1  •  vitamin D (ERGOCALCIFEROL) 13150 units capsule capsule, TAKE 1 CAPSULE BY MOUTH 1 (ONE) TIME PER WEEK., Disp: 4 capsule, Rfl: 0  •  HYDROcodone-acetaminophen (NORCO)  MG per tablet, Take 1-2 tablets by mouth Every  4 (Four) Hours As Needed for Moderate Pain ., Disp: 36 tablet, Rfl: 0    Past Medical History:   Diagnosis Date   • Anemia    • Arthritis    • Atrial fibrillation (CMS/HCC)    • Cardiac defibrillator in place 01/2009    medtronic    • Cardiomyopathy (CMS/HCC)     Hypertrophic Cardiomyopathy   • CHF (congestive heart failure) (CMS/HCC)    • Chronic pain    • Coronary artery disease    • Diabetes mellitus (CMS/HCC)     Type II   • Diverticulosis     hx diverticulitis   • GERD (gastroesophageal reflux disease)    • Heart murmur    • History of depression    • History of tumor     left ocular tumor, treated with steroids   • Hyperlipidemia    • Hypertension    • Migraines    • On anticoagulant therapy    • Osteoporosis    • Palpitations    • Sleep apnea     does not use CPAP or BiPAP, used nose strips   • Thyroid disease    • Vasovagal syncope    • Vertigo        Past Surgical History:   Procedure Laterality Date   • ANTERIOR CERVICAL DISCECTOMY W/ FUSION  2012    C5-7   • APPENDECTOMY  1970   • AUGMENTATION MAMMAPLASTY     • BILATERAL BREAST REDUCTION     • BREAST BIOPSY     • CARDIAC CATHETERIZATION  02/19/2007   • CARDIAC DEFIBRILLATOR PLACEMENT Left 1999, 2013    Dr. Ya   • CATARACT EXTRACTION Bilateral    • COLONOSCOPY  1996    Dr. Herbert Gonsales    • COLONOSCOPY  8/16/2016    Procedure: COLONOSCOPY with cecum;  Surgeon: Rosalio Sheikh MD;  Location: [facility] ENDOSCOPY;  Service:    • ENDOSCOPY N/A 4/27/2017    Procedure: ESOPHAGOGASTRODUODENOSCOPY WITH BIOPSIES;  Surgeon: Rosalio Sheikh MD;  Location: [facility] ENDOSCOPY;  Service:    • FEMUR SURGERY Left     with metal implant  x3   • FIRST RIB RESECTION Bilateral     and scalenectomy   • HYSTERECTOMY  1989    Total   • KNEE ARTHROSCOPY Bilateral 2014    Dr. Li   • KNEE SURGERY Right 06/10/2010   • LAPAROSCOPIC CHOLECYSTECTOMY  1974   • OOPHORECTOMY     • ROTATOR CUFF REPAIR Right 06/10/2010   • SHOULDER ARTHROSCOPY W/ ROTATOR CUFF REPAIR Bilateral  2011   • SHOULDER SURGERY Right    • TONSILLECTOMY     • TOTAL KNEE ARTHROPLASTY Right 2018    Procedure: RIGHT TOTAL KNEE ARTHROPLASTY WITH STEVE NAVIGATION;  Surgeon: Bravo Pascual MD;  [facility];  Service: Orthopedics   • TOTAL SHOULDER ARTHROPLASTY W/ DISTAL CLAVICLE EXCISION Right 2017    Procedure: TOTAL SHOULDER REVERSE ARTHROPLASTY;  Surgeon: Juan Antonio Anne MD;  Location: [facility] MAIN OR;  Service:        Social History     Occupational History   • Occupation:    Tobacco Use   • Smoking status: Former Smoker     Packs/day: 0.50     Years: 17.00     Pack years: 8.50     Types: Cigarettes     Last attempt to quit: 1985     Years since quittin.0   • Smokeless tobacco: Never Used   Substance and Sexual Activity   • Alcohol use: No   • Drug use: No   • Sexual activity: Defer      Social History     Social History Narrative   • Not on file       Family History   Problem Relation Age of Onset   • Heart attack Brother    • Heart disease Brother    • Hyperthyroidism Mother    • Cancer Mother    • Heart disease Mother    • Osteoporosis Mother    • No Known Problems Father          car accident   • Cirrhosis Maternal Grandmother    • No Known Problems Maternal Grandfather    • No Known Problems Paternal Grandmother    • No Known Problems Paternal Grandfather    • Breast cancer Maternal Aunt    • Malig Hyperthermia Neg Hx        Review of Systems:      Constitutional: Denies fever, shaking or chills   Eyes: Denies change in visual acuity   HEENT: Denies nasal congestion or sore throat   Respiratory: Denies cough or shortness of breath   Cardiovascular: Denies chest pain or edema  Endocrine: Denies tremors, palpitations, intolerance of heat or cold, polyuria, polydipsia.  GI: Denies abdominal pain, nausea, vomiting, bloody stools or diarrhea  : Denies frequency, urgency, incontinence, retention, or nocturia.  Musculoskeletal: Denies numbness, tingling  "or loss of motor function except as above  Integument: Denies rash, lesion or ulceration   Neurologic: Denies headache or focal weakness, deficits  Heme: Denies spontaneous or excessive bleeding, epistaxis, hematuria, melena, fatigue, enlarged or tender lymph nodes.      All other pertinent positives and negatives as noted above in HPI.    Physical Exam: 69 y.o. female  Vitals:    09/16/19 1509   Temp: 97.6 °F (36.4 °C)   Weight: 70.8 kg (156 lb)   Height: 154.9 cm (61\")     General:  Patient is awake and alert.  Appears in no acute distress or discomfort.    Psych:  Affect and demeanor are appropriate.    Eyes:  Conjunctiva and sclera appear grossly normal.  Eyes track well and EOM seem to be intact.    Ears:  No gross abnormalities.  Hearing adequate for the exam.    Cardiovascular:  Regular rate and rhythm.    Lungs:  Good chest expansion.  Breathing unlabored.    Extremities: Left hand is examined.  Skin is benign.  She has mallet deformity of the middle finger.  I can passively correct her into full extension.  She is unable to maintain it.  No tenderness throughout the remainder the hand or wrist.  No instability.  Good finger and thumb abduction,  and pinch strength.  Intact sensation.  Palpable radial pulse.  Brisk capillary refill.    Imaging:  AP, oblique, and lateral views of the left hand are ordered by myself and reviewed to evaluate the patient's complaint.  No comparison films are immediately available.  The x-rays show a dorsal avulsion fracture of the distal phalanx of the middle finger on the lateral view.  Interestingly, it appears that she has a similar finding of the index finger.  I do not see any other no obvious acute abnormalities, lesions, masses, significant degenerative changes, or other concerning findings.     Assessment/Plan:  Left middle finger avulsion fracture/mallet finger    After reviewing the x-rays, I went back and examined her index finger.  She lacks just a few degrees of " active extension but she can actively extend.  There is no tenderness and I suspect that is chronic.  We talked about treatment options for the mallet finger.  I explained that there is good evidence of these can heal with conservative treatment.  I placed a finger splint to keep her extended.  I explained that she needs to keep that on at all times.  It is fine to remove it but when doing so, she needs to exercise great caution to keep the finger extended at all times.  I am going to see her back in 2 weeks to reevaluate.  I told her that we will likely have to keep this immobilized for approximately 6 weeks.  Appropriate activity modifications and restrictions were discussed.  I will see her back in 2 weeks for repeat x-rays and reevaluation.    Juan Antonio Anne MD    09/16/2019

## 2019-09-20 ENCOUNTER — TELEPHONE (OUTPATIENT)
Dept: FAMILY MEDICINE CLINIC | Facility: CLINIC | Age: 69
End: 2019-09-20

## 2019-09-20 RX ORDER — DOXEPIN HYDROCHLORIDE 25 MG/1
25 CAPSULE ORAL NIGHTLY
Qty: 30 CAPSULE | Refills: 1 | Status: SHIPPED | OUTPATIENT
Start: 2019-09-20 | End: 2019-10-14 | Stop reason: SDUPTHER

## 2019-09-20 NOTE — TELEPHONE ENCOUNTER
CALLED PATIENT 09/20/19 11:15 AM, discussed continued sx of insomnia and poor sleep, unable to take seroquel d/t possible SE prolonged QT interval with amiodarone, prev tried amitriptyline 10 mg 3 PO HS no help, will try doxepin 25 mg 1 PO HS may try 2 PO prn, consider ambien or lunesta if sx persist or worsen, pt will call next week for recheck      Regarding: FW: Prescription Question  Contact: [phone]      ----- Message -----  From: Daksha Linda MA  Sent: 9/18/2019  10:51 AM  To: Kathleen Umana MA  Subject: FW: Prescription Question                            ----- Message -----  From: Kristan Galicia  Sent: 9/18/2019   8:46 AM  To: Sheri [facility]  Subject: RE: Prescription Question                        ----- Message from Mychart, Generic sent at 9/18/2019  8:46 AM EDT -----    That's the one that they are telling me reacts with my Amederion.  They have questions about me taking both of them together.    Should we try something else?  ----- Message -----  From: CHRITSIN Mckee  Sent: 9/18/2019  8:14 AM EDT  To: Kristan Galicia  Subject: RE: Prescription Question  I looks like seroquel was sent to [facility] on 09/10/19, is this the right pharmacy?      ----- Message -----     From: Kristan Galicia     Sent: 9/17/2019 11:25 PM EDT       To: CHRISTIN Mckee  Subject: Prescription Question    Good morning Chelly.  When Kathleen called me last week she said a new prescription to help me sleep and for my depression had been called in but there was a conflict with my Amederoin and something else would be called in.  When I went by [facility] today to pick it up, I was told that they were waiting to hear from you all since the one called in reacts with my Amederion.    Can you please check on this and let me know if I need to do something to help.    Thank you.  Kristan Galicia   [phone]

## 2019-09-26 RX ORDER — DULOXETIN HYDROCHLORIDE 60 MG/1
60 CAPSULE, DELAYED RELEASE ORAL DAILY
Qty: 90 CAPSULE | Refills: 1 | Status: SHIPPED | OUTPATIENT
Start: 2019-09-26 | End: 2019-11-11 | Stop reason: SDUPTHER

## 2019-10-12 ENCOUNTER — HOSPITAL ENCOUNTER (EMERGENCY)
Facility: HOSPITAL | Age: 69
Discharge: HOME | End: 2019-10-12
Attending: EMERGENCY MEDICINE | Admitting: EMERGENCY MEDICINE

## 2019-10-12 ENCOUNTER — APPOINTMENT (OUTPATIENT)
Dept: GENERAL RADIOLOGY | Facility: HOSPITAL | Age: 69
End: 2019-10-12

## 2019-10-12 ENCOUNTER — APPOINTMENT (OUTPATIENT)
Dept: CT IMAGING | Facility: HOSPITAL | Age: 69
End: 2019-10-12

## 2019-10-12 VITALS
TEMPERATURE: 98.1 F | RESPIRATION RATE: 16 BRPM | WEIGHT: 157 LBS | HEIGHT: 61 IN | DIASTOLIC BLOOD PRESSURE: 54 MMHG | OXYGEN SATURATION: 99 % | BODY MASS INDEX: 29.64 KG/M2 | SYSTOLIC BLOOD PRESSURE: 120 MMHG | HEART RATE: 68 BPM

## 2019-10-12 DIAGNOSIS — S80.02XA CONTUSION OF LEFT KNEE, INITIAL ENCOUNTER: ICD-10-CM

## 2019-10-12 DIAGNOSIS — S60.212A CONTUSION OF LEFT WRIST, INITIAL ENCOUNTER: ICD-10-CM

## 2019-10-12 DIAGNOSIS — M25.512 ACUTE PAIN OF LEFT SHOULDER: ICD-10-CM

## 2019-10-12 DIAGNOSIS — S09.90XA MINOR HEAD INJURY, INITIAL ENCOUNTER: Primary | ICD-10-CM

## 2019-10-12 PROCEDURE — 73562 X-RAY EXAM OF KNEE 3: CPT

## 2019-10-12 PROCEDURE — 99283 EMERGENCY DEPT VISIT LOW MDM: CPT

## 2019-10-12 PROCEDURE — 72125 CT NECK SPINE W/O DYE: CPT

## 2019-10-12 PROCEDURE — 73030 X-RAY EXAM OF SHOULDER: CPT

## 2019-10-12 PROCEDURE — 73110 X-RAY EXAM OF WRIST: CPT

## 2019-10-12 PROCEDURE — 70450 CT HEAD/BRAIN W/O DYE: CPT

## 2019-10-12 RX ORDER — HYDROCODONE BITARTRATE AND ACETAMINOPHEN 5; 325 MG/1; MG/1
2 TABLET ORAL ONCE
Status: COMPLETED | OUTPATIENT
Start: 2019-10-12 | End: 2019-10-12

## 2019-10-12 RX ADMIN — HYDROCODONE BITARTRATE AND ACETAMINOPHEN 2 TABLET: 5; 325 TABLET ORAL at 02:08

## 2019-10-12 NOTE — DISCHARGE INSTRUCTIONS
Wear splint, apply ice and elevate, home medicine as directed, follow up with your PCP for recheck.

## 2019-10-12 NOTE — ED PROVIDER NOTES
EMERGENCY DEPARTMENT ENCOUNTER    CHIEF COMPLAINT  Chief Complaint: L wrist and shoulder pain/mechanical fall  History given by: patient  History limited by: none  Room Number: 24/24  PMD: Chelly Red APRN      HPI:  Pt is a 69 y.o. female who presents complaining of L wrist and shoulder pain from a mechanical fall that happened a couple of hours PTA. Pt states that she was stepping up onto newly paved asphalt, caught her shoe, and tripped and hit her head but did not lose consciousness.  Pt has MHx of L TKR and is on blood thinners. Pt affirms neck pain but denies back pain and any fever, chills, and abdominal pain.    Duration:  A couple of ours  Timing: constant  Location: L shoulder/wrist  Radiation: none  Quality: pain   Intensity/Severity: mild  Progression: unchanged  Associated Symptoms: neck pain  Aggravating Factors: none  Alleviating Factors: none  Previous Episodes: none  Treatment before arrival: none    PAST MEDICAL HISTORY  Active Ambulatory Problems     Diagnosis Date Noted   • Shoulder pain, right 08/03/2016   • Knee pain, right 08/03/2016   • Fall down stairs 08/03/2016   • PAF (paroxysmal atrial fibrillation) (CMS/[facility]) 11/01/2016   • S/P rotator cuff repair 11/11/2016   • Vitamin D deficiency    • Vertigo    • Vasovagal syncope    • Thyroid disease    • Obstructive sleep apnea, adult    • Palpitations    • Osteoporosis    • 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/2016   • Migraines    • Hyperlipidemia    • History of transfusion    • Heart murmur    • Heart attack (CMS/[facility])    • GERD (gastroesophageal reflux disease)    • Depression    • CHF (congestive heart failure) (CMS/[facility])    • Cardiomyopathy (CMS/[facility])    • Asthma    • Anxiety    • Arthritis of right knee 12/27/2016   • Right rotator cuff tear 02/02/2017   • Hypothyroidism 02/22/2017   • Dyspnea and respiratory abnormalities 03/03/2017   • Orthopnea 03/03/2017   • IHSS (idiopathic hypertrophic subaortic stenosis) (CMS/[facility]) 03/03/2017   • AICD  (automatic cardioverter/defibrillator) present 03/03/2017   • Anticoagulant long-term use 03/03/2017   • Hyperglycemia, drug-induced 03/06/2017   • Chronic pain of right knee 03/17/2017   • Diverticulitis 04/05/2016   • CAD (coronary artery disease) 08/07/2017   • Hypertrophic nonobstructive cardiomyopathy (CMS/[facility]) 06/25/2013   • ICD (implantable cardioverter-defibrillator) discharge 04/04/2016   • Menopause 08/07/2017   • Non-obstructive hypertrophic cardiomyopathy (CMS/[facility]) 08/07/2017   • Insomnia 03/27/2018   • History of total knee arthroplasty, right 05/02/2018   • Closed fracture of odontoid process of axis (CMS/[facility]) 09/14/2018   • Atelectasis 09/16/2018   • Chronic diastolic CHF (congestive heart failure) (CMS/[facility]) 08/14/2018   • Diabetes mellitus (CMS/[facility]) 06/18/2019   • Chronic gout involving 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 06/18/2019   • Other displaced fracture of first cervical vertebra, subsequent encounter for fracture with routine healing 09/27/2018     Resolved Ambulatory Problems     Diagnosis Date Noted   • RSV bronchiolitis 03/06/2017   • Hypoxia 09/16/2018     Past Medical History:   Diagnosis Date   • Anemia    • Arthritis    • Atrial fibrillation (CMS/[facility])    • Cardiac defibrillator in place 01/2009   • Cardiomyopathy (CMS/[facility])    • CHF (congestive heart failure) (CMS/[facility])    • Chronic pain    • Coronary artery disease    • Diabetes mellitus (CMS/[facility])    • Diverticulosis    • GERD (gastroesophageal reflux disease)    • Heart murmur    • History of depression    • History of tumor    • Hyperlipidemia    • Hypertension    • Migraines    • On anticoagulant therapy    • Osteoporosis    • Palpitations    • Sleep apnea    • Thyroid disease    • Vasovagal syncope    • Vertigo        PAST SURGICAL HISTORY  Past Surgical History:   Procedure Laterality Date   • ANTERIOR CERVICAL DISCECTOMY W/ FUSION  2012    C5-7   • APPENDECTOMY  1970   • AUGMENTATION MAMMAPLASTY     • BILATERAL BREAST REDUCTION     •  BREAST BIOPSY     • CARDIAC CATHETERIZATION  02/19/2007   • CARDIAC DEFIBRILLATOR PLACEMENT Left 1999, 2013    Dr. Ya   • CATARACT EXTRACTION Bilateral    • COLONOSCOPY  1996    Dr. Herbert Gonsales    • COLONOSCOPY  8/16/2016    Procedure: COLONOSCOPY with cecum;  Surgeon: Rosalio Sheikh MD;  Location: [facility] ENDOSCOPY;  Service:    • ENDOSCOPY N/A 4/27/2017    Procedure: ESOPHAGOGASTRODUODENOSCOPY WITH BIOPSIES;  Surgeon: Rosalio Sheikh MD;  Location: [facility] ENDOSCOPY;  Service:    • FEMUR SURGERY Left     with metal implant  x3   • FIRST RIB RESECTION Bilateral     and scalenectomy   • HYSTERECTOMY  1989    Total   • KNEE ARTHROSCOPY Bilateral 2014    Dr. Li   • KNEE SURGERY Right 06/10/2010   • LAPAROSCOPIC CHOLECYSTECTOMY  1974   • OOPHORECTOMY     • ROTATOR CUFF REPAIR Right 06/10/2010   • SHOULDER ARTHROSCOPY W/ ROTATOR CUFF REPAIR Bilateral 05/20/2011   • SHOULDER SURGERY Right    • TONSILLECTOMY     • TOTAL KNEE ARTHROPLASTY Right 4/18/2018    Procedure: RIGHT TOTAL KNEE ARTHROPLASTY WITH STEVE NAVIGATION;  Surgeon: Bravo Pascual MD;  Location: [facility] OR;  Service: Orthopedics   • TOTAL SHOULDER ARTHROPLASTY W/ DISTAL CLAVICLE EXCISION Right 2/2/2017    Procedure: TOTAL SHOULDER REVERSE ARTHROPLASTY;  Surgeon: Juan Antonio Anne MD;  Location: [facility] OR;  Service:        FAMILY HISTORY  Family History   Problem Relation Age of Onset   • Heart attack Brother    • Heart disease Brother    • Hyperthyroidism Mother    • Cancer Mother    • Heart disease Mother    • Osteoporosis Mother    • No Known Problems Father          car accident   • Cirrhosis Maternal Grandmother    • No Known Problems Maternal Grandfather    • No Known Problems Paternal Grandmother    • No Known Problems Paternal Grandfather    • Breast cancer Maternal Aunt    • Malig Hyperthermia Neg Hx        SOCIAL HISTORY  Social History     Socioeconomic History   • Marital status: Single     Spouse name: Not on file   • Number  of children: 1   • Years of education: Not on file   • Highest education level: Not on file   Occupational History   • Occupation:    Tobacco Use   • Smoking status: Former Smoker     Packs/day: 0.50     Years: 17.00     Pack years: 8.50     Types: Cigarettes     Last attempt to quit: 1985     Years since quittin.1   • Smokeless tobacco: Never Used   Substance and Sexual Activity   • Alcohol use: No   • Drug use: No   • Sexual activity: Defer       ALLERGIES  Adhesive tape; Aspirin; Biaxin [clarithromycin]; Codeine; Darvon [propoxyphene]; Levaquin [levofloxacin]; and Tequin [gatifloxacin]    REVIEW OF SYSTEMS  Review of Systems   Constitutional: Negative for fever.   HENT: Negative for sore throat.    Eyes: Negative.    Respiratory: Negative for cough and shortness of breath.    Cardiovascular: Negative for chest pain.   Gastrointestinal: Negative for abdominal pain, diarrhea and vomiting.   Genitourinary: Negative for dysuria.   Musculoskeletal: Positive for arthralgias ( L wrist and L shoulder pain) and neck pain.   Skin: Negative for rash.   Allergic/Immunologic: Negative.    Neurological: Negative for weakness, numbness and headaches.   Hematological: Negative.    Psychiatric/Behavioral: Negative.    All other systems reviewed and are negative.      PHYSICAL EXAM  ED Triage Vitals [10/12/19 0119]   Temp Heart Rate Resp BP SpO2   97.8 °F (36.6 °C) 98 18 -- 96 %      Temp src Heart Rate Source Patient Position BP Location FiO2 (%)   Oral Monitor -- -- --       Physical Exam   Constitutional: She is oriented to person, place, and time. No distress.   HENT:   Head: Normocephalic and atraumatic.   Eyes: EOM are normal. Pupils are equal, round, and reactive to light.   Neck: Normal range of motion. Neck supple.   Cardiovascular: Normal rate, regular rhythm and normal heart sounds.   Pulmonary/Chest: Effort normal and breath sounds normal. No respiratory distress.   Abdominal: Soft.  There is no tenderness. There is no rebound and no guarding.   Musculoskeletal: Normal range of motion. She exhibits no edema.        Left shoulder: She exhibits tenderness.        Left wrist: She exhibits tenderness.        Cervical back: She exhibits tenderness ( mild).   Neurological: She is alert and oriented to person, place, and time. She has normal sensation and normal strength.   Skin: Skin is warm and dry. No rash noted.   contusion and abrasion to L forehead, bruising to the L wrist, abrasion to anterior  L knee.   Psychiatric: Mood and affect normal.   Nursing note and vitals reviewed.        RADIOLOGY  XR Knee 3 View Left   Final Result   No acute fracture or subluxation identified.       This report was finalized on 10/12/2019 2:56 AM by Dr. Christi Munson M.D.          XR Wrist 3+ View Left   Final Result   No acute fracture or subluxation identified.       This report was finalized on 10/12/2019 2:53 AM by Dr. Christi Munson M.D.          XR Shoulder 2+ View Left   Final Result   No acute fracture or subluxation identified.       This report was finalized on 10/12/2019 2:50 AM by Dr. Christi Munson M.D.          CT Head Without Contrast   Final Result       1. No acute intracranial hemorrhage.   2. No acute fracture or subluxation of the cervical spine is identified.   3. Orthopedic screw traverses previously identified C2 fracture. There   is some lucency seen around the hardware. This appearance can be   associated with hardware loosening. There is persistent nonunion of the   fracture.       Radiation dose reduction techniques were utilized, including automated   exposure control and exposure modulation based on body size.       This report was finalized on 10/12/2019 2:49 AM by Dr. Christi Munson M.D.          CT Cervical Spine Without Contrast   Final Result       1. No acute intracranial hemorrhage.   2. No acute fracture or subluxation of the cervical spine is identified.    3. Orthopedic screw traverses previously identified C2 fracture. There   is some lucency seen around the hardware. This appearance can be   associated with hardware loosening. There is persistent nonunion of the   fracture.       Radiation dose reduction techniques were utilized, including automated   exposure control and exposure modulation based on body size.       This report was finalized on 10/12/2019 2:49 AM by Dr. Christi Munson M.D.               I ordered the above noted radiological studies. Interpreted by radiologist. Discussed with radiologist. Reviewed by me in PACS.         PROGRESS AND CONSULTS     0307: Pt rechecked and resting comfortably. Discussed negative imaging except possibility of select hardware not healing completely well. Pt family states that this finding is not new and she was told that it may never fully heal. Discussed plan for discharge with wrist splint. Pt understands and agrees with the plan, all questions answered.        MEDICAL DECISION MAKING  Results were reviewed/discussed with the patient and they were also made aware of online access. Pt also made aware that some labs, such as cultures, will not be resulted during ER visit and follow up with PMD is necessary.     MDM  Number of Diagnoses or Management Options  Acute pain of left shoulder:   Contusion of left knee, initial encounter:   Contusion of left wrist, initial encounter:   Minor head injury, initial encounter:      Amount and/or Complexity of Data Reviewed  Tests in the radiology section of CPT®: ordered and reviewed (No acute findings)           DIAGNOSIS  Final diagnoses:   Minor head injury, initial encounter   Contusion of left wrist, initial encounter   Contusion of left knee, initial encounter   Acute pain of left shoulder       DISPOSITION  DISCHARGE    Patient discharged in stable condition.    Reviewed implications of results, diagnosis, meds, responsibility to follow up, warning signs and symptoms of  possible worsening, potential complications and reasons to return to ER.    Patient/Family voiced understanding of above instructions.    Discussed plan for discharge, as there is no emergent indication for admission. Patient referred to primary care provider for BP management due to today's BP. Pt/family is agreeable and understands need for follow up and repeat testing.  Pt is aware that discharge does not mean that nothing is wrong but it indicates no emergency is present that requires admission and they must continue care with follow-up as given below or physician of their choice.     FOLLOW-UP  Chelly Red, APRN  [address] [id number]  [phone]    Schedule an appointment as soon as possible for a visit in 3 days           Medication List      No changes were made to your prescriptions during this visit.           Latest Documented Vital Signs:  As of 3:31 AM  BP- 145/66 HR- 78 Temp- 97.8 °F (36.6 °C) (Oral) O2 sat- 98%    --  Documentation assistance provided by branden William for AMRITA Srinivasan.  Information recorded by the scribe was done at my direction and has been verified and validated by me.       Rebecca William  10/12/19 0314       Anthony Galindo PA  10/12/19 033

## 2019-10-12 NOTE — ED TRIAGE NOTES
Pt here via private car after fall. Lost footing fell resulting in hitting left temple, left extremity pain. Pain is 7 on pain scale.

## 2019-10-12 NOTE — ED PROVIDER NOTES
MD ATTESTATION NOTE    The RUMA and I have discussed this patient's history, physical exam, and treatment plan.  I have reviewed the documentation and personally had a face to face interaction with the patient. I affirm the documentation and agree with the treatment and plan.  The attached note describes my personal findings.    Patient with a trip and fall earlier today.  She hit her head without loss of consciousness.  Her GCS is 15 and her head CT is negative.    She also had some right shoulder pain and left wrist pain.  No obvious deformities on exam, some various abrasions and soft tissue swelling, but all the imagings negative.  Patient safe for discharge home     Cole Vaz MD  10/12/19 0596

## 2019-10-14 RX ORDER — DOXEPIN HYDROCHLORIDE 25 MG/1
25 CAPSULE ORAL NIGHTLY
Qty: 30 CAPSULE | Refills: 3 | Status: SHIPPED | OUTPATIENT
Start: 2019-10-14 | End: 2020-01-16 | Stop reason: SDUPTHER

## 2019-10-22 RX ORDER — ERGOCALCIFEROL 1.25 MG/1
50000 CAPSULE ORAL WEEKLY
Qty: 4 CAPSULE | Refills: 0 | OUTPATIENT
Start: 2019-10-22

## 2019-11-11 ENCOUNTER — TELEPHONE (OUTPATIENT)
Dept: FAMILY MEDICINE CLINIC | Facility: CLINIC | Age: 69
End: 2019-11-11

## 2019-11-11 ENCOUNTER — CLINICAL SUPPORT (OUTPATIENT)
Dept: FAMILY MEDICINE CLINIC | Facility: CLINIC | Age: 69
End: 2019-11-11

## 2019-11-11 DIAGNOSIS — Z09 NEED FOR IMMUNIZATION FOLLOW-UP: Primary | ICD-10-CM

## 2019-11-11 PROCEDURE — 90670 PCV13 VACCINE IM: CPT | Performed by: NURSE PRACTITIONER

## 2019-11-11 PROCEDURE — 90653 IIV ADJUVANT VACCINE IM: CPT | Performed by: NURSE PRACTITIONER

## 2019-11-11 PROCEDURE — G0009 ADMIN PNEUMOCOCCAL VACCINE: HCPCS | Performed by: NURSE PRACTITIONER

## 2019-11-11 PROCEDURE — G0008 ADMIN INFLUENZA VIRUS VAC: HCPCS | Performed by: NURSE PRACTITIONER

## 2019-11-11 RX ORDER — DULOXETIN HYDROCHLORIDE 60 MG/1
60 CAPSULE, DELAYED RELEASE ORAL DAILY
Qty: 90 CAPSULE | Refills: 3 | Status: SHIPPED | OUTPATIENT
Start: 2019-11-11 | End: 2020-10-16 | Stop reason: SDUPTHER

## 2019-11-14 RX ORDER — LEVOTHYROXINE SODIUM 88 UG/1
88 TABLET ORAL DAILY
Qty: 30 TABLET | Refills: 1 | Status: SHIPPED | OUTPATIENT
Start: 2019-11-14 | End: 2020-01-10

## 2019-11-15 ENCOUNTER — OFFICE VISIT (OUTPATIENT)
Dept: ORTHOPEDIC SURGERY | Facility: CLINIC | Age: 69
End: 2019-11-15

## 2019-11-15 VITALS — WEIGHT: 150 LBS | BODY MASS INDEX: 28.32 KG/M2 | HEIGHT: 61 IN | TEMPERATURE: 98 F

## 2019-11-15 DIAGNOSIS — M79.642 LEFT HAND PAIN: Primary | ICD-10-CM

## 2019-11-15 DIAGNOSIS — Z09 FRACTURE FOLLOW-UP: ICD-10-CM

## 2019-11-15 PROCEDURE — 99212 OFFICE O/P EST SF 10 MIN: CPT | Performed by: ORTHOPAEDIC SURGERY

## 2019-11-15 PROCEDURE — 73130 X-RAY EXAM OF HAND: CPT | Performed by: ORTHOPAEDIC SURGERY

## 2019-11-15 RX ORDER — SPIRONOLACTONE 25 MG/1
25 TABLET ORAL EVERY MORNING
COMMUNITY
Start: 2019-09-26

## 2019-11-17 NOTE — PROGRESS NOTES
Chief complaint: Follow-up regarding left middle mallet finger    Ms. Galicia presents for follow-up.  He is now been about 2 months since I last saw her.  She missed her last couple of follow-up visits.  She reports compliance with the brace.  She says the finger feels like it is better.    The finger is examined.  Brace was removed.  She is got about a 5 or 10 degree extensor lag but she can actively extend.  No significant tenderness over the DIP.    AP, oblique and lateral views of the left hand are ordered and reviewed.  These are compared to previous x-rays.  The fragment appears in unchanged position and there does appear to be some callus.    Assessment: Follow-up now 2 months status post initiation of closed treatment for left middle mallet finger    Plan: Discontinue the brace and gradually resume activity as tolerated.  I will see her back in another 6 weeks or so for a final visit.

## 2019-11-20 RX ORDER — MONTELUKAST SODIUM 10 MG/1
TABLET ORAL
Qty: 90 TABLET | Refills: 1 | Status: SHIPPED | OUTPATIENT
Start: 2019-11-20 | End: 2020-05-20

## 2019-12-10 RX ORDER — ALLOPURINOL 100 MG/1
TABLET ORAL
Qty: 30 TABLET | Refills: 3 | Status: SHIPPED | OUTPATIENT
Start: 2019-12-10 | End: 2020-03-09

## 2019-12-16 RX ORDER — ERGOCALCIFEROL 1.25 MG/1
50000 CAPSULE ORAL WEEKLY
Qty: 4 CAPSULE | Refills: 0 | Status: SHIPPED | OUTPATIENT
Start: 2019-12-16 | End: 2020-01-13

## 2019-12-23 ENCOUNTER — TELEPHONE (OUTPATIENT)
Dept: FAMILY MEDICINE CLINIC | Facility: CLINIC | Age: 69
End: 2019-12-23

## 2019-12-30 ENCOUNTER — TELEPHONE (OUTPATIENT)
Dept: FAMILY MEDICINE CLINIC | Facility: CLINIC | Age: 69
End: 2019-12-30

## 2020-01-10 ENCOUNTER — TELEPHONE (OUTPATIENT)
Dept: ORTHOPEDIC SURGERY | Facility: CLINIC | Age: 70
End: 2020-01-10

## 2020-01-10 DIAGNOSIS — M79.642 LEFT HAND PAIN: Primary | ICD-10-CM

## 2020-01-10 RX ORDER — LEVOTHYROXINE SODIUM 88 UG/1
88 TABLET ORAL DAILY
Qty: 30 TABLET | Refills: 1 | Status: SHIPPED | OUTPATIENT
Start: 2020-01-10 | End: 2020-02-14

## 2020-01-10 NOTE — TELEPHONE ENCOUNTER
Patient had to cancel 2:30 appt today with [facility]. Went to leave and has a flat tire. She says this is f/u on her finger and if not better [facility] said was going to refer to hand specialist. She is not better and next available appt with [facility] is 2/14. Please advise.

## 2020-01-13 RX ORDER — ERGOCALCIFEROL 1.25 MG/1
50000 CAPSULE ORAL WEEKLY
Qty: 4 CAPSULE | Refills: 0 | Status: SHIPPED | OUTPATIENT
Start: 2020-01-13 | End: 2020-10-15

## 2020-01-16 RX ORDER — DOXEPIN HYDROCHLORIDE 25 MG/1
25 CAPSULE ORAL NIGHTLY
Qty: 90 CAPSULE | Refills: 0 | Status: SHIPPED | OUTPATIENT
Start: 2020-01-16 | End: 2020-02-26

## 2020-01-24 ENCOUNTER — TELEPHONE (OUTPATIENT)
Dept: FAMILY MEDICINE CLINIC | Facility: CLINIC | Age: 70
End: 2020-01-24

## 2020-02-11 ENCOUNTER — TELEPHONE (OUTPATIENT)
Dept: FAMILY MEDICINE CLINIC | Facility: CLINIC | Age: 70
End: 2020-02-11

## 2020-02-11 NOTE — TELEPHONE ENCOUNTER
Received labs from cardiology regarding thyroid level abnormal, overtreated and we need to decrease dose, please confirm she is taking levothyroxine 88 mcg daily-if yes new dose will be levothyroxine 75 mcg daily and we can recheck labs FU apt 6 wks

## 2020-02-14 RX ORDER — LEVOTHYROXINE SODIUM 88 UG/1
TABLET ORAL
Qty: 30 TABLET | Refills: 1 | Status: SHIPPED | OUTPATIENT
Start: 2020-02-14 | End: 2020-02-14 | Stop reason: DRUGHIGH

## 2020-02-14 RX ORDER — ERGOCALCIFEROL 1.25 MG/1
50000 CAPSULE ORAL WEEKLY
Qty: 4 CAPSULE | Refills: 0 | OUTPATIENT
Start: 2020-02-14

## 2020-02-14 RX ORDER — LEVOTHYROXINE SODIUM 0.07 MG/1
75 TABLET ORAL DAILY
Qty: 30 TABLET | Refills: 2 | Status: SHIPPED | OUTPATIENT
Start: 2020-02-14 | End: 2020-05-19

## 2020-02-14 NOTE — TELEPHONE ENCOUNTER
Patient has been on 88, I sent in new Rx for 75. She said her Vit D refill was denied wanted to know if you want her to come for that lab or refill or what?

## 2020-02-26 ENCOUNTER — OFFICE VISIT (OUTPATIENT)
Dept: FAMILY MEDICINE CLINIC | Facility: CLINIC | Age: 70
End: 2020-02-26

## 2020-02-26 VITALS
DIASTOLIC BLOOD PRESSURE: 70 MMHG | HEIGHT: 61 IN | BODY MASS INDEX: 29.83 KG/M2 | HEART RATE: 90 BPM | WEIGHT: 158 LBS | SYSTOLIC BLOOD PRESSURE: 118 MMHG | OXYGEN SATURATION: 99 % | TEMPERATURE: 98.6 F

## 2020-02-26 DIAGNOSIS — Z12.31 ENCOUNTER FOR SCREENING MAMMOGRAM FOR BREAST CANCER: ICD-10-CM

## 2020-02-26 DIAGNOSIS — I25.10 CORONARY ARTERY DISEASE WITHOUT ANGINA PECTORIS, UNSPECIFIED VESSEL OR LESION TYPE, UNSPECIFIED WHETHER NATIVE OR TRANSPLANTED HEART: ICD-10-CM

## 2020-02-26 DIAGNOSIS — J20.9 ACUTE BRONCHITIS, UNSPECIFIED ORGANISM: ICD-10-CM

## 2020-02-26 DIAGNOSIS — E55.9 VITAMIN D DEFICIENCY: ICD-10-CM

## 2020-02-26 DIAGNOSIS — E03.9 HYPOTHYROIDISM, UNSPECIFIED TYPE: ICD-10-CM

## 2020-02-26 DIAGNOSIS — Z79.4 TYPE 2 DIABETES MELLITUS WITH HYPERGLYCEMIA, WITH LONG-TERM CURRENT USE OF INSULIN (HCC): ICD-10-CM

## 2020-02-26 DIAGNOSIS — Z87.81 S/P LEFT HIP FRACTURE: ICD-10-CM

## 2020-02-26 DIAGNOSIS — E78.2 MIXED HYPERLIPIDEMIA: ICD-10-CM

## 2020-02-26 DIAGNOSIS — J01.30 ACUTE SPHENOIDAL SINUSITIS, RECURRENCE NOT SPECIFIED: ICD-10-CM

## 2020-02-26 DIAGNOSIS — E11.65 TYPE 2 DIABETES MELLITUS WITH HYPERGLYCEMIA, WITH LONG-TERM CURRENT USE OF INSULIN (HCC): ICD-10-CM

## 2020-02-26 DIAGNOSIS — M81.0 OSTEOPOROSIS, UNSPECIFIED OSTEOPOROSIS TYPE, UNSPECIFIED PATHOLOGICAL FRACTURE PRESENCE: ICD-10-CM

## 2020-02-26 DIAGNOSIS — Z00.00 MEDICARE ANNUAL WELLNESS VISIT, SUBSEQUENT: Primary | ICD-10-CM

## 2020-02-26 LAB
25(OH)D3 SERPL-MCNC: 48.3 NG/ML (ref 30–100)
ALBUMIN SERPL-MCNC: 4.6 G/DL (ref 3.5–5.2)
ALBUMIN UR-MCNC: 50 MG/L (ref 0–20)
ALBUMIN/GLOB SERPL: 1.7 G/DL
ALP SERPL-CCNC: 64 U/L (ref 39–117)
ALT SERPL W P-5'-P-CCNC: 20 U/L (ref 1–33)
ANION GAP SERPL CALCULATED.3IONS-SCNC: 15.1 MMOL/L (ref 5–15)
AST SERPL-CCNC: 24 U/L (ref 1–32)
BACTERIA UR QL AUTO: ABNORMAL /HPF
BILIRUB SERPL-MCNC: 0.8 MG/DL (ref 0.2–1.2)
BILIRUB UR QL STRIP: NEGATIVE
BUN BLD-MCNC: 14 MG/DL (ref 8–23)
BUN/CREAT SERPL: 13.3 (ref 7–25)
CALCIUM SPEC-SCNC: 10.3 MG/DL (ref 8.6–10.5)
CHLORIDE SERPL-SCNC: 104 MMOL/L (ref 98–107)
CHOLEST SERPL-MCNC: 125 MG/DL (ref 0–200)
CLARITY UR: CLEAR
CO2 SERPL-SCNC: 24.9 MMOL/L (ref 22–29)
COLOR UR: YELLOW
CREAT BLD-MCNC: 1.05 MG/DL (ref 0.57–1)
ERYTHROCYTE [DISTWIDTH] IN BLOOD BY AUTOMATED COUNT: 13.8 % (ref 12.3–15.4)
GFR SERPL CREATININE-BSD FRML MDRD: 52 ML/MIN/1.73
GLOBULIN UR ELPH-MCNC: 2.7 GM/DL
GLUCOSE BLD-MCNC: 116 MG/DL (ref 65–99)
GLUCOSE UR STRIP-MCNC: NEGATIVE MG/DL
HBA1C MFR BLD: 6.9 % (ref 4.8–5.6)
HCT VFR BLD AUTO: 43.5 % (ref 34–46.6)
HDLC SERPL-MCNC: 42 MG/DL (ref 40–60)
HGB BLD-MCNC: 14.1 G/DL (ref 12–15.9)
HGB UR QL STRIP.AUTO: NEGATIVE
KETONES UR QL STRIP: NEGATIVE
LDLC SERPL CALC-MCNC: 56 MG/DL (ref 0–100)
LDLC/HDLC SERPL: 1.34 {RATIO}
LEUKOCYTE ESTERASE UR QL STRIP.AUTO: ABNORMAL
LYMPHOCYTES # BLD AUTO: 2.5 10*3/MM3 (ref 0.7–3.1)
LYMPHOCYTES NFR BLD AUTO: 27.1 % (ref 19.6–45.3)
MCH RBC QN AUTO: 28.5 PG (ref 26.6–33)
MCHC RBC AUTO-ENTMCNC: 32.5 G/DL (ref 31.5–35.7)
MCV RBC AUTO: 87.9 FL (ref 79–97)
MONOCYTES # BLD AUTO: 0.8 10*3/MM3 (ref 0.1–0.9)
MONOCYTES NFR BLD AUTO: 8.3 % (ref 5–12)
NEUTROPHILS # BLD AUTO: 5.9 10*3/MM3 (ref 1.7–7)
NEUTROPHILS NFR BLD AUTO: 64.6 % (ref 42.7–76)
NITRITE UR QL STRIP: NEGATIVE
PH UR STRIP.AUTO: 5.5 [PH] (ref 4.6–8)
PLATELET # BLD AUTO: 331 10*3/MM3 (ref 140–450)
PMV BLD AUTO: 6.8 FL (ref 6–12)
POTASSIUM BLD-SCNC: 4.7 MMOL/L (ref 3.5–5.2)
PROT SERPL-MCNC: 7.3 G/DL (ref 6–8.5)
PROT UR QL STRIP: NEGATIVE
RBC # BLD AUTO: 4.95 10*6/MM3 (ref 3.77–5.28)
RBC # UR: ABNORMAL /HPF
REF LAB TEST METHOD: ABNORMAL
SODIUM BLD-SCNC: 144 MMOL/L (ref 136–145)
SP GR UR STRIP: 1.02 (ref 1–1.03)
SQUAMOUS #/AREA URNS HPF: ABNORMAL /HPF
TRANS CELLS #/AREA URNS HPF: ABNORMAL /HPF
TRIGL SERPL-MCNC: 133 MG/DL (ref 0–150)
UROBILINOGEN UR QL STRIP: ABNORMAL
VLDLC SERPL-MCNC: 26.6 MG/DL (ref 5–40)
WBC NRBC COR # BLD: 9.2 10*3/MM3 (ref 3.4–10.8)
WBC UR QL AUTO: ABNORMAL /HPF

## 2020-02-26 PROCEDURE — 36415 COLL VENOUS BLD VENIPUNCTURE: CPT | Performed by: NURSE PRACTITIONER

## 2020-02-26 PROCEDURE — 85025 COMPLETE CBC W/AUTO DIFF WBC: CPT | Performed by: NURSE PRACTITIONER

## 2020-02-26 PROCEDURE — 99214 OFFICE O/P EST MOD 30 MIN: CPT | Performed by: NURSE PRACTITIONER

## 2020-02-26 PROCEDURE — G0439 PPPS, SUBSEQ VISIT: HCPCS | Performed by: NURSE PRACTITIONER

## 2020-02-26 PROCEDURE — 82043 UR ALBUMIN QUANTITATIVE: CPT | Performed by: NURSE PRACTITIONER

## 2020-02-26 PROCEDURE — 80061 LIPID PANEL: CPT | Performed by: NURSE PRACTITIONER

## 2020-02-26 PROCEDURE — 83036 HEMOGLOBIN GLYCOSYLATED A1C: CPT | Performed by: NURSE PRACTITIONER

## 2020-02-26 PROCEDURE — 82306 VITAMIN D 25 HYDROXY: CPT | Performed by: NURSE PRACTITIONER

## 2020-02-26 PROCEDURE — 81001 URINALYSIS AUTO W/SCOPE: CPT | Performed by: NURSE PRACTITIONER

## 2020-02-26 PROCEDURE — 80053 COMPREHEN METABOLIC PANEL: CPT | Performed by: NURSE PRACTITIONER

## 2020-02-26 RX ORDER — AMOXICILLIN 500 MG/1
500 CAPSULE ORAL 3 TIMES DAILY
Qty: 30 CAPSULE | Refills: 0 | Status: SHIPPED | OUTPATIENT
Start: 2020-02-26 | End: 2020-03-07

## 2020-02-26 NOTE — PROGRESS NOTES
Subjective   Krisatn Galicia is a 69 y.o. female.     History of Present Illness   C/o prod cough > 1 mo, with SOA wheezing and chest congestion, with swollen lymph nodes, no sore throat, ear pain or sinus congestion, no fevers NVD, took clindamycin x 10 days had at home but sx worsening after stopped, allergic adhesive, ASA, biaxin, codeine, darvon, levaquin, tequin  Here to FU on chronic DM2 well controlled on ozempic 1 mg daily last A1C 6.5 06/19, was prev on metformin GI upset, no longer taking glipizide and januvia, working on healthy diet and exercise, checking , no feet pain, overdue dilated eye exam, With chronic chol fasting for labs on lipitor 80 mg HS with last chol 06/19 well controlled and triglycerides elevated but recommended fish oil not taking, with chronic hypothyroid on levothyroxine 75 mcg daily decreased from 88 mcg with last TSH abnormal this month  With gout L toe no recent flairs on allopurinol 100 mg daily with last uric acid 3.3 06/19, prev saw Dr [facility] s/t cervical fracture C1-2-3 09/18, with last dexa scan 12/16 improved from osteoporosis to osteopenia on fosamax 70 mg weekly, with chronic back pain s/p radiofreq procedure last year no longer with pain, no longer using hydrocodone or gabapentin except for once weekly with weather change sees PM and has flector patches at home  With chronic CHF, HTN and PAF on amiodarone 200 mg, coreg 12.5 mg BID, spironolactone-HCTZ 25/25 mg daily, and pradaxa free from company, last saw cardiology Munira Zaidi 05/19 with defibrillator, No CP dizziness, palpitations, HA last CXR 06/19 NAD  With depression and anxiety now well controlled, stopped cymbalta 60 mg 2 wks ago didn't think needed for mood or pain, prev amitriptyline HS 08/19 but didn't help with chronic poor sleep, has tried melatonin seroquel doxepin and trazodone no help  Last colonoscopy 08/16 Dr Sheikh, last mammo 12/18 NAD no breast pain lumps nipple dc    The following  portions of the patient's history were reviewed and updated as appropriate: allergies, current medications, past family history, past medical history, past social history, past surgical history and problem list.    Review of Systems   Constitutional: Positive for fatigue. Negative for fever.   HENT: Positive for congestion, facial swelling, postnasal drip, sinus pressure, sinus pain and voice change. Negative for dental problem, drooling, ear discharge, ear pain, hearing loss, mouth sores, nosebleeds, rhinorrhea, sneezing, sore throat, tinnitus and trouble swallowing.    Respiratory: Positive for cough, chest tightness and wheezing. Negative for apnea, choking, shortness of breath and stridor.    Cardiovascular: Negative for chest pain, palpitations and leg swelling.   Gastrointestinal: Negative for diarrhea, nausea and vomiting.   Musculoskeletal: Negative for arthralgias and back pain.   Neurological: Negative for dizziness and headaches.   Psychiatric/Behavioral: Positive for sleep disturbance. Negative for agitation, behavioral problems, confusion, decreased concentration, dysphoric mood, hallucinations, self-injury and suicidal ideas. The patient is not nervous/anxious and is not hyperactive.    All other systems reviewed and are negative.      Objective   Physical Exam   Constitutional: She is oriented to person, place, and time. She appears well-developed and well-nourished.   HENT:   Head: Normocephalic and atraumatic.   Right Ear: Hearing and tympanic membrane normal.   Left Ear: Hearing and tympanic membrane normal.   Nose: Mucosal edema present. Right sinus exhibits no maxillary sinus tenderness and no frontal sinus tenderness. Left sinus exhibits no maxillary sinus tenderness and no frontal sinus tenderness.   Mouth/Throat: Oropharynx is clear and moist.   Eyes: Pupils are equal, round, and reactive to light. Conjunctivae and EOM are normal.   Neck: Normal range of motion. Neck supple. No thyromegaly  present.   Cardiovascular: Normal rate, regular rhythm and normal heart sounds.   Pulmonary/Chest: Effort normal. She has wheezes.   cough   Musculoskeletal: Normal range of motion. She exhibits no edema (B LE).   Lymphadenopathy:     She has cervical adenopathy.   Neurological: She is alert and oriented to person, place, and time.   Skin: Skin is warm and dry.   Psychiatric: She has a normal mood and affect. Her behavior is normal. Judgment and thought content normal.   Vitals reviewed.      Assessment/Plan   Kristan was seen today for follow-up.    Diagnoses and all orders for this visit:    Medicare annual wellness visit, subsequent    Type 2 diabetes mellitus with hyperglycemia, with long-term current use of insulin (CMS/[facility])  -     Hemoglobin A1c  -     Lipid Panel  -     Comprehensive Metabolic Panel  -     MicroAlbumin, Urine, Random - Urine, Clean Catch  -     Urinalysis With Microscopic - Urine, Clean Catch  -     Urinalysis without microscopic (no culture) - Urine, Clean Catch  -     Urinalysis, Microscopic Only - Urine, Clean Catch    Mixed hyperlipidemia  -     Lipid Panel    Coronary artery disease without angina pectoris, unspecified vessel or lesion type, unspecified whether native or transplanted heart    S/p left hip fracture  -     DEXA Bone Density Axial; Future    Osteoporosis, unspecified osteoporosis type, unspecified pathological fracture presence  -     DEXA Bone Density Axial; Future    Encounter for screening mammogram for breast cancer  -     Mammo Screening Bilateral With CAD; Future    Acute bronchitis, unspecified organism  -     CBC & Differential  -     CBC Auto Differential    Acute sphenoidal sinusitis, recurrence not specified  -     CBC & Differential  -     CBC Auto Differential    Vitamin D deficiency  -     Vitamin D 25 Hydroxy    Hypothyroidism, unspecified type    Other orders  -     amoxicillin (AMOXIL) 500 MG capsule; Take 1 capsule by mouth 3 (Three) Times a Day for 10  days.    medicare wellness today, check labs and call with results, cont all chronic dz meds, check BP and BS at home, Enc healthy diet and regular exercise for wt loss, order dexa and mammo defer CBE, erx amox 500 mg TID x 10 days, increase H20 and rest, call or RTC if sx persist or worsen, cont FU with specialists, will recheck TSH 6 wks

## 2020-02-26 NOTE — PATIENT INSTRUCTIONS
medicare wellness today, check labs and call with results, cont all chronic dz meds, check BP and BS at home, Enc healthy diet and regular exercise for wt loss, order dexa and mammo defer CBE, erx amox 500 mg TID x 10 days, increase H20 and rest, call or RTC if sx persist or worsen, cont FU with specialists, will recheck TSH 6 wks  Medicare Wellness  Personal Prevention Plan of Service     Date of Office Visit:  2020  Encounter Provider:  CHRISTIN Mckee  Place of Service:  [facility]  Patient Name: Kristan Galicia  :  1950    As part of the Medicare Wellness portion of your visit today, we are providing you with this personalized preventive plan of services (PPPS). This plan is based upon recommendations of the [place] Preventive Services Task Force (USPSTF) and the Advisory Committee on Immunization Practices (ACIP).    This lists the preventive care services that should be considered, and provides dates of when you are due. Items listed as completed are up-to-date and do not require any further intervention.    Health Maintenance   Topic Date Due   • COLONOSCOPY  2016   • Pneumococcal Vaccine Once at 65 Years Old  2016   • ZOSTER VACCINE (2 of 2) 2017   • DIABETIC EYE EXAM  2017   • MEDICARE ANNUAL WELLNESS  2018   • DXA SCAN  2018   • HEMOGLOBIN A1C  2019   • DIABETIC FOOT EXAM  2020   • LIPID PANEL  2020   • URINE MICROALBUMIN  2020   • MAMMOGRAM  2020   • TDAP/TD VACCINES (2 - Td) 2028   • HEPATITIS C SCREENING  Completed   • INFLUENZA VACCINE  Completed       Orders Placed This Encounter   Procedures   • DEXA Bone Density Axial     Standing Status:   Future     Standing Expiration Date:   2021     Order Specific Question:   Reason for Exam:     Answer:   osteopenia, last dexa      Order Specific Question:   Does this patient have a diabetic  monitoring/medication delivering device on?     Answer:   No   • Hemoglobin A1c   • Lipid Panel   • Comprehensive Metabolic Panel   • MicroAlbumin, Urine, Random - Urine, Clean Catch   • Vitamin D 25 Hydroxy   • Urinalysis without microscopic (no culture) - Urine, Clean Catch   • Urinalysis, Microscopic Only - Urine, Clean Catch   • CBC Auto Differential   • Urinalysis With Microscopic - Urine, Clean Catch   • CBC & Differential     Order Specific Question:   Manual Differential     Answer:   No       Return in about 3 months (around 5/26/2020).

## 2020-02-26 NOTE — PROGRESS NOTES
The ABCs of the Annual Wellness Visit  Subsequent Medicare Wellness Visit    Chief Complaint   Patient presents with   • Follow-up     uri and getting labwork     Subjective   History of Present Illness:  Kristan Galicia is a 69 y.o. female who presents for a Subsequent Medicare Wellness Visit.    HEALTH RISK ASSESSMENT    Recent Hospitalizations:  No hospitalization(s) within the last year.    Current Medical Providers:  Patient Care Team:  Chelly Red APRN as PCP - General (Family Medicine)  Zeeshan Donato MD as PCP - Claims Attributed  Minh Ya MD as Consulting Physician (Cardiac Electrophysiology)  Rosalio Sheikh MD as Consulting Physician (General Surgery)  Juan Antonio Anne MD as Consulting Physician (Orthopedic Surgery)  Ruben Jackson MD as Consulting Physician (Neurosurgery)    Smoking Status:  Social History     Tobacco Use   Smoking Status Former Smoker   • Packs/day: 0.50   • Years: 17.00   • Pack years: 8.50   • Types: Cigarettes   • Last attempt to quit: 1985   • Years since quittin.4   Smokeless Tobacco Never Used     Alcohol Consumption:  Social History     Substance and Sexual Activity   Alcohol Use No     Depression Screen:   PHQ-2/PHQ-9 Depression Screening 2020   Little interest or pleasure in doing things 0   Feeling down, depressed, or hopeless 0   Trouble falling or staying asleep, or sleeping too much 3   Feeling tired or having little energy 0   Poor appetite or overeating 0   Feeling bad about yourself - or that you are a failure or have let yourself or your family down 0   Trouble concentrating on things, such as reading the newspaper or watching television 0   Moving or speaking so slowly that other people could have noticed. Or the opposite - being so fidgety or restless that you have been moving around a lot more than usual 0   Thoughts that you would be better off dead, or of hurting yourself in some way 0   Total Score 3   If you  checked off any problems, how difficult have these problems made it for you to do your work, take care of things at home, or get along with other people? Not difficult at all     Fall Risk Screen:  YOUNG Fall Risk Assessment was completed, and patient is at LOW risk for falls.Assessment completed on:2/26/2020    Health Habits and Functional and Cognitive Screening:  Functional & Cognitive Status 2/26/2020   Do you have difficulty preparing food and eating? No   Do you have difficulty bathing yourself, getting dressed or grooming yourself? No   Do you have difficulty using the toilet? No   Do you have difficulty moving around from place to place? No   Do you have trouble with steps or getting out of a bed or a chair? No   Current Diet Diabetic Diet   Dental Exam Up to date   Eye Exam Not up to date   Exercise (times per week) 3 times per week   Current Exercise Activities Include Walking   Do you need help using the phone?  No   Are you deaf or do you have serious difficulty hearing?  No   Do you need help with transportation? No   Do you need help shopping? No   Do you need help preparing meals?  No   Do you need help with housework?  No   Do you need help with laundry? No   Do you need help taking your medications? No   Do you need help managing money? No   Do you ever drive or ride in a car without wearing a seat belt? No   Have you felt unusual stress, anger or loneliness in the last month? No   Who do you live with? Alone   If you need help, do you have trouble finding someone available to you? No   Have you been bothered in the last four weeks by sexual problems? No   Do you have difficulty concentrating, remembering or making decisions? No     Does the patient have evidence of cognitive impairment? No    Asprin use counseling:Does not need ASA (and currently is not on it) allergic ASA on pradaxa    Age-appropriate Screening Schedule:  Refer to the list below for future screening recommendations based on  patient's age, sex and/or medical conditions. Orders for these recommended tests are listed in the plan section. The patient has been provided with a written plan.    Health Maintenance   Topic Date Due   • COLONOSCOPY  03/21/2016   • ZOSTER VACCINE (2 of 2) 08/02/2017   • DIABETIC EYE EXAM  08/16/2017   • DXA SCAN  12/01/2018   • HEMOGLOBIN A1C  12/18/2019   • DIABETIC FOOT EXAM  06/18/2020   • LIPID PANEL  06/18/2020   • URINE MICROALBUMIN  06/18/2020   • MAMMOGRAM  12/28/2020   • TDAP/TD VACCINES (2 - Td) 09/14/2028   • INFLUENZA VACCINE  Completed          The following portions of the patient's history were reviewed and updated as appropriate: allergies, current medications, past family history, past medical history, past social history, past surgical history and problem list.    Outpatient Medications Prior to Visit   Medication Sig Dispense Refill   • alendronate (FOSAMAX) 70 MG tablet TAKE 1 TABLET BY MOUTH EVERY 7 (SEVEN) DAYS. 12 tablet 3   • allopurinol (ZYLOPRIM) 100 MG tablet TAKE 1 TABLET BY MOUTH DAILY. 30 tablet 3   • amiodarone (PACERONE) 200 MG tablet Take 100 mg by mouth Every Night.     • atorvastatin (LIPITOR) 80 MG tablet TAKE 1 TABLET BY MOUTH EVERY NIGHT. 90 tablet 1   • baclofen (LIORESAL) 10 MG tablet Take 10 mg by mouth At Night As Needed for Muscle Spasms.     • Blood Glucose Monitoring Suppl (ACCU-CHEK OTILIA PLUS) W/DEVICE kit Dx e11.9 use to check BS BID, ok to substitute formulary preferred 1 kit 0   • carvedilol (COREG) 12.5 MG tablet Take 12.5 mg by mouth 2 (Two) Times a Day.     • dabigatran etexilate (PRADAXA) 150 MG capsu Take 150 mg by mouth 2 (Two) Times a Day. TO HOLD 5 DAYS PRIOR TO OR PER CARDIO     • diclofenac (FLECTOR) 1.3 % patch patch Apply 1 patch topically to the appropriate area as directed Every 12 (Twelve) Hours. 30 patch 5   • docusate sodium (COLACE) 100 MG capsule TAKE 1 CAPSULE BY MOUTH TWICE A DAY 60 capsule 2   • DULoxetine (CYMBALTA) 60 MG capsule Take 1  capsule by mouth Daily. 90 capsule 3   • glucose blood (ACCU-CHEK OTILIA PLUS) test strip Dx e11.9 use to check BS BID, ok to substitute formulary preferred 60 each 11   • HYDROcodone-acetaminophen (NORCO)  MG per tablet Take 1-2 tablets by mouth Every 4 (Four) Hours As Needed for Moderate Pain . 36 tablet 0   • Lancets (ACCU-CHEK MULTICLIX) lancets Dx e11.9 use to check BS BID, ok to substitute formulary preferred 60 each 11   • levothyroxine (SYNTHROID) 75 MCG tablet Take 1 tablet by mouth Daily. 30 tablet 2   • montelukast (SINGULAIR) 10 MG tablet TAKE 1 TABLET BY MOUTH EVERY DAY 90 tablet 1   • pantoprazole (PROTONIX) 40 MG EC tablet TAKE 1 TABLET BY MOUTH EVERY DAY 90 tablet 2   • sennosides-docusate sodium (SENOKOT-S) 8.6-50 MG tablet Take 2 tablets by mouth 2 (Two) Times a Day.     • spironolactone (ALDACTONE) 25 MG tablet TAKE 1 TABLET BY MOUTH EVERY DAY     • spironolactone-hydrochlorothiazide (ALDACTAZIDE) 25-25 MG tablet TAKE 1 TABLET BY MOUTH EVERY DAY 90 tablet 1   • vitamin D (ERGOCALCIFEROL) 1.25 MG (12250 UT) capsule capsule TAKE 1 CAPSULE BY MOUTH 1 (ONE) TIME PER WEEK. 4 capsule 0   • doxepin (SINEquan) 25 MG capsule Take 1 capsule by mouth Every Night. 90 capsule 0   • gabapentin (NEURONTIN) 300 MG capsule Take 1 capsule by mouth 3 (Three) Times a Day. 9 capsule 0   • lidocaine (LIDODERM) 5 % Place 1 patch on the skin Daily As Needed for Mild Pain . Remove & Discard patch within 12 hours or as directed by MD     • QUEtiapine (SEROQUEL) 50 MG tablet 1-2 PO HS 60 tablet 1     No facility-administered medications prior to visit.        Patient Active Problem List   Diagnosis   • Shoulder pain, right   • Knee pain, right   • Fall down stairs   • PAF (paroxysmal atrial fibrillation) (CMS/HCC)   • S/P rotator cuff repair   • Vitamin D deficiency   • Vertigo   • Vasovagal syncope   • Thyroid disease   • Obstructive sleep apnea, adult   • Palpitations   • Osteoporosis   • Ocular tumor   • Migraines  "  • Hyperlipidemia   • History of transfusion   • Heart murmur   • Heart attack (CMS/HCC)   • GERD (gastroesophageal reflux disease)   • Depression   • CHF (congestive heart failure) (CMS/HCC)   • Cardiomyopathy (CMS/[facility])   • Asthma   • Anxiety   • Arthritis of right knee   • Right rotator cuff tear   • Hypothyroidism   • Dyspnea and respiratory abnormalities   • Orthopnea   • IHSS (idiopathic hypertrophic subaortic stenosis) (CMS/[facility])   • AICD (automatic cardioverter/defibrillator) present   • Anticoagulant long-term use   • Hyperglycemia, drug-induced   • Chronic pain of right knee   • Diverticulitis   • CAD (coronary artery disease)   • Hypertrophic nonobstructive cardiomyopathy (CMS/[facility])   • ICD (implantable cardioverter-defibrillator) discharge   • Menopause   • Non-obstructive hypertrophic cardiomyopathy (CMS/[facility])   • Insomnia   • History of total knee arthroplasty, right   • Closed fracture of odontoid process of axis (CMS/[facility])   • Atelectasis   • Chronic diastolic CHF (congestive heart failure) (CMS/[facility])   • Diabetes mellitus (CMS/[facility])   • Chronic gout involving 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   • Other displaced fracture of first cervical vertebra, subsequent encounter for fracture with routine healing       Advanced Care Planning:  Patient has an advance directive that is valid in EMR.  Advance Care Planning         Review of Systems    Compared to one year ago, the patient feels her physical health is better.  Compared to one year ago, the patient feels her mental health is better.    Reviewed chart for potential of high risk medication in the elderly: yes  Reviewed chart for potential of harmful drug interactions in the elderly:yes    Objective         Vitals:    02/26/20 1448   BP: 118/70   BP Location: Left arm   Patient Position: Sitting   Cuff Size: Large Adult   Pulse: 90   Temp: 98.6 °F (37 °C)   TempSrc: Oral   SpO2: 99%   Weight: 71.7 kg (158 lb)   Height: 154.9 cm (61\")   PainSc: 0-No pain "       Body mass index is 29.85 kg/m².  Discussed the patient's BMI with her. The BMI is above average; BMI management plan is completed.    Physical Exam          Assessment/Plan   Medicare Risks and Personalized Health Plan  CMS Preventative Services Quick Reference  Breast Cancer/Mammogram Screening  Cardiovascular risk  Chronic Pain   Depression/Dysphoria  Diabetic Lab Screening   Glaucoma Risk  Obesity/Overweight   Osteoprorosis Risk    The above risks/problems have been discussed with the patient.  Pertinent information has been shared with the patient in the After Visit Summary.  Follow up plans and orders are seen below in the Assessment/Plan Section.    Diagnoses and all orders for this visit:    1. Medicare annual wellness visit, subsequent (Primary)    2. Type 2 diabetes mellitus with hyperglycemia, with long-term current use of insulin (CMS/[facility])  -     Hemoglobin A1c  -     Lipid Panel  -     Comprehensive Metabolic Panel  -     MicroAlbumin, Urine, Random - Urine, Clean Catch  -     Urinalysis With Microscopic - Urine, Clean Catch  -     Urinalysis without microscopic (no culture) - Urine, Clean Catch  -     Urinalysis, Microscopic Only - Urine, Clean Catch    3. Mixed hyperlipidemia  -     Lipid Panel    4. Coronary artery disease without angina pectoris, unspecified vessel or lesion type, unspecified whether native or transplanted heart    5. S/p left hip fracture  -     DEXA Bone Density Axial; Future    6. Osteoporosis, unspecified osteoporosis type, unspecified pathological fracture presence  -     DEXA Bone Density Axial; Future    7. Encounter for screening mammogram for breast cancer  -     Mammo Screening Bilateral With CAD; Future    8. Acute bronchitis, unspecified organism  -     CBC & Differential  -     CBC Auto Differential    9. Acute sphenoidal sinusitis, recurrence not specified  -     CBC & Differential  -     CBC Auto Differential    10. Vitamin D deficiency  -     Vitamin D 25  Hydroxy    11. Hypothyroidism, unspecified type    Other orders  -     amoxicillin (AMOXIL) 500 MG capsule; Take 1 capsule by mouth 3 (Three) Times a Day for 10 days.  Dispense: 30 capsule; Refill: 0      Follow Up:  Return in about 3 months (around 5/26/2020).     An After Visit Summary and PPPS were given to the patient.

## 2020-02-28 RX ORDER — LEVOTHYROXINE SODIUM 88 UG/1
88 TABLET ORAL DAILY
Qty: 90 TABLET | Refills: 2 | Status: SHIPPED | OUTPATIENT
Start: 2020-02-28 | End: 2020-10-15

## 2020-03-05 ENCOUNTER — TELEPHONE (OUTPATIENT)
Dept: FAMILY MEDICINE CLINIC | Facility: CLINIC | Age: 70
End: 2020-03-05

## 2020-03-05 NOTE — TELEPHONE ENCOUNTER
PATIENT CALLED STATING THAT SHE WAS IN OFFICE LAST WEDNESDAY (2/26/20) FOR AN APPOINTMENT WITH BLANE MONTIEL AND SHE PRESCRIBED HER THE AMOXICILLIN (AMOXIL) 500 MG CAPSULE. THE PATIENT STATED THAT SHE TOOK 1 CAPSULE 3 TIMES DAILY, AND SHE WILL BE OUT OF THIS MEDICATION AFTER TOMORROW (3/6/20).    THE PATIENT STATED THAT BLANE MONTIEL ADVISED HER TO CALL BACK IF SHE WAS NOT FEELING BETTER, AND SHE STATED THAT SHE IS NOT. THE PATIENT STATED THAT SHE STILL HAS A COUGH AND BURNING IN HER CHEST.    THE PATIENT STATED THAT SHE DOES NOT KNOW WHAT BLANE MONTIEL WOULD SUGGEST DOING, BUT SHE WANTED TO LET HER KNOW HOW SHE IS FEELING TO SEE WHAT FURTHER ACTION NEEDS TO BE TAKEN.    PLEASE CALL THE PATIENT -[phone] WHEN THIS MESSAGE HAS BEEN RECEIVED AND ADVISE.    IF A PRESCRIPTION IS RECOMMENDED, I CONFIRMED THE CORRECT PHARMACY WITH THE PATIENT TO BE [facility] ON [address].    IF THERE ARE ANY QUESTIONS OR CONCERNS PLEASE CALL THE PATIENT -[phone] OR THE PHARMACY AT [facility] -[phone].

## 2020-03-06 RX ORDER — DOXYCYCLINE HYCLATE 100 MG
100 TABLET ORAL 2 TIMES DAILY
Qty: 20 TABLET | Refills: 0 | Status: SHIPPED | OUTPATIENT
Start: 2020-03-06 | End: 2020-10-15

## 2020-03-06 RX ORDER — BENZONATATE 100 MG/1
100 CAPSULE ORAL 3 TIMES DAILY PRN
Qty: 30 CAPSULE | Refills: 0 | Status: SHIPPED | OUTPATIENT
Start: 2020-03-06 | End: 2020-10-15

## 2020-03-06 NOTE — TELEPHONE ENCOUNTER
PT CALLED STATING THAT HER COUGH HAS NOT IMPROVED AND IS STILL HAVING A HARD TIME BREATHING. PT STATES THAT THE amoxicillin (AMOXIL) 500 MG capsule  HAS NOT HELPED.     PLEASE ADVISE     PT CALL ME  [phone]

## 2020-03-06 NOTE — TELEPHONE ENCOUNTER
Sent in doxycycline 100 mg BID x 10 days does she need more cough drops?    Please see if sinus and cough are improving after abx?

## 2020-03-09 RX ORDER — ALLOPURINOL 100 MG/1
TABLET ORAL
Qty: 90 TABLET | Refills: 1 | Status: SHIPPED | OUTPATIENT
Start: 2020-03-09 | End: 2020-10-16 | Stop reason: SDUPTHER

## 2020-04-10 ENCOUNTER — HOSPITAL ENCOUNTER (OUTPATIENT)
Dept: BONE DENSITY | Facility: HOSPITAL | Age: 70
End: 2020-04-10

## 2020-04-10 ENCOUNTER — HOSPITAL ENCOUNTER (OUTPATIENT)
Dept: MAMMOGRAPHY | Facility: HOSPITAL | Age: 70
Discharge: HOME | End: 2020-04-10

## 2020-04-16 RX ORDER — ATORVASTATIN CALCIUM 80 MG/1
80 TABLET, FILM COATED ORAL NIGHTLY
Qty: 90 TABLET | Refills: 0 | Status: SHIPPED | OUTPATIENT
Start: 2020-04-16 | End: 2020-07-20

## 2020-04-20 ENCOUNTER — TELEPHONE (OUTPATIENT)
Dept: FAMILY MEDICINE CLINIC | Facility: CLINIC | Age: 70
End: 2020-04-20

## 2020-04-20 NOTE — TELEPHONE ENCOUNTER
----- Message from Shoshana Singh MA sent at 4/20/2020  7:31 AM EDT -----  Regarding: FW: Prescription Question  Contact: [phone]      ----- Message -----  From: Kristan Galicia  Sent: 4/18/2020   1:43 AM EDT  To: [facility]  Subject: RE: Prescription Question                        It's 0.5mg, weekly.    Thank you.  ----- Message -----  From: CHRISTIN Mckee  Sent: 4/17/2020  9:58 AM EDT  To: Kristan Galicia  Subject: RE: Prescription Question  What is the dose for ozempic-0.5 mg or 1 mg weekly?      ----- Message -----     From: Kristan Galicia     Sent: 4/16/2020  9:07 AM EDT       To: CHRISTIN Mckee  Subject: RE: Prescription Question    Yes, I got the [facility] perscription for Allopurinol.    Please send over one for Ozempic.      If I can't afford it, I'll contact you all back to see what Chelly wants me on until this pandemic is over.    Thank you.  I pray for your all's safety and good health.  I appreciate your all's dedication.  ----- Message -----  From: CHRISTIN Mckee  Sent: 4/15/2020  8:16 AM EDT  To: Kristan Galicia  Subject: RE: Prescription Question  I sent over allopurinol for 90 days last month to [facility]-is that where it should go? And do you need a prescription of ozempic as well?      ----- Message -----     From: Kristan Galicia     Sent: 4/14/2020  4:58 PM EDT       To: CHRISTIN Mckee  Subject: RE: Prescription Question    Can you please ask about calling in the Allopurinol perscription and one for Ozempic?  ----- Message -----  From: KEYSHAWN PERES  Sent: 4/14/2020  2:03 PM EDT  To: Kristan Galicia  Subject: RE: Prescription Question  We do not have any samples currently since the reps are not allowed to visit during the pandemic    ----- Message -----     From: Kristan Galicia     Sent: 4/14/2020  1:58 PM EDT       To: CHRISTIN Mckee  Subject: Prescription Question    Good afternoon  .  Can you please call  me in a perscription for Allopurinol, for a 90 day supply, with 3 refills?  Also can you Kathleen check and see if you have samples of Ozempic and the Flecnor pain patches.    I hope and pray that you all are safe.    Thank you,  Kristan Galicia

## 2020-04-21 RX ORDER — ERGOCALCIFEROL 1.25 MG/1
50000 CAPSULE ORAL WEEKLY
Qty: 4 CAPSULE | Refills: 0 | OUTPATIENT
Start: 2020-04-21

## 2020-04-22 ENCOUNTER — TELEPHONE (OUTPATIENT)
Dept: FAMILY MEDICINE CLINIC | Facility: CLINIC | Age: 70
End: 2020-04-22

## 2020-04-22 NOTE — TELEPHONE ENCOUNTER
Patient called in requesting to drop off a Patient Assistance Program for Ozempic For to be filled out by her pcp.      Please call to advise @ [phone]

## 2020-05-16 DIAGNOSIS — M75.101 TEAR OF RIGHT ROTATOR CUFF: ICD-10-CM

## 2020-05-18 RX ORDER — ALENDRONATE SODIUM 70 MG/1
70 TABLET ORAL
Qty: 12 TABLET | Refills: 3 | Status: SHIPPED | OUTPATIENT
Start: 2020-05-18 | End: 2021-04-20 | Stop reason: SDUPTHER

## 2020-05-18 RX ORDER — PANTOPRAZOLE SODIUM 40 MG/1
TABLET, DELAYED RELEASE ORAL
Qty: 90 TABLET | Refills: 2 | Status: SHIPPED | OUTPATIENT
Start: 2020-05-18 | End: 2021-04-05 | Stop reason: SDUPTHER

## 2020-05-19 ENCOUNTER — APPOINTMENT (OUTPATIENT)
Dept: BONE DENSITY | Facility: HOSPITAL | Age: 70
End: 2020-05-19

## 2020-05-19 RX ORDER — LEVOTHYROXINE SODIUM 0.07 MG/1
TABLET ORAL
Qty: 90 TABLET | Refills: 0 | Status: SHIPPED | OUTPATIENT
Start: 2020-05-19 | End: 2020-08-17

## 2020-05-20 RX ORDER — MONTELUKAST SODIUM 10 MG/1
TABLET ORAL
Qty: 90 TABLET | Refills: 1 | Status: SHIPPED | OUTPATIENT
Start: 2020-05-20 | End: 2020-10-16 | Stop reason: SDUPTHER

## 2020-07-20 RX ORDER — ATORVASTATIN CALCIUM 80 MG/1
TABLET, FILM COATED ORAL
Qty: 90 TABLET | Refills: 1 | Status: SHIPPED | OUTPATIENT
Start: 2020-07-20 | End: 2020-10-16 | Stop reason: SDUPTHER

## 2020-07-21 ENCOUNTER — TRANSCRIBE ORDERS (OUTPATIENT)
Dept: ADMINISTRATIVE | Facility: HOSPITAL | Age: 70
End: 2020-07-21

## 2020-07-21 DIAGNOSIS — Z12.39 SCREENING BREAST EXAMINATION: Primary | ICD-10-CM

## 2020-08-17 RX ORDER — LEVOTHYROXINE SODIUM 0.07 MG/1
TABLET ORAL
Qty: 90 TABLET | Refills: 2 | Status: SHIPPED | OUTPATIENT
Start: 2020-08-17 | End: 2021-05-18

## 2020-08-28 ENCOUNTER — HOSPITAL ENCOUNTER (OUTPATIENT)
Dept: MAMMOGRAPHY | Facility: HOSPITAL | Age: 70
Discharge: HOME | End: 2020-08-28
Admitting: NURSE PRACTITIONER

## 2020-08-28 DIAGNOSIS — Z12.39 SCREENING BREAST EXAMINATION: ICD-10-CM

## 2020-08-28 PROCEDURE — 77067 SCR MAMMO BI INCL CAD: CPT

## 2020-08-28 PROCEDURE — 77063 BREAST TOMOSYNTHESIS BI: CPT

## 2020-09-08 ENCOUNTER — APPOINTMENT (OUTPATIENT)
Dept: BONE DENSITY | Facility: HOSPITAL | Age: 70
End: 2020-09-08

## 2020-09-09 ENCOUNTER — TELEPHONE (OUTPATIENT)
Dept: ORTHOPEDIC SURGERY | Facility: CLINIC | Age: 70
End: 2020-09-09

## 2020-09-09 ENCOUNTER — OFFICE VISIT (OUTPATIENT)
Dept: ORTHOPEDIC SURGERY | Facility: CLINIC | Age: 70
End: 2020-09-09

## 2020-09-09 VITALS — TEMPERATURE: 97.6 F | WEIGHT: 160 LBS | HEIGHT: 61 IN | BODY MASS INDEX: 30.21 KG/M2

## 2020-09-09 DIAGNOSIS — M25.512 LEFT SHOULDER PAIN, UNSPECIFIED CHRONICITY: Primary | ICD-10-CM

## 2020-09-09 DIAGNOSIS — M25.512 CHRONIC LEFT SHOULDER PAIN: ICD-10-CM

## 2020-09-09 DIAGNOSIS — G89.29 CHRONIC LEFT SHOULDER PAIN: ICD-10-CM

## 2020-09-09 PROCEDURE — 73030 X-RAY EXAM OF SHOULDER: CPT | Performed by: ORTHOPAEDIC SURGERY

## 2020-09-09 PROCEDURE — 99214 OFFICE O/P EST MOD 30 MIN: CPT | Performed by: ORTHOPAEDIC SURGERY

## 2020-09-09 RX ORDER — ACETAMINOPHEN 325 MG/1
1000 TABLET ORAL ONCE
Status: CANCELLED | OUTPATIENT
Start: 2020-10-22 | End: 2020-09-09

## 2020-09-09 RX ORDER — PREGABALIN 75 MG/1
150 CAPSULE ORAL ONCE
Status: CANCELLED | OUTPATIENT
Start: 2020-10-22 | End: 2020-09-09

## 2020-09-09 RX ORDER — MELOXICAM 15 MG/1
15 TABLET ORAL ONCE
Status: CANCELLED | OUTPATIENT
Start: 2020-10-22 | End: 2020-09-09

## 2020-09-09 RX ORDER — VANCOMYCIN HYDROCHLORIDE 1 G/200ML
15 INJECTION, SOLUTION INTRAVENOUS ONCE
Status: CANCELLED | OUTPATIENT
Start: 2020-10-22 | End: 2020-09-09

## 2020-09-09 RX ORDER — CEFAZOLIN SODIUM 2 G/100ML
2 INJECTION, SOLUTION INTRAVENOUS ONCE
Status: CANCELLED | OUTPATIENT
Start: 2020-10-22 | End: 2020-09-09

## 2020-09-09 NOTE — PROGRESS NOTES
Patient: Kristan Galicia    YOB: 1950    Medical Record Number: [id number]    Chief Complaints: Follow-up regarding left shoulder pain and weakness    History of Present Illness:     70 y.o. female patient who presents for follow-up of the left shoulder.  She reports progressively worsening pain and dysfunction.  Current pain is severe and constant.  She reports difficulty performing tasks at or above shoulder level.  It has been quite sometime since we injected the left shoulder but she says that she does not feel like the last injection helped.  Her right shoulder did great with the arthroplasty.  She wants to pursue an arthroplasty for the left at this point.  She does mention chronic numbness and tingling in her hand.  She modifies how she sleeps to try to minimize the symptoms.  She reports some occasional numbness and tingling shooting down the arm into the hand.  She has a history of carpal tunnel in the left hand with nerve studies done back in 2018.  She tells me the symptoms are manageable.  Her biggest complaint remains the shoulder.    Allergies:   Allergies   Allergen Reactions   • Adhesive Tape Other (See Comments)     SKIN BLISTERS   • Aspirin Swelling   • Biaxin [Clarithromycin] Other (See Comments)     BLISTERS AROUND MOTH  Also known as Bioxen    • Codeine Swelling   • Darvon [Propoxyphene] Swelling   • Levaquin [Levofloxacin] Other (See Comments)     BLISTERS AROUND MOUTH   • Tequin [Gatifloxacin] Other (See Comments)     BLISTERS AROUND MOUTH  Also known Tequine        Home Medications:    Current Outpatient Medications:   •  alendronate (FOSAMAX) 70 MG tablet, TAKE 1 TABLET BY MOUTH EVERY 7 (SEVEN) DAYS., Disp: 12 tablet, Rfl: 3  •  allopurinol (ZYLOPRIM) 100 MG tablet, TAKE 1 TABLET BY MOUTH EVERY DAY, Disp: 90 tablet, Rfl: 1  •  amiodarone (PACERONE) 200 MG tablet, Take 100 mg by mouth Every Night., Disp: , Rfl:   •  atorvastatin (LIPITOR) 80 MG tablet, TAKE 1 TABLET BY MOUTH  EVERY DAY AT NIGHT, Disp: 90 tablet, Rfl: 1  •  baclofen (LIORESAL) 10 MG tablet, Take 10 mg by mouth At Night As Needed for Muscle Spasms., Disp: , Rfl:   •  benzonatate (TESSALON PERLES) 100 MG capsule, Take 1 capsule by mouth 3 (Three) Times a Day As Needed for Cough., Disp: 30 capsule, Rfl: 0  •  Blood Glucose Monitoring Suppl (ACCU-CHEK OTILIA PLUS) W/DEVICE kit, Dx e11.9 use to check BS BID, ok to substitute formulary preferred, Disp: 1 kit, Rfl: 0  •  carvedilol (COREG) 12.5 MG tablet, Take 12.5 mg by mouth 2 (Two) Times a Day., Disp: , Rfl:   •  dabigatran etexilate (PRADAXA) 150 MG capsu, Take 150 mg by mouth 2 (Two) Times a Day. TO HOLD 5 DAYS PRIOR TO OR PER CARDIO, Disp: , Rfl:   •  diclofenac (FLECTOR) 1.3 % patch patch, Apply 1 patch topically to the appropriate area as directed Every 12 (Twelve) Hours., Disp: 30 patch, Rfl: 5  •  doxycycline (VIBRAMYICN) 100 MG tablet, Take 1 tablet by mouth 2 (Two) Times a Day., Disp: 20 tablet, Rfl: 0  •  DULoxetine (CYMBALTA) 60 MG capsule, Take 1 capsule by mouth Daily., Disp: 90 capsule, Rfl: 3  •  glucose blood (ACCU-CHEK OTILIA PLUS) test strip, Dx e11.9 use to check BS BID, ok to substitute formulary preferred, Disp: 60 each, Rfl: 11  •  HYDROcodone-acetaminophen (NORCO)  MG per tablet, Take 1-2 tablets by mouth Every 4 (Four) Hours As Needed for Moderate Pain ., Disp: 36 tablet, Rfl: 0  •  Lancets (ACCU-CHEK MULTICLIX) lancets, Dx e11.9 use to check BS BID, ok to substitute formulary preferred, Disp: 60 each, Rfl: 11  •  levothyroxine (SYNTHROID) 88 MCG tablet, Take 1 tablet by mouth Daily., Disp: 90 tablet, Rfl: 2  •  levothyroxine (SYNTHROID, LEVOTHROID) 75 MCG tablet, TAKE 1 TABLET BY MOUTH EVERY DAY, Disp: 90 tablet, Rfl: 2  •  montelukast (SINGULAIR) 10 MG tablet, TAKE 1 TABLET BY MOUTH EVERY DAY, Disp: 90 tablet, Rfl: 1  •  pantoprazole (PROTONIX) 40 MG EC tablet, TAKE 1 TABLET BY MOUTH EVERY DAY, Disp: 90 tablet, Rfl: 2  •  Semaglutide,0.25 or  0.5MG/DOS, (Ozempic, 0.25 or 0.5 MG/DOSE,) 2 MG/1.5ML solution pen-injector, Inject 0.5 mg under the skin into the appropriate area as directed 1 (One) Time Per Week., Disp: 4 pen, Rfl: 11  •  sennosides-docusate sodium (SENOKOT-S) 8.6-50 MG tablet, Take 2 tablets by mouth 2 (Two) Times a Day., Disp: , Rfl:   •  spironolactone (ALDACTONE) 25 MG tablet, TAKE 1 TABLET BY MOUTH EVERY DAY, Disp: , Rfl:   •  spironolactone-hydrochlorothiazide (ALDACTAZIDE) 25-25 MG tablet, TAKE 1 TABLET BY MOUTH EVERY DAY, Disp: 90 tablet, Rfl: 1  •  vitamin D (ERGOCALCIFEROL) 1.25 MG (16712 UT) capsule capsule, TAKE 1 CAPSULE BY MOUTH 1 (ONE) TIME PER WEEK., Disp: 4 capsule, Rfl: 0  •  docusate sodium (COLACE) 100 MG capsule, TAKE 1 CAPSULE BY MOUTH TWICE A DAY, Disp: 60 capsule, Rfl: 2    Past Medical History:   Diagnosis Date   • Anemia    • Arthritis    • Atrial fibrillation (CMS/HCC)    • Cardiac defibrillator in place 01/2009    [employer]    • Cardiomyopathy (CMS/HCC)     Hypertrophic Cardiomyopathy   • CHF (congestive heart failure) (CMS/HCC)    • Chronic pain    • Coronary artery disease    • Diabetes mellitus (CMS/HCC)     Type II   • Diverticulosis     hx diverticulitis   • GERD (gastroesophageal reflux disease)    • Heart murmur    • History of depression    • History of tumor     left ocular tumor, treated with steroids   • Hyperlipidemia    • Hypertension    • Migraines    • On anticoagulant therapy    • Osteoporosis    • Palpitations    • Sleep apnea     does not use CPAP or BiPAP, used nose strips   • Thyroid disease    • Vasovagal syncope    • Vertigo        Past Surgical History:   Procedure Laterality Date   • ANTERIOR CERVICAL DISCECTOMY W/ FUSION  2012    C5-7   • APPENDECTOMY  1970   • AUGMENTATION MAMMAPLASTY     • BILATERAL BREAST REDUCTION     • BREAST BIOPSY     • CARDIAC CATHETERIZATION  02/19/2007   • CARDIAC DEFIBRILLATOR PLACEMENT Left 1999, 2013    Dr. Ya   • CATARACT EXTRACTION Bilateral    •  COLONOSCOPY      Dr. Herbert Gonsales    • COLONOSCOPY  2016    Procedure: COLONOSCOPY with cecum;  Surgeon: Rosalio Sheikh MD;  Location: [facility] ENDOSCOPY;  Service:    • ENDOSCOPY N/A 2017    Procedure: ESOPHAGOGASTRODUODENOSCOPY WITH BIOPSIES;  Surgeon: Rosalio Sheikh MD;  Location: [facility] ENDOSCOPY;  Service:    • FEMUR SURGERY Left     with metal implant  x3   • FIRST RIB RESECTION Bilateral     and scalenectomy   • HYSTERECTOMY  1989    Total   • KNEE ARTHROSCOPY Bilateral     Dr. Li   • KNEE SURGERY Right 06/10/2010   • LAPAROSCOPIC CHOLECYSTECTOMY  1974   • OOPHORECTOMY     • ROTATOR CUFF REPAIR Right 06/10/2010   • SHOULDER ARTHROSCOPY W/ ROTATOR CUFF REPAIR Bilateral 2011   • SHOULDER SURGERY Right    • TONSILLECTOMY     • TOTAL KNEE ARTHROPLASTY Right 2018    Procedure: RIGHT TOTAL KNEE ARTHROPLASTY WITH STEVE NAVIGATION;  Surgeon: Bravo Pascual MD;  Location: [facility] OR;  Service: Orthopedics   • TOTAL SHOULDER ARTHROPLASTY W/ DISTAL CLAVICLE EXCISION Right 2017    Procedure: TOTAL SHOULDER REVERSE ARTHROPLASTY;  Surgeon: Juan Antonio Anne MD;  Location: [facility] MAIN OR;  Service:        Social History     Occupational History   • Occupation:    Tobacco Use   • Smoking status: Former Smoker     Packs/day: 0.50     Years: 17.00     Pack years: 8.50     Types: Cigarettes     Last attempt to quit: 1985     Years since quittin.0   • Smokeless tobacco: Never Used   Substance and Sexual Activity   • Alcohol use: No   • Drug use: No   • Sexual activity: Defer      Social History     Social History Narrative   • Not on file       Family History   Problem Relation Age of Onset   • Heart attack Brother    • Heart disease Brother    • Hyperthyroidism Mother    • Cancer Mother    • Heart disease Mother    • Osteoporosis Mother    • No Known Problems Father          car accident   • Cirrhosis Maternal Grandmother    • No Known Problems  "Maternal Grandfather    • No Known Problems Paternal Grandmother    • No Known Problems Paternal Grandfather    • Breast cancer Maternal Aunt    • Malig Hyperthermia Neg Hx        Review of Systems:      Constitutional: Denies fever, shaking or chills   Eyes: Denies change in visual acuity   HEENT: Denies nasal congestion or sore throat   Respiratory: Denies cough or shortness of breath   Cardiovascular: Denies chest pain or edema  Endocrine: Denies tremors, palpitations, intolerance of heat or cold, polyuria, polydipsia.  GI: Denies abdominal pain, nausea, vomiting, bloody stools or diarrhea  : Denies frequency, urgency, incontinence, retention, or nocturia.  Musculoskeletal: Denies numbness, tingling or loss of motor function except as above  Integument: Denies rash, lesion or ulceration   Neurologic: Denies headache or focal weakness, deficits  Heme: Denies spontaneous or excessive bleeding, epistaxis, hematuria, melena, fatigue, enlarged or tender lymph nodes.      All other pertinent positives and negatives as noted above in HPI.    Physical Exam:   70 y.o. female  Vitals:    09/09/20 1124   Temp: 97.6 °F (36.4 °C)   Weight: 72.6 kg (160 lb)   Height: 154.9 cm (61\")     General:  Patient is awake and alert.  Appears in no acute distress or discomfort.    Psych:  Affect and demeanor are appropriate.    Eyes:  Conjunctiva and sclera appear grossly normal.  Eyes track well and EOM seem to be intact.    Ears:  No gross abnormalities.  Hearing adequate for the exam.    Cardiovascular:  Regular rate and rhythm.    Lungs:  Good chest expansion.  Breathing unlabored.    Extremities:  Left shoulder is examined.  Skin is benign.  Moderate anterior tenderness.  Passive motion remains good.  The patient can maintain her arm in an elevated position when I raise it.  Active motion is limited and uncomfortable.  Negative Hornblower's.  Intact deltoid function and axillary nerve sensation.  Normal motor function in her lower " arm and hand.  She has diminished sensation in the median nerve distribution and a positive Phalen's over the carpal tunnel.  Palpable radial pulse.  Brisk capillary refill.    Imaging: AP, scapular Y and axillary views of the left shoulder are ordered, reviewed, and compared to previous x-rays from 2017.  She has a retained metal anchor consistent with her surgical history of rotator cuff repair.  There has been diminution of the acromiohumeral interval relative to her previous films.  Currently, her AHI measures less than 4 mm.  Previously this was normal.  She has what appears to be moderate associated cuff tear arthropathy.  This has progressed relative to previous films.  Incidental note of retained defibrillator.    Assessment/Plan:   1.  Left rotator cuff tear arthropathy 2.  Left carpal tunnel syndrome    I did go back and review her previous nerve studies of the left upper extremity.  The studies do confirm carpal tunnel syndrome.  We talked about options.  I would recommend addressing this as a separate issue.  At this point I consider that an injection is reasonable.  It is also reasonable to consider surgery given the persistent nature of her complaint.  She feels like the carpal tunnel is a much more minor issue.  The shoulder is bothering her much more and she would prefer to get that addressed first.  For her shoulder, we discussed the natural history of this condition and all available treatment options, both surgical and nonsurgical.  She had a good understanding of this information having previously been through the right reverse total shoulder.  The risk, benefits and alternatives to a left reverse total shoulder arthroplasty were carefully discussed.  All questions posed by the patient were answered in detail.  She wants to move forward with an arthroplasty.  We will look at getting this scheduled.  I will have her follow-up with either myself or Orin for a preoperative visit.  She will need  clearance from her cardiologist.  She is going to need permission to come off the Pradaxa as well.    Juan Antonio Anne MD  09/09/2020

## 2020-09-09 NOTE — TELEPHONE ENCOUNTER
Left a message for Dr. Ya's office to find out if there are any special precautions that need to be taken with her defibrillator intraoperatively.  Patient is to be scheduled for a left reverse total shoulder and incision will be made in the anterior aspect of the shoulder

## 2020-09-09 NOTE — TELEPHONE ENCOUNTER
Received a call back from Dr. Ya's office.  In the office informed me that his recommended precautions are to make sure that the defibrillator is turned off at the time of surgery, patient is monitored continuously, and to avoid contact with the device and the leads during surgery.  Have advised the office that the defibrillator will be turned off and the patient will be monitored continuously during surgery.  The office is aware that the incision would be an anterior approach and a Bovie would be used during surgery.  Patient is scheduled to see Dr. Ya on October 15 for cardiac clearance.

## 2020-09-10 ENCOUNTER — HOSPITAL ENCOUNTER (OUTPATIENT)
Dept: BONE DENSITY | Facility: HOSPITAL | Age: 70
Discharge: HOME | End: 2020-09-10
Admitting: NURSE PRACTITIONER

## 2020-09-10 DIAGNOSIS — Z87.81 S/P LEFT HIP FRACTURE: ICD-10-CM

## 2020-09-10 DIAGNOSIS — M81.0 OSTEOPOROSIS, UNSPECIFIED OSTEOPOROSIS TYPE, UNSPECIFIED PATHOLOGICAL FRACTURE PRESENCE: ICD-10-CM

## 2020-09-10 PROCEDURE — 77080 DXA BONE DENSITY AXIAL: CPT

## 2020-09-10 NOTE — TELEPHONE ENCOUNTER
I have talked to cardiology in more detail about this patient's defibrillator.  Will discuss with [facility] tomorrow in the office

## 2020-10-07 ENCOUNTER — TRANSCRIBE ORDERS (OUTPATIENT)
Dept: PREADMISSION TESTING | Facility: HOSPITAL | Age: 70
End: 2020-10-07

## 2020-10-07 DIAGNOSIS — Z01.818 OTHER SPECIFIED PRE-OPERATIVE EXAMINATION: Primary | ICD-10-CM

## 2020-10-12 ENCOUNTER — APPOINTMENT (OUTPATIENT)
Dept: PREADMISSION TESTING | Facility: HOSPITAL | Age: 70
End: 2020-10-12

## 2020-10-15 ENCOUNTER — APPOINTMENT (OUTPATIENT)
Dept: PREADMISSION TESTING | Facility: HOSPITAL | Age: 70
End: 2020-10-15

## 2020-10-15 VITALS
HEART RATE: 78 BPM | HEIGHT: 61 IN | OXYGEN SATURATION: 98 % | WEIGHT: 168 LBS | RESPIRATION RATE: 20 BRPM | TEMPERATURE: 97.9 F | BODY MASS INDEX: 31.72 KG/M2 | DIASTOLIC BLOOD PRESSURE: 69 MMHG | SYSTOLIC BLOOD PRESSURE: 106 MMHG

## 2020-10-15 DIAGNOSIS — M25.512 LEFT SHOULDER PAIN, UNSPECIFIED CHRONICITY: ICD-10-CM

## 2020-10-15 LAB
ANION GAP SERPL CALCULATED.3IONS-SCNC: 11 MMOL/L (ref 5–15)
BACTERIA UR QL AUTO: ABNORMAL /HPF
BILIRUB UR QL STRIP: NEGATIVE
BUN SERPL-MCNC: 14 MG/DL (ref 8–23)
BUN/CREAT SERPL: 12.1 (ref 7–25)
CALCIUM SPEC-SCNC: 9.3 MG/DL (ref 8.6–10.5)
CHLORIDE SERPL-SCNC: 105 MMOL/L (ref 98–107)
CLARITY UR: CLEAR
CO2 SERPL-SCNC: 25 MMOL/L (ref 22–29)
COLOR UR: YELLOW
CREAT SERPL-MCNC: 1.16 MG/DL (ref 0.57–1)
DEPRECATED RDW RBC AUTO: 41.8 FL (ref 37–54)
ERYTHROCYTE [DISTWIDTH] IN BLOOD BY AUTOMATED COUNT: 12.8 % (ref 12.3–15.4)
GFR SERPL CREATININE-BSD FRML MDRD: 46 ML/MIN/1.73
GLUCOSE SERPL-MCNC: 142 MG/DL (ref 65–99)
GLUCOSE UR STRIP-MCNC: NEGATIVE MG/DL
HCT VFR BLD AUTO: 40.2 % (ref 34–46.6)
HGB BLD-MCNC: 13.4 G/DL (ref 12–15.9)
HGB UR QL STRIP.AUTO: NEGATIVE
HYALINE CASTS UR QL AUTO: ABNORMAL /LPF
KETONES UR QL STRIP: NEGATIVE
LEUKOCYTE ESTERASE UR QL STRIP.AUTO: ABNORMAL
MCH RBC QN AUTO: 29.8 PG (ref 26.6–33)
MCHC RBC AUTO-ENTMCNC: 33.3 G/DL (ref 31.5–35.7)
MCV RBC AUTO: 89.3 FL (ref 79–97)
NITRITE UR QL STRIP: NEGATIVE
PH UR STRIP.AUTO: <=5 [PH] (ref 5–8)
PLATELET # BLD AUTO: 232 10*3/MM3 (ref 140–450)
PMV BLD AUTO: 9 FL (ref 6–12)
POTASSIUM SERPL-SCNC: 4 MMOL/L (ref 3.5–5.2)
PROT UR QL STRIP: NEGATIVE
RBC # BLD AUTO: 4.5 10*6/MM3 (ref 3.77–5.28)
RBC # UR: ABNORMAL /HPF
REF LAB TEST METHOD: ABNORMAL
SODIUM SERPL-SCNC: 141 MMOL/L (ref 136–145)
SP GR UR STRIP: 1.03 (ref 1–1.03)
SQUAMOUS #/AREA URNS HPF: ABNORMAL /HPF
UROBILINOGEN UR QL STRIP: ABNORMAL
WBC # BLD AUTO: 6.9 10*3/MM3 (ref 3.4–10.8)
WBC UR QL AUTO: ABNORMAL /HPF

## 2020-10-15 PROCEDURE — 80048 BASIC METABOLIC PNL TOTAL CA: CPT | Performed by: ORTHOPAEDIC SURGERY

## 2020-10-15 PROCEDURE — 80061 LIPID PANEL: CPT | Performed by: ORTHOPAEDIC SURGERY

## 2020-10-15 PROCEDURE — 84443 ASSAY THYROID STIM HORMONE: CPT | Performed by: ORTHOPAEDIC SURGERY

## 2020-10-15 PROCEDURE — 84550 ASSAY OF BLOOD/URIC ACID: CPT | Performed by: ORTHOPAEDIC SURGERY

## 2020-10-15 PROCEDURE — 36415 COLL VENOUS BLD VENIPUNCTURE: CPT

## 2020-10-15 PROCEDURE — A9270 NON-COVERED ITEM OR SERVICE: HCPCS | Performed by: ORTHOPAEDIC SURGERY

## 2020-10-15 PROCEDURE — 85027 COMPLETE CBC AUTOMATED: CPT | Performed by: ORTHOPAEDIC SURGERY

## 2020-10-15 PROCEDURE — 63710000001 MUPIROCIN 2 % OINTMENT: Performed by: ORTHOPAEDIC SURGERY

## 2020-10-15 PROCEDURE — 81001 URINALYSIS AUTO W/SCOPE: CPT | Performed by: ORTHOPAEDIC SURGERY

## 2020-10-15 RX ORDER — CHLORHEXIDINE GLUCONATE 500 MG/1
1 CLOTH TOPICAL
COMMUNITY
End: 2020-10-23 | Stop reason: HOSPADM

## 2020-10-15 NOTE — DISCHARGE INSTRUCTIONS
Take the following medications the morning of surgery:    Levothyroxine    Pantoprazole   carvedilol    If you are on prescription narcotic pain medication to control your pain you may also take that medication the morning of surgery.    General Instructions:  • Do not eat solid food after midnight the night before surgery.  • You may drink clear liquids day of surgery but must stop at least one hour before your hospital arrival time.  • It is beneficial for you to have a clear drink that contains carbohydrates the day of surgery.  We suggest a 12 to 20 ounce bottle of Gatorade or Powerade for non-diabetic patients or a 12 to 20 ounce bottle of G2 or Powerade Zero for diabetic patients. (Pediatric patients, are not advised to drink a 12 to 20 ounce carbohydrate drink)    Clear liquids are liquids you can see through.  Nothing red in color.     Plain water                               Sports drinks  Sodas                                   Gelatin (Jell-O)  Fruit juices without pulp such as white grape juice and apple juice  Popsicles that contain no fruit or yogurt  Tea or coffee (no cream or milk added)  Gatorade / Powerade  G2 / Powerade Zero    • Infants may have breast milk up to four hours before surgery.  • Infants drinking formula may drink formula up to six hours before surgery.   • Patients who avoid smoking, chewing tobacco and alcohol for 4 weeks prior to surgery have a reduced risk of post-operative complications.  Quit smoking as many days before surgery as you can.  • Do not smoke, use chewing tobacco or drink alcohol the day of surgery.   • If applicable bring your C-PAP/ BI-PAP machine.  • Bring any papers given to you in the doctor’s office.  • Wear clean comfortable clothes.  • Do not wear contact lenses, false eyelashes or make-up.  Bring a case for your glasses.   • Bring crutches or walker if applicable.  • Remove all piercings.  Leave jewelry and any other valuables at home.  • Hair extensions  with metal clips must be removed prior to surgery.  • The Pre-Admission Testing nurse will instruct you to bring medications if unable to obtain an accurate list in Pre-Admission Testing.        If you were given a blood bank ID arm band remember to bring it with you the day of surgery.    Preventing a Surgical Site Infection:  • For 2 to 3 days before surgery, avoid shaving with a razor because the razor can irritate skin and make it easier to develop an infection.    • Any areas of open skin can increase the risk of a post-operative wound infection by allowing bacteria to enter and travel throughout the body.  Notify your surgeon if you have any skin wounds / rashes even if it is not near the expected surgical site.  The area will need assessed to determine if surgery should be delayed until it is healed.  • The night prior to surgery shower using a fresh bar of anti-bacterial soap (such as Dial) and clean washcloth.  Sleep in a clean bed with clean clothing.  Do not allow pets to sleep with you.  • Shower on the morning of surgery using a fresh bar of anti-bacterial soap (such as Dial) and clean washcloth.  Dry with a clean towel and dress in clean clothing.  • Ask your surgeon if you will be receiving antibiotics prior to surgery.  • Make sure you, your family, and all healthcare providers clean their hands with soap and water or an alcohol based hand  before caring for you or your wound.    Day of surgery:10/22/2020   0515  Your arrival time is approximately two hours before your scheduled surgery time.  Upon arrival, a Pre-op nurse and Anesthesiologist will review your health history, obtain vital signs, and answer questions you may have.  The only belongings needed at this time will be a list of your home medications and if applicable your C-PAP/BI-PAP machine.  If you are staying overnight your family can leave the rest of your belongings in the car and bring them to your room later.  A Pre-op nurse  will start an IV and you may receive medication in preparation for surgery, including something to help you relax.  While you are in surgery your family should notify the waiting room  if they leave the waiting room area and provide a contact phone number.    Please be aware that surgery does come with discomfort.  We want to make every effort to control your discomfort so please discuss any uncontrolled symptoms with your nurse.   Your doctor will most likely have prescribed pain medications.      If you are going home after surgery you will receive individualized written care instructions before being discharged.  A responsible adult must drive you to and from the hospital on the day of your surgery and stay with you for 24 hours.    If you are staying overnight following surgery, you will be transported to your hospital room following the recovery period.  [facility] has all private rooms.    If you have any questions please call Pre-Admission Testing at [phone].  Deductibles and co-payments are collected on the day of service. Please be prepared to pay the required co-pay, deductible or deposit on the day of service as defined by your plan.    Patient Education for Self-Quarantine Process    Following your COVID testing, we strongly recommend that you do not leave your home after you have been tested for COVID except to get medical care. This includes not going to work, school or to public areas.  If this is not possible for you to do please limit your activities to only required outings.  Be sure to wear a mask when you are with other people, practice social distancing and wash your hands frequently.      The following items provide additional details to keep you safe.  • Wash your hands with soap and water frequently for at least 20 seconds.   • Avoid touching your eyes, nose and mouth with unwashed hands.  • Do not share anything - utensils, towels, food from the same bowl.    • Have your own utensils, drinking glass, dishes, towels and bedding.   • Do not have visitors.   • Do use FaceTime to stay in touch with family and friends.  • You should stay in a specific room away from others if possible.   • Stay at least 6 feet away from others in the home if you cannot have a dedicated room to yourself.   • Do not snuggle with your pet. While the CDC says there is no evidence that pets can spread COVID-19 or be infected from humans, it is probably best to avoid “petting, snuggling, being kissed or licked and sharing food (during self-quarantine)”, according to the CDC.   • Sanitize household surfaces daily. Include all high touch areas (door handles, light switches, phones, countertops, etc.)  • Do not share a bathroom with others, if possible.   • Wear a mask around others in your home if you are unable to stay in a separate room or 6 feet apart. If  you are unable to wear a mask, have your family member wear a mask if they must be within 6 feet of you.   Call your surgeon immediately if you experience any of the following symptoms:  • Sore Throat  • Shortness of Breath or difficulty breathing  • Cough  • Chills  • Body soreness or muscle pain  • Headache  • Fever  • New loss of taste or smell  • Do not arrive for your surgery ill.  Your procedure will need to be rescheduled to another time.  You will need to call your physician before the day of surgery to avoid any unnecessary exposure to hospital staff as well as other patients.    CHLORHEXIDINE CLOTH INSTRUCTIONS  The morning of surgery follow these instructions using the Chlorhexidine cloths you've been given.  These steps reduce bacteria on the body.  Do not use the cloths near your eyes, ears mouth, genitalia or on open wounds.  Throw the cloths away after use but do not try to flush them down a toilet.      • Open and remove one cloth at a time from the package.    • Leave the cloth unfolded and begin the bathing.  • Massage the  skin with the cloths using gentle pressure to remove bacteria.  Do not scrub harshly.   • Follow the steps below with one 2% CHG cloth per area (6 total cloths).  • One cloth for neck, shoulders and chest.  • One cloth for both arms, hands, fingers and underarms (do underarms last).  • One cloth for the abdomen followed by groin.  • One cloth for right leg and foot including between the toes.  • One cloth for left leg and foot including between the toes.  • The last cloth is to be used for the back of the neck, back and buttocks.    Allow the CHG to air dry 3 minutes on the skin which will give it time to work and decrease the chance of irritation.  The skin may feel sticky until it is dry.  Do not rinse with water or any other liquid or you will lose the beneficial effects of the CHG.  If mild skin irritation occurs, do rinse the skin to remove the CHG.  Report this to the nurse at time of admission.  Do not apply lotions, creams, ointments, deodorants or perfumes after using the clothes. Dress in clean clothes before coming to the hospital.    BACTROBAN NASAL OINTMENT  There are many germs normally in your nose. Bactroban is an ointment that will help reduce these germs. Please follow these instructions for Bactroban use:      _1___The day before surgery in the morning  Date_10/21/2020_______    _2___The day before surgery in the evening              Date_10/21/2020_______    _3___The day of surgery in the morning    Date_10/22/2020_______    **Squirt ½ package of Bactroban Ointment onto a cotton applicator and apply to inside of 1st nostril.  Squirt the remaining Bactroban and apply to the inside of the other nostril.    PERIDEX- ORAL:  Use only if your surgeon has ordered  Use the night before and morning of surgery - Swish, gargle, and spit - do not swallow.

## 2020-10-16 ENCOUNTER — OFFICE VISIT (OUTPATIENT)
Dept: FAMILY MEDICINE CLINIC | Facility: CLINIC | Age: 70
End: 2020-10-16

## 2020-10-16 VITALS
DIASTOLIC BLOOD PRESSURE: 68 MMHG | HEART RATE: 61 BPM | TEMPERATURE: 97.5 F | BODY MASS INDEX: 31.34 KG/M2 | WEIGHT: 166 LBS | RESPIRATION RATE: 18 BRPM | HEIGHT: 61 IN | SYSTOLIC BLOOD PRESSURE: 110 MMHG | OXYGEN SATURATION: 97 %

## 2020-10-16 DIAGNOSIS — E66.09 CLASS 1 OBESITY DUE TO EXCESS CALORIES WITH SERIOUS COMORBIDITY AND BODY MASS INDEX (BMI) OF 31.0 TO 31.9 IN ADULT: ICD-10-CM

## 2020-10-16 DIAGNOSIS — M1A.9XX0 CHRONIC GOUT INVOLVING TOE OF LEFT FOOT WITHOUT TOPHUS, UNSPECIFIED CAUSE: ICD-10-CM

## 2020-10-16 DIAGNOSIS — E03.9 HYPOTHYROIDISM, UNSPECIFIED TYPE: ICD-10-CM

## 2020-10-16 DIAGNOSIS — I50.9 CONGESTIVE HEART FAILURE, UNSPECIFIED HF CHRONICITY, UNSPECIFIED HEART FAILURE TYPE (HCC): ICD-10-CM

## 2020-10-16 DIAGNOSIS — J30.2 SEASONAL ALLERGIC RHINITIS, UNSPECIFIED TRIGGER: ICD-10-CM

## 2020-10-16 DIAGNOSIS — Z79.4 TYPE 2 DIABETES MELLITUS WITH HYPERGLYCEMIA, WITH LONG-TERM CURRENT USE OF INSULIN (HCC): Primary | ICD-10-CM

## 2020-10-16 DIAGNOSIS — E11.65 TYPE 2 DIABETES MELLITUS WITH HYPERGLYCEMIA, WITH LONG-TERM CURRENT USE OF INSULIN (HCC): Primary | ICD-10-CM

## 2020-10-16 DIAGNOSIS — E78.2 MIXED HYPERLIPIDEMIA: ICD-10-CM

## 2020-10-16 LAB
ALBUMIN UR-MCNC: 20 MG/L (ref 0–20)
URATE SERPL-MCNC: 4.4 MG/DL (ref 2.4–5.7)

## 2020-10-16 PROCEDURE — 99214 OFFICE O/P EST MOD 30 MIN: CPT | Performed by: NURSE PRACTITIONER

## 2020-10-16 PROCEDURE — 82043 UR ALBUMIN QUANTITATIVE: CPT | Performed by: NURSE PRACTITIONER

## 2020-10-16 RX ORDER — ATORVASTATIN CALCIUM 80 MG/1
80 TABLET, FILM COATED ORAL
Qty: 90 TABLET | Refills: 3 | Status: SHIPPED | OUTPATIENT
Start: 2020-10-16 | End: 2021-04-20 | Stop reason: SDUPTHER

## 2020-10-16 RX ORDER — MONTELUKAST SODIUM 10 MG/1
10 TABLET ORAL DAILY
Qty: 90 TABLET | Refills: 3 | Status: SHIPPED | OUTPATIENT
Start: 2020-10-16

## 2020-10-16 RX ORDER — ALLOPURINOL 100 MG/1
100 TABLET ORAL DAILY
Qty: 90 TABLET | Refills: 3 | Status: SHIPPED | OUTPATIENT
Start: 2020-10-16

## 2020-10-16 RX ORDER — DULOXETIN HYDROCHLORIDE 60 MG/1
60 CAPSULE, DELAYED RELEASE ORAL DAILY
Qty: 90 CAPSULE | Refills: 3 | Status: SHIPPED | OUTPATIENT
Start: 2020-10-16

## 2020-10-16 NOTE — PROGRESS NOTES
Subjective   Kristan Galicia is a 70 y.o. female.     History of Present Illness   Here to FU on chronic DM2 well controlled on ozempic 1 mg daily last A1C 6.9 02/20, was prev on metformin GI upset, no longer taking glipizide and januvia, working on healthy diet and exercise, checking BS 100s, no feet pain, last eye exam 6 mo ago and pending FU next mo to monitor cataract, With chronic chol fasting for labs on lipitor 80 mg HS with last chol 02/20 well controlled, with chronic hypothyroid on levothyroxine 75 mcg daily decreased from 88 mcg with last TSH abnormal 02/20 here for recheck, with chronic allergies stable on singulair  With gout L toe no recent flairs on allopurinol 100 mg daily with last uric acid 3.3 06/19, prev saw Dr [facility] s/t cervical fracture C1-2-3 09/18, with last dexa scan 09/20 improved from osteoporosis to osteopenia on fosamax 70 mg weekly, with chronic back pain s/p radiofreq procedure last year no longer with pain, on cymbalta 60 mg daily no longer using hydrocodone or gabapentin except for once weekly with weather change sees PM and has flector patches at home, pending L shoulder sgy with Dr Anne next week recently saw cardiology Christopher Zarate yesterday for pre op clearance with chronic CHF, HTN and PAF on amiodarone 200 mg, coreg 12.5 mg BID, spironolactone-HCTZ 25/25 mg daily, and pradaxa free from company, with defibrillator, No CP dizziness, palpitations, HA  Last colonoscopy 08/16 Dr Sheikh no colon sc, last mammo 08/20 NAD no breast pain lumps nipple dc    The following portions of the patient's history were reviewed and updated as appropriate: allergies, current medications, past family history, past medical history, past social history, past surgical history and problem list.    Review of Systems   Constitutional: Negative for fever.   Respiratory: Negative for cough, shortness of breath and wheezing.    Cardiovascular: Negative for chest pain, palpitations and leg  swelling.   Gastrointestinal: Negative for abdominal distention, abdominal pain, anal bleeding, blood in stool, constipation, diarrhea, nausea, rectal pain and vomiting.   Endocrine: Negative for cold intolerance, heat intolerance, polydipsia, polyphagia and polyuria.   Musculoskeletal: Positive for arthralgias, back pain, myalgias, neck pain and neck stiffness. Negative for gait problem and joint swelling.   Allergic/Immunologic: Positive for environmental allergies.   Neurological: Negative for dizziness and headaches.   Psychiatric/Behavioral: Negative for agitation, behavioral problems, confusion, decreased concentration, dysphoric mood, hallucinations, self-injury, sleep disturbance and suicidal ideas. The patient is not nervous/anxious and is not hyperactive.    All other systems reviewed and are negative.      Objective   Physical Exam  Vitals signs reviewed.   Constitutional:       Appearance: She is well-developed.   HENT:      Head: Normocephalic and atraumatic.   Eyes:      Conjunctiva/sclera: Conjunctivae normal.      Pupils: Pupils are equal, round, and reactive to light.   Neck:      Musculoskeletal: Normal range of motion.      Thyroid: No thyromegaly.   Cardiovascular:      Rate and Rhythm: Normal rate and regular rhythm.      Heart sounds: Normal heart sounds.   Pulmonary:      Effort: Pulmonary effort is normal.      Breath sounds: Normal breath sounds.   Abdominal:      General: There is no distension.      Palpations: Abdomen is soft. There is no mass.      Tenderness: There is no abdominal tenderness. There is no right CVA tenderness, left CVA tenderness, guarding or rebound.      Hernia: No hernia is present.   Musculoskeletal: Normal range of motion.         General: No swelling (B LE).   Skin:     General: Skin is warm and dry.   Neurological:      Mental Status: She is alert and oriented to person, place, and time.   Psychiatric:         Mood and Affect: Mood normal.         Behavior:  Behavior normal.         Thought Content: Thought content normal.         Judgment: Judgment normal.         Assessment/Plan   Diagnoses and all orders for this visit:    1. Type 2 diabetes mellitus with hyperglycemia, with long-term current use of insulin (CMS/[facility]) (Primary)  -     Lipid Panel  -     Hemoglobin A1c  -     MicroAlbumin, Urine, Random - Urine, Clean Catch    2. Hypothyroidism, unspecified type  -     TSH    3. Mixed hyperlipidemia  -     Lipid Panel    4. Chronic gout involving 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, unspecified cause  -     Uric Acid    5. Congestive heart failure, unspecified HF chronicity, unspecified heart failure type (CMS/[facility])    6. Class 1 obesity due to excess calories with serious comorbidity and body mass index (BMI) of 31.0 to 31.9 in adult    7. Seasonal allergic rhinitis, unspecified trigger    Other orders  -     allopurinol (ZYLOPRIM) 100 MG tablet; Take 1 tablet by mouth Daily.  Dispense: 90 tablet; Refill: 3  -     atorvastatin (LIPITOR) 80 MG tablet; Take 1 tablet by mouth every night at bedtime.  Dispense: 90 tablet; Refill: 3  -     montelukast (SINGULAIR) 10 MG tablet; Take 1 tablet by mouth Daily.  Dispense: 90 tablet; Refill: 3  -     DULoxetine (CYMBALTA) 60 MG capsule; Take 1 capsule by mouth Daily.  Dispense: 90 capsule; Refill: 3    check labs and call with results, refill chronic dz meds, check BP and BS at home, cont healthy diet and exercise, pending FU with Dr Anne for shoulder surgery

## 2020-10-16 NOTE — PATIENT INSTRUCTIONS
check labs and call with results, refill chronic dz meds, check BP and BS at home, cont healthy diet and exercise, pending FU with Dr Anne for shoulder surgery

## 2020-10-17 LAB
CHOLEST SERPL-MCNC: 127 MG/DL (ref 0–200)
HDLC SERPL-MCNC: 40 MG/DL (ref 40–60)
LDLC SERPL CALC-MCNC: 46 MG/DL (ref 0–100)
LDLC/HDLC SERPL: 0.86 {RATIO}
TRIGL SERPL-MCNC: 264 MG/DL (ref 0–150)
TSH SERPL DL<=0.05 MIU/L-ACNC: 1.69 UIU/ML (ref 0.27–4.2)
VLDLC SERPL-MCNC: 41 MG/DL (ref 5–40)

## 2020-10-20 ENCOUNTER — LAB (OUTPATIENT)
Dept: LAB | Facility: HOSPITAL | Age: 70
End: 2020-10-20

## 2020-10-20 DIAGNOSIS — Z01.818 OTHER SPECIFIED PRE-OPERATIVE EXAMINATION: ICD-10-CM

## 2020-10-20 PROCEDURE — U0004 COV-19 TEST NON-CDC HGH THRU: HCPCS | Performed by: INTERNAL MEDICINE

## 2020-10-20 PROCEDURE — C9803 HOPD COVID-19 SPEC COLLECT: HCPCS

## 2020-10-21 ENCOUNTER — OFFICE VISIT (OUTPATIENT)
Dept: ORTHOPEDIC SURGERY | Facility: CLINIC | Age: 70
End: 2020-10-21

## 2020-10-21 VITALS — WEIGHT: 165.6 LBS | TEMPERATURE: 97.5 F | BODY MASS INDEX: 31.26 KG/M2 | HEIGHT: 61 IN

## 2020-10-21 DIAGNOSIS — Z01.818 PREOP EXAMINATION: Primary | ICD-10-CM

## 2020-10-21 LAB — SARS-COV-2 RNA RESP QL NAA+PROBE: NOT DETECTED

## 2020-10-21 PROCEDURE — S0260 H&P FOR SURGERY: HCPCS | Performed by: NURSE PRACTITIONER

## 2020-10-21 NOTE — PROGRESS NOTES
History & Physical         Patient: Kristan Galicia    YOB: 1950    Medical Record Number: [id number]    Chief Complaints:   Chief Complaint   Patient presents with   • Left Shoulder - Pre-op Exam, Pain       History of Present Illness: 70 y.o. female comes in today of upcoming shoulder replacement surgery. Reports a long history of progressively worsening pain, motion loss, and dysfunction.  Describes current pain as severe.  Has failed prolonged conservative treatment.  Denies any new complaints or issues.    Allergies:   Allergies   Allergen Reactions   • Adhesive Tape Other (See Comments)     SKIN BLISTERS   • Nitroglycerin Other (See Comments)     Was told by cardiologist not to take due to heart condition & defibrillator   • Aspirin Swelling   • Biaxin [Clarithromycin] Other (See Comments)     BLISTERS AROUND MOTH  Also known as Bioxen    • Codeine Swelling   • Darvon [Propoxyphene] Swelling   • Levaquin [Levofloxacin] Other (See Comments)     BLISTERS AROUND MOUTH   • Tequin [Gatifloxacin] Other (See Comments)     BLISTERS AROUND MOUTH  Also known Tequine        Medications:   Home Medications:    Current Outpatient Medications:   •  alendronate (FOSAMAX) 70 MG tablet, TAKE 1 TABLET BY MOUTH EVERY 7 (SEVEN) DAYS., Disp: 12 tablet, Rfl: 3  •  allopurinol (ZYLOPRIM) 100 MG tablet, Take 1 tablet by mouth Daily., Disp: 90 tablet, Rfl: 3  •  amiodarone (PACERONE) 200 MG tablet, Take 100 mg by mouth Every Night., Disp: , Rfl:   •  atorvastatin (LIPITOR) 80 MG tablet, Take 1 tablet by mouth every night at bedtime., Disp: 90 tablet, Rfl: 3  •  carvedilol (COREG) 12.5 MG tablet, Take 12.5 mg by mouth 2 (Two) Times a Day., Disp: , Rfl:   •  dabigatran etexilate (PRADAXA) 150 MG capsu, Take 150 mg by mouth 2 (Two) Times a Day. Pt to stop 2 days before surgery per cardiologist MD Ray for OR scheduled 10/22/2020, Disp: , Rfl:   •  diclofenac (FLECTOR) 1.3 % patch patch, Apply 1 patch topically to  the appropriate area as directed Every 12 (Twelve) Hours., Disp: 30 patch, Rfl: 5  •  DULoxetine (CYMBALTA) 60 MG capsule, Take 1 capsule by mouth Daily., Disp: 90 capsule, Rfl: 3  •  glucose blood (ACCU-CHEK OTILIA PLUS) test strip, Dx e11.9 use to check BS BID, ok to substitute formulary preferred, Disp: 60 each, Rfl: 11  •  HYDROcodone-acetaminophen (NORCO)  MG per tablet, Take 1-2 tablets by mouth Every 4 (Four) Hours As Needed for Moderate Pain ., Disp: 36 tablet, Rfl: 0  •  Lancets (ACCU-CHEK MULTICLIX) lancets, Dx e11.9 use to check BS BID, ok to substitute formulary preferred, Disp: 60 each, Rfl: 11  •  levothyroxine (SYNTHROID, LEVOTHROID) 75 MCG tablet, TAKE 1 TABLET BY MOUTH EVERY DAY, Disp: 90 tablet, Rfl: 2  •  montelukast (SINGULAIR) 10 MG tablet, Take 1 tablet by mouth Daily., Disp: 90 tablet, Rfl: 3  •  pantoprazole (PROTONIX) 40 MG EC tablet, TAKE 1 TABLET BY MOUTH EVERY DAY, Disp: 90 tablet, Rfl: 2  •  Semaglutide,0.25 or 0.5MG/DOS, (Ozempic, 0.25 or 0.5 MG/DOSE,) 2 MG/1.5ML solution pen-injector, Inject 0.5 mg under the skin into the appropriate area as directed 1 (One) Time Per Week., Disp: 4 pen, Rfl: 11  •  spironolactone (ALDACTONE) 25 MG tablet, Take 25 mg by mouth Every Morning., Disp: , Rfl:   •  Blood Glucose Monitoring Suppl (ACCU-CHEK OTILIA PLUS) W/DEVICE kit, Dx e11.9 use to check BS BID, ok to substitute formulary preferred, Disp: 1 kit, Rfl: 0  •  Chlorhexidine Gluconate Cloth 2 % pads, Apply 1 application topically. USE AS DIRECTED PREOP, Disp: , Rfl:   •  mupirocin (BACTROBAN) 2 % nasal ointment, 1 application into the nostril(s) as directed by provider. USE AS DIRECTED PREOP, Disp: , Rfl:   No current facility-administered medications for this visit.     Facility-Administered Medications Ordered in Other Visits:   •  Chlorhexidine Gluconate 2 % pads 1 each, 1 each, Apply externally, Take As Directed, Juan Antonio Anne MD    Past Medical History:   Diagnosis Date   • Anemia     • Angina at rest (CMS/HCC)     history   • Arthritis    • Atrial fibrillation (CMS/HCC)    • Cardiac defibrillator in place 01/2009    medtronic    • Cardiomyopathy (CMS/HCC)     Hypertrophic Cardiomyopathy   • Cataract     left   • CHF (congestive heart failure) (CMS/HCC)    • Chronic pain    • Coronary artery disease    • Diabetes mellitus (CMS/HCC)     Type II   • Diverticulosis     hx diverticulitis   • GERD (gastroesophageal reflux disease)    • Heart murmur    • History of bronchitis    • History of depression    • History of pneumonia    • History of transfusion     no reaction   • History of tumor     left ocular tumor, treated with steroids   • Hyperlipidemia    • Hypertension    • Migraines    • On anticoagulant therapy    • Osteoporosis    • Palpitations    • Shoulder pain     left   • Sleep apnea     history retested 2019 and was told doesn't have anymore no machine      • Thyroid disease    • Vasovagal syncope    • Vertigo           Past Surgical History:   Procedure Laterality Date   • ANTERIOR CERVICAL DISCECTOMY W/ FUSION  2012    C5-7   • APPENDECTOMY  1970   • AUGMENTATION MAMMAPLASTY     • BILATERAL BREAST REDUCTION     • BREAST BIOPSY     • CARDIAC CATHETERIZATION  02/19/2007   • CARDIAC DEFIBRILLATOR PLACEMENT Left 1999, 2013    Dr. Ya   • CATARACT EXTRACTION Bilateral    • COLONOSCOPY  1996    Dr. Herbert Gonsales    • COLONOSCOPY  8/16/2016    Procedure: COLONOSCOPY with cecum;  Surgeon: Rosalio Sheikh MD;  Location: [facility] ENDOSCOPY;  Service:    • ENDOSCOPY N/A 4/27/2017    Procedure: ESOPHAGOGASTRODUODENOSCOPY WITH BIOPSIES;  Surgeon: Rosalio Sheikh MD;  Location: [facility] ENDOSCOPY;  Service:    • FEMUR SURGERY Left     with metal implant  x3   • FIRST RIB RESECTION Bilateral     and scalenectomy   • HYSTERECTOMY  1989    Total   • KNEE ARTHROSCOPY Bilateral 2014    Dr. Pascual   • KNEE SURGERY Right 06/10/2010   • LAPAROSCOPIC CHOLECYSTECTOMY  1974   • OOPHORECTOMY     • SHOULDER  ARTHROSCOPY W/ ROTATOR CUFF REPAIR Bilateral 2011   • SHOULDER SURGERY Right    • TONSILLECTOMY     • TOTAL KNEE ARTHROPLASTY Right 2018    Procedure: RIGHT TOTAL KNEE ARTHROPLASTY WITH STEVE NAVIGATION;  Surgeon: Bravo Pascual MD;  Location: [facility];  Service: Orthopedics   • TOTAL SHOULDER ARTHROPLASTY W/ DISTAL CLAVICLE EXCISION Right 2017    Procedure: TOTAL SHOULDER REVERSE ARTHROPLASTY;  Surgeon: Juan Antonio Anne MD;  Location: [facility];  Service:           Social History     Occupational History   • Occupation:    Tobacco Use   • Smoking status: Former Smoker     Packs/day: 0.50     Years: 17.00     Pack years: 8.50     Types: Cigarettes     Quit date: 1985     Years since quittin.1   • Smokeless tobacco: Never Used   Substance and Sexual Activity   • Alcohol use: No   • Drug use: Never   • Sexual activity: Defer      Social History     Social History Narrative   • Not on file          Family History   Problem Relation Age of Onset   • Heart attack Brother    • Heart disease Brother    • Hyperthyroidism Mother    • Cancer Mother    • Heart disease Mother    • Osteoporosis Mother    • No Known Problems Father          car accident   • Cirrhosis Maternal Grandmother    • No Known Problems Maternal Grandfather    • No Known Problems Paternal Grandmother    • No Known Problems Paternal Grandfather    • Breast cancer Maternal Aunt    • Malig Hyperthermia Neg Hx        Review of Systems:     Constitutional:  Denies fever, shaking or chills   Eyes:  Denies change in visual acuity   HEENT:  Denies nasal congestion or sore throat   Respiratory:  Denies cough or shortness of breath   Cardiovascular:  Denies chest pain or edema   GI:  Denies abdominal pain, nausea, vomiting, bloody stools or diarrhea   Musculoskeletal:  Denies numbness tingling or loss of motor function except as outlined above in history of present illness.  Integument:  Denies rash,  "lesion or ulceration   Neurologic:  Denies headache or focal weakness, deficits      All other pertinent positives and negatives as noted above in HPI.    Physical Exam: 70 y.o. female    Vitals:    10/21/20 1253   Temp: 97.5 °F (36.4 °C)   Weight: 75.1 kg (165 lb 9.6 oz)   Height: 154.9 cm (61\")     General:  Patient is awake and alert.  Appears in no acute distress or discomfort.    Psych:  Affect and demeanor are appropriate.    Eyes:  Conjunctiva and sclera appear grossly normal.  Eyes track well and EOM seem to be intact.    Ears:  No gross abnormalities.  Hearing adequate for the exam.    Cardiovascular:  Regular rate and rhythm.    Lungs:  Good chest expansion.  Breathing unlabored.    Lymph:  No palpable adenopathy about neck or axilla.    Neck:  Supple.  Normal ROM.  Negative Spurling's for shoulder or arm pain.    Left upper extremity:  Skin benign and intact without evidence for swelling, masses or atrophy.  No palpable masses.  Moderate anterior tenderness.  Shoulder ROM limited and uncomfortable.  No evident instability or apprehension.  The patient can maintain her arm in an elevated position when I raise it.  Deltoid fires on exam.  Good strength in the lower arm and hand.  Intact sensation throughout the arm and hand palpable radial pulse with brisk cap refill.    Diagnostic Tests:  Lab Results   Component Value Date    GLUCOSE 142 (H) 10/15/2020    CALCIUM 9.3 10/15/2020     10/15/2020    K 4.0 10/15/2020    CO2 25.0 10/15/2020     10/15/2020    BUN 14 10/15/2020    CREATININE 1.16 (H) 10/15/2020    EGFRIFNONA 46 (L) 10/15/2020    BCR 12.1 10/15/2020    ANIONGAP 11.0 10/15/2020     Lab Results   Component Value Date    WBC 6.90 10/15/2020    HGB 13.4 10/15/2020    HCT 40.2 10/15/2020    MCV 89.3 10/15/2020     10/15/2020     Lab Results   Component Value Date    INR 1.0 12/26/2018    INR 1.4 12/21/2018    INR 1.05 09/14/2018    PROTIME 10.3 12/26/2018    PROTIME 13.6 (H) " 12/21/2018    PROTIME 13.5 09/14/2018       Imaging:  Previous x-rays of the shoulder are reviewed.  She has a retained metal anchor consistent with her surgical history of rotator cuff repair.  The acromiohumeral interval is diminished measuring at 3.5 mm.    Assessment:  Left shoulder rotator cuff tear arthropathy    Plan: We had a thorough discussion regarding the risks, benefits and alternatives to an arthroplasty and non-surgical management versus surgery.  I explained that surgical risks include infection, hematoma, hardware related complications including failure of fixation, loosening, fracture, persistent pain and/or loss of motion, iatrogenic nerve and/or blood vessel injury resulting in permanent weakness, numbness or dysfunction, DVT, PE, positioning related neuropraxia, and anesthesia related complications resulting in death.  We discussed the indication for a reverse as opposed to a standard total shoulder and the risks inherent to the reverse including notching, glenoid loosening, instability, and traction related neuropraxia, any of which could result in persistent pain or problems requiring further surgery.  Lastly, we discussed the rehab and all that will be expected of the patient post operatively to ensure an optimal outcome.  The patient voiced understanding of the risks, benefits, and alternative forms of treatment that were discussed and the patient consents to proceed with the reverse arthroplasty.      Orin Maldonado, APRN  10/21/20

## 2020-10-21 NOTE — H&P (VIEW-ONLY)
History & Physical         Patient: Kristan Galicia    YOB: 1950    Medical Record Number: [id number]    Chief Complaints:   Chief Complaint   Patient presents with   • Left Shoulder - Pre-op Exam, Pain       History of Present Illness: 70 y.o. female comes in today of upcoming shoulder replacement surgery. Reports a long history of progressively worsening pain, motion loss, and dysfunction.  Describes current pain as severe.  Has failed prolonged conservative treatment.  Denies any new complaints or issues.    Allergies:   Allergies   Allergen Reactions   • Adhesive Tape Other (See Comments)     SKIN BLISTERS   • Nitroglycerin Other (See Comments)     Was told by cardiologist not to take due to heart condition & defibrillator   • Aspirin Swelling   • Biaxin [Clarithromycin] Other (See Comments)     BLISTERS AROUND MOTH  Also known as Bioxen    • Codeine Swelling   • Darvon [Propoxyphene] Swelling   • Levaquin [Levofloxacin] Other (See Comments)     BLISTERS AROUND MOUTH   • Tequin [Gatifloxacin] Other (See Comments)     BLISTERS AROUND MOUTH  Also known Tequine        Medications:   Home Medications:    Current Outpatient Medications:   •  alendronate (FOSAMAX) 70 MG tablet, TAKE 1 TABLET BY MOUTH EVERY 7 (SEVEN) DAYS., Disp: 12 tablet, Rfl: 3  •  allopurinol (ZYLOPRIM) 100 MG tablet, Take 1 tablet by mouth Daily., Disp: 90 tablet, Rfl: 3  •  amiodarone (PACERONE) 200 MG tablet, Take 100 mg by mouth Every Night., Disp: , Rfl:   •  atorvastatin (LIPITOR) 80 MG tablet, Take 1 tablet by mouth every night at bedtime., Disp: 90 tablet, Rfl: 3  •  carvedilol (COREG) 12.5 MG tablet, Take 12.5 mg by mouth 2 (Two) Times a Day., Disp: , Rfl:   •  dabigatran etexilate (PRADAXA) 150 MG capsu, Take 150 mg by mouth 2 (Two) Times a Day. Pt to stop 2 days before surgery per cardiologist MD Ray for OR scheduled 10/22/2020, Disp: , Rfl:   •  diclofenac (FLECTOR) 1.3 % patch patch, Apply 1 patch topically to  the appropriate area as directed Every 12 (Twelve) Hours., Disp: 30 patch, Rfl: 5  •  DULoxetine (CYMBALTA) 60 MG capsule, Take 1 capsule by mouth Daily., Disp: 90 capsule, Rfl: 3  •  glucose blood (ACCU-CHEK OTILIA PLUS) test strip, Dx e11.9 use to check BS BID, ok to substitute formulary preferred, Disp: 60 each, Rfl: 11  •  HYDROcodone-acetaminophen (NORCO)  MG per tablet, Take 1-2 tablets by mouth Every 4 (Four) Hours As Needed for Moderate Pain ., Disp: 36 tablet, Rfl: 0  •  Lancets (ACCU-CHEK MULTICLIX) lancets, Dx e11.9 use to check BS BID, ok to substitute formulary preferred, Disp: 60 each, Rfl: 11  •  levothyroxine (SYNTHROID, LEVOTHROID) 75 MCG tablet, TAKE 1 TABLET BY MOUTH EVERY DAY, Disp: 90 tablet, Rfl: 2  •  montelukast (SINGULAIR) 10 MG tablet, Take 1 tablet by mouth Daily., Disp: 90 tablet, Rfl: 3  •  pantoprazole (PROTONIX) 40 MG EC tablet, TAKE 1 TABLET BY MOUTH EVERY DAY, Disp: 90 tablet, Rfl: 2  •  Semaglutide,0.25 or 0.5MG/DOS, (Ozempic, 0.25 or 0.5 MG/DOSE,) 2 MG/1.5ML solution pen-injector, Inject 0.5 mg under the skin into the appropriate area as directed 1 (One) Time Per Week., Disp: 4 pen, Rfl: 11  •  spironolactone (ALDACTONE) 25 MG tablet, Take 25 mg by mouth Every Morning., Disp: , Rfl:   •  Blood Glucose Monitoring Suppl (ACCU-CHEK OTILIA PLUS) W/DEVICE kit, Dx e11.9 use to check BS BID, ok to substitute formulary preferred, Disp: 1 kit, Rfl: 0  •  Chlorhexidine Gluconate Cloth 2 % pads, Apply 1 application topically. USE AS DIRECTED PREOP, Disp: , Rfl:   •  mupirocin (BACTROBAN) 2 % nasal ointment, 1 application into the nostril(s) as directed by provider. USE AS DIRECTED PREOP, Disp: , Rfl:   No current facility-administered medications for this visit.     Facility-Administered Medications Ordered in Other Visits:   •  Chlorhexidine Gluconate 2 % pads 1 each, 1 each, Apply externally, Take As Directed, Juan Antonio Anne MD    Past Medical History:   Diagnosis Date   • Anemia     • Angina at rest (CMS/HCC)     history   • Arthritis    • Atrial fibrillation (CMS/HCC)    • Cardiac defibrillator in place 01/2009    medtronic    • Cardiomyopathy (CMS/HCC)     Hypertrophic Cardiomyopathy   • Cataract     left   • CHF (congestive heart failure) (CMS/HCC)    • Chronic pain    • Coronary artery disease    • Diabetes mellitus (CMS/HCC)     Type II   • Diverticulosis     hx diverticulitis   • GERD (gastroesophageal reflux disease)    • Heart murmur    • History of bronchitis    • History of depression    • History of pneumonia    • History of transfusion     no reaction   • History of tumor     left ocular tumor, treated with steroids   • Hyperlipidemia    • Hypertension    • Migraines    • On anticoagulant therapy    • Osteoporosis    • Palpitations    • Shoulder pain     left   • Sleep apnea     history retested 2019 and was told doesn't have anymore no machine      • Thyroid disease    • Vasovagal syncope    • Vertigo           Past Surgical History:   Procedure Laterality Date   • ANTERIOR CERVICAL DISCECTOMY W/ FUSION  2012    C5-7   • APPENDECTOMY  1970   • AUGMENTATION MAMMAPLASTY     • BILATERAL BREAST REDUCTION     • BREAST BIOPSY     • CARDIAC CATHETERIZATION  02/19/2007   • CARDIAC DEFIBRILLATOR PLACEMENT Left 1999, 2013    Dr. Ya   • CATARACT EXTRACTION Bilateral    • COLONOSCOPY  1996    Dr. Herbert Gonsales    • COLONOSCOPY  8/16/2016    Procedure: COLONOSCOPY with cecum;  Surgeon: Rosalio Sheikh MD;  Location: [facility] ENDOSCOPY;  Service:    • ENDOSCOPY N/A 4/27/2017    Procedure: ESOPHAGOGASTRODUODENOSCOPY WITH BIOPSIES;  Surgeon: Rosalio Sheikh MD;  Location: [facility] ENDOSCOPY;  Service:    • FEMUR SURGERY Left     with metal implant  x3   • FIRST RIB RESECTION Bilateral     and scalenectomy   • HYSTERECTOMY  1989    Total   • KNEE ARTHROSCOPY Bilateral 2014    Dr. Pascual   • KNEE SURGERY Right 06/10/2010   • LAPAROSCOPIC CHOLECYSTECTOMY  1974   • OOPHORECTOMY     • SHOULDER  ARTHROSCOPY W/ ROTATOR CUFF REPAIR Bilateral 2011   • SHOULDER SURGERY Right    • TONSILLECTOMY     • TOTAL KNEE ARTHROPLASTY Right 2018    Procedure: RIGHT TOTAL KNEE ARTHROPLASTY WITH STEVE NAVIGATION;  Surgeon: Bravo Pascual MD;  Location: [facility];  Service: Orthopedics   • TOTAL SHOULDER ARTHROPLASTY W/ DISTAL CLAVICLE EXCISION Right 2017    Procedure: TOTAL SHOULDER REVERSE ARTHROPLASTY;  Surgeon: Juan Antonio Anne MD;  Location: [facility];  Service:           Social History     Occupational History   • Occupation:    Tobacco Use   • Smoking status: Former Smoker     Packs/day: 0.50     Years: 17.00     Pack years: 8.50     Types: Cigarettes     Quit date: 1985     Years since quittin.1   • Smokeless tobacco: Never Used   Substance and Sexual Activity   • Alcohol use: No   • Drug use: Never   • Sexual activity: Defer      Social History     Social History Narrative   • Not on file          Family History   Problem Relation Age of Onset   • Heart attack Brother    • Heart disease Brother    • Hyperthyroidism Mother    • Cancer Mother    • Heart disease Mother    • Osteoporosis Mother    • No Known Problems Father          car accident   • Cirrhosis Maternal Grandmother    • No Known Problems Maternal Grandfather    • No Known Problems Paternal Grandmother    • No Known Problems Paternal Grandfather    • Breast cancer Maternal Aunt    • Malig Hyperthermia Neg Hx        Review of Systems:     Constitutional:  Denies fever, shaking or chills   Eyes:  Denies change in visual acuity   HEENT:  Denies nasal congestion or sore throat   Respiratory:  Denies cough or shortness of breath   Cardiovascular:  Denies chest pain or edema   GI:  Denies abdominal pain, nausea, vomiting, bloody stools or diarrhea   Musculoskeletal:  Denies numbness tingling or loss of motor function except as outlined above in history of present illness.  Integument:  Denies rash,  "lesion or ulceration   Neurologic:  Denies headache or focal weakness, deficits      All other pertinent positives and negatives as noted above in HPI.    Physical Exam: 70 y.o. female    Vitals:    10/21/20 1253   Temp: 97.5 °F (36.4 °C)   Weight: 75.1 kg (165 lb 9.6 oz)   Height: 154.9 cm (61\")     General:  Patient is awake and alert.  Appears in no acute distress or discomfort.    Psych:  Affect and demeanor are appropriate.    Eyes:  Conjunctiva and sclera appear grossly normal.  Eyes track well and EOM seem to be intact.    Ears:  No gross abnormalities.  Hearing adequate for the exam.    Cardiovascular:  Regular rate and rhythm.    Lungs:  Good chest expansion.  Breathing unlabored.    Lymph:  No palpable adenopathy about neck or axilla.    Neck:  Supple.  Normal ROM.  Negative Spurling's for shoulder or arm pain.    Left upper extremity:  Skin benign and intact without evidence for swelling, masses or atrophy.  No palpable masses.  Moderate anterior tenderness.  Shoulder ROM limited and uncomfortable.  No evident instability or apprehension.  The patient can maintain her arm in an elevated position when I raise it.  Deltoid fires on exam.  Good strength in the lower arm and hand.  Intact sensation throughout the arm and hand palpable radial pulse with brisk cap refill.    Diagnostic Tests:  Lab Results   Component Value Date    GLUCOSE 142 (H) 10/15/2020    CALCIUM 9.3 10/15/2020     10/15/2020    K 4.0 10/15/2020    CO2 25.0 10/15/2020     10/15/2020    BUN 14 10/15/2020    CREATININE 1.16 (H) 10/15/2020    EGFRIFNONA 46 (L) 10/15/2020    BCR 12.1 10/15/2020    ANIONGAP 11.0 10/15/2020     Lab Results   Component Value Date    WBC 6.90 10/15/2020    HGB 13.4 10/15/2020    HCT 40.2 10/15/2020    MCV 89.3 10/15/2020     10/15/2020     Lab Results   Component Value Date    INR 1.0 12/26/2018    INR 1.4 12/21/2018    INR 1.05 09/14/2018    PROTIME 10.3 12/26/2018    PROTIME 13.6 (H) " 12/21/2018    PROTIME 13.5 09/14/2018       Imaging:  Previous x-rays of the shoulder are reviewed.  She has a retained metal anchor consistent with her surgical history of rotator cuff repair.  The acromiohumeral interval is diminished measuring at 3.5 mm.    Assessment:  Left shoulder rotator cuff tear arthropathy    Plan: We had a thorough discussion regarding the risks, benefits and alternatives to an arthroplasty and non-surgical management versus surgery.  I explained that surgical risks include infection, hematoma, hardware related complications including failure of fixation, loosening, fracture, persistent pain and/or loss of motion, iatrogenic nerve and/or blood vessel injury resulting in permanent weakness, numbness or dysfunction, DVT, PE, positioning related neuropraxia, and anesthesia related complications resulting in death.  We discussed the indication for a reverse as opposed to a standard total shoulder and the risks inherent to the reverse including notching, glenoid loosening, instability, and traction related neuropraxia, any of which could result in persistent pain or problems requiring further surgery.  Lastly, we discussed the rehab and all that will be expected of the patient post operatively to ensure an optimal outcome.  The patient voiced understanding of the risks, benefits, and alternative forms of treatment that were discussed and the patient consents to proceed with the reverse arthroplasty.      Orin Maldonado, APRN  10/21/20

## 2020-10-22 ENCOUNTER — ANESTHESIA (OUTPATIENT)
Dept: PERIOP | Facility: HOSPITAL | Age: 70
End: 2020-10-22

## 2020-10-22 ENCOUNTER — APPOINTMENT (OUTPATIENT)
Dept: GENERAL RADIOLOGY | Facility: HOSPITAL | Age: 70
End: 2020-10-22

## 2020-10-22 ENCOUNTER — ANESTHESIA EVENT (OUTPATIENT)
Dept: PERIOP | Facility: HOSPITAL | Age: 70
End: 2020-10-22

## 2020-10-22 ENCOUNTER — HOSPITAL ENCOUNTER (INPATIENT)
Facility: HOSPITAL | Age: 70
LOS: 1 days | Discharge: HOME | End: 2020-10-23
Attending: ORTHOPAEDIC SURGERY | Admitting: ORTHOPAEDIC SURGERY

## 2020-10-22 DIAGNOSIS — M25.512 LEFT SHOULDER PAIN, UNSPECIFIED CHRONICITY: ICD-10-CM

## 2020-10-22 DIAGNOSIS — Z96.612 S/P REVERSE TOTAL SHOULDER ARTHROPLASTY, LEFT: Primary | ICD-10-CM

## 2020-10-22 LAB
GLUCOSE BLDC GLUCOMTR-MCNC: 113 MG/DL (ref 70–130)
GLUCOSE BLDC GLUCOMTR-MCNC: 121 MG/DL (ref 70–130)
GLUCOSE BLDC GLUCOMTR-MCNC: 124 MG/DL (ref 70–130)
GLUCOSE BLDC GLUCOMTR-MCNC: 135 MG/DL (ref 70–130)
GLUCOSE BLDC GLUCOMTR-MCNC: 148 MG/DL (ref 70–130)

## 2020-10-22 PROCEDURE — 25010000002 FENTANYL CITRATE (PF) 100 MCG/2ML SOLUTION: Performed by: ANESTHESIOLOGY

## 2020-10-22 PROCEDURE — 25010000002 VANCOMYCIN 10 G RECONSTITUTED SOLUTION: Performed by: ORTHOPAEDIC SURGERY

## 2020-10-22 PROCEDURE — 0RRK00Z REPLACEMENT OF LEFT SHOULDER JOINT WITH REVERSE BALL AND SOCKET SYNTHETIC SUBSTITUTE, OPEN APPROACH: ICD-10-PCS | Performed by: ORTHOPAEDIC SURGERY

## 2020-10-22 PROCEDURE — C1713 ANCHOR/SCREW BN/BN,TIS/BN: HCPCS | Performed by: ORTHOPAEDIC SURGERY

## 2020-10-22 PROCEDURE — 82962 GLUCOSE BLOOD TEST: CPT

## 2020-10-22 PROCEDURE — 25010000002 ONDANSETRON PER 1 MG: Performed by: NURSE ANESTHETIST, CERTIFIED REGISTERED

## 2020-10-22 PROCEDURE — 25010000002 PHENYLEPHRINE PER 1 ML: Performed by: NURSE ANESTHETIST, CERTIFIED REGISTERED

## 2020-10-22 PROCEDURE — 25010000003 CEFAZOLIN IN DEXTROSE 2-4 GM/100ML-% SOLUTION: Performed by: ORTHOPAEDIC SURGERY

## 2020-10-22 PROCEDURE — 23472 RECONSTRUCT SHOULDER JOINT: CPT | Performed by: ORTHOPAEDIC SURGERY

## 2020-10-22 PROCEDURE — 25010000002 PROPOFOL 10 MG/ML EMULSION: Performed by: NURSE ANESTHETIST, CERTIFIED REGISTERED

## 2020-10-22 PROCEDURE — C9290 INJ, BUPIVACAINE LIPOSOME: HCPCS | Performed by: ANESTHESIOLOGY

## 2020-10-22 PROCEDURE — 73020 X-RAY EXAM OF SHOULDER: CPT

## 2020-10-22 PROCEDURE — C1776 JOINT DEVICE (IMPLANTABLE): HCPCS | Performed by: ORTHOPAEDIC SURGERY

## 2020-10-22 PROCEDURE — 25010000002 VANCOMYCIN PER 500 MG: Performed by: ORTHOPAEDIC SURGERY

## 2020-10-22 PROCEDURE — 25010000002 NEOSTIGMINE PER 0.5 MG: Performed by: NURSE ANESTHETIST, CERTIFIED REGISTERED

## 2020-10-22 PROCEDURE — 76942 ECHO GUIDE FOR BIOPSY: CPT | Performed by: ORTHOPAEDIC SURGERY

## 2020-10-22 PROCEDURE — 25010000002 FENTANYL CITRATE (PF) 100 MCG/2ML SOLUTION: Performed by: NURSE ANESTHETIST, CERTIFIED REGISTERED

## 2020-10-22 PROCEDURE — 25010000003 BUPIVACAINE LIPOSOME 1.3 % SUSPENSION: Performed by: ANESTHESIOLOGY

## 2020-10-22 PROCEDURE — 25010000002 MIDAZOLAM PER 1 MG: Performed by: ANESTHESIOLOGY

## 2020-10-22 DEVICE — IMPLANTABLE DEVICE: Type: IMPLANTABLE DEVICE | Site: SHOULDER | Status: FUNCTIONAL

## 2020-10-22 DEVICE — BEAR HUM PROLNG STD 36MM: Type: IMPLANTABLE DEVICE | Site: SHOULDER | Status: FUNCTIONAL

## 2020-10-22 DEVICE — DEV CONTRL TISS STRATAFIX SPIRAL MNCRYL UD 3/0 PLS 30CM: Type: IMPLANTABLE DEVICE | Site: SHOULDER | Status: FUNCTIONAL

## 2020-10-22 DEVICE — IMPLANTABLE DEVICE
Type: IMPLANTABLE DEVICE | Site: SHOULDER | Status: FUNCTIONAL
Brand: COMPREHENSIVE® REVERSE SHOULDER

## 2020-10-22 DEVICE — IMPLANTABLE DEVICE
Type: IMPLANTABLE DEVICE | Site: SHOULDER | Status: FUNCTIONAL
Brand: COMPREHENSIVE REVERSE SHOULDER

## 2020-10-22 DEVICE — TRY HUM/SHLDR COMPREHENSIVE/REVERSE MINI COCR STD PLS3MM 40M: Type: IMPLANTABLE DEVICE | Site: SHOULDER | Status: FUNCTIONAL

## 2020-10-22 DEVICE — SUT NONABS MAXBRAID/PE NMBR2 C7 38IN BLU 900335: Type: IMPLANTABLE DEVICE | Site: SHOULDER | Status: FUNCTIONAL

## 2020-10-22 RX ORDER — NALOXONE HCL 0.4 MG/ML
0.1 VIAL (ML) INJECTION
Status: DISCONTINUED | OUTPATIENT
Start: 2020-10-22 | End: 2020-10-23 | Stop reason: HOSPADM

## 2020-10-22 RX ORDER — MELOXICAM 15 MG/1
15 TABLET ORAL DAILY
Status: DISCONTINUED | OUTPATIENT
Start: 2020-10-23 | End: 2020-10-23 | Stop reason: HOSPADM

## 2020-10-22 RX ORDER — HYDROCODONE BITARTRATE AND ACETAMINOPHEN 7.5; 325 MG/1; MG/1
1 TABLET ORAL ONCE AS NEEDED
Status: DISCONTINUED | OUTPATIENT
Start: 2020-10-22 | End: 2020-10-22 | Stop reason: HOSPADM

## 2020-10-22 RX ORDER — MAGNESIUM HYDROXIDE 1200 MG/15ML
LIQUID ORAL AS NEEDED
Status: DISCONTINUED | OUTPATIENT
Start: 2020-10-22 | End: 2020-10-22 | Stop reason: HOSPADM

## 2020-10-22 RX ORDER — BUPIVACAINE HYDROCHLORIDE 5 MG/ML
INJECTION, SOLUTION EPIDURAL; INTRACAUDAL
Status: COMPLETED | OUTPATIENT
Start: 2020-10-22 | End: 2020-10-22

## 2020-10-22 RX ORDER — MIDAZOLAM HYDROCHLORIDE 1 MG/ML
1 INJECTION INTRAMUSCULAR; INTRAVENOUS
Status: DISCONTINUED | OUTPATIENT
Start: 2020-10-22 | End: 2020-10-22 | Stop reason: HOSPADM

## 2020-10-22 RX ORDER — SODIUM CHLORIDE 0.9 % (FLUSH) 0.9 %
3-10 SYRINGE (ML) INJECTION AS NEEDED
Status: DISCONTINUED | OUTPATIENT
Start: 2020-10-22 | End: 2020-10-22 | Stop reason: HOSPADM

## 2020-10-22 RX ORDER — DULOXETIN HYDROCHLORIDE 60 MG/1
60 CAPSULE, DELAYED RELEASE ORAL DAILY
Status: DISCONTINUED | OUTPATIENT
Start: 2020-10-22 | End: 2020-10-23 | Stop reason: HOSPADM

## 2020-10-22 RX ORDER — EPHEDRINE SULFATE 50 MG/ML
INJECTION, SOLUTION INTRAVENOUS AS NEEDED
Status: DISCONTINUED | OUTPATIENT
Start: 2020-10-22 | End: 2020-10-22 | Stop reason: SURG

## 2020-10-22 RX ORDER — PANTOPRAZOLE SODIUM 40 MG/1
40 TABLET, DELAYED RELEASE ORAL DAILY
Status: DISCONTINUED | OUTPATIENT
Start: 2020-10-23 | End: 2020-10-23 | Stop reason: HOSPADM

## 2020-10-22 RX ORDER — DIPHENHYDRAMINE HCL 25 MG
25 CAPSULE ORAL
Status: DISCONTINUED | OUTPATIENT
Start: 2020-10-22 | End: 2020-10-22 | Stop reason: HOSPADM

## 2020-10-22 RX ORDER — ATORVASTATIN CALCIUM 80 MG/1
80 TABLET, FILM COATED ORAL DAILY
Status: DISCONTINUED | OUTPATIENT
Start: 2020-10-22 | End: 2020-10-23 | Stop reason: HOSPADM

## 2020-10-22 RX ORDER — FENTANYL CITRATE 50 UG/ML
50 INJECTION, SOLUTION INTRAMUSCULAR; INTRAVENOUS
Status: DISCONTINUED | OUTPATIENT
Start: 2020-10-22 | End: 2020-10-22 | Stop reason: HOSPADM

## 2020-10-22 RX ORDER — GLYCOPYRROLATE 0.2 MG/ML
INJECTION INTRAMUSCULAR; INTRAVENOUS AS NEEDED
Status: DISCONTINUED | OUTPATIENT
Start: 2020-10-22 | End: 2020-10-22 | Stop reason: SURG

## 2020-10-22 RX ORDER — CARVEDILOL 12.5 MG/1
12.5 TABLET ORAL EVERY 12 HOURS SCHEDULED
Status: DISCONTINUED | OUTPATIENT
Start: 2020-10-22 | End: 2020-10-23 | Stop reason: HOSPADM

## 2020-10-22 RX ORDER — ALLOPURINOL 100 MG/1
100 TABLET ORAL DAILY
Status: DISCONTINUED | OUTPATIENT
Start: 2020-10-23 | End: 2020-10-23 | Stop reason: HOSPADM

## 2020-10-22 RX ORDER — SODIUM CHLORIDE 0.9 % (FLUSH) 0.9 %
10 SYRINGE (ML) INJECTION AS NEEDED
Status: DISCONTINUED | OUTPATIENT
Start: 2020-10-22 | End: 2020-10-23 | Stop reason: HOSPADM

## 2020-10-22 RX ORDER — ONDANSETRON 2 MG/ML
4 INJECTION INTRAMUSCULAR; INTRAVENOUS ONCE AS NEEDED
Status: DISCONTINUED | OUTPATIENT
Start: 2020-10-22 | End: 2020-10-22 | Stop reason: HOSPADM

## 2020-10-22 RX ORDER — MONTELUKAST SODIUM 10 MG/1
10 TABLET ORAL DAILY
Status: DISCONTINUED | OUTPATIENT
Start: 2020-10-22 | End: 2020-10-23 | Stop reason: HOSPADM

## 2020-10-22 RX ORDER — ACETAMINOPHEN 325 MG/1
650 TABLET ORAL EVERY 4 HOURS PRN
Status: DISCONTINUED | OUTPATIENT
Start: 2020-10-22 | End: 2020-10-23 | Stop reason: HOSPADM

## 2020-10-22 RX ORDER — AMIODARONE HYDROCHLORIDE 200 MG/1
100 TABLET ORAL NIGHTLY
Status: DISCONTINUED | OUTPATIENT
Start: 2020-10-22 | End: 2020-10-23 | Stop reason: HOSPADM

## 2020-10-22 RX ORDER — LIDOCAINE HYDROCHLORIDE 10 MG/ML
0.5 INJECTION, SOLUTION EPIDURAL; INFILTRATION; INTRACAUDAL; PERINEURAL ONCE AS NEEDED
Status: DISCONTINUED | OUTPATIENT
Start: 2020-10-22 | End: 2020-10-22 | Stop reason: HOSPADM

## 2020-10-22 RX ORDER — PROMETHAZINE HYDROCHLORIDE 25 MG/1
25 SUPPOSITORY RECTAL ONCE AS NEEDED
Status: DISCONTINUED | OUTPATIENT
Start: 2020-10-22 | End: 2020-10-22 | Stop reason: HOSPADM

## 2020-10-22 RX ORDER — PROPOFOL 10 MG/ML
VIAL (ML) INTRAVENOUS AS NEEDED
Status: DISCONTINUED | OUTPATIENT
Start: 2020-10-22 | End: 2020-10-22 | Stop reason: SURG

## 2020-10-22 RX ORDER — PROMETHAZINE HYDROCHLORIDE 25 MG/1
25 TABLET ORAL ONCE AS NEEDED
Status: DISCONTINUED | OUTPATIENT
Start: 2020-10-22 | End: 2020-10-22 | Stop reason: HOSPADM

## 2020-10-22 RX ORDER — ONDANSETRON 4 MG/1
4 TABLET, FILM COATED ORAL EVERY 6 HOURS PRN
Status: DISCONTINUED | OUTPATIENT
Start: 2020-10-22 | End: 2020-10-23 | Stop reason: HOSPADM

## 2020-10-22 RX ORDER — CEFAZOLIN SODIUM 2 G/100ML
2 INJECTION, SOLUTION INTRAVENOUS ONCE
Status: COMPLETED | OUTPATIENT
Start: 2020-10-22 | End: 2020-10-22

## 2020-10-22 RX ORDER — VANCOMYCIN HYDROCHLORIDE 1 G/200ML
15 INJECTION, SOLUTION INTRAVENOUS ONCE
Status: COMPLETED | OUTPATIENT
Start: 2020-10-22 | End: 2020-10-22

## 2020-10-22 RX ORDER — BUPIVACAINE HYDROCHLORIDE 2.5 MG/ML
INJECTION, SOLUTION EPIDURAL; INFILTRATION; INTRACAUDAL
Status: COMPLETED | OUTPATIENT
Start: 2020-10-22 | End: 2020-10-22

## 2020-10-22 RX ORDER — DEXTROSE MONOHYDRATE 25 G/50ML
25 INJECTION, SOLUTION INTRAVENOUS
Status: DISCONTINUED | OUTPATIENT
Start: 2020-10-22 | End: 2020-10-23 | Stop reason: HOSPADM

## 2020-10-22 RX ORDER — DIPHENHYDRAMINE HYDROCHLORIDE 50 MG/ML
12.5 INJECTION INTRAMUSCULAR; INTRAVENOUS
Status: DISCONTINUED | OUTPATIENT
Start: 2020-10-22 | End: 2020-10-22 | Stop reason: HOSPADM

## 2020-10-22 RX ORDER — POLYETHYLENE GLYCOL 3350 17 G/17G
17 POWDER, FOR SOLUTION ORAL DAILY
Status: DISCONTINUED | OUTPATIENT
Start: 2020-10-22 | End: 2020-10-23 | Stop reason: HOSPADM

## 2020-10-22 RX ORDER — FLUMAZENIL 0.1 MG/ML
0.2 INJECTION INTRAVENOUS AS NEEDED
Status: DISCONTINUED | OUTPATIENT
Start: 2020-10-22 | End: 2020-10-22 | Stop reason: HOSPADM

## 2020-10-22 RX ORDER — ACETAMINOPHEN 325 MG/1
1000 TABLET ORAL ONCE
Status: COMPLETED | OUTPATIENT
Start: 2020-10-22 | End: 2020-10-22

## 2020-10-22 RX ORDER — PREGABALIN 75 MG/1
150 CAPSULE ORAL ONCE
Status: COMPLETED | OUTPATIENT
Start: 2020-10-22 | End: 2020-10-22

## 2020-10-22 RX ORDER — EPHEDRINE SULFATE 50 MG/ML
5 INJECTION, SOLUTION INTRAVENOUS ONCE AS NEEDED
Status: DISCONTINUED | OUTPATIENT
Start: 2020-10-22 | End: 2020-10-22 | Stop reason: HOSPADM

## 2020-10-22 RX ORDER — LIDOCAINE HYDROCHLORIDE 20 MG/ML
INJECTION, SOLUTION INFILTRATION; PERINEURAL AS NEEDED
Status: DISCONTINUED | OUTPATIENT
Start: 2020-10-22 | End: 2020-10-22 | Stop reason: SURG

## 2020-10-22 RX ORDER — ONDANSETRON 2 MG/ML
4 INJECTION INTRAMUSCULAR; INTRAVENOUS EVERY 6 HOURS PRN
Status: DISCONTINUED | OUTPATIENT
Start: 2020-10-22 | End: 2020-10-23 | Stop reason: HOSPADM

## 2020-10-22 RX ORDER — SODIUM CHLORIDE 0.9 % (FLUSH) 0.9 %
3 SYRINGE (ML) INJECTION EVERY 12 HOURS SCHEDULED
Status: DISCONTINUED | OUTPATIENT
Start: 2020-10-22 | End: 2020-10-23 | Stop reason: HOSPADM

## 2020-10-22 RX ORDER — FENTANYL CITRATE 50 UG/ML
INJECTION, SOLUTION INTRAMUSCULAR; INTRAVENOUS AS NEEDED
Status: DISCONTINUED | OUTPATIENT
Start: 2020-10-22 | End: 2020-10-22 | Stop reason: SURG

## 2020-10-22 RX ORDER — HYDROCODONE BITARTRATE AND ACETAMINOPHEN 10; 325 MG/1; MG/1
2 TABLET ORAL EVERY 4 HOURS PRN
Status: DISCONTINUED | OUTPATIENT
Start: 2020-10-22 | End: 2020-10-23 | Stop reason: HOSPADM

## 2020-10-22 RX ORDER — HYDROCODONE BITARTRATE AND ACETAMINOPHEN 10; 325 MG/1; MG/1
1 TABLET ORAL EVERY 4 HOURS PRN
Status: DISCONTINUED | OUTPATIENT
Start: 2020-10-22 | End: 2020-10-23 | Stop reason: HOSPADM

## 2020-10-22 RX ORDER — OXYCODONE AND ACETAMINOPHEN 7.5; 325 MG/1; MG/1
1 TABLET ORAL ONCE AS NEEDED
Status: DISCONTINUED | OUTPATIENT
Start: 2020-10-22 | End: 2020-10-22 | Stop reason: HOSPADM

## 2020-10-22 RX ORDER — FAMOTIDINE 10 MG/ML
20 INJECTION, SOLUTION INTRAVENOUS ONCE
Status: COMPLETED | OUTPATIENT
Start: 2020-10-22 | End: 2020-10-22

## 2020-10-22 RX ORDER — LEVOTHYROXINE SODIUM 0.07 MG/1
75 TABLET ORAL DAILY
Status: DISCONTINUED | OUTPATIENT
Start: 2020-10-23 | End: 2020-10-23 | Stop reason: HOSPADM

## 2020-10-22 RX ORDER — MELOXICAM 15 MG/1
15 TABLET ORAL ONCE
Status: COMPLETED | OUTPATIENT
Start: 2020-10-22 | End: 2020-10-22

## 2020-10-22 RX ORDER — HYDROMORPHONE HYDROCHLORIDE 1 MG/ML
0.5 INJECTION, SOLUTION INTRAMUSCULAR; INTRAVENOUS; SUBCUTANEOUS
Status: DISCONTINUED | OUTPATIENT
Start: 2020-10-22 | End: 2020-10-22 | Stop reason: HOSPADM

## 2020-10-22 RX ORDER — SODIUM CHLORIDE, SODIUM LACTATE, POTASSIUM CHLORIDE, CALCIUM CHLORIDE 600; 310; 30; 20 MG/100ML; MG/100ML; MG/100ML; MG/100ML
9 INJECTION, SOLUTION INTRAVENOUS CONTINUOUS
Status: DISCONTINUED | OUTPATIENT
Start: 2020-10-22 | End: 2020-10-22 | Stop reason: HOSPADM

## 2020-10-22 RX ORDER — SPIRONOLACTONE 25 MG/1
25 TABLET ORAL EVERY MORNING
Status: DISCONTINUED | OUTPATIENT
Start: 2020-10-22 | End: 2020-10-23 | Stop reason: HOSPADM

## 2020-10-22 RX ORDER — SODIUM CHLORIDE, SODIUM LACTATE, POTASSIUM CHLORIDE, CALCIUM CHLORIDE 600; 310; 30; 20 MG/100ML; MG/100ML; MG/100ML; MG/100ML
100 INJECTION, SOLUTION INTRAVENOUS CONTINUOUS
Status: DISCONTINUED | OUTPATIENT
Start: 2020-10-22 | End: 2020-10-23 | Stop reason: HOSPADM

## 2020-10-22 RX ORDER — NICOTINE POLACRILEX 4 MG
15 LOZENGE BUCCAL
Status: DISCONTINUED | OUTPATIENT
Start: 2020-10-22 | End: 2020-10-23 | Stop reason: HOSPADM

## 2020-10-22 RX ORDER — HYDRALAZINE HYDROCHLORIDE 20 MG/ML
5 INJECTION INTRAMUSCULAR; INTRAVENOUS
Status: DISCONTINUED | OUTPATIENT
Start: 2020-10-22 | End: 2020-10-22 | Stop reason: HOSPADM

## 2020-10-22 RX ORDER — NALOXONE HCL 0.4 MG/ML
0.2 VIAL (ML) INJECTION AS NEEDED
Status: DISCONTINUED | OUTPATIENT
Start: 2020-10-22 | End: 2020-10-22 | Stop reason: HOSPADM

## 2020-10-22 RX ORDER — DOCUSATE SODIUM 100 MG/1
100 CAPSULE, LIQUID FILLED ORAL 2 TIMES DAILY PRN
Status: DISCONTINUED | OUTPATIENT
Start: 2020-10-22 | End: 2020-10-23 | Stop reason: HOSPADM

## 2020-10-22 RX ORDER — CEFAZOLIN SODIUM 2 G/100ML
2 INJECTION, SOLUTION INTRAVENOUS EVERY 8 HOURS
Status: COMPLETED | OUTPATIENT
Start: 2020-10-22 | End: 2020-10-23

## 2020-10-22 RX ORDER — LABETALOL HYDROCHLORIDE 5 MG/ML
5 INJECTION, SOLUTION INTRAVENOUS
Status: DISCONTINUED | OUTPATIENT
Start: 2020-10-22 | End: 2020-10-22 | Stop reason: HOSPADM

## 2020-10-22 RX ORDER — TRANEXAMIC ACID 100 MG/ML
INJECTION, SOLUTION INTRAVENOUS AS NEEDED
Status: DISCONTINUED | OUTPATIENT
Start: 2020-10-22 | End: 2020-10-22 | Stop reason: SURG

## 2020-10-22 RX ORDER — ROCURONIUM BROMIDE 10 MG/ML
INJECTION, SOLUTION INTRAVENOUS AS NEEDED
Status: DISCONTINUED | OUTPATIENT
Start: 2020-10-22 | End: 2020-10-22 | Stop reason: SURG

## 2020-10-22 RX ORDER — SODIUM CHLORIDE 0.9 % (FLUSH) 0.9 %
3 SYRINGE (ML) INJECTION EVERY 12 HOURS SCHEDULED
Status: DISCONTINUED | OUTPATIENT
Start: 2020-10-22 | End: 2020-10-22 | Stop reason: HOSPADM

## 2020-10-22 RX ORDER — HYDROMORPHONE HYDROCHLORIDE 1 MG/ML
0.5 INJECTION, SOLUTION INTRAMUSCULAR; INTRAVENOUS; SUBCUTANEOUS
Status: DISCONTINUED | OUTPATIENT
Start: 2020-10-22 | End: 2020-10-23 | Stop reason: HOSPADM

## 2020-10-22 RX ORDER — ONDANSETRON 2 MG/ML
INJECTION INTRAMUSCULAR; INTRAVENOUS AS NEEDED
Status: DISCONTINUED | OUTPATIENT
Start: 2020-10-22 | End: 2020-10-22 | Stop reason: SURG

## 2020-10-22 RX ADMIN — DULOXETINE HYDROCHLORIDE 60 MG: 60 CAPSULE, DELAYED RELEASE ORAL at 14:56

## 2020-10-22 RX ADMIN — PREGABALIN 150 MG: 75 CAPSULE ORAL at 06:22

## 2020-10-22 RX ADMIN — POLYETHYLENE GLYCOL 3350 17 G: 17 POWDER, FOR SOLUTION ORAL at 11:24

## 2020-10-22 RX ADMIN — MIDAZOLAM 1 MG: 1 INJECTION INTRAMUSCULAR; INTRAVENOUS at 06:29

## 2020-10-22 RX ADMIN — ATORVASTATIN CALCIUM 80 MG: 80 TABLET, FILM COATED ORAL at 14:57

## 2020-10-22 RX ADMIN — MUPIROCIN 1 APPLICATION: 20 OINTMENT TOPICAL at 22:00

## 2020-10-22 RX ADMIN — ACETAMINOPHEN 975 MG: 325 TABLET, FILM COATED ORAL at 06:22

## 2020-10-22 RX ADMIN — HYDROCODONE BITARTRATE AND ACETAMINOPHEN 2 TABLET: 10; 325 TABLET ORAL at 11:24

## 2020-10-22 RX ADMIN — VANCOMYCIN HYDROCHLORIDE 1250 MG: 10 INJECTION, POWDER, LYOPHILIZED, FOR SOLUTION INTRAVENOUS at 18:19

## 2020-10-22 RX ADMIN — GLYCOPYRROLATE 0.6 MG: 0.2 INJECTION INTRAMUSCULAR; INTRAVENOUS at 08:34

## 2020-10-22 RX ADMIN — ROCURONIUM BROMIDE 30 MG: 10 INJECTION INTRAVENOUS at 07:02

## 2020-10-22 RX ADMIN — FENTANYL CITRATE 50 MCG: 50 INJECTION, SOLUTION INTRAMUSCULAR; INTRAVENOUS at 06:29

## 2020-10-22 RX ADMIN — PHENYLEPHRINE HYDROCHLORIDE 200 MCG: 10 INJECTION INTRAVENOUS at 07:17

## 2020-10-22 RX ADMIN — VANCOMYCIN HYDROCHLORIDE 1000 MG: 1 INJECTION, SOLUTION INTRAVENOUS at 06:22

## 2020-10-22 RX ADMIN — HYDROCODONE BITARTRATE AND ACETAMINOPHEN 2 TABLET: 10; 325 TABLET ORAL at 15:15

## 2020-10-22 RX ADMIN — CEFAZOLIN SODIUM 2 G: 2 INJECTION, SOLUTION INTRAVENOUS at 07:02

## 2020-10-22 RX ADMIN — PROPOFOL 100 MG: 10 INJECTION, EMULSION INTRAVENOUS at 07:02

## 2020-10-22 RX ADMIN — CARVEDILOL 12.5 MG: 12.5 TABLET, FILM COATED ORAL at 22:01

## 2020-10-22 RX ADMIN — BUPIVACAINE 10 ML: 13.3 INJECTION, SUSPENSION, LIPOSOMAL INFILTRATION at 06:34

## 2020-10-22 RX ADMIN — TRANEXAMIC ACID 1000 MG: 100 INJECTION, SOLUTION INTRAVENOUS at 07:25

## 2020-10-22 RX ADMIN — BUPIVACAINE HYDROCHLORIDE 12 ML: 2.5 INJECTION, SOLUTION EPIDURAL; INFILTRATION; INTRACAUDAL; PERINEURAL at 06:34

## 2020-10-22 RX ADMIN — CEFAZOLIN SODIUM 2 G: 2 INJECTION, SOLUTION INTRAVENOUS at 14:57

## 2020-10-22 RX ADMIN — NEOSTIGMINE METHYLSULFATE 3.5 MG: 1 INJECTION INTRAMUSCULAR; INTRAVENOUS; SUBCUTANEOUS at 08:34

## 2020-10-22 RX ADMIN — PHENYLEPHRINE HYDROCHLORIDE 200 MCG: 10 INJECTION INTRAVENOUS at 07:49

## 2020-10-22 RX ADMIN — AMIODARONE HYDROCHLORIDE 100 MG: 200 TABLET ORAL at 22:01

## 2020-10-22 RX ADMIN — PHENYLEPHRINE HYDROCHLORIDE 0.8 MCG: 10 INJECTION, SOLUTION INTRAMUSCULAR; INTRAVENOUS; SUBCUTANEOUS at 07:58

## 2020-10-22 RX ADMIN — MONTELUKAST SODIUM 10 MG: 10 TABLET, FILM COATED ORAL at 14:57

## 2020-10-22 RX ADMIN — SODIUM CHLORIDE, PRESERVATIVE FREE 3 ML: 5 INJECTION INTRAVENOUS at 22:04

## 2020-10-22 RX ADMIN — MELOXICAM 15 MG: 15 TABLET ORAL at 06:22

## 2020-10-22 RX ADMIN — FENTANYL CITRATE 50 MCG: 50 INJECTION INTRAMUSCULAR; INTRAVENOUS at 07:02

## 2020-10-22 RX ADMIN — EPHEDRINE SULFATE 10 MG: 50 INJECTION INTRAVENOUS at 07:08

## 2020-10-22 RX ADMIN — SODIUM CHLORIDE, POTASSIUM CHLORIDE, SODIUM LACTATE AND CALCIUM CHLORIDE 9 ML/HR: 600; 310; 30; 20 INJECTION, SOLUTION INTRAVENOUS at 06:22

## 2020-10-22 RX ADMIN — PHENYLEPHRINE HYDROCHLORIDE 100 MCG: 10 INJECTION INTRAVENOUS at 07:58

## 2020-10-22 RX ADMIN — PHENYLEPHRINE HYDROCHLORIDE 100 MCG: 10 INJECTION INTRAVENOUS at 07:07

## 2020-10-22 RX ADMIN — EPHEDRINE SULFATE 10 MG: 50 INJECTION INTRAVENOUS at 07:49

## 2020-10-22 RX ADMIN — HYDROCODONE BITARTRATE AND ACETAMINOPHEN 2 TABLET: 10; 325 TABLET ORAL at 19:20

## 2020-10-22 RX ADMIN — EPHEDRINE SULFATE 10 MG: 50 INJECTION INTRAVENOUS at 07:36

## 2020-10-22 RX ADMIN — PHENYLEPHRINE HYDROCHLORIDE 200 MCG: 10 INJECTION INTRAVENOUS at 07:30

## 2020-10-22 RX ADMIN — PHENYLEPHRINE HYDROCHLORIDE 250 MCG: 10 INJECTION INTRAVENOUS at 07:36

## 2020-10-22 RX ADMIN — ONDANSETRON HYDROCHLORIDE 4 MG: 2 SOLUTION INTRAMUSCULAR; INTRAVENOUS at 08:17

## 2020-10-22 RX ADMIN — FAMOTIDINE 20 MG: 10 INJECTION, SOLUTION INTRAVENOUS at 06:29

## 2020-10-22 RX ADMIN — LIDOCAINE HYDROCHLORIDE 50 MG: 20 INJECTION, SOLUTION INFILTRATION; PERINEURAL at 07:02

## 2020-10-22 RX ADMIN — BUPIVACAINE HYDROCHLORIDE 12 ML: 5 INJECTION, SOLUTION EPIDURAL; INTRACAUDAL; PERINEURAL at 06:34

## 2020-10-22 NOTE — ANESTHESIA PREPROCEDURE EVALUATION
Anesthesia Evaluation     no history of anesthetic complications:  NPO Solid Status: > 8 hours             Airway   Mallampati: II  TM distance: >3 FB  Neck ROM: full  No difficulty expected  Dental          Pulmonary - normal exam   (+) asthma,shortness of breath,   (-) recent URISleep apnea: h/o, resolved.  Cardiovascular - normal exam    PT is on anticoagulation therapy  Patient on routine beta blocker and Beta blocker given within 24 hours of surgery    (+) pacemaker pacemaker, ICD, hypertension, valvular problems/murmurs, past MI  >12 months, CAD, dysrhythmias Atrial Fib, CHF , hyperlipidemia,   (-) angina    ROS comment: IHSS cardiomyopathy  Pt denies MI.  Defibrillator last fired greater than 4 years ago.    Neuro/Psych  (+) headaches, dizziness/light headedness, psychiatric history Depression,     (-) seizures, neuromuscular disease, TIA, CVA  GI/Hepatic/Renal/Endo    (+) obesity,  GERD,  diabetes mellitus type 2, thyroid problem hypothyroidism    Musculoskeletal     Abdominal    Substance History      OB/GYN          Other   arthritis,    history of cancer                      Anesthesia Plan    ASA 3     general with block   (+ISB USGSS for POPC)  intravenous induction     Anesthetic plan, all risks, benefits, and alternatives have been provided, discussed and informed consent has been obtained with: patient.    Plan discussed with CRNA.

## 2020-10-22 NOTE — ANESTHESIA PROCEDURE NOTES
Peripheral Block    Pre-sedation assessment completed: 10/22/2020 6:27 AM    Patient reassessed immediately prior to procedure    Patient location during procedure: holding area  Start time: 10/22/2020 6:29 AM  Stop time: 10/22/2020 6:35 AM  Reason for block: at surgeon's request and post-op pain management  Performed by  Anesthesiologist: Rich Marshall DO  Preanesthetic Checklist  Completed: patient identified, site marked, surgical consent, pre-op evaluation, timeout performed, IV checked, risks and benefits discussed and monitors and equipment checked  Prep:  Pt Position: sitting  Sterile barriers:gloves and mask  Prep: ChloraPrep  Patient monitoring: blood pressure monitoring, continuous pulse oximetry and EKG  Procedure  Sedation:yes  Performed under: local infiltration  Guidance:ultrasound guided  ULTRASOUND INTERPRETATION. Using ultrasound guidance a gauge needle was placed in close proximity to the brachial plexus nerve, at which point, under ultrasound guidance anesthetic was injected in the area of the nerve and spread of the anesthesia was seen on ultrasound in close proximity thereto.  There were no abnormalities seen on ultrasound; a digital image was taken; and the patient tolerated the procedure with no complications. Images:still images obtained, printed/placed on chart    Laterality:left  Block Type:interscalene  Injection Technique:single-shot  Needle Type:echogenic  Needle Gauge:22 G  Resistance on Injection: none    Medications Used: bupivacaine liposome (EXPAREL) 1.3 % injection, 10 mL  bupivacaine PF (MARCAINE) 0.25 % injection, 12 mL  bupivacaine (PF) (MARCAINE) 0.5 % injection, 12 mL  Med admintered at 10/22/2020 6:34 AM      Medications  Comment:Ultrasound Interpretation:  Using ultrasound guidance the needle was placed in close proximity to the brachial plexus and anesthetic was injected in the area of the nerve and spread of the anesthetic was seen on ultrasound in close  proximity thereto.  There were no abnormalities seen on ultrasound; a digital image was taken; and the patient tolerated the procedure with no complications.      Post Assessment  Injection Assessment: negative aspiration for heme, no paresthesia on injection and incremental injection  Patient Tolerance:comfortable throughout block  Complications:no

## 2020-10-22 NOTE — ANESTHESIA PROCEDURE NOTES
Airway  Urgency: elective    Date/Time: 10/22/2020 7:05 AM  Airway not difficult    General Information and Staff    Patient location during procedure: OR  Anesthesiologist: Rich Marshall DO  CRNA: Kanika Landry CRNA    Indications and Patient Condition  Indications for airway management: airway protection    Preoxygenated: yes  MILS maintained throughout  Mask difficulty assessment: 1 - vent by mask    Final Airway Details  Final airway type: endotracheal airway      Successful airway: ETT  Cuffed: yes   Successful intubation technique: direct laryngoscopy  Facilitating devices/methods: intubating stylet  Endotracheal tube insertion site: oral  Blade: Dorado  Blade size: 2  ETT size (mm): 7.0  Cormack-Lehane Classification: grade I - full view of glottis  Placement verified by: chest auscultation and capnometry   Measured from: lips  Number of attempts at approach: 1  Assessment: lips, teeth, and gum same as pre-op and atraumatic intubation    Additional Comments  Atraumatic, Secured after verification of placement

## 2020-10-22 NOTE — OP NOTE
Orthopaedic Operative Note    Facility: [facility]    Patient: Kristna Galicia    Medical Record Number: [id number]    YOB: 1950    Dictating Surgeon: Juan Antonio Anne M.D.*    Primary Care Physician: Chelly Red APRN    Date of Operation: 10/22/2020    Pre-Operative Diagnosis:  Left rotator cuff tear arthropathy    Post-Operative Diagnosis:  Left rotator cuff tear arthropathy    Procedure Performed:    Left reverse total shoulder arthroplasty      Surgeon: Juan Antonio Anne MD     Assistant: RICCO Do    Anesthesia: Regional followed by Gen.    Complications: None.     Estimated Blood Loss: Less than 50 mL.     Implants: 1.  Biomet size 6 mm mini comprehensive stem  2.  Mini glenoid baseplate with one 6.5 mm central compression screw and 4 peripheral 4.75 mm locking screws  3.  Size 36 mm +3 offset glenosphere  4.  Standard thickness, +3 offset humeral tray with standard thickness humeral bearing    Specimens: * No orders in the log *    Brief Operative Indication:  Ms. Galicia had a history of worsening left shoulder pain and dysfunction which had been nonresponsive to conservative treatment.  We had a thorough discussion regarding the risks, benefits and alternatives to an arthroplasty and non-surgical management versus surgery.  I explained that surgical risks include infection, hematoma, hardware related complications including failure of fixation, loosening, fracture, persistent pain and/or loss of motion, iatrogenic nerve and/or blood vessel injury resulting in permanent weakness, numbness or dysfunction, DVT, PE, positioning related neuropraxia, and anesthesia related complications resulting in death.  We discussed the indication for a reverse as opposed to a standard total shoulder and the risks inherent to the reverse including notching, glenoid loosening, instability, and traction related neuropraxia, any of which could result in persistent pain or problems  requiring further surgery.  Last, we discussed the rehab and all that will be expected of the patient post operatively to ensure an optimal outcome.  The patient voiced understanding of the risks, benefits, and alternative forms of treatment that were discussed and the patient consented to proceed.    Description of the procedure in detail:  The patient and operative site were identified in the preoperative holding area.  The surgical site was marked.  Adequate regional anesthesia was administered.  The patient was then taken to the operating room.  The patient was placed on the operating table where adequate general anesthesia was administered.  The patient was then repositioned into the modified beachchair position with the head and neck in neutral alignment.  All bony prominences were carefully padded and protected.    The left upper extremity was then prepped and draped in the standard, sterile fashion.  The extremity was cleaned with an alcohol solution.  A Hibiclens scrub was performed and then the extremity was prepped with 2 ChloraPrep preps.  I allowed this to dry for 3 minutes before the draping procedure was carried out.    A timeout was taken and preoperative antibiotics administered.  Transexamic acid was administered at this time as well.  Following this, I began by fashioning an approximately 6 cm incision over the anterior aspect of the shoulder.  This was carried down through the skin and subcutaneous tissues.  Full-thickness medial and lateral skin flaps were developed.  The interval between the deltoid and pectoralis was carefully identified and developed.  The underlying cephalic vein was dissected out and retracted laterally.  This structure was kept protected throughout the case.    The clavipectoral fascia was divided.  The strap muscles were retracted medially.  The biceps groove was identified.  The biceps tendon was already torn and retracted.  The structure was not visualized during the  case.    I then directed my attention to the shoulder.  As expected, there was a large, retracted tear of the rotator cuff involving the entirety of the supraspinatus and infraspinatus.  These were retracted back to the level of the glenoid.  There was significant arthrosis of the visible portion of the humeral head.  The subscapularis was carefully tagged and released.  I left a small cuff of tissue on the lesser tuberosity for a later anatomic repair of this structure.   The humeral head was then completely exposed.  The periarticular osteophytes were carefully removed with a rongeur.    The cutting guide for the head cut was inserted.  This was set to 20° of retroversion which I judged off of the forearm.  With the guide in position, I demarcated the cut and then carried out the cut using an oscillating saw.  The cut portion of the bone was removed and taken to the back table for possible bone grafting later in the case, if needed.    Having completed the humeral sided preparations, I then directed my attention to the glenoid.  The axillary nerve was carefully dissected out and identified.  This structure was protected.  Retractors were carefully positioned along the anterior, posterior and inferior glenoid rim.  I carefully exposed the caudal rim of the glenoid using the electrocautery.  The anterior, inferior and posterior glenoid labrum were then carefully debrided and removed along with the remaining biceps stump superiorly.  The centering guide for the baseplate was inserted.  The center pin for the baseplate was drilled in the center of the glenoid vault.  I then carefully reamed and prepared the glenoid in typical fashion, taking care to maintain appropriate inferior tilt for proper positioning of the baseplate.    Once I had completed the reaming process and made sure that the reaming was adequate, the baseplate was impacted into position.  This was secured with a single screw central compression screw  which got great purchase.  The 4 peripheral locking screws were then placed without complication.  All 4 screws were confirmed to lock into the baseplate.  With the baseplate secured, I examined the remaining glenoid.  I did determine that a 36 mm +3 offset glenosphere would fit best in this case.  The appropriate size implant was selected for use and then impacted.  I took care to make sure that the Lucio taper was fully engaged and that the implant was well-seated.  I used a right angle clamp to pass this beneath the implant and pull up.  When doing so, the entire scapula moved.  Once I was satisfied that this was well seated, I then directed my attention back to the humerus.    The humeral sided preparations were carried out in the typical fashion.  I reamed and broached the humerus up to a size 6 broach which seemed to fit well.  The appropriate size implant was impacted into position, taking care to maintain appropriate retroversion as judged off of the forearm.  The implants seated well.  I then trialed off of the stem.  The standard thickness +3 offset tray with a standard thickness liner seemed to fit best.  This allowed for excellent motion and stability.  The trial component was removed and then the final component impacted.  Again, I took care to make sure that the Lucio taper was fully engaged before reducing it and carrying the shoulder through range of motion.    Again, the shoulder demonstrated excellent motion and stability.  I could fully elevate, abduct and externally rotate the shoulder without any impingement or limitation.  The shoulder demonstrated excellent motion and absolutely no instability.  There was good tension in the periarticular soft tissue structures.  At this point, I directed my attention to the subscapularis repair.  The arm was placed in approximately 30 degrees of external rotation.  The subscapularis was then anatomically repaired using multiple #2 max braid sutures.  I then  irrigated the wound with 500 cc of a Betadine-containing saline solution.  I then irrigated with 3 L of sterile saline via pulsatile lavage.  I made sure that we had good hemostasis.  The wound was sequentially closed in a layered fashion.  Vicryl was used to repair the subcutaneous tissues.  A running subcuticular Monocryl stitch was used to close the skin followed by Dermabond.  Sterile dressings were applied.  The drapes were withdrawn.  The arm was placed in a sling.  The patient was awakened and taken to the recovery room in good condition.    Juan Antonio Anne MD  10/22/20

## 2020-10-22 NOTE — BRIEF OP NOTE
TOTAL SHOULDER REVERSE ARTHROPLASTY  Progress Note    Kristan Galicia  10/22/2020    Pre-op Diagnosis:   Left shoulder pain, unspecified chronicity [M25.512]       Post-Op Diagnosis Codes:     * Left shoulder pain, unspecified chronicity [M25.512]    Procedure/CPT® Codes:        Procedure(s):  TOTAL SHOULDER REVERSE ARTHROPLASTY    Surgeon(s):  Juan Antonio Anne MD    Anesthesia: General with Block    Staff:   Assistant: Nabeel Noel CSA  Assistant: Nabeel Noel CSA    Estimated Blood Loss: 100ml    Urine Voided: * No values recorded between 10/22/2020 12:00 AM and 10/22/2020  6:43 AM *    Specimens:                None          Drains: * No LDAs found *    Findings: See dictation    Complications: None    Assistant: Nabeel Noel CSA  was responsible for performing the following activities: Retraction, manipulation of the arm for insertion of the implants, suctioning and irrigation, assistance with wound closure and application of the postoperative bandages. and their skilled assistance was necessary for the success of this case.    Juan Antonio Anne MD     Date: 10/22/2020  Time: 0830

## 2020-10-22 NOTE — PLAN OF CARE
See Below   Problem: Adult Inpatient Plan of Care  Goal: Plan of Care Review  10/22/2020 1645 by Miguel Ángel Titus, RN  Outcome: Ongoing, Progressing  Flowsheets  Taken 10/22/2020 1645 by Miguel Ángel Titus, JUNE  Progress: improving  Outcome Summary: Pt was slighly drowsy upon arrival. Voided independently, ambulates to BR assist x1, Pulses are +2 throughout, respirations are clear but slighly deminished. BS are hypoactive.  Pain is still not under control with PO meds but her pain level is trending down Pt coughs independently and is productive. D/C planning is in progress.  Taken 10/22/2020 0650 by India Castillo, RN  Plan of Care Reviewed With: patient

## 2020-10-22 NOTE — ANESTHESIA POSTPROCEDURE EVALUATION
"Patient: Kristan Galicia    Procedure Summary     Date: 10/22/20 Room / Location: [facility] OR [address] [place] [address] MAIN OR    Anesthesia Start: 0653 Anesthesia Stop: 0850    Procedure: TOTAL SHOULDER REVERSE ARTHROPLASTY (Left Shoulder) Diagnosis:       Left shoulder pain, unspecified chronicity      (Left shoulder pain, unspecified chronicity [M25.512])    Surgeon: Juan Antonio Anne MD Provider: Rich Marshall DO    Anesthesia Type: general with block ASA Status: 3          Anesthesia Type: general with block    Vitals  Vitals Value Taken Time   /76 10/22/20 0900   Temp 36.7 °C (98 °F) 10/22/20 0848   Pulse 62 10/22/20 0916   Resp 18 10/22/20 0900   SpO2 95 % 10/22/20 0916   Vitals shown include unvalidated device data.        Post Anesthesia Care and Evaluation    Patient location during evaluation: bedside  Patient participation: complete - patient participated  Level of consciousness: sleepy but conscious  Pain score: 0  Pain management: adequate  Airway patency: patent  Anesthetic complications: No anesthetic complications    Cardiovascular status: acceptable  Respiratory status: acceptable  Hydration status: acceptable    Comments: /76   Pulse 65   Temp 36.7 °C (98 °F) (Oral)   Resp 18   Ht 154.9 cm (61\")   Wt 76.1 kg (167 lb 12.3 oz)   SpO2 93%   BMI 31.70 kg/m²         "

## 2020-10-23 ENCOUNTER — READMISSION MANAGEMENT (OUTPATIENT)
Dept: CALL CENTER | Facility: HOSPITAL | Age: 70
End: 2020-10-23

## 2020-10-23 VITALS
WEIGHT: 168.21 LBS | HEART RATE: 65 BPM | SYSTOLIC BLOOD PRESSURE: 94 MMHG | RESPIRATION RATE: 16 BRPM | DIASTOLIC BLOOD PRESSURE: 48 MMHG | TEMPERATURE: 96.8 F | OXYGEN SATURATION: 93 % | BODY MASS INDEX: 31.76 KG/M2 | HEIGHT: 61 IN

## 2020-10-23 LAB
GLUCOSE BLDC GLUCOMTR-MCNC: 130 MG/DL (ref 70–130)
GLUCOSE BLDC GLUCOMTR-MCNC: 160 MG/DL (ref 70–130)

## 2020-10-23 PROCEDURE — 99024 POSTOP FOLLOW-UP VISIT: CPT | Performed by: NURSE PRACTITIONER

## 2020-10-23 PROCEDURE — 97166 OT EVAL MOD COMPLEX 45 MIN: CPT

## 2020-10-23 PROCEDURE — 97110 THERAPEUTIC EXERCISES: CPT

## 2020-10-23 PROCEDURE — 25010000003 CEFAZOLIN IN DEXTROSE 2-4 GM/100ML-% SOLUTION: Performed by: ORTHOPAEDIC SURGERY

## 2020-10-23 PROCEDURE — 82962 GLUCOSE BLOOD TEST: CPT

## 2020-10-23 RX ORDER — HYDROCODONE BITARTRATE AND ACETAMINOPHEN 10; 325 MG/1; MG/1
TABLET ORAL
Qty: 42 TABLET | Refills: 0 | Status: SHIPPED | OUTPATIENT
Start: 2020-10-23 | End: 2020-11-06 | Stop reason: SDUPTHER

## 2020-10-23 RX ORDER — PSEUDOEPHEDRINE HCL 30 MG
100 TABLET ORAL 2 TIMES DAILY PRN
Qty: 60 CAPSULE | Refills: 1 | Status: SHIPPED | OUTPATIENT
Start: 2020-10-23 | End: 2021-04-20 | Stop reason: SDUPTHER

## 2020-10-23 RX ORDER — ONDANSETRON 4 MG/1
4 TABLET, FILM COATED ORAL EVERY 6 HOURS PRN
Qty: 12 TABLET | Refills: 0 | Status: SHIPPED | OUTPATIENT
Start: 2020-10-23 | End: 2022-07-06

## 2020-10-23 RX ADMIN — LEVOTHYROXINE SODIUM 75 MCG: 75 TABLET ORAL at 07:52

## 2020-10-23 RX ADMIN — HYDROCODONE BITARTRATE AND ACETAMINOPHEN 2 TABLET: 10; 325 TABLET ORAL at 11:32

## 2020-10-23 RX ADMIN — POLYETHYLENE GLYCOL 3350 17 G: 17 POWDER, FOR SOLUTION ORAL at 07:52

## 2020-10-23 RX ADMIN — MELOXICAM 15 MG: 15 TABLET ORAL at 07:52

## 2020-10-23 RX ADMIN — SPIRONOLACTONE 25 MG: 25 TABLET ORAL at 05:51

## 2020-10-23 RX ADMIN — CEFAZOLIN SODIUM 2 G: 2 INJECTION, SOLUTION INTRAVENOUS at 00:06

## 2020-10-23 RX ADMIN — PANTOPRAZOLE SODIUM 40 MG: 40 TABLET, DELAYED RELEASE ORAL at 07:52

## 2020-10-23 RX ADMIN — HYDROCODONE BITARTRATE AND ACETAMINOPHEN 2 TABLET: 10; 325 TABLET ORAL at 05:46

## 2020-10-23 RX ADMIN — ALLOPURINOL 100 MG: 100 TABLET ORAL at 07:53

## 2020-10-23 RX ADMIN — HYDROCODONE BITARTRATE AND ACETAMINOPHEN 2 TABLET: 10; 325 TABLET ORAL at 00:06

## 2020-10-23 RX ADMIN — DICLOFENAC EPOLAMINE 1 PATCH: 0.01 SYSTEM TOPICAL at 00:07

## 2020-10-23 RX ADMIN — CARVEDILOL 12.5 MG: 12.5 TABLET, FILM COATED ORAL at 07:52

## 2020-10-23 NOTE — DISCHARGE SUMMARY
Date of Admission:  10/22/2020    Date of Discharge:  10/23/2020    Discharge Diagnosis: s/p Left reverse total shoulder arthroplasty    Admitting Physician: Juan Antonio Anne    Consults: none    DETAILS OF HOSPITAL STAY:  Patient is a 70 y.o. female was admitted to the floor following a reverse total shoulder arthroplasty.  Post-operatively the patient was transferred to the post-operative floor where the patient underwent physical therapy including both active and passive ROM exercises. Opioids were titrated to achieve appropriate pain management to allow for participation in mobilization exercises. Vital signs are now stable. The incision is benign without signs or symptoms of infection. Operative extremity neurovascular status remains intact. Appropriate education re: incision care, activity levels, medications, and follow up visits was completed and all questions were answered. The patient is now deemed stable for discharge to home.    Condition on Discharge:  Stable    Discharge Medications     Discharge Medications      New Medications      Instructions Start Date   ondansetron 4 MG tablet  Commonly known as: ZOFRAN   4 mg, Oral, Every 6 Hours PRN      Stool Softener 100 MG capsule  Generic drug: docusate sodium   100 mg, Oral, 2 Times Daily PRN         Changes to Medications      Instructions Start Date   HYDROcodone-acetaminophen  MG per tablet  Commonly known as: NORCO  What changed:   · how much to take  · how to take this  · when to take this  · reasons to take this  · additional instructions   Take 1-2 tablets by mouth every 4-6 hours as needed for pain.  Not to exceed 6 tabs per day.         Continue These Medications      Instructions Start Date   Accu-Chek Sangeeta Plus w/Device kit   Dx e11.9 use to check BS BID, ok to substitute formulary preferred      accu-chek multiclix lancets   Dx e11.9 use to check BS BID, ok to substitute formulary preferred      alendronate 70 MG tablet  Commonly known as:  FOSAMAX   70 mg, Oral, Every 7 Days      allopurinol 100 MG tablet  Commonly known as: ZYLOPRIM   100 mg, Oral, Daily      amiodarone 200 MG tablet  Commonly known as: PACERONE   100 mg, Oral, Nightly      atorvastatin 80 MG tablet  Commonly known as: LIPITOR   80 mg, Oral, Every Night at Bedtime      carvedilol 12.5 MG tablet  Commonly known as: COREG   12.5 mg, Oral, 2 Times Daily      dabigatran etexilate 150 MG capsu  Commonly known as: PRADAXA   150 mg, Oral, 2 Times Daily, Pt to stop 2 days before surgery per cardiologist MD Ray for OR scheduled 10/22/2020      diclofenac 1.3 % patch patch  Commonly known as: Flector   1 patch, Topical, Every 12 Hours      DULoxetine 60 MG capsule  Commonly known as: CYMBALTA   60 mg, Oral, Daily      glucose blood test strip  Commonly known as: Accu-Chek Sangeeta Plus   Dx e11.9 use to check BS BID, ok to substitute formulary preferred      levothyroxine 75 MCG tablet  Commonly known as: SYNTHROID, LEVOTHROID   TAKE 1 TABLET BY MOUTH EVERY DAY      montelukast 10 MG tablet  Commonly known as: SINGULAIR   10 mg, Oral, Daily      pantoprazole 40 MG EC tablet  Commonly known as: PROTONIX   TAKE 1 TABLET BY MOUTH EVERY DAY      Semaglutide(0.25 or 0.5MG/DOS) 2 MG/1.5ML solution pen-injector  Commonly known as: Ozempic (0.25 or 0.5 MG/DOSE)   0.5 mg, Subcutaneous, Weekly      spironolactone 25 MG tablet  Commonly known as: ALDACTONE   25 mg, Oral, Every Morning         Stop These Medications    Chlorhexidine Gluconate Cloth 2 % pads     mupirocin 2 % nasal ointment  Commonly known as: BACTROBAN            Discharge Diet: regular    Activity at Discharge: as tolerated    Discharge Instructions:   1)  Patient is to continue with physical therapy exercises daily and continue working with the physical therapist as ordered.  2)  Continue to follow precautions as instructed.   3)  Patient is instructed to continue use of the sling except when performing their physical therapy exercising  and while dressing or showering.  4)  Continue to ice regularly. Patient also instructed on incentive spirometer during hospitalization and encouraged to continue to use at home regularly.   5)  The dressing should be left in place. If waterproof dressing is intact the patient may shower immediately following discharge. If the dressing becomes disloged or saturated it should be changed and patient must wait until POD #5 to shower. If dressing is changed, apply dry sterile dressing after showering.  6)  Follow up appointment in 2 weeks - patient to call the office at [phone] to schedule.     Follow-up Appointments  2 weeks with Dr Omid Maldonado, APRN  10/23/20  15:26 EDT

## 2020-10-23 NOTE — PLAN OF CARE
See below.    Problem: Adult Inpatient Plan of Care  Goal: Plan of Care Review  Outcome: Met  Flowsheets (Taken 10/23/2020 1226)  Progress: improving  Plan of Care Reviewed With: patient  Outcome Summary: 70/F POD#1 left reverse TSA.  ALOx4, RA, lungs clear but diminished, BS hypoactive but improving, voiding independently.  Up x1 standby BRP with sling in place.  2+ radial pulses noted bilaterally, numbness/tingling noted in operative extremity r/t interscalene block, dressing CDI.  Pain well-controlled with PO pain meds only  PIV removed.  D/C home with family today.

## 2020-10-23 NOTE — OUTREACH NOTE
Prep Survey      Responses   [facility] patient discharged from?  [facility]   Is LACE score < 7 ?  No   Eligibility  [facility]   Date of Admission  10/22/20   Date of Discharge  10/23/20   Discharge Disposition  Home or Self Care   Discharge diagnosis  Total shoulder arthro-left   Does the patient have one of the following disease processes/diagnoses(primary or secondary)?  Total Joint Replacement   Does the patient have Home health ordered?  No   Is there a DME ordered?  No   Prep survey completed?  Yes          Junie Amaya RN

## 2020-10-23 NOTE — PLAN OF CARE
Pt presents to [facility] 2/2 to a L total shoulder arthroplasty. Pt with excellent participation in OT this morning prior to d/c. Pt able to manage sling, ADL's and HEP shoulder protocol HEP exercises with no assist. Pt d/c home this afternoon with her children's assist as needed. OT to sign off.     Patient was wearing a face mask during this therapy encounter. Therapist used appropriate personal protective equipment including mask, goggles and gloves. Mask used was standard procedure mask. Appropriate PPE was worn during the entire therapy session. Hand hygiene was completed before and after therapy session. Patient is not in enhanced droplet precautions.

## 2020-10-23 NOTE — THERAPY EVALUATION
Patient Name: Kristan Galicia  : 1950    MRN: [id number]                              Today's Date: 10/23/2020       Admit Date: 10/22/2020    Visit Dx:     ICD-10-CM ICD-9-CM   1. S/P reverse total shoulder arthroplasty, left  Z96.612 V43.61   2. Left shoulder pain, unspecified chronicity  M25.512 719.41     Patient Active Problem List   Diagnosis   • Shoulder pain, right   • Knee pain, right   • Fall down stairs   • PAF (paroxysmal atrial fibrillation) (CMS/[facility])   • S/P rotator cuff repair   • Vitamin D deficiency   • Vertigo   • Vasovagal syncope   • Thyroid disease   • Obstructive sleep apnea, adult   • Palpitations   • Osteoporosis   • Ocular tumor   • Migraines   • Hyperlipidemia   • History of transfusion   • Heart murmur   • Heart attack (CMS/[facility])   • GERD (gastroesophageal reflux disease)   • CHF (congestive heart failure) (CMS/[facility])   • Cardiomyopathy (CMS/[facility])   • Asthma   • Arthritis of right knee   • Right rotator cuff tear   • Hypothyroidism   • Dyspnea and respiratory abnormalities   • Orthopnea   • IHSS (idiopathic hypertrophic subaortic stenosis) (CMS/[facility])   • AICD (automatic cardioverter/defibrillator) present   • Anticoagulant long-term use   • Hyperglycemia, drug-induced   • Chronic pain of right knee   • Diverticulitis   • CAD (coronary artery disease)   • Hypertrophic nonobstructive cardiomyopathy (CMS/[facility])   • ICD (implantable cardioverter-defibrillator) discharge   • Menopause   • Non-obstructive hypertrophic cardiomyopathy (CMS/[facility])   • Insomnia   • History of total knee arthroplasty, right   • Closed fracture of odontoid process of axis (CMS/[facility])   • Atelectasis   • Chronic diastolic CHF (congestive heart failure) (CMS/[facility])   • Diabetes mellitus (CMS/[facility])   • Chronic gout involving 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   • Other displaced fracture of first cervical vertebra, subsequent encounter for fracture with routine healing   • Left shoulder pain     Past Medical History:    Diagnosis Date   • Anemia    • Angina at rest (CMS/HCC)     history   • Arthritis    • Atrial fibrillation (CMS/HCC)    • Cardiac defibrillator in place 01/2009    medtronic    • Cardiomyopathy (CMS/HCC)     Hypertrophic Cardiomyopathy   • Cataract     left   • CHF (congestive heart failure) (CMS/HCC)    • Chronic pain    • Coronary artery disease    • Diabetes mellitus (CMS/HCC)     Type II   • Diverticulosis     hx diverticulitis   • GERD (gastroesophageal reflux disease)    • Heart murmur    • History of bronchitis    • History of depression    • History of pneumonia    • History of transfusion     no reaction   • History of tumor     left ocular tumor, treated with steroids   • Hyperlipidemia    • Hypertension    • Migraines    • On anticoagulant therapy    • Osteoporosis    • Palpitations    • Shoulder pain     left   • Sleep apnea     history retested 2019 and was told doesn't have anymore no machine      • Thyroid disease    • Vasovagal syncope    • Vertigo      Past Surgical History:   Procedure Laterality Date   • ANTERIOR CERVICAL DISCECTOMY W/ FUSION  2012    C5-7   • APPENDECTOMY  1970   • AUGMENTATION MAMMAPLASTY     • BILATERAL BREAST REDUCTION     • BREAST BIOPSY     • CARDIAC CATHETERIZATION  02/19/2007   • CARDIAC DEFIBRILLATOR PLACEMENT Left 1999, 2013    Dr. Ya   • CATARACT EXTRACTION Bilateral    • COLONOSCOPY  1996    Dr. Herbert Gonsales    • COLONOSCOPY  8/16/2016    Procedure: COLONOSCOPY with cecum;  Surgeon: Rosalio Sheikh MD;  Location: [facility] ENDOSCOPY;  Service:    • ENDOSCOPY N/A 4/27/2017    Procedure: ESOPHAGOGASTRODUODENOSCOPY WITH BIOPSIES;  Surgeon: Rosalio Sheikh MD;  Location: [facility] ENDOSCOPY;  Service:    • FEMUR SURGERY Left     with metal implant  x3   • FIRST RIB RESECTION Bilateral     and scalenectomy   • HYSTERECTOMY  1989    Total   • KNEE ARTHROSCOPY Bilateral 2014    Dr. Pascual   • KNEE SURGERY Right 06/10/2010   • LAPAROSCOPIC CHOLECYSTECTOMY  1974   •  OOPHORECTOMY     • SHOULDER ARTHROSCOPY W/ ROTATOR CUFF REPAIR Bilateral 05/20/2011   • SHOULDER SURGERY Right    • TONSILLECTOMY     • TOTAL KNEE ARTHROPLASTY Right 4/18/2018    Procedure: RIGHT TOTAL KNEE ARTHROPLASTY WITH STEVE NAVIGATION;  Surgeon: Bravo Pascual MD;  Location: [facility];  Service: Orthopedics   • TOTAL SHOULDER ARTHROPLASTY W/ DISTAL CLAVICLE EXCISION Right 2/2/2017    Procedure: TOTAL SHOULDER REVERSE ARTHROPLASTY;  Surgeon: Juan Antonio Anne MD;  Location: [facility];  Service:    • TOTAL SHOULDER ARTHROPLASTY W/ DISTAL CLAVICLE EXCISION Left 10/22/2020    Procedure: TOTAL SHOULDER REVERSE ARTHROPLASTY;  Surgeon: Juan Antonio Anne MD;  Location: [facility] OR;  Service: Orthopedics;  Laterality: Left;     General Information     Row Name 10/23/20 1342          OT Time and Intention    Document Type  evaluation  -RB     Mode of Treatment  individual therapy;occupational therapy  -RB     Row Name 10/23/20 1342          General Information    Patient Profile Reviewed  yes  -RB     Prior Level of Function  independent:;ADL's;transfer  -RB     Existing Precautions/Restrictions  fall  -RB     Barriers to Rehab  none identified  -RB     Row Name 10/23/20 1342          Living Environment    Lives With  alone  -RB     Row Name 10/23/20 1342          Cognition    Orientation Status (Cognition)  oriented x 4  -RB     Row Name 10/23/20 1342          Safety Issues, Functional Mobility    Impairments Affecting Function (Mobility)  range of motion (ROM);pain;sensation/sensory awareness;strength  -RB       User Key  (r) = Recorded By, (t) = Taken By, (c) = Cosigned By    Initials Name Provider Type    RB Racheal Harrison OT Occupational Therapist        Mobility/ADL's     Row Name 10/23/20 1342          Bed Mobility    Bed Mobility  bed mobility (all) activities  -RB     All Activities, [place] (Bed Mobility)  modified independence  -RB     Bed Mobility, Safety Issues  decreased use of  arms for pushing/pulling  -RB     Assistive Device (Bed Mobility)  bed rails  -RB     Row Name 10/23/20 1342          Transfers    Transfers  bed-chair transfer;toilet transfer  -RB     Bed-Chair [place] (Transfers)  modified independence  -RB     [place] Level (Toilet Transfer)  modified independence  -RB     Row Name 10/23/20 1342          Functional Mobility    Functional Mobility- Ind. Level  conditional independence  -RB     Row Name 10/23/20 1342          Activities of Daily Living    BADL Assessment/Intervention  grooming;toileting  -RB     Row Name 10/23/20 1342          Mobility    Extremity Weight-bearing Status  left upper extremity  -RB     Left Upper Extremity (Weight-bearing Status)  non weight-bearing (NWB)  -RB     Row Name 10/23/20 1342          Grooming Assessment/Training    [place] Level (Grooming)  grooming skills  -RB     Position (Grooming)  sink side  -RB     Row Name 10/23/20 1342          Toileting Assessment/Training    [place] Level (Toileting)  toileting skills;modified independence  -RB     Position (Toileting)  unsupported standing  -RB       User Key  (r) = Recorded By, (t) = Taken By, (c) = Cosigned By    Initials Name Provider Type    RB Racheal Harrison OT Occupational Therapist        Obj/Interventions     Row Name 10/23/20 1343          Sensory Assessment (Somatosensory)    Sensory Assessment (Somatosensory)  -- LUE nerve block remains - numbness/tingling in LUE  -RB     Row Name 10/23/20 1343          Vision Assessment/Intervention    Visual Impairment/Limitations  WNL  -RB     Row Name 10/23/20 1343          Range of Motion Comprehensive    Comment, General Range of Motion  L shoulder pendulums WFL. min elbow AROM due to block. wrist/digits WFL.  -RB     Row Name 10/23/20 1343          Strength Comprehensive (MMT)    Comment, General Manual Muscle Testing (MMT) Assessment  LUE not tested. RUE WFL.  -RB     Row Name 10/23/20 1343          Therapeutic  Exercise    Therapeutic Exercise  shoulder;wrist;hand;elbow/forearm Shoulder pendulums WFL. L AAROM elbow, AROM wrist/digits.  -RB       User Key  (r) = Recorded By, (t) = Taken By, (c) = Cosigned By    Initials Name Provider Type    RB Racheal Harrison, OT Occupational Therapist        Goals/Plan     Row Name 10/23/20 921          Bed Mobility Goal 1 (OT)    Activity/Assistive Device (Bed Mobility Goal 1, OT)  bed mobility activities, all  -RB     [place] Level/Cues Needed (Bed Mobility Goal 1, OT)  modified independence  -RB     Time Frame (Bed Mobility Goal 1, OT)  short term goal (STG);2 weeks  -RB     Progress/Outcomes (Bed Mobility Goal 1, OT)  goal met  -RB     Row Name 10/23/20 [facility]          Transfer Goal 1 (OT)    Activity/Assistive Device (Transfer Goal 1, OT)  transfers, all  -RB     [place] Level/Cues Needed (Transfer Goal 1, OT)  modified independence  -RB     Time Frame (Transfer Goal 1, OT)  short term goal (STG);2 weeks  -RB     Progress/Outcome (Transfer Goal 1, OT)  goal met  -RB     Row Name 10/23/20 [facility]          Toileting Goal 1 (OT)    Activity/Device (Toileting Goal 1, OT)  toileting skills, all  -RB     [place] Level/Cues Needed (Toileting Goal 1, OT)  modified independence  -RB     Time Frame (Toileting Goal 1, OT)  short term goal (STG);2 weeks  -RB     Progress/Outcome (Toileting Goal 1, OT)  goal met  -RB     Row Name 10/23/20 [facility]          Grooming Goal 1 (OT)    Activity/Device (Grooming Goal 1, OT)  grooming skills, all  -RB     [place] (Grooming Goal 1, OT)  modified independence  -RB     Time Frame (Grooming Goal 1, OT)  short term goal (STG);2 weeks  -RB     Progress/Outcome (Grooming Goal 1, OT)  goal met  -RB     Row Name 10/23/20 [facility]          ROM Goal 1 (OT)    ROM Goal 1 (OT)  Pt to complete LUE shoulder ROM HEP with Mod I.  -RB     Time Frame (ROM Goal 1, OT)  short term goal (STG);2 weeks  -RB     Progress/Outcome (ROM Goal 1, OT)  goal met  -RB     Row  Name 10/23/20 1352          Therapy Assessment/Plan (OT)    Planned Therapy Interventions (OT)  BADL retraining;ROM/therapeutic exercise;occupation/activity based interventions;activity tolerance training;patient/caregiver education/training  -RB       User Key  (r) = Recorded By, (t) = Taken By, (c) = Cosigned By    Initials Name Provider Type    RB Racheal Harrison OT Occupational Therapist        Clinical Impression     Row Name 10/23/20 1346          Pain Assessment    Additional Documentation  Pain Scale: Numbers Pre/Post-Treatment (Group)  -RB     Row Name 10/23/20 1347          Pain Scale: Numbers Pre/Post-Treatment    Pretreatment Pain Rating  4/10  -RB     Posttreatment Pain Rating  4/10  -RB     Pain Location - Side  Left  -RB     Pain Location  shoulder  -RB     Pain Intervention(s)  Cold applied;Repositioned;Rest  -RB     Row Name 10/23/20 1345          Plan of Care Review    Plan of Care Reviewed With  patient  -RB     Progress  improving  -RB     Row Name 10/23/20 1340          Therapy Assessment/Plan (OT)    Rehab Potential (OT)  good, to achieve stated therapy goals  -RB     Criteria for Skilled Therapeutic Interventions Met (OT)  no Pt d/c today. Home with children assist and OP therapy  -RB     Therapy Frequency (OT)  evaluation only  -RB     Row Name 10/23/20 1340          Therapy Plan Review/Discharge Plan (OT)    Anticipated Discharge Disposition (OT)  home with assist;home with outpatient therapy services  -RB     Row Name 10/23/20 1343          Vital Signs    O2 Delivery Pre Treatment  room air  -RB     O2 Delivery Intra Treatment  room air  -RB     O2 Delivery Post Treatment  room air  -RB     Pre Patient Position  Sitting  -RB     Intra Patient Position  Standing  -RB     Post Patient Position  Supine  -RB     Row Name 10/23/20 1347          Positioning and Restraints    Pre-Treatment Position  sitting in chair/recliner  -RB     Post Treatment Position  bed  -RB     In Bed  notified  nsg;supine;call light within reach;exit alarm on;encouraged to call for assist;patient within staff view  -RB       User Key  (r) = Recorded By, (t) = Taken By, (c) = Cosigned By    Initials Name Provider Type    Racheal Son OT Occupational Therapist        Outcome Measures     Row Name 10/23/20 6718          How much help from another is currently needed...    Putting on and taking off regular lower body clothing?  4  -RB     Bathing (including washing, rinsing, and drying)  4  -RB     Toileting (which includes using toilet bed pan or urinal)  4  -RB     Putting on and taking off regular upper body clothing  4  -RB     Taking care of personal grooming (such as brushing teeth)  4  -RB     Eating meals  4  -RB     AM-PAC 6 Clicks Score (OT)  24  -RB     Row Name 10/23/20 1354          Functional Assessment    Outcome Measure Options  AM-PAC 6 Clicks Daily Activity (OT)  -RB       User Key  (r) = Recorded By, (t) = Taken By, (c) = Cosigned By    Initials Name Provider Type    Racheal Son OT Occupational Therapist        Occupational Therapy Education                 Title: PT OT SLP Therapies (In Progress)     Topic: Occupational Therapy (Not Started)     Point: ADL training (Not Started)     Description:   Instruct learner(s) on proper safety adaptation and remediation techniques during self care or transfers.   Instruct in proper use of assistive devices.              Learner Progress:  Not documented in this visit.          Point: Home exercise program (Not Started)     Description:   Instruct learner(s) on appropriate technique for monitoring, assisting and/or progressing therapeutic exercises/activities.              Learner Progress:  Not documented in this visit.          Point: Precautions (Not Started)     Description:   Instruct learner(s) on prescribed precautions during self-care and functional transfers.              Learner Progress:  Not documented in this visit.          Point: Body  mechanics (Not Started)     Description:   Instruct learner(s) on proper positioning and spine alignment during self-care, functional mobility activities and/or exercises.              Learner Progress:  Not documented in this visit.                          OT Recommendation and Plan  Planned Therapy Interventions (OT): BADL retraining, ROM/therapeutic exercise, occupation/activity based interventions, activity tolerance training, patient/caregiver education/training  Therapy Frequency (OT): evaluation only  Plan of Care Review  Plan of Care Reviewed With: patient  Progress: improving     Time Calculation:   Time Calculation- OT     Row Name 10/23/20 1341             Time Calculation- OT    OT Start Time  0930  -RB      OT Stop Time  0950  -RB      OT Time Calculation (min)  20 min  -RB      Total Timed Code Minutes- OT  10 minute(s)  -RB      OT Received On  10/23/20  -RB        User Key  (r) = Recorded By, (t) = Taken By, (c) = Cosigned By    Initials Name Provider Type    RB Racheal Harrison OT Occupational Therapist        Therapy Charges for Today     Code Description Service Date Service Provider Modifiers Qty    [id number]  OT EVAL MOD COMPLEXITY 2 10/23/2020 Racheal Harrison OT GO 1    [id number]  OT THER PROC EA 15 MIN 10/23/2020 Racheal Harrison OT GO 1               Racheal Harrison OT  10/23/2020

## 2020-10-23 NOTE — PROGRESS NOTES
Case Management Discharge Note      Final Note: PT d/c home with family suppor.t No d/c orders for DME/RH/HH noted.         Selected Continued Care - Discharged on 10/23/2020 Admission date: 10/22/2020 - Discharge disposition: Home or Self Care    Destination    No services have been selected for the patient.              Durable Medical Equipment    No services have been selected for the patient.              Dialysis/Infusion    No services have been selected for the patient.              Home Medical Care    No services have been selected for the patient.              Therapy    No services have been selected for the patient.              Community Resources    No services have been selected for the patient.                       Final Discharge Disposition Code: 01 - home or self-care

## 2020-10-23 NOTE — PLAN OF CARE
Goal Outcome Evaluation:  Plan of Care Reviewed With: patient  Progress: improving  Outcome Summary: HTN well controlled. pain well controlled

## 2020-10-26 ENCOUNTER — TRANSITIONAL CARE MANAGEMENT TELEPHONE ENCOUNTER (OUTPATIENT)
Dept: CALL CENTER | Facility: HOSPITAL | Age: 70
End: 2020-10-26

## 2020-10-26 NOTE — OUTREACH NOTE
Call Center TCM Note      Responses   [facility] patient discharged from?  [place]   Does the patient have one of the following disease processes/diagnoses(primary or secondary)?  Total Joint Replacement   Joint surgery performed?  Shoulder   TCM attempt successful?  No   Unsuccessful attempts  Attempt 1   Call Status  Left message          Tara Wu MA    10/26/2020, 14:04 EDT

## 2020-10-26 NOTE — OUTREACH NOTE
Call Center TCM Note      Responses   [facility] patient discharged from?  [place]   Does the patient have one of the following disease processes/diagnoses(primary or secondary)?  Total Joint Replacement   Joint surgery performed?  Shoulder   TCM attempt successful?  Yes   Discharge diagnosis  Total shoulder arthro-left   Does the patient have all medications related to this admission filled (includes all antibiotics, pain medications, etc.)  Yes   Is the patient taking all medications as directed (includes completed medication regime)?  Yes   Is the patient able to teach back alternate methods of pain control?  Ice, Reposition, Correct alignment, Shoulder-elevate above heart/ keep in sling as advised, Short, frequent activity   Does the patient have a follow up appointment with their surgeon?  Yes   Has the patient kept scheduled appointments due by today?  Yes   Comments  post op 11/06/2020   Has home health visited the patient within 72 hours of discharge?  N/A   Has the patient began therapy sessions (either in the home or as an out patient)?  No   Does the patient have a wound vac in place?  N/A   Has the patient fallen since discharge?  No   Did the patient receive a copy of their discharge instructions?  Yes   Nursing interventions  Reviewed instructions with patient   What is the patient's perception of their functional status since discharge?  Improving   Is the patient able to teach back signs and symptoms of infection?  Temp >100.4 for 24h or longer, Blisters around incision, Severe discomfort or pain, Shortness of breath or chest pain, Changes in mobility, Increased swelling or redness around incision (not associated with surgical edema)   Is the patient able to teach back how to prevent infection?  Check incision daily, No lotion or creams, Monitor blood sugar if diabetic, Wash hands before and after touching incision, Keep incision covered if drainage, Keep incision covered if pets in house,  No tub baths, hot tub or swimming, Eat well-balanced diet, Shower only as directed by surgeon   Is the patient able to teach back signs and symptoms of DVT?  Redness in calf, Swelling in calf, Area hot to touch, Severe pain in calf, Shortness of breath or chest pain   Is the patient able to teach back home safety measures?  Ability to shower, Modifications with ADLs such as dressing, cooking, toileting, Accessibility to necessary areas in home, Modifications to reach items   Is the patient/caregiver able to teach back the hierarchy of who to call/visit for symptoms/problems? PCP, Specialist, Home health nurse, Urgent Care, ED, 911  Yes   TCM call completed?  Yes   Wrap up additional comments  Pt doing well s/p reerse total shoulder. Declines tcm fwp at this time          Tara Wu MA    10/26/2020, 17:42 EDT

## 2020-11-02 ENCOUNTER — READMISSION MANAGEMENT (OUTPATIENT)
Dept: CALL CENTER | Facility: HOSPITAL | Age: 70
End: 2020-11-02

## 2020-11-03 ENCOUNTER — READMISSION MANAGEMENT (OUTPATIENT)
Dept: CALL CENTER | Facility: HOSPITAL | Age: 70
End: 2020-11-03

## 2020-11-03 NOTE — OUTREACH NOTE
Total Joint Week 2 Survey      Responses   [facility] patient discharged from?  [place]   Does the patient have one of the following disease processes/diagnoses(primary or secondary)?  Total Joint Replacement   Joint surgery performed?  Shoulder   Week 2 attempt successful?  Yes   Call start time  1447   Call end time  1450   Has the patient been back in either the hospital or Emergency Department since discharge?  Yes   Discharge diagnosis  Total shoulder arthro-left   Does the patient have all medications related to this admission filled (includes all antibiotics, pain medications, etc.)  Yes   Is the patient taking all medications as directed (includes completed medication regime)?  Yes   Is the patient able to teach back alternate methods of pain control?  Ice, Shoulder-elevate above heart/ keep in sling as advised, Reposition, Correct alignment, Short, frequent activity   Does the patient have a follow up appointment with their surgeon?  Yes   Has the patient kept scheduled appointments due by today?  N/A   Has home health visited the patient within 72 hours of discharge?  N/A   Psychosocial issues?  No   Has the patient began therapy sessions (either in the home or as an out patient)?  No   Does the patient have a wound vac in place?  N/A   Has the patient fallen since discharge?  No   Did the patient receive a copy of their discharge instructions?  Yes   Nursing interventions  Reviewed instructions with patient   What is the patient's perception of their functional status since discharge?  Improving   Is the patient able to teach back signs and symptoms of infection?  Temp >100.4 for 24h or longer, Incisional drainage, Blisters around incision, Increased swelling or redness around incision (not associated with surgical edema), Severe discomfort or pain, Changes in mobility, Shortness of breath or chest pain   Is the patient able to teach back how to prevent infection?  Check incision daily, Wash  hands before and after touching incision, Keep incision covered if drainage, Keep incision covered if pets in house, Shower only as directed by surgeon, Eat well-balanced diet, No tub baths, hot tub or swimming, No lotion or creams   Is the patient able to teach back signs and symptoms of DVT?  Redness in calf, Swelling in calf, Severe pain in calf, Area hot to touch, Shortness of breath or chest pain   Is the patient able to teach back home safety measures?  Ability to shower, Accessibility to necessary areas in home, Modifications to reach items, Modifications with ADLs such as dressing, cooking, toileting   Did the patient implement home safety suggestions from pre-surgery classes if attended?  N/A   If the patient is a current smoker, are they able to teach back resources for cessation?  Not a smoker   Is the patient/caregiver able to teach back the hierarchy of who to call/visit for symptoms/problems? PCP, Specialist, Home health nurse, Urgent Care, ED, 911  Yes   Week 2 call completed?  Yes          Stefani Diallo RN

## 2020-11-03 NOTE — OUTREACH NOTE
Total Joint Week 2 Survey      Responses   [facility] patient discharged from?  [place]   Does the patient have one of the following disease processes/diagnoses(primary or secondary)?  Total Joint Replacement   Joint surgery performed?  Shoulder   Week 2 attempt successful?  No   Unsuccessful attempts  Attempt 1          Sherine Mcgovern LPN

## 2020-11-06 ENCOUNTER — OFFICE VISIT (OUTPATIENT)
Dept: ORTHOPEDIC SURGERY | Facility: CLINIC | Age: 70
End: 2020-11-06

## 2020-11-06 ENCOUNTER — TELEPHONE (OUTPATIENT)
Dept: ORTHOPEDIC SURGERY | Facility: CLINIC | Age: 70
End: 2020-11-06

## 2020-11-06 VITALS — BODY MASS INDEX: 31.72 KG/M2 | WEIGHT: 168 LBS | HEIGHT: 61 IN | TEMPERATURE: 97.8 F

## 2020-11-06 DIAGNOSIS — Z96.612 S/P REVERSE TOTAL SHOULDER ARTHROPLASTY, LEFT: Primary | ICD-10-CM

## 2020-11-06 DIAGNOSIS — Z09 SURGERY FOLLOW-UP: ICD-10-CM

## 2020-11-06 DIAGNOSIS — Z96.612 S/P REVERSE TOTAL SHOULDER ARTHROPLASTY, LEFT: ICD-10-CM

## 2020-11-06 PROCEDURE — 99024 POSTOP FOLLOW-UP VISIT: CPT | Performed by: ORTHOPAEDIC SURGERY

## 2020-11-06 PROCEDURE — 73030 X-RAY EXAM OF SHOULDER: CPT | Performed by: ORTHOPAEDIC SURGERY

## 2020-11-06 RX ORDER — HYDROCODONE BITARTRATE AND ACETAMINOPHEN 7.5; 325 MG/1; MG/1
1 TABLET ORAL EVERY 4 HOURS PRN
Qty: 50 TABLET | Refills: 0 | Status: SHIPPED | OUTPATIENT
Start: 2020-11-06 | End: 2020-11-06

## 2020-11-06 RX ORDER — HYDROCODONE BITARTRATE AND ACETAMINOPHEN 10; 325 MG/1; MG/1
TABLET ORAL
Qty: 42 TABLET | Refills: 0 | Status: SHIPPED | OUTPATIENT
Start: 2020-11-06

## 2020-11-06 NOTE — TELEPHONE ENCOUNTER
I spoke to Ms. Galicia.  I explained that Dr. Anne does want her to continue on with the Norco 10 mg.  I have discontinued Norco 7.5/325mg and reordered Norco 10/325mg.  She acknowledged understanding and appreciated the assistance.

## 2020-11-06 NOTE — TELEPHONE ENCOUNTER
----- Message from Kristan Galicia sent at 11/6/2020  1:07 PM EST -----  Regarding: Prescription Question  Contact: [phone]  MY CHAT MESSAGE:    Dr Anne, I was taking the Hydrocodone 's but when you put through my new perscription it was written for 7.5-325's.  Did you want me to start taking the lesser dose?

## 2020-11-06 NOTE — PROGRESS NOTES
"Kristan Galicia : 1950 MRN: [id number] DATE: 2020    DIAGNOSIS:  2 week follow up left shoulder arthroplasty      SUBJECTIVE:  Patient returns today for 2 week follow up of left shoulder replacement. Patient reports doing well with no unusual complaints.      OBJECTIVE:    Temp 97.8 °F (36.6 °C)   Ht 154.9 cm (61\")   Wt 76.2 kg (168 lb)   BMI 31.74 kg/m²     Exam:  The incision is well approximated.  No erythema or drainage. Shoulder moves fluidly with pendulums.  The arm is soft and nontender.  Intact motor and sensory function in the lower arm and hand.  Palpable radial pulse.    DIAGNOSTIC STUDIES    Xrays: AP and scapular Y views of the left shoulder are ordered and reviewed for evaluation of the recent shoulder replacement.  The x-rays demonstrate a well positioned, well aligned replacement without complicating factors noted.  In comparison with previous films, there has been no change.    ASSESSMENT: 2 week follow up left shoulder replacement.    PLAN:   1.  Begin PT per protocol--prescription given along with 2 copies of my protocol.  2.  Continue sling until next visit.  3.  Counseled patient about appropriate activity modifications and restrictions, including no driving at this point.    Juan Antonio Anne MD    "

## 2020-11-17 ENCOUNTER — READMISSION MANAGEMENT (OUTPATIENT)
Dept: CALL CENTER | Facility: HOSPITAL | Age: 70
End: 2020-11-17

## 2020-11-17 NOTE — OUTREACH NOTE
Total Joint Month 1 Survey      Responses   [facility] in [place] patient discharged from?  [facility]   Does the patient have one of the following disease processes/diagnoses(primary or secondary)?  Total Joint Replacement   Month 1 attempt successful?  No   Unsuccessful attempts  Attempt 1          Jodi Phillip RN

## 2020-11-23 ENCOUNTER — READMISSION MANAGEMENT (OUTPATIENT)
Dept: CALL CENTER | Facility: HOSPITAL | Age: 70
End: 2020-11-23

## 2020-11-23 NOTE — OUTREACH NOTE
Total Joint Month 1 Survey      Responses   [facility] in [place] patient discharged from?  [facility]   Does the patient have one of the following disease processes/diagnoses(primary or secondary)?  Total Joint Replacement   Month 1 attempt successful?  Yes   Call start time  1537   Call end time  1539   Has the patient been back in either the hospital or Emergency Department since discharge?  No   Is the patient taking all medications as directed (includes completed medication regime)?  Yes   Has the patient kept scheduled appointments due by today?  Yes   Is the patient still receiving Home Health Services?  No   Is the patient still attending therapy sessions(either in the home or as an outpatient)?  Yes   Has the patient fallen since discharge?  Yes   If the patient has fallen, were there any injuries?  Yes   Fall comments  bruised her elbow and knee but did not hurt shoulder   What is the patient's perception of their functional status since discharge?  Improving   Month 1 call completed?  Yes   Wrap up additional comments  No questions starting therapy and no new issues          Jodi Phillip, RN

## 2020-12-11 ENCOUNTER — OFFICE VISIT (OUTPATIENT)
Dept: ORTHOPEDIC SURGERY | Facility: CLINIC | Age: 70
End: 2020-12-11

## 2020-12-11 VITALS — HEIGHT: 61 IN | TEMPERATURE: 97.6 F | WEIGHT: 167.99 LBS | BODY MASS INDEX: 31.72 KG/M2

## 2020-12-11 DIAGNOSIS — Z96.612 S/P REVERSE TOTAL SHOULDER ARTHROPLASTY, LEFT: ICD-10-CM

## 2020-12-11 DIAGNOSIS — R52 PAIN: Primary | ICD-10-CM

## 2020-12-11 PROCEDURE — 99024 POSTOP FOLLOW-UP VISIT: CPT | Performed by: ORTHOPAEDIC SURGERY

## 2020-12-11 PROCEDURE — 73030 X-RAY EXAM OF SHOULDER: CPT | Performed by: ORTHOPAEDIC SURGERY

## 2020-12-11 NOTE — PROGRESS NOTES
"Kristan Galicia : 1950 MRN: [id number] DATE: 2020    DIAGNOSIS: 6 week follow up left shoulder arthroplasty      SUBJECTIVE:  Patient returns today for 6 week follow up of left shoulder replacement.  She is not really having much pain.  Motion is a struggle.  She does tell me that she fell and landed on the shoulder about 2 weeks ago.  She has been having some posterior scapular pain since that.  No significant pain in the shoulder though.  Of note, she never followed through on the therapy recommendation.  She has been trying to do some exercises on her own but she admits she has not been very diligent.    OBJECTIVE:    Temp 97.6 °F (36.4 °C) (Temporal)   Ht 154.9 cm (60.98\")   Wt 76.2 kg (167 lb 15.9 oz)   BMI 31.76 kg/m²     Exam: The incision is well healed. No erythema or drainage.  No bony tenderness over the acromion or the shoulder itself.  She is tender adjacent to the superomedial border of her scapula in the soft tissues there.  Seems like a trigger point in that area.  Her active motion is very poor.  Her passive motion is great though.  The arm is soft and nontender.  Good motor and sensory function.  Palpable distal pulses.     DIAGNOSTIC STUDIES    Xrays: AP and scapular Y views of the left shoulder are ordered and reviewed for evaluation of shoulder replacement.  The plane of the x-rays is a little different.  There is also some parallax from one of the leads that gives the impression that the central screw is bent.  My initial interpretation of the AP x-ray was that the glenosphere may have changed in position but upon further review, I do not think that is the case.  I do not see any obvious fractures.  The Y view looks normal.    ASSESSMENT: 6 week follow up right shoulder replacement    PLAN:   She needs to get into therapy.  I gave her another referral for this.  I want to see her back in another 6 weeks.  I showed her the x-rays.  I explained to her that I do not think there " is a fracture.  We will keep an eye on things.  We may consider a trigger point injection when she comes back and if it is still an issue.  She can come out of the sling at this point and progress her motion as tolerated.    Juan Antonio Anne MD    12/11/2020

## 2020-12-23 ENCOUNTER — READMISSION MANAGEMENT (OUTPATIENT)
Dept: CALL CENTER | Facility: HOSPITAL | Age: 70
End: 2020-12-23

## 2020-12-23 NOTE — OUTREACH NOTE
Total Joint Month 2 Survey      Responses   [facility] patient discharged from?  [place]   Does the patient have one of the following disease processes/diagnoses(primary or secondary)?  Total Joint Replacement   Joint surgery performed?  Shoulder   Month 2 attempt successful?  No   Unsuccessful attempts  Attempt 1          Delvis Flores RN

## 2020-12-28 ENCOUNTER — READMISSION MANAGEMENT (OUTPATIENT)
Dept: CALL CENTER | Facility: HOSPITAL | Age: 70
End: 2020-12-28

## 2020-12-28 NOTE — OUTREACH NOTE
Total Joint Month 2 Survey      Responses   [facility] patient discharged from?  [place]   Does the patient have one of the following disease processes/diagnoses(primary or secondary)?  Total Joint Replacement   Joint surgery performed?  Shoulder   Month 2 attempt successful?  No   Unsuccessful attempts  Attempt 2          Staci Cummins RN

## 2020-12-29 RX ORDER — PANTOPRAZOLE SODIUM 40 MG/1
TABLET, DELAYED RELEASE ORAL
Qty: 90 TABLET | Refills: 2 | OUTPATIENT
Start: 2020-12-29

## 2020-12-29 NOTE — TELEPHONE ENCOUNTER
I have not seen her in 3+ years.  She should either fill this through her pcp or follow up with me if her pcp feels it is appropriate.

## 2021-01-22 ENCOUNTER — OFFICE VISIT (OUTPATIENT)
Dept: ORTHOPEDIC SURGERY | Facility: CLINIC | Age: 71
End: 2021-01-22

## 2021-01-22 VITALS — HEIGHT: 60 IN | BODY MASS INDEX: 32.79 KG/M2 | TEMPERATURE: 97.2 F | WEIGHT: 167 LBS

## 2021-01-22 DIAGNOSIS — Z09 SURGERY FOLLOW-UP: Primary | ICD-10-CM

## 2021-01-22 PROCEDURE — 99024 POSTOP FOLLOW-UP VISIT: CPT | Performed by: ORTHOPAEDIC SURGERY

## 2021-01-22 NOTE — PROGRESS NOTES
"Kristan Galicia : 1950 MRN: [id number] DATE: 2021    DIAGNOSIS: 3 month follow up left shoulder arthroplasty      SUBJECTIVE:  Patient returns today for 3 month follow up of left shoulder replacement.  Patient reports doing well with no unusual complaints. Still in PT and reports making good progress.    OBJECTIVE:    Temp 97.2 °F (36.2 °C)   Ht 152.4 cm (60\")   Wt 75.8 kg (167 lb)   BMI 32.61 kg/m²     Review of Systems negative except as above    Exam:  The incision is well healed. No effusion.  Range of motion is progressing as expected. The arm is soft and nontender.  She remains weak with elevation and abduction.  Sensation intact distally.  Brisk cap refill.    DIAGNOSTIC STUDIES    Xrays: AP, scapular Y and axillary views of the left shoulder are ordered and reviewed for evaluation of shoulder replacement. They demonstrate a well positioned, well aligned replacement without complicating factors noted.  In comparison with previous films there has been no change.    ASSESSMENT: 3 month follow up left shoulder replacement.    PLAN:   1.  Continue appropriate activity modifications and restrictions as discussed  2.  Continue PT per protocol.  3.  I explained that one can expect continued improvement in motion, function, and any residual discomfort for up to 1 year after surgery.  4.  Follow up at 1 year unless experiencing any new or concerning symptoms.    Juan Antonio Anne MD    2021     "

## 2021-01-28 ENCOUNTER — READMISSION MANAGEMENT (OUTPATIENT)
Dept: CALL CENTER | Facility: HOSPITAL | Age: 71
End: 2021-01-28

## 2021-02-01 ENCOUNTER — READMISSION MANAGEMENT (OUTPATIENT)
Dept: CALL CENTER | Facility: HOSPITAL | Age: 71
End: 2021-02-01

## 2021-02-01 NOTE — OUTREACH NOTE
Total Joint Month 3 Survey      Responses   [facility] patient discharged from?  [place]   Does the patient have one of the following disease processes/diagnoses(primary or secondary)?  Total Joint Replacement   Joint surgery performed?  Shoulder   Month 3 attempt successful?  No          Staci Cummins RN

## 2021-02-21 ENCOUNTER — HOSPITAL ENCOUNTER (EMERGENCY)
Facility: HOSPITAL | Age: 71
Discharge: HOME | End: 2021-02-21
Attending: EMERGENCY MEDICINE | Admitting: EMERGENCY MEDICINE

## 2021-02-21 ENCOUNTER — APPOINTMENT (OUTPATIENT)
Dept: GENERAL RADIOLOGY | Facility: HOSPITAL | Age: 71
End: 2021-02-21

## 2021-02-21 VITALS
OXYGEN SATURATION: 96 % | RESPIRATION RATE: 18 BRPM | DIASTOLIC BLOOD PRESSURE: 57 MMHG | SYSTOLIC BLOOD PRESSURE: 117 MMHG | HEIGHT: 60 IN | BODY MASS INDEX: 32.61 KG/M2 | HEART RATE: 69 BPM | TEMPERATURE: 97.2 F

## 2021-02-21 DIAGNOSIS — M25.512 ACUTE PAIN OF LEFT SHOULDER: Primary | ICD-10-CM

## 2021-02-21 PROCEDURE — 73030 X-RAY EXAM OF SHOULDER: CPT

## 2021-02-21 PROCEDURE — 99282 EMERGENCY DEPT VISIT SF MDM: CPT

## 2021-02-21 NOTE — ED TRIAGE NOTES
Pt reports left shoulder pain, states moved arm certain way and felt pop now has pain. Pt has arm in sling from home. Patient masked in first look triage. I was wearing mask and goggles.

## 2021-02-21 NOTE — ED PROVIDER NOTES
" EMERGENCY DEPARTMENT ENCOUNTER    Room Number:  28/28  Date of encounter:  2/21/2021  PCP: Chelly Red APRN  Historian: Patient     I used full protective equipment while examining this patient.  This includes face mask, gloves and protective eyewear.  I washed my hands before entering the room and immediately upon leaving the room      HPI:  Chief Complaint: Left shoulder pain  A complete HPI/ROS/PMH/PSH/SH/FH are unobtainable due to: None    Context: Kristan Galicia is a 70 y.o. female who presents to the ED c/o left shoulder pain.  Patient had left shoulder replacement several months ago by Dr. Anne.  She had healed up very well and today she felt a \"pop\" when moving her left shoulder and had acute onset of pain in the left shoulder which radiates towards the neck.  She denies weakness or numbness to the left arm.      MEDICAL RECORD REVIEW  I did review prior records and note that patient was seen in Dr. Anne's office on 12/11/2020 with left shoulder pain after surgery.  This was related to a fall.  X-rays on that visit showed no obvious fracture or dislocation.    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/2016   • Migraines    • Hyperlipidemia    • History of transfusion    • Heart murmur    • Heart attack (CMS/[facility])    • GERD (gastroesophageal reflux disease)    • CHF (congestive heart failure) (CMS/[facility])    • Cardiomyopathy (CMS/[facility])    • Asthma    • Arthritis of right knee 12/27/2016   • Right rotator cuff tear 02/02/2017   • Hypothyroidism 02/22/2017   • Dyspnea and respiratory abnormalities " 03/03/2017   • Orthopnea 03/03/2017   • IHSS (idiopathic hypertrophic subaortic stenosis) (CMS/[facility]) 03/03/2017   • AICD (automatic cardioverter/defibrillator) present 03/03/2017   • Anticoagulant long-term use 03/03/2017   • Hyperglycemia, drug-induced 03/06/2017   • Chronic pain of right knee 03/17/2017   • Diverticulitis 04/05/2016   • CAD (coronary artery disease) 08/07/2017   • Hypertrophic nonobstructive cardiomyopathy (CMS/[facility]) 06/25/2013   • ICD (implantable cardioverter-defibrillator) discharge 04/04/2016   • Menopause 08/07/2017   • Non-obstructive hypertrophic cardiomyopathy (CMS/[facility]) 08/07/2017   • Insomnia 03/27/2018   • History of total knee arthroplasty, right 05/02/2018   • Closed fracture of odontoid process of axis (CMS/HCC) 09/14/2018   • Atelectasis 09/16/2018   • Chronic diastolic CHF (congestive heart failure) (CMS/HCC) 08/14/2018   • Diabetes mellitus (CMS/HCC) 06/18/2019   • Chronic gout involving 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 06/18/2019   • Other displaced fracture of first cervical vertebra, subsequent encounter for fracture with routine healing 09/27/2018   • Left shoulder pain 09/09/2020     Resolved Ambulatory Problems     Diagnosis Date Noted   • Depression    • Anxiety    • RSV bronchiolitis 03/06/2017   • Hypoxia 09/16/2018     Past Medical History:   Diagnosis Date   • Anemia    • Angina at rest (CMS/HCC)    • Arthritis    • Atrial fibrillation (CMS/HCC)    • Cardiac defibrillator in place 01/2009   • Cataract    • Chronic pain    • Coronary artery disease    • Diverticulosis    • History of bronchitis    • History of depression    • History of pneumonia    • History of tumor    • Hypertension    • On anticoagulant therapy    • Shoulder pain    • Sleep apnea          PAST SURGICAL HISTORY  Past Surgical History:   Procedure Laterality Date   • ANTERIOR CERVICAL DISCECTOMY W/ FUSION  2012    C5-7   • APPENDECTOMY  1970   • AUGMENTATION MAMMAPLASTY     • BILATERAL BREAST REDUCTION      • BREAST BIOPSY     • CARDIAC CATHETERIZATION  02/19/2007   • CARDIAC DEFIBRILLATOR PLACEMENT Left 1999, 2013    Dr. Ya   • CATARACT EXTRACTION Bilateral    • COLONOSCOPY  1996    Dr. Herbert Gonsales    • COLONOSCOPY  8/16/2016    Procedure: COLONOSCOPY with cecum;  Surgeon: Rosalio Sheikh MD;  Location: [facility] ENDOSCOPY;  Service:    • ENDOSCOPY N/A 4/27/2017    Procedure: ESOPHAGOGASTRODUODENOSCOPY WITH BIOPSIES;  Surgeon: Rosalio Sheikh MD;  Location: [facility] ENDOSCOPY;  Service:    • FEMUR SURGERY Left     with metal implant  x3   • FIRST RIB RESECTION Bilateral     and scalenectomy   • HYSTERECTOMY  1989    Total   • KNEE ARTHROSCOPY Bilateral 2014    Dr. Pascual   • KNEE SURGERY Right 06/10/2010   • LAPAROSCOPIC CHOLECYSTECTOMY  1974   • OOPHORECTOMY     • SHOULDER ARTHROSCOPY W/ ROTATOR CUFF REPAIR Bilateral 05/20/2011   • SHOULDER SURGERY Right    • TONSILLECTOMY     • TOTAL KNEE ARTHROPLASTY Right 4/18/2018    Procedure: RIGHT TOTAL KNEE ARTHROPLASTY WITH STEVE NAVIGATION;  Surgeon: Bravo Pascual MD;  Location: [facility] OR;  Service: Orthopedics   • TOTAL SHOULDER ARTHROPLASTY W/ DISTAL CLAVICLE EXCISION Right 2/2/2017    Procedure: TOTAL SHOULDER REVERSE ARTHROPLASTY;  Surgeon: Juan Antonio Anne MD;  Location: [facility] OR;  Service:    • TOTAL SHOULDER ARTHROPLASTY W/ DISTAL CLAVICLE EXCISION Left 10/22/2020    Procedure: TOTAL SHOULDER REVERSE ARTHROPLASTY;  Surgeon: Juan Antonio Anne MD;  Location: [facility] OR;  Service: Orthopedics;  Laterality: Left;         FAMILY HISTORY  Family History   Problem Relation Age of Onset   • Heart attack Brother    • Heart disease Brother    • Hyperthyroidism Mother    • Cancer Mother    • Heart disease Mother    • Osteoporosis Mother    • No Known Problems Father          car accident   • Cirrhosis Maternal Grandmother    • No Known Problems Maternal Grandfather    • No Known Problems Paternal Grandmother    • No Known Problems Paternal Grandfather     • Breast cancer Maternal Aunt    • Malig Hyperthermia Neg Hx          SOCIAL HISTORY  Social History     Socioeconomic History   • Marital status:      Spouse name: Not on file   • Number of children: 1   • Years of education: Not on file   • Highest education level: Not on file   Occupational History   • Occupation:    Tobacco Use   • Smoking status: Former Smoker     Packs/day: 0.50     Years: 17.00     Pack years: 8.50     Types: Cigarettes     Quit date: 1985     Years since quittin.4   • Smokeless tobacco: Never Used   Substance and Sexual Activity   • Alcohol use: No   • Drug use: Never   • Sexual activity: Defer         ALLERGIES  Adhesive tape, Nitroglycerin, Aspirin, Biaxin [clarithromycin], Codeine, Darvon [propoxyphene], Levaquin [levofloxacin], and Tequin [gatifloxacin]       REVIEW OF SYSTEMS  Review of Systems   Constitutional: Negative for fever.   Respiratory: Negative for shortness of breath.    Cardiovascular: Negative for chest pain.   Psychiatric/Behavioral: Suicidal ideas: .discharge.           PHYSICAL EXAM    I have reviewed the triage vital signs and nursing notes.    ED Triage Vitals [21 1612]   Temp Heart Rate Resp BP SpO2   97.2 °F (36.2 °C) 69 18 -- 96 %      Temp src Heart Rate Source Patient Position BP Location FiO2 (%)   Tympanic -- -- -- --       Physical Exam  GENERAL: Alert female in no obvious distress.  Vitals reviewed and are fairly unremarkable  HENT: nares patent  EYES: no scleral icterus  CV: regular rhythm, regular rate-no murmur  RESPIRATORY: normal effort, clear to auscultation bilaterally  ABDOMEN: soft  MUSCULOSKELETAL: Spine-no significant tenderness to palpation along the cervical or thoracic spine.  Upper extremities-there is mild diffuse tenderness to palpation about the left shoulder and there is limited range of motion.  There is no obvious bony deformity.  Examination of the distal left arm is unremarkable without  tenderness at the elbow wrist or hand.  Distal strength sensation pulses are grossly intact.  NEURO: Strength, sensation, and coordination are grossly intact.  Speech and mentation are unremarkable  SKIN: warm, dry        RADIOLOGY  Xr Shoulder 2+ View Left    Result Date: 2/21/2021  TWO-VIEW LEFT SHOULDER  HISTORY: Recent shoulder replacement. Fell to a pop. Pain.  FINDINGS: The patient has previous total shoulder replacement and the alignment appears satisfactory and unchanged from 10/22/2020. There is no evidence of fracture or dislocation.  This report was finalized on 2/21/2021 4:44 PM by Dr. Herberth Mayer M.D.        I ordered the above noted radiological studies. Reviewed by me and discussed with radiologist.  See dictation for official radiology interpretation.      PROCEDURES  Procedures      MEDICATIONS GIVEN IN ER    Medications - No data to display      PROGRESS, DATA ANALYSIS, CONSULTS, AND MEDICAL DECISION MAKING    All labs have been independently reviewed by me.  All radiology studies have been reviewed by me and discussed with radiologist dictating the report.   EKG's independently viewed and interpreted by me.  Discussion below represents my analysis of pertinent findings related to patient's condition, differential diagnosis, treatment plan and final disposition.           AS OF 17:08 EST VITALS:    BP -    HR - 69  TEMP - 97.2 °F (36.2 °C) (Tympanic)  O2 SATS - 96%      DIAGNOSIS  Final diagnoses:   Acute pain of left shoulder         DISPOSITION  DISCHARGE    Patient discharged in stable condition.    Reviewed implications of results, diagnosis, meds, responsibility to follow up, warning signs and symptoms of possible worsening, potential complications and reasons to return to ER, including increased pain, weakness or as needed.    Patient/Family voiced understanding of above instructions.    Discussed plan for discharge, as there is no emergent indication for admission. Patient referred to  primary care provider for BP management due to today's BP. Pt/family is agreeable and understands need for follow up and repeat testing.  Pt is aware that discharge does not mean that nothing is wrong but it indicates no emergency is present that requires admission and they must continue care with follow-up as given below or physician of their choice.     FOLLOW-UP  Juan Antonio Anne MD  [address] [address]  [phone]               Medication List      No changes were made to your prescriptions during this visit.                Delvis Ibrahim MD  02/21/21 [id number]

## 2021-02-21 NOTE — DISCHARGE INSTRUCTIONS
Please use shoulder immobilizer until pain is improved.  After 48 hours you should do range of motion exercises to prevent frozen shoulder.  Please contact Dr. Anne for follow-up if symptoms are not improved in the next 5 to 7 days.

## 2021-02-22 ENCOUNTER — EPISODE CHANGES (OUTPATIENT)
Dept: CASE MANAGEMENT | Facility: OTHER | Age: 71
End: 2021-02-22

## 2021-02-23 ENCOUNTER — TELEPHONE (OUTPATIENT)
Dept: ORTHOPEDIC SURGERY | Facility: CLINIC | Age: 71
End: 2021-02-23

## 2021-02-23 NOTE — TELEPHONE ENCOUNTER
----- Message from Kristan Galicia sent at 2/23/2021 11:09 AM EST -----  Regarding: Visit Follow-Up Question  Contact: [phone]  Dr Anne, about q week or so ago, I put my left hand on the counter so I could bend down and when I did, I felt a little pull.  I didn't think anything about it but then two days later, my shoulder was really sore.  This past Sunday, I went to sit down and raise my left shoulder and when I did I felt and heard a tearing sound.  Immediately, I was unable to move my arm because the pain was so awful.  It hurt so bad, it was making me nauseous.    I had my daughter take me to the ER @[facility].  They did two xrays and said there was no dislocation or bone damage and that I tore a ligament.  If it was not better in about 4-5 days to contact you.  It's not any better, I've been icing it and taking Tylenol for the pain but the Tylenol is not even touching the pain.  Is there anything else I can do for the pain?  The ER Dr said he would send you a follow-up note.    Thank you,  Kristan Galicia

## 2021-02-24 ENCOUNTER — TELEPHONE (OUTPATIENT)
Dept: ORTHOPEDIC SURGERY | Facility: CLINIC | Age: 71
End: 2021-02-24

## 2021-02-24 NOTE — TELEPHONE ENCOUNTER
I spoke to MsLeonard Frida.  I explained that she should come in for evaluation with Dr. Anne.  I will have our office give her a call to schedule an appointment for Friday.  She agreed with this plan.

## 2021-02-24 NOTE — TELEPHONE ENCOUNTER
----- Message from Kristan Galicia sent at 2/24/2021 11:09 AM EST -----  Regarding: Complaint  Contact: [phone]  Dr Anne,    I went to the ER at [facility], [place], operated by [facility] on Sunday, February 21st.  I hurt my left shoulder and when I went to raise it up, I had a terrible pain and a ripping sound.  They did 2 xrsys and said there was no dislocation or bone fracture.  I was told yo ice it, take tylenol and to call you if it wasn't feeling better in 3-4 days.   I'm still having a lot of pain whenever I move it.  Is there anything else I patricia do to hel p with the pain?    Kristan Galicia

## 2021-02-26 ENCOUNTER — OFFICE VISIT (OUTPATIENT)
Dept: ORTHOPEDIC SURGERY | Facility: CLINIC | Age: 71
End: 2021-02-26

## 2021-02-26 VITALS — WEIGHT: 167 LBS | HEIGHT: 60 IN | TEMPERATURE: 97.2 F | BODY MASS INDEX: 32.79 KG/M2

## 2021-02-26 DIAGNOSIS — Z96.612 S/P REVERSE TOTAL SHOULDER ARTHROPLASTY, LEFT: Primary | ICD-10-CM

## 2021-02-26 PROCEDURE — 99214 OFFICE O/P EST MOD 30 MIN: CPT | Performed by: ORTHOPAEDIC SURGERY

## 2021-02-26 NOTE — PROGRESS NOTES
"  Patient: Kristan Galicia    YOB: 1950    Medical Record Number: [id number]    Chief Complaints:  Left shoulder pain    History of Present Illness:     70 y.o. female patient who presents for her left shoulder.  She is now about 4 months out from a reverse.  She tells me she was doing great until about 5 days ago.  She lifted up her arm and she \"felt something tear\" in the top of the shoulder.  Since then, she reports severe constant throbbing pain in her shoulder blade.  The pain is worse when trying to reach or lift overhead.    Allergies:   Allergies   Allergen Reactions   • Adhesive Tape Other (See Comments)     SKIN BLISTERS   • Nitroglycerin Other (See Comments)     Was told by cardiologist not to take due to heart condition & defibrillator   • Aspirin Swelling   • Biaxin [Clarithromycin] Other (See Comments)     BLISTERS AROUND MOTH  Also known as Bioxen    • Codeine Swelling   • Darvon [Propoxyphene] Swelling   • Levaquin [Levofloxacin] Other (See Comments)     BLISTERS AROUND MOUTH   • Tequin [Gatifloxacin] Other (See Comments)     BLISTERS AROUND MOUTH  Also known Tequine        Home Medications:    Current Outpatient Medications:   •  alendronate (FOSAMAX) 70 MG tablet, TAKE 1 TABLET BY MOUTH EVERY 7 (SEVEN) DAYS., Disp: 12 tablet, Rfl: 3  •  allopurinol (ZYLOPRIM) 100 MG tablet, Take 1 tablet by mouth Daily., Disp: 90 tablet, Rfl: 3  •  amiodarone (PACERONE) 200 MG tablet, Take 100 mg by mouth Every Night., Disp: , Rfl:   •  atorvastatin (LIPITOR) 80 MG tablet, Take 1 tablet by mouth every night at bedtime., Disp: 90 tablet, Rfl: 3  •  Blood Glucose Monitoring Suppl (ACCU-CHEK OTILIA PLUS) W/DEVICE kit, Dx e11.9 use to check BS BID, ok to substitute formulary preferred, Disp: 1 kit, Rfl: 0  •  carvedilol (COREG) 12.5 MG tablet, Take 12.5 mg by mouth 2 (Two) Times a Day., Disp: , Rfl:   •  dabigatran etexilate (PRADAXA) 150 MG capsu, Take 150 mg by mouth 2 (Two) Times a Day. Pt to stop 2 " days before surgery per cardiologist MD Ray for OR scheduled 10/22/2020, Disp: , Rfl:   •  diclofenac (FLECTOR) 1.3 % patch patch, Apply 1 patch topically to the appropriate area as directed Every 12 (Twelve) Hours., Disp: 30 patch, Rfl: 5  •  docusate sodium 100 MG capsule, Take 1 capsule by mouth 2 (Two) Times a Day As Needed for Constipation., Disp: 60 capsule, Rfl: 1  •  DULoxetine (CYMBALTA) 60 MG capsule, Take 1 capsule by mouth Daily., Disp: 90 capsule, Rfl: 3  •  glucose blood (ACCU-CHEK OTILIA PLUS) test strip, Dx e11.9 use to check BS BID, ok to substitute formulary preferred, Disp: 60 each, Rfl: 11  •  HYDROcodone-acetaminophen (NORCO)  MG per tablet, Take 1-2 tablets by mouth every 4-6 hours as needed for pain.  Not to exceed 6 tabs per day., Disp: 42 tablet, Rfl: 0  •  Lancets (ACCU-CHEK MULTICLIX) lancets, Dx e11.9 use to check BS BID, ok to substitute formulary preferred, Disp: 60 each, Rfl: 11  •  levothyroxine (SYNTHROID, LEVOTHROID) 75 MCG tablet, TAKE 1 TABLET BY MOUTH EVERY DAY, Disp: 90 tablet, Rfl: 2  •  montelukast (SINGULAIR) 10 MG tablet, Take 1 tablet by mouth Daily., Disp: 90 tablet, Rfl: 3  •  ondansetron (ZOFRAN) 4 MG tablet, Take 1 tablet by mouth Every 6 (Six) Hours As Needed for Nausea or Vomiting., Disp: 12 tablet, Rfl: 0  •  pantoprazole (PROTONIX) 40 MG EC tablet, TAKE 1 TABLET BY MOUTH EVERY DAY, Disp: 90 tablet, Rfl: 2  •  Semaglutide,0.25 or 0.5MG/DOS, (Ozempic, 0.25 or 0.5 MG/DOSE,) 2 MG/1.5ML solution pen-injector, Inject 0.5 mg under the skin into the appropriate area as directed 1 (One) Time Per Week., Disp: 4 pen, Rfl: 11  •  spironolactone (ALDACTONE) 25 MG tablet, Take 25 mg by mouth Every Morning., Disp: , Rfl:   No current facility-administered medications for this visit.     Facility-Administered Medications Ordered in Other Visits:   •  Chlorhexidine Gluconate 2 % pads 1 each, 1 each, Apply externally, Take As Directed, Juan Antonio Anne MD    Past Medical  History:   Diagnosis Date   • Anemia    • Angina at rest (CMS/HCC)     history   • Arthritis    • Atrial fibrillation (CMS/HCC)    • Cardiac defibrillator in place 01/2009    medtronic    • Cardiomyopathy (CMS/HCC)     Hypertrophic Cardiomyopathy   • Cataract     left   • CHF (congestive heart failure) (CMS/HCC)    • Chronic pain    • Coronary artery disease    • Diabetes mellitus (CMS/HCC)     Type II   • Diverticulosis     hx diverticulitis   • GERD (gastroesophageal reflux disease)    • Heart murmur    • History of bronchitis    • History of depression    • History of pneumonia    • History of transfusion     no reaction   • History of tumor     left ocular tumor, treated with steroids   • Hyperlipidemia    • Hypertension    • Migraines    • On anticoagulant therapy    • Osteoporosis    • Palpitations    • Shoulder pain     left   • Sleep apnea     history retested 2019 and was told doesn't have anymore no machine      • Thyroid disease    • Vasovagal syncope    • Vertigo        Past Surgical History:   Procedure Laterality Date   • ANTERIOR CERVICAL DISCECTOMY W/ FUSION  2012    C5-7   • APPENDECTOMY  1970   • AUGMENTATION MAMMAPLASTY     • BILATERAL BREAST REDUCTION     • BREAST BIOPSY     • CARDIAC CATHETERIZATION  02/19/2007   • CARDIAC DEFIBRILLATOR PLACEMENT Left 1999, 2013    Dr. Ya   • CATARACT EXTRACTION Bilateral    • COLONOSCOPY  1996    Dr. Herbert Gonsales    • COLONOSCOPY  8/16/2016    Procedure: COLONOSCOPY with cecum;  Surgeon: Rosalio Sheikh MD;  Location: [facility] ENDOSCOPY;  Service:    • ENDOSCOPY N/A 4/27/2017    Procedure: ESOPHAGOGASTRODUODENOSCOPY WITH BIOPSIES;  Surgeon: Rosalio Sheikh MD;  Location: [facility] ENDOSCOPY;  Service:    • FEMUR SURGERY Left     with metal implant  x3   • FIRST RIB RESECTION Bilateral     and scalenectomy   • HYSTERECTOMY  1989    Total   • KNEE ARTHROSCOPY Bilateral 2014    Dr. Pascual   • KNEE SURGERY Right 06/10/2010   • LAPAROSCOPIC CHOLECYSTECTOMY  1974    • OOPHORECTOMY     • SHOULDER ARTHROSCOPY W/ ROTATOR CUFF REPAIR Bilateral 2011   • SHOULDER SURGERY Right    • TONSILLECTOMY     • TOTAL KNEE ARTHROPLASTY Right 2018    Procedure: RIGHT TOTAL KNEE ARTHROPLASTY WITH STEVE NAVIGATION;  Surgeon: Bravo Pascual MD;  Location: [facility];  Service: Orthopedics   • TOTAL SHOULDER ARTHROPLASTY W/ DISTAL CLAVICLE EXCISION Right 2017    Procedure: TOTAL SHOULDER REVERSE ARTHROPLASTY;  Surgeon: Juan Antonio Anne MD;  Location: [facility] OR;  Service:    • TOTAL SHOULDER ARTHROPLASTY W/ DISTAL CLAVICLE EXCISION Left 10/22/2020    Procedure: TOTAL SHOULDER REVERSE ARTHROPLASTY;  Surgeon: Juan Antonio Anne MD;  Location: [facility] OR;  Service: Orthopedics;  Laterality: Left;       Social History     Occupational History   • Occupation:    Tobacco Use   • Smoking status: Former Smoker     Packs/day: 0.50     Years: 17.00     Pack years: 8.50     Types: Cigarettes     Quit date: 1985     Years since quittin.4   • Smokeless tobacco: Never Used   Substance and Sexual Activity   • Alcohol use: No   • Drug use: Never   • Sexual activity: Defer      Social History     Social History Narrative   • Not on file       Family History   Problem Relation Age of Onset   • Heart attack Brother    • Heart disease Brother    • Hyperthyroidism Mother    • Cancer Mother    • Heart disease Mother    • Osteoporosis Mother    • No Known Problems Father          car accident   • Cirrhosis Maternal Grandmother    • No Known Problems Maternal Grandfather    • No Known Problems Paternal Grandmother    • No Known Problems Paternal Grandfather    • Breast cancer Maternal Aunt    • Malig Hyperthermia Neg Hx        Review of Systems:      Constitutional: Denies fever, shaking or chills   Eyes: Denies change in visual acuity   HEENT: Denies nasal congestion or sore throat   Respiratory: Denies cough or shortness of breath   Cardiovascular: Denies  "chest pain or edema  Endocrine: Denies tremors, palpitations, intolerance of heat or cold, polyuria, polydipsia.  GI: Denies abdominal pain, nausea, vomiting, bloody stools or diarrhea  : Denies frequency, urgency, incontinence, retention, or nocturia.  Musculoskeletal: Denies numbness, tingling or loss of motor function except as above  Integument: Denies rash, lesion or ulceration   Neurologic: Denies headache or focal weakness, deficits  Heme: Denies spontaneous or excessive bleeding, epistaxis, hematuria, melena, fatigue, enlarged or tender lymph nodes.      All other pertinent positives and negatives as noted above in HPI.    Physical Exam: 70 y.o. female  Vitals:    02/26/21 0920   Temp: 97.2 °F (36.2 °C)   Weight: 75.8 kg (167 lb)   Height: 152.4 cm (60\")     General:  Patient is awake and alert.  Appears in no acute distress or discomfort.    Psych:  Affect and demeanor are appropriate.    Eyes:  Conjunctiva and sclera appear grossly normal.  Eyes track well and EOM seem to be intact.    Ears:  No gross abnormalities.  Hearing adequate for the exam.    Cardiovascular:  Regular rate and rhythm.    Lungs:  Good chest expansion.  Breathing unlabored.    Extremities: Left shoulder is examined.  Surgical incision is healed.  She has focally fairly exquisite tenderness over the scapular spine and base of the acromion.  No palpable defect or step-off.  Active and passive motion are both very painful for her in this area.  Her arm is soft.  Intact motor and sensory function in her wrist and hand.    Imaging: Outside x-rays including AP and orthogonal views of the right shoulder are reviewed.  These are from February 21.  These are compared to her previous x-rays here in our office.  No acute abnormalities.  Components appear well fixed and well-positioned.    Assessment/Plan: Acromion fracture status post left reverse total shoulder    I am guessing she has a nondisplaced acromion fracture.  I recommend she " continue use of the sling.  I am going to send her for a CT scan.  I will call her with the results.  Going to set her up to come back in a month but I told her I will call her before then with the results of the CT.  We discussed a possible prescription for pain medicine.  She is in pain management and gets her pain medicine there.    Juan Antonio Anne MD    02/26/2021

## 2021-03-04 ENCOUNTER — PATIENT OUTREACH (OUTPATIENT)
Dept: CASE MANAGEMENT | Facility: OTHER | Age: 71
End: 2021-03-04

## 2021-03-04 NOTE — OUTREACH NOTE
Care Coordination Note    In basket message to PCP office related to unsuccessful attempts to contact patient to offer ACM services.     Biju Sr RN  Ambulatory     3/4/2021, 12:29 EST

## 2021-03-08 ENCOUNTER — HOSPITAL ENCOUNTER (OUTPATIENT)
Dept: CT IMAGING | Facility: HOSPITAL | Age: 71
Discharge: HOME | End: 2021-03-08
Admitting: ORTHOPAEDIC SURGERY

## 2021-03-08 DIAGNOSIS — Z96.612 S/P REVERSE TOTAL SHOULDER ARTHROPLASTY, LEFT: ICD-10-CM

## 2021-03-08 PROCEDURE — 73200 CT UPPER EXTREMITY W/O DYE: CPT

## 2021-03-08 PROCEDURE — 76376 3D RENDER W/INTRP POSTPROCES: CPT

## 2021-03-10 ENCOUNTER — TELEPHONE (OUTPATIENT)
Dept: ORTHOPEDIC SURGERY | Facility: CLINIC | Age: 71
End: 2021-03-10

## 2021-03-10 NOTE — TELEPHONE ENCOUNTER
I spoke with her.  I explained the CT scan results.  She has an appointment to follow-up with me in about 2-1/2 weeks.  I told her to keep that appointment.  I want her to continue use of the sling in the meantime.  I instructed her on appropriate activity modifications and restrictions in the interim as well.

## 2021-03-10 NOTE — TELEPHONE ENCOUNTER
----- Message from Kristan Galicia sent at 3/10/2021  3:23 PM EST -----  Regarding: Test Results Question  Contact: [phone]  Hi Harvey Xavier you get the results back from my CT Scan?

## 2021-03-12 ENCOUNTER — TELEPHONE (OUTPATIENT)
Dept: FAMILY MEDICINE CLINIC | Facility: CLINIC | Age: 71
End: 2021-03-12

## 2021-03-16 ENCOUNTER — BULK ORDERING (OUTPATIENT)
Dept: CASE MANAGEMENT | Facility: OTHER | Age: 71
End: 2021-03-16

## 2021-03-16 DIAGNOSIS — Z23 IMMUNIZATION DUE: ICD-10-CM

## 2021-03-17 ENCOUNTER — IMMUNIZATION (OUTPATIENT)
Dept: VACCINE CLINIC | Facility: HOSPITAL | Age: 71
End: 2021-03-17

## 2021-03-17 DIAGNOSIS — Z23 IMMUNIZATION DUE: ICD-10-CM

## 2021-03-17 PROCEDURE — 0001A: CPT | Performed by: INTERNAL MEDICINE

## 2021-03-17 PROCEDURE — 91300 HC SARSCOV02 VAC 30MCG/0.3ML IM: CPT | Performed by: INTERNAL MEDICINE

## 2021-03-26 ENCOUNTER — OFFICE VISIT (OUTPATIENT)
Dept: ORTHOPEDIC SURGERY | Facility: CLINIC | Age: 71
End: 2021-03-26

## 2021-03-26 VITALS — TEMPERATURE: 97.2 F | BODY MASS INDEX: 32.79 KG/M2 | WEIGHT: 167 LBS | HEIGHT: 60 IN

## 2021-03-26 DIAGNOSIS — Z09 SURGERY FOLLOW-UP: ICD-10-CM

## 2021-03-26 DIAGNOSIS — S42.122A CLOSED DISPLACED FRACTURE OF ACROMIAL PROCESS OF LEFT SCAPULA, INITIAL ENCOUNTER: ICD-10-CM

## 2021-03-26 DIAGNOSIS — R52 PAIN: Primary | ICD-10-CM

## 2021-03-26 PROCEDURE — 99214 OFFICE O/P EST MOD 30 MIN: CPT | Performed by: ORTHOPAEDIC SURGERY

## 2021-03-26 PROCEDURE — 73030 X-RAY EXAM OF SHOULDER: CPT | Performed by: ORTHOPAEDIC SURGERY

## 2021-03-29 PROBLEM — S42.122A CLOSED DISPLACED FRACTURE OF LEFT ACROMIAL PROCESS: Status: ACTIVE | Noted: 2021-03-29

## 2021-03-29 RX ORDER — CEFAZOLIN SODIUM 2 G/100ML
2 INJECTION, SOLUTION INTRAVENOUS ONCE
Status: CANCELLED | OUTPATIENT
Start: 2021-04-15 | End: 2021-03-29

## 2021-03-29 NOTE — PROGRESS NOTES
Patient:Kristan Galicia    YOB: 1950    Medical Record Number:[id number]    Chief Complaints: Follow-up regarding left acromion fracture    History of Present Illness:     70 y.o. female patient who presents for follow-up of her left shoulder.  She reports no improvement since I last saw her.  She has been compliant with the sling.  She is very frustrated.    Allergies:  Allergies   Allergen Reactions   • Adhesive Tape Other (See Comments)     SKIN BLISTERS   • Nitroglycerin Other (See Comments)     Was told by cardiologist not to take due to heart condition & defibrillator   • Aspirin Swelling   • Biaxin [Clarithromycin] Other (See Comments)     BLISTERS AROUND MOTH  Also known as Bioxen    • Codeine Swelling   • Darvon [Propoxyphene] Swelling   • Levaquin [Levofloxacin] Other (See Comments)     BLISTERS AROUND MOUTH   • Tequin [Gatifloxacin] Other (See Comments)     BLISTERS AROUND MOUTH  Also known Tequine        Home Medications:    Current Outpatient Medications:   •  alendronate (FOSAMAX) 70 MG tablet, TAKE 1 TABLET BY MOUTH EVERY 7 (SEVEN) DAYS., Disp: 12 tablet, Rfl: 3  •  allopurinol (ZYLOPRIM) 100 MG tablet, Take 1 tablet by mouth Daily., Disp: 90 tablet, Rfl: 3  •  amiodarone (PACERONE) 200 MG tablet, Take 100 mg by mouth Every Night., Disp: , Rfl:   •  atorvastatin (LIPITOR) 80 MG tablet, Take 1 tablet by mouth every night at bedtime., Disp: 90 tablet, Rfl: 3  •  Blood Glucose Monitoring Suppl (ACCU-CHEK OTILIA PLUS) W/DEVICE kit, Dx e11.9 use to check BS BID, ok to substitute formulary preferred, Disp: 1 kit, Rfl: 0  •  carvedilol (COREG) 12.5 MG tablet, Take 12.5 mg by mouth 2 (Two) Times a Day., Disp: , Rfl:   •  dabigatran etexilate (PRADAXA) 150 MG capsu, Take 150 mg by mouth 2 (Two) Times a Day. Pt to stop 2 days before surgery per cardiologist MD Ray for OR scheduled 10/22/2020, Disp: , Rfl:   •  diclofenac (FLECTOR) 1.3 % patch patch, Apply 1 patch topically to the appropriate  area as directed Every 12 (Twelve) Hours., Disp: 30 patch, Rfl: 5  •  docusate sodium 100 MG capsule, Take 1 capsule by mouth 2 (Two) Times a Day As Needed for Constipation., Disp: 60 capsule, Rfl: 1  •  DULoxetine (CYMBALTA) 60 MG capsule, Take 1 capsule by mouth Daily., Disp: 90 capsule, Rfl: 3  •  glucose blood (ACCU-CHEK OTILIA PLUS) test strip, Dx e11.9 use to check BS BID, ok to substitute formulary preferred, Disp: 60 each, Rfl: 11  •  HYDROcodone-acetaminophen (NORCO)  MG per tablet, Take 1-2 tablets by mouth every 4-6 hours as needed for pain.  Not to exceed 6 tabs per day., Disp: 42 tablet, Rfl: 0  •  Lancets (ACCU-CHEK MULTICLIX) lancets, Dx e11.9 use to check BS BID, ok to substitute formulary preferred, Disp: 60 each, Rfl: 11  •  levothyroxine (SYNTHROID, LEVOTHROID) 75 MCG tablet, TAKE 1 TABLET BY MOUTH EVERY DAY, Disp: 90 tablet, Rfl: 2  •  montelukast (SINGULAIR) 10 MG tablet, Take 1 tablet by mouth Daily., Disp: 90 tablet, Rfl: 3  •  ondansetron (ZOFRAN) 4 MG tablet, Take 1 tablet by mouth Every 6 (Six) Hours As Needed for Nausea or Vomiting., Disp: 12 tablet, Rfl: 0  •  pantoprazole (PROTONIX) 40 MG EC tablet, TAKE 1 TABLET BY MOUTH EVERY DAY, Disp: 90 tablet, Rfl: 2  •  Semaglutide,0.25 or 0.5MG/DOS, (Ozempic, 0.25 or 0.5 MG/DOSE,) 2 MG/1.5ML solution pen-injector, Inject 0.5 mg under the skin into the appropriate area as directed 1 (One) Time Per Week., Disp: 4 pen, Rfl: 11  •  spironolactone (ALDACTONE) 25 MG tablet, Take 25 mg by mouth Every Morning., Disp: , Rfl:   No current facility-administered medications for this visit.    Facility-Administered Medications Ordered in Other Visits:   •  Chlorhexidine Gluconate 2 % pads 1 each, 1 each, Apply externally, Take As Directed, Juan Antonio Anne MD    Past Medical History:   Diagnosis Date   • Anemia    • Angina at rest (CMS/HCC)     history   • Arthritis    • Atrial fibrillation (CMS/HCC)    • Cardiac defibrillator in place 01/2009     medtronic    • Cardiomyopathy (CMS/HCC)     Hypertrophic Cardiomyopathy   • Cataract     left   • CHF (congestive heart failure) (CMS/HCC)    • Chronic pain    • Coronary artery disease    • Diabetes mellitus (CMS/HCC)     Type II   • Diverticulosis     hx diverticulitis   • GERD (gastroesophageal reflux disease)    • Heart murmur    • History of bronchitis    • History of depression    • History of pneumonia    • History of transfusion     no reaction   • History of tumor     left ocular tumor, treated with steroids   • Hyperlipidemia    • Hypertension    • Migraines    • On anticoagulant therapy    • Osteoporosis    • Palpitations    • Shoulder pain     left   • Sleep apnea     history retested 2019 and was told doesn't have anymore no machine      • Thyroid disease    • Vasovagal syncope    • Vertigo        Past Surgical History:   Procedure Laterality Date   • ANTERIOR CERVICAL DISCECTOMY W/ FUSION  2012    C5-7   • APPENDECTOMY  1970   • AUGMENTATION MAMMAPLASTY     • BILATERAL BREAST REDUCTION     • BREAST BIOPSY     • CARDIAC CATHETERIZATION  02/19/2007   • CARDIAC DEFIBRILLATOR PLACEMENT Left 1999, 2013    Dr. Ya   • CATARACT EXTRACTION Bilateral    • COLONOSCOPY  1996    Dr. Herbert Gonsales    • COLONOSCOPY  8/16/2016    Procedure: COLONOSCOPY with cecum;  Surgeon: Rosalio Sheikh MD;  Location: [facility] ENDOSCOPY;  Service:    • ENDOSCOPY N/A 4/27/2017    Procedure: ESOPHAGOGASTRODUODENOSCOPY WITH BIOPSIES;  Surgeon: Rosalio Sheikh MD;  Location: [facility] ENDOSCOPY;  Service:    • FEMUR SURGERY Left     with metal implant  x3   • FIRST RIB RESECTION Bilateral     and scalenectomy   • HYSTERECTOMY  1989    Total   • KNEE ARTHROSCOPY Bilateral 2014    Dr. Pascual   • KNEE SURGERY Right 06/10/2010   • LAPAROSCOPIC CHOLECYSTECTOMY  1974   • OOPHORECTOMY     • SHOULDER ARTHROSCOPY W/ ROTATOR CUFF REPAIR Bilateral 05/20/2011   • SHOULDER SURGERY Right    • TONSILLECTOMY     • TOTAL KNEE ARTHROPLASTY Right  2018    Procedure: RIGHT TOTAL KNEE ARTHROPLASTY WITH STEVE NAVIGATION;  Surgeon: Bravo Pascual MD;  Location: [facility] MAIN OR;  Service: Orthopedics   • TOTAL SHOULDER ARTHROPLASTY W/ DISTAL CLAVICLE EXCISION Right 2017    Procedure: TOTAL SHOULDER REVERSE ARTHROPLASTY;  Surgeon: Juan Antonio Anne MD;  Location: [facility] MAIN OR;  Service:    • TOTAL SHOULDER ARTHROPLASTY W/ DISTAL CLAVICLE EXCISION Left 10/22/2020    Procedure: TOTAL SHOULDER REVERSE ARTHROPLASTY;  Surgeon: Juan Antonio Anne MD;  Location: [facility] MAIN OR;  Service: Orthopedics;  Laterality: Left;       Social History     Occupational History   • Occupation:    Tobacco Use   • Smoking status: Former Smoker     Packs/day: 0.50     Years: 17.00     Pack years: 8.50     Types: Cigarettes     Quit date: 1985     Years since quittin.5   • Smokeless tobacco: Never Used   Vaping Use   • Vaping Use: Never used   Substance and Sexual Activity   • Alcohol use: No   • Drug use: Never   • Sexual activity: Defer      Social History     Social History Narrative   • Not on file       Family History   Problem Relation Age of Onset   • Heart attack Brother    • Heart disease Brother    • Hyperthyroidism Mother    • Cancer Mother    • Heart disease Mother    • Osteoporosis Mother    • No Known Problems Father          car accident   • Cirrhosis Maternal Grandmother    • No Known Problems Maternal Grandfather    • No Known Problems Paternal Grandmother    • No Known Problems Paternal Grandfather    • Breast cancer Maternal Aunt    • Malig Hyperthermia Neg Hx        Review of Systems:      Constitutional: Denies fever, shaking or chills   Eyes: Denies change in visual acuity   HEENT: Denies nasal congestion or sore throat   Respiratory: Denies cough or shortness of breath   Cardiovascular: Denies chest pain or edema  Endocrine: Denies tremors, palpitations, intolerance of heat or cold, polyuria, polydipsia.  GI: Denies  "abdominal pain, nausea, vomiting, bloody stools or diarrhea  : Denies frequency, urgency, incontinence, retention, or nocturia.  Musculoskeletal: Denies numbness, tingling or loss of motor function except as above  Integument: Denies rash, lesion or ulceration   Neurologic: Denies headache or focal weakness, deficits  Heme: Denies spontaneous or excessive bleeding, epistaxis, hematuria, melena, fatigue, enlarged or tender lymph nodes.      All other pertinent positives and negatives as noted above in HPI.    Physical Exam:70 y.o. female  Vitals:    03/26/21 1320   Temp: 97.2 °F (36.2 °C)   Weight: 75.8 kg (167 lb)   Height: 152.4 cm (60\")       General:  Patient is awake and alert.  Appears in no acute distress or discomfort.    Psych:  Affect and demeanor are appropriate.    Extremities: Left shoulder is examined.  Skin is benign.  She has a subtle palpable step-off at the acromion process and marked tenderness over the fracture site.  Shoulder motion is limited and painful.    Imaging: AP and scapular Y views left shoulder are ordered and reviewed.  These are compared to previous x-rays.  I can see some subtle callus formation over the acromion and the area of the fracture but the fracture itself is poorly visualized.    We did review her CT scan as well and the associated report.  She has a minimally displaced acromion fracture.    Assessment/Plan: Left acromion fracture status post reverse total shoulder arthroplasty    I showed her the x-rays and CT scan.  We discussed the options.  I would like to manage this conservatively but it has been roughly 5 weeks now and she reports no improvement.  On exam, she now seems to have a subtle palpable step-off at the fracture site and I am concerned that her fracture may have displaced in the interim since her CT scan.  We discussed her options and she would like to move forward with surgical repair.  The risks, benefits, and alternatives to surgical fixation of the " fracture were discussed in detail.  We talked about the surgery itself, how it is done, and the rehabilitation and recovery.  Specifically, with regards to the risks, we talked about the risk of infection, wound healing problems, nonunion, malunion, persistent pain or deformity which could necessitate further intervention down the road.  We talked about injury to nearby neurovascular structures which could result in permanent weakness, numbness, or dysfunction and potentially necessitate further surgery.  Last, we did also talk about the risk of RSD, PE, DVT, anesthesia related complications and medical complications as result of surgery potentially resulting in death.  She has consented to proceed.    Juan Antonio Anne MD    03/26/2021

## 2021-03-30 ENCOUNTER — TRANSCRIBE ORDERS (OUTPATIENT)
Dept: PREADMISSION TESTING | Facility: HOSPITAL | Age: 71
End: 2021-03-30

## 2021-03-30 DIAGNOSIS — Z01.818 OTHER SPECIFIED PRE-OPERATIVE EXAMINATION: Primary | ICD-10-CM

## 2021-04-01 ENCOUNTER — APPOINTMENT (OUTPATIENT)
Dept: PREADMISSION TESTING | Facility: HOSPITAL | Age: 71
End: 2021-04-01

## 2021-04-01 VITALS
BODY MASS INDEX: 31.15 KG/M2 | RESPIRATION RATE: 18 BRPM | HEIGHT: 61 IN | TEMPERATURE: 97.6 F | WEIGHT: 165 LBS | DIASTOLIC BLOOD PRESSURE: 68 MMHG | HEART RATE: 89 BPM | SYSTOLIC BLOOD PRESSURE: 127 MMHG | OXYGEN SATURATION: 100 %

## 2021-04-01 LAB
ANION GAP SERPL CALCULATED.3IONS-SCNC: 8.9 MMOL/L (ref 5–15)
BUN SERPL-MCNC: 13 MG/DL (ref 8–23)
BUN/CREAT SERPL: 14.3 (ref 7–25)
CALCIUM SPEC-SCNC: 9.2 MG/DL (ref 8.6–10.5)
CHLORIDE SERPL-SCNC: 106 MMOL/L (ref 98–107)
CO2 SERPL-SCNC: 26.1 MMOL/L (ref 22–29)
CREAT SERPL-MCNC: 0.91 MG/DL (ref 0.57–1)
DEPRECATED RDW RBC AUTO: 54.3 FL (ref 37–54)
ERYTHROCYTE [DISTWIDTH] IN BLOOD BY AUTOMATED COUNT: 16.6 % (ref 12.3–15.4)
GFR SERPL CREATININE-BSD FRML MDRD: 61 ML/MIN/1.73
GLUCOSE SERPL-MCNC: 141 MG/DL (ref 65–99)
HCT VFR BLD AUTO: 40.8 % (ref 34–46.6)
HGB BLD-MCNC: 12.9 G/DL (ref 12–15.9)
MCH RBC QN AUTO: 27.9 PG (ref 26.6–33)
MCHC RBC AUTO-ENTMCNC: 31.6 G/DL (ref 31.5–35.7)
MCV RBC AUTO: 88.1 FL (ref 79–97)
PLATELET # BLD AUTO: 265 10*3/MM3 (ref 140–450)
PMV BLD AUTO: 9.2 FL (ref 6–12)
POTASSIUM SERPL-SCNC: 4.8 MMOL/L (ref 3.5–5.2)
RBC # BLD AUTO: 4.63 10*6/MM3 (ref 3.77–5.28)
SODIUM SERPL-SCNC: 141 MMOL/L (ref 136–145)
WBC # BLD AUTO: 8.28 10*3/MM3 (ref 3.4–10.8)

## 2021-04-01 PROCEDURE — 36415 COLL VENOUS BLD VENIPUNCTURE: CPT

## 2021-04-01 PROCEDURE — 85027 COMPLETE CBC AUTOMATED: CPT

## 2021-04-01 PROCEDURE — 80048 BASIC METABOLIC PNL TOTAL CA: CPT

## 2021-04-01 NOTE — DISCHARGE INSTRUCTIONS
ARRIVE DAY OF SURGERY AS NOTIFIED BY DR NARVAEZ TO [facility] SURGERY        Take the following medications the morning of surgery:  COREG, LEVOTHYROXINE, PANTOPRAZOLE      If you are on prescription narcotic pain medication to control your pain you may also take that medication the morning of surgery.    General Instructions:  • Do not eat solid food after midnight the night before surgery.  • You may drink clear liquids day of surgery but must stop at least one hour before your hospital arrival time.  • It is beneficial for you to have a clear drink that contains carbohydrates the day of surgery.  We suggest a 12 to 20 ounce bottle of Gatorade or Powerade for non-diabetic patients or a 12 to 20 ounce bottle of G2 or Powerade Zero for diabetic patients. (Pediatric patients, are not advised to drink a 12 to 20 ounce carbohydrate drink)    Clear liquids are liquids you can see through.  Nothing red in color.     Plain water                               Sports drinks  Sodas                                   Gelatin (Jell-O)  Fruit juices without pulp such as white grape juice and apple juice  Popsicles that contain no fruit or yogurt  Tea or coffee (no cream or milk added)  Gatorade / Powerade  G2 / Powerade Zero    • Infants may have breast milk up to four hours before surgery.  • Infants drinking formula may drink formula up to six hours before surgery.   • Patients who avoid smoking, chewing tobacco and alcohol for 4 weeks prior to surgery have a reduced risk of post-operative complications.  Quit smoking as many days before surgery as you can.  • Do not smoke, use chewing tobacco or drink alcohol the day of surgery.   • If applicable bring your C-PAP/ BI-PAP machine.  • Bring any papers given to you in the doctor’s office.  • Wear clean comfortable clothes.  • Do not wear contact lenses, false eyelashes or make-up.  Bring a case for your glasses.   • Bring crutches or walker if applicable.  • Remove all piercings.   Leave jewelry and any other valuables at home.  • Hair extensions with metal clips must be removed prior to surgery.  • The Pre-Admission Testing nurse will instruct you to bring medications if unable to obtain an accurate list in Pre-Admission Testing.            Preventing a Surgical Site Infection:  • For 2 to 3 days before surgery, avoid shaving with a razor because the razor can irritate skin and make it easier to develop an infection.    • Any areas of open skin can increase the risk of a post-operative wound infection by allowing bacteria to enter and travel throughout the body.  Notify your surgeon if you have any skin wounds / rashes even if it is not near the expected surgical site.  The area will need assessed to determine if surgery should be delayed until it is healed.  • The night prior to surgery shower using a fresh bar of anti-bacterial soap (such as Dial) and clean washcloth.  Sleep in a clean bed with clean clothing.  Do not allow pets to sleep with you.  • Shower on the morning of surgery using a fresh bar of anti-bacterial soap (such as Dial) and clean washcloth.  Dry with a clean towel and dress in clean clothing.  • Ask your surgeon if you will be receiving antibiotics prior to surgery.  • Make sure you, your family, and all healthcare providers clean their hands with soap and water or an alcohol based hand  before caring for you or your wound.    Day of surgery:  Your arrival time is approximately two hours before your scheduled surgery time.  Upon arrival, a Pre-op nurse and Anesthesiologist will review your health history, obtain vital signs, and answer questions you may have.  The only belongings needed at this time will be a list of your home medications and if applicable your C-PAP/BI-PAP machine.  A Pre-op nurse will start an IV and you may receive medication in preparation for surgery, including something to help you relax.     Please be aware that surgery does come with  discomfort.  We want to make every effort to control your discomfort so please discuss any uncontrolled symptoms with your nurse.   Your doctor will most likely have prescribed pain medications.      If you are going home after surgery you will receive individualized written care instructions before being discharged.  A responsible adult must drive you to and from the hospital on the day of your surgery and stay with you for 24 hours.  Discharge prescriptions can be filled by the hospital pharmacy during regular pharmacy hours.  If you are having surgery late in the day/evening your prescription may be e-prescribed to your pharmacy.  Please verify your pharmacy hours or chose a 24 hour pharmacy to avoid not having access to your prescription because your pharmacy has closed for the day.    If you are staying overnight following surgery, you will be transported to your hospital room following the recovery period.  [facility] has all private rooms.    If you have any questions please call Pre-Admission Testing at [phone].  Deductibles and co-payments are collected on the day of service. Please be prepared to pay the required co-pay, deductible or deposit on the day of service as defined by your plan.    Patient Education for Self-Quarantine Process    Following your COVID testing, we strongly recommend that you do not leave your home after you have been tested for COVID except to get medical care. This includes not going to work, school or to public areas.  If this is not possible for you to do please limit your activities to only required outings.  Be sure to wear a mask when you are with other people, practice social distancing and wash your hands frequently.      The following items provide additional details to keep you safe.  • Wash your hands with soap and water frequently for at least 20 seconds.   • Avoid touching your eyes, nose and mouth with unwashed hands.  • Do not share anything -  utensils, towels, food from the same bowl.   • Have your own utensils, drinking glass, dishes, towels and bedding.   • Do not have visitors.   • Do use FaceTime to stay in touch with family and friends.  • You should stay in a specific room away from others if possible.   • Stay at least 6 feet away from others in the home if you cannot have a dedicated room to yourself.   • Do not snuggle with your pet. While the CDC says there is no evidence that pets can spread COVID-19 or be infected from humans, it is probably best to avoid “petting, snuggling, being kissed or licked and sharing food (during self-quarantine)”, according to the CDC.   • Sanitize household surfaces daily. Include all high touch areas (door handles, light switches, phones, countertops, etc.)  • Do not share a bathroom with others, if possible.   • Wear a mask around others in your home if you are unable to stay in a separate room or 6 feet apart. If  you are unable to wear a mask, have your family member wear a mask if they must be within 6 feet of you.   Call your surgeon immediately if you experience any of the following symptoms:  • Sore Throat  • Shortness of Breath or difficulty breathing  • Cough  • Chills  • Body soreness or muscle pain  • Headache  • Fever  • New loss of taste or smell  • Do not arrive for your surgery ill.  Your procedure will need to be rescheduled to another time.  You will need to call your physician before the day of surgery to avoid any unnecessary exposure to hospital staff as well as other patients.

## 2021-04-05 RX ORDER — PANTOPRAZOLE SODIUM 40 MG/1
40 TABLET, DELAYED RELEASE ORAL DAILY
Qty: 90 TABLET | Refills: 1 | Status: SHIPPED | OUTPATIENT
Start: 2021-04-05

## 2021-04-07 ENCOUNTER — IMMUNIZATION (OUTPATIENT)
Dept: VACCINE CLINIC | Facility: HOSPITAL | Age: 71
End: 2021-04-07

## 2021-04-07 PROCEDURE — 0002A: CPT | Performed by: INTERNAL MEDICINE

## 2021-04-07 PROCEDURE — 91300 HC SARSCOV02 VAC 30MCG/0.3ML IM: CPT | Performed by: INTERNAL MEDICINE

## 2021-04-13 ENCOUNTER — LAB (OUTPATIENT)
Dept: LAB | Facility: HOSPITAL | Age: 71
End: 2021-04-13

## 2021-04-13 DIAGNOSIS — Z01.818 OTHER SPECIFIED PRE-OPERATIVE EXAMINATION: ICD-10-CM

## 2021-04-13 PROCEDURE — C9803 HOPD COVID-19 SPEC COLLECT: HCPCS

## 2021-04-13 PROCEDURE — U0004 COV-19 TEST NON-CDC HGH THRU: HCPCS

## 2021-04-13 PROCEDURE — U0005 INFEC AGEN DETEC AMPLI PROBE: HCPCS

## 2021-04-14 LAB — SARS-COV-2 RNA RESP QL NAA+PROBE: NOT DETECTED

## 2021-04-15 ENCOUNTER — HOSPITAL ENCOUNTER (OUTPATIENT)
Facility: HOSPITAL | Age: 71
Setting detail: HOSPITAL OUTPATIENT SURGERY
Discharge: HOME | End: 2021-04-15
Attending: ORTHOPAEDIC SURGERY | Admitting: ORTHOPAEDIC SURGERY

## 2021-04-15 ENCOUNTER — ANESTHESIA (OUTPATIENT)
Dept: PERIOP | Facility: HOSPITAL | Age: 71
End: 2021-04-15

## 2021-04-15 ENCOUNTER — ANESTHESIA EVENT (OUTPATIENT)
Dept: PERIOP | Facility: HOSPITAL | Age: 71
End: 2021-04-15

## 2021-04-15 ENCOUNTER — APPOINTMENT (OUTPATIENT)
Dept: GENERAL RADIOLOGY | Facility: HOSPITAL | Age: 71
End: 2021-04-15

## 2021-04-15 VITALS
WEIGHT: 161.4 LBS | DIASTOLIC BLOOD PRESSURE: 68 MMHG | RESPIRATION RATE: 16 BRPM | OXYGEN SATURATION: 96 % | BODY MASS INDEX: 30.47 KG/M2 | HEIGHT: 61 IN | TEMPERATURE: 98.4 F | HEART RATE: 62 BPM | SYSTOLIC BLOOD PRESSURE: 139 MMHG

## 2021-04-15 DIAGNOSIS — S42.122A CLOSED DISPLACED FRACTURE OF ACROMIAL PROCESS OF LEFT SCAPULA, INITIAL ENCOUNTER: ICD-10-CM

## 2021-04-15 LAB — GLUCOSE BLDC GLUCOMTR-MCNC: 138 MG/DL (ref 70–130)

## 2021-04-15 PROCEDURE — C1713 ANCHOR/SCREW BN/BN,TIS/BN: HCPCS | Performed by: ORTHOPAEDIC SURGERY

## 2021-04-15 PROCEDURE — 82962 GLUCOSE BLOOD TEST: CPT

## 2021-04-15 PROCEDURE — 25010000002 PROPOFOL 10 MG/ML EMULSION: Performed by: NURSE ANESTHETIST, CERTIFIED REGISTERED

## 2021-04-15 PROCEDURE — 76942 ECHO GUIDE FOR BIOPSY: CPT | Performed by: ORTHOPAEDIC SURGERY

## 2021-04-15 PROCEDURE — 23585 OPTX SCAPULAR FX W/INT FIXJ: CPT | Performed by: ORTHOPAEDIC SURGERY

## 2021-04-15 PROCEDURE — 25010000003 CEFAZOLIN IN DEXTROSE 2-4 GM/100ML-% SOLUTION: Performed by: ORTHOPAEDIC SURGERY

## 2021-04-15 PROCEDURE — 76000 FLUOROSCOPY <1 HR PHYS/QHP: CPT

## 2021-04-15 PROCEDURE — 25010000002 NEOSTIGMINE 5 MG/10ML SOLUTION: Performed by: ANESTHESIOLOGY

## 2021-04-15 PROCEDURE — C1889 IMPLANT/INSERT DEVICE, NOC: HCPCS | Performed by: ORTHOPAEDIC SURGERY

## 2021-04-15 PROCEDURE — 25010000002 FENTANYL CITRATE (PF) 100 MCG/2ML SOLUTION: Performed by: ANESTHESIOLOGY

## 2021-04-15 PROCEDURE — 25010000002 ROPIVACAINE PER 1 MG: Performed by: ANESTHESIOLOGY

## 2021-04-15 PROCEDURE — 25010000002 PHENYLEPHRINE PER 1 ML: Performed by: NURSE ANESTHETIST, CERTIFIED REGISTERED

## 2021-04-15 PROCEDURE — 25010000002 MIDAZOLAM PER 1 MG: Performed by: ANESTHESIOLOGY

## 2021-04-15 DEVICE — IMPLANTABLE DEVICE: Type: IMPLANTABLE DEVICE | Site: SHOULDER | Status: FUNCTIONAL

## 2021-04-15 DEVICE — KNOTLESS TISSUE CONTROL DEVICE, UNDYED UNIDIRECTIONAL (ANTIBACTERIAL) SYNTHETIC ABSORBABLE DEVICE
Type: IMPLANTABLE DEVICE | Site: SHOULDER | Status: FUNCTIONAL
Brand: STRATAFIX

## 2021-04-15 DEVICE — KNOTLESS TISSUE CONTROL DEVICE, VIOLET UNIDIRECTIONAL (ANTIBACTERIAL) SYNTHETIC ABSORBABLE DEVICE
Type: IMPLANTABLE DEVICE | Site: SHOULDER | Status: FUNCTIONAL
Brand: STRATAFIX

## 2021-04-15 DEVICE — PUTTY DBM PROGENIX PLS 2.5CC: Type: IMPLANTABLE DEVICE | Site: SHOULDER | Status: FUNCTIONAL

## 2021-04-15 RX ORDER — FENTANYL CITRATE 50 UG/ML
50 INJECTION, SOLUTION INTRAMUSCULAR; INTRAVENOUS
Status: DISCONTINUED | OUTPATIENT
Start: 2021-04-15 | End: 2021-04-15 | Stop reason: HOSPADM

## 2021-04-15 RX ORDER — EPHEDRINE SULFATE 50 MG/ML
5 INJECTION, SOLUTION INTRAVENOUS ONCE AS NEEDED
Status: DISCONTINUED | OUTPATIENT
Start: 2021-04-15 | End: 2021-04-15 | Stop reason: HOSPADM

## 2021-04-15 RX ORDER — MIDAZOLAM HYDROCHLORIDE 1 MG/ML
0.5 INJECTION INTRAMUSCULAR; INTRAVENOUS
Status: COMPLETED | OUTPATIENT
Start: 2021-04-15 | End: 2021-04-15

## 2021-04-15 RX ORDER — SODIUM CHLORIDE 0.9 % (FLUSH) 0.9 %
3-10 SYRINGE (ML) INJECTION AS NEEDED
Status: DISCONTINUED | OUTPATIENT
Start: 2021-04-15 | End: 2021-04-15 | Stop reason: HOSPADM

## 2021-04-15 RX ORDER — PROPOFOL 10 MG/ML
VIAL (ML) INTRAVENOUS AS NEEDED
Status: DISCONTINUED | OUTPATIENT
Start: 2021-04-15 | End: 2021-04-15 | Stop reason: SURG

## 2021-04-15 RX ORDER — IPRATROPIUM BROMIDE AND ALBUTEROL SULFATE 2.5; .5 MG/3ML; MG/3ML
3 SOLUTION RESPIRATORY (INHALATION) ONCE AS NEEDED
Status: DISCONTINUED | OUTPATIENT
Start: 2021-04-15 | End: 2021-04-15 | Stop reason: HOSPADM

## 2021-04-15 RX ORDER — CEFAZOLIN SODIUM 2 G/100ML
2 INJECTION, SOLUTION INTRAVENOUS ONCE
Status: COMPLETED | OUTPATIENT
Start: 2021-04-15 | End: 2021-04-15

## 2021-04-15 RX ORDER — ONDANSETRON 4 MG/1
4 TABLET, FILM COATED ORAL EVERY 8 HOURS PRN
Qty: 30 TABLET | Refills: 0 | Status: SHIPPED | OUTPATIENT
Start: 2021-04-15 | End: 2021-04-20 | Stop reason: SDUPTHER

## 2021-04-15 RX ORDER — MULTIPLE VITAMINS W/ MINERALS TAB 9MG-400MCG
2 TAB ORAL NIGHTLY
COMMUNITY

## 2021-04-15 RX ORDER — HYDRALAZINE HYDROCHLORIDE 20 MG/ML
5 INJECTION INTRAMUSCULAR; INTRAVENOUS
Status: DISCONTINUED | OUTPATIENT
Start: 2021-04-15 | End: 2021-04-15 | Stop reason: HOSPADM

## 2021-04-15 RX ORDER — ONDANSETRON 2 MG/ML
4 INJECTION INTRAMUSCULAR; INTRAVENOUS ONCE AS NEEDED
Status: DISCONTINUED | OUTPATIENT
Start: 2021-04-15 | End: 2021-04-15 | Stop reason: HOSPADM

## 2021-04-15 RX ORDER — FAMOTIDINE 10 MG/ML
20 INJECTION, SOLUTION INTRAVENOUS ONCE
Status: COMPLETED | OUTPATIENT
Start: 2021-04-15 | End: 2021-04-15

## 2021-04-15 RX ORDER — NALOXONE HCL 0.4 MG/ML
0.2 VIAL (ML) INJECTION AS NEEDED
Status: DISCONTINUED | OUTPATIENT
Start: 2021-04-15 | End: 2021-04-15 | Stop reason: HOSPADM

## 2021-04-15 RX ORDER — DOCUSATE SODIUM 100 MG/1
100 CAPSULE, LIQUID FILLED ORAL 2 TIMES DAILY
Qty: 60 CAPSULE | Refills: 0 | Status: SHIPPED | OUTPATIENT
Start: 2021-04-15 | End: 2022-07-06

## 2021-04-15 RX ORDER — LIDOCAINE HYDROCHLORIDE 10 MG/ML
0.5 INJECTION, SOLUTION EPIDURAL; INFILTRATION; INTRACAUDAL; PERINEURAL ONCE AS NEEDED
Status: DISCONTINUED | OUTPATIENT
Start: 2021-04-15 | End: 2021-04-15 | Stop reason: HOSPADM

## 2021-04-15 RX ORDER — PROMETHAZINE HYDROCHLORIDE 25 MG/1
25 TABLET ORAL ONCE AS NEEDED
Status: DISCONTINUED | OUTPATIENT
Start: 2021-04-15 | End: 2021-04-15 | Stop reason: HOSPADM

## 2021-04-15 RX ORDER — DIPHENHYDRAMINE HYDROCHLORIDE 50 MG/ML
12.5 INJECTION INTRAMUSCULAR; INTRAVENOUS
Status: DISCONTINUED | OUTPATIENT
Start: 2021-04-15 | End: 2021-04-15 | Stop reason: HOSPADM

## 2021-04-15 RX ORDER — TRANEXAMIC ACID 100 MG/ML
INJECTION, SOLUTION INTRAVENOUS AS NEEDED
Status: DISCONTINUED | OUTPATIENT
Start: 2021-04-15 | End: 2021-04-15 | Stop reason: SURG

## 2021-04-15 RX ORDER — SODIUM CHLORIDE 0.9 % (FLUSH) 0.9 %
3 SYRINGE (ML) INJECTION EVERY 12 HOURS SCHEDULED
Status: DISCONTINUED | OUTPATIENT
Start: 2021-04-15 | End: 2021-04-15 | Stop reason: HOSPADM

## 2021-04-15 RX ORDER — MAGNESIUM HYDROXIDE 1200 MG/15ML
LIQUID ORAL AS NEEDED
Status: DISCONTINUED | OUTPATIENT
Start: 2021-04-15 | End: 2021-04-15 | Stop reason: HOSPADM

## 2021-04-15 RX ORDER — PROMETHAZINE HYDROCHLORIDE 25 MG/1
25 SUPPOSITORY RECTAL ONCE AS NEEDED
Status: DISCONTINUED | OUTPATIENT
Start: 2021-04-15 | End: 2021-04-15 | Stop reason: HOSPADM

## 2021-04-15 RX ORDER — GLYCOPYRROLATE 0.2 MG/ML
INJECTION INTRAMUSCULAR; INTRAVENOUS AS NEEDED
Status: DISCONTINUED | OUTPATIENT
Start: 2021-04-15 | End: 2021-04-15 | Stop reason: SURG

## 2021-04-15 RX ORDER — NEOSTIGMINE METHYLSULFATE 0.5 MG/ML
INJECTION, SOLUTION INTRAVENOUS AS NEEDED
Status: DISCONTINUED | OUTPATIENT
Start: 2021-04-15 | End: 2021-04-15 | Stop reason: SURG

## 2021-04-15 RX ORDER — LIDOCAINE HYDROCHLORIDE 20 MG/ML
INJECTION, SOLUTION INFILTRATION; PERINEURAL AS NEEDED
Status: DISCONTINUED | OUTPATIENT
Start: 2021-04-15 | End: 2021-04-15 | Stop reason: SURG

## 2021-04-15 RX ORDER — FENTANYL CITRATE 50 UG/ML
25 INJECTION, SOLUTION INTRAMUSCULAR; INTRAVENOUS
Status: DISCONTINUED | OUTPATIENT
Start: 2021-04-15 | End: 2021-04-15 | Stop reason: HOSPADM

## 2021-04-15 RX ORDER — SODIUM CHLORIDE, SODIUM LACTATE, POTASSIUM CHLORIDE, CALCIUM CHLORIDE 600; 310; 30; 20 MG/100ML; MG/100ML; MG/100ML; MG/100ML
9 INJECTION, SOLUTION INTRAVENOUS CONTINUOUS
Status: DISCONTINUED | OUTPATIENT
Start: 2021-04-15 | End: 2021-04-15 | Stop reason: HOSPADM

## 2021-04-15 RX ORDER — DIPHENHYDRAMINE HCL 25 MG
25 CAPSULE ORAL
Status: DISCONTINUED | OUTPATIENT
Start: 2021-04-15 | End: 2021-04-15 | Stop reason: HOSPADM

## 2021-04-15 RX ORDER — LABETALOL HYDROCHLORIDE 5 MG/ML
5 INJECTION, SOLUTION INTRAVENOUS
Status: DISCONTINUED | OUTPATIENT
Start: 2021-04-15 | End: 2021-04-15 | Stop reason: HOSPADM

## 2021-04-15 RX ORDER — MIDAZOLAM HYDROCHLORIDE 1 MG/ML
1 INJECTION INTRAMUSCULAR; INTRAVENOUS
Status: COMPLETED | OUTPATIENT
Start: 2021-04-15 | End: 2021-04-15

## 2021-04-15 RX ORDER — HYDROCODONE BITARTRATE AND ACETAMINOPHEN 10; 325 MG/1; MG/1
TABLET ORAL
Qty: 42 TABLET | Refills: 0 | Status: SHIPPED | OUTPATIENT
Start: 2021-04-15 | End: 2021-04-20 | Stop reason: SDUPTHER

## 2021-04-15 RX ORDER — HYDROMORPHONE HYDROCHLORIDE 1 MG/ML
0.25 INJECTION, SOLUTION INTRAMUSCULAR; INTRAVENOUS; SUBCUTANEOUS
Status: DISCONTINUED | OUTPATIENT
Start: 2021-04-15 | End: 2021-04-15 | Stop reason: HOSPADM

## 2021-04-15 RX ORDER — VASOPRESSIN 20 U/ML
INJECTION PARENTERAL AS NEEDED
Status: DISCONTINUED | OUTPATIENT
Start: 2021-04-15 | End: 2021-04-15 | Stop reason: SURG

## 2021-04-15 RX ORDER — ROPIVACAINE HYDROCHLORIDE 5 MG/ML
INJECTION, SOLUTION EPIDURAL; INFILTRATION; PERINEURAL
Status: COMPLETED | OUTPATIENT
Start: 2021-04-15 | End: 2021-04-15

## 2021-04-15 RX ORDER — ROCURONIUM BROMIDE 10 MG/ML
INJECTION, SOLUTION INTRAVENOUS AS NEEDED
Status: DISCONTINUED | OUTPATIENT
Start: 2021-04-15 | End: 2021-04-15 | Stop reason: SURG

## 2021-04-15 RX ORDER — EPHEDRINE SULFATE 50 MG/ML
INJECTION, SOLUTION INTRAVENOUS AS NEEDED
Status: DISCONTINUED | OUTPATIENT
Start: 2021-04-15 | End: 2021-04-15 | Stop reason: SURG

## 2021-04-15 RX ADMIN — VASOPRESSIN 1 UNITS: 20 INJECTION INTRAVENOUS at 14:49

## 2021-04-15 RX ADMIN — LIDOCAINE HYDROCHLORIDE 100 MG: 20 INJECTION, SOLUTION INFILTRATION; PERINEURAL at 14:30

## 2021-04-15 RX ADMIN — VASOPRESSIN 1 UNITS: 20 INJECTION INTRAVENOUS at 15:07

## 2021-04-15 RX ADMIN — FAMOTIDINE 20 MG: 10 INJECTION INTRAVENOUS at 13:13

## 2021-04-15 RX ADMIN — VASOPRESSIN 1 UNITS: 20 INJECTION INTRAVENOUS at 14:40

## 2021-04-15 RX ADMIN — MIDAZOLAM 0.5 MG: 1 INJECTION INTRAMUSCULAR; INTRAVENOUS at 13:14

## 2021-04-15 RX ADMIN — VASOPRESSIN 1 UNITS: 20 INJECTION INTRAVENOUS at 15:27

## 2021-04-15 RX ADMIN — FENTANYL CITRATE 50 MCG: 50 INJECTION, SOLUTION INTRAMUSCULAR; INTRAVENOUS at 13:39

## 2021-04-15 RX ADMIN — VASOPRESSIN 1 UNITS: 20 INJECTION INTRAVENOUS at 15:19

## 2021-04-15 RX ADMIN — PHENYLEPHRINE HYDROCHLORIDE 1 MCG/KG/MIN: 10 INJECTION, SOLUTION INTRAMUSCULAR; INTRAVENOUS; SUBCUTANEOUS at 15:34

## 2021-04-15 RX ADMIN — NEOSTIGMINE METHYLSULFATE 4 MG: 0.5 INJECTION INTRAVENOUS at 16:41

## 2021-04-15 RX ADMIN — EPHEDRINE SULFATE 10 MG: 50 INJECTION INTRAVENOUS at 15:12

## 2021-04-15 RX ADMIN — PHENYLEPHRINE HYDROCHLORIDE 100 MCG: 10 INJECTION INTRAVENOUS at 14:33

## 2021-04-15 RX ADMIN — PROPOFOL 150 MG: 10 INJECTION, EMULSION INTRAVENOUS at 14:30

## 2021-04-15 RX ADMIN — TRANEXAMIC ACID 1000 MG: 1 INJECTION, SOLUTION INTRAVENOUS at 15:06

## 2021-04-15 RX ADMIN — FENTANYL CITRATE 50 MCG: 50 INJECTION, SOLUTION INTRAMUSCULAR; INTRAVENOUS at 13:40

## 2021-04-15 RX ADMIN — MIDAZOLAM 2 MG: 1 INJECTION INTRAMUSCULAR; INTRAVENOUS at 13:38

## 2021-04-15 RX ADMIN — PHENYLEPHRINE HYDROCHLORIDE 100 MCG: 10 INJECTION INTRAVENOUS at 16:19

## 2021-04-15 RX ADMIN — GLYCOPYRROLATE 0.4 MG: 0.2 INJECTION INTRAMUSCULAR; INTRAVENOUS at 16:41

## 2021-04-15 RX ADMIN — PHENYLEPHRINE HYDROCHLORIDE 100 MCG: 10 INJECTION INTRAVENOUS at 15:21

## 2021-04-15 RX ADMIN — CEFAZOLIN SODIUM 2 G: 2 INJECTION, SOLUTION INTRAVENOUS at 14:11

## 2021-04-15 RX ADMIN — SODIUM CHLORIDE, POTASSIUM CHLORIDE, SODIUM LACTATE AND CALCIUM CHLORIDE: 600; 310; 30; 20 INJECTION, SOLUTION INTRAVENOUS at 14:22

## 2021-04-15 RX ADMIN — VASOPRESSIN 1 UNITS: 20 INJECTION INTRAVENOUS at 15:12

## 2021-04-15 RX ADMIN — VASOPRESSIN 1 UNITS: 20 INJECTION INTRAVENOUS at 14:46

## 2021-04-15 RX ADMIN — SODIUM CHLORIDE, POTASSIUM CHLORIDE, SODIUM LACTATE AND CALCIUM CHLORIDE 9 ML/HR: 600; 310; 30; 20 INJECTION, SOLUTION INTRAVENOUS at 13:13

## 2021-04-15 RX ADMIN — FENTANYL CITRATE 50 MCG: 50 INJECTION, SOLUTION INTRAMUSCULAR; INTRAVENOUS at 13:13

## 2021-04-15 RX ADMIN — ROPIVACAINE HYDROCHLORIDE 30 ML: 5 INJECTION, SOLUTION EPIDURAL; INFILTRATION; PERINEURAL at 13:45

## 2021-04-15 RX ADMIN — ROCURONIUM BROMIDE 40 MG: 50 INJECTION INTRAVENOUS at 14:30

## 2021-04-15 NOTE — ANESTHESIA POSTPROCEDURE EVALUATION
Patient: Kristan Galicia    Procedure Summary     Date: 04/15/21 Room / Location: [facility] OR [address] [place] [address] MAIN OR    Anesthesia Start: 1422 Anesthesia Stop: 1706    Procedure: Acromion nonunion OPEN REDUCTION INTERNAL FIXATION (Left Shoulder) Diagnosis:       Closed displaced fracture of acromial process of left scapula, initial encounter      (Closed displaced fracture of acromial process of left scapula, initial encounter [S42.122A])    Surgeons: Juan Antonio Anne MD Provider: Ruben Treadwell MD    Anesthesia Type: general with block ASA Status: 4          Anesthesia Type: general with block    Vitals  Vitals Value Taken Time   /73 04/15/21 1730   Temp 36.9 °C (98.4 °F) 04/15/21 1702   Pulse 60 04/15/21 1740   Resp 16 04/15/21 1730   SpO2 92 % 04/15/21 1740   Vitals shown include unvalidated device data.        Post Anesthesia Care and Evaluation    Patient location during evaluation: bedside  Patient participation: complete - patient participated  Level of consciousness: awake  Pain management: adequate  Airway patency: patent  Anesthetic complications: No anesthetic complications  PONV Status: none  Cardiovascular status: acceptable  Respiratory status: acceptable  Hydration status: acceptable  Post Neuraxial Block status: Motor and sensory function returned to baseline

## 2021-04-15 NOTE — ANESTHESIA PROCEDURE NOTES
Airway  Urgency: elective    Date/Time: 4/15/2021 2:34 PM    General Information and Staff    Patient location during procedure: OR  Anesthesiologist: Ruben Treadwell MD  CRNA: Stacy Erazo CRNA    Indications and Patient Condition  Indications for airway management: airway protection    Preoxygenated: yes  MILS not maintained throughout  Mask difficulty assessment: 1 - vent by mask    Final Airway Details  Final airway type: endotracheal airway      Successful airway: ETT  Cuffed: yes   Successful intubation technique: direct laryngoscopy  Endotracheal tube insertion site: oral  Blade: Vamsi  Blade size: 3  ETT size (mm): 7.0  Cormack-Lehane Classification: grade I - full view of glottis  Placement verified by: chest auscultation and capnometry   Cuff volume (mL): 7  Measured from: lips  ETT/EBT  to lips (cm): 21  Number of attempts at approach: 1  Assessment: lips, teeth, and gum same as pre-op and atraumatic intubation    Additional Comments  Pt preoxygenated prior to induction, easy mask airway, atraumatic intubation,+ ETCO2, + bs bilat,  ETT secured and connected to ventilator.

## 2021-04-15 NOTE — OP NOTE
Orthopaedic Operative Note    Facility: [facility]    Patient: Kristan Galicia    Medical Record Number: [id number]    YOB: 1950    Dictating Surgeon: Juan Atnonio Anne M.D.*    Primary Care Physician: Chelly Red APRN    Date of Operation: 4/15/2021    Pre-Operative Diagnosis:  Left acromion nonunion    Post-Operative Diagnosis:  Left acromion nonunion    Procedure Performed:  Open reduction, internal fixation of left acromion nonunion    Surgeon: Juan Antonio Anne MD     Assistant: Nabeel Noel CSA whose assistance was critical for help with patient positioning, suctioning and irrigation, retraction, manipulation of the extremity for insertion of the implants, wound closure and application of the bandages.  His assistance was critical to the success of this case.    Anesthesia: Regional followed by Gen.    Complications: None.     Estimated Blood Loss: Less than 50 mL.     Implants: K wires and 18-gauge wire for tension band/cerclage fixation.  [employer] ALPS 2.5 mm plate with multiple locking screws for supplementary fixation.    Specimens: * No orders in the log *    Brief Operative Indication:  The risks, benefits, and alternatives to surgical fixation of the acromion nonunion were discussed in detail.  We talked about the surgery itself, how it is done, and the rehabilitation and recovery.  Specifically, with regards to the risks, we talked about the risk of infection, wound healing problems, nonunion, malunion, persistent pain or deformity which could necessitate further intervention down the road including the possible need for revision to an arthroplasty.  We talked about injury to nearby neurovascular structures, which could result in permanent weakness, numbness, or dysfunction and potentially necessitate further surgery as well.  Last,we did also talk about the risk of RSD, PE, DVT, anesthesia related complications and medical complications as result of surgery  potentially resulting in death.  The patient has consented to proceed.    Description of the procedure in detail:  The patient and operative site were identified in the preoperative holding area.  The surgical site was marked.  Adequate regional anesthesia was administered.  The patient was then taken to the operating room.  The patient was placed on the operating table where adequate general anesthesia was administered.  The patient was then repositioned into the modified beachchair position with the head and neck in neutral alignment.  All bony prominences were carefully padded and protected.    The left upper extremity was then prepped and draped in the standard, sterile fashion.  The extremity was cleaned with an alcohol solution.  A Hibiclens scrub was performed and then the extremity was prepped with 2 ChloraPrep preps.  I allowed this to dry for 3 minutes before the draping procedure was carried out.    A timeout was taken and preoperative antibiotics administered.  Transexamic acid was administered at this time as well.  Following this, I began by fashioning an approximately 5 cm incision over the superior aspect of the shoulder.  This was carried down through the skin and subcutaneous tissues.  Full-thickness medial and lateral skin flaps were developed.      The acromion was exposed.  The nonunion site at the junction of the acromion and scapular spine was easily visible.  The fibrous tissue at the nonunion site was debrided.  I curetted both ends of the bone to create a bleeding bed of bone and generate a healing response.  At this point 2.5 cc of pro-Genex plus putty was packed into the fracture site.  A clamp was used to reduce the fracture.  This worked well to achieve good compression.    Next, I brought in C arm for purposes of localization of the hardware.  I had good visualization but I wanted C arm for confirmation and documentation.  Unfortunately, due to the magnet over her defibrillator, we  could not get imaging with the C arm.  We initially thought this was due to the machine and we brought in a second machine.  The second machine had the same problem and it was at this point that we determined that it was due to the magnet.  Again, I had direct visualization of the fracture and hardware so I proceeded with fixation without C arm imaging.    2 parallel K wires were driven through the acromion across the fracture site.  The secured the reduction well and the clamp was removed.  At this point an 18-gauge wire was passed around the 2 K wires in typical fashion.  This was tensioned to create a cerclage/tension band construct over the top.  The K wires were then cut and bent to secure the wire.    Next, a Y-shaped ALPS plate was contoured to fit around the scapular spine up onto the acromion.  This was used to supplement the fixation.  Multiple locking screws were placed.  All the locking screws locked into the plate very nicely and seemed to help secure the reduction very well.  The fracture was no longer visible. I could fully elevate, abduct and rotate the shoulder without any mechanical phenomenon or limitation.  The fracture seem to be stable throughout range of motion.      I directed my attention to closure.  I irrigated the wound with 500 cc of a Betadine-containing saline solution.  I then irrigated with 1 L of sterile saline.  I made sure that we had good hemostasis.  The wound was sequentially closed in a layered fashion.  Vicryl was used to repair the subcutaneous tissues.  A running subcuticular Monocryl stitch was used to close the skin followed by dermal adhesive.  Sterile dressings were applied.  The drapes were withdrawn.  The arm was placed in a sling.  The patient was awakened and taken to the recovery room in good condition.    Juan Antonio Anne MD  04/15/21

## 2021-04-15 NOTE — ANESTHESIA PREPROCEDURE EVALUATION
Anesthesia Evaluation     Patient summary reviewed and Nursing notes reviewed   NPO Solid Status: > 8 hours  NPO Liquid Status: > 2 hours           Airway   Mallampati: II  TM distance: >3 FB  Neck ROM: full  Dental - normal exam     Pulmonary - normal exam   (+) asthma,shortness of breath, sleep apnea,   Cardiovascular - normal exam    ECG reviewed    (+) pacemaker pacemaker, hypertension, valvular problems/murmurs murmur, past MI , CAD, dysrhythmias Atrial Fib, angina, CHF , orthopnea, hyperlipidemia,       Neuro/Psych  (+) headaches, dizziness/light headedness, syncope,     GI/Hepatic/Renal/Endo    (+)  GERD,  diabetes mellitus,     Musculoskeletal     Abdominal    Substance History - negative use     OB/GYN negative ob/gyn ROS         Other   arthritis,                      Anesthesia Plan    ASA 4     general with block       Anesthetic plan, all risks, benefits, and alternatives have been provided, discussed and informed consent has been obtained with: patient.

## 2021-04-15 NOTE — ANESTHESIA PROCEDURE NOTES
Peripheral Block    Pre-sedation assessment completed: 4/15/2021 1:40 PM    Patient reassessed immediately prior to procedure    Patient location during procedure: holding area  Start time: 4/15/2021 1:40 PM  Stop time: 4/15/2021 1:45 PM  Reason for block: at surgeon's request and post-op pain management  Performed by  Anesthesiologist: Ruben Treadwell MD  Preanesthetic Checklist  Completed: patient identified, IV checked, site marked, risks and benefits discussed, surgical consent, monitors and equipment checked, pre-op evaluation and timeout performed  Prep:  Sterile barriers:cap, gloves and mask  Prep: ChloraPrep  Patient monitoring: blood pressure monitoring, continuous pulse oximetry and EKG  Procedure  Sedation:yes  Performed under: local infiltration  Guidance:ultrasound guided  ULTRASOUND INTERPRETATION.  Using ultrasound guidance a 22 G gauge needle was placed in close proximity to the brachial plexus nerve, at which point, under ultrasound guidance anesthetic was injected in the area of the nerve and spread of the anesthesia was seen on ultrasound in close proximity thereto.  There were no abnormalities seen on ultrasound; a digital image was taken; and the patient tolerated the procedure with no complications. Images:still images obtained    Laterality:left  Block Type:interscalene  Injection Technique:single-shot  Needle Type:echogenic  Resistance on Injection: none    Medications Used: ropivacaine (NAROPIN) 0.5 % injection, 30 mL  Med admintered at 4/15/2021 1:45 PM      Post Assessment  Injection Assessment: negative aspiration for heme, no paresthesia on injection and incremental injection  Patient Tolerance:comfortable throughout block  Complications:no  Additional Notes  Ultrasound Interpretation:  Using ultrasound guidance the needle was placed in close proximity to the brachial plexus and anesthetic was injected in the area of the brachial plexus and spread of the anesthetic was seen on  ultrasound in close proximity thereto.  There were no abnormalities seen on ultrasound; a digital image was taken; and the patient tolerated the procedure with no complications.    Block placed for postoperative pain control per surgeon request.

## 2021-04-15 NOTE — BRIEF OP NOTE
SCAPULA OPEN REDUCTION INTERNAL FIXATION  Progress Note    Kristan Galicia  4/15/2021    Pre-op Diagnosis:   Closed displaced fracture of acromial process of left scapula, initial encounter [S42.122A]       Post-Op Diagnosis Codes:     * Closed displaced fracture of acromial process of left scapula, initial encounter [S42.122A]    Procedure/CPT® Codes:        Procedure(s):  Acromion nonunion OPEN REDUCTION INTERNAL FIXATION    Surgeon(s):  Juan Antonio Anne MD    Anesthesia: General with Block    Staff:   Circulator: Osmany Venegas RN  Radiology Technologist: Cata Ma RRT  Scrub Person: Viola Dorado; Raúl Dodd  Vendor Representative: Gaston Richmond  Assistant: Nabeel Noel CSA  Assistant: Nabeel Noel CSA      Estimated Blood Loss: 100ml    Urine Voided: * No values recorded between 4/15/2021  2:16 PM and 4/15/2021  4:42 PM *    Specimens:                None          Drains: * No LDAs found *    Findings: see dictation    Complications: none    Assistant: Nabeel Noel CSA  was responsible for performing the following activities: Retraction and their skilled assistance was necessary for the success of this case.    Juan Antonio Anne MD     Date: 4/15/2021  Time: 16:47 EDT

## 2021-04-20 ENCOUNTER — OFFICE VISIT (OUTPATIENT)
Dept: FAMILY MEDICINE CLINIC | Facility: CLINIC | Age: 71
End: 2021-04-20

## 2021-04-20 VITALS
TEMPERATURE: 98 F | BODY MASS INDEX: 31.19 KG/M2 | HEART RATE: 87 BPM | OXYGEN SATURATION: 97 % | DIASTOLIC BLOOD PRESSURE: 70 MMHG | SYSTOLIC BLOOD PRESSURE: 110 MMHG | WEIGHT: 165.2 LBS | HEIGHT: 61 IN

## 2021-04-20 DIAGNOSIS — Z79.4 TYPE 2 DIABETES MELLITUS WITHOUT COMPLICATION, WITH LONG-TERM CURRENT USE OF INSULIN (HCC): ICD-10-CM

## 2021-04-20 DIAGNOSIS — E11.9 TYPE 2 DIABETES MELLITUS WITHOUT COMPLICATION, WITH LONG-TERM CURRENT USE OF INSULIN (HCC): ICD-10-CM

## 2021-04-20 DIAGNOSIS — M1A.9XX0 CHRONIC GOUT INVOLVING TOE OF LEFT FOOT WITHOUT TOPHUS, UNSPECIFIED CAUSE: ICD-10-CM

## 2021-04-20 DIAGNOSIS — M75.101 TEAR OF RIGHT ROTATOR CUFF: ICD-10-CM

## 2021-04-20 DIAGNOSIS — Z00.00 MEDICARE ANNUAL WELLNESS VISIT, SUBSEQUENT: Primary | ICD-10-CM

## 2021-04-20 DIAGNOSIS — I25.10 CORONARY ARTERY DISEASE WITHOUT ANGINA PECTORIS, UNSPECIFIED VESSEL OR LESION TYPE, UNSPECIFIED WHETHER NATIVE OR TRANSPLANTED HEART: ICD-10-CM

## 2021-04-20 DIAGNOSIS — E78.2 MIXED HYPERLIPIDEMIA: ICD-10-CM

## 2021-04-20 DIAGNOSIS — E66.09 CLASS 1 OBESITY DUE TO EXCESS CALORIES WITH SERIOUS COMORBIDITY AND BODY MASS INDEX (BMI) OF 31.0 TO 31.9 IN ADULT: ICD-10-CM

## 2021-04-20 DIAGNOSIS — M81.0 OSTEOPOROSIS, UNSPECIFIED OSTEOPOROSIS TYPE, UNSPECIFIED PATHOLOGICAL FRACTURE PRESENCE: ICD-10-CM

## 2021-04-20 DIAGNOSIS — I50.9 CONGESTIVE HEART FAILURE, UNSPECIFIED HF CHRONICITY, UNSPECIFIED HEART FAILURE TYPE (HCC): ICD-10-CM

## 2021-04-20 DIAGNOSIS — E03.9 HYPOTHYROIDISM, UNSPECIFIED TYPE: ICD-10-CM

## 2021-04-20 DIAGNOSIS — I48.0 PAF (PAROXYSMAL ATRIAL FIBRILLATION) (HCC): ICD-10-CM

## 2021-04-20 LAB
ALBUMIN SERPL-MCNC: 3.9 G/DL (ref 3.5–5.2)
ALBUMIN/GLOB SERPL: 1.4 G/DL
ALP SERPL-CCNC: 64 U/L (ref 39–117)
ALT SERPL W P-5'-P-CCNC: 16 U/L (ref 1–33)
ANION GAP SERPL CALCULATED.3IONS-SCNC: 11.3 MMOL/L (ref 5–15)
AST SERPL-CCNC: 21 U/L (ref 1–32)
BACTERIA UR QL AUTO: ABNORMAL /HPF
BASOPHILS # BLD AUTO: 0.1 10*3/MM3 (ref 0–0.2)
BASOPHILS NFR BLD AUTO: 1.2 % (ref 0–1.5)
BILIRUB SERPL-MCNC: 0.6 MG/DL (ref 0–1.2)
BILIRUB UR QL STRIP: NEGATIVE
BUN SERPL-MCNC: 14 MG/DL (ref 8–23)
BUN/CREAT SERPL: 18.2 (ref 7–25)
CALCIUM SPEC-SCNC: 9.6 MG/DL (ref 8.6–10.5)
CHLORIDE SERPL-SCNC: 102 MMOL/L (ref 98–107)
CHOLEST SERPL-MCNC: 125 MG/DL (ref 0–200)
CLARITY UR: CLEAR
CO2 SERPL-SCNC: 27.7 MMOL/L (ref 22–29)
COLOR UR: YELLOW
CREAT SERPL-MCNC: 0.77 MG/DL (ref 0.57–1)
DEPRECATED RDW RBC AUTO: 48 FL (ref 37–54)
EOSINOPHIL # BLD AUTO: 0.28 10*3/MM3 (ref 0–0.4)
EOSINOPHIL NFR BLD AUTO: 3.4 % (ref 0.3–6.2)
ERYTHROCYTE [DISTWIDTH] IN BLOOD BY AUTOMATED COUNT: 15.5 % (ref 12.3–15.4)
GFR SERPL CREATININE-BSD FRML MDRD: 74 ML/MIN/1.73
GLOBULIN UR ELPH-MCNC: 2.7 GM/DL
GLUCOSE SERPL-MCNC: 98 MG/DL (ref 65–99)
GLUCOSE UR STRIP-MCNC: NEGATIVE MG/DL
HBA1C MFR BLD: 6.39 % (ref 4.8–5.6)
HCT VFR BLD AUTO: 37.3 % (ref 34–46.6)
HDLC SERPL-MCNC: 40 MG/DL (ref 40–60)
HGB BLD-MCNC: 11.9 G/DL (ref 12–15.9)
HGB UR QL STRIP.AUTO: NEGATIVE
IMM GRANULOCYTES # BLD AUTO: 0.03 10*3/MM3 (ref 0–0.05)
IMM GRANULOCYTES NFR BLD AUTO: 0.4 % (ref 0–0.5)
KETONES UR QL STRIP: NEGATIVE
LDLC SERPL CALC-MCNC: 54 MG/DL (ref 0–100)
LDLC/HDLC SERPL: 1.2 {RATIO}
LEUKOCYTE ESTERASE UR QL STRIP.AUTO: ABNORMAL
LYMPHOCYTES # BLD AUTO: 2.34 10*3/MM3 (ref 0.7–3.1)
LYMPHOCYTES NFR BLD AUTO: 28.8 % (ref 19.6–45.3)
MCH RBC QN AUTO: 27.2 PG (ref 26.6–33)
MCHC RBC AUTO-ENTMCNC: 31.9 G/DL (ref 31.5–35.7)
MCV RBC AUTO: 85.4 FL (ref 79–97)
MONOCYTES # BLD AUTO: 0.59 10*3/MM3 (ref 0.1–0.9)
MONOCYTES NFR BLD AUTO: 7.3 % (ref 5–12)
NEUTROPHILS NFR BLD AUTO: 4.78 10*3/MM3 (ref 1.7–7)
NEUTROPHILS NFR BLD AUTO: 58.9 % (ref 42.7–76)
NITRITE UR QL STRIP: NEGATIVE
NRBC BLD AUTO-RTO: 0 /100 WBC (ref 0–0.2)
PH UR STRIP.AUTO: 5.5 [PH] (ref 4.6–8)
PLATELET # BLD AUTO: 265 10*3/MM3 (ref 140–450)
PMV BLD AUTO: 9.1 FL (ref 6–12)
POTASSIUM SERPL-SCNC: 4.5 MMOL/L (ref 3.5–5.2)
PROT SERPL-MCNC: 6.6 G/DL (ref 6–8.5)
PROT UR QL STRIP: NEGATIVE
RBC # BLD AUTO: 4.37 10*6/MM3 (ref 3.77–5.28)
RBC # UR: ABNORMAL /HPF
REF LAB TEST METHOD: ABNORMAL
SODIUM SERPL-SCNC: 141 MMOL/L (ref 136–145)
SP GR UR STRIP: 1.02 (ref 1–1.03)
SQUAMOUS #/AREA URNS HPF: ABNORMAL /HPF
T4 FREE SERPL-MCNC: 1.93 NG/DL (ref 0.93–1.7)
TRIGL SERPL-MCNC: 186 MG/DL (ref 0–150)
TSH SERPL DL<=0.05 MIU/L-ACNC: 1.07 UIU/ML (ref 0.27–4.2)
URATE SERPL-MCNC: 4.9 MG/DL (ref 2.4–5.7)
UROBILINOGEN UR QL STRIP: ABNORMAL
VLDLC SERPL-MCNC: 31 MG/DL (ref 5–40)
WBC # BLD AUTO: 8.12 10*3/MM3 (ref 3.4–10.8)
WBC UR QL AUTO: ABNORMAL /HPF

## 2021-04-20 PROCEDURE — 84550 ASSAY OF BLOOD/URIC ACID: CPT | Performed by: NURSE PRACTITIONER

## 2021-04-20 PROCEDURE — 80053 COMPREHEN METABOLIC PANEL: CPT | Performed by: NURSE PRACTITIONER

## 2021-04-20 PROCEDURE — 81001 URINALYSIS AUTO W/SCOPE: CPT | Performed by: NURSE PRACTITIONER

## 2021-04-20 PROCEDURE — G0439 PPPS, SUBSEQ VISIT: HCPCS | Performed by: NURSE PRACTITIONER

## 2021-04-20 PROCEDURE — 80061 LIPID PANEL: CPT | Performed by: NURSE PRACTITIONER

## 2021-04-20 PROCEDURE — 84439 ASSAY OF FREE THYROXINE: CPT | Performed by: NURSE PRACTITIONER

## 2021-04-20 PROCEDURE — 85025 COMPLETE CBC W/AUTO DIFF WBC: CPT | Performed by: NURSE PRACTITIONER

## 2021-04-20 PROCEDURE — 99214 OFFICE O/P EST MOD 30 MIN: CPT | Performed by: NURSE PRACTITIONER

## 2021-04-20 PROCEDURE — 36415 COLL VENOUS BLD VENIPUNCTURE: CPT | Performed by: NURSE PRACTITIONER

## 2021-04-20 PROCEDURE — 83036 HEMOGLOBIN GLYCOSYLATED A1C: CPT | Performed by: NURSE PRACTITIONER

## 2021-04-20 PROCEDURE — 84443 ASSAY THYROID STIM HORMONE: CPT | Performed by: NURSE PRACTITIONER

## 2021-04-20 RX ORDER — ATORVASTATIN CALCIUM 80 MG/1
80 TABLET, FILM COATED ORAL
Qty: 90 TABLET | Refills: 3 | Status: SHIPPED | OUTPATIENT
Start: 2021-04-20

## 2021-04-20 RX ORDER — ALENDRONATE SODIUM 70 MG/1
70 TABLET ORAL
Qty: 12 TABLET | Refills: 3 | Status: SHIPPED | OUTPATIENT
Start: 2021-04-20

## 2021-04-20 NOTE — PROGRESS NOTES
"Chief Complaint  Medicare Wellness-subsequent    Subjective          Kristan Galicia presents to [facility]  History of Present Illness  Here to FU on chronic DM2 well controlled on ozempic 1 mg daily last A1C 6.9 02/20, was prev on metformin GI upset, no longer taking glipizide and januvia, working on healthy diet and exercise, not checking BS and no sx of high or low BS, no feet pain, last eye exam last month monitoring cataract, With chronic chol fasting for labs on lipitor 80 mg HS with last chol 10/20 well controlled, request refill, with chronic hypothyroid on levothyroxine 75 mcg daily last TSH 10/20 nl, with chronic allergies stable on singulair  With gout L toe no recent flairs on allopurinol 100 mg daily with last uric acid 4.4 10/20, with last dexa scan 09/20 improved from osteoporosis to osteopenia on fosamax 70 mg weekly request refill, with chronic back pain s/p radiofreq procedure last year no longer with pain, on cymbalta 60 mg daily also sees PM on hydrocodone 10/325 mg BID prn, s/p L shoulder sgy with Dr Anne 10/22/20 then had pop and pain with shoulder movement 02/21 and displaced fracture s/p plate correction sgy 04/15/21 feeling better today some soreness and icing  sees cardiology Christopher Zarate with chronic CHF, HTN and PAF on amiodarone 200 mg, coreg 12.5 mg BID, spironolactone-HCTZ 25/25 mg daily, and pradaxa free from company, with defibrillator, No CP dizziness, palpitations, HA or B LE edema  Last colonoscopy 08/16 Dr Sheikh no colon sc, last mammo 08/20 NAD no breast pain lumps nipple dc    Objective   Vital Signs:   /70 (BP Location: Left arm, Patient Position: Sitting, Cuff Size: Adult)   Pulse 87   Temp 98 °F (36.7 °C) (Temporal)   Ht 154.9 cm (61\")   Wt 74.9 kg (165 lb 3.2 oz)   SpO2 97%   BMI 31.21 kg/m²     Physical Exam  Vitals reviewed.   Constitutional:       Appearance: She is well-developed.   HENT:      Head: Normocephalic and " atraumatic.   Eyes:      Conjunctiva/sclera: Conjunctivae normal.      Pupils: Pupils are equal, round, and reactive to light.   Cardiovascular:      Rate and Rhythm: Normal rate and regular rhythm.      Pulses: Normal pulses.      Heart sounds: Normal heart sounds.   Pulmonary:      Effort: Pulmonary effort is normal.      Breath sounds: Normal breath sounds.   Abdominal:      General: There is no distension.      Palpations: Abdomen is soft. There is no mass.      Tenderness: There is no abdominal tenderness. There is no right CVA tenderness, left CVA tenderness, guarding or rebound.      Hernia: No hernia is present.   Musculoskeletal:         General: Tenderness (R shoulder gave ice pack while we spoke, in arm sling) present. Normal range of motion.      Cervical back: Normal range of motion.      Right lower leg: No edema.      Left lower leg: No edema.   Skin:     General: Skin is warm and dry.   Neurological:      Mental Status: She is alert and oriented to person, place, and time.   Psychiatric:         Mood and Affect: Mood normal.         Behavior: Behavior normal.         Thought Content: Thought content normal.         Judgment: Judgment normal.     Diabetic foot exam: sensation intact, pulses strong, well hydrated, no ulcers or fungal toenails     Result Review :                 Assessment and Plan    Diagnoses and all orders for this visit:    1. Medicare annual wellness visit, subsequent (Primary)    2. Mixed hyperlipidemia  -     CBC & Differential  -     Comprehensive Metabolic Panel  -     Lipid Panel    3. PAF (paroxysmal atrial fibrillation) (CMS/[facility])  -     CBC & Differential  -     Comprehensive Metabolic Panel  -     Lipid Panel  -     TSH  -     Urinalysis With Microscopic - Urine, Clean Catch    4. Coronary artery disease without angina pectoris, unspecified vessel or lesion type, unspecified whether native or transplanted heart    5. Congestive heart failure, unspecified HF chronicity,  unspecified heart failure type (CMS/[facility])    6. Tear of right rotator cuff  -     alendronate (FOSAMAX) 70 MG tablet; Take 1 tablet by mouth Every 7 (Seven) Days.  Dispense: 12 tablet; Refill: 3    7. Type 2 diabetes mellitus without complication, with long-term current use of insulin (CMS/[facility])  -     CBC & Differential  -     Comprehensive Metabolic Panel  -     Lipid Panel  -     TSH  -     Hemoglobin A1c  -     Urinalysis With Microscopic - Urine, Clean Catch    8. Hypothyroidism, unspecified type  -     TSH  -     T4, free    9. Osteoporosis, unspecified osteoporosis type, unspecified pathological fracture presence    10. Chronic gout involving 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, unspecified cause  -     Uric Acid    11. Class 1 obesity due to excess calories with serious comorbidity and body mass index (BMI) of 31.0 to 31.9 in adult    Other orders  -     atorvastatin (LIPITOR) 80 MG tablet; Take 1 tablet by mouth every night at bedtime.  Dispense: 90 tablet; Refill: 3        Follow Up   Return in about 6 months (around 10/20/2021).  Patient was given instructions and counseling regarding her condition or for health maintenance advice. Please see specific information pulled into the AVS if appropriate.   Medicare wellness today, check labs and call with results, refill chronic dz meds and check BP at home, cont FU with specialists, DM foot exam today, check BS at home, Enc healthy diet and regular exercise for wt loss

## 2021-04-20 NOTE — PROGRESS NOTES
The ABCs of the Annual Wellness Visit  Subsequent Medicare Wellness Visit    Chief Complaint   Patient presents with   • Medicare Wellness-subsequent     Subjective   History of Present Illness:  Kristan Galicia is a 70 y.o. female who presents for a Subsequent Medicare Wellness Visit.    The following portions of the patient's history were reviewed and   updated as appropriate: allergies, current medications, past family history, past medical history, past social history, past surgical history and problem list.    Compared to one year ago, the patient feels her physical   health is the same.    Compared to one year ago, the patient feels her mental   health is the same.    Opioid medication/s are on active medication list.  and I have evaluated her active treatment plan and pain score trends (see table).  Vitals:    04/20/21 1058   PainSc: 0-No pain     I have reviewed the chart for potential of high risk medication and harmful drug interactions in the elderly.            Outpatient Medications Prior to Visit   Medication Sig Dispense Refill   • allopurinol (ZYLOPRIM) 100 MG tablet Take 1 tablet by mouth Daily. 90 tablet 3   • amiodarone (PACERONE) 200 MG tablet Take 100 mg by mouth Every Night.     • carvedilol (COREG) 12.5 MG tablet Take 12.5 mg by mouth 2 (Two) Times a Day.     • dabigatran etexilate (PRADAXA) 150 MG capsu Take 150 mg by mouth 2 (Two) Times a Day. WILL HOLD PRIOR TO SURGERY ACCORDING TO CARDIOLOGIST     • docusate sodium (COLACE) 100 MG capsule Take 1 capsule by mouth 2 (Two) Times a Day. 60 capsule 0   • DULoxetine (CYMBALTA) 60 MG capsule Take 1 capsule by mouth Daily. (Patient taking differently: Take 60 mg by mouth Every Night.) 90 capsule 3   • ELDERBERRY PO Take 1 tablet by mouth Daily.     • HYDROcodone-acetaminophen (NORCO)  MG per tablet Take 1-2 tablets by mouth every 4-6 hours as needed for pain.  Not to exceed 6 tabs per day. 42 tablet 0   • levothyroxine (SYNTHROID,  LEVOTHROID) 75 MCG tablet TAKE 1 TABLET BY MOUTH EVERY DAY (Patient taking differently: Take 75 mcg by mouth Daily.) 90 tablet 2   • montelukast (SINGULAIR) 10 MG tablet Take 1 tablet by mouth Daily. (Patient taking differently: Take 10 mg by mouth Every Night.) 90 tablet 3   • multivitamin with minerals (MULTIVITAMIN ADULT PO) Take 2 tablets by mouth Daily.     • ondansetron (ZOFRAN) 4 MG tablet Take 1 tablet by mouth Every 6 (Six) Hours As Needed for Nausea or Vomiting. 12 tablet 0   • pantoprazole (PROTONIX) 40 MG EC tablet Take 1 tablet by mouth Daily. 90 tablet 1   • Semaglutide,0.25 or 0.5MG/DOS, (Ozempic, 0.25 or 0.5 MG/DOSE,) 2 MG/1.5ML solution pen-injector Inject 0.5 mg under the skin into the appropriate area as directed 1 (One) Time Per Week. (Patient taking differently: Inject 0.5 mg under the skin into the appropriate area as directed 1 (One) Time Per Week. Takes on mondays) 4 pen 11   • alendronate (FOSAMAX) 70 MG tablet TAKE 1 TABLET BY MOUTH EVERY 7 (SEVEN) DAYS. (Patient taking differently: Take 70 mg by mouth Every 7 (Seven) Days. TAKES ON SUNDAYS) 12 tablet 3   • atorvastatin (LIPITOR) 80 MG tablet Take 1 tablet by mouth every night at bedtime. 90 tablet 3   • Blood Glucose Monitoring Suppl (ACCU-CHEK OTILIA PLUS) W/DEVICE kit Dx e11.9 use to check BS BID, ok to substitute formulary preferred 1 kit 0   • diclofenac (FLECTOR) 1.3 % patch patch Apply 1 patch topically to the appropriate area as directed Every 12 (Twelve) Hours. 30 patch 5   • docusate sodium 100 MG capsule Take 1 capsule by mouth 2 (Two) Times a Day As Needed for Constipation. 60 capsule 1   • glucose blood (ACCU-CHEK OTILIA PLUS) test strip Dx e11.9 use to check BS BID, ok to substitute formulary preferred 60 each 11   • HYDROcodone-acetaminophen (NORCO)  MG per tablet Take 1-2 tabs po Q 4-6 hours prn pain.  Not to exceed 6 tabs per day. 42 tablet 0   • Lancets (ACCU-CHEK MULTICLIX) lancets Dx e11.9 use to check BS BID, ok to  substitute formulary preferred 60 each 11   • spironolactone (ALDACTONE) 25 MG tablet Take 25 mg by mouth Every Morning.     • ondansetron (Zofran) 4 MG tablet Take 1 tablet by mouth Every 8 (Eight) Hours As Needed for Nausea or Vomiting. 30 tablet 0     Facility-Administered Medications Prior to Visit   Medication Dose Route Frequency Provider Last Rate Last Admin   • Chlorhexidine Gluconate 2 % pads 1 each  1 each Apply externally Take As Directed Juan Antonio Anne MD           Patient Active Problem List   Diagnosis   • Shoulder pain, right   • Knee pain, right   • Fall down stairs   • PAF (paroxysmal atrial fibrillation) (CMS/[facility])   • S/P rotator cuff repair   • Vitamin D deficiency   • Vertigo   • Vasovagal syncope   • Thyroid disease   • Obstructive sleep apnea, adult   • Palpitations   • Osteoporosis   • Ocular tumor   • Migraines   • Hyperlipidemia   • History of transfusion   • Heart murmur   • Heart attack (CMS/[facility])   • GERD (gastroesophageal reflux disease)   • CHF (congestive heart failure) (CMS/[facility])   • Cardiomyopathy (CMS/[facility])   • Asthma   • Arthritis of right knee   • Right rotator cuff tear   • Hypothyroidism   • Dyspnea and respiratory abnormalities   • Orthopnea   • IHSS (idiopathic hypertrophic subaortic stenosis) (CMS/[facility])   • AICD (automatic cardioverter/defibrillator) present   • Anticoagulant long-term use   • Hyperglycemia, drug-induced   • Chronic pain of right knee   • Diverticulitis   • CAD (coronary artery disease)   • Hypertrophic nonobstructive cardiomyopathy (CMS/[facility])   • ICD (implantable cardioverter-defibrillator) discharge   • Menopause   • Non-obstructive hypertrophic cardiomyopathy (CMS/[facility])   • Insomnia   • History of total knee arthroplasty, right   • Closed fracture of odontoid process of axis (CMS/[facility])   • Atelectasis   • Chronic diastolic CHF (congestive heart failure) (CMS/[facility])   • Diabetes mellitus (CMS/[facility])   • Chronic gout involving 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   •  "Other displaced fracture of first cervical vertebra, subsequent encounter for fracture with routine healing   • Left shoulder pain   • Closed displaced fracture of left acromial process     Advanced Care Planning:  ACP discussion was held with the patient during this visit. Patient has an advance directive in EMR which is still valid.          Objective         Vitals:    21 1058   BP: 110/70   BP Location: Left arm   Patient Position: Sitting   Cuff Size: Adult   Pulse: 87   Temp: 98 °F (36.7 °C)   TempSrc: Temporal   SpO2: 97%   Weight: 74.9 kg (165 lb 3.2 oz)   Height: 154.9 cm (61\")   PainSc: 0-No pain     Patient's Body mass index is 31.21 kg/m². BMI is above normal parameters. Recommendations include: educational material and none (medical contraindication).    Physical Exam       HEALTH RISK ASSESSMENT    Does the patient have evidence of cognitive impairment? No    Aspirin is not on active medication list.  Aspirin use is contraindicated for this patient due to: history of aspirin allergy.  .  On pradaxa    Recent Hospitalizations:  [place] [facility] at the following:  [facility] [place] [facility] [place] Providers:  Patient Care Team:  Chelly Red APRN as PCP - General (Family Medicine)  Minh Ya MD as Consulting Physician (Cardiac Electrophysiology)  Rosalio Sheikh MD as Consulting Physician (General Surgery)  Juan Antonio Anne MD as Consulting Physician (Orthopedic Surgery)  Ruben Jackson MD (Inactive) as Consulting Physician (Neurosurgery)    Smoking Status:  Social History     Tobacco Use   Smoking Status Former Smoker   • Packs/day: 0.50   • Years: 17.00   • Pack years: 8.50   • Types: Cigarettes   • Quit date: 1985   • Years since quittin.6   Smokeless Tobacco Never Used     Alcohol Consumption:  Social History     Substance and Sexual Activity   Alcohol Use No     Fall Risk Screen:  STEADI Fall Risk Assessment was completed, and patient is " at LOW risk for falls.Assessment completed on:4/20/2021    Depression Screening:  PHQ-2/PHQ-9 Depression Screening 4/20/2021   Little interest or pleasure in doing things 0   Feeling down, depressed, or hopeless 0   Trouble falling or staying asleep, or sleeping too much 0   Feeling tired or having little energy 0   Poor appetite or overeating 0   Feeling bad about yourself - or that you are a failure or have let yourself or your family down 0   Trouble concentrating on things, such as reading the newspaper or watching television 0   Moving or speaking so slowly that other people could have noticed. Or the opposite - being so fidgety or restless that you have been moving around a lot more than usual 0   Thoughts that you would be better off dead, or of hurting yourself in some way 0   Total Score 0   If you checked off any problems, how difficult have these problems made it for you to do your work, take care of things at home, or get along with other people? Not difficult at all     Health Habits and Functional and Cognitive Screening:  Functional & Cognitive Status 4/20/2021   Do you have difficulty preparing food and eating? No   Do you have difficulty bathing yourself, getting dressed or grooming yourself? No   Do you have difficulty using the toilet? No   Do you have difficulty moving around from place to place? No   Do you have trouble with steps or getting out of a bed or a chair? No   Current Diet Well Balanced Diet        Current Diet Comment -   Dental Exam Not up to date   Eye Exam Up to date   Exercise (times per week) 2 times per week   Current Exercises Include Walking   Current Exercise Activities Include -   Do you need help using the phone?  No   Are you deaf or do you have serious difficulty hearing?  No   Do you need help with transportation? No   Do you need help shopping? No   Do you need help preparing meals?  No   Do you need help with housework?  Yes   Do you need help with laundry? Yes   Do  you need help taking your medications? No   Do you need help managing money? No   Do you ever drive or ride in a car without wearing a seat belt? No   Have you felt unusual stress, anger or loneliness in the last month? No   Who do you live with? Alone   If you need help, do you have trouble finding someone available to you? No   Have you been bothered in the last four weeks by sexual problems? No   Do you have difficulty concentrating, remembering or making decisions? No     Age-appropriate Screening Schedule:  Refer to the list below for future screening recommendations based on patient's age, sex and/or medical conditions. Orders for these recommended tests are listed in the plan section. The patient has been provided with a written plan.    Health Maintenance   Topic Date Due   • ZOSTER VACCINE (2 of 2) 08/02/2017   • PAP SMEAR  11/15/2019   • HEMOGLOBIN A1C  08/26/2020   • INFLUENZA VACCINE  08/01/2021   • LIPID PANEL  10/15/2021   • URINE MICROALBUMIN  10/16/2021   • DIABETIC EYE EXAM  11/16/2021   • DIABETIC FOOT EXAM  04/20/2022   • MAMMOGRAM  08/28/2022   • DXA SCAN  09/10/2022   • COLONOSCOPY  08/16/2026   • TDAP/TD VACCINES (2 - Td) 09/14/2028        Assessment/Plan   Medicare Risks and Personalized Health Plan  CMS Preventative Services Quick Reference  Cardiovascular risk  Obesity/Overweight     The above risks/problems have been discussed with the patient.  Pertinent information has been shared with the patient in the After Visit Summary.  Follow up plans and orders are seen below in the Assessment/Plan Section.    Diagnoses and all orders for this visit:    1. Medicare annual wellness visit, subsequent (Primary)    2. Mixed hyperlipidemia  -     CBC & Differential  -     Comprehensive Metabolic Panel  -     Lipid Panel    3. PAF (paroxysmal atrial fibrillation) (CMS/[facility])  -     CBC & Differential  -     Comprehensive Metabolic Panel  -     Lipid Panel  -     TSH  -     Urinalysis With Microscopic -  Urine, Clean Catch    4. Coronary artery disease without angina pectoris, unspecified vessel or lesion type, unspecified whether native or transplanted heart    5. Congestive heart failure, unspecified HF chronicity, unspecified heart failure type (CMS/[facility])    6. Tear of right rotator cuff  -     alendronate (FOSAMAX) 70 MG tablet; Take 1 tablet by mouth Every 7 (Seven) Days.  Dispense: 12 tablet; Refill: 3    7. Type 2 diabetes mellitus without complication, with long-term current use of insulin (CMS/[facility])  -     CBC & Differential  -     Comprehensive Metabolic Panel  -     Lipid Panel  -     TSH  -     Hemoglobin A1c  -     Urinalysis With Microscopic - Urine, Clean Catch    8. Hypothyroidism, unspecified type  -     TSH  -     T4, free    9. Osteoporosis, unspecified osteoporosis type, unspecified pathological fracture presence    10. Chronic gout involving 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, unspecified cause  -     Uric Acid    11. Class 1 obesity due to excess calories with serious comorbidity and body mass index (BMI) of 31.0 to 31.9 in adult    Other orders  -     atorvastatin (LIPITOR) 80 MG tablet; Take 1 tablet by mouth every night at bedtime.  Dispense: 90 tablet; Refill: 3      Follow Up:  Return in about 6 months (around 10/20/2021).     An After Visit Summary and PPPS were given to the patient.       AWV Documentation

## 2021-04-20 NOTE — PATIENT INSTRUCTIONS
Medicare wellness today, check labs and call with results, refill chronic dz meds and check BP at home, cont FU with specialists, DM foot exam today, check BS at home, Enc healthy diet and regular exercise for wt loss  Medicare Wellness  Personal Prevention Plan of Service     Date of Office Visit:  2021  Encounter Provider:  CHRISTIN Son  Place of Service:  [facility]  Patient Name: Kristan Galicia  :  1950    As part of the Medicare Wellness portion of your visit today, we are providing you with this personalized preventive plan of services (PPPS). This plan is based upon recommendations of the [place] Preventive Services Task Force (USPSTF) and the Advisory Committee on Immunization Practices (ACIP).    This lists the preventive care services that should be considered, and provides dates of when you are due. Items listed as completed are up-to-date and do not require any further intervention.    Health Maintenance   Topic Date Due   • ZOSTER VACCINE (2 of 2) 2017   • PAP SMEAR  11/15/2019   • DIABETIC FOOT EXAM  2020   • HEMOGLOBIN A1C  2020   • Pneumococcal Vaccine 65+ (2 of 2 - PPSV23) 2020   • INFLUENZA VACCINE  2021   • LIPID PANEL  10/15/2021   • URINE MICROALBUMIN  10/16/2021   • DIABETIC EYE EXAM  2021   • ANNUAL WELLNESS VISIT  2022   • MAMMOGRAM  2022   • DXA SCAN  09/10/2022   • COLONOSCOPY  2026   • TDAP/TD VACCINES (2 - Td) 2028   • HEPATITIS C SCREENING  Completed   • COVID-19 Vaccine  Completed       Orders Placed This Encounter   Procedures   • Comprehensive Metabolic Panel     Order Specific Question:   Release to patient     Answer:   Immediate   • Lipid Panel   • TSH     Order Specific Question:   Release to patient     Answer:   Immediate   • Hemoglobin A1c     Order Specific Question:   Release to patient     Answer:   Immediate   • Uric Acid     Order Specific Question:    Release to patient     Answer:   Immediate   • T4, free     Order Specific Question:   Release to patient     Answer:   Immediate   • CBC Auto Differential   • Urinalysis without microscopic (no culture) - Urine, Clean Catch     Order Specific Question:   Release to patient     Answer:   Immediate   • Urinalysis, Microscopic Only - Urine, Clean Catch     Order Specific Question:   Release to patient     Answer:   Immediate   • CBC & Differential     Order Specific Question:   Manual Differential     Answer:   No   • Urinalysis With Microscopic - Urine, Clean Catch     Order Specific Question:   Release to patient     Answer:   Immediate       Return in about 6 months (around 10/20/2021).

## 2021-04-27 ENCOUNTER — TELEPHONE (OUTPATIENT)
Dept: ORTHOPEDIC SURGERY | Facility: CLINIC | Age: 71
End: 2021-04-27

## 2021-04-27 DIAGNOSIS — I50.32 CHRONIC DIASTOLIC (CONGESTIVE) HEART FAILURE (HCC): ICD-10-CM

## 2021-04-27 DIAGNOSIS — R53.82 CHRONIC FATIGUE: Primary | ICD-10-CM

## 2021-04-27 DIAGNOSIS — E66.01 MORBID (SEVERE) OBESITY DUE TO EXCESS CALORIES (HCC): ICD-10-CM

## 2021-04-30 ENCOUNTER — OFFICE VISIT (OUTPATIENT)
Dept: ORTHOPEDIC SURGERY | Facility: CLINIC | Age: 71
End: 2021-04-30

## 2021-04-30 VITALS — WEIGHT: 164 LBS | HEIGHT: 61 IN | BODY MASS INDEX: 30.96 KG/M2 | TEMPERATURE: 97.3 F

## 2021-04-30 DIAGNOSIS — Z09 SURGERY FOLLOW-UP: Primary | ICD-10-CM

## 2021-04-30 PROCEDURE — 73030 X-RAY EXAM OF SHOULDER: CPT | Performed by: NURSE PRACTITIONER

## 2021-04-30 PROCEDURE — 99024 POSTOP FOLLOW-UP VISIT: CPT | Performed by: NURSE PRACTITIONER

## 2021-04-30 NOTE — PROGRESS NOTES
Kristan Galicia     : 1950     MRN: [id number]     DATE: 2021    CC:  2 weeks s/p ORIF left acromion fracture    HPI: Patient returns to clinic today stating pain is improved.   Denies any new concerns or issues.    Vitals:    21 1521   Temp: 97.3 °F (36.3 °C)     Exam:  Incision is well-approximated.  No erythema or drainage.  Contour of shoulder appears normal.  Skin intact and benign.  Arm and forearm soft.  Shoulder moves fluidly with pendulum exercises.  Good motor and sensory function in the hand and wrist.  Palpable radial pulse with good cap refill.      Imaginv xrays including AP and scapular Y views of the shoulder are ordered and reviewed by me to evaluate alignment and for comparison purposes.  Fracture appears well-aligned.      Impression:   2 weeks s/p ORIF left acromion fracture    Plan:    1.  Continue use of sling.  No driving at this point.  2.  Begin working on progressive ROM.  3.  Follow up with Dr. Anne in 2 weeks with repeat xrays.  4.  Counseled the patient about appropriate activity modifications and restrictions.    Orin Maldonado, CHRISTIN     2021

## 2021-05-14 ENCOUNTER — OFFICE VISIT (OUTPATIENT)
Dept: ORTHOPEDIC SURGERY | Facility: CLINIC | Age: 71
End: 2021-05-14

## 2021-05-14 VITALS — BODY MASS INDEX: 30.96 KG/M2 | WEIGHT: 164 LBS | HEIGHT: 61 IN | TEMPERATURE: 97.7 F

## 2021-05-14 DIAGNOSIS — Z09 SURGERY FOLLOW-UP: ICD-10-CM

## 2021-05-14 DIAGNOSIS — R52 PAIN: Primary | ICD-10-CM

## 2021-05-14 PROCEDURE — 73030 X-RAY EXAM OF SHOULDER: CPT | Performed by: ORTHOPAEDIC SURGERY

## 2021-05-14 PROCEDURE — 99024 POSTOP FOLLOW-UP VISIT: CPT | Performed by: ORTHOPAEDIC SURGERY

## 2021-05-14 NOTE — PROGRESS NOTES
Ms. Galicia follows up for her left shoulder.  She is now about a month out from fixation of an acromion nonunion.  She says the shoulder is feeling much better.  She has been compliant with use of the sling.    Her incision is healed.  Mild tenderness over the acromion.  Pendulums are well-tolerated.  She can actively elevate to about 140 degrees and abduct about 90 degrees with minimal discomfort.    AP and scapular Y views left shoulder are ordered and reviewed to evaluate healing.  These are compared to previous x-rays.  I cannot really visualize her fracture very well.  The plate over the top of the shoulder appears to have broken.  The tension band wires appear to have rotated slightly.  The K wires did not appear like they are entirely in the acromion.    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  Looks like the plate broke.  I cannot tell if the tension band is holding up or not but it looks like the wires are not entirely in bone.  This is a byproduct of putting in the hardware without the benefit of x-ray due to the magnet overlying her defibrillator.  I would not recommend any further surgery at this time and am going to continue to watch her for now.  She can come out of the sling and progress her motion.  We discussed appropriate activity modifications and restrictions.  I want to see her back in 1 month.

## 2021-05-18 RX ORDER — LEVOTHYROXINE SODIUM 0.07 MG/1
TABLET ORAL
Qty: 90 TABLET | Refills: 2 | Status: SHIPPED | OUTPATIENT
Start: 2021-05-18

## 2021-06-02 NOTE — PROGRESS NOTES
Continued Stay Note  [facility]     Patient Name: Kristan Galicia  MRN: [id number]  Today's Date: 4/20/2018    Admit Date: 4/18/2018          Discharge Plan     Row Name 04/20/18 1506       Plan    Plan [facility]    Patient/Family in Agreement with Plan yes    Plan Comments Plan remains to d/c to [facility], partial packet will be on chart.               Discharge Codes    No documentation.       Expected Discharge Date and Time     Expected Discharge Date Expected Discharge Time    Apr 21, 2018             Lalitha Mercedes RN     General

## 2021-06-11 ENCOUNTER — OFFICE VISIT (OUTPATIENT)
Dept: ORTHOPEDIC SURGERY | Facility: CLINIC | Age: 71
End: 2021-06-11

## 2021-06-11 VITALS — WEIGHT: 164 LBS | TEMPERATURE: 96.3 F | BODY MASS INDEX: 30.96 KG/M2 | HEIGHT: 61 IN

## 2021-06-11 DIAGNOSIS — Z09 SURGERY FOLLOW-UP: ICD-10-CM

## 2021-06-11 DIAGNOSIS — R52 PAIN: Primary | ICD-10-CM

## 2021-06-11 PROCEDURE — 99024 POSTOP FOLLOW-UP VISIT: CPT | Performed by: ORTHOPAEDIC SURGERY

## 2021-06-11 PROCEDURE — 73030 X-RAY EXAM OF SHOULDER: CPT | Performed by: ORTHOPAEDIC SURGERY

## 2021-06-11 NOTE — PROGRESS NOTES
Chief complaint: Follow-up now approximately 2 months status post ORIF left acromion nonunion    Ms. Galicia follows up again today for her left shoulder.  Overall, she says her pain is better than before surgery but her pain level remains significant, 5-6 out of 10.  A lot of her pain now seems to be down over the arm rather than over the top of the shoulder though.  She says she thinks it has to do with the weather.    Her incisions look great.  She is not really tender over the acromion.  I do not appreciate any palpable defect or step-off.  Motion is about the same as last visit.  She remains weak with abduction and elevation in the scapular plane.    AP and scapular Y views of the left shoulder are ordered and reviewed to evaluate healing of the fracture.  These are compared to previous x-rays.  No changes relative to previous films.  The fracture is difficult to visualize.    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  Next visit, she needs an axillary view so I can get a better assessment.  Clinically, the acromion seems to be doing okay as best I can tell.  I recommend that we get her into PT to start working on strengthening and range of motion.  I am going to see her back in another month.

## 2021-06-23 ENCOUNTER — OFFICE VISIT (OUTPATIENT)
Dept: FAMILY MEDICINE CLINIC | Facility: CLINIC | Age: 71
End: 2021-06-23

## 2021-06-23 VITALS
HEART RATE: 78 BPM | BODY MASS INDEX: 30.93 KG/M2 | OXYGEN SATURATION: 96 % | WEIGHT: 163.8 LBS | DIASTOLIC BLOOD PRESSURE: 68 MMHG | SYSTOLIC BLOOD PRESSURE: 108 MMHG | TEMPERATURE: 97.3 F | HEIGHT: 61 IN
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
## 2021-06-23 DIAGNOSIS — Z72.820 POOR SLEEP: ICD-10-CM

## 2021-06-23 DIAGNOSIS — L03.311 CELLULITIS, ABDOMINAL WALL: ICD-10-CM

## 2021-06-23 DIAGNOSIS — S30.861A TICK BITE OF ABDOMEN, INITIAL ENCOUNTER: Primary | ICD-10-CM

## 2021-06-23 DIAGNOSIS — F43.21 GRIEF: ICD-10-CM

## 2021-06-23 PROCEDURE — 36415 COLL VENOUS BLD VENIPUNCTURE: CPT | Performed by: NURSE PRACTITIONER

## 2021-06-23 PROCEDURE — 99213 OFFICE O/P EST LOW 20 MIN: CPT | Performed by: NURSE PRACTITIONER

## 2021-06-23 RX ORDER — DOXYCYCLINE HYCLATE 100 MG
100 TABLET ORAL 2 TIMES DAILY
Qty: 20 TABLET | Refills: 0 | Status: SHIPPED | OUTPATIENT
Start: 2021-06-23 | End: 2022-05-03

## 2021-06-23 NOTE — PROGRESS NOTES
"Answers for HPI/ROS submitted by the patient on 6/16/2021  Please describe your symptoms.: Tick bites  and I head my head  Have you had these symptoms before?: No  How long have you been having these symptoms?: Greater than 2 weeks  Please list any medications you are currently taking for this condition.: None  Please describe any probable cause for these symptoms. : Headache, fatique  What is the primary reason for your visit?: Other    Chief Complaint  Tick Removal (tick bite left rib area and inner thigh), Headache (hit head on toilet paper roll 2 weeks ago had headache since then), and Fatigue (can't sleep)    Subjective          Kristan Galicia presents to [facility]  History of Present Illness  C/o recent tick bites x 2 removed both herself when visiting close family friend who recently passed from renal cancer few weeks ago, now with some itching over bites on L rib and inner thigh, notes did have area of red streaking has since improved, with general fatigue HA and body aches, no current fevers or rash, with poor sleep, denies anxiety or restlessness but with chronic depression generally well controlled on cymbalta, prev on wellbutrin, attributes some worsening sleep d/t grief and pending sleep study    Objective   Vital Signs:   /68 (BP Location: Left arm, Patient Position: Sitting, Cuff Size: Adult)   Pulse 78   Temp 97.3 °F (36.3 °C) (Infrared)   Ht 154.9 cm (60.98\")   Wt 74.3 kg (163 lb 12.8 oz)   SpO2 96%   BMI 30.97 kg/m²     Physical Exam  Vitals reviewed.   Constitutional:       Appearance: She is well-developed.   HENT:      Head: Normocephalic and atraumatic.   Eyes:      Conjunctiva/sclera: Conjunctivae normal.      Pupils: Pupils are equal, round, and reactive to light.   Cardiovascular:      Rate and Rhythm: Normal rate and regular rhythm.      Pulses: Normal pulses.      Heart sounds: Normal heart sounds.   Pulmonary:      Effort: Pulmonary effort " is normal.      Breath sounds: Normal breath sounds.   Abdominal:      General: There is no distension.      Palpations: Abdomen is soft. There is no mass.      Tenderness: There is no abdominal tenderness. There is no right CVA tenderness, left CVA tenderness, guarding or rebound.      Hernia: No hernia is present.   Musculoskeletal:         General: Normal range of motion.      Cervical back: Normal range of motion.      Right lower leg: No edema.      Left lower leg: No edema.   Skin:     General: Skin is warm and dry.      Findings: Erythema (ulcer L abd over mid rib without dc or bleeding, no streaking or targetoid rash) present.   Neurological:      Mental Status: She is alert and oriented to person, place, and time.   Psychiatric:         Behavior: Behavior normal.         Thought Content: Thought content normal.         Judgment: Judgment normal.      Comments: Flat        Result Review :                 Assessment and Plan    Diagnoses and all orders for this visit:    1. Tick bite of abdomen, initial encounter (Primary)  -     Ehrlichia Antibody Panel  -     Lyme, Total Antibody Test / Reflex  -     [place] SF (IgG / M)    2. Cellulitis, abdominal wall    3. Grief    4. Poor sleep    Other orders  -     doxycycline (VIBRAMYICN) 100 MG tablet; Take 1 tablet by mouth 2 (Two) Times a Day.  Dispense: 20 tablet; Refill: 0        Follow Up   No follow-ups on file.  Patient was given instructions and counseling regarding her condition or for health maintenance advice. Please see specific information pulled into the AVS if appropriate.   Check labs and call with results, erx doxycycline 100 mg BID x 10 days and monitor for improvement of cellulitis, fatigue, HA. If persist or worsen call or RTC consider exacerbation of depression with grief. Pending sleep study, continue all chronic dz meds.

## 2021-06-23 NOTE — PATIENT INSTRUCTIONS
Check labs and call with results, erx doxycycline 100 mg BID x 10 days and monitor for improvement of cellulitis, fatigue, HA. If persist or worsen call or RTC consider exacerbation of depression with grief. Pending sleep study, continue all chronic dz meds.

## 2021-06-24 LAB — B BURGDOR IGG+IGM SER-ACNC: <0.91 ISR (ref 0–0.9)

## 2021-06-25 LAB
R RICKETTSI IGG SER QL IA: NEGATIVE
R RICKETTSI IGM SER-ACNC: 0.38 INDEX (ref 0–0.89)

## 2021-06-28 LAB
A PHAGOCYTOPH IGG TITR SER IF: NEGATIVE {TITER}
A PHAGOCYTOPH IGM TITR SER IF: NEGATIVE {TITER}
E CHAFFEENSIS IGG TITR SER IF: NEGATIVE {TITER}
E CHAFFEENSIS IGM TITR SER IF: NEGATIVE {TITER}

## 2021-06-30 ENCOUNTER — TELEPHONE (OUTPATIENT)
Dept: FAMILY MEDICINE CLINIC | Facility: CLINIC | Age: 71
End: 2021-06-30

## 2021-07-14 ENCOUNTER — TRANSCRIBE ORDERS (OUTPATIENT)
Dept: ADMINISTRATIVE | Facility: HOSPITAL | Age: 71
End: 2021-07-14

## 2021-07-14 DIAGNOSIS — Z12.31 VISIT FOR SCREENING MAMMOGRAM: Primary | ICD-10-CM

## 2021-07-30 ENCOUNTER — OFFICE VISIT (OUTPATIENT)
Dept: ORTHOPEDIC SURGERY | Facility: CLINIC | Age: 71
End: 2021-07-30

## 2021-07-30 VITALS — HEIGHT: 61 IN | WEIGHT: 160 LBS | TEMPERATURE: 98.4 F | BODY MASS INDEX: 30.21 KG/M2

## 2021-07-30 DIAGNOSIS — R52 PAIN: Primary | ICD-10-CM

## 2021-07-30 DIAGNOSIS — Z09 SURGERY FOLLOW-UP: ICD-10-CM

## 2021-07-30 PROCEDURE — 99212 OFFICE O/P EST SF 10 MIN: CPT | Performed by: ORTHOPAEDIC SURGERY

## 2021-07-30 PROCEDURE — 73030 X-RAY EXAM OF SHOULDER: CPT | Performed by: ORTHOPAEDIC SURGERY

## 2021-07-30 RX ORDER — TIZANIDINE 4 MG/1
4 TABLET ORAL AS NEEDED
COMMUNITY
Start: 2021-06-28

## 2021-07-30 NOTE — PROGRESS NOTES
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 Galicia follows up today for her left shoulder.  She says it is doing great.  She is not having any significant pain.  She has noticed a spot that seems to \"stick out\" a little bit posteriorly.  Other than that, she says she is very happy with the shoulder.  She says that she feels like the surgery really helped her tremendously.    Her incision is healed.  She does have prominent hardware in the area of her concern.  Her motion is excellent.  She can abduct and elevate against resistance with mild discomfort.  Strength seems to be pretty good.  She is not having any significant pain on exam today.    AP, scapular Y and axillary views of the left shoulder are ordered and reviewed to evaluate healing of the fracture.  These are compared to previous x-rays.  The metal plate is broken.  The hardware appears unchanged.  I cannot really adequately see the fracture to tell if it has healed or not.    Assessment: 3.5-month status post ORIF left acromion nonunion    Plan: Once again I cannot tell if her fracture has healed radiographically.  Clinically though, she seems to be doing fantastic.  I am going to release her at this point.  She can follow-up with me on an as-needed basis.      "

## 2021-11-04 NOTE — TELEPHONE ENCOUNTER
OK to work in sooner.   Cyclosporine Pregnancy And Lactation Text: This medication is Pregnancy Category C and it isn't know if it is safe during pregnancy. This medication is excreted in breast milk.

## 2022-03-09 ENCOUNTER — DOCUMENTATION (OUTPATIENT)
Dept: ORTHOPEDIC SURGERY | Facility: CLINIC | Age: 72
End: 2022-03-09

## 2022-03-09 ENCOUNTER — OFFICE VISIT (OUTPATIENT)
Dept: ORTHOPEDIC SURGERY | Facility: CLINIC | Age: 72
End: 2022-03-09

## 2022-03-09 VITALS — WEIGHT: 160 LBS | HEIGHT: 61 IN | BODY MASS INDEX: 30.21 KG/M2 | TEMPERATURE: 98.2 F

## 2022-03-09 DIAGNOSIS — Z96.612 S/P REVERSE TOTAL SHOULDER ARTHROPLASTY, LEFT: Primary | ICD-10-CM

## 2022-03-09 DIAGNOSIS — M54.2 NECK PAIN: ICD-10-CM

## 2022-03-09 PROCEDURE — 73030 X-RAY EXAM OF SHOULDER: CPT | Performed by: ORTHOPAEDIC SURGERY

## 2022-03-09 PROCEDURE — 99213 OFFICE O/P EST LOW 20 MIN: CPT | Performed by: ORTHOPAEDIC SURGERY

## 2022-03-09 PROCEDURE — 72040 X-RAY EXAM NECK SPINE 2-3 VW: CPT | Performed by: ORTHOPAEDIC SURGERY

## 2022-03-09 RX ORDER — METHYLPREDNISOLONE 4 MG/1
TABLET ORAL
Qty: 21 TABLET | Refills: 0 | Status: SHIPPED | OUTPATIENT
Start: 2022-03-09 | End: 2022-05-03

## 2022-03-09 NOTE — PROGRESS NOTES
PT REQUESTED A CD OF TODAY'S XR TAKEN AT . SPOKE DIRECTLY TO PT AND NOTIFIED HER THAT THE CD COULD BE PICKED UP AT THE  OF THE [facility] OFFICE AT HER CONVENIENCE. PT VERBALIZED UNDERSTANDING.

## 2022-03-09 NOTE — PROGRESS NOTES
Patient:Kristan Galicia    YOB: 1950    Medical Record Number:[id number]    Chief Complaints:  Left arm pain    History of Present Illness:     71 y.o. female patient who presents for a new complaint of left arm pain.  When I last saw her, her shoulder was doing great and I released her.  She says that about 6 weeks ago she started having severe pain shooting down the back of her shoulder, into the arm and hand.  She reports associated intermittent numbness and paresthesia.  She has a history of multiple previous cervical spine surgeries done by Dr. Jackson.  Current pain is described as 9 out of 10 and burning.  The majority of her pain seems to be localized to the top and back of her shoulder.  The pain is worse with shoulder movement.    Allergies:  Allergies   Allergen Reactions   • Adhesive Tape Other (See Comments)     SKIN BLISTERS  PAPER TAPE OKAY PER PT    • Aspirin Swelling   • Biaxin [Clarithromycin] Other (See Comments)     BLISTERS AROUND MOTH  Also known as Bioxen    • Codeine Swelling   • Darvon [Propoxyphene] Swelling   • Levaquin [Levofloxacin] Other (See Comments)     BLISTERS AROUND MOUTH   • Nitroglycerin Other (See Comments)     Was told by cardiologist not to take due to heart condition & defibrillator   • Tequin [Gatifloxacin] Other (See Comments)     BLISTERS AROUND MOUTH  Also known Tequine        Home Medications:    Current Outpatient Medications:   •  alendronate (FOSAMAX) 70 MG tablet, Take 1 tablet by mouth Every 7 (Seven) Days., Disp: 12 tablet, Rfl: 3  •  allopurinol (ZYLOPRIM) 100 MG tablet, Take 1 tablet by mouth Daily., Disp: 90 tablet, Rfl: 3  •  amiodarone (PACERONE) 200 MG tablet, Take 100 mg by mouth Every Night., Disp: , Rfl:   •  atorvastatin (LIPITOR) 80 MG tablet, Take 1 tablet by mouth every night at bedtime., Disp: 90 tablet, Rfl: 3  •  Blood Glucose Monitoring Suppl (ACCU-CHEK OTILIA PLUS) W/DEVICE kit, Dx e11.9 use to check BS BID, ok to substitute  formulary preferred, Disp: 1 kit, Rfl: 0  •  carvedilol (COREG) 12.5 MG tablet, Take 12.5 mg by mouth 2 (Two) Times a Day., Disp: , Rfl:   •  dabigatran etexilate (PRADAXA) 150 MG capsu, Take 150 mg by mouth 2 (Two) Times a Day. WILL HOLD PRIOR TO SURGERY ACCORDING TO CARDIOLOGIST, Disp: , Rfl:   •  diclofenac (FLECTOR) 1.3 % patch patch, Apply 1 patch topically to the appropriate area as directed Every 12 (Twelve) Hours., Disp: 30 patch, Rfl: 5  •  docusate sodium (COLACE) 100 MG capsule, Take 1 capsule by mouth 2 (Two) Times a Day., Disp: 60 capsule, Rfl: 0  •  DULoxetine (CYMBALTA) 60 MG capsule, Take 1 capsule by mouth Daily. (Patient taking differently: Take 60 mg by mouth Every Night.), Disp: 90 capsule, Rfl: 3  •  ELDERBERRY PO, Take 1 tablet by mouth Daily., Disp: , Rfl:   •  glucose blood (ACCU-CHEK OTILIA PLUS) test strip, Dx e11.9 use to check BS BID, ok to substitute formulary preferred, Disp: 60 each, Rfl: 11  •  HYDROcodone-acetaminophen (NORCO)  MG per tablet, Take 1-2 tablets by mouth every 4-6 hours as needed for pain.  Not to exceed 6 tabs per day., Disp: 42 tablet, Rfl: 0  •  Lancets (ACCU-CHEK MULTICLIX) lancets, Dx e11.9 use to check BS BID, ok to substitute formulary preferred, Disp: 60 each, Rfl: 11  •  levothyroxine (SYNTHROID, LEVOTHROID) 75 MCG tablet, TAKE 1 TABLET BY MOUTH EVERY DAY, Disp: 90 tablet, Rfl: 2  •  montelukast (SINGULAIR) 10 MG tablet, Take 1 tablet by mouth Daily. (Patient taking differently: Take 10 mg by mouth Every Night.), Disp: 90 tablet, Rfl: 3  •  multivitamin with minerals tablet tablet, Take 2 tablets by mouth Daily., Disp: , Rfl:   •  pantoprazole (PROTONIX) 40 MG EC tablet, Take 1 tablet by mouth Daily., Disp: 90 tablet, Rfl: 1  •  Semaglutide,0.25 or 0.5MG/DOS, (Ozempic, 0.25 or 0.5 MG/DOSE,) 2 MG/1.5ML solution pen-injector, Inject 0.5 mg under the skin into the appropriate area as directed 1 (One) Time Per Week. (Patient taking differently: Inject 0.5 mg  under the skin into the appropriate area as directed 1 (One) Time Per Week. Takes on mondays), Disp: 4 pen, Rfl: 11  •  spironolactone (ALDACTONE) 25 MG tablet, Take 25 mg by mouth Every Morning., Disp: , Rfl:   •  tiZANidine (ZANAFLEX) 4 MG tablet, , Disp: , Rfl:   •  doxycycline (VIBRAMYICN) 100 MG tablet, Take 1 tablet by mouth 2 (Two) Times a Day., Disp: 20 tablet, Rfl: 0  •  methylPREDNISolone (Medrol) 4 MG dose pack, Take as directed on package instructions, Disp: 21 tablet, Rfl: 0  •  ondansetron (ZOFRAN) 4 MG tablet, Take 1 tablet by mouth Every 6 (Six) Hours As Needed for Nausea or Vomiting., Disp: 12 tablet, Rfl: 0  No current facility-administered medications for this visit.    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    • Coronary artery disease    • Diabetes mellitus (HCC)     Type II   • Diverticulosis     hx diverticulitis   • GERD (gastroesophageal reflux disease)    • Heart murmur    • History of bronchitis    • History of COVID-19    • History of depression    • History of pneumonia    • History of sleep apnea    • History of transfusion     no reaction   • History of tumor     left ocular tumor, treated with steroids   • Hyperlipidemia    • Hypertension    • Migraines    • On anticoagulant therapy    • Osteoporosis    • Palpitations    • Scoliosis    • Shoulder pain     left   • Sleep apnea     HX OF SLEEP APNEA AND WORE CPAP. PT STATED SINCE SHE  LOST WEIGHT AND WAS RETESTED AND WAS TOLD SHE NO LONGER HAS IT. STOPPED USING CPAP AROUND 2018.   • Thyroid disease     HYPOTHYROIDISM   • Vasovagal syncope    • Vertigo        Past Surgical History:   Procedure Laterality Date   • ANTERIOR CERVICAL DISCECTOMY W/ FUSION  2012    C5-7   • APPENDECTOMY  1970   • AUGMENTATION MAMMAPLASTY     • BILATERAL BREAST REDUCTION     • BREAST BIOPSY     • CARDIAC CATHETERIZATION  02/19/2007   • CARDIAC DEFIBRILLATOR PLACEMENT Left 1999, 2013    Dr. Ya   • CATARACT EXTRACTION Bilateral    • COLONOSCOPY  1996    Dr. Herbert Gonsales    • COLONOSCOPY  8/16/2016    Procedure: COLONOSCOPY with cecum;  Surgeon: Rosalio Sheikh MD;  Location: [facility] ENDOSCOPY;  Service:    • ENDOSCOPY N/A 4/27/2017    Procedure: ESOPHAGOGASTRODUODENOSCOPY WITH BIOPSIES;  Surgeon: Rosalio Sheikh MD;  Location: [facility] ENDOSCOPY;  Service:    • FEMUR SURGERY Left     with metal implant  x3   • FIRST RIB RESECTION Bilateral     and scalenectomy   • HYSTERECTOMY  1989    Total   • IMPLANTABLE CARDIOVERTER DEFIBRILLATOR LEAD REPLACEMENT/POCKET REVISION     • JOINT REPLACEMENT  Right shoulder  and right knee ear   • KNEE ARTHROSCOPY Bilateral 2014    Dr. Pascual   • KNEE SURGERY Right 06/10/2010   • LAPAROSCOPIC CHOLECYSTECTOMY  1974   • OOPHORECTOMY     • SCAPULA OPEN REDUCTION INTERNAL FIXATION Left 4/15/2021    Procedure: Acromion nonunion OPEN REDUCTION INTERNAL FIXATION;  Surgeon: Juan Antonio Anne MD;  Location: [facility];  Service: Orthopedics;  Laterality: Left;   • SHOULDER ARTHROSCOPY W/ ROTATOR CUFF REPAIR Bilateral 05/20/2011   • SHOULDER SURGERY Right    • TONSILLECTOMY     • TOTAL KNEE ARTHROPLASTY Right 4/18/2018    Procedure: RIGHT TOTAL KNEE ARTHROPLASTY WITH STEVE NAVIGATION;  Surgeon: Bravo Pascual MD;  Location: [facility] OR;  Service: Orthopedics   • TOTAL SHOULDER ARTHROPLASTY W/ DISTAL CLAVICLE EXCISION Right 2/2/2017    Procedure: TOTAL SHOULDER REVERSE ARTHROPLASTY;   "Surgeon: Juan Antonio Anne MD;  Location: [facility];  Service:    • TOTAL SHOULDER ARTHROPLASTY W/ DISTAL CLAVICLE EXCISION Left 10/22/2020    Procedure: TOTAL SHOULDER REVERSE ARTHROPLASTY;  Surgeon: Juan Antonio Anne MD;  Location: [facility];  Service: Orthopedics;  Laterality: Left;       Social History     Occupational History   • Occupation:    Tobacco Use   • Smoking status: Former Smoker     Packs/day: 0.50     Years: 17.00     Pack years: 8.50     Types: Cigarettes     Quit date: 1985     Years since quittin.5   • Smokeless tobacco: Never Used   Vaping Use   • Vaping Use: Never used   Substance and Sexual Activity   • Alcohol use: No   • Drug use: No   • Sexual activity: Not Currently     Birth control/protection: Abstinence      Social History     Social History Narrative   • Not on file       Family History   Problem Relation Age of Onset   • Heart attack Brother    • Heart disease Brother    • Hyperthyroidism Mother    • Cancer Mother    • Heart disease Mother    • Osteoporosis Mother    • Thyroid disease Mother    • No Known Problems Father          car accident   • Cirrhosis Maternal Grandmother    • No Known Problems Maternal Grandfather    • No Known Problems Paternal Grandmother    • No Known Problems Paternal Grandfather    • Breast cancer Maternal Aunt    • Malig Hyperthermia Neg Hx        Review of Systems:      A 10 point ROS was reviewed with the patient.      All other pertinent positives and negatives as noted above in HPI.    Physical Exam:71 y.o. female  Vitals:    22 1537   Temp: 98.2 °F (36.8 °C)   Weight: 72.6 kg (160 lb)   Height: 154.9 cm (60.98\")     General:  Patient is awake and alert.  Appears in no acute distress or discomfort.    Psych:  Affect and demeanor are appropriate.    Neck: Neck motion is limited and uncomfortable.  A Spurling's to the left reproduces shooting pain into the back of her shoulder and down the " arm.    Extremities: Left shoulder is examined.  Surgical incisions are healed.  Skin is benign.  No swelling or effusion.  She has significant tenderness over the acromion and scapular spine though.  Her motion is actually still pretty good.  She can forward elevate about 150 degrees, abduct about 90 and externally rotate about 50.  Resisted abduction and elevation are both very uncomfortable for her though.    Imaging: AP, scapular Y and axillary views of the left shoulder are ordered and reviewed to evaluate her complaint.  These compared to previous x-rays.  I do not see any changes relative to the previous films.  The broken hardware is again noted.  I cannot adequately evaluate her acromion fracture on the limited views.    AP and lateral views cervical spine are ordered and reviewed to evaluate the neck as a potential source of her symptomatology.  She has hardware consistent with her surgical history.  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    Assessment/Plan:  Left arm pain, possible cervical etiology    She is very tender over the acromion and it is possible that she has a nonunion which is contributing to her symptoms.  I did explain that this would not explain the shooting pain down her arm, numbness or paresthesias.  I think this may be cervical in etiology.  Dr. Jackson did her previous surgeries.  Has suggested she follow-up with him to see what he recommends.  I did agree to try her on a Medrol Dosepak to see if that helps.  Risk of this medicine were discussed, particularly with respect to her diabetes.  I told her to notify me if Dr. Jackson thinks this is more of an intrinsic shoulder issue and I will refer her for a CT scan to evaluate healing of the acromion fracture.    Juan Antonio Anne MD    03/09/2022

## 2022-03-22 ENCOUNTER — TELEPHONE (OUTPATIENT)
Dept: ORTHOPEDIC SURGERY | Facility: CLINIC | Age: 72
End: 2022-03-22

## 2022-03-22 NOTE — TELEPHONE ENCOUNTER
CLAUDIA  Per HUB on 03/22/2022....    PT RETURNED PHONE CALL- SHE IS SCHEDULED TO SEE DR. DHILLON AND DOES NOT NEED REFERRAL.

## 2022-04-06 ENCOUNTER — OFFICE VISIT (OUTPATIENT)
Dept: ORTHOPEDIC SURGERY | Facility: CLINIC | Age: 72
End: 2022-04-06

## 2022-04-06 VITALS — TEMPERATURE: 98 F | BODY MASS INDEX: 32.13 KG/M2 | WEIGHT: 170.2 LBS | HEIGHT: 61 IN

## 2022-04-06 DIAGNOSIS — M47.22 CERVICAL SPONDYLOSIS WITH RADICULOPATHY: Primary | ICD-10-CM

## 2022-04-06 DIAGNOSIS — M54.2 CERVICAL PAIN: Primary | ICD-10-CM

## 2022-04-06 PROCEDURE — 99214 OFFICE O/P EST MOD 30 MIN: CPT | Performed by: ORTHOPAEDIC SURGERY

## 2022-04-08 NOTE — H&P (VIEW-ONLY)
New patient or new problem visit    CC: Neck pain    HPI: She is seen today for complaints of neck and left shoulder pain.  Dr. Anne saw her for shoulder pain and referred her to me.  She has had left shoulder replacement.  Pain radiates into that joint on the left and she has weakness in abduction.  No balance difficulties bowel or bladder complaints.  In 2008 I performed to see 5-7 anterior cervical discectomy and fusion with allograft and plate.  Dr. Jackson recently performed open reduction internal fixation of the odontoid with a cannulated screw.  Both surgeries worked out well.    PFSH: See attached.  History of the multiple arthritides and gout.  She has a pacemaker.    ROS: See attached    PE: BMI 32.2.  Good strength in the upper extremities bilaterally except for weakness in abduction of the left shoulder Neida test is negative sensations intact    XRAY: Plain film x-rays of the cervical spine demonstrate C5-7 plated fusion degenerative changes of 4 5 above.  4 mm of anterolisthesis is noted at C3-4.  A well-placed cannulated screw from the base of C2 extends into 2 the tip of the odontoid.  It appears well reduced.    Other: n/a    Impression: Cervical spondylosis with radiculopathy.    Plan: She cannot undergo MRI scanning so I will obtain a cervical myelogram and CT scan and I have explained the risk and benefits.  I will call her with results.

## 2022-04-14 NOTE — PROGRESS NOTES
04/15/22 0002   Pre-Procedure Phone Call   Procedure Time Verified Yes   Arrival Time 0800   Procedure Location Verified Yes   Medical History Reviewed No   NPO Status Reinforced Yes   Ride and Caregiver Arranged Yes   Phone Number for Ride/Caregiver daughter   Patient Knows to Bring Current Medications No   Bring Outside Films Requested No

## 2022-04-15 ENCOUNTER — HOSPITAL ENCOUNTER (OUTPATIENT)
Dept: CT IMAGING | Facility: HOSPITAL | Age: 72
Discharge: HOME | End: 2022-04-15

## 2022-04-15 ENCOUNTER — HOSPITAL ENCOUNTER (OUTPATIENT)
Dept: GENERAL RADIOLOGY | Facility: HOSPITAL | Age: 72
Discharge: HOME | End: 2022-04-15

## 2022-04-15 VITALS
WEIGHT: 165 LBS | DIASTOLIC BLOOD PRESSURE: 70 MMHG | HEART RATE: 61 BPM | TEMPERATURE: 98 F | RESPIRATION RATE: 18 BRPM | BODY MASS INDEX: 31.15 KG/M2 | HEIGHT: 61 IN | SYSTOLIC BLOOD PRESSURE: 125 MMHG | OXYGEN SATURATION: 95 %

## 2022-04-15 DIAGNOSIS — M54.2 CERVICAL PAIN: ICD-10-CM

## 2022-04-15 PROCEDURE — 62302 MYELOGRAPHY LUMBAR INJECTION: CPT

## 2022-04-15 PROCEDURE — 25010000002 IOPAMIDOL 61 % SOLUTION: Performed by: RADIOLOGY

## 2022-04-15 PROCEDURE — 72240 MYELOGRAPHY NECK SPINE: CPT

## 2022-04-15 PROCEDURE — 63710000001 ALPRAZOLAM 0.5 MG TABLET: Performed by: RADIOLOGY

## 2022-04-15 PROCEDURE — A9270 NON-COVERED ITEM OR SERVICE: HCPCS | Performed by: RADIOLOGY

## 2022-04-15 PROCEDURE — 0 LIDOCAINE 1 % SOLUTION: Performed by: RADIOLOGY

## 2022-04-15 PROCEDURE — 72126 CT NECK SPINE W/DYE: CPT

## 2022-04-15 RX ORDER — TRAZODONE HYDROCHLORIDE 50 MG/1
50 TABLET ORAL AS NEEDED
COMMUNITY
Start: 2022-04-13 | End: 2022-07-06

## 2022-04-15 RX ORDER — SODIUM CHLORIDE 0.9 % (FLUSH) 0.9 %
10 SYRINGE (ML) INJECTION AS NEEDED
Status: DISCONTINUED | OUTPATIENT
Start: 2022-04-15 | End: 2022-04-16 | Stop reason: HOSPADM

## 2022-04-15 RX ORDER — ALPRAZOLAM 0.5 MG/1
0.5 TABLET ORAL ONCE
Status: COMPLETED | OUTPATIENT
Start: 2022-04-15 | End: 2022-04-15

## 2022-04-15 RX ORDER — LIDOCAINE HYDROCHLORIDE 10 MG/ML
10 INJECTION, SOLUTION INFILTRATION; PERINEURAL ONCE
Status: COMPLETED | OUTPATIENT
Start: 2022-04-15 | End: 2022-04-15

## 2022-04-15 RX ORDER — SODIUM CHLORIDE 0.9 % (FLUSH) 0.9 %
3 SYRINGE (ML) INJECTION EVERY 12 HOURS SCHEDULED
Status: DISCONTINUED | OUTPATIENT
Start: 2022-04-15 | End: 2022-04-16 | Stop reason: HOSPADM

## 2022-04-15 RX ADMIN — LIDOCAINE HYDROCHLORIDE 2.5 ML: 10 INJECTION, SOLUTION INFILTRATION; PERINEURAL at 10:10

## 2022-04-15 RX ADMIN — ALPRAZOLAM 0.5 MG: 0.5 TABLET ORAL at 08:44

## 2022-04-15 RX ADMIN — IOPAMIDOL 10 ML: 612 INJECTION, SOLUTION INTRATHECAL at 10:12

## 2022-04-15 NOTE — NURSING NOTE
Pt in xray triage for Myelogram.  Pt wearing mask and RN wearing mask and eye protection for all pt interactions.

## 2022-04-15 NOTE — DISCHARGE INSTRUCTIONS
EDUCATION /DISCHARGE INSTRUCTIONS:  A myelogram is a special radiology procedure of the spinal cord, spinal nerves and other related structures.  You will be awake during the examination.  An area of your lower back will be cleansed with an antiseptic solution.  The physician will inject a numbing medication in your lower back.  While your back is numb, a needle will be placed in the lower back area.  A small amount of spinal fluid may be withdrawn and sent to the lab if ordered by your physician. While the needle is in the back, an injection of a contrast material (xray dye) will be given through the needle.  The contrast material will allow the physician to see the spinal cord and spinal nerves.  Once injected, the needle will be removed and a band aid will be placed over the injection site.  The table will be tilted during the process to allow the contrast material to flow to particular areas in the spine.  Following the injection and xrays, you will be taken to the CT scan where more pictures will be taken. After the procedure is finished, the contrast material will be absorbed by your body and eliminated through your kidneys.  The radiologist will study and interpret your myelogram and send the results to your physician.  Procedure risks of a myelogram include, but are not limited to:  *  Bleeding   *  Seizure  *  Infection   *  Headache, possibly severe requiring a blood patch  *  Contrast reaction  *  Nerve or cord injury  *  Paralysis and death    Benefits of the procedure:    Best examination for delineating pathology related to spinal cord compression from a disc and/or nerve root compression  Alternatives to the procedure:MRI - a non invasive procedure requiring intravenous contrast injection. Cannot be done on patients with certain pacemakers or metal in the body.  MRI risks include possible reaction to the contrast material, movement of metal located in the body.   Benefit to MRI:  Non-invasive and  usually painless procedure.  THIS EDUCATION INFORMATION WAS REVIEWED PRIOR TO THE PROCEDURE AND CONSENT. Patient initials __________________Time___08:27 AM______________      Important information following your myelogram:    *  When you get home, lie down with no more than 2 pillows under your head.  *  Sit up in the car going home. At home, sit up to eat and use the restroom only, then lie back down.     *  24 HOURS COMPLETE AT _Saturday, April 16, 2021 at 10:00 AM_______________________     *  Tomorrow, after 24 hours completed, take it easy and rest the next 2-3 days.  *  Do not drive for 24 hours following a myelogram  *  You may remove the bandage and shower in the morning  *  Increase your fluids for the next 24 hours.  Caffeinated drinks are encouraged.Increase your intake of fluids the next 2-3 days    CALL YOUR PHYSICIAN FOR THE FOLLOWING:  * Pain at the injection site  * Redness, swelling, bruising or drainage at the injection site  * A fever by mouth of 101.0  * Any new symptoms  If you have problems with a headache that is not relieved with rest and medication, please call the Radiology Triage Nurse desk [phone]

## 2022-04-16 ENCOUNTER — TELEPHONE (OUTPATIENT)
Dept: INTERVENTIONAL RADIOLOGY/VASCULAR | Facility: HOSPITAL | Age: 72
End: 2022-04-16

## 2022-04-25 ENCOUNTER — PREP FOR SURGERY (OUTPATIENT)
Dept: OTHER | Facility: HOSPITAL | Age: 72
End: 2022-04-25

## 2022-04-25 ENCOUNTER — TELEPHONE (OUTPATIENT)
Dept: ORTHOPEDIC SURGERY | Facility: CLINIC | Age: 72
End: 2022-04-25

## 2022-04-25 DIAGNOSIS — M47.22 CERVICAL SPONDYLOSIS WITH RADICULOPATHY: Primary | ICD-10-CM

## 2022-04-25 RX ORDER — CEFAZOLIN SODIUM 2 G/100ML
2 INJECTION, SOLUTION INTRAVENOUS ONCE
Status: CANCELLED | OUTPATIENT
Start: 2022-05-05 | End: 2022-04-25

## 2022-04-25 NOTE — TELEPHONE ENCOUNTER
----- Message from Wilfredo Bailey MD sent at 4/23/2022  8:26 AM EDT -----  Regarding: FW: Rosario      ----- Message -----  From: Tania Mckeon MA  Sent: 4/22/2022   8:02 AM EDT  To: Wilfredo Bailey MD  Subject: Milkimberlyam                                         Please advise    ----- Message -----  From: Kristan Galicia  Sent: 4/21/2022   8:17 PM EDT  To: [facility]  Subject: Milkimberlyam                                         Can you please ask Dr Bailey if we can go a head and schedule my surgery before I come for my visit on May 4th?  If we wait until after my visit, it adds another two weeks that I'm in pain and my arm is numb.    Thank you..

## 2022-04-25 NOTE — TELEPHONE ENCOUNTER
Return to the patient.  I was unable to reach her but I did leave a message for her to contact me at the office

## 2022-05-03 ENCOUNTER — PRE-ADMISSION TESTING (OUTPATIENT)
Dept: PREADMISSION TESTING | Facility: HOSPITAL | Age: 72
End: 2022-05-03

## 2022-05-03 VITALS
SYSTOLIC BLOOD PRESSURE: 82 MMHG | OXYGEN SATURATION: 95 % | DIASTOLIC BLOOD PRESSURE: 62 MMHG | HEART RATE: 90 BPM | WEIGHT: 163 LBS | TEMPERATURE: 98.1 F | RESPIRATION RATE: 18 BRPM | BODY MASS INDEX: 30.78 KG/M2 | HEIGHT: 61 IN

## 2022-05-03 DIAGNOSIS — R73.9 ELEVATED BLOOD SUGAR LEVEL: ICD-10-CM

## 2022-05-03 DIAGNOSIS — R73.9 ELEVATED BLOOD SUGAR LEVEL: Primary | ICD-10-CM

## 2022-05-03 DIAGNOSIS — M47.22 CERVICAL SPONDYLOSIS WITH RADICULOPATHY: ICD-10-CM

## 2022-05-03 LAB
ANION GAP SERPL CALCULATED.3IONS-SCNC: 13 MMOL/L (ref 5–15)
BUN SERPL-MCNC: 11 MG/DL (ref 8–23)
BUN/CREAT SERPL: 11.8 (ref 7–25)
CALCIUM SPEC-SCNC: 9.3 MG/DL (ref 8.6–10.5)
CHLORIDE SERPL-SCNC: 103 MMOL/L (ref 98–107)
CO2 SERPL-SCNC: 24 MMOL/L (ref 22–29)
CREAT SERPL-MCNC: 0.93 MG/DL (ref 0.57–1)
DEPRECATED RDW RBC AUTO: 47.3 FL (ref 37–54)
EGFRCR SERPLBLD CKD-EPI 2021: 66.3 ML/MIN/1.73
ERYTHROCYTE [DISTWIDTH] IN BLOOD BY AUTOMATED COUNT: 14.4 % (ref 12.3–15.4)
GLUCOSE SERPL-MCNC: 218 MG/DL (ref 65–99)
HBA1C MFR BLD: 7.6 % (ref 4.8–5.6)
HCT VFR BLD AUTO: 41.5 % (ref 34–46.6)
HGB BLD-MCNC: 13.2 G/DL (ref 12–15.9)
MCH RBC QN AUTO: 28.5 PG (ref 26.6–33)
MCHC RBC AUTO-ENTMCNC: 31.8 G/DL (ref 31.5–35.7)
MCV RBC AUTO: 89.6 FL (ref 79–97)
PLATELET # BLD AUTO: 257 10*3/MM3 (ref 140–450)
PMV BLD AUTO: 9.3 FL (ref 6–12)
POTASSIUM SERPL-SCNC: 4.5 MMOL/L (ref 3.5–5.2)
RBC # BLD AUTO: 4.63 10*6/MM3 (ref 3.77–5.28)
SARS-COV-2 ORF1AB RESP QL NAA+PROBE: NOT DETECTED
SODIUM SERPL-SCNC: 140 MMOL/L (ref 136–145)
WBC NRBC COR # BLD: 6.23 10*3/MM3 (ref 3.4–10.8)

## 2022-05-03 PROCEDURE — U0004 COV-19 TEST NON-CDC HGH THRU: HCPCS

## 2022-05-03 PROCEDURE — 85027 COMPLETE CBC AUTOMATED: CPT

## 2022-05-03 PROCEDURE — 36415 COLL VENOUS BLD VENIPUNCTURE: CPT

## 2022-05-03 PROCEDURE — 83036 HEMOGLOBIN GLYCOSYLATED A1C: CPT

## 2022-05-03 PROCEDURE — 80048 BASIC METABOLIC PNL TOTAL CA: CPT

## 2022-05-03 PROCEDURE — C9803 HOPD COVID-19 SPEC COLLECT: HCPCS

## 2022-05-03 NOTE — DISCHARGE INSTRUCTIONS
Take the following medications the morning of surgery: CARVEDILOL, LEVOTHYROXINE, AND PANTOPRAZOLE    ARRIVE AT 6 AM ON 5/5/22    If you are on prescription narcotic pain medication to control your pain you may also take that medication the morning of surgery.    General Instructions:  Do not eat solid food after midnight the night before surgery.  You may drink clear liquids day of surgery but must stop at least one hour before your hospital arrival time.  It is beneficial for you to have a clear drink that contains carbohydrates the day of surgery.  We suggest a 12 to 20 ounce bottle of Gatorade or Powerade for non-diabetic patients or a 12 to 20 ounce bottle of G2 or Powerade Zero for diabetic patients. (Pediatric patients, are not advised to drink a 12 to 20 ounce carbohydrate drink)    Clear liquids are liquids you can see through.  Nothing red in color.     Plain water                               Sports drinks  Sodas                                   Gelatin (Jell-O)  Fruit juices without pulp such as white grape juice and apple juice  Popsicles that contain no fruit or yogurt  Tea or coffee (no cream or milk added)  Gatorade / Powerade  G2 / Powerade Zero    Infants may have breast milk up to four hours before surgery.  Infants drinking formula may drink formula up to six hours before surgery.   Patients who avoid smoking, chewing tobacco and alcohol for 4 weeks prior to surgery have a reduced risk of post-operative complications.  Quit smoking as many days before surgery as you can.  Do not smoke, use chewing tobacco or drink alcohol the day of surgery.   If applicable bring your C-PAP/ BI-PAP machine.  Bring any papers given to you in the doctor’s office.  Wear clean comfortable clothes.  Do not wear contact lenses, false eyelashes or make-up.  Bring a case for your glasses.   Bring crutches or walker if applicable.  Remove all piercings.  Leave jewelry and any other valuables at home.  Hair extensions  with metal clips must be removed prior to surgery.  The Pre-Admission Testing nurse will instruct you to bring medications if unable to obtain an accurate list in Pre-Admission Testing.        If you were given a blood bank ID arm band remember to bring it with you the day of surgery.    Preventing a Surgical Site Infection:  For 2 to 3 days before surgery, avoid shaving with a razor because the razor can irritate skin and make it easier to develop an infection.    Any areas of open skin can increase the risk of a post-operative wound infection by allowing bacteria to enter and travel throughout the body.  Notify your surgeon if you have any skin wounds / rashes even if it is not near the expected surgical site.  The area will need assessed to determine if surgery should be delayed until it is healed.  The night prior to surgery shower using a fresh bar of anti-bacterial soap (such as Dial) and clean washcloth.  Sleep in a clean bed with clean clothing.  Do not allow pets to sleep with you.  Shower on the morning of surgery using a fresh bar of anti-bacterial soap (such as Dial) and clean washcloth.  Dry with a clean towel and dress in clean clothing.  Ask your surgeon if you will be receiving antibiotics prior to surgery.  Make sure you, your family, and all healthcare providers clean their hands with soap and water or an alcohol based hand  before caring for you or your wound.    Day of surgery:  Your arrival time is approximately two hours before your scheduled surgery time.  Upon arrival, a Pre-op nurse and Anesthesiologist will review your health history, obtain vital signs, and answer questions you may have.  The only belongings needed at this time will be a list of your home medications and if applicable your C-PAP/BI-PAP machine.  A Pre-op nurse will start an IV and you may receive medication in preparation for surgery, including something to help you relax.     Please be aware that surgery does  come with discomfort.  We want to make every effort to control your discomfort so please discuss any uncontrolled symptoms with your nurse.   Your doctor will most likely have prescribed pain medications.      If you are going home after surgery you will receive individualized written care instructions before being discharged.  A responsible adult must drive you to and from the hospital on the day of your surgery and stay with you for 24 hours.  Discharge prescriptions can be filled by the hospital pharmacy during regular pharmacy hours.  If you are having surgery late in the day/evening your prescription may be e-prescribed to your pharmacy.  Please verify your pharmacy hours or chose a 24 hour pharmacy to avoid not having access to your prescription because your pharmacy has closed for the day.    If you are staying overnight following surgery, you will be transported to your hospital room following the recovery period.  [facility] has all private rooms.    If you have any questions please call Pre-Admission Testing at [phone].  Deductibles and co-payments are collected on the day of service. Please be prepared to pay the required co-pay, deductible or deposit on the day of service as defined by your plan.    Patient Education for Self-Quarantine Process    Following your COVID testing, we strongly recommend that you wear a mask when you are with other people and practice social distancing.   Limit your activities to only required outings.  Wash your hands with soap and water frequently for at least 20 seconds.   Avoid touching your eyes, nose and mouth with unwashed hands.  Do not share anything - utensils, drinking glasses, food from the same bowl.   Sanitize household surfaces daily. Include all high touch areas (door handles, light switches, phones, countertops, etc.)    Call your surgeon immediately if you experience any of the following symptoms:  Sore Throat  Shortness of Breath or  difficulty breathing  Cough  Chills  Body soreness or muscle pain  Headache  Fever  New loss of taste or smell  Do not arrive for your surgery ill.  Your procedure will need to be rescheduled to another time.  You will need to call your physician before the day of surgery to avoid any unnecessary exposure to hospital staff as well as other patients.

## 2022-05-04 ENCOUNTER — OFFICE VISIT (OUTPATIENT)
Dept: ORTHOPEDIC SURGERY | Facility: CLINIC | Age: 72
End: 2022-05-04

## 2022-05-04 VITALS — TEMPERATURE: 95.5 F | HEIGHT: 61 IN | BODY MASS INDEX: 30.85 KG/M2 | WEIGHT: 163.4 LBS

## 2022-05-04 DIAGNOSIS — M47.22 CERVICAL SPONDYLOSIS WITH RADICULOPATHY: Primary | ICD-10-CM

## 2022-05-04 PROCEDURE — 99214 OFFICE O/P EST MOD 30 MIN: CPT | Performed by: ORTHOPAEDIC SURGERY

## 2022-05-04 NOTE — PROGRESS NOTES
She has neck pain left trapezial pain left shoulder pain which radiates into the upper arm and then increase weakness in the left shoulder which she states is worse than she states is not from her shoulder replacement as it has been stronger in the past.  Myelogram and CT scan showed C3-4 spondylolisthesis with bilateral moderate foraminal stenosis, C4-5 shows broad-based spondylosis which impacts but does not deform the spinal cord.  There is extensive degenerative change noted on the plain films followed by plated fusion from C5-7.  The plate gets close to the disc base but does not appear to obstruct the disc space such that I do not think we need to remove it.  She has failed to improve with physical therapy I told her there to be a separate incision in all likelihood.  I discussed the risks and benefits of anterior cervical discectomy and fusion with instrumentation and allograft or mechanical strut placement.  Risks include adverse anesthetic events such as death, stroke and myocardial infarction.  More specific surgical complications include infection, nonunion, and persistent pain.  Less likely problems include hardware failure, hoarseness, prolonged difficulty swallowing, visceral or vascular injury, pneumothorax, graft extrusion, and paralysis, among others. There is a 90 percent chance of success.   Alternatives were also discussed.  After careful consideration the patient wishes to proceed with surgery.

## 2022-05-05 ENCOUNTER — ANESTHESIA (OUTPATIENT)
Dept: PERIOP | Facility: HOSPITAL | Age: 72
End: 2022-05-05

## 2022-05-05 ENCOUNTER — HOSPITAL ENCOUNTER (OUTPATIENT)
Facility: HOSPITAL | Age: 72
Discharge: HOME | End: 2022-05-06
Attending: ORTHOPAEDIC SURGERY | Admitting: ORTHOPAEDIC SURGERY

## 2022-05-05 ENCOUNTER — APPOINTMENT (OUTPATIENT)
Dept: GENERAL RADIOLOGY | Facility: HOSPITAL | Age: 72
End: 2022-05-05

## 2022-05-05 ENCOUNTER — ANESTHESIA EVENT (OUTPATIENT)
Dept: PERIOP | Facility: HOSPITAL | Age: 72
End: 2022-05-05

## 2022-05-05 DIAGNOSIS — M47.22 CERVICAL SPONDYLOSIS WITH RADICULOPATHY: ICD-10-CM

## 2022-05-05 LAB
GLUCOSE BLDC GLUCOMTR-MCNC: 164 MG/DL (ref 70–130)
GLUCOSE BLDC GLUCOMTR-MCNC: 199 MG/DL (ref 70–130)
GLUCOSE BLDC GLUCOMTR-MCNC: 212 MG/DL (ref 70–130)
GLUCOSE BLDC GLUCOMTR-MCNC: 236 MG/DL (ref 70–130)
QT INTERVAL: 466 MS

## 2022-05-05 PROCEDURE — C1713 ANCHOR/SCREW BN/BN,TIS/BN: HCPCS | Performed by: ORTHOPAEDIC SURGERY

## 2022-05-05 PROCEDURE — 63710000001 ALLOPURINOL 100 MG TABLET: Performed by: ORTHOPAEDIC SURGERY

## 2022-05-05 PROCEDURE — 25010000002 NEOSTIGMINE 5 MG/10ML SOLUTION: Performed by: NURSE ANESTHETIST, CERTIFIED REGISTERED

## 2022-05-05 PROCEDURE — 63710000001 DOCUSATE SODIUM 100 MG CAPSULE: Performed by: ORTHOPAEDIC SURGERY

## 2022-05-05 PROCEDURE — 22845 INSERT SPINE FIXATION DEVICE: CPT | Performed by: ORTHOPAEDIC SURGERY

## 2022-05-05 PROCEDURE — 25010000002 HYDROMORPHONE PER 4 MG: Performed by: NURSE ANESTHETIST, CERTIFIED REGISTERED

## 2022-05-05 PROCEDURE — 63710000001 MONTELUKAST 10 MG TABLET: Performed by: ORTHOPAEDIC SURGERY

## 2022-05-05 PROCEDURE — A9270 NON-COVERED ITEM OR SERVICE: HCPCS | Performed by: ORTHOPAEDIC SURGERY

## 2022-05-05 PROCEDURE — 25010000002 ONDANSETRON PER 1 MG: Performed by: NURSE ANESTHETIST, CERTIFIED REGISTERED

## 2022-05-05 PROCEDURE — 25010000002 MAGNESIUM SULFATE PER 500 MG OF MAGNESIUM: Performed by: NURSE ANESTHETIST, CERTIFIED REGISTERED

## 2022-05-05 PROCEDURE — 93010 ELECTROCARDIOGRAM REPORT: CPT | Performed by: INTERNAL MEDICINE

## 2022-05-05 PROCEDURE — 25010000002 PHENYLEPHRINE 10 MG/ML SOLUTION: Performed by: NURSE ANESTHETIST, CERTIFIED REGISTERED

## 2022-05-05 PROCEDURE — 22551 ARTHRD ANT NTRBDY CERVICAL: CPT | Performed by: ORTHOPAEDIC SURGERY

## 2022-05-05 PROCEDURE — 76000 FLUOROSCOPY <1 HR PHYS/QHP: CPT | Performed by: ORTHOPAEDIC SURGERY

## 2022-05-05 PROCEDURE — A9270 NON-COVERED ITEM OR SERVICE: HCPCS | Performed by: HOSPITALIST

## 2022-05-05 PROCEDURE — 63710000001 DEXAMETHASONE PER 0.25 MG: Performed by: ORTHOPAEDIC SURGERY

## 2022-05-05 PROCEDURE — 25010000002 CEFAZOLIN PER 500 MG: Performed by: ORTHOPAEDIC SURGERY

## 2022-05-05 PROCEDURE — 25010000002 HYDROMORPHONE 1 MG/ML SOLUTION: Performed by: NURSE ANESTHETIST, CERTIFIED REGISTERED

## 2022-05-05 PROCEDURE — 63710000001 DULOXETINE 60 MG CAPSULE DELAYED-RELEASE PARTICLES: Performed by: HOSPITALIST

## 2022-05-05 PROCEDURE — 93005 ELECTROCARDIOGRAM TRACING: CPT | Performed by: ANESTHESIOLOGY

## 2022-05-05 PROCEDURE — 72020 X-RAY EXAM OF SPINE 1 VIEW: CPT | Performed by: ORTHOPAEDIC SURGERY

## 2022-05-05 PROCEDURE — G0378 HOSPITAL OBSERVATION PER HR: HCPCS

## 2022-05-05 PROCEDURE — 25010000002 FENTANYL CITRATE (PF) 50 MCG/ML SOLUTION: Performed by: NURSE ANESTHETIST, CERTIFIED REGISTERED

## 2022-05-05 PROCEDURE — 63710000001 ATORVASTATIN 20 MG TABLET: Performed by: ORTHOPAEDIC SURGERY

## 2022-05-05 PROCEDURE — 22853 INSJ BIOMECHANICAL DEVICE: CPT | Performed by: ORTHOPAEDIC SURGERY

## 2022-05-05 PROCEDURE — 22552 ARTHRD ANT NTRBD CERVICAL EA: CPT | Performed by: ORTHOPAEDIC SURGERY

## 2022-05-05 PROCEDURE — 25010000002 MIDAZOLAM PER 1 MG: Performed by: ANESTHESIOLOGY

## 2022-05-05 PROCEDURE — 25010000002 DEXAMETHASONE PER 1 MG: Performed by: NURSE ANESTHETIST, CERTIFIED REGISTERED

## 2022-05-05 PROCEDURE — 25010000002 FENTANYL CITRATE (PF) 50 MCG/ML SOLUTION: Performed by: ANESTHESIOLOGY

## 2022-05-05 PROCEDURE — 25010000002 CEFAZOLIN IN DEXTROSE 2-4 GM/100ML-% SOLUTION: Performed by: ORTHOPAEDIC SURGERY

## 2022-05-05 PROCEDURE — 82962 GLUCOSE BLOOD TEST: CPT

## 2022-05-05 PROCEDURE — S0260 H&P FOR SURGERY: HCPCS | Performed by: ORTHOPAEDIC SURGERY

## 2022-05-05 PROCEDURE — 63710000001 CARVEDILOL 12.5 MG TABLET: Performed by: HOSPITALIST

## 2022-05-05 PROCEDURE — 63710000001 QUAD-PROBIOTIC CAPSULE: Performed by: ORTHOPAEDIC SURGERY

## 2022-05-05 PROCEDURE — 0 HYDROMORPHONE HCL PF 50 MG/5ML SOLUTION: Performed by: ORTHOPAEDIC SURGERY

## 2022-05-05 PROCEDURE — 63710000001 INSULIN LISPRO (HUMAN) PER 5 UNITS: Performed by: HOSPITALIST

## 2022-05-05 PROCEDURE — 25010000002 PROPOFOL 10 MG/ML EMULSION: Performed by: NURSE ANESTHETIST, CERTIFIED REGISTERED

## 2022-05-05 DEVICE — FLOSEAL HEMOSTATIC MATRIX, 5ML
Type: IMPLANTABLE DEVICE | Site: SPINE CERVICAL | Status: FUNCTIONAL
Brand: FLOSEAL HEMOSTATIC MATRIX

## 2022-05-05 DEVICE — IMPLANTABLE DEVICE: Type: IMPLANTABLE DEVICE | Site: SPINE CERVICAL | Status: FUNCTIONAL

## 2022-05-05 DEVICE — PLT ZERO/P VA PARL PEEK SPACR LRD TI 7MM: Type: IMPLANTABLE DEVICE | Site: SPINE CERVICAL | Status: FUNCTIONAL

## 2022-05-05 DEVICE — ALLOGRFT BONE VIVIGEN CELLUAR MATRX FORMABLE 1CC: Type: IMPLANTABLE DEVICE | Site: SPINE CERVICAL | Status: FUNCTIONAL

## 2022-05-05 DEVICE — BONE WAX
Type: IMPLANTABLE DEVICE | Site: SPINE CERVICAL | Status: FUNCTIONAL
Brand: ETHICON

## 2022-05-05 RX ORDER — DEXTROSE MONOHYDRATE 25 G/50ML
25 INJECTION, SOLUTION INTRAVENOUS
Status: DISCONTINUED | OUTPATIENT
Start: 2022-05-05 | End: 2022-05-06 | Stop reason: HOSPADM

## 2022-05-05 RX ORDER — SODIUM CHLORIDE 9 MG/ML
100 INJECTION, SOLUTION INTRAVENOUS CONTINUOUS
Status: DISCONTINUED | OUTPATIENT
Start: 2022-05-05 | End: 2022-05-06 | Stop reason: HOSPADM

## 2022-05-05 RX ORDER — SODIUM CHLORIDE, SODIUM LACTATE, POTASSIUM CHLORIDE, CALCIUM CHLORIDE 600; 310; 30; 20 MG/100ML; MG/100ML; MG/100ML; MG/100ML
9 INJECTION, SOLUTION INTRAVENOUS CONTINUOUS
Status: DISCONTINUED | OUTPATIENT
Start: 2022-05-05 | End: 2022-05-05

## 2022-05-05 RX ORDER — LEVOTHYROXINE SODIUM 0.07 MG/1
75 TABLET ORAL DAILY
Status: DISCONTINUED | OUTPATIENT
Start: 2022-05-06 | End: 2022-05-06 | Stop reason: HOSPADM

## 2022-05-05 RX ORDER — OXYCODONE HYDROCHLORIDE AND ACETAMINOPHEN 5; 325 MG/1; MG/1
2 TABLET ORAL EVERY 4 HOURS PRN
Status: DISCONTINUED | OUTPATIENT
Start: 2022-05-05 | End: 2022-05-06

## 2022-05-05 RX ORDER — LABETALOL HYDROCHLORIDE 5 MG/ML
5 INJECTION, SOLUTION INTRAVENOUS
Status: DISCONTINUED | OUTPATIENT
Start: 2022-05-05 | End: 2022-05-05 | Stop reason: HOSPADM

## 2022-05-05 RX ORDER — MONTELUKAST SODIUM 10 MG/1
10 TABLET ORAL NIGHTLY
Status: DISCONTINUED | OUTPATIENT
Start: 2022-05-05 | End: 2022-05-06 | Stop reason: HOSPADM

## 2022-05-05 RX ORDER — GLYCOPYRROLATE 0.2 MG/ML
INJECTION INTRAMUSCULAR; INTRAVENOUS AS NEEDED
Status: DISCONTINUED | OUTPATIENT
Start: 2022-05-05 | End: 2022-05-05 | Stop reason: SURG

## 2022-05-05 RX ORDER — SODIUM CHLORIDE 0.9 % (FLUSH) 0.9 %
10 SYRINGE (ML) INJECTION AS NEEDED
Status: DISCONTINUED | OUTPATIENT
Start: 2022-05-05 | End: 2022-05-06 | Stop reason: HOSPADM

## 2022-05-05 RX ORDER — FENTANYL CITRATE 50 UG/ML
50 INJECTION, SOLUTION INTRAMUSCULAR; INTRAVENOUS
Status: DISCONTINUED | OUTPATIENT
Start: 2022-05-05 | End: 2022-05-05 | Stop reason: HOSPADM

## 2022-05-05 RX ORDER — HYDRALAZINE HYDROCHLORIDE 20 MG/ML
5 INJECTION INTRAMUSCULAR; INTRAVENOUS
Status: DISCONTINUED | OUTPATIENT
Start: 2022-05-05 | End: 2022-05-05 | Stop reason: HOSPADM

## 2022-05-05 RX ORDER — DIPHENHYDRAMINE HCL 25 MG
25 CAPSULE ORAL EVERY 6 HOURS PRN
Status: DISCONTINUED | OUTPATIENT
Start: 2022-05-05 | End: 2022-05-06 | Stop reason: HOSPADM

## 2022-05-05 RX ORDER — DIPHENHYDRAMINE HCL 25 MG
25 CAPSULE ORAL
Status: DISCONTINUED | OUTPATIENT
Start: 2022-05-05 | End: 2022-05-05 | Stop reason: HOSPADM

## 2022-05-05 RX ORDER — NALOXONE HCL 0.4 MG/ML
0.2 VIAL (ML) INJECTION AS NEEDED
Status: DISCONTINUED | OUTPATIENT
Start: 2022-05-05 | End: 2022-05-05 | Stop reason: HOSPADM

## 2022-05-05 RX ORDER — FENTANYL CITRATE 50 UG/ML
INJECTION, SOLUTION INTRAMUSCULAR; INTRAVENOUS AS NEEDED
Status: DISCONTINUED | OUTPATIENT
Start: 2022-05-05 | End: 2022-05-05 | Stop reason: SURG

## 2022-05-05 RX ORDER — ONDANSETRON 2 MG/ML
4 INJECTION INTRAMUSCULAR; INTRAVENOUS EVERY 6 HOURS PRN
Status: DISCONTINUED | OUTPATIENT
Start: 2022-05-05 | End: 2022-05-06 | Stop reason: HOSPADM

## 2022-05-05 RX ORDER — LIDOCAINE HYDROCHLORIDE 20 MG/ML
INJECTION, SOLUTION INFILTRATION; PERINEURAL AS NEEDED
Status: DISCONTINUED | OUTPATIENT
Start: 2022-05-05 | End: 2022-05-05 | Stop reason: SURG

## 2022-05-05 RX ORDER — CEFAZOLIN SODIUM 2 G/100ML
2 INJECTION, SOLUTION INTRAVENOUS ONCE
Status: COMPLETED | OUTPATIENT
Start: 2022-05-05 | End: 2022-05-05

## 2022-05-05 RX ORDER — DIPHENHYDRAMINE HYDROCHLORIDE 50 MG/ML
12.5 INJECTION INTRAMUSCULAR; INTRAVENOUS
Status: DISCONTINUED | OUTPATIENT
Start: 2022-05-05 | End: 2022-05-05 | Stop reason: HOSPADM

## 2022-05-05 RX ORDER — ACETAMINOPHEN 325 MG/1
650 TABLET ORAL EVERY 4 HOURS PRN
Status: DISCONTINUED | OUTPATIENT
Start: 2022-05-05 | End: 2022-05-06 | Stop reason: HOSPADM

## 2022-05-05 RX ORDER — ONDANSETRON 2 MG/ML
INJECTION INTRAMUSCULAR; INTRAVENOUS AS NEEDED
Status: DISCONTINUED | OUTPATIENT
Start: 2022-05-05 | End: 2022-05-05 | Stop reason: SURG

## 2022-05-05 RX ORDER — BISACODYL 10 MG
10 SUPPOSITORY, RECTAL RECTAL DAILY PRN
Status: DISCONTINUED | OUTPATIENT
Start: 2022-05-05 | End: 2022-05-06 | Stop reason: HOSPADM

## 2022-05-05 RX ORDER — PHENYLEPHRINE HYDROCHLORIDE 10 MG/ML
INJECTION INTRAVENOUS AS NEEDED
Status: DISCONTINUED | OUTPATIENT
Start: 2022-05-05 | End: 2022-05-05 | Stop reason: SURG

## 2022-05-05 RX ORDER — HYDROMORPHONE HYDROCHLORIDE 1 MG/ML
0.25 INJECTION, SOLUTION INTRAMUSCULAR; INTRAVENOUS; SUBCUTANEOUS
Status: DISCONTINUED | OUTPATIENT
Start: 2022-05-05 | End: 2022-05-05 | Stop reason: HOSPADM

## 2022-05-05 RX ORDER — FENTANYL CITRATE 50 UG/ML
25 INJECTION, SOLUTION INTRAMUSCULAR; INTRAVENOUS
Status: DISCONTINUED | OUTPATIENT
Start: 2022-05-05 | End: 2022-05-05 | Stop reason: HOSPADM

## 2022-05-05 RX ORDER — MIDAZOLAM HYDROCHLORIDE 1 MG/ML
0.5 INJECTION INTRAMUSCULAR; INTRAVENOUS
Status: DISCONTINUED | OUTPATIENT
Start: 2022-05-05 | End: 2022-05-05 | Stop reason: HOSPADM

## 2022-05-05 RX ORDER — ATORVASTATIN CALCIUM 20 MG/1
80 TABLET, FILM COATED ORAL NIGHTLY
Status: DISCONTINUED | OUTPATIENT
Start: 2022-05-05 | End: 2022-05-06 | Stop reason: HOSPADM

## 2022-05-05 RX ORDER — PROPOFOL 10 MG/ML
VIAL (ML) INTRAVENOUS AS NEEDED
Status: DISCONTINUED | OUTPATIENT
Start: 2022-05-05 | End: 2022-05-05 | Stop reason: SURG

## 2022-05-05 RX ORDER — HYDROMORPHONE HCL IN 0.9% NACL 10 MG/50ML
PATIENT CONTROLLED ANALGESIA SYRINGE INTRAVENOUS CONTINUOUS
Status: DISCONTINUED | OUTPATIENT
Start: 2022-05-05 | End: 2022-05-06

## 2022-05-05 RX ORDER — ONDANSETRON 2 MG/ML
4 INJECTION INTRAMUSCULAR; INTRAVENOUS ONCE AS NEEDED
Status: DISCONTINUED | OUTPATIENT
Start: 2022-05-05 | End: 2022-05-05 | Stop reason: HOSPADM

## 2022-05-05 RX ORDER — TRAZODONE HYDROCHLORIDE 50 MG/1
50 TABLET ORAL AS NEEDED
Status: DISCONTINUED | OUTPATIENT
Start: 2022-05-05 | End: 2022-05-06 | Stop reason: HOSPADM

## 2022-05-05 RX ORDER — DULOXETIN HYDROCHLORIDE 60 MG/1
60 CAPSULE, DELAYED RELEASE ORAL DAILY
Status: DISCONTINUED | OUTPATIENT
Start: 2022-05-05 | End: 2022-05-05

## 2022-05-05 RX ORDER — POLYETHYLENE GLYCOL 3350 17 G/17G
17 POWDER, FOR SOLUTION ORAL DAILY
Status: DISCONTINUED | OUTPATIENT
Start: 2022-05-05 | End: 2022-05-06 | Stop reason: HOSPADM

## 2022-05-05 RX ORDER — INSULIN LISPRO 100 [IU]/ML
0-14 INJECTION, SOLUTION INTRAVENOUS; SUBCUTANEOUS
Status: DISCONTINUED | OUTPATIENT
Start: 2022-05-05 | End: 2022-05-06 | Stop reason: HOSPADM

## 2022-05-05 RX ORDER — PROMETHAZINE HYDROCHLORIDE 25 MG/1
25 SUPPOSITORY RECTAL ONCE AS NEEDED
Status: DISCONTINUED | OUTPATIENT
Start: 2022-05-05 | End: 2022-05-05 | Stop reason: HOSPADM

## 2022-05-05 RX ORDER — EPHEDRINE SULFATE 50 MG/ML
5 INJECTION, SOLUTION INTRAVENOUS ONCE AS NEEDED
Status: DISCONTINUED | OUTPATIENT
Start: 2022-05-05 | End: 2022-05-05 | Stop reason: HOSPADM

## 2022-05-05 RX ORDER — CEFAZOLIN SODIUM 2 G/100ML
2 INJECTION, SOLUTION INTRAVENOUS EVERY 8 HOURS
Status: COMPLETED | OUTPATIENT
Start: 2022-05-05 | End: 2022-05-06

## 2022-05-05 RX ORDER — NEOSTIGMINE METHYLSULFATE 0.5 MG/ML
INJECTION, SOLUTION INTRAVENOUS AS NEEDED
Status: DISCONTINUED | OUTPATIENT
Start: 2022-05-05 | End: 2022-05-05 | Stop reason: SURG

## 2022-05-05 RX ORDER — ALLOPURINOL 100 MG/1
100 TABLET ORAL DAILY
Status: DISCONTINUED | OUTPATIENT
Start: 2022-05-05 | End: 2022-05-06 | Stop reason: HOSPADM

## 2022-05-05 RX ORDER — SPIRONOLACTONE 25 MG/1
25 TABLET ORAL EVERY MORNING
Status: DISCONTINUED | OUTPATIENT
Start: 2022-05-06 | End: 2022-05-06 | Stop reason: HOSPADM

## 2022-05-05 RX ORDER — ROCURONIUM BROMIDE 10 MG/ML
INJECTION, SOLUTION INTRAVENOUS AS NEEDED
Status: DISCONTINUED | OUTPATIENT
Start: 2022-05-05 | End: 2022-05-05 | Stop reason: SURG

## 2022-05-05 RX ORDER — SODIUM CHLORIDE 0.9 % (FLUSH) 0.9 %
3 SYRINGE (ML) INJECTION EVERY 12 HOURS SCHEDULED
Status: DISCONTINUED | OUTPATIENT
Start: 2022-05-05 | End: 2022-05-06 | Stop reason: HOSPADM

## 2022-05-05 RX ORDER — DULOXETIN HYDROCHLORIDE 60 MG/1
60 CAPSULE, DELAYED RELEASE ORAL NIGHTLY
Status: DISCONTINUED | OUTPATIENT
Start: 2022-05-05 | End: 2022-05-06 | Stop reason: HOSPADM

## 2022-05-05 RX ORDER — LIDOCAINE HYDROCHLORIDE 10 MG/ML
0.5 INJECTION, SOLUTION EPIDURAL; INFILTRATION; INTRACAUDAL; PERINEURAL ONCE AS NEEDED
Status: DISCONTINUED | OUTPATIENT
Start: 2022-05-05 | End: 2022-05-05 | Stop reason: HOSPADM

## 2022-05-05 RX ORDER — PANTOPRAZOLE SODIUM 40 MG/1
40 TABLET, DELAYED RELEASE ORAL DAILY
Status: DISCONTINUED | OUTPATIENT
Start: 2022-05-06 | End: 2022-05-06 | Stop reason: HOSPADM

## 2022-05-05 RX ORDER — DEXAMETHASONE 4 MG/1
4 TABLET ORAL EVERY 6 HOURS SCHEDULED
Status: COMPLETED | OUTPATIENT
Start: 2022-05-05 | End: 2022-05-06

## 2022-05-05 RX ORDER — SODIUM CHLORIDE 0.9 % (FLUSH) 0.9 %
3-10 SYRINGE (ML) INJECTION AS NEEDED
Status: DISCONTINUED | OUTPATIENT
Start: 2022-05-05 | End: 2022-05-05 | Stop reason: HOSPADM

## 2022-05-05 RX ORDER — ONDANSETRON 4 MG/1
4 TABLET, FILM COATED ORAL EVERY 6 HOURS PRN
Status: DISCONTINUED | OUTPATIENT
Start: 2022-05-05 | End: 2022-05-06 | Stop reason: HOSPADM

## 2022-05-05 RX ORDER — L.ACID,PARA/B.BIFIDUM/S.THERM 8B CELL
1 CAPSULE ORAL DAILY
Status: DISCONTINUED | OUTPATIENT
Start: 2022-05-05 | End: 2022-05-06 | Stop reason: HOSPADM

## 2022-05-05 RX ORDER — DEXAMETHASONE SODIUM PHOSPHATE 4 MG/ML
INJECTION, SOLUTION INTRA-ARTICULAR; INTRALESIONAL; INTRAMUSCULAR; INTRAVENOUS; SOFT TISSUE AS NEEDED
Status: DISCONTINUED | OUTPATIENT
Start: 2022-05-05 | End: 2022-05-05 | Stop reason: SURG

## 2022-05-05 RX ORDER — DEXAMETHASONE SODIUM PHOSPHATE 4 MG/ML
4 INJECTION, SOLUTION INTRA-ARTICULAR; INTRALESIONAL; INTRAMUSCULAR; INTRAVENOUS; SOFT TISSUE EVERY 6 HOURS SCHEDULED
Status: COMPLETED | OUTPATIENT
Start: 2022-05-05 | End: 2022-05-06

## 2022-05-05 RX ORDER — DOCUSATE SODIUM 100 MG/1
100 CAPSULE, LIQUID FILLED ORAL 2 TIMES DAILY
Status: DISCONTINUED | OUTPATIENT
Start: 2022-05-05 | End: 2022-05-06 | Stop reason: HOSPADM

## 2022-05-05 RX ORDER — SODIUM CHLORIDE 0.9 % (FLUSH) 0.9 %
3 SYRINGE (ML) INJECTION EVERY 12 HOURS SCHEDULED
Status: DISCONTINUED | OUTPATIENT
Start: 2022-05-05 | End: 2022-05-05 | Stop reason: HOSPADM

## 2022-05-05 RX ORDER — TIZANIDINE 4 MG/1
4 TABLET ORAL EVERY 8 HOURS PRN
Status: DISCONTINUED | OUTPATIENT
Start: 2022-05-05 | End: 2022-05-06 | Stop reason: HOSPADM

## 2022-05-05 RX ORDER — NALOXONE HCL 0.4 MG/ML
0.1 VIAL (ML) INJECTION
Status: DISCONTINUED | OUTPATIENT
Start: 2022-05-05 | End: 2022-05-06 | Stop reason: HOSPADM

## 2022-05-05 RX ORDER — CARVEDILOL 12.5 MG/1
12.5 TABLET ORAL 2 TIMES DAILY WITH MEALS
Status: DISCONTINUED | OUTPATIENT
Start: 2022-05-05 | End: 2022-05-06 | Stop reason: HOSPADM

## 2022-05-05 RX ORDER — NICOTINE POLACRILEX 4 MG
15 LOZENGE BUCCAL
Status: DISCONTINUED | OUTPATIENT
Start: 2022-05-05 | End: 2022-05-06 | Stop reason: HOSPADM

## 2022-05-05 RX ORDER — FAMOTIDINE 10 MG/ML
20 INJECTION, SOLUTION INTRAVENOUS ONCE
Status: COMPLETED | OUTPATIENT
Start: 2022-05-05 | End: 2022-05-05

## 2022-05-05 RX ORDER — FLUMAZENIL 0.1 MG/ML
0.2 INJECTION INTRAVENOUS AS NEEDED
Status: DISCONTINUED | OUTPATIENT
Start: 2022-05-05 | End: 2022-05-05 | Stop reason: HOSPADM

## 2022-05-05 RX ORDER — PROMETHAZINE HYDROCHLORIDE 25 MG/1
25 TABLET ORAL ONCE AS NEEDED
Status: DISCONTINUED | OUTPATIENT
Start: 2022-05-05 | End: 2022-05-05 | Stop reason: HOSPADM

## 2022-05-05 RX ORDER — MAGNESIUM SULFATE HEPTAHYDRATE 500 MG/ML
INJECTION, SOLUTION INTRAMUSCULAR; INTRAVENOUS AS NEEDED
Status: DISCONTINUED | OUTPATIENT
Start: 2022-05-05 | End: 2022-05-05 | Stop reason: SURG

## 2022-05-05 RX ADMIN — HYDROMORPHONE HYDROCHLORIDE 0.25 MG: 1 INJECTION, SOLUTION INTRAMUSCULAR; INTRAVENOUS; SUBCUTANEOUS at 08:58

## 2022-05-05 RX ADMIN — GLYCOPYRROLATE 0.4 MG: 0.2 INJECTION INTRAMUSCULAR; INTRAVENOUS at 11:36

## 2022-05-05 RX ADMIN — PHENYLEPHRINE HYDROCHLORIDE 200 MCG: 10 INJECTION, SOLUTION INTRAVENOUS at 08:08

## 2022-05-05 RX ADMIN — FENTANYL CITRATE 25 MCG: 0.05 INJECTION, SOLUTION INTRAMUSCULAR; INTRAVENOUS at 12:08

## 2022-05-05 RX ADMIN — PHENYLEPHRINE HYDROCHLORIDE 100 MCG: 10 INJECTION, SOLUTION INTRAVENOUS at 08:04

## 2022-05-05 RX ADMIN — ROCURONIUM BROMIDE 50 MG: 50 INJECTION INTRAVENOUS at 07:56

## 2022-05-05 RX ADMIN — HYDROMORPHONE HYDROCHLORIDE 0.25 MG: 1 INJECTION, SOLUTION INTRAMUSCULAR; INTRAVENOUS; SUBCUTANEOUS at 12:35

## 2022-05-05 RX ADMIN — LIDOCAINE HYDROCHLORIDE 60 MG: 20 INJECTION, SOLUTION INFILTRATION; PERINEURAL at 07:56

## 2022-05-05 RX ADMIN — DEXAMETHASONE 4 MG: 4 TABLET ORAL at 23:19

## 2022-05-05 RX ADMIN — DOCUSATE SODIUM 100 MG: 100 CAPSULE, LIQUID FILLED ORAL at 23:20

## 2022-05-05 RX ADMIN — SODIUM CHLORIDE, POTASSIUM CHLORIDE, SODIUM LACTATE AND CALCIUM CHLORIDE 9 ML/HR: 600; 310; 30; 20 INJECTION, SOLUTION INTRAVENOUS at 07:42

## 2022-05-05 RX ADMIN — PHENYLEPHRINE HYDROCHLORIDE 200 MCG: 10 INJECTION, SOLUTION INTRAVENOUS at 08:32

## 2022-05-05 RX ADMIN — PHENYLEPHRINE HYDROCHLORIDE 100 MCG: 10 INJECTION, SOLUTION INTRAVENOUS at 08:17

## 2022-05-05 RX ADMIN — ROCURONIUM BROMIDE 20 MG: 50 INJECTION INTRAVENOUS at 09:32

## 2022-05-05 RX ADMIN — FENTANYL CITRATE 50 MCG: 50 INJECTION, SOLUTION INTRAMUSCULAR; INTRAVENOUS at 07:42

## 2022-05-05 RX ADMIN — FENTANYL CITRATE 25 MCG: 0.05 INJECTION, SOLUTION INTRAMUSCULAR; INTRAVENOUS at 08:39

## 2022-05-05 RX ADMIN — DEXAMETHASONE 4 MG: 4 TABLET ORAL at 15:09

## 2022-05-05 RX ADMIN — FENTANYL CITRATE 50 MCG: 0.05 INJECTION, SOLUTION INTRAMUSCULAR; INTRAVENOUS at 07:54

## 2022-05-05 RX ADMIN — ALLOPURINOL 100 MG: 100 TABLET ORAL at 15:08

## 2022-05-05 RX ADMIN — Medication 3 ML: at 23:22

## 2022-05-05 RX ADMIN — FENTANYL CITRATE 25 MCG: 0.05 INJECTION, SOLUTION INTRAMUSCULAR; INTRAVENOUS at 08:45

## 2022-05-05 RX ADMIN — FENTANYL CITRATE 25 MCG: 0.05 INJECTION, SOLUTION INTRAMUSCULAR; INTRAVENOUS at 12:02

## 2022-05-05 RX ADMIN — SODIUM CHLORIDE 100 ML/HR: 9 INJECTION, SOLUTION INTRAVENOUS at 23:15

## 2022-05-05 RX ADMIN — SODIUM CHLORIDE 100 ML/HR: 9 INJECTION, SOLUTION INTRAVENOUS at 13:42

## 2022-05-05 RX ADMIN — PHENYLEPHRINE HYDROCHLORIDE 100 MCG: 10 INJECTION, SOLUTION INTRAVENOUS at 08:13

## 2022-05-05 RX ADMIN — Medication 3 ML: at 13:41

## 2022-05-05 RX ADMIN — HYDROMORPHONE HYDROCHLORIDE 0.25 MG: 1 INJECTION, SOLUTION INTRAMUSCULAR; INTRAVENOUS; SUBCUTANEOUS at 12:20

## 2022-05-05 RX ADMIN — PHENYLEPHRINE HYDROCHLORIDE 200 MCG: 10 INJECTION, SOLUTION INTRAVENOUS at 08:21

## 2022-05-05 RX ADMIN — FENTANYL CITRATE 25 MCG: 0.05 INJECTION, SOLUTION INTRAMUSCULAR; INTRAVENOUS at 12:20

## 2022-05-05 RX ADMIN — ONDANSETRON 4 MG: 2 INJECTION INTRAMUSCULAR; INTRAVENOUS at 11:17

## 2022-05-05 RX ADMIN — ATORVASTATIN CALCIUM 80 MG: 20 TABLET, FILM COATED ORAL at 23:19

## 2022-05-05 RX ADMIN — NEOSTIGMINE METHYLSULFATE 3 MG: 0.5 INJECTION INTRAVENOUS at 11:36

## 2022-05-05 RX ADMIN — MAGNESIUM SULFATE HEPTAHYDRATE 2 G: 500 INJECTION, SOLUTION INTRAMUSCULAR; INTRAVENOUS at 08:13

## 2022-05-05 RX ADMIN — DEXAMETHASONE SODIUM PHOSPHATE 8 MG: 4 INJECTION, SOLUTION INTRAMUSCULAR; INTRAVENOUS at 08:03

## 2022-05-05 RX ADMIN — PROPOFOL 130 MG: 10 INJECTION, EMULSION INTRAVENOUS at 07:56

## 2022-05-05 RX ADMIN — PHENYLEPHRINE HYDROCHLORIDE 100 MCG: 10 INJECTION, SOLUTION INTRAVENOUS at 08:00

## 2022-05-05 RX ADMIN — CARVEDILOL 12.5 MG: 12.5 TABLET, FILM COATED ORAL at 17:00

## 2022-05-05 RX ADMIN — HYDROMORPHONE HYDROCHLORIDE 0.25 MG: 1 INJECTION, SOLUTION INTRAMUSCULAR; INTRAVENOUS; SUBCUTANEOUS at 08:50

## 2022-05-05 RX ADMIN — FAMOTIDINE 20 MG: 10 INJECTION INTRAVENOUS at 07:42

## 2022-05-05 RX ADMIN — CEFAZOLIN SODIUM 2 G: 2 INJECTION, SOLUTION INTRAVENOUS at 15:58

## 2022-05-05 RX ADMIN — MONTELUKAST SODIUM 10 MG: 10 TABLET, FILM COATED ORAL at 23:19

## 2022-05-05 RX ADMIN — MIDAZOLAM 0.5 MG: 1 INJECTION INTRAMUSCULAR; INTRAVENOUS at 07:42

## 2022-05-05 RX ADMIN — Medication 1 CAPSULE: at 15:09

## 2022-05-05 RX ADMIN — CEFAZOLIN SODIUM 2 G: 2 INJECTION, SOLUTION INTRAVENOUS at 07:47

## 2022-05-05 RX ADMIN — HYDROMORPHONE HYDROCHLORIDE 0.25 MG: 1 INJECTION, SOLUTION INTRAMUSCULAR; INTRAVENOUS; SUBCUTANEOUS at 12:25

## 2022-05-05 RX ADMIN — FENTANYL CITRATE 25 MCG: 0.05 INJECTION, SOLUTION INTRAMUSCULAR; INTRAVENOUS at 12:28

## 2022-05-05 RX ADMIN — HYDROMORPHONE HYDROCHLORIDE: 10 INJECTION, SOLUTION INTRAMUSCULAR; INTRAVENOUS; SUBCUTANEOUS at 12:57

## 2022-05-05 RX ADMIN — INSULIN LISPRO 5 UNITS: 100 INJECTION, SOLUTION INTRAVENOUS; SUBCUTANEOUS at 16:59

## 2022-05-05 RX ADMIN — DULOXETINE HYDROCHLORIDE 60 MG: 60 CAPSULE, DELAYED RELEASE ORAL at 23:19

## 2022-05-05 RX ADMIN — CEFAZOLIN SODIUM 2 G: 2 INJECTION, SOLUTION INTRAVENOUS at 23:17

## 2022-05-05 NOTE — CONSULTS
CONSULT NOTE    INTERNAL MEDICINE   [facility]       Patient Identification:  Name: Kristan Galicia  Age: 71 y.o.  Sex: female  :  1950  MRN: [id number]             Date of Consultation: 2022      Primary Care Physician: Chelly Red APRN                               Requesting Physician: Dr. Wilfredo Bailey  Reason for Consultation: Diabetes    Chief Complaint: Neck pain    History of Present Illness:   This is Frida is a wonderfully pleasant 71-year-old female has been admitted to the surgical service and is postop C3-4 C4-5 anterior discectomy with fusion.  Patient is tolerated the procedure well and she has current bracing in place as well as MOR drain.  She is tolerated p.o. intake postoperatively and with no dysphagia.  She states she has had no postoperative nausea or vomiting.  She is managed on spironolactone as well as Coreg for her blood pressure.  She is formally on Pradaxa and that is on hold for surgical intervention and reinstatement will be once deemed appropriate per .  She states she takes Ozempic for her diabetes and is on no other p.o. medications.  She has noted weight loss with use of exam pick.  She claims her A1c upon last check was 7.1.  She is currently on Decadron postoperatively for surgical swelling and blood sugars are therapeutic at this time in the 160s.  Patient denies any chest pain troubles breathing or altered mentation.      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    • Coronary artery disease    • Diabetes  mellitus (HCC)     Type II   • Diverticulosis     hx diverticulitis   • Fracture, femur (HCC) 1954, 1964, 2011   • Fracture, foot 1969   • GERD (gastroesophageal reflux disease)    • Heart murmur    • History of bronchitis    • History of COVID-19    • History of depression    • History of pneumonia    • History of sleep apnea    • History of transfusion     no reaction   • History of tumor     left ocular tumor, treated with steroids   • Hyperlipidemia    • Hypertension    • Knee swelling 2017   • Lumbosacral disc disease 2010   • Migraines    • On anticoagulant therapy    • Osteoporosis    • Palpitations    • Periarthritis of shoulder 2016   • Rotator cuff syndrome 2014   • Scoliosis    • Shoulder pain     left   • Sleep apnea     HX OF SLEEP APNEA AND WORE CPAP. PT STATED SINCE SHE LOST WEIGHT AND WAS RETESTED AND WAS TOLD SHE NO LONGER HAS IT. STOPPED USING CPAP AROUND 2018.   • Thyroid disease     HYPOTHYROIDISM   • Vasovagal syncope    • Vertigo      Past Surgical History:  Past Surgical History:   Procedure Laterality Date   • ANTERIOR CERVICAL DISCECTOMY W/ FUSION  2012    C5-7   • APPENDECTOMY  1970   • AUGMENTATION MAMMAPLASTY     • BILATERAL BREAST REDUCTION     • BREAST BIOPSY     • CARDIAC CATHETERIZATION  02/19/2007   • CARDIAC DEFIBRILLATOR PLACEMENT Left 1999, 2013    Dr. Ya   • CATARACT EXTRACTION Bilateral    • COLONOSCOPY  1996    Dr. Herbert Gonsales    • COLONOSCOPY  08/16/2016    Procedure: COLONOSCOPY with cecum;  Surgeon: Rosalio Sheikh MD;  Location: [facility] ENDOSCOPY;  Service:    • ENDOSCOPY N/A 04/27/2017    Procedure: ESOPHAGOGASTRODUODENOSCOPY WITH BIOPSIES;  Surgeon: Rosalio Sheikh MD;  Location: [facility] ENDOSCOPY;  Service:    • FEMUR SURGERY Left     with metal implant  x3   • FIRST RIB RESECTION Bilateral     and scalenectomy   • HYSTERECTOMY  1989    Total   • IMPLANTABLE CARDIOVERTER DEFIBRILLATOR LEAD REPLACEMENT/POCKET REVISION     • JOINT REPLACEMENT  Right shoulder  and  right knee ear   • KNEE ARTHROSCOPY Bilateral 2014    Dr. Pascual   • KNEE SURGERY Right 06/10/2010   • LAPAROSCOPIC CHOLECYSTECTOMY  1974   • NECK SURGERY  2012, 2018   • OOPHORECTOMY     • SCAPULA OPEN REDUCTION INTERNAL FIXATION Left 04/15/2021    Procedure: Acromion nonunion OPEN REDUCTION INTERNAL FIXATION;  Surgeon: Juan Antonio Anne MD;  Location: [facility] OR;  Service: Orthopedics;  Laterality: Left;   • SHOULDER ARTHROSCOPY W/ ROTATOR CUFF REPAIR Bilateral 05/20/2011   • SHOULDER SURGERY Right    • TONSILLECTOMY     • TOTAL KNEE ARTHROPLASTY Right 04/18/2018    Procedure: RIGHT TOTAL KNEE ARTHROPLASTY WITH STEVE NAVIGATION;  Surgeon: Bravo Pascual MD;  Location: [facility] OR;  Service: Orthopedics   • TOTAL SHOULDER ARTHROPLASTY W/ DISTAL CLAVICLE EXCISION Right 02/02/2017    Procedure: TOTAL SHOULDER REVERSE ARTHROPLASTY;  Surgeon: Juan Antonio Anne MD;  Location: [facility] OR;  Service:    • TOTAL SHOULDER ARTHROPLASTY W/ DISTAL CLAVICLE EXCISION Left 10/22/2020    Procedure: TOTAL SHOULDER REVERSE ARTHROPLASTY;  Surgeon: Juan Antonio Anne MD;  Location: [facility] OR;  Service: Orthopedics;  Laterality: Left;   • TRIGGER POINT INJECTION  Starting in 2010 until now      Home Meds:  Medications Prior to Admission   Medication Sig Dispense Refill Last Dose   • allopurinol (ZYLOPRIM) 100 MG tablet Take 1 tablet by mouth Daily. 90 tablet 3 5/4/2022 at 0800   • atorvastatin (LIPITOR) 80 MG tablet Take 1 tablet by mouth every night at bedtime. 90 tablet 3 5/4/2022 at 2200   • carvedilol (COREG) 12.5 MG tablet Take 12.5 mg by mouth 2 (Two) Times a Day.   5/5/2022 at 0500   • diclofenac (FLECTOR) 1.3 % patch patch Apply 1 patch topically to the appropriate area as directed Every 12 (Twelve) Hours. (Patient taking differently: Apply 1 patch topically to the appropriate area as directed Every 12 (Twelve) Hours. HELD FOR SURGERY) 30 patch 5 Past Month at Unknown time   • docusate sodium (COLACE) 100 MG  capsule Take 1 capsule by mouth 2 (Two) Times a Day. (Patient taking differently: Take 100 mg by mouth 2 (Two) Times a Day As Needed.) 60 capsule 0 Past Month at Unknown time   • DULoxetine (CYMBALTA) 60 MG capsule Take 1 capsule by mouth Daily. (Patient taking differently: Take 60 mg by mouth Every Night.) 90 capsule 3 5/4/2022 at 2200   • ELDERBERRY PO Take 1 tablet by mouth Every Night. HELD   Past Week at Unknown time   • HYDROcodone-acetaminophen (NORCO)  MG per tablet Take 1-2 tablets by mouth every 4-6 hours as needed for pain.  Not to exceed 6 tabs per day. (Patient taking differently: Take 1 tablet by mouth Every 4 (Four) Hours As Needed. Take 1-2 tablets by mouth every 4-6 hours as needed for pain.  Not to exceed 6 tabs per day.) 42 tablet 0 5/4/2022 at 1900   • levothyroxine (SYNTHROID, LEVOTHROID) 75 MCG tablet TAKE 1 TABLET BY MOUTH EVERY DAY (Patient taking differently: Take 75 mcg by mouth Daily.) 90 tablet 2 5/5/2022 at 0500   • montelukast (SINGULAIR) 10 MG tablet Take 1 tablet by mouth Daily. (Patient taking differently: Take 10 mg by mouth Every Night.) 90 tablet 3 5/4/2022 at 2200   • multivitamin with minerals tablet tablet Take 2 tablets by mouth Every Night.   Past Week at Unknown time   • pantoprazole (PROTONIX) 40 MG EC tablet Take 1 tablet by mouth Daily. 90 tablet 1 5/5/2022 at 0500   • Probiotic Product (PROBIOTIC DAILY PO) Take 1 tablet by mouth Daily.   Past Week at Unknown time   • spironolactone (ALDACTONE) 25 MG tablet Take 25 mg by mouth Every Morning.   5/4/2022 at 0800   • tiZANidine (ZANAFLEX) 4 MG tablet Take 4 mg by mouth As Needed.   Past Week at Unknown time   • traZODone (DESYREL) 50 MG tablet Take 50 mg by mouth As Needed.   Past Week at Unknown time   • alendronate (FOSAMAX) 70 MG tablet Take 1 tablet by mouth Every 7 (Seven) Days. 12 tablet 3 5/1/2022   • Blood Glucose Monitoring Suppl (ACCU-CHEK OTILIA PLUS) W/DEVICE kit Dx e11.9 use to check BS BID, ok to  substitute formulary preferred 1 kit 0    • dabigatran etexilate (PRADAXA) 150 MG capsu Take 150 mg by mouth 2 (Two) Times a Day. WILL HOLD PRIOR TO SURGERY ACCORDING TO CARDIOLOGIST  HELD FOR SURGERY LAST DOSE 04/3/22   4/30/2022   • glucose blood (ACCU-CHEK OTILIA PLUS) test strip Dx e11.9 use to check BS BID, ok to substitute formulary preferred 60 each 11    • Lancets (ACCU-CHEK MULTICLIX) lancets Dx e11.9 use to check BS BID, ok to substitute formulary preferred 60 each 11    • ondansetron (ZOFRAN) 4 MG tablet Take 1 tablet by mouth Every 6 (Six) Hours As Needed for Nausea or Vomiting. 12 tablet 0 More than a month at Unknown time   • Semaglutide,0.25 or 0.5MG/DOS, (Ozempic, 0.25 or 0.5 MG/DOSE,) 2 MG/1.5ML solution pen-injector Inject 0.5 mg under the skin into the appropriate area as directed 1 (One) Time Per Week. (Patient taking differently: Inject 0.5 mg under the skin into the appropriate area as directed 1 (One) Time Per Week. Takes on mondays) 4 pen 11 5/2/2022     Current Meds:     Current Facility-Administered Medications:   •  acetaminophen (TYLENOL) tablet 650 mg, 650 mg, Oral, Q4H PRN, Wilfredo Bailey MD  •  allopurinol (ZYLOPRIM) tablet 100 mg, 100 mg, Oral, Daily, Wilfredo Bailey MD  •  atorvastatin (LIPITOR) tablet 80 mg, 80 mg, Oral, Nightly, Wilfredo Bailey MD  •  bisacodyl (DULCOLAX) suppository 10 mg, 10 mg, Rectal, Daily PRN, Wilfredo Bailey MD  •  carvedilol (COREG) tablet 12.5 mg, 12.5 mg, Oral, BID With Meals, Wilfredo Bailey MD  •  ceFAZolin in dextrose (ANCEF) IVPB solution 2 g, 2 g, Intravenous, Q8H, Wilfredo Bailey MD  •  dexamethasone (DECADRON) tablet 4 mg, 4 mg, Oral, Q6H **OR** dexamethasone (DECADRON) injection 4 mg, 4 mg, Intravenous, Q6H, Wilfredo Bailey MD  •  diphenhydrAMINE (BENADRYL) capsule 25 mg, 25 mg, Oral, Q6H PRN, Wilfredo Bailey MD  •  docusate sodium (COLACE) capsule 100 mg, 100 mg, Oral, BID, Wilfredo Bailey MD  •  DULoxetine (CYMBALTA) DR capsule  60 mg, 60 mg, Oral, Daily, Wilfredo Bailey MD  •  HYDROmorphone (DILAUDID) PCA 0.2 mg/ml 50 mL syringe, , Intravenous, Continuous, Wilfredo Bailey MD, New Syringe/Cartridge at 05/05/22 1257  •  lactobacillus acidophilus (RISAQUAD) capsule 1 capsule, 1 capsule, Oral, Daily, Wilfredo Bailey MD  •  [START ON 5/6/2022] levothyroxine (SYNTHROID, LEVOTHROID) tablet 75 mcg, 75 mcg, Oral, Daily, Wilfredo Bailey MD  •  magnesium hydroxide (MILK OF MAGNESIA) suspension 10 mL, 10 mL, Oral, Daily PRN, Wilfredo Bailey MD  •  montelukast (SINGULAIR) tablet 10 mg, 10 mg, Oral, Nightly, Wilfredo Bailey MD  •  naloxone (NARCAN) injection 0.1 mg, 0.1 mg, Intravenous, Q5 Min PRN, Wilfredo Bailey MD  •  ondansetron (ZOFRAN) tablet 4 mg, 4 mg, Oral, Q6H PRN **OR** ondansetron (ZOFRAN) injection 4 mg, 4 mg, Intravenous, Q6H PRN, Wilfredo Bailey MD  •  oxyCODONE-acetaminophen (PERCOCET) 5-325 MG per tablet 2 tablet, 2 tablet, Oral, Q4H PRN, Wilfredo Bailey MD  •  [START ON 5/6/2022] pantoprazole (PROTONIX) EC tablet 40 mg, 40 mg, Oral, Daily, Wilfredo Bailey MD  •  polyethylene glycol (MIRALAX) packet 17 g, 17 g, Oral, Daily, Wilfredo Bailey MD  •  sodium chloride 0.9 % flush 10 mL, 10 mL, Intravenous, PRN, Wilfredo Bailey MD  •  sodium chloride 0.9 % flush 3 mL, 3 mL, Intravenous, Q12H, Wilfredo Bailey MD, 3 mL at 05/05/22 1341  •  sodium chloride 0.9 % infusion, 100 mL/hr, Intravenous, Continuous, Wilfredo Bailey MD, Last Rate: 100 mL/hr at 05/05/22 1342, 100 mL/hr at 05/05/22 1342  •  [START ON 5/6/2022] spironolactone (ALDACTONE) tablet 25 mg, 25 mg, Oral, QAM, Wilfredo Bailey MD  •  tiZANidine (ZANAFLEX) tablet 4 mg, 4 mg, Oral, Q8H PRN, Wilfredo Bailey MD  •  traZODone (DESYREL) tablet 50 mg, 50 mg, Oral, PRN, Wilfredo Bailey MD    Facility-Administered Medications Ordered in Other Encounters:   •  Chlorhexidine Gluconate 2 % pads 1 each, 1 each, Apply externally, Take As Directed, Juan Antonio Anne,  MD  Allergies:  Allergies   Allergen Reactions   • Adhesive Tape Other (See Comments)     SKIN BLISTERS  PAPER TAPE OKAY PER PT    • Aspirin Swelling   • Biaxin [Clarithromycin] Other (See Comments)     BLISTERS AROUND MOTH  Also known as Bioxen    • Codeine Swelling   • Darvon [Propoxyphene] Swelling   • Levaquin [Levofloxacin] Other (See Comments)     BLISTERS AROUND MOUTH   • Nitroglycerin Other (See Comments)     Was told by cardiologist not to take due to heart condition & defibrillator   • Tequin [Gatifloxacin] Other (See Comments)     BLISTERS AROUND MOUTH  Also known Tequine      Social History:   Social History     Socioeconomic History   • Marital status:    • Number of children: 1   Tobacco Use   • Smoking status: Former Smoker     Packs/day: 0.50     Years: 18.00     Pack years: 9.00     Types: Cigarettes     Start date: 4/3/1967     Quit date: 1985     Years since quittin.6   • Smokeless tobacco: Never Used   • Tobacco comment: Haven't smoked in 37 years   Vaping Use   • Vaping Use: Never used   Substance and Sexual Activity   • Alcohol use: No   • Drug use: Never   • Sexual activity: Defer     Family History:  Family History   Problem Relation Age of Onset   • Heart attack Brother    • Heart disease Brother    • Hyperthyroidism Mother    • Cancer Mother    • Heart disease Mother    • Osteoporosis Mother    • Thyroid disease Mother    • Broken bones Mother    • No Known Problems Father          car accident   • Cirrhosis Maternal Grandmother    • No Known Problems Maternal Grandfather    • No Known Problems Paternal Grandmother    • No Known Problems Paternal Grandfather    • Breast cancer Maternal Aunt    • Cancer Maternal Aunt    • Malig Hyperthermia Neg Hx           Review of Systems  See history of present illness and past medical history.  Patient denies nausea vomiting abdominal pain dysuria.  Denies any confusion fever chills night sweats or focal changes to vision smell taste or  "sound.  Denies any chest pain palpitation cough shortness of breath abdominal pain diarrhea.  Denies any new bruising or bleeding preoperatively and formally was on Pradaxa.  Remainder of ROS is negative.      Vitals:   /61 (BP Location: Left arm, Patient Position: Lying)   Pulse 73   Temp 97 °F (36.1 °C) (Oral)   Resp 18   Ht 154.9 cm (61\")   Wt 75 kg (165 lb 6.4 oz)   SpO2 96%   BMI 31.25 kg/m²   I/O:     Intake/Output Summary (Last 24 hours) at 5/5/2022 1421  Last data filed at 5/5/2022 1345  Gross per 24 hour   Intake 1120 ml   Output 100 ml   Net 1020 ml     Exam:  General Appearance:    Alert, cooperative, fluent speech, good historian, nontoxic, no family at bedside   Head:    Normocephalic, without obvious abnormality, atraumatic   Eyes:    PERRL, conjunctivae/corneas clear, EOM's intact, both eyes   Ears:    Normal external ear canals, both ears   Nose:   Nares normal, septum midline, mucosa normal, no drainage    or sinus tenderness   Throat:   Lips, tongue, gums normal; oral mucosa pink and moist   Neck:  Neck bracing and did not remove, MOR drain noted       Lungs:     Clear to auscultation bilaterally, respirations unlabored   Chest Wall:    No tenderness or deformity    Heart:    Regular rate and rhythm, S1 and S2 normal, no murmur   Abdomen:     Soft, nontender, bowel sounds active all four quadrants,     no masses   Extremities:  Moving all, SCDs lower, no cyanosis or edema   Pulses:   Pulses palpable in all extremities; symmetric all extremities       Neurologic:   CNII-XII intact, no focal deficits noted       Data Review:  Labs in chart were reviewed.      Results from last 7 days   Lab Units 05/03/22  1547   HEMOGLOBIN A1C % 7.60*       Imaging Results (Last 7 Days)     Procedure Component Value Units Date/Time    XR Spine Cervical 1 View [[id number]] Collected: 05/05/22 1202     Updated: 05/05/22 1210    Narrative:      X-RAY CERVICAL SPINE ONE VIEW      NUMBER OF IMAGES: 3   "   CLINICAL: ACDF     FLUOROSCOPY TIME:19 seconds     FINDINGS: Intraoperative C-arm images of the cervical spine taken in the  lateral projection for the purpose of visualization and localization  during surgery.     This report was finalized on 5/5/2022 12:07 PM by Dr. Liang Arizmendi M.D.       FL C Arm During Surgery [[id number]] Resulted: 05/05/22 1137     Updated: 05/05/22 1137    Narrative:      This procedure was auto-finalized with no dictation required.            Assessment:  Active Hospital Problems    Diagnosis  POA   • **Cervical spondylosis with radiculopathy [M47.22]  Unknown      Resolved Hospital Problems   No resolved problems to display.       Plan:     DM2 w/ Hyperglycemia   -Qzempic noted on MAR - nonformulary the patient took earlier in the week   -implement MH SSI for now   -may need basal as on sterdids - BS currently 164   -Reports A1c last tested was 7.1 so no plans to recheck      HTN - no postop Hypotension at this time   -parameters written on Coreg/Spironolactone    HLD on statin     Dep/Anx - on Cymbalta    GERD on PPI     Hypothyroid on Levothyroxine    POD0 Cervical surgery - PCA/DVT ppx per surgery       Thanks you much Dr Bailey for the consult - [facility] happy to follow along     Dominik Camarillo MD   5/5/2022  14:21 EDT

## 2022-05-05 NOTE — ANESTHESIA POSTPROCEDURE EVALUATION
"Patient: Kristan Galicia    Procedure Summary     Date: 05/05/22 Room / Location: [facility] OR  / [facility] MAIN OR    Anesthesia Start: 0749 Anesthesia Stop: 1154    Procedure: Cervical 3- Cervical 4, Cervical 4- Cervical 5 anterior cervical discectomy and fusion with Zero profile cage and titanuium mesh cage (N/A Spine Cervical) Diagnosis:       Cervical spondylosis with radiculopathy      (Cervical spondylosis with radiculopathy [M47.22])    Surgeons: Wilfredo Bailey MD Provider: Rickey Martínez MD    Anesthesia Type: general ASA Status: 3          Anesthesia Type: general    Vitals  Vitals Value Taken Time   /60 05/05/22 1316   Temp 37.4 °C (99.3 °F) 05/05/22 1153   Pulse 69 05/05/22 1321   Resp 14 05/05/22 1230   SpO2 94 % 05/05/22 1321   Vitals shown include unvalidated device data.        Post Anesthesia Care and Evaluation    Patient location during evaluation: PACU  Patient participation: complete - patient participated  Level of consciousness: awake and alert  Pain management: adequate  Airway patency: patent  Anesthetic complications: No anesthetic complications    Cardiovascular status: acceptable  Respiratory status: acceptable  Hydration status: acceptable    Comments: /57   Pulse 71   Temp 37.4 °C (99.3 °F) (Oral)   Resp 14   Ht 154.9 cm (61\")   Wt 75 kg (165 lb 6.4 oz)   SpO2 95%   BMI 31.25 kg/m²         "

## 2022-05-05 NOTE — ANESTHESIA PREPROCEDURE EVALUATION
Anesthesia Evaluation     Patient summary reviewed and Nursing notes reviewed                Airway   Mallampati: II  TM distance: >3 FB  Neck ROM: limited  No difficulty expected  Comment: Slight decrease in neck extension, easy intubation with MAC 3 last month  Dental - normal exam         Pulmonary - normal exam   (+) a smoker Former, asthma,shortness of breath, sleep apnea,     ROS comment: INGRID resolved after wt loss  Cardiovascular     ECG reviewed  Rhythm: regular  Rate: normal    (+) pacemaker ICD, pacemaker, hypertension 2 medications or greater, valvular problems/murmurs murmur, dysrhythmias Paroxysmal Atrial Fib, angina, CHF , orthopnea, murmur, hyperlipidemia,     ROS comment: Hx of IHSS with non-obstructive cardiomyopathy, normal LV fxn and EF 55% by ECHO 3/17/hx AICD and pacer in  2013/last shocks 4 years ago/pt denies hx of MI  PE comment: NSR by ECG now/faint OTILIO at [facility]    Neuro/Psych  (+) headaches, dizziness/light headedness, syncope, numbness, psychiatric history Depression,      ROS Comment: Migraines/vertigo/vasovagal syncope  GI/Hepatic/Renal/Endo    (+) obesity,  GERD,  diabetes mellitus type 2, thyroid problem hypothyroidism    Musculoskeletal         ROS comment: Hx ACDF  Abdominal   (+) obese,    Substance History      OB/GYN          Other   arthritis,          Phys Exam Other: AICD/pacer left chest              Anesthesia Plan    ASA 3     general   (Pt did not know she had a pacemaker, just thought it was an AICD. ECG ordered. She has done very well with recent anesthetics. Magnet for AICD.)  intravenous induction     Anesthetic plan, all risks, benefits, and alternatives have been provided, discussed and informed consent has been obtained with: patient.    Plan discussed with CRNA.        CODE STATUS:

## 2022-05-05 NOTE — ANESTHESIA PROCEDURE NOTES
Airway  Urgency: elective    Date/Time: 5/5/2022 7:59 AM  Airway not difficult    General Information and Staff    Patient location during procedure: OR  Anesthesiologist: Rickey Martínez MD  CRNA/CAA: Lucille Chandra CRNA    Indications and Patient Condition  Indications for airway management: airway protection    Preoxygenated: yes  MILS not maintained throughout  Mask difficulty assessment: 1 - vent by mask    Final Airway Details  Final airway type: endotracheal airway      Successful airway: ETT  Cuffed: yes   Successful intubation technique: direct laryngoscopy  Facilitating devices/methods: intubating stylet  Endotracheal tube insertion site: oral  Blade: Dorado  Blade size: 2  ETT size (mm): 6.5  Cormack-Lehane Classification: grade I - full view of glottis  Placement verified by: chest auscultation and capnometry   Cuff volume (mL): 5  Measured from: teeth  ETT/EBT  to teeth (cm): 20  Number of attempts at approach: 1  Assessment: lips, teeth, and gum same as pre-op and atraumatic intubation    Additional Comments  Airway exam prior to DL, teeth/lips inspected. Preoxygenated with 100% O2; sniffing position, easy mask ventilation. Eyes taped. Atraumatic intubation w/ minimal cervical manipulation. C-spine maintained in neutral alignment. Lips and teeth intact, no damage. ETT connected to vent. Confirmed EBBS, +EtCO2.

## 2022-05-05 NOTE — H&P
CC: Neck pain,left shoulder pain     HPI: She is seen today for complaints of neck and left shoulder pain.  Dr. Anne saw her for shoulder pain and referred her to me.  She has had left shoulder replacement.  Pain radiates into that joint on the left and she has weakness in abduction.  No balance difficulties bowel or bladder complaints.  In 2008 I performed to see 5-7 anterior cervical discectomy and fusion with allograft and plate.  Dr. Jackson recently performed open reduction internal fixation of the odontoid with a cannulated screw.  Both surgeries worked out well.     PFSH: See attached.  History of the multiple arthritides and gout.  She has a pacemaker.     ROS: See attached     PE: BMI 32.2.  Good strength in the upper extremities bilaterally except for weakness in abduction of the left shoulder Neida test is negative sensations intact     XRAY: Plain film x-rays of the cervical spine demonstrate C5-7 plated fusion degenerative changes of 4 5 above.  4 mm of anterolisthesis is noted at C3-4.  A well-placed cannulated screw from the base of C2 extends into 2 the tip of the odontoid.  It appears well reduced.     Other: n/a     Impression: Cervical spondylosis with radiculopathy.     Plan: She has neck pain left trapezial pain left shoulder pain which radiates into the upper arm and then increase weakness in the left shoulder which she states is worse than she states is not from her shoulder replacement as it has been stronger in the past.  Myelogram and CT scan showed C3-4 spondylolisthesis with bilateral moderate foraminal stenosis, C4-5 shows broad-based spondylosis which impacts but does not deform the spinal cord.  There is extensive degenerative change noted on the plain films followed by plated fusion from C5-7.  The plate gets close to the disc base but does not appear to obstruct the disc space such that I do not think we need to remove it.  She has failed to improve with physical therapy I  told her there to be a separate incision in all likelihood.  I discussed the risks and benefits of anterior cervical discectomy and fusion with instrumentation and allograft or mechanical strut placement.  Risks include adverse anesthetic events such as death, stroke and myocardial infarction.  More specific surgical complications include infection, nonunion, and persistent pain.  Less likely problems include hardware failure, hoarseness, prolonged difficulty swallowing, visceral or vascular injury, pneumothorax, graft extrusion, and paralysis, among others. There is a 90 percent chance of success.   Alternatives were also discussed.  After careful consideration the patient wishes to proceed with surgery.

## 2022-05-05 NOTE — OP NOTE
Operative note    Preoperative diagnosis: C3-4 spondylolisthesis, C3-4, C4-5 cervical spondylosis with radiculopathy status post remote C5-7 anterior cervical discectomy and fusion with cage and plate    Postoperative diagnosis: Same    Procedure: C3-4, C4-5 anterior cervical discectomy and fusion with 10 x 7 mm DePuy titanium mesh cage and ViviGen, and 0 profile cage/ plate at C4-5 ViviGen    Surgeon: Wilfredo Bailey Jr. M.D.    Assistant: Marjorie Pino    Anesthetic: Gen.    Condition: Satisfactory    EBL: Minimal    Description of procedure: The patient was anesthetized and positioned supine on a horseshoe head rest.  10 pounds of traction was applied to a head halter.  Bony prominences were well padded, and the neck was prepped and draped in sterile fashion.  Incision was made on the right side in the previous scar line which was about 2 fingerbreadths below the inferior border of the mandible.  The interval between the sternocleidomastoid and strap muscles was bluntly divided down to prevertebral fascia.  Anatomic level was verified by location of the previously placed C5-7 anterior plate and screw construct..  The longus colli was elevated on either side of the disc.  Submuscular self-retaining retractors were placed.  I started at C4-5 the disc was incised with a knife, and then curetted back to posterior longitudinal ligament.  Significant posterior osteophytes were removed with a Kerrison rongeur and a maryam[, along with the posterior longitudinal ligament.] I could pass a small nerve hook into the foramina on either side.  [This process was repeated at the adjacent level C3-4]   at C4-5 trials were placed, and the appropriate-sized 0 profile cage plate was selectedand packed with Vivogen bone graft substitute.The packed cage was tamped into position, then countersunk.  In order to place the plate appropriately I had to use a metal cutting maryam to remove the tiny caudal tab in order to situate the construct  above the previously placed anterior plate.  To avoid metal drilling I would have had to either remove the 3 level fusion plate, or do in situ drilling to relieve the contour the plate which would have created in situ metal fragment some of which would never of been able to be removed.  I decided to do the metal burring away from the open wound.  The holes were predrilled and unicortical self locking bone screws were placed, then tightened.  Image intensification confirmed appropriate anatomic level, and screw and graft position.  Because of the spondylolisthesis at C3-4 the disc space was too shallow to allow placement of the 12 mm depth cage and secure it anteriorly.  This was evident from partial placement of the template under image intensification.  Instead I chose a 10 x 7 mm titanium mesh cage/plate which was then packed with ViviGen and tamped into position in the distracted disc space and the fit was excellent and it did not approach the spinal canal posteriorly by image intensification.  The plate was secured with screws.  Final posterior and hardware appearance as well as confirmation of anatomic level was confirmed radiographically.  The wound was vigorously irrigated.  A 7 mm Tirso-Downs drain was placed above the plate.  Wound was then closed with 2-0 Vicryl for the platysma, a couple of 2-0 Vicryls for the subcutaneous tissue, then 4-0 Monocryl and Dermabond on skin.  A sterile dressing was applied.  The patient is about to be recovered.

## 2022-05-06 VITALS
RESPIRATION RATE: 16 BRPM | BODY MASS INDEX: 31.23 KG/M2 | TEMPERATURE: 97.3 F | HEIGHT: 61 IN | DIASTOLIC BLOOD PRESSURE: 63 MMHG | WEIGHT: 165.4 LBS | SYSTOLIC BLOOD PRESSURE: 104 MMHG | OXYGEN SATURATION: 98 % | HEART RATE: 78 BPM

## 2022-05-06 LAB
ANION GAP SERPL CALCULATED.3IONS-SCNC: 13 MMOL/L (ref 5–15)
BASOPHILS # BLD AUTO: 0.01 10*3/MM3 (ref 0–0.2)
BASOPHILS NFR BLD AUTO: 0.1 % (ref 0–1.5)
BUN SERPL-MCNC: 9 MG/DL (ref 8–23)
BUN/CREAT SERPL: 10 (ref 7–25)
CALCIUM SPEC-SCNC: 8.4 MG/DL (ref 8.6–10.5)
CHLORIDE SERPL-SCNC: 105 MMOL/L (ref 98–107)
CO2 SERPL-SCNC: 23 MMOL/L (ref 22–29)
CREAT SERPL-MCNC: 0.9 MG/DL (ref 0.57–1)
DEPRECATED RDW RBC AUTO: 45.2 FL (ref 37–54)
EGFRCR SERPLBLD CKD-EPI 2021: 68.9 ML/MIN/1.73
EOSINOPHIL # BLD AUTO: 0 10*3/MM3 (ref 0–0.4)
EOSINOPHIL NFR BLD AUTO: 0 % (ref 0.3–6.2)
ERYTHROCYTE [DISTWIDTH] IN BLOOD BY AUTOMATED COUNT: 13.9 % (ref 12.3–15.4)
GLUCOSE BLDC GLUCOMTR-MCNC: 189 MG/DL (ref 70–130)
GLUCOSE SERPL-MCNC: 201 MG/DL (ref 65–99)
HCT VFR BLD AUTO: 36.1 % (ref 34–46.6)
HGB BLD-MCNC: 11.4 G/DL (ref 12–15.9)
IMM GRANULOCYTES # BLD AUTO: 0.04 10*3/MM3 (ref 0–0.05)
IMM GRANULOCYTES NFR BLD AUTO: 0.4 % (ref 0–0.5)
LYMPHOCYTES # BLD AUTO: 1.11 10*3/MM3 (ref 0.7–3.1)
LYMPHOCYTES NFR BLD AUTO: 11.8 % (ref 19.6–45.3)
MCH RBC QN AUTO: 28.1 PG (ref 26.6–33)
MCHC RBC AUTO-ENTMCNC: 31.6 G/DL (ref 31.5–35.7)
MCV RBC AUTO: 88.9 FL (ref 79–97)
MONOCYTES # BLD AUTO: 0.15 10*3/MM3 (ref 0.1–0.9)
MONOCYTES NFR BLD AUTO: 1.6 % (ref 5–12)
NEUTROPHILS NFR BLD AUTO: 8.11 10*3/MM3 (ref 1.7–7)
NEUTROPHILS NFR BLD AUTO: 86.1 % (ref 42.7–76)
NRBC BLD AUTO-RTO: 0 /100 WBC (ref 0–0.2)
PLATELET # BLD AUTO: 214 10*3/MM3 (ref 140–450)
PMV BLD AUTO: 9.3 FL (ref 6–12)
POTASSIUM SERPL-SCNC: 5.3 MMOL/L (ref 3.5–5.2)
RBC # BLD AUTO: 4.06 10*6/MM3 (ref 3.77–5.28)
SODIUM SERPL-SCNC: 141 MMOL/L (ref 136–145)
WBC NRBC COR # BLD: 9.42 10*3/MM3 (ref 3.4–10.8)

## 2022-05-06 PROCEDURE — 82962 GLUCOSE BLOOD TEST: CPT

## 2022-05-06 PROCEDURE — A9270 NON-COVERED ITEM OR SERVICE: HCPCS | Performed by: ORTHOPAEDIC SURGERY

## 2022-05-06 PROCEDURE — 63710000001 HYDROCODONE-ACETAMINOPHEN 10-325 MG TABLET: Performed by: ORTHOPAEDIC SURGERY

## 2022-05-06 PROCEDURE — A9270 NON-COVERED ITEM OR SERVICE: HCPCS | Performed by: HOSPITALIST

## 2022-05-06 PROCEDURE — 63710000001 ALLOPURINOL 100 MG TABLET: Performed by: ORTHOPAEDIC SURGERY

## 2022-05-06 PROCEDURE — 63710000001 CARVEDILOL 12.5 MG TABLET: Performed by: HOSPITALIST

## 2022-05-06 PROCEDURE — 63710000001 QUAD-PROBIOTIC CAPSULE: Performed by: ORTHOPAEDIC SURGERY

## 2022-05-06 PROCEDURE — 63710000001 INSULIN LISPRO (HUMAN) PER 5 UNITS: Performed by: HOSPITALIST

## 2022-05-06 PROCEDURE — 63710000001 DEXAMETHASONE PER 0.25 MG: Performed by: ORTHOPAEDIC SURGERY

## 2022-05-06 PROCEDURE — 85025 COMPLETE CBC W/AUTO DIFF WBC: CPT | Performed by: ORTHOPAEDIC SURGERY

## 2022-05-06 PROCEDURE — G0378 HOSPITAL OBSERVATION PER HR: HCPCS

## 2022-05-06 PROCEDURE — 63710000001 PANTOPRAZOLE 40 MG TABLET DELAYED-RELEASE: Performed by: ORTHOPAEDIC SURGERY

## 2022-05-06 PROCEDURE — 63710000001 SPIRONOLACTONE 25 MG TABLET: Performed by: HOSPITALIST

## 2022-05-06 PROCEDURE — 80048 BASIC METABOLIC PNL TOTAL CA: CPT | Performed by: ORTHOPAEDIC SURGERY

## 2022-05-06 PROCEDURE — 63710000001 LEVOTHYROXINE 75 MCG TABLET: Performed by: ORTHOPAEDIC SURGERY

## 2022-05-06 PROCEDURE — 25010000002 DEXAMETHASONE PER 1 MG: Performed by: ORTHOPAEDIC SURGERY

## 2022-05-06 PROCEDURE — 99024 POSTOP FOLLOW-UP VISIT: CPT | Performed by: ORTHOPAEDIC SURGERY

## 2022-05-06 PROCEDURE — 63710000001 POLYETHYLENE GLYCOL 17 G PACK: Performed by: ORTHOPAEDIC SURGERY

## 2022-05-06 PROCEDURE — 63710000001 DOCUSATE SODIUM 100 MG CAPSULE: Performed by: ORTHOPAEDIC SURGERY

## 2022-05-06 RX ORDER — DABIGATRAN ETEXILATE 150 MG/1
150 CAPSULE ORAL 2 TIMES DAILY
Qty: 60 CAPSULE
Start: 2022-05-06

## 2022-05-06 RX ORDER — DABIGATRAN ETEXILATE 150 MG/1
150 CAPSULE ORAL 2 TIMES DAILY
Qty: 60 CAPSULE
Start: 2022-05-06 | End: 2022-05-06 | Stop reason: SDUPTHER

## 2022-05-06 RX ORDER — HYDROCODONE BITARTRATE AND ACETAMINOPHEN 10; 325 MG/1; MG/1
1 TABLET ORAL EVERY 6 HOURS PRN
Status: DISCONTINUED | OUTPATIENT
Start: 2022-05-06 | End: 2022-05-06 | Stop reason: HOSPADM

## 2022-05-06 RX ADMIN — DEXAMETHASONE 4 MG: 4 TABLET ORAL at 03:24

## 2022-05-06 RX ADMIN — SPIRONOLACTONE 25 MG: 25 TABLET, FILM COATED ORAL at 06:22

## 2022-05-06 RX ADMIN — CARVEDILOL 12.5 MG: 12.5 TABLET, FILM COATED ORAL at 08:22

## 2022-05-06 RX ADMIN — DEXAMETHASONE SODIUM PHOSPHATE 4 MG: 4 INJECTION, SOLUTION INTRAMUSCULAR; INTRAVENOUS at 08:25

## 2022-05-06 RX ADMIN — DOCUSATE SODIUM 100 MG: 100 CAPSULE, LIQUID FILLED ORAL at 08:22

## 2022-05-06 RX ADMIN — Medication 1 CAPSULE: at 08:22

## 2022-05-06 RX ADMIN — ALLOPURINOL 100 MG: 100 TABLET ORAL at 08:25

## 2022-05-06 RX ADMIN — POLYETHYLENE GLYCOL 3350 17 G: 17 POWDER, FOR SOLUTION ORAL at 08:22

## 2022-05-06 RX ADMIN — HYDROCODONE BITARTRATE AND ACETAMINOPHEN 1 TABLET: 10; 325 TABLET ORAL at 09:38

## 2022-05-06 RX ADMIN — LEVOTHYROXINE SODIUM 75 MCG: 0.07 TABLET ORAL at 08:22

## 2022-05-06 RX ADMIN — PANTOPRAZOLE SODIUM 40 MG: 40 TABLET, DELAYED RELEASE ORAL at 08:22

## 2022-05-06 RX ADMIN — INSULIN LISPRO 3 UNITS: 100 INJECTION, SOLUTION INTRAVENOUS; SUBCUTANEOUS at 08:22

## 2022-05-06 NOTE — PROGRESS NOTES
Orthopedic Progress Note      Patient: Kristan Galicia    YOB: 1950    Medical Record Number: [id number]    Attending Physician: Wilfredo Bailey MD    Date of Admission: 5/5/2022  6:20 AM    Admitting Dx:  Cervical spondylosis with radiculopathy [M47.22]    Status Post: Cervical 3- Cervical 4, Cervical 4- Cervical 5 anterior cervical discectomy and fusion with Zero profile cage/plates.     Post Operative Day Number: 1    Current Problem List:   Patient Active Problem List   Diagnosis    Shoulder pain, right    Knee pain, right    Fall down stairs    PAF (paroxysmal atrial fibrillation) ([facility])    S/P rotator cuff repair    Vitamin D deficiency    Vertigo    Vasovagal syncope    Thyroid disease    Obstructive sleep apnea, adult    Palpitations    Osteoporosis    Ocular tumor    Migraines    Hyperlipidemia    History of transfusion    Heart murmur    Heart attack ([facility])    GERD (gastroesophageal reflux disease)    CHF (congestive heart failure) ([facility])    Cardiomyopathy ([facility])    Asthma    Arthritis of right knee    Right rotator cuff tear    Hypothyroidism    Dyspnea and respiratory abnormalities    Orthopnea    IHSS (idiopathic hypertrophic subaortic stenosis) ([facility])    AICD (automatic cardioverter/defibrillator) present    Anticoagulant long-term use    Hyperglycemia, drug-induced    Chronic pain of right knee    Diverticulitis    CAD (coronary artery disease)    Hypertrophic nonobstructive cardiomyopathy ([facility])    ICD (implantable cardioverter-defibrillator) discharge    Menopause    Non-obstructive hypertrophic cardiomyopathy ([facility])    Insomnia    History of total knee arthroplasty, right    Closed fracture of odontoid process of axis ([facility])    Atelectasis    Chronic diastolic CHF (congestive heart failure) ([facility])    Diabetes mellitus ([facility])    Chronic gout involving 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    Other displaced fracture of first cervical vertebra, subsequent encounter for fracture with routine  healing    Left shoulder pain    Closed displaced fracture of left acromial process    Cervical spondylosis with radiculopathy         Past Medical History:   Diagnosis Date    Allergic     Seasonal    Anemia     Angina at rest ([facility])     history    Arthritis     Asthma     Atrial fibrillation ([facility])     Cardiac defibrillator in place 01/2009    medtronic     Cardiomyopathy ([facility])     Hypertrophic Cardiomyopathy    Cervical disc disorder     CHF (congestive heart failure) ([facility])     Chronic pain     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     Coronary artery disease     Diabetes mellitus ([facility])     Type II    Diverticulosis     hx diverticulitis    Fracture, femur ([facility]) 1954, 1964, 2011    Fracture, foot 1969    GERD (gastroesophageal reflux disease)     Heart murmur     History of bronchitis     History of COVID-19     History of depression     History of pneumonia     History of sleep apnea     History of transfusion     no reaction    History of tumor     left ocular tumor, treated with steroids    Hyperlipidemia     Hypertension     Knee swelling 2017    Lumbosacral disc disease 2010    Migraines     On anticoagulant therapy     Osteoporosis     Palpitations     Periarthritis of shoulder 2016    Rotator cuff syndrome 2014    Scoliosis     Shoulder pain     left    Sleep apnea     HX OF SLEEP APNEA AND WORE CPAP. PT STATED SINCE SHE LOST WEIGHT AND WAS RETESTED AND WAS TOLD SHE NO LONGER HAS IT. STOPPED USING CPAP AROUND 2018.    Thyroid disease     HYPOTHYROIDISM    Vasovagal syncope     Vertigo        Current Medications:  Scheduled Meds:allopurinol, 100 mg, Oral, Daily  atorvastatin, 80 mg, Oral, Nightly  carvedilol, 12.5 mg, Oral, BID With Meals  docusate sodium, 100 mg, Oral, BID  DULoxetine, 60 mg, Oral, Nightly  insulin lispro, 0-14 Units, Subcutaneous, TID AC  lactobacillus acidophilus, 1 capsule, Oral, Daily  levothyroxine, 75 mcg, Oral, Daily  montelukast, 10 mg, Oral,  Nightly  pantoprazole, 40 mg, Oral, Daily  polyethylene glycol, 17 g, Oral, Daily  sodium chloride, 3 mL, Intravenous, Q12H  spironolactone, 25 mg, Oral, QAM      PRN Meds:.  acetaminophen    bisacodyl    dextrose    dextrose    diphenhydrAMINE    glucagon (human recombinant)    HYDROcodone-acetaminophen    magnesium hydroxide    naloxone    ondansetron **OR** ondansetron    sodium chloride    tiZANidine    traZODone    SUBJECTIVE: 71 y.o.  female.    OBJECTIVE:   Vitals:    05/06/22 0200 05/06/22 0426 05/06/22 0624 05/06/22 0700   BP:    104/63   BP Location:    Left arm   Patient Position:    Lying   Pulse:    78   Resp:    16   Temp:    97.3 °F (36.3 °C)   TempSrc:    Oral   SpO2: 96% 98% 98% 98%   Weight:       Height:         I/O last 3 completed shifts:  In: 1360 [P.O.:360; I.V.:1000]  Out: 2090 [Urine:1925; Drains:65; Blood:100]    Diagnostic Tests:   Lab Results (last 24 hours)       Procedure Component Value Units Date/Time    POC Glucose Once [[id number]]  (Abnormal) Collected: 05/06/22 0551    Specimen: Blood Updated: 05/06/22 0553     Glucose 189 mg/dL      Comment: Meter: [id number] : [id number] Sukhjinder DUTTA       Basic Metabolic Panel [[id number]]  (Abnormal) Collected: 05/06/22 0430    Specimen: Blood Updated: 05/06/22 0526     Glucose 201 mg/dL      BUN 9 mg/dL      Creatinine 0.90 mg/dL      Sodium 141 mmol/L      Potassium 5.3 mmol/L      Comment: Slight hemolysis detected by analyzer. Results may be affected.        Chloride 105 mmol/L      CO2 23.0 mmol/L      Calcium 8.4 mg/dL      BUN/Creatinine Ratio 10.0     Anion Gap 13.0 mmol/L      eGFR 68.9 mL/min/1.73      Comment: National Kidney Foundation and American Society of Nephrology (ASN) Task Force recommended calculation based on the Chronic Kidney Disease Epidemiology Collaboration (CKD-EPI) equation refit without adjustment for race.       Narrative:      GFR Normal >60  Chronic Kidney Disease <60  Kidney Failure <15      CBC &  Differential [[id number]]  (Abnormal) Collected: 05/06/22 0430    Specimen: Blood Updated: 05/06/22 0504    Narrative:      The following orders were created for panel order CBC & Differential.  Procedure                               Abnormality         Status                     ---------                               -----------         ------                     CBC Auto Differential[[id number]]        Abnormal            Final result                 Please view results for these tests on the individual orders.    CBC Auto Differential [[id number]]  (Abnormal) Collected: 05/06/22 0430    Specimen: Blood Updated: 05/06/22 0504     WBC 9.42 10*3/mm3      RBC 4.06 10*6/mm3      Hemoglobin 11.4 g/dL      Hematocrit 36.1 %      MCV 88.9 fL      MCH 28.1 pg      MCHC 31.6 g/dL      RDW 13.9 %      RDW-SD 45.2 fl      MPV 9.3 fL      Platelets 214 10*3/mm3      Neutrophil % 86.1 %      Lymphocyte % 11.8 %      Monocyte % 1.6 %      Eosinophil % 0.0 %      Basophil % 0.1 %      Immature Grans % 0.4 %      Neutrophils, Absolute 8.11 10*3/mm3      Lymphocytes, Absolute 1.11 10*3/mm3      Monocytes, Absolute 0.15 10*3/mm3      Eosinophils, Absolute 0.00 10*3/mm3      Basophils, Absolute 0.01 10*3/mm3      Immature Grans, Absolute 0.04 10*3/mm3      nRBC 0.0 /100 WBC     POC Glucose Once [[id number]]  (Abnormal) Collected: 05/05/22 2053    Specimen: Blood Updated: 05/05/22 2054     Glucose 236 mg/dL      Comment: Meter: [id number] : [id number] Sukhjinder DUTTA       POC Glucose Once [[id number]]  (Abnormal) Collected: 05/05/22 1616    Specimen: Blood Updated: 05/05/22 1618     Glucose 212 mg/dL      Comment: Meter: [id number] : [id number] Estrada Abraham RN       POC Glucose Once [[id number]]  (Abnormal) Collected: 05/05/22 1156    Specimen: Blood Updated: 05/05/22 1158     Glucose 164 mg/dL      Comment: Meter: [id number] : [id number] Barbie Baum RN               PHYSICAL EXAM: Anterior neck dressing is dry and  intact.  Drain is patent she has had 65 cc output since surgery.  Voice is normal.  She denies any difficulty swallowing or shortness of breath.  Preoperative arm pain is resolved.  Hand grasp are equal bilaterally.  Patient is up walking with assist and tolerating that well.  Hemoglobin is 11.4 voiding well.     ASSESSMENT & PLAN:  Patient is current with pain management and presently takes hydrocodone 10/325 mg tablets.  States that she would prefer to go back on her home dose of hydrocodone rather than the Percocet since the Percocet does cause some increased agitation and irritability.  For now I have not recommended that she go back on her hydrocodone as prescribed by pain management.  If she does not get adequately relief have asked her to contact the office.  DC drain.  Patient has been instructed that she wear soft collar at night and her hard collar during the day.  She needs to avoid forward flexion and extension and lateral side to side movements of her head and neck.  She should avoid any pushing or pulling through her upper extremities and no lifting anything heavier than a half a gallon of milk.  Patient will return to the office in 2 weeks to see Dr. Bailey.  Has been instructed to resume her Pradaxa on Monday    Date: 5/6/2022    Kayleigh Henry RN

## 2022-05-06 NOTE — PLAN OF CARE
Goal Outcome Evaluation:              Outcome Evaluation: Pt discharged to home, stable condition, MOR drain removed and dressing applied, needs attended.

## 2022-05-06 NOTE — DISCHARGE SUMMARY
Orthopedic Discharge Summary      Patient: Kristan Galicia  YOB: 1950  Medical Record Number: [id number]    Attending Physician: Wilfredo Bailey MD  Consulting Physician(s):   Consults     Date and Time Order Name Status Description    5/5/2022 12:49 PM Inpatient Hospitalist Consult Completed           Date of Admission: 5/5/2022  6:20 AM  Date of Discharge:5/6/2022    Discharge Diagnosis: Cervical 3- Cervical 4, Cervical 4- Cervical 5 anterior cervical discectomy and fusion with Zero profile cage/plates. ,       Presenting Problem/History of Present Illness: Cervical spondylosis with radiculopathy [M47.22]      Allergies:   Allergies   Allergen Reactions   • Adhesive Tape Other (See Comments)     SKIN BLISTERS  PAPER TAPE OKAY PER PT    • Percocet [Oxycodone-Acetaminophen] Irritability   • Aspirin Swelling   • Biaxin [Clarithromycin] Other (See Comments)     BLISTERS AROUND MOTH  Also known as Bioxen    • Codeine Swelling   • Darvon [Propoxyphene] Swelling   • Levaquin [Levofloxacin] Other (See Comments)     BLISTERS AROUND MOUTH   • Nitroglycerin Other (See Comments)     Was told by cardiologist not to take due to heart condition & defibrillator   • Tequin [Gatifloxacin] Other (See Comments)     BLISTERS AROUND MOUTH  Also known Tequine        Discharge Medications     Discharge Medications      Changes to Medications      Instructions Start Date   dabigatran etexilate 150 MG capsu  Commonly known as: PRADAXA  What changed:   · when to take this  · additional instructions   150 mg, Oral, 2 Times Daily, Resume Monday 5/9/22      docusate sodium 100 MG capsule  Commonly known as: COLACE  What changed:   · when to take this  · reasons to take this   100 mg, Oral, 2 Times Daily      DULoxetine 60 MG capsule  Commonly known as: CYMBALTA  What changed: when to take this   60 mg, Oral, Daily      HYDROcodone-acetaminophen  MG per tablet  Commonly known as: NORCO  What changed:   · how much to  take  · how to take this  · when to take this  · reasons to take this   Take 1-2 tablets by mouth every 4-6 hours as needed for pain.  Not to exceed 6 tabs per day.      montelukast 10 MG tablet  Commonly known as: SINGULAIR  What changed: when to take this   10 mg, Oral, Daily      Semaglutide(0.25 or 0.5MG/DOS) 2 MG/1.5ML solution pen-injector  Commonly known as: Ozempic (0.25 or 0.5 MG/DOSE)  What changed: additional instructions   0.5 mg, Subcutaneous, Weekly         Continue These Medications      Instructions Start Date   Accu-Chek Sangeeta Plus w/Device kit   Dx e11.9 use to check BS BID, ok to substitute formulary preferred      accu-chek multiclix lancets   Dx e11.9 use to check BS BID, ok to substitute formulary preferred      alendronate 70 MG tablet  Commonly known as: FOSAMAX   70 mg, Oral, Every 7 Days      allopurinol 100 MG tablet  Commonly known as: ZYLOPRIM   100 mg, Oral, Daily      atorvastatin 80 MG tablet  Commonly known as: LIPITOR   80 mg, Oral, Every Night at Bedtime      carvedilol 12.5 MG tablet  Commonly known as: COREG   12.5 mg, Oral, 2 Times Daily      ELDERBERRY PO   1 tablet, Oral, Nightly, HELD      glucose blood test strip  Commonly known as: Accu-Chek Sangeeta Plus   Dx e11.9 use to check BS BID, ok to substitute formulary preferred      levothyroxine 75 MCG tablet  Commonly known as: SYNTHROID, LEVOTHROID   TAKE 1 TABLET BY MOUTH EVERY DAY      multivitamin with minerals tablet tablet   2 tablets, Oral, Nightly      ondansetron 4 MG tablet  Commonly known as: ZOFRAN   4 mg, Oral, Every 6 Hours PRN      pantoprazole 40 MG EC tablet  Commonly known as: PROTONIX   40 mg, Oral, Daily      PROBIOTIC DAILY PO   1 tablet, Oral, Daily      spironolactone 25 MG tablet  Commonly known as: ALDACTONE   25 mg, Oral, Every Morning      tiZANidine 4 MG tablet  Commonly known as: ZANAFLEX   4 mg, Oral, As Needed      traZODone 50 MG tablet  Commonly known as: DESYREL   50 mg, Oral, As Needed          Stop These Medications    diclofenac 1.3 % patch patch  Commonly known as: Flector              Past Medical History:   Diagnosis Date   • Allergic     Seasonal   • Anemia    • Angina at rest ([facility])     history   • Arthritis    • Asthma    • Atrial fibrillation ([facility])    • Cardiac defibrillator in place 01/2009    medtronic    • Cardiomyopathy ([facility])     Hypertrophic Cardiomyopathy   • Cervical disc disorder    • CHF (congestive heart failure) ([facility])    • Chronic pain    • 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    • Coronary artery disease    • Diabetes mellitus ([facility])     Type II   • Diverticulosis     hx diverticulitis   • Fracture, femur ([facility]) 1954, 1964, 2011   • Fracture, foot 1969   • GERD (gastroesophageal reflux disease)    • Heart murmur    • History of bronchitis    • History of COVID-19    • History of depression    • History of pneumonia    • History of sleep apnea    • History of transfusion     no reaction   • History of tumor     left ocular tumor, treated with steroids   • Hyperlipidemia    • Hypertension    • Knee swelling 2017   • Lumbosacral disc disease 2010   • Migraines    • On anticoagulant therapy    • Osteoporosis    • Palpitations    • Periarthritis of shoulder 2016   • Rotator cuff syndrome 2014   • Scoliosis    • Shoulder pain     left   • Sleep apnea     HX OF SLEEP APNEA AND WORE CPAP. PT STATED SINCE SHE LOST WEIGHT AND WAS RETESTED AND WAS TOLD SHE NO LONGER HAS IT. STOPPED USING CPAP AROUND 2018.   • Thyroid disease     HYPOTHYROIDISM   • Vasovagal syncope    • Vertigo         Past Surgical History:   Procedure Laterality Date   • ANTERIOR CERVICAL DISCECTOMY W/ FUSION  2012    C5-7   • APPENDECTOMY  1970   • AUGMENTATION MAMMAPLASTY     • BILATERAL BREAST REDUCTION     • BREAST BIOPSY     • CARDIAC CATHETERIZATION  02/19/2007   • CARDIAC DEFIBRILLATOR PLACEMENT Left 1999, 2013    Dr. Ya   • CATARACT EXTRACTION Bilateral    • COLONOSCOPY  1996     Dr. Herbert Gonsales    • COLONOSCOPY  08/16/2016    Procedure: COLONOSCOPY with cecum;  Surgeon: Rosalio Sheikh MD;  Location: [facility];  Service:    • ENDOSCOPY N/A 04/27/2017    Procedure: ESOPHAGOGASTRODUODENOSCOPY WITH BIOPSIES;  Surgeon: Rosalio Sheikh MD;  Location: [facility];  Service:    • FEMUR SURGERY Left     with metal implant  x3   • FIRST RIB RESECTION Bilateral     and scalenectomy   • HYSTERECTOMY  1989    Total   • IMPLANTABLE CARDIOVERTER DEFIBRILLATOR LEAD REPLACEMENT/POCKET REVISION     • JOINT REPLACEMENT  Right shoulder  and right knee ear   • KNEE ARTHROSCOPY Bilateral 2014    Dr. Pascual   • KNEE SURGERY Right 06/10/2010   • LAPAROSCOPIC CHOLECYSTECTOMY  1974   • NECK SURGERY  2012, 2018   • OOPHORECTOMY     • SCAPULA OPEN REDUCTION INTERNAL FIXATION Left 04/15/2021    Procedure: Acromion nonunion OPEN REDUCTION INTERNAL FIXATION;  Surgeon: Juan Antonio Anne MD;  Location: [facility];  Service: Orthopedics;  Laterality: Left;   • SHOULDER ARTHROSCOPY W/ ROTATOR CUFF REPAIR Bilateral 05/20/2011   • SHOULDER SURGERY Right    • TONSILLECTOMY     • TOTAL KNEE ARTHROPLASTY Right 04/18/2018    Procedure: RIGHT TOTAL KNEE ARTHROPLASTY WITH STEVE NAVIGATION;  Surgeon: Bravo Pascual MD;  Location: [facility];  Service: Orthopedics   • TOTAL SHOULDER ARTHROPLASTY W/ DISTAL CLAVICLE EXCISION Right 02/02/2017    Procedure: TOTAL SHOULDER REVERSE ARTHROPLASTY;  Surgeon: Juan Antonio Anne MD;  Location: [facility] OR;  Service:    • TOTAL SHOULDER ARTHROPLASTY W/ DISTAL CLAVICLE EXCISION Left 10/22/2020    Procedure: TOTAL SHOULDER REVERSE ARTHROPLASTY;  Surgeon: Juan Antonio Anne MD;  Location: [facility];  Service: Orthopedics;  Laterality: Left;   • TRIGGER POINT INJECTION  Starting in 2010 until now        Social History     Occupational History   • Occupation:    Tobacco Use   • Smoking status: Former Smoker     Packs/day: 0.50     Years: 18.00      Pack years: 9.00     Types: Cigarettes     Start date: 4/3/1967     Quit date: 1985     Years since quittin.6   • Smokeless tobacco: Never Used   • Tobacco comment: Haven't smoked in 37 years   Vaping Use   • Vaping Use: Never used   Substance and Sexual Activity   • Alcohol use: No   • Drug use: Never   • Sexual activity: Defer      Social History     Social History Narrative   • Not on file        Family History   Problem Relation Age of Onset   • Heart attack Brother    • Heart disease Brother    • Hyperthyroidism Mother    • Cancer Mother    • Heart disease Mother    • Osteoporosis Mother    • Thyroid disease Mother    • Broken bones Mother    • No Known Problems Father          car accident   • Cirrhosis Maternal Grandmother    • No Known Problems Maternal Grandfather    • No Known Problems Paternal Grandmother    • No Known Problems Paternal Grandfather    • Breast cancer Maternal Aunt    • Cancer Maternal Aunt    • Malig Hyperthermia Neg Hx          Physical Exam: 71 y.o. female  General Appearance:    Alert, cooperative, in no acute distress                      Vitals:    22 0200 22 0426 22 0624 22 0700   BP:    104/63   BP Location:    Left arm   Patient Position:    Lying   Pulse:    78   Resp:    16   Temp:    97.3 °F (36.3 °C)   TempSrc:    Oral   SpO2: 96% 98% 98% 98%   Weight:       Height:            DIAGNOSTIC TESTS:   Admission on 2022   Component Date Value Ref Range Status   • Glucose 2022 199 (A) 70 - 130 mg/dL Final    Meter: [id number] : [id number] Breezy DUTTA   • QT Interval 2022 466  ms Final   • Glucose 2022 164 (A) 70 - 130 mg/dL Final    Meter: [id number] : [id number] Barbie Baum RN   • Glucose 2022 212 (A) 70 - 130 mg/dL Final    Meter: [id number] : [id number] Estrada Abraham RN   • Glucose 2022 236 (A) 70 - 130 mg/dL Final    Meter: [id number] : [id number] Sukhjinder DUTTA   • Glucose  05/06/2022 201 (A) 65 - 99 mg/dL Final   • BUN 05/06/2022 9  8 - 23 mg/dL Final   • Creatinine 05/06/2022 0.90  0.57 - 1.00 mg/dL Final   • Sodium 05/06/2022 141  136 - 145 mmol/L Final   • Potassium 05/06/2022 5.3 (A) 3.5 - 5.2 mmol/L Final    Slight hemolysis detected by analyzer. Results may be affected.   • Chloride 05/06/2022 105  98 - 107 mmol/L Final   • CO2 05/06/2022 23.0  22.0 - 29.0 mmol/L Final   • Calcium 05/06/2022 8.4 (A) 8.6 - 10.5 mg/dL Final   • BUN/Creatinine Ratio 05/06/2022 10.0  7.0 - 25.0 Final   • Anion Gap 05/06/2022 13.0  5.0 - 15.0 mmol/L Final   • eGFR 05/06/2022 68.9  >60.0 mL/min/1.73 Final    National Kidney Foundation and American Society of Nephrology (ASN) Task Force recommended calculation based on the Chronic Kidney Disease Epidemiology Collaboration (CKD-EPI) equation refit without adjustment for race.   • WBC 05/06/2022 9.42  3.40 - 10.80 10*3/mm3 Final   • RBC 05/06/2022 4.06  3.77 - 5.28 10*6/mm3 Final   • Hemoglobin 05/06/2022 11.4 (A) 12.0 - 15.9 g/dL Final   • Hematocrit 05/06/2022 36.1  34.0 - 46.6 % Final   • MCV 05/06/2022 88.9  79.0 - 97.0 fL Final   • MCH 05/06/2022 28.1  26.6 - 33.0 pg Final   • MCHC 05/06/2022 31.6  31.5 - 35.7 g/dL Final   • RDW 05/06/2022 13.9  12.3 - 15.4 % Final   • RDW-SD 05/06/2022 45.2  37.0 - 54.0 fl Final   • MPV 05/06/2022 9.3  6.0 - 12.0 fL Final   • Platelets 05/06/2022 214  140 - 450 10*3/mm3 Final   • Neutrophil % 05/06/2022 86.1 (A) 42.7 - 76.0 % Final   • Lymphocyte % 05/06/2022 11.8 (A) 19.6 - 45.3 % Final   • Monocyte % 05/06/2022 1.6 (A) 5.0 - 12.0 % Final   • Eosinophil % 05/06/2022 0.0 (A) 0.3 - 6.2 % Final   • Basophil % 05/06/2022 0.1  0.0 - 1.5 % Final   • Immature Grans % 05/06/2022 0.4  0.0 - 0.5 % Final   • Neutrophils, Absolute 05/06/2022 8.11 (A) 1.70 - 7.00 10*3/mm3 Final   • Lymphocytes, Absolute 05/06/2022 1.11  0.70 - 3.10 10*3/mm3 Final   • Monocytes, Absolute 05/06/2022 0.15  0.10 - 0.90 10*3/mm3 Final   •  Eosinophils, Absolute 05/06/2022 0.00  0.00 - 0.40 10*3/mm3 Final   • Basophils, Absolute 05/06/2022 0.01  0.00 - 0.20 10*3/mm3 Final   • Immature Grans, Absolute 05/06/2022 0.04  0.00 - 0.05 10*3/mm3 Final   • nRBC 05/06/2022 0.0  0.0 - 0.2 /100 WBC Final   • Glucose 05/06/2022 189 (A) 70 - 130 mg/dL Final    Meter: [id number] : [id number] Sukhjinder DUTTA       No results found.    Hospital Course:  71 y.o. female admitted to [facility] to services of Wilfredo Bailey MD with Cervical spondylosis with radiculopathy [M47.22] on 5/5/2022 and underwent Cervical 3- Cervical 4, Cervical 4- Cervical 5 anterior cervical discectomy and fusion with Zero profile cage/plates.   Per Wilfredo Bailey MD. Antibiotic and VTE prophylaxis were per SCIP protocols.  The patient was admitted to the floor where IV and/or oral pain medications were administered for postoperative pain.  At discharge the incisional pain was tolerable and preop neurologic function was intact.  The dressing was dry and the wound was clean.    Condition on Discharge:  Stable    Discharge Instructions:   1.  Patient to wear soft cervical collar at nighttime and hard cervical collar during the daytime and while riding in a car.    2.  May use ice to neck incision as needed.   3.  Patient also instructed on incentive spirometer during hospitalization and encouraged to continue to use at home regularly.   4.  Dressing is waterproof and should remain on and intact until seen in the office at 1st PO visit.  Patient has been instructed to contact the office if dressing becomes loose or saturated.   5.  Patient may shower on POD #3 if and when all wound drainage has stopped.    6.  Patient is to avoid forward bending and hyperextension and twisting side to side of neck.  Avoid pushing or pulling and no lifting greater than 5 pounds.     A detailed list of instructions specific to the operation was given to the patient at the time of  discharge.    Follow up Instructions:  Follow up in the office with Dr. Wilfredo Bailey Jr. in 2-3 weeks - patient to call the office at [phone] to schedule. Prescriptions were given for pain medication.    Follow-up Appointments  Future Appointments   Date Time Provider Department Center   5/20/2022  8:50 AM Wilfredo Bailey MD [facility] [address] [facility]     Additional Instructions for the Follow-ups that You Need to Schedule     Discharge Follow-up with Specialty: Orthopedics; 2 Weeks   As directed      Specialty: Orthopedics    Follow Up: 2 Weeks    Follow Up Details: Return to the office to see Dr. Wilfredo Bailey               Discharge Disposition Plan:today to home    Date: 5/6/2022    Wilfredo Bailey MD  05/06/22  10:28 EDT

## 2022-05-09 NOTE — CASE MANAGEMENT/SOCIAL WORK
Case Management Discharge Note      Final Note: Home.         Selected Continued Care - Discharged on 5/6/2022 Admission date: 5/5/2022 - Discharge disposition: Home or Self Care    Destination    No services have been selected for the patient.              Durable Medical Equipment    No services have been selected for the patient.              Dialysis/Infusion    No services have been selected for the patient.              Home Medical Care    No services have been selected for the patient.              Therapy    No services have been selected for the patient.              Community Resources    No services have been selected for the patient.              Community & DME    No services have been selected for the patient.                       Final Discharge Disposition Code: 01 - home or self-care

## 2022-05-20 ENCOUNTER — OFFICE VISIT (OUTPATIENT)
Dept: ORTHOPEDIC SURGERY | Facility: CLINIC | Age: 72
End: 2022-05-20

## 2022-05-20 VITALS — WEIGHT: 160 LBS | HEIGHT: 61 IN | BODY MASS INDEX: 30.21 KG/M2 | TEMPERATURE: 96.1 F

## 2022-05-20 DIAGNOSIS — R52 PAIN: Primary | ICD-10-CM

## 2022-05-20 DIAGNOSIS — M54.50 LUMBAR PAIN: ICD-10-CM

## 2022-05-20 PROCEDURE — 72020 X-RAY EXAM OF SPINE 1 VIEW: CPT | Performed by: ORTHOPAEDIC SURGERY

## 2022-05-20 PROCEDURE — 99024 POSTOP FOLLOW-UP VISIT: CPT | Performed by: ORTHOPAEDIC SURGERY

## 2022-06-17 ENCOUNTER — HOSPITAL ENCOUNTER (OUTPATIENT)
Dept: MAMMOGRAPHY | Facility: HOSPITAL | Age: 72
Discharge: HOME | End: 2022-06-17
Admitting: NURSE PRACTITIONER

## 2022-06-17 DIAGNOSIS — Z12.31 VISIT FOR SCREENING MAMMOGRAM: ICD-10-CM

## 2022-06-17 PROCEDURE — 77067 SCR MAMMO BI INCL CAD: CPT

## 2022-06-17 PROCEDURE — 77063 BREAST TOMOSYNTHESIS BI: CPT

## 2022-06-29 ENCOUNTER — APPOINTMENT (OUTPATIENT)
Dept: PAIN MEDICINE | Facility: HOSPITAL | Age: 72
End: 2022-06-29

## 2022-07-06 ENCOUNTER — OFFICE VISIT (OUTPATIENT)
Dept: ORTHOPEDIC SURGERY | Facility: CLINIC | Age: 72
End: 2022-07-06

## 2022-07-06 VITALS — TEMPERATURE: 97.8 F | BODY MASS INDEX: 30.96 KG/M2 | WEIGHT: 164 LBS | HEIGHT: 61 IN

## 2022-07-06 DIAGNOSIS — R52 PAIN: Primary | ICD-10-CM

## 2022-07-06 PROCEDURE — 99024 POSTOP FOLLOW-UP VISIT: CPT | Performed by: ORTHOPAEDIC SURGERY

## 2022-07-06 PROCEDURE — 72040 X-RAY EXAM NECK SPINE 2-3 VW: CPT | Performed by: ORTHOPAEDIC SURGERY

## 2022-07-06 RX ORDER — BACLOFEN 10 MG/1
10 TABLET ORAL
COMMUNITY
Start: 2022-05-31

## 2022-09-21 ENCOUNTER — OFFICE VISIT (OUTPATIENT)
Dept: ORTHOPEDIC SURGERY | Facility: CLINIC | Age: 72
End: 2022-09-21

## 2022-09-21 VITALS — WEIGHT: 158 LBS | TEMPERATURE: 97.3 F | RESPIRATION RATE: 16 BRPM | BODY MASS INDEX: 29.83 KG/M2 | HEIGHT: 61 IN

## 2022-09-21 DIAGNOSIS — R52 PAIN: ICD-10-CM

## 2022-09-21 DIAGNOSIS — M47.22 CERVICAL SPONDYLOSIS WITH RADICULOPATHY: Primary | ICD-10-CM

## 2022-09-21 PROCEDURE — 72020 X-RAY EXAM OF SPINE 1 VIEW: CPT | Performed by: NURSE PRACTITIONER

## 2022-09-21 PROCEDURE — 99213 OFFICE O/P EST LOW 20 MIN: CPT | Performed by: NURSE PRACTITIONER

## 2022-09-21 RX ORDER — GUAIFENESIN AND CODEINE PHOSPHATE 100; 10 MG/5ML; MG/5ML
5-10 SOLUTION ORAL
COMMUNITY
Start: 2022-09-14 | End: 2022-10-14

## 2022-09-21 NOTE — PROGRESS NOTES
Patient Name: Kristan Galicia   YOB: 1950  Referring Primary Care Physician: Chelly Red APRN      Chief Complaint:    Chief Complaint   Patient presents with   • Cervical Spine - Follow-up        HPI:  Kristan Galicia is a 72 y.o. female [facility].  She last had surgery in May of this year.  She has had at least 3 prior cervical surgeries.  She had a open reduction and internal fixation of the odontoid by Dr. Jackson.  Prior to that she had a C5-7 ACDF by Dr. Bailey.  Most recent surgery was C3-4 and C4-5 fusion.  She reports minimal residual neck pain and no residual arm symptoms.  She has no swallowing difficulty.  She is quite happy the results of surgery.  She has resumed her normal activities without difficulty.    PFSH:  See attached    ROS: As per HPI, otherwise negative    Objective:    Vitals:    09/21/22 0957   Resp: 16   Temp: 97.3 °F (36.3 °C)     Body mass index is 29.85 kg/m².      Physical Exam  Vitals reviewed.   Constitutional:       Appearance: She is well-developed and well-nourished.   Eyes:      General: No scleral icterus.  Skin:     General: Skin is intact.   Neurological:      Mental Status: She is alert.      Gait: Gait is intact.   Psychiatric:         Mood and Affect: Mood and affect normal.       Spine Musculoskeletal Exam    General        Constitutional: well-developed and well-nourished    Scleral icterus: no    Labored breathing: no    Psychiatric: normal mood and affect and no acute distress    Neurological: alert and oriented x3    Skin: intact    Gait      Gait is normal.    Range of Motion      Cervical Spine      Cervical flexion: 1-2 finger breadths    Cervical extension: reduced extension (0-25%)    Strength      Cervical Spine      Cervical spine motor exam is normal.    Sensory      Cervical Spine      Cervical spine sensation is normal.    Reflexes        Right Spine           Biceps: 1/4      Brachioradialis: 1/4      Triceps: 1/4      Left Spine          Biceps: 1/4        Brachioradialis: 1/4      Triceps: 1/4        IMAGING:     Indication: Follow-up cervical fusion,  Views: 2V AP&LAT cervical  Findings: Personally reviewed and reveals C5-7 anterior plate and fusion, C4-5 fusion with zero-P and C3-4 cage, also has cannulated screw from the odontoid through C2  Comparison views: No change from prior film 7/6/2022    Assessment:           Diagnoses and all orders for this visit:    1. Cervical spondylosis with radiculopathy (Primary)    2. Pain  -     Cancel: XR Spine Cervical 2 View  -     XR Spine Cervical 1 View             Plan:  She is doing extraordinarily well considering the surgeries that she has undergone.  She is happy with the result so far.  She has no motor dysfunction on exam and better than expected cervical range of motion.  She understands Dr. Bailey will be retiring at the end of the year but at this point we will see her only as needed in the future.  She will call with any future concerns or complaints and understands at [facility] neurosurgery will be taking over if needed from this point forward.    Return if symptoms worsen or fail to improve.

## 2023-01-25 ENCOUNTER — HOSPITAL ENCOUNTER (EMERGENCY)
Facility: HOSPITAL | Age: 73
Discharge: HOME | End: 2023-01-25
Attending: EMERGENCY MEDICINE | Admitting: EMERGENCY MEDICINE
Payer: MEDICARE

## 2023-01-25 VITALS
DIASTOLIC BLOOD PRESSURE: 60 MMHG | HEIGHT: 61 IN | HEART RATE: 72 BPM | SYSTOLIC BLOOD PRESSURE: 110 MMHG | TEMPERATURE: 98.9 F | OXYGEN SATURATION: 98 % | BODY MASS INDEX: 30.02 KG/M2 | RESPIRATION RATE: 16 BRPM | WEIGHT: 159 LBS

## 2023-01-25 DIAGNOSIS — T78.40XA ALLERGIC REACTION, INITIAL ENCOUNTER: Primary | ICD-10-CM

## 2023-01-25 DIAGNOSIS — L50.9 URTICARIA: ICD-10-CM

## 2023-01-25 PROCEDURE — 25010000002 METHYLPREDNISOLONE PER 125 MG: Performed by: PHYSICIAN ASSISTANT

## 2023-01-25 PROCEDURE — 96375 TX/PRO/DX INJ NEW DRUG ADDON: CPT

## 2023-01-25 PROCEDURE — 99283 EMERGENCY DEPT VISIT LOW MDM: CPT

## 2023-01-25 PROCEDURE — 96374 THER/PROPH/DIAG INJ IV PUSH: CPT

## 2023-01-25 RX ORDER — FAMOTIDINE 20 MG/1
20 TABLET, FILM COATED ORAL 2 TIMES DAILY
Qty: 14 TABLET | Refills: 0 | Status: SHIPPED | OUTPATIENT
Start: 2023-01-25

## 2023-01-25 RX ORDER — EPINEPHRINE 0.3 MG/.3ML
0.3 INJECTION SUBCUTANEOUS ONCE
Qty: 1 EACH | Refills: 0 | Status: SHIPPED | OUTPATIENT
Start: 2023-01-25 | End: 2023-01-25

## 2023-01-25 RX ORDER — FAMOTIDINE 10 MG/ML
20 INJECTION, SOLUTION INTRAVENOUS ONCE
Status: COMPLETED | OUTPATIENT
Start: 2023-01-25 | End: 2023-01-25

## 2023-01-25 RX ORDER — METHYLPREDNISOLONE 4 MG/1
TABLET ORAL
Qty: 21 TABLET | Refills: 0 | Status: SHIPPED | OUTPATIENT
Start: 2023-01-25

## 2023-01-25 RX ORDER — METHYLPREDNISOLONE SODIUM SUCCINATE 125 MG/2ML
60 INJECTION, POWDER, LYOPHILIZED, FOR SOLUTION INTRAMUSCULAR; INTRAVENOUS ONCE
Status: COMPLETED | OUTPATIENT
Start: 2023-01-25 | End: 2023-01-25

## 2023-01-25 RX ADMIN — FAMOTIDINE 20 MG: 10 INJECTION INTRAVENOUS at 14:49

## 2023-01-25 RX ADMIN — METHYLPREDNISOLONE SODIUM SUCCINATE 60 MG: 125 INJECTION, POWDER, FOR SOLUTION INTRAMUSCULAR; INTRAVENOUS at 14:46

## 2023-09-01 ENCOUNTER — OFFICE VISIT (OUTPATIENT)
Dept: ORTHOPEDIC SURGERY | Facility: CLINIC | Age: 73
End: 2023-09-01
Payer: MEDICARE

## 2023-09-01 VITALS — HEIGHT: 61 IN | BODY MASS INDEX: 30.76 KG/M2 | TEMPERATURE: 97.3 F | WEIGHT: 162.9 LBS

## 2023-09-01 DIAGNOSIS — M25.512 ACUTE PAIN OF LEFT SHOULDER: Primary | ICD-10-CM

## 2023-09-01 DIAGNOSIS — Z98.890 HISTORY OF REVERSE TOTAL REPLACEMENT OF LEFT SHOULDER JOINT: ICD-10-CM

## 2023-09-01 DIAGNOSIS — Z98.890 HISTORY OF OPEN REDUCTION AND INTERNAL FIXATION (ORIF) PROCEDURE: ICD-10-CM

## 2023-09-01 PROCEDURE — 99213 OFFICE O/P EST LOW 20 MIN: CPT | Performed by: NURSE PRACTITIONER

## 2023-09-03 NOTE — PROGRESS NOTES
"Chief Complaint:  Left shoulder pain    HPI:  Ms. Galicia comes in today for left shoulder evaluation.  She has a history of reverse total shoulder replacement in 2020 and ORIF left acromion in 2021 both with Dr. Anne.  Reports she was lifting an object about 1 week ago when she experienced a pop in the shoulder.  Reports she had immediate increased pain.  Describes her current pain as moderate, constant, and aching.  Pain is certainly worse with any movement of the arm.  She has tried ice, lidocaine patches, sling, activity modifications, and Norco without relief.  Prior to this incident, reports she the shoulder was doing very well in regards to both pain and motion.  She demonstrated her right arm motion, which is near full, as says her left was comparable.  Denies any other concerning symptoms.  She is right hand dominant.      Vitals:    09/01/23 1324   Temp: 97.3 øF (36.3 øC)   Weight: 73.9 kg (162 lb 14.4 oz)   Height: 154.9 cm (61\")     Exam:  Left shoulder is examined.  Surgical incisions are well healed and benign appearing.  No obvious deformity noted in regards to the contour of the shoulder.  Mild resolving ecchymosis.  No erythema.  Mild edema.  No effusion.  She has focal tenderness over the acromion.  No palpable defect or step-off.  Active and passive motion are both very uncomfortable and limited.  Compartments are soft.  Intact motor and sensory function in the elbow, wrist, and hand.  Good  strength.  Palpable radial pulse.  Brisk capillary refill.    Imaging:  AP and scapular Y views of the left  shoulder are reviewed for evaluation of her complaint and for comparison purposes.  They demonstrate a well positioned, well aligned replacement without complicating factors noted.  The plate over the top of the shoulder is broken as noted in previous x-rays.  In comparison with previous films there has been no change.  No acute findings noted.    Assessment:  1.  Acute left shoulder pain, " suspected acromion fracture  2.  History of left reverse total shoulder replacement  3.  History of left acromion ORIF    Plan:   I explained I am concerned she may have an occult fracture of the acromion.  I recommend getting a CT scan for further evaluation.  She verbalized understanding and agreed.  I will call her with the results and come up with a plan at that time.  Meanwhile, I recommended continued sling use for comfort, ice, rest, and activity modifications.      Orin Maldonado, CHRISTIN    09/01/2023

## 2023-09-22 ENCOUNTER — TELEPHONE (OUTPATIENT)
Dept: ORTHOPEDIC SURGERY | Facility: CLINIC | Age: 73
End: 2023-09-22
Payer: MEDICARE

## 2023-10-08 ENCOUNTER — HOSPITAL ENCOUNTER (OUTPATIENT)
Facility: HOSPITAL | Age: 73
Discharge: HOME | End: 2023-10-08
Admitting: NURSE PRACTITIONER
Payer: MEDICARE

## 2023-10-08 DIAGNOSIS — Z98.890 HISTORY OF REVERSE TOTAL REPLACEMENT OF LEFT SHOULDER JOINT: ICD-10-CM

## 2023-10-08 DIAGNOSIS — M25.512 ACUTE PAIN OF LEFT SHOULDER: ICD-10-CM

## 2023-10-08 DIAGNOSIS — Z98.890 HISTORY OF OPEN REDUCTION AND INTERNAL FIXATION (ORIF) PROCEDURE: ICD-10-CM

## 2023-10-08 PROCEDURE — 73200 CT UPPER EXTREMITY W/O DYE: CPT

## 2023-10-16 ENCOUNTER — TELEPHONE (OUTPATIENT)
Dept: ORTHOPEDIC SURGERY | Facility: CLINIC | Age: 73
End: 2023-10-16
Payer: MEDICARE

## 2023-10-16 DIAGNOSIS — Z98.890 HISTORY OF REVERSE TOTAL REPLACEMENT OF LEFT SHOULDER JOINT: ICD-10-CM

## 2023-10-16 DIAGNOSIS — Z98.890 HISTORY OF OPEN REDUCTION AND INTERNAL FIXATION (ORIF) PROCEDURE: Primary | ICD-10-CM

## 2023-10-16 DIAGNOSIS — G89.29 CHRONIC LEFT SHOULDER PAIN: ICD-10-CM

## 2023-10-16 DIAGNOSIS — M25.512 CHRONIC LEFT SHOULDER PAIN: ICD-10-CM

## 2023-10-16 NOTE — TELEPHONE ENCOUNTER
I spoke to MsLeonard Frida.  I provided her with the left shoulder CT scan.  I explained Dr. Anne and I have reviewed the study report together.  The acromion remains ununited and the plate is broken.  Dr. Anne recommends she be evaluated by a traumatologist.  She agreed.  I will enter the referral to Dr. Araya for her.  Also, she is interested in having her left wrist evaluated.  I will have a staff member call her to schedule this appointment.

## 2023-10-16 NOTE — TELEPHONE ENCOUNTER
----- Message from Abida Thomas sent at 10/16/2023  9:26 AM EDT -----  Regarding: CT SCAN  Contact: [phone]  Please review pt message.   CT completed at [facility] 10/8/23.       ----- Message -----  From: Kristan Galicia  Sent: 10/16/2023   7:56 AM EDT  To: [facility] Clinical Pool  Subject: CT SCAN                                          Can someone please let me know the result of the scan of my left shoulder?    Thank you

## 2023-10-24 ENCOUNTER — TELEPHONE (OUTPATIENT)
Dept: ORTHOPEDIC SURGERY | Facility: CLINIC | Age: 73
End: 2023-10-24
Payer: MEDICARE

## 2023-10-24 NOTE — TELEPHONE ENCOUNTER
----- Message from CHRISTIN Zapata sent at 10/23/2023  5:15 PM EDT -----  Please call patient to schedule left shoulder follow up with Dr. Anne.  Thanks

## 2023-10-27 ENCOUNTER — TELEPHONE (OUTPATIENT)
Dept: ORTHOPEDIC SURGERY | Facility: CLINIC | Age: 73
End: 2023-10-27

## 2023-10-27 NOTE — TELEPHONE ENCOUNTER
Called patient to see if she would like to reschedule her missed appointment from today. No answer. Left VM.

## 2023-11-01 ENCOUNTER — OFFICE VISIT (OUTPATIENT)
Dept: ORTHOPEDIC SURGERY | Facility: CLINIC | Age: 73
End: 2023-11-01
Payer: MEDICARE

## 2023-11-01 VITALS — BODY MASS INDEX: 31.08 KG/M2 | TEMPERATURE: 98.2 F | HEIGHT: 60 IN | WEIGHT: 158.3 LBS

## 2023-11-01 DIAGNOSIS — M25.512 CHRONIC LEFT SHOULDER PAIN: ICD-10-CM

## 2023-11-01 DIAGNOSIS — G89.29 CHRONIC LEFT SHOULDER PAIN: ICD-10-CM

## 2023-11-01 DIAGNOSIS — M89.8X1 PAIN OF LEFT CLAVICLE: Primary | ICD-10-CM

## 2023-11-01 PROCEDURE — 99213 OFFICE O/P EST LOW 20 MIN: CPT | Performed by: ORTHOPAEDIC SURGERY

## 2023-11-01 NOTE — PROGRESS NOTES
Chief complaint: Left shoulder pain    Ms. Galicia is seen today in follow-up for her left shoulder.  She previously saw Orin for this issue.  She is having pain over the top of the shoulder.  She recently had a CT.    Her surgical incisions at the left shoulder are healed.  She is tender over the acromion and the hardware.  She has a palpable defect at the base of the acromion.  Shoulder motion is functional but limited.    AP and orthogonal views left shoulder are ordered and reviewed today to evaluate her complaint.  These are compared to previous x-rays.  Hardware appears broken.  Acromion fracture is difficult to visualize on the limited views.  No change with respect to the implants.      CT left shoulder is reviewed.  I have independently interpreted the images and reviewed the report.  The acromion fracture has not healed.  Hardware is broken.    Assessment: Left acromion nonunion with broken hardware and history of failed attempted ORIF status post reverse total shoulder arthroplasty    Plan: She has a very difficult problem.  I had a long conversation with her and her family member who accompanied her.  I have spoken with Dr. Araya about this patient.  I had plan to refer her to him.  He indicated that the only thing he could offer her would be removal of hardware.  He did not think revision fixation would likely work.  It may be an option to try a grafting and revision fixation but I agree that the likelihood of success with that procedure is going to be low.  I have limited experience of converting patients back to hemiarthroplasty in this situation and that does seem to help with the pain however, it would probably diminish her function.  She is going to think about the options.  If she decides she wants to pursue the removal however, I told her to let me know.  Otherwise, she can follow-up as needed.    Juan Antonio Anne MD      Answers submitted by the patient for this visit:  Other (Submitted on  10/26/2023)  Please describe your symptoms.: Pain in shoulder, that I've had replaced.  Have you had these symptoms before?: Yes  How long have you been having these symptoms?: Greater than 2 weeks  Please describe any probable cause for these symptoms. : Hit the side of the door frame when I was walking through it.  Primary Reason for Visit (Submitted on 10/26/2023)  What is the primary reason for your visit?: Other

## 2023-11-08 ENCOUNTER — PREP FOR SURGERY (OUTPATIENT)
Dept: OTHER | Facility: HOSPITAL | Age: 73
End: 2023-11-08
Payer: MEDICARE

## 2023-11-08 DIAGNOSIS — M25.512 CHRONIC LEFT SHOULDER PAIN: Primary | ICD-10-CM

## 2023-11-08 DIAGNOSIS — G89.29 CHRONIC LEFT SHOULDER PAIN: Primary | ICD-10-CM

## 2023-11-08 RX ORDER — CEFAZOLIN SODIUM 2 G/100ML
2 INJECTION, SOLUTION INTRAVENOUS ONCE
OUTPATIENT
Start: 2023-11-08 | End: 2023-11-08

## 2023-12-14 ENCOUNTER — TELEPHONE (OUTPATIENT)
Dept: ORTHOPEDIC SURGERY | Facility: CLINIC | Age: 73
End: 2023-12-14
Payer: MEDICARE

## 2023-12-14 ENCOUNTER — PRE-ADMISSION TESTING (OUTPATIENT)
Dept: PREADMISSION TESTING | Facility: HOSPITAL | Age: 73
End: 2023-12-14
Payer: MEDICARE

## 2023-12-14 VITALS
OXYGEN SATURATION: 97 % | SYSTOLIC BLOOD PRESSURE: 121 MMHG | HEIGHT: 60 IN | TEMPERATURE: 97.9 F | BODY MASS INDEX: 31.16 KG/M2 | WEIGHT: 158.7 LBS | DIASTOLIC BLOOD PRESSURE: 71 MMHG | RESPIRATION RATE: 18 BRPM | HEART RATE: 98 BPM

## 2023-12-14 DIAGNOSIS — R73.9 ELEVATED BLOOD SUGAR LEVEL: ICD-10-CM

## 2023-12-14 DIAGNOSIS — R73.9 ELEVATED BLOOD SUGAR LEVEL: Primary | ICD-10-CM

## 2023-12-14 LAB
ANION GAP SERPL CALCULATED.3IONS-SCNC: 10 MMOL/L (ref 5–15)
BUN SERPL-MCNC: 17 MG/DL (ref 8–23)
BUN/CREAT SERPL: 20.7 (ref 7–25)
CALCIUM SPEC-SCNC: 9.4 MG/DL (ref 8.6–10.5)
CHLORIDE SERPL-SCNC: 100 MMOL/L (ref 98–107)
CO2 SERPL-SCNC: 26 MMOL/L (ref 22–29)
CREAT SERPL-MCNC: 0.82 MG/DL (ref 0.57–1)
DEPRECATED RDW RBC AUTO: 44.5 FL (ref 37–54)
EGFRCR SERPLBLD CKD-EPI 2021: 76.1 ML/MIN/1.73
ERYTHROCYTE [DISTWIDTH] IN BLOOD BY AUTOMATED COUNT: 13 % (ref 12.3–15.4)
GLUCOSE SERPL-MCNC: 389 MG/DL (ref 65–99)
HBA1C MFR BLD: 10.6 % (ref 4.8–5.6)
HCT VFR BLD AUTO: 36.6 % (ref 34–46.6)
HGB BLD-MCNC: 11.7 G/DL (ref 12–15.9)
MCH RBC QN AUTO: 29.9 PG (ref 26.6–33)
MCHC RBC AUTO-ENTMCNC: 32 G/DL (ref 31.5–35.7)
MCV RBC AUTO: 93.6 FL (ref 79–97)
PLATELET # BLD AUTO: 179 10*3/MM3 (ref 140–450)
PMV BLD AUTO: 9.6 FL (ref 6–12)
POTASSIUM SERPL-SCNC: 4.8 MMOL/L (ref 3.5–5.2)
QT INTERVAL: 400 MS
QTC INTERVAL: 453 MS
RBC # BLD AUTO: 3.91 10*6/MM3 (ref 3.77–5.28)
SODIUM SERPL-SCNC: 136 MMOL/L (ref 136–145)
WBC NRBC COR # BLD AUTO: 5.39 10*3/MM3 (ref 3.4–10.8)

## 2023-12-14 PROCEDURE — 93005 ELECTROCARDIOGRAM TRACING: CPT

## 2023-12-14 PROCEDURE — 80048 BASIC METABOLIC PNL TOTAL CA: CPT

## 2023-12-14 PROCEDURE — 83036 HEMOGLOBIN GLYCOSYLATED A1C: CPT

## 2023-12-14 PROCEDURE — 36415 COLL VENOUS BLD VENIPUNCTURE: CPT

## 2023-12-14 PROCEDURE — 85027 COMPLETE CBC AUTOMATED: CPT

## 2023-12-14 RX ORDER — LORATADINE 10 MG/1
1 CAPSULE, LIQUID FILLED ORAL DAILY
COMMUNITY

## 2023-12-14 NOTE — DISCHARGE INSTRUCTIONS
ARRIVE DAY OF SURGERY AS NOTIFIED THE DAY BEFORE        Take the following medications the morning of surgery: CARVEDILOL & LEVOTHYROXINE      If you are on prescription narcotic pain medication to control your pain you may also take that medication the morning of surgery.    General Instructions:  Do not eat solid food after midnight the night before surgery.  You may drink clear liquids day of surgery but must stop at least one hour before your hospital arrival time.  It is beneficial for you to have a clear drink that contains carbohydrates the day of surgery.  We suggest a 12 to 20 ounce bottle of Gatorade or Powerade for non-diabetic patients or a 12 to 20 ounce bottle of G2 or Powerade Zero for diabetic patients. (Pediatric patients, are not advised to drink a 12 to 20 ounce carbohydrate drink)    Clear liquids are liquids you can see through.  Nothing red in color.     Plain water                               Sports drinks  Sodas                                   Gelatin (Jell-O)  Fruit juices without pulp such as white grape juice and apple juice  Popsicles that contain no fruit or yogurt  Tea or coffee (no cream or milk added)  Gatorade / Powerade  G2 / Powerade Zero    Infants may have breast milk up to four hours before surgery.  Infants drinking formula may drink formula up to six hours before surgery.   Patients who avoid smoking, chewing tobacco and alcohol for 4 weeks prior to surgery have a reduced risk of post-operative complications.  Quit smoking as many days before surgery as you can.  Do not smoke, use chewing tobacco or drink alcohol the day of surgery.   If applicable bring your C-PAP/ BI-PAP machine in with you to preop day of surgery.  Bring any papers given to you in the doctor’s office.  Wear clean comfortable clothes.  Do not wear contact lenses, false eyelashes or make-up.  Bring a case for your glasses.   Bring crutches or walker if applicable.  Remove all piercings.  Leave jewelry and  any other valuables at home.  Hair extensions with metal clips must be removed prior to surgery.  The Pre-Admission Testing nurse will instruct you to bring medications if unable to obtain an accurate list in Pre-Admission Testing.      Preventing a Surgical Site Infection:  For 2 to 3 days before surgery, avoid shaving with a razor because the razor can irritate skin and make it easier to develop an infection.    Any areas of open skin can increase the risk of a post-operative wound infection by allowing bacteria to enter and travel throughout the body.  Notify your surgeon if you have any skin wounds / rashes even if it is not near the expected surgical site.  The area will need assessed to determine if surgery should be delayed until it is healed.  The night prior to surgery shower using a fresh bar of anti-bacterial soap (such as Dial) and clean washcloth.  Sleep in a clean bed with clean clothing.  Do not allow pets to sleep with you.  Shower on the morning of surgery using a fresh bar of anti-bacterial soap (such as Dial) and clean washcloth.  Dry with a clean towel and dress in clean clothing.  Ask your surgeon if you will be receiving antibiotics prior to surgery.  Make sure you, your family, and all healthcare providers clean their hands with soap and water or an alcohol based hand  before caring for you or your wound.    Day of surgery:  Your arrival time is approximately two hours before your scheduled surgery time.  Upon arrival, a Pre-op nurse and Anesthesiologist will review your health history, obtain vital signs, and answer questions you may have.  The only belongings needed at this time will be a list of your home medications and if applicable your C-PAP/BI-PAP machine.  A Pre-op nurse will start an IV and you may receive medication in preparation for surgery, including something to help you relax.     Please be aware that surgery does come with discomfort.  We want to make every effort to  control your discomfort so please discuss any uncontrolled symptoms with your nurse.   Your doctor will most likely have prescribed pain medications.      If you are going home after surgery you will receive individualized written care instructions before being discharged.  A responsible adult must drive you to and from the hospital on the day of your surgery and stay with you for 24 hours.  Discharge prescriptions can be filled by the hospital pharmacy during regular pharmacy hours.  If you are having surgery late in the day/evening your prescription may be e-prescribed to your pharmacy.  Please verify your pharmacy hours or chose a 24 hour pharmacy to avoid not having access to your prescription because your pharmacy has closed for the day.    If you are staying overnight following surgery, you will be transported to your hospital room following the recovery period.  [facility] has all private rooms.    If you have any questions please call Pre-Admission Testing at [phone].  Deductibles and co-payments are collected on the day of service. Please be prepared to pay the required co-pay, deductible or deposit on the day of service as defined by your plan.    Call your surgeon immediately if you experience any of the following symptoms:  Sore Throat  Shortness of Breath or difficulty breathing  Cough  Chills  Body soreness or muscle pain  Headache  Fever  New loss of taste or smell  Do not arrive for your surgery ill.  Your procedure will need to be rescheduled to another time.  You will need to call your physician before the day of surgery to avoid any unnecessary exposure to hospital staff as well as other patients.

## 2023-12-14 NOTE — TELEPHONE ENCOUNTER
Received a call from [facility] preadmission testing patient's blood sugar today and during her PAT visit was 389 however was not a fasting specimen.  Patient does have a history of diabetes her last hemoglobin A1c that I can find is from May 2022 and it was 7.6.  Will go ahead and recheck her hemoglobin A1c prior to her hardware removal surgery

## 2023-12-28 ENCOUNTER — ANESTHESIA EVENT (OUTPATIENT)
Dept: PERIOP | Facility: HOSPITAL | Age: 73
End: 2023-12-28
Payer: MEDICARE

## 2023-12-28 ENCOUNTER — ANESTHESIA (OUTPATIENT)
Dept: PERIOP | Facility: HOSPITAL | Age: 73
End: 2023-12-28
Payer: MEDICARE

## 2023-12-28 ENCOUNTER — HOSPITAL ENCOUNTER (OUTPATIENT)
Facility: HOSPITAL | Age: 73
Setting detail: HOSPITAL OUTPATIENT SURGERY
Discharge: HOME | End: 2023-12-28
Attending: ORTHOPAEDIC SURGERY | Admitting: ORTHOPAEDIC SURGERY
Payer: MEDICARE

## 2023-12-28 VITALS
SYSTOLIC BLOOD PRESSURE: 110 MMHG | HEART RATE: 66 BPM | DIASTOLIC BLOOD PRESSURE: 61 MMHG | TEMPERATURE: 97.8 F | OXYGEN SATURATION: 95 % | RESPIRATION RATE: 16 BRPM

## 2023-12-28 DIAGNOSIS — M25.512 CHRONIC LEFT SHOULDER PAIN: ICD-10-CM

## 2023-12-28 DIAGNOSIS — G89.29 CHRONIC LEFT SHOULDER PAIN: ICD-10-CM

## 2023-12-28 LAB
GLUCOSE BLDC GLUCOMTR-MCNC: 234 MG/DL (ref 70–130)
GLUCOSE BLDC GLUCOMTR-MCNC: 347 MG/DL (ref 70–130)

## 2023-12-28 PROCEDURE — 25010000002 PROPOFOL 200 MG/20ML EMULSION: Performed by: NURSE ANESTHETIST, CERTIFIED REGISTERED

## 2023-12-28 PROCEDURE — 25010000002 DEXAMETHASONE PER 1 MG: Performed by: STUDENT IN AN ORGANIZED HEALTH CARE EDUCATION/TRAINING PROGRAM

## 2023-12-28 PROCEDURE — 25010000002 ONDANSETRON PER 1 MG: Performed by: NURSE ANESTHETIST, CERTIFIED REGISTERED

## 2023-12-28 PROCEDURE — 25010000002 CEFAZOLIN IN DEXTROSE 2-4 GM/100ML-% SOLUTION: Performed by: ORTHOPAEDIC SURGERY

## 2023-12-28 PROCEDURE — 25010000002 FENTANYL CITRATE (PF) 50 MCG/ML SOLUTION: Performed by: NURSE ANESTHETIST, CERTIFIED REGISTERED

## 2023-12-28 PROCEDURE — 25010000002 BUPIVACAINE (PF) 0.5 % SOLUTION: Performed by: STUDENT IN AN ORGANIZED HEALTH CARE EDUCATION/TRAINING PROGRAM

## 2023-12-28 PROCEDURE — 25810000003 LACTATED RINGERS PER 1000 ML: Performed by: STUDENT IN AN ORGANIZED HEALTH CARE EDUCATION/TRAINING PROGRAM

## 2023-12-28 PROCEDURE — 25010000002 MIDAZOLAM PER 1 MG: Performed by: STUDENT IN AN ORGANIZED HEALTH CARE EDUCATION/TRAINING PROGRAM

## 2023-12-28 PROCEDURE — 82948 REAGENT STRIP/BLOOD GLUCOSE: CPT

## 2023-12-28 PROCEDURE — 25010000002 DEXAMETHASONE SODIUM PHOSPHATE 20 MG/5ML SOLUTION: Performed by: NURSE ANESTHETIST, CERTIFIED REGISTERED

## 2023-12-28 RX ORDER — DOCUSATE SODIUM 100 MG/1
100 CAPSULE, LIQUID FILLED ORAL 2 TIMES DAILY
Qty: 60 CAPSULE | Refills: 0 | Status: SHIPPED | OUTPATIENT
Start: 2023-12-28

## 2023-12-28 RX ORDER — HYDRALAZINE HYDROCHLORIDE 20 MG/ML
5 INJECTION INTRAMUSCULAR; INTRAVENOUS
Status: DISCONTINUED | OUTPATIENT
Start: 2023-12-28 | End: 2023-12-28 | Stop reason: HOSPADM

## 2023-12-28 RX ORDER — LIDOCAINE HYDROCHLORIDE 20 MG/ML
INJECTION, SOLUTION EPIDURAL; INFILTRATION; INTRACAUDAL; PERINEURAL AS NEEDED
Status: DISCONTINUED | OUTPATIENT
Start: 2023-12-28 | End: 2023-12-28 | Stop reason: SURG

## 2023-12-28 RX ORDER — BUPIVACAINE HYDROCHLORIDE AND EPINEPHRINE 2.5; 5 MG/ML; UG/ML
INJECTION, SOLUTION EPIDURAL; INFILTRATION; INTRACAUDAL; PERINEURAL
Status: COMPLETED | OUTPATIENT
Start: 2023-12-28 | End: 2023-12-28

## 2023-12-28 RX ORDER — HYDROCODONE BITARTRATE AND ACETAMINOPHEN 10; 325 MG/1; MG/1
1 TABLET ORAL EVERY 4 HOURS PRN
Qty: 42 TABLET | Refills: 0 | Status: SHIPPED | OUTPATIENT
Start: 2023-12-28

## 2023-12-28 RX ORDER — LIDOCAINE HYDROCHLORIDE 10 MG/ML
0.5 INJECTION, SOLUTION INFILTRATION; PERINEURAL ONCE AS NEEDED
Status: DISCONTINUED | OUTPATIENT
Start: 2023-12-28 | End: 2023-12-28 | Stop reason: HOSPADM

## 2023-12-28 RX ORDER — MIDAZOLAM HYDROCHLORIDE 1 MG/ML
0.5 INJECTION INTRAMUSCULAR; INTRAVENOUS
Status: DISCONTINUED | OUTPATIENT
Start: 2023-12-28 | End: 2023-12-28 | Stop reason: HOSPADM

## 2023-12-28 RX ORDER — IPRATROPIUM BROMIDE AND ALBUTEROL SULFATE 2.5; .5 MG/3ML; MG/3ML
3 SOLUTION RESPIRATORY (INHALATION) ONCE AS NEEDED
Status: DISCONTINUED | OUTPATIENT
Start: 2023-12-28 | End: 2023-12-28 | Stop reason: HOSPADM

## 2023-12-28 RX ORDER — SODIUM CHLORIDE 0.9 % (FLUSH) 0.9 %
3-10 SYRINGE (ML) INJECTION AS NEEDED
Status: DISCONTINUED | OUTPATIENT
Start: 2023-12-28 | End: 2023-12-28 | Stop reason: HOSPADM

## 2023-12-28 RX ORDER — ACETAMINOPHEN 650 MG
TABLET, EXTENDED RELEASE ORAL AS NEEDED
Status: DISCONTINUED | OUTPATIENT
Start: 2023-12-28 | End: 2023-12-28 | Stop reason: HOSPADM

## 2023-12-28 RX ORDER — LABETALOL HYDROCHLORIDE 5 MG/ML
5 INJECTION, SOLUTION INTRAVENOUS
Status: DISCONTINUED | OUTPATIENT
Start: 2023-12-28 | End: 2023-12-28 | Stop reason: HOSPADM

## 2023-12-28 RX ORDER — PROPOFOL 10 MG/ML
INJECTION, EMULSION INTRAVENOUS AS NEEDED
Status: DISCONTINUED | OUTPATIENT
Start: 2023-12-28 | End: 2023-12-28 | Stop reason: SURG

## 2023-12-28 RX ORDER — ONDANSETRON 2 MG/ML
INJECTION INTRAMUSCULAR; INTRAVENOUS AS NEEDED
Status: DISCONTINUED | OUTPATIENT
Start: 2023-12-28 | End: 2023-12-28 | Stop reason: SURG

## 2023-12-28 RX ORDER — ONDANSETRON 4 MG/1
4 TABLET, FILM COATED ORAL EVERY 8 HOURS PRN
Qty: 30 TABLET | Refills: 0 | Status: SHIPPED | OUTPATIENT
Start: 2023-12-28

## 2023-12-28 RX ORDER — BUPIVACAINE HYDROCHLORIDE 5 MG/ML
INJECTION, SOLUTION EPIDURAL; INTRACAUDAL
Status: COMPLETED | OUTPATIENT
Start: 2023-12-28 | End: 2023-12-28

## 2023-12-28 RX ORDER — EPHEDRINE SULFATE 50 MG/ML
INJECTION INTRAVENOUS AS NEEDED
Status: DISCONTINUED | OUTPATIENT
Start: 2023-12-28 | End: 2023-12-28 | Stop reason: SURG

## 2023-12-28 RX ORDER — FENTANYL CITRATE 50 UG/ML
25 INJECTION, SOLUTION INTRAMUSCULAR; INTRAVENOUS
Status: DISCONTINUED | OUTPATIENT
Start: 2023-12-28 | End: 2023-12-28 | Stop reason: HOSPADM

## 2023-12-28 RX ORDER — FLUMAZENIL 0.1 MG/ML
0.2 INJECTION INTRAVENOUS AS NEEDED
Status: DISCONTINUED | OUTPATIENT
Start: 2023-12-28 | End: 2023-12-28 | Stop reason: HOSPADM

## 2023-12-28 RX ORDER — PROMETHAZINE HYDROCHLORIDE 25 MG/1
25 TABLET ORAL ONCE AS NEEDED
Status: DISCONTINUED | OUTPATIENT
Start: 2023-12-28 | End: 2023-12-28 | Stop reason: HOSPADM

## 2023-12-28 RX ORDER — HYDROCODONE BITARTRATE AND ACETAMINOPHEN 7.5; 325 MG/1; MG/1
1 TABLET ORAL EVERY 4 HOURS PRN
Status: DISCONTINUED | OUTPATIENT
Start: 2023-12-28 | End: 2023-12-28 | Stop reason: HOSPADM

## 2023-12-28 RX ORDER — SODIUM CHLORIDE 0.9 % (FLUSH) 0.9 %
3 SYRINGE (ML) INJECTION EVERY 12 HOURS SCHEDULED
Status: DISCONTINUED | OUTPATIENT
Start: 2023-12-28 | End: 2023-12-28 | Stop reason: HOSPADM

## 2023-12-28 RX ORDER — DEXAMETHASONE SODIUM PHOSPHATE 4 MG/ML
INJECTION, SOLUTION INTRA-ARTICULAR; INTRALESIONAL; INTRAMUSCULAR; INTRAVENOUS; SOFT TISSUE
Status: COMPLETED | OUTPATIENT
Start: 2023-12-28 | End: 2023-12-28

## 2023-12-28 RX ORDER — PHENYLEPHRINE HCL IN 0.9% NACL 1 MG/10 ML
SYRINGE (ML) INTRAVENOUS AS NEEDED
Status: DISCONTINUED | OUTPATIENT
Start: 2023-12-28 | End: 2023-12-28 | Stop reason: SURG

## 2023-12-28 RX ORDER — PROMETHAZINE HYDROCHLORIDE 25 MG/1
25 SUPPOSITORY RECTAL ONCE AS NEEDED
Status: DISCONTINUED | OUTPATIENT
Start: 2023-12-28 | End: 2023-12-28 | Stop reason: HOSPADM

## 2023-12-28 RX ORDER — ONDANSETRON 2 MG/ML
4 INJECTION INTRAMUSCULAR; INTRAVENOUS ONCE AS NEEDED
Status: DISCONTINUED | OUTPATIENT
Start: 2023-12-28 | End: 2023-12-28 | Stop reason: HOSPADM

## 2023-12-28 RX ORDER — DEXAMETHASONE SODIUM PHOSPHATE 4 MG/ML
INJECTION, SOLUTION INTRA-ARTICULAR; INTRALESIONAL; INTRAMUSCULAR; INTRAVENOUS; SOFT TISSUE AS NEEDED
Status: DISCONTINUED | OUTPATIENT
Start: 2023-12-28 | End: 2023-12-28 | Stop reason: SURG

## 2023-12-28 RX ORDER — DROPERIDOL 2.5 MG/ML
0.62 INJECTION, SOLUTION INTRAMUSCULAR; INTRAVENOUS
Status: DISCONTINUED | OUTPATIENT
Start: 2023-12-28 | End: 2023-12-28 | Stop reason: HOSPADM

## 2023-12-28 RX ORDER — NALOXONE HCL 0.4 MG/ML
0.2 VIAL (ML) INJECTION AS NEEDED
Status: DISCONTINUED | OUTPATIENT
Start: 2023-12-28 | End: 2023-12-28 | Stop reason: HOSPADM

## 2023-12-28 RX ORDER — CEFAZOLIN SODIUM 2 G/100ML
2 INJECTION, SOLUTION INTRAVENOUS ONCE
Status: COMPLETED | OUTPATIENT
Start: 2023-12-28 | End: 2023-12-28

## 2023-12-28 RX ORDER — DIPHENHYDRAMINE HYDROCHLORIDE 50 MG/ML
12.5 INJECTION INTRAMUSCULAR; INTRAVENOUS
Status: DISCONTINUED | OUTPATIENT
Start: 2023-12-28 | End: 2023-12-28 | Stop reason: HOSPADM

## 2023-12-28 RX ORDER — FENTANYL CITRATE 50 UG/ML
INJECTION, SOLUTION INTRAMUSCULAR; INTRAVENOUS AS NEEDED
Status: DISCONTINUED | OUTPATIENT
Start: 2023-12-28 | End: 2023-12-28 | Stop reason: SURG

## 2023-12-28 RX ORDER — SODIUM CHLORIDE, SODIUM LACTATE, POTASSIUM CHLORIDE, CALCIUM CHLORIDE 600; 310; 30; 20 MG/100ML; MG/100ML; MG/100ML; MG/100ML
9 INJECTION, SOLUTION INTRAVENOUS CONTINUOUS
Status: DISCONTINUED | OUTPATIENT
Start: 2023-12-28 | End: 2023-12-28 | Stop reason: HOSPADM

## 2023-12-28 RX ORDER — EPHEDRINE SULFATE 50 MG/ML
5 INJECTION, SOLUTION INTRAVENOUS ONCE AS NEEDED
Status: DISCONTINUED | OUTPATIENT
Start: 2023-12-28 | End: 2023-12-28 | Stop reason: HOSPADM

## 2023-12-28 RX ORDER — HYDROCODONE BITARTRATE AND ACETAMINOPHEN 5; 325 MG/1; MG/1
1 TABLET ORAL ONCE AS NEEDED
Status: DISCONTINUED | OUTPATIENT
Start: 2023-12-28 | End: 2023-12-28 | Stop reason: HOSPADM

## 2023-12-28 RX ORDER — HYDROMORPHONE HYDROCHLORIDE 1 MG/ML
0.25 INJECTION, SOLUTION INTRAMUSCULAR; INTRAVENOUS; SUBCUTANEOUS
Status: DISCONTINUED | OUTPATIENT
Start: 2023-12-28 | End: 2023-12-28 | Stop reason: HOSPADM

## 2023-12-28 RX ADMIN — PROPOFOL 120 MG: 10 INJECTION, EMULSION INTRAVENOUS at 13:59

## 2023-12-28 RX ADMIN — EPHEDRINE SULFATE 10 MG: 50 INJECTION INTRAVENOUS at 14:20

## 2023-12-28 RX ADMIN — Medication 100 MCG: at 14:43

## 2023-12-28 RX ADMIN — DEXAMETHASONE SODIUM PHOSPHATE 4 MG: 4 INJECTION, SOLUTION INTRA-ARTICULAR; INTRALESIONAL; INTRAMUSCULAR; INTRAVENOUS; SOFT TISSUE at 13:30

## 2023-12-28 RX ADMIN — ONDANSETRON 4 MG: 2 INJECTION INTRAMUSCULAR; INTRAVENOUS at 15:11

## 2023-12-28 RX ADMIN — SODIUM CHLORIDE, POTASSIUM CHLORIDE, SODIUM LACTATE AND CALCIUM CHLORIDE 9 ML/HR: 600; 310; 30; 20 INJECTION, SOLUTION INTRAVENOUS at 13:36

## 2023-12-28 RX ADMIN — CEFAZOLIN SODIUM 2 G: 2 INJECTION, SOLUTION INTRAVENOUS at 13:41

## 2023-12-28 RX ADMIN — MIDAZOLAM HYDROCHLORIDE 1 MG: 1 INJECTION, SOLUTION INTRAMUSCULAR; INTRAVENOUS at 13:30

## 2023-12-28 RX ADMIN — BUPIVACAINE HYDROCHLORIDE 15 ML: 5 INJECTION, SOLUTION EPIDURAL; INTRACAUDAL; PERINEURAL at 13:30

## 2023-12-28 RX ADMIN — FENTANYL CITRATE 25 MCG: 50 INJECTION, SOLUTION INTRAMUSCULAR; INTRAVENOUS at 14:15

## 2023-12-28 RX ADMIN — DEXAMETHASONE SODIUM PHOSPHATE 4 MG: 4 INJECTION, SOLUTION INTRAMUSCULAR; INTRAVENOUS at 14:15

## 2023-12-28 RX ADMIN — HYDROCODONE BITARTRATE AND ACETAMINOPHEN 1 TABLET: 7.5; 325 TABLET ORAL at 16:31

## 2023-12-28 RX ADMIN — BUPIVACAINE HYDROCHLORIDE AND EPINEPHRINE BITARTRATE 5 ML: 2.5; .005 INJECTION, SOLUTION EPIDURAL; INFILTRATION; INTRACAUDAL; PERINEURAL at 13:30

## 2023-12-28 RX ADMIN — Medication 100 MCG: at 14:31

## 2023-12-28 RX ADMIN — FENTANYL CITRATE 25 MCG: 50 INJECTION, SOLUTION INTRAMUSCULAR; INTRAVENOUS at 15:11

## 2023-12-28 RX ADMIN — LIDOCAINE HYDROCHLORIDE 100 MG: 20 INJECTION, SOLUTION EPIDURAL; INFILTRATION; INTRACAUDAL; PERINEURAL at 13:59

## 2023-12-28 RX ADMIN — EPHEDRINE SULFATE 20 MG: 50 INJECTION INTRAVENOUS at 14:27

## 2023-12-28 RX ADMIN — EPHEDRINE SULFATE 10 MG: 50 INJECTION INTRAVENOUS at 14:31

## 2023-12-28 NOTE — ANESTHESIA PROCEDURE NOTES
Airway  Urgency: elective    Date/Time: 12/28/2023 2:01 PM    General Information and Staff    Patient location during procedure: OR  Anesthesiologist: Rex Hernandez MD  CRNA/CAA: Natasha Murcia CRNA    Indications and Patient Condition    Preoxygenated: yes  Mask difficulty assessment: 0 - not attempted    Final Airway Details  Final airway type: supraglottic airway      Successful airway: unique  Size 4     Number of attempts at approach: 1  Assessment: lips, teeth, and gum same as pre-op and atraumatic intubation    Additional Comments  Atraumatic, adeq seal, secured, MV/AV until SV

## 2023-12-28 NOTE — BRIEF OP NOTE
SHOULDER HARDWARE REMOVAL  Progress Note    Kristan Galicia  12/28/2023    Pre-op Diagnosis:   Chronic left shoulder pain [M25.512, G89.29]       Post-Op Diagnosis Codes:     * Chronic left shoulder pain [M25.512, G89.29]    Procedure/CPT® Codes:        Procedure(s):  SHOULDER HARDWARE REMOVAL              Surgeon(s):  Juan Antonio Anne MD    Anesthesia: General    Staff:   Circulator: Jc Ramos RN  Scrub Person: Elvira Bradford  Vendor Representative: Gaston Richmond  Assistant: Anabela Anderson CSA  Assistant: Anabela Anderson CSA      Estimated Blood Loss: minimal    Urine Voided: * No values recorded between 12/28/2023  1:49 PM and 12/28/2023  2:59 PM *    Specimens:                None          Drains: * No LDAs found *    Findings: see dictation        Complications: none    Assistant: Anabela Anderson CSA  was responsible for performing the following activities: Retraction and their skilled assistance was necessary for the success of this case.    Juan Antonio Anne MD     Date: 12/28/2023  Time: 14:59 EST

## 2023-12-28 NOTE — NURSING NOTE
Spoke with [employer] representative via telephone. Per representative, as long as the magnet is used in surgery the pacemaker/ICD does not need to be checked before or after surgery.

## 2023-12-28 NOTE — ANESTHESIA POSTPROCEDURE EVALUATION
Patient: Kristan Galicia    Procedure Summary       Date: 12/28/23 Room / Location: [facility] OR [address] [place] [address] [facility]    Anesthesia Start: 1349 Anesthesia Stop: 1523    Procedure: SHOULDER HARDWARE REMOVAL (Left: Shoulder) Diagnosis:       Chronic left shoulder pain      (Chronic left shoulder pain [M25.512, G89.29])    Surgeons: Juan Antonio Anne MD Provider:     Anesthesia Type: general ASA Status: 3            Anesthesia Type: general    Vitals  Vitals Value Taken Time   /50 12/28/23 1630   Temp     Pulse 67 12/28/23 1637   Resp     SpO2 96 % 12/28/23 1637   Vitals shown include unfiled device data.        Post Anesthesia Care and Evaluation    Level of consciousness: awake and alert  Pain management: adequate    Airway patency: patent  Anesthetic complications: No anesthetic complications  PONV Status: controlled  Cardiovascular status: blood pressure returned to baseline and acceptable  Respiratory status: acceptable  Hydration status: acceptable

## 2023-12-28 NOTE — NURSING NOTE
Dr. Rubi and Dr. Reynolds notified of patients A1C of 10.6 and elevated blood sugar of 347. Patient states she drank a body armour electrolyte drink this morning at 1030 and it was not sugar free. Patient states she was on ozempic multiple months ago, but does not take any medication for diabetes

## 2023-12-28 NOTE — ANESTHESIA PREPROCEDURE EVALUATION
Anesthesia Evaluation     Patient summary reviewed and Nursing notes reviewed   no history of anesthetic complications:   NPO Solid Status: > 8 hours  NPO Liquid Status: > 2 hours           Airway   Mallampati: II  TM distance: >3 FB  Neck ROM: full  Dental      Pulmonary    (+) asthma,sleep apnea  Cardiovascular     (+) pacemaker ICD, hypertension, dysrhythmias Paroxysmal Atrial Fib, CHF Diastolic >=55%    ROS comment: Echo 5 years ago showed normal EF but hypertrophic non-obstructive cardiomyopathy. S/p septal ablation    Neuro/Psych  GI/Hepatic/Renal/Endo    (+) GERD, diabetes mellitus    Musculoskeletal     Abdominal   (+) obese   Substance History      OB/GYN          Other                      Anesthesia Plan    ASA 3     general with block     intravenous induction     Anesthetic plan, risks, benefits, and alternatives have been provided, discussed and informed consent has been obtained with: patient.      CODE STATUS:

## 2023-12-28 NOTE — OP NOTE
Orthopaedic Operative Note    Facility: [facility]    Patient: Kristan Galicia    Medical Record Number: [id number]    YOB: 1950    Dictating Surgeon: Juan Antonio Anne M.D.*    Primary Care Physician: Chelly Red APRN    Date of Operation: 12/28/2023    Pre-Operative Diagnosis: Painful retained hardware, left shoulder    Post-Operative Diagnosis: Painful retained hardware, left shoulder    Procedure Performed: Open removal of hardware from left shoulder, deep    Surgeon: Juan Antonio Anne MD     Assistant: Anabela Anderson whose assistance was critical for help with patient positioning, suctioning and irrigation, retraction, manipulation of the extremity for insertion of the implants, wound closure and application of the bandages.  Her assistance was critical to the success of this case.      Anesthesia: Regional followed by general    Complications: None.     Estimated Blood Loss: Less than 50 mL.     Implants: None    Specimens: * No orders in the log *    Brief Operative Indication: Ms. Galicia had painful hardware retained in her left shoulder.  She had an attempted fixation of the acromion nonunion which failed.  The risk, benefits and alternatives to removal of hardware were discussed with her in detail.  She acknowledged understanding and consented.  We did specifically discussed the limited goals of the procedure and the fact that this would not address the nonunion.    Description of the procedure in detail: The patient and operative site were identified in the preoperative holding area.  The surgical site was marked.  Adequate regional anesthesia of the left upper extremity was administered.  Ms. Galicia was taken to the operating room and placed in the supine position.  Adequate general anesthesia was then administered.    She was repositioned on the operating table in the modified beachchair position.  Her head and neck were placed in neutral alignment.  All bony  prominences were carefully padded and protected.  A timeout was taken and preoperative antibiotics administered.    Her left upper extremity was prepped and draped in the standard, sterile fashion.  I cleaned the extremity with alcohol.  A Hibiclens scrub was then performed followed by prepping with 2 ChloraPrep's.  We allowed those to dry for 3 minutes before the drape procedure was carried out.    I began the procedure by fashioning an approximately 6 cm incision over the top of her shoulder through her previous scar.  Full-thickness skin flaps were developed.  The hardware was exposed and then removed without difficulty.  There was no purulence, necrosis or overt evidence for infection.    I irrigated the wound with 500 cc of sterile saline.  I made sure we had good hemostasis.  The wound was then closed in a layered fashion using Vicryl for the deep tissues and staples for the skin.  A sterile bandage was applied.  The drapes were withdrawn.  Her arm was placed in a sling.  She was awakened and taken the recovery room in good condition.    Juan Antonio Anne MD  12/28/23

## 2023-12-28 NOTE — ANESTHESIA PROCEDURE NOTES
Peripheral Block    Pre-sedation assessment completed: 12/28/2023 1:25 PM    Patient reassessed immediately prior to procedure    Patient location during procedure: pre-op  Start time: 12/28/2023 1:30 PM  Stop time: 12/28/2023 1:30 PM  Reason for block: at surgeon's request and post-op pain management  Performed by  Anesthesiologist: Dawson Reynolds MD  Preanesthetic Checklist  Completed: patient identified, IV checked, site marked, risks and benefits discussed, surgical consent, monitors and equipment checked, pre-op evaluation and timeout performed  Prep:  Pt Position: supine  Sterile barriers:cap, gloves and mask  Prep: ChloraPrep  Patient monitoring: blood pressure monitoring, continuous pulse oximetry and EKG  Procedure    Sedation: yes  Performed under: local infiltration  Guidance:ultrasound guided    ULTRASOUND INTERPRETATION.  Using ultrasound guidance a 21 G gauge needle was placed in close proximity to the nerve, at which point, under ultrasound guidance anesthetic was injected in the area of the nerve and spread of the anesthesia was seen on ultrasound in close proximity thereto.  There were no abnormalities seen on ultrasound; a digital image was taken; and the patient tolerated the procedure with no complications. Images:still images obtained, printed/placed on chart    Laterality:left  Block Type:interscalene  Injection Technique:single-shot  Needle Type:echogenic and Tuohy  Needle Gauge:21 G  Resistance on Injection: none    Medications Used: dexamethasone (DECADRON) injection - Injection   4 mg - 12/28/2023 1:30:00 PM  bupivacaine (PF) (MARCAINE) 0.5 % injection - Injection   15 mL - 12/28/2023 1:30:00 PM  bupivacaine-EPINEPHrine PF (MARCAINE w/EPI) 0.25% -[id number] injection - Injection   5 mL - 12/28/2023 1:30:00 PM      Post Assessment  Injection Assessment: negative aspiration for heme, no paresthesia on injection and incremental injection  Patient Tolerance:comfortable throughout  block  Complications:no  Additional Notes  Ultrasound guidance used to visualize nerve anatomy, guide needle placement and verify local anesthetic disbursement.

## 2023-12-28 NOTE — H&P
History & Physical       Patient: Kristan Galicia    YOB: 1950    Medical Record Number: [id number]    Chief Complaints: Preop    History of Present Illness: 73 y.o. female presents today in anticipation of upcoming surgery.  Denies any changes to medical history.  Denies any changes to her symptomatology.    Allergies:   Allergies   Allergen Reactions    Adhesive Tape Other (See Comments)     SKIN BLISTERS  PAPER TAPE OKAY PER PT     Aspirin Swelling    Biaxin [Clarithromycin] Other (See Comments)     BLISTERS AROUND MOTH  Also known as Bioxen     Codeine Swelling    Darvon [Propoxyphene] Swelling    Levaquin [Levofloxacin] Other (See Comments)     BLISTERS AROUND MOUTH    Nitroglycerin Other (See Comments)     Was told by cardiologist not to take due to heart condition & defibrillator    Percocet [Oxycodone-Acetaminophen] Irritability    Tequin [Gatifloxacin] Other (See Comments)     BLISTERS AROUND MOUTH  Also known Tequine        Home Medications:    Current Facility-Administered Medications:     bupivacaine liposome (EXPAREL) 1.3 % injection 266 mg, 20 mL, Injection, Once, Juan Antonio Anne MD    ceFAZolin in dextrose (ANCEF) IVPB solution 2 g, 2 g, Intravenous, Once, Juan Antonio Anne MD    ethyl alcohol 62 % 2 each, 2 swab , Nasal, Once, Juan Antonio Anne MD    Facility-Administered Medications Ordered in Other Encounters:     Chlorhexidine Gluconate 2 % pads 1 each, 1 each, Apply externally, Take As Directed, Juan Antonio Anne MD    Past Medical History:   Diagnosis Date    Allergic     Seasonal    Anemia     Angina at rest     history    Arthritis     Asthma     Atrial fibrillation     Cardiac defibrillator in place 01/2009    medtronic     Cardiomyopathy     Hypertrophic Cardiomyopathy    Cervical disc disorder     CHF (congestive heart failure)     Chronic pain     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     Coronary artery disease     Diabetes  mellitus     Type II    Diverticulosis     hx diverticulitis    Fracture, femur 1954, 1964, 2011    Fracture, foot 1969    Frozen shoulder 2016    GERD (gastroesophageal reflux disease)     Heart murmur     History of bronchitis     History of COVID-19     History of depression     History of pneumonia     History of sleep apnea     History of transfusion     no reaction    History of tumor     left ocular tumor, treated with steroids    Hyperlipidemia     Hypertension     Knee swelling 2017    Lumbosacral disc disease 2010    Migraines     On anticoagulant therapy     Osteoporosis     Palpitations     Periarthritis of shoulder 2016    Rotator cuff syndrome 2014    Scoliosis     Shoulder pain     left    Sleep apnea     HX OF SLEEP APNEA AND WORE CPAP. PT STATED SINCE SHE LOST WEIGHT AND WAS RETESTED AND WAS TOLD SHE NO LONGER HAS IT. STOPPED USING CPAP AROUND 2018.    Thyroid disease     HYPOTHYROIDISM    Vasovagal syncope     Vertigo           Past Surgical History:   Procedure Laterality Date    ANTERIOR CERVICAL DISCECTOMY W/ FUSION  2012    C5-7    ANTERIOR CERVICAL DISCECTOMY W/ FUSION N/A 05/05/2022    Procedure: Cervical 3- Cervical 4, Cervical 4- Cervical 5 anterior cervical discectomy and fusion with Zero profile cage and titanuium mesh cage;  Surgeon: Wilfredo Bailey MD;  Location: [facility] OR;  Service: Orthopedic Spine;  Laterality: N/A;    APPENDECTOMY  1970    BILATERAL BREAST REDUCTION      BREAST BIOPSY      CARDIAC CATHETERIZATION  02/19/2007    CARDIAC DEFIBRILLATOR PLACEMENT Left 1999, 2013    Dr. Ya    CATARACT EXTRACTION Bilateral     COLONOSCOPY  1996    Dr. Herbert Gonsales     COLONOSCOPY  08/16/2016    Procedure: COLONOSCOPY with cecum;  Surgeon: Rosalio Sheikh MD;  Location: [facility] ENDOSCOPY;  Service:     ENDOSCOPY N/A 04/27/2017    Procedure: ESOPHAGOGASTRODUODENOSCOPY WITH BIOPSIES;  Surgeon: Rosalio Sheikh MD;  Location: [facility] ENDOSCOPY;  Service:     FEMUR SURGERY Left      with metal implant  x3    FIRST RIB RESECTION Bilateral     and scalenectomy    HYSTERECTOMY      Total    IMPLANTABLE CARDIOVERTER DEFIBRILLATOR LEAD REPLACEMENT/POCKET REVISION      JOINT REPLACEMENT  Right shoulder  and right knee ear    KNEE ARTHROSCOPY Bilateral     Dr. Pascual    KNEE SURGERY Right 06/10/2010    LAPAROSCOPIC CHOLECYSTECTOMY  1974    NECK SURGERY  ,     OOPHORECTOMY      REDUCTION MAMMAPLASTY      SCAPULA OPEN REDUCTION INTERNAL FIXATION Left 04/15/2021    Procedure: Acromion nonunion OPEN REDUCTION INTERNAL FIXATION;  Surgeon: Juan Antonio Anne MD;  Location: [facility];  Service: Orthopedics;  Laterality: Left;    SHOULDER ARTHROSCOPY W/ ROTATOR CUFF REPAIR Bilateral 2011    SHOULDER SURGERY Right     TONSILLECTOMY      TOTAL KNEE ARTHROPLASTY Right 2018    Procedure: RIGHT TOTAL KNEE ARTHROPLASTY WITH STEVE NAVIGATION;  Surgeon: Bravo Pascual MD;  Location: [facility] OR;  Service: Orthopedics    TOTAL SHOULDER ARTHROPLASTY W/ DISTAL CLAVICLE EXCISION Right 2017    Procedure: TOTAL SHOULDER REVERSE ARTHROPLASTY;  Surgeon: Juan Antonio Anne MD;  Location: [facility] OR;  Service:     TOTAL SHOULDER ARTHROPLASTY W/ DISTAL CLAVICLE EXCISION Left 10/22/2020    Procedure: TOTAL SHOULDER REVERSE ARTHROPLASTY;  Surgeon: Juan Antonio Anne MD;  Location: [facility];  Service: Orthopedics;  Laterality: Left;    TRIGGER POINT INJECTION  Starting in  until now          Social History     Occupational History    Occupation:    Tobacco Use    Smoking status: Former     Packs/day: 0.25     Years: 18.00     Additional pack years: 0.00     Total pack years: 4.50     Types: Cigarettes     Start date: 4/3/1967     Quit date: 1985     Years since quittin.3    Smokeless tobacco: Never    Tobacco comments:     Haven't smoked in 37 years   Vaping Use    Vaping Use: Never used   Substance and Sexual Activity    Alcohol use: No     Drug use: Never    Sexual activity: Not Currently     Birth control/protection: Post-menopausal      Social History     Social History Narrative    Not on file          Family History   Problem Relation Age of Onset    Heart attack Brother     Heart disease Brother     Hyperthyroidism Mother     Cancer Mother     Heart disease Mother     Osteoporosis Mother     Thyroid disease Mother     Broken bones Mother     No Known Problems Father          car accident    Cirrhosis Maternal Grandmother     No Known Problems Maternal Grandfather     No Known Problems Paternal Grandmother     No Known Problems Paternal Grandfather     Breast cancer Maternal Aunt     Cancer Maternal Aunt     Malig Hyperthermia Neg Hx        Review of Systems:  A 14 point review of systems is reviewed with the patient.  Pertinent positives are listed above.  All others are negative.    Physical Exam: 73 y.o. female    There were no vitals filed for this visit.    General:  Patient is awake and alert.  Appears in no acute distress or discomfort.    Psych:  Affect and demeanor are appropriate.    Eyes:  Conjunctiva and sclera appear grossly normal.  Eyes track well and EOM seem to be intact.    Ears:  No gross abnormalities.  Hearing adequate for the exam.    Cardiovascular:  Regular rate and rhythm.    Lungs:  Good chest expansion.  Breathing unlabored.    Lymph:  No palpable adenopathy about neck or axilla.    Extremity:  Skin benign and intact without evidence for swelling, masses or atrophy.  Exam otherwise deferred at this time.    Diagnostic Tests:  Lab Results   Component Value Date    GLUCOSE 389 (H) 12/14/2023    CALCIUM 9.4 12/14/2023     12/14/2023    K 4.8 12/14/2023    CO2 26.0 12/14/2023     12/14/2023    BUN 17 12/14/2023    CREATININE 0.82 12/14/2023    EGFRIFAFRI >60 01/24/2022    EGFRIFNONA 74 04/20/2021    BCR 20.7 12/14/2023    ANIONGAP 10.0 12/14/2023     Lab Results   Component Value Date    WBC 5.39 12/14/2023     HGB 11.7 (L) 12/14/2023    HCT 36.6 12/14/2023    MCV 93.6 12/14/2023     12/14/2023     Lab Results   Component Value Date    INR 0.9 01/24/2022    INR 1.0 01/26/2021    INR 1.0 12/26/2018    PROTIME 10.2 (L) 01/24/2022    PROTIME 11.0 01/26/2021    PROTIME 10.3 12/26/2018       Assessment:  Painful retained hardware left shoulder    Plan: The patient denies any changes to their symptomatology.  Will proceed with surgery as planned.  I again offered her a chance to ask any questions about the upcoming procedure. She stated that she had a good understanding of everything and had no questions.    Juan Antonio Anne MD  12/28/23

## 2023-12-29 ENCOUNTER — TELEPHONE (OUTPATIENT)
Dept: ORTHOPEDIC SURGERY | Facility: CLINIC | Age: 73
End: 2023-12-29
Payer: MEDICARE

## 2023-12-29 NOTE — TELEPHONE ENCOUNTER
Provider: HUESTON    Caller: JC    Relationship to Patient: SELF    Pharmacy:     Phone Number: [phone]    Reason for Call: PT CALLING BACK TO STATE SHE HAS A CONCERN ABOUT THE 2 BRUISES ON THE SIDES IF HER NOSE THAT WEREN'T THERE WHEN SHE WENT IN BEFORE SX

## 2023-12-29 NOTE — TELEPHONE ENCOUNTER
Please review, thank you  Spoke to patient, she has obtained her MRI disc and will bring it with her to the appointment with  on Tuesday.

## 2024-01-03 NOTE — TELEPHONE ENCOUNTER
I spoke to Ms. Galicia.  She says she actually experienced blackish bruising at the inner corners of both eyes.  She says on Saturday she started developing inflammation, irritation, and drainage from both eyes as well.  She has not been medically evaluated for the new symptoms.  I recommended she contact her eye doctor for examination to determine if her symptoms are bacterial or viral in nature.  She verbalized understanding and agreed with this plan.

## 2024-01-12 ENCOUNTER — OFFICE VISIT (OUTPATIENT)
Dept: ORTHOPEDIC SURGERY | Facility: CLINIC | Age: 74
End: 2024-01-12
Payer: MEDICARE

## 2024-01-12 VITALS — BODY MASS INDEX: 31.41 KG/M2 | WEIGHT: 160 LBS | HEIGHT: 60 IN | TEMPERATURE: 97.3 F

## 2024-01-12 DIAGNOSIS — R52 PAIN: ICD-10-CM

## 2024-01-12 DIAGNOSIS — Z09 SURGERY FOLLOW-UP: Primary | ICD-10-CM

## 2024-01-12 RX ORDER — NEOMYCIN SULFATE, POLYMYXIN B SULFATE, AND DEXAMETHASONE 3.5; 10000; 1 MG/G; [USP'U]/G; MG/G
OINTMENT OPHTHALMIC
COMMUNITY
Start: 2024-01-03

## 2024-01-12 RX ORDER — TOBRAMYCIN AND DEXAMETHASONE 3; 1 MG/ML; MG/ML
SUSPENSION/ DROPS OPHTHALMIC
COMMUNITY
Start: 2024-01-03

## 2024-01-12 NOTE — PROGRESS NOTES
Chief complaint: Follow-up status post left shoulder hardware removal    Ms. Galicia is seen today in follow-up for her hardware removal.  She says her shoulder is doing great.  She says she has less pain now than she did prior to the surgery.  She also says that her motion is already better.  She denies any complaints or concerns.    Wound is well-approximated benign.  The staples were removed and replaced with Steri-Strips.  Arm is soft.  Pendulums are well-tolerated.  Her motion is actually surprisingly good.  She can forward elevate to 155, abduct about 100 and externally rotate about 40.  All of this is very well-tolerated.  Intact motor and sensory function in her lower arm and hand.    Assessment: Follow-up now 2-week status post left shoulder hardware removal    Plan: She seems to be doing great.  We talked about appropriate wound care.  I will see her back in another month for reevaluation.  Okay to begin working on home exercises for range of motion and strengthening.  Offered her referral to formal physical therapy but she declined.    Juan Antonio Anne MD

## 2024-01-23 NOTE — ADDENDUM NOTE
Addendum  created 01/23/24 0620 by Rex Hernandez MD    Attestation recorded in Intraprocedure, Intraprocedure Attestations filed

## 2024-02-09 ENCOUNTER — TELEPHONE (OUTPATIENT)
Dept: ORTHOPEDIC SURGERY | Facility: CLINIC | Age: 74
End: 2024-02-09

## 2024-02-09 NOTE — TELEPHONE ENCOUNTER
Hub staff attempted to follow warm transfer process and was unsuccessful     Caller: Kristan Galicia    Relationship to patient: Self    Best call back number: [phone]    Patient is needing: PATIENT IS RUNNING ABOUT 20 MINUTES LATE FOR HER APPT BUT I  DID NOT WANT TO CANCEL/RESCHED DUE TO BEING POST OP. WT BUT NO ANSWER. PLEASE ADVISE PATIENT IF SHE CANNOT BE SEEN.

## 2024-02-14 ENCOUNTER — OFFICE VISIT (OUTPATIENT)
Dept: ORTHOPEDIC SURGERY | Facility: CLINIC | Age: 74
End: 2024-02-14
Payer: MEDICARE

## 2024-02-14 VITALS — TEMPERATURE: 97.8 F | BODY MASS INDEX: 30.47 KG/M2 | WEIGHT: 161.4 LBS | HEIGHT: 61 IN

## 2024-02-14 DIAGNOSIS — Z09 SURGERY FOLLOW-UP: Primary | ICD-10-CM

## 2024-05-23 ENCOUNTER — OFFICE VISIT (OUTPATIENT)
Dept: SURGERY | Facility: CLINIC | Age: 74
End: 2024-05-23
Payer: MEDICARE

## 2024-05-23 VITALS
WEIGHT: 159 LBS | HEIGHT: 61 IN | SYSTOLIC BLOOD PRESSURE: 110 MMHG | DIASTOLIC BLOOD PRESSURE: 60 MMHG | BODY MASS INDEX: 30.02 KG/M2

## 2024-05-23 DIAGNOSIS — R19.7 DIARRHEA, UNSPECIFIED TYPE: Primary | ICD-10-CM

## 2024-05-23 DIAGNOSIS — K21.9 GASTROESOPHAGEAL REFLUX DISEASE, UNSPECIFIED WHETHER ESOPHAGITIS PRESENT: ICD-10-CM

## 2024-05-23 PROCEDURE — 99204 OFFICE O/P NEW MOD 45 MIN: CPT | Performed by: SURGERY

## 2024-05-23 PROCEDURE — 1159F MED LIST DOCD IN RCRD: CPT | Performed by: SURGERY

## 2024-05-23 PROCEDURE — 1160F RVW MEDS BY RX/DR IN RCRD: CPT | Performed by: SURGERY

## 2024-05-23 RX ORDER — FUROSEMIDE 20 MG/1
TABLET ORAL
COMMUNITY
Start: 2024-05-06

## 2024-05-23 RX ORDER — METOPROLOL TARTRATE 50 MG/1
TABLET, FILM COATED ORAL
COMMUNITY
Start: 2024-05-05

## 2024-05-23 RX ORDER — TAMSULOSIN HYDROCHLORIDE 0.4 MG/1
0.4 CAPSULE ORAL DAILY
COMMUNITY
Start: 2024-03-29 | End: 2025-03-29

## 2024-05-23 RX ORDER — SEMAGLUTIDE 0.68 MG/ML
0.5 INJECTION, SOLUTION SUBCUTANEOUS
COMMUNITY
Start: 2024-02-16

## 2024-05-23 NOTE — H&P (VIEW-ONLY)
CC: Right  diaphragmatic hernia     HPI: Patient is a very pleasant 73-year-old female was referred by primary care provider Neda Spaulding for evaluation of possible diaphragmatic hernia.  The patient reports that recently she developed sharp abdominal pain that was located over the right flank and radiated to the right upper and right lower abdomen.  This was associated with nausea vomiting as well as diarrhea.  Symptoms lasted for a day and a half.  After that symptoms resolved.  Since then she has been having intermittent episodes of abdominal pain.  She reports intermittent episodes of diarrhea as well as constipation.  The patient has been on Ozempic now for approximately 1 year.  She reports early satiety.  Patient reports acid reflux has been taking Protonix 40 mg daily.  The patient had colonoscopy 10 years ago.  She underwent HIDA scan that was essentially normal.  She underwent CT scan of the abdomen and pelvis that show a right posterior diaphragmatic hernia without any other abnormality     PMH: Paroxysmal atrial fibrillation.  Vasovagal syncope, palpitations, hyperlipidemia, heart attack, CHF,  hypertrophic nonobstructive cardiomyopathy, chronic anticoagulation, vitamin D deficiency, hypothyroidism, diabetes, acid reflux, alternating constipation and diarrhea, gout, asthma, sleep apnea     PSH:   Pertinent abdominal operations:   Oophorectomy  Open appendectomy 1970  Open cholecystectomy 1974  Open hysterectomy 1989  Colonoscopy 1996 and 2016   Upper endoscopy 2017    MEDS: Lasix, metoprolol, tamsulosin, Ozempic, tobramycin, Colace, Norco, Eliquis, famotidine, Zanaflex, levothyroxine, Fosamax, atorvastatin, Protonix, allopurinol, Singulair, Cymbalta, Aldactone, loratidine     ALL: Reviewed and reconciled in epic    FH and SH: Family history is noncontributory to current issue.  Denies smoking drinking or taking any drugs     ROS:   Pertinent review of system was described in H&P.  Reports abdominal  "pain nausea vomiting alternating constipation and diarrhea.     PE:   Vitals:    05/23/24 0856   BP: 110/60   BP Location: Left arm   Patient Position: Sitting   Weight: 72.1 kg (159 lb)   Height: 154.9 cm (61\")     Body mass index is 30.04 kg/m².   Alert and oriented ×3, no acute distress.  Head is normocephalic and atraumatic.  Neck is supple there is no thyromegaly or lymphadenopathy  Chest is clear bilaterally there is no added sounds  Regular rate and rhythm, no murmurs  Abdomen is soft and tender throughout the abdomen, is nondistended, bowel sounds are positive.  There is no rebound or guarding is and there is no peritoneal signs.  There are well-healed midline incision.  There is a well-healed right paramedian incision.  There is well-healed right subcostal incision.  No evidence of hernia  No clubbing cyanosis or edema in lower or upper extremities     Diagnostic studies:   CT scan of the abdomen pelvis that was performed on 4/9/2024 was reviewed by me.  There is distended stomach with fluid fluid.  There is a fat-containing right posterior diaphragmatic hernia.  There is no evidence of incarceration strangulation.  Evidence of diverticulosis    Labs 2/3/2024:   White blood cell count 6, hemoglobin 12, platelets 196, otherwise normal CBC, glucose 144, otherwise normal BMP    Assessment and plan    The patient is a very pleasant 73-year-old female with multiple abdominal complaints including alternating constipation diarrhea with abdominal pain.  Patient has a small right-sided posterior diaphragmatic hernia.  She has on CT scan distention of the stomach with food and fluid although the patient reports being n.p.o. for more than 12 hours.  I discussed with the patient about further step.  I think that is extremely unlikely that her small diaphragmatic hernia is causing her symptoms.  I would not expect her hernia to cause any type of GI complaints.  I think that his symptoms are related to chronic Ozempic " use but I think that further workup is needed.  I recommend that she undergoes upper endoscopy and colonoscopy for further evaluation.  I recommend that she stops Ozempic at least 1 week before undergoing endoscopic evaluation.  Risk and benefits of procedure including bleeding, infection, perforation discussed in detail with the patient that verbalized understanding and agreed with the plan    If symptoms do not improve or clear reason for her symptoms on endoscopic evaluation then she will need to be taken off Ozempic.  Do not need to hold Eliquis for procedure    She understood     Sabino Arboleda MD  General, Minimally Invasive and Endoscopic Surgery  [facility]     [address], [address]  [place], [place], [address]  P: [phone]  F: [phone]

## 2024-05-23 NOTE — PROGRESS NOTES
CC: Right  diaphragmatic hernia     HPI: Patient is a very pleasant 73-year-old female was referred by primary care provider Neda Spaulding for evaluation of possible diaphragmatic hernia.  The patient reports that recently she developed sharp abdominal pain that was located over the right flank and radiated to the right upper and right lower abdomen.  This was associated with nausea vomiting as well as diarrhea.  Symptoms lasted for a day and a half.  After that symptoms resolved.  Since then she has been having intermittent episodes of abdominal pain.  She reports intermittent episodes of diarrhea as well as constipation.  The patient has been on Ozempic now for approximately 1 year.  She reports early satiety.  Patient reports acid reflux has been taking Protonix 40 mg daily.  The patient had colonoscopy 10 years ago.  She underwent HIDA scan that was essentially normal.  She underwent CT scan of the abdomen and pelvis that show a right posterior diaphragmatic hernia without any other abnormality     PMH: Paroxysmal atrial fibrillation.  Vasovagal syncope, palpitations, hyperlipidemia, heart attack, CHF,  hypertrophic nonobstructive cardiomyopathy, chronic anticoagulation, vitamin D deficiency, hypothyroidism, diabetes, acid reflux, alternating constipation and diarrhea, gout, asthma, sleep apnea     PSH:   Pertinent abdominal operations:   Oophorectomy  Open appendectomy 1970  Open cholecystectomy 1974  Open hysterectomy 1989  Colonoscopy 1996 and 2016   Upper endoscopy 2017    MEDS: Lasix, metoprolol, tamsulosin, Ozempic, tobramycin, Colace, Norco, Eliquis, famotidine, Zanaflex, levothyroxine, Fosamax, atorvastatin, Protonix, allopurinol, Singulair, Cymbalta, Aldactone, loratidine     ALL: Reviewed and reconciled in epic    FH and SH: Family history is noncontributory to current issue.  Denies smoking drinking or taking any drugs     ROS:   Pertinent review of system was described in H&P.  Reports abdominal  "pain nausea vomiting alternating constipation and diarrhea.     PE:   Vitals:    05/23/24 0856   BP: 110/60   BP Location: Left arm   Patient Position: Sitting   Weight: 72.1 kg (159 lb)   Height: 154.9 cm (61\")     Body mass index is 30.04 kg/m².   Alert and oriented ×3, no acute distress.  Head is normocephalic and atraumatic.  Neck is supple there is no thyromegaly or lymphadenopathy  Chest is clear bilaterally there is no added sounds  Regular rate and rhythm, no murmurs  Abdomen is soft and tender throughout the abdomen, is nondistended, bowel sounds are positive.  There is no rebound or guarding is and there is no peritoneal signs.  There are well-healed midline incision.  There is a well-healed right paramedian incision.  There is well-healed right subcostal incision.  No evidence of hernia  No clubbing cyanosis or edema in lower or upper extremities     Diagnostic studies:   CT scan of the abdomen pelvis that was performed on 4/9/2024 was reviewed by me.  There is distended stomach with fluid fluid.  There is a fat-containing right posterior diaphragmatic hernia.  There is no evidence of incarceration strangulation.  Evidence of diverticulosis    Labs 2/3/2024:   White blood cell count 6, hemoglobin 12, platelets 196, otherwise normal CBC, glucose 144, otherwise normal BMP    Assessment and plan    The patient is a very pleasant 73-year-old female with multiple abdominal complaints including alternating constipation diarrhea with abdominal pain.  Patient has a small right-sided posterior diaphragmatic hernia.  She has on CT scan distention of the stomach with food and fluid although the patient reports being n.p.o. for more than 12 hours.  I discussed with the patient about further step.  I think that is extremely unlikely that her small diaphragmatic hernia is causing her symptoms.  I would not expect her hernia to cause any type of GI complaints.  I think that his symptoms are related to chronic Ozempic " use but I think that further workup is needed.  I recommend that she undergoes upper endoscopy and colonoscopy for further evaluation.  I recommend that she stops Ozempic at least 1 week before undergoing endoscopic evaluation.  Risk and benefits of procedure including bleeding, infection, perforation discussed in detail with the patient that verbalized understanding and agreed with the plan    If symptoms do not improve or clear reason for her symptoms on endoscopic evaluation then she will need to be taken off Ozempic.  Do not need to hold Eliquis for procedure    She understood     Sabino Arboleda MD  General, Minimally Invasive and Endoscopic Surgery  [facility]     [address], [address]  [place], [place], [address]  P: [phone]  F: [phone]

## 2024-06-14 ENCOUNTER — TELEPHONE (OUTPATIENT)
Dept: SURGERY | Facility: CLINIC | Age: 74
End: 2024-06-14
Payer: MEDICARE

## 2024-06-14 NOTE — TELEPHONE ENCOUNTER
Attempted to return patients call to reschedule EGD/C-scope on 6/18 with Dr. Arboleda. LMOM w/ next available date of 8/6. Requested patient to call back if she wants to reschedule.

## 2024-06-18 ENCOUNTER — HOSPITAL ENCOUNTER (OUTPATIENT)
Facility: HOSPITAL | Age: 74
Setting detail: HOSPITAL OUTPATIENT SURGERY
Discharge: HOME | End: 2024-06-18
Attending: SURGERY | Admitting: SURGERY
Payer: MEDICARE

## 2024-06-18 ENCOUNTER — ANESTHESIA EVENT (OUTPATIENT)
Dept: GASTROENTEROLOGY | Facility: HOSPITAL | Age: 74
End: 2024-06-18
Payer: MEDICARE

## 2024-06-18 ENCOUNTER — ANESTHESIA (OUTPATIENT)
Dept: GASTROENTEROLOGY | Facility: HOSPITAL | Age: 74
End: 2024-06-18
Payer: MEDICARE

## 2024-06-18 VITALS
BODY MASS INDEX: 29.83 KG/M2 | RESPIRATION RATE: 16 BRPM | WEIGHT: 158 LBS | HEART RATE: 60 BPM | OXYGEN SATURATION: 97 % | DIASTOLIC BLOOD PRESSURE: 65 MMHG | HEIGHT: 61 IN | SYSTOLIC BLOOD PRESSURE: 80 MMHG

## 2024-06-18 DIAGNOSIS — K21.9 GASTROESOPHAGEAL REFLUX DISEASE, UNSPECIFIED WHETHER ESOPHAGITIS PRESENT: ICD-10-CM

## 2024-06-18 DIAGNOSIS — R19.7 DIARRHEA, UNSPECIFIED TYPE: ICD-10-CM

## 2024-06-18 DIAGNOSIS — D12.3 ADENOMATOUS POLYP OF TRANSVERSE COLON: Primary | ICD-10-CM

## 2024-06-18 PROBLEM — K63.5 COLON POLYPS: Status: ACTIVE | Noted: 2024-06-18

## 2024-06-18 LAB — GLUCOSE BLDC GLUCOMTR-MCNC: 220 MG/DL (ref 70–130)

## 2024-06-18 PROCEDURE — 88305 TISSUE EXAM BY PATHOLOGIST: CPT | Performed by: SURGERY

## 2024-06-18 PROCEDURE — 82948 REAGENT STRIP/BLOOD GLUCOSE: CPT

## 2024-06-18 PROCEDURE — 25010000002 GLYCOPYRROLATE 0.2 MG/ML SOLUTION

## 2024-06-18 PROCEDURE — 25810000003 LACTATED RINGERS PER 1000 ML: Performed by: SURGERY

## 2024-06-18 PROCEDURE — 25010000002 PROPOFOL 1000 MG/100ML EMULSION

## 2024-06-18 DEVICE — DEV CLIP HEMO RESOLUTION360/ULTR 235CM 17MM STRL: Type: IMPLANTABLE DEVICE | Site: COLON | Status: FUNCTIONAL

## 2024-06-18 DEVICE — DEV CLIP ENDO RESOLUTION360 CONTRL ROT 235CM: Type: IMPLANTABLE DEVICE | Site: COLON | Status: FUNCTIONAL

## 2024-06-18 RX ORDER — GLYCOPYRROLATE 0.2 MG/ML
INJECTION INTRAMUSCULAR; INTRAVENOUS AS NEEDED
Status: DISCONTINUED | OUTPATIENT
Start: 2024-06-18 | End: 2024-06-18 | Stop reason: SURG

## 2024-06-18 RX ORDER — PROPOFOL 10 MG/ML
INJECTION, EMULSION INTRAVENOUS AS NEEDED
Status: DISCONTINUED | OUTPATIENT
Start: 2024-06-18 | End: 2024-06-18 | Stop reason: SURG

## 2024-06-18 RX ORDER — LIDOCAINE HYDROCHLORIDE 20 MG/ML
INJECTION, SOLUTION INFILTRATION; PERINEURAL AS NEEDED
Status: DISCONTINUED | OUTPATIENT
Start: 2024-06-18 | End: 2024-06-18 | Stop reason: SURG

## 2024-06-18 RX ORDER — SODIUM CHLORIDE, SODIUM LACTATE, POTASSIUM CHLORIDE, CALCIUM CHLORIDE 600; 310; 30; 20 MG/100ML; MG/100ML; MG/100ML; MG/100ML
30 INJECTION, SOLUTION INTRAVENOUS CONTINUOUS PRN
Status: DISCONTINUED | OUTPATIENT
Start: 2024-06-18 | End: 2024-06-18 | Stop reason: HOSPADM

## 2024-06-18 RX ADMIN — SODIUM CHLORIDE, POTASSIUM CHLORIDE, SODIUM LACTATE AND CALCIUM CHLORIDE: 600; 310; 30; 20 INJECTION, SOLUTION INTRAVENOUS at 09:36

## 2024-06-18 RX ADMIN — GLYCOPYRROLATE 0.2 MG: 0.2 INJECTION INTRAMUSCULAR; INTRAVENOUS at 09:36

## 2024-06-18 RX ADMIN — PROPOFOL INJECTABLE EMULSION 70 MG: 10 INJECTION, EMULSION INTRAVENOUS at 09:40

## 2024-06-18 RX ADMIN — LIDOCAINE HYDROCHLORIDE 80 MG: 20 INJECTION, SOLUTION INFILTRATION; PERINEURAL at 09:40

## 2024-06-18 RX ADMIN — PROPOFOL INJECTABLE EMULSION 150 MCG/KG/MIN: 10 INJECTION, EMULSION INTRAVENOUS at 09:41

## 2024-06-18 NOTE — ANESTHESIA POSTPROCEDURE EVALUATION
Patient: Kristan Galicia    Procedure Summary       Date: 06/18/24 Room / Location: [facility] ENDOSCOPY 10 /  [facility]    Anesthesia Start: 0936 Anesthesia Stop: 1036    Procedures:       ESOPHAGOGASTRODUODENOSCOPY WITH BIOPSiES/cold forcep (Esophagus)      COLONOSCOPY to with partial cecum viewing with cold biopsies/polypectomy with clip placement x3 Diagnosis:       Diarrhea, unspecified type      Gastroesophageal reflux disease, unspecified whether esophagitis present      (Diarrhea, unspecified type [R19.7])      (Gastroesophageal reflux disease, unspecified whether esophagitis present [K21.9])    Surgeons: Sabino Arboleda MD Provider: Corey Mcneill MD    Anesthesia Type: MAC ASA Status: 4            Anesthesia Type: MAC    Vitals  Vitals Value Taken Time   BP 98/62 06/18/24 1036   Temp     Pulse 61 06/18/24 1039   Resp 11 06/18/24 1036   SpO2 91 % 06/18/24 1039   Vitals shown include unfiled device data.        Post Anesthesia Care and Evaluation    Patient location during evaluation: PACU  Patient participation: complete - patient participated  Level of consciousness: awake and alert  Pain management: adequate    Airway patency: patent  Anesthetic complications: No anesthetic complications    Cardiovascular status: acceptable  Respiratory status: acceptable  Hydration status: acceptable    Comments: --------------------            06/18/24               1036     --------------------   BP:       98/62      Pulse:      61       Resp:       11       SpO2:      93%      --------------------

## 2024-06-18 NOTE — ANESTHESIA PREPROCEDURE EVALUATION
Anesthesia Evaluation     Patient summary reviewed and Nursing notes reviewed                Airway   Mallampati: II  Dental          Pulmonary    (+) asthma,shortness of breath, sleep apnea  Cardiovascular     ECG reviewed  PT is on anticoagulation therapy  Patient on routine beta blocker  Rhythm: regular  Rate: normal    (+) pacemaker ICD, pacemaker, hypertension, valvular problems/murmurs murmur, past MI , CAD, dysrhythmias Paroxysmal Atrial Fib, angina, CHF , orthopnea, hyperlipidemia      Neuro/Psych  (+) headaches, dizziness/light headedness, syncope, numbness  GI/Hepatic/Renal/Endo    (+) GERD, diabetes mellitus type 2, thyroid problem     Musculoskeletal     Abdominal    Substance History - negative use     OB/GYN negative ob/gyn ROS         Other   arthritis,                 Anesthesia Plan    ASA 4     MAC     (ICD  Pacemaker: atrially paced  Ozempic last injected 6/3/24)  intravenous induction     Anesthetic plan, risks, benefits, and alternatives have been provided, discussed and informed consent has been obtained with: patient.    CODE STATUS:

## 2024-06-19 ENCOUNTER — TELEPHONE (OUTPATIENT)
Dept: SURGERY | Facility: CLINIC | Age: 74
End: 2024-06-19
Payer: MEDICARE

## 2024-06-19 LAB
LAB AP CASE REPORT: NORMAL
PATH REPORT.FINAL DX SPEC: NORMAL
PATH REPORT.GROSS SPEC: NORMAL

## 2024-06-19 RX ORDER — OMEPRAZOLE 40 MG/1
40 CAPSULE, DELAYED RELEASE ORAL DAILY
Qty: 60 CAPSULE | Refills: 0 | Status: SHIPPED | OUTPATIENT
Start: 2024-06-19

## 2024-06-19 NOTE — TELEPHONE ENCOUNTER
Attempted to contact patient to inform barium enema is scheduled 7/2 arrive 0915 to start 930 am and she has to prep. Left voicemail to call back.

## 2024-06-19 NOTE — TELEPHONE ENCOUNTER
Call the patient for upper endoscopy colonoscopy results    inal Diagnosis   1.  Small bowel, second portion duodenum, biopsy: Benign small bowel with-               -A. Normal intact villous architecture.               -B. No significant inflammation, no granuloma.               -C. No viral inclusions or other organisms on routinely stained sections.      2.  Stomach, prepylorus, biopsy: Benign gastric mucosa with               -A. Mild chronic inflammation (mild non-specific chronic gastritis).                -B. No intestinal metaplasia.               -C. No Helicobacter pylori.      3.  Stomach, body, biopsy: Benign gastric mucosa with               -A. Mild chronic inflammation (mild non-specific chronic gastritis).                -B. No intestinal metaplasia.               -C. No Helicobacter pylori.  -D.  Changes suggesting developing fundic gland polyp     4.  Gastroesophageal junction, biopsy: Benign squamous and gastric mucosa with-               -A. Chronic inflammation in the gastric mucosa.               -B. No intestinal metaplasia.               -C. No Helicobacter pylori.      5.  Colon, hepatic flexure, biopsy: Benign polypoid colonic mucosa with-               -A. No hyperplastic or tubulovillous change.               -B. No significant inflammation.               -C. No crypt distortion or basement membrane thickening.                -D. No viral inclusions or other organisms on routinely stained sections.                -E.  Prominent lymphoid aggregate     6.  Colon, sigmoid, biopsy: Benign colonic mucosa with-               -A. No hyperplastic or tubulovillous change.               -B. No significant inflammation.               -C. No crypt distortion or basement membrane thickening.                -D. No viral inclusions or other organisms on routinely stained sections.      7.  Colon, right ascending, biopsy: Hyperplastic polyp     I left a message patient voicemail stating that she should  try Protonix 40 mg for 2 months  Famotidine should be discontinue for now  Colonoscopy in 5 years  Follow-up in my office in 2 months

## 2024-06-19 NOTE — TELEPHONE ENCOUNTER
I called the patient again.  Patient has been taking Protonix 40 mg daily as well as famotidine.  Will switch her to omeprazole 40 mg daily and stop Protonix.

## 2024-06-20 ENCOUNTER — TELEPHONE (OUTPATIENT)
Dept: SURGERY | Facility: CLINIC | Age: 74
End: 2024-06-20
Payer: MEDICARE

## 2024-06-20 NOTE — TELEPHONE ENCOUNTER
LM for pt to call to sched a 2-mo f/u appt with Dr Arboleda (Aug) -     HUB: ok to sched a f/u appt for pt in Aug with Dr Arboleda (f/u GI issues)

## 2024-06-21 ENCOUNTER — TELEPHONE (OUTPATIENT)
Dept: SURGERY | Facility: CLINIC | Age: 74
End: 2024-06-21
Payer: MEDICARE

## 2024-06-21 NOTE — TELEPHONE ENCOUNTER
2x attempt       Attempted to contact patient to inform barium enema is scheduled 7/2 arrive 0915 to start 930 am and she has to prep. Left voicemail to call back

## 2024-07-03 ENCOUNTER — OFFICE VISIT (OUTPATIENT)
Dept: ORTHOPEDIC SURGERY | Facility: CLINIC | Age: 74
End: 2024-07-03
Payer: MEDICARE

## 2024-07-03 VITALS — WEIGHT: 158.3 LBS | TEMPERATURE: 98.6 F | HEIGHT: 60 IN | BODY MASS INDEX: 31.08 KG/M2

## 2024-07-03 DIAGNOSIS — Z96.611 HISTORY OF REVERSE TOTAL REPLACEMENT OF RIGHT SHOULDER JOINT: ICD-10-CM

## 2024-07-03 DIAGNOSIS — S49.91XA INJURY OF RIGHT CLAVICLE, INITIAL ENCOUNTER: Primary | ICD-10-CM

## 2024-07-03 PROCEDURE — 99213 OFFICE O/P EST LOW 20 MIN: CPT | Performed by: NURSE PRACTITIONER

## 2024-07-03 RX ORDER — DILTIAZEM HYDROCHLORIDE 120 MG/1
120 CAPSULE, COATED, EXTENDED RELEASE ORAL DAILY
COMMUNITY

## 2024-07-08 NOTE — PROGRESS NOTES
"   History & Physical     Patient: Kristan Galicia    YOB: 1950    Medical Record Number: [id number]    Chief Complaints: Right shoulder injury    History of Present Illness: 73 y.o. female presents for evaluation of the right shoulder.  The injury was sustained approximately 12 days ago.  Reports she was wearing some \"slides\" which got caught on the stairs and she fell forward striking her forearm and clavicle.  She was evaluated with x-rays of her shoulder, clavicle, and cervical spine at [facility] urgent care approximately 5 days after her injury.  Reports she was informed a dislocation of the clavicle is suspected.  She comes in today for further evaluation and recommendations.  Current pain is described as moderate, constant and aching.  Rest, ice, and use of the sling have helped with the pain.  Reports associated swelling.  Patient reports good use and function of the right upper extremity prior to this injury.  Of note, reports her right shoulder replacement is doing well.  Denies pain directly in the shoulder.    Allergies:   Allergies   Allergen Reactions    Adhesive Tape Other (See Comments)     SKIN BLISTERS  PAPER TAPE OKAY PER PT     Aspirin Swelling    Biaxin [Clarithromycin] Other (See Comments)     BLISTERS AROUND MOTH  Also known as Bioxen     Codeine Swelling    Darvon [Propoxyphene] Swelling    Levaquin [Levofloxacin] Other (See Comments)     BLISTERS AROUND MOUTH    Nitroglycerin Other (See Comments)     Was told by cardiologist not to take due to heart condition & defibrillator    Percocet [Oxycodone-Acetaminophen] Irritability    Tequin [Gatifloxacin] Other (See Comments)     BLISTERS AROUND MOUTH  Also known Tequine        Home Medications:      Current Outpatient Medications:     alendronate (FOSAMAX) 70 MG tablet, Take 1 tablet by mouth Every 7 (Seven) Days. (Patient taking differently: Take 1 tablet by mouth Every 7 (Seven) Days. TAKES ON SUNDAYS), Disp: 12 tablet, Rfl: 3    " allopurinol (ZYLOPRIM) 100 MG tablet, Take 1 tablet by mouth Daily., Disp: 90 tablet, Rfl: 3    apixaban (Eliquis) 5 MG tablet tablet, Take 1 tablet by mouth Every 12 (Twelve) Hours., Disp: , Rfl:     atorvastatin (LIPITOR) 80 MG tablet, Take 1 tablet by mouth every night at bedtime., Disp: 90 tablet, Rfl: 3    Blood Glucose Monitoring Suppl (ACCU-CHEK OTILIA PLUS) W/DEVICE kit, Dx e11.9 use to check BS BID, ok to substitute formulary preferred, Disp: 1 kit, Rfl: 0    docusate sodium (COLACE) 100 MG capsule, Take 1 capsule by mouth 2 (Two) Times a Day., Disp: 60 capsule, Rfl: 0    DULoxetine (CYMBALTA) 60 MG capsule, Take 1 capsule by mouth Daily. (Patient taking differently: Take 1 capsule by mouth Every Night.), Disp: 90 capsule, Rfl: 3    ELDERBERRY PO, Take 1 tablet by mouth Every Night. HELD, Disp: , Rfl:     famotidine (PEPCID) 20 MG tablet, Take 1 tablet by mouth 2 (Two) Times a Day., Disp: 14 tablet, Rfl: 0    furosemide (LASIX) 20 MG tablet, TAKE 1 TABLET (20 MG TOTAL) BY MOUTH ONE TIME EACH DAY, Disp: , Rfl:     glucose blood (ACCU-CHEK OTILIA PLUS) test strip, Dx e11.9 use to check BS BID, ok to substitute formulary preferred, Disp: 60 each, Rfl: 11    HYDROcodone-acetaminophen (NORCO)  MG per tablet, Take 1 tablet by mouth Every 4 (Four) Hours As Needed for Moderate Pain (Pain)., Disp: 42 tablet, Rfl: 0    Lancets (ACCU-CHEK MULTICLIX) lancets, Dx e11.9 use to check BS BID, ok to substitute formulary preferred, Disp: 60 each, Rfl: 11    levothyroxine (SYNTHROID, LEVOTHROID) 75 MCG tablet, TAKE 1 TABLET BY MOUTH EVERY DAY (Patient taking differently: Take 1 tablet by mouth Daily.), Disp: 90 tablet, Rfl: 2    Loratadine 10 MG capsule, Take 1 capsule by mouth Daily., Disp: , Rfl:     metoprolol tartrate (LOPRESSOR) 50 MG tablet, , Disp: , Rfl:     montelukast (SINGULAIR) 10 MG tablet, Take 1 tablet by mouth Daily. (Patient taking differently: Take 1 tablet by mouth Every Night.), Disp: 90 tablet, Rfl:  3    multivitamin with minerals tablet tablet, Take 2 tablets by mouth Every Night., Disp: , Rfl:     omeprazole (priLOSEC) 40 MG capsule, Take 1 capsule by mouth Daily., Disp: 60 capsule, Rfl: 0    Ozempic, 0.25 or 0.5 MG/DOSE, 2 MG/3ML solution pen-injector, Inject 0.5 mg under the skin into the appropriate area as directed., Disp: , Rfl:     spironolactone (ALDACTONE) 25 MG tablet, Take 1 tablet by mouth Every Morning., Disp: , Rfl:     tamsulosin (FLOMAX) 0.4 MG capsule 24 hr capsule, Take 1 capsule by mouth Daily., Disp: , Rfl:     tiZANidine (ZANAFLEX) 4 MG tablet, Take 1 tablet by mouth As Needed., Disp: , Rfl:     dilTIAZem CD (CARDIZEM CD) 120 MG 24 hr capsule, Take 1 capsule by mouth Daily., Disp: , Rfl:     neomycin-polymyxin-dexamethamethasone (POLYDEX) 3.5-02643-8.1 ointment ophthalmic ointment, , Disp: , Rfl:     tobramycin-dexAMETHasone (TOBRADEX) 0.3-0.1 % ophthalmic suspension, ADMINISTER 2 DROPS INTO BOTH EYES 3 (THREE) TIMES A DAY FOR 7 DAYS. (Patient not taking: Reported on 7/3/2024), Disp: , Rfl:   No current facility-administered medications for this visit.    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     Coronary artery disease     Diabetes mellitus     Type II    Diverticulitis of colon 2021    Diverticulosis     hx diverticulitis    Fibrocystic breast 2010    Fracture, femur 1954,  1964, 2011    Fracture, foot 1969    Frozen shoulder 2016    GERD (gastroesophageal reflux disease)     Heart murmur     History of bronchitis     History of COVID-19     History of depression     History of pneumonia     History of sleep apnea     History of transfusion     no reaction    History of tumor     left ocular tumor, treated with steroids    Hyperlipidemia     Hypertension     Knee swelling 2017    Lumbosacral disc disease 2010    Migraines     On anticoagulant therapy     Osteoporosis     Palpitations     Periarthritis of shoulder 2016    Rotator cuff syndrome 2014    Scoliosis     Shoulder pain     left    Sleep apnea     HX OF SLEEP APNEA AND WORE CPAP. PT STATED SINCE SHE LOST WEIGHT AND WAS RETESTED AND WAS TOLD SHE NO LONGER HAS IT. STOPPED USING CPAP AROUND 2018.    Thyroid disease     HYPOTHYROIDISM    Vasovagal syncope     Vertigo           Past Surgical History:   Procedure Laterality Date    ANTERIOR CERVICAL DISCECTOMY W/ FUSION  2012    C5-7    ANTERIOR CERVICAL DISCECTOMY W/ FUSION N/A 05/05/2022    Procedure: Cervical 3- Cervical 4, Cervical 4- Cervical 5 anterior cervical discectomy and fusion with Zero profile cage and titanuium mesh cage;  Surgeon: Wilfredo Bailey MD;  Location: [facility] OR;  Service: Orthopedic Spine;  Laterality: N/A;    APPENDECTOMY  1970    BILATERAL BREAST REDUCTION      BREAST BIOPSY      CARDIAC CATHETERIZATION  02/19/2007    CARDIAC DEFIBRILLATOR PLACEMENT Left 1999, 2013    Dr. Ya    CATARACT EXTRACTION Bilateral     CHOLECYSTECTOMY OPEN  1974    COLONOSCOPY  1996    Dr. Herbert Gonsales     COLONOSCOPY  08/16/2016    Procedure: COLONOSCOPY with cecum;  Surgeon: Rosalio Sheikh MD;  Location: [facility] ENDOSCOPY;  Service:     COLONOSCOPY N/A 6/18/2024    Procedure: COLONOSCOPY to with partial cecum viewing with cold biopsies/polypectomy with clip placement x3;  Surgeon: Sabino Arboleda MD;  Location: [facility] ENDOSCOPY;  Service: General;   Laterality: N/A;  pre change in bowel habits  post lipoma clip x2 diverticulosos polyp    ENDOSCOPY N/A 04/27/2017    Procedure: ESOPHAGOGASTRODUODENOSCOPY WITH BIOPSIES;  Surgeon: Rosalio Sheikh MD;  Location: [facility] ENDOSCOPY;  Service:     ENDOSCOPY N/A 6/18/2024    Procedure: ESOPHAGOGASTRODUODENOSCOPY WITH BIOPSiES/cold forcep;  Surgeon: Sabino Arboleda MD;  Location: [facility] ENDOSCOPY;  Service: General;  Laterality: N/A;  pre reflux post gastritis    FEMUR SURGERY Left     with metal implant  x3    FIRST RIB RESECTION Bilateral     and scalenectomy    FRACTURE SURGERY      Broke left femor 3 times    HARDWARE REMOVAL Left 12/28/2023    Procedure: SHOULDER HARDWARE REMOVAL;  Surgeon: Juan Antonio Anne MD;  Location: [facility] OR O[facility];  Service: Orthopedics;  Laterality: Left;    HYSTERECTOMY  1989    Total    IMPLANTABLE CARDIOVERTER DEFIBRILLATOR LEAD REPLACEMENT/POCKET REVISION      JOINT REPLACEMENT  Right shoulder  and right knee ear    KNEE ARTHROSCOPY Bilateral 2014    Dr. Pascual    KNEE SURGERY Right 06/10/2010    NECK SURGERY  2012, 2018    OOPHORECTOMY      REDUCTION MAMMAPLASTY      SCAPULA OPEN REDUCTION INTERNAL FIXATION Left 04/15/2021    Procedure: Acromion nonunion OPEN REDUCTION INTERNAL FIXATION;  Surgeon: Juan Antonio Anne MD;  Location: [facility] OR;  Service: Orthopedics;  Laterality: Left;    SHOULDER ARTHROSCOPY W/ ROTATOR CUFF REPAIR Bilateral 05/20/2011    SHOULDER SURGERY Right     TONSILLECTOMY      TOTAL KNEE ARTHROPLASTY Right 04/18/2018    Procedure: RIGHT TOTAL KNEE ARTHROPLASTY WITH STEVE NAVIGATION;  Surgeon: Bravo Pascual MD;  Location: [facility] OR;  Service: Orthopedics    TOTAL SHOULDER ARTHROPLASTY W/ DISTAL CLAVICLE EXCISION Right 02/02/2017    Procedure: TOTAL SHOULDER REVERSE ARTHROPLASTY;  Surgeon: Juan Antonio Anne MD;  Location: [facility] OR;  Service:     TOTAL SHOULDER ARTHROPLASTY W/ DISTAL CLAVICLE EXCISION Left 10/22/2020    Procedure: TOTAL  SHOULDER REVERSE ARTHROPLASTY;  Surgeon: Juan Antonio Anne MD;  Location: [facility];  Service: Orthopedics;  Laterality: Left;    TRIGGER POINT INJECTION  Starting in  until now          Social History     Occupational History    Occupation:    Tobacco Use    Smoking status: Former     Current packs/day: 0.00     Average packs/day: 0.3 packs/day for 18.4 years (4.6 ttl pk-yrs)     Types: Cigarettes     Start date: 4/3/1967     Quit date: 1985     Years since quittin.8     Passive exposure: Past    Smokeless tobacco: Never    Tobacco comments:     Haven't smoked in 39 years   Vaping Use    Vaping status: Never Used   Substance and Sexual Activity    Alcohol use: No    Drug use: Never    Sexual activity: Not Currently     Birth control/protection: Post-menopausal, Hysterectomy      Social History     Social History Narrative    Not on file          Family History   Problem Relation Age of Onset    Heart attack Brother     Heart disease Brother     Hyperthyroidism Mother     Cancer Mother     Heart disease Mother     Osteoporosis Mother     Thyroid disease Mother     Broken bones Mother     No Known Problems Father          car accident    Cirrhosis Maternal Grandmother     No Known Problems Maternal Grandfather     No Known Problems Paternal Grandmother     No Known Problems Paternal Grandfather     Breast cancer Maternal Aunt     Cancer Maternal Aunt     Malig Hyperthermia Neg Hx        Review of Systems:      Constitutional: Denies fever, shaking or chills   Eyes: Denies change in visual acuity   HEENT: Denies nasal congestion or sore throat   Respiratory: Denies cough or shortness of breath   Cardiovascular: Denies chest pain or edema  Endocrine: Denies tremors, palpitations, intolerance of heat or cold, polyuria, polydipsia.  GI: Denies abdominal pain, nausea, vomiting, bloody stools or diarrhea  : Denies frequency, urgency, incontinence, retention, or  "nocturia.  Musculoskeletal: Denies numbness tingling or loss of motor function except as above  Integument: Denies rash, lesion or ulceration   Neurologic: Denies headache or focal weakness, deficits  Heme: Denies epistaxis, spontaneous or excessive bleeding, epistaxis, hematuria, melena, fatigue, enlarged or tender lymph nodes.      All other pertinent positives and negatives as noted above in HPI.    Physical Exam: 73 y.o. female    Vitals:    07/03/24 1424   Temp: 98.6 °F (37 °C)   Weight: 71.8 kg (158 lb 4.8 oz)   Height: 152.4 cm (60\")       General:  Patient is awake and alert.  Appears in no acute distress or discomfort.    Psych:  Affect and demeanor are appropriate.    Eyes:  Conjunctiva and sclera appear grossly normal.  Eyes track well and EOM seem to be intact.    Dentition:  No gross abnormalities noted.    Ears:  No gross abnormalities.  Hearing adequate for the exam.    Cardiovascular:  Regular rate and rhythm.    Lungs:  Good chest expansion.  Breathing unlabored.    Lymph:  No palpable masses or adenopathy in the affected extremity    Right upper extremity:  Sling was in place and removed.  Skin appears benign.  Moderate prominence at the sternoclavicular joint.  Tenderness to palpation in this area as well.  No palpable masses or adenopathy.  Compartments soft.  Painful, limited ROM of the shoulder.  No tenderness noted over the distal clavicle, proximal humerus, upper arm, elbow, forearm, wrist or hand.  Good strength in the wrist and hand including wrist flexion and extension, , pinch, finger and thumb abduction.  Intact sensation.  Brisk cap refill.  Palpable radial pulse      Diagnostic Tests:  Lab Results   Component Value Date    GLUCOSE 389 (H) 12/14/2023    CALCIUM 9.4 12/14/2023     12/14/2023    K 4.8 12/14/2023    CO2 26.0 12/14/2023     12/14/2023    BUN 17 12/14/2023    CREATININE 0.82 12/14/2023    EGFRIFAFRI >60 01/24/2022    EGFRIFNONA 74 04/20/2021    BCR 20.7 " 12/14/2023    ANIONGAP 10.0 12/14/2023     Lab Results   Component Value Date    WBC 6.01 02/03/2024    HGB 12.0 02/03/2024    HCT 37.3 02/03/2024    MCV 89.9 02/03/2024     02/03/2024     Lab Results   Component Value Date    INR 1.2 02/01/2024    INR 0.9 01/24/2022    INR 1.0 01/26/2021    PROTIME 13.7 (H) 02/01/2024    PROTIME 10.2 (L) 01/24/2022    PROTIME 11.0 01/26/2021       Imaging:    Dr. Anne and I reviewed the x-rays together.  Outside right shoulder x-rays (2 views) are reviewed.  These are compared to previous x-rays from 2017.  The x-rays demonstrate a well positioned, well aligned replacement without complicating factors noted. In comparison with previous films there has been no change.  No acute findings noted    Outside bilateral sternoclavicular x-rays (4 views) are reviewed.  The right sternoclavicular joint has a slightly abnormal appearance compared to the left.  This may represent anterior sternoclavicular displacement, but this is difficult to tell on plain film images.    Assessment:  1.  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  History of right reverse total shoulder replacement    Plan: We had a thorough discussion regarding further evaluation with CT scan versus continued conservative management based on current x-rays and symptoms.  I explained there is no surgical intervention recommended for her injury.  She verbalized understanding.  She is not interested in getting a CT scan at this time.  I explained her shoulder x-rays look fine.  There are no acute findings or any obvious complications in regards to her implants.  Appropriate activity modifications and restrictions were discussed.  I recommend continued use of the sling for comfort.  Patient to follow up in 2-3 weeks for reevaluation.      Date: 7/8/2024    CHRISTIN Zapata    CC to Chelly Red APRN    Much of this encounter note is an electronic transcription/translation of  spoken language to printed text. The electronic translation of spoken language may permit erroneous, or at times, nonsensical words or phrases to be inadvertently transcribed.  Although I have reviewed the note for such errors, some may still exist.

## 2024-07-26 ENCOUNTER — OFFICE VISIT (OUTPATIENT)
Dept: ORTHOPEDIC SURGERY | Facility: CLINIC | Age: 74
End: 2024-07-26
Payer: MEDICARE

## 2024-07-26 VITALS — WEIGHT: 155.2 LBS | HEIGHT: 60 IN | BODY MASS INDEX: 30.47 KG/M2 | TEMPERATURE: 97.8 F

## 2024-07-26 DIAGNOSIS — S49.91XD INJURY OF RIGHT CLAVICLE, SUBSEQUENT ENCOUNTER: Primary | ICD-10-CM

## 2024-07-26 DIAGNOSIS — Z96.611 HISTORY OF REVERSE TOTAL REPLACEMENT OF RIGHT SHOULDER JOINT: ICD-10-CM

## 2024-07-26 NOTE — PROGRESS NOTES
"Chief Complaint:  Follow up right clavicle injury    HPI:  Ms. Galicia comes in today for right clavicle follow-up.  Reports her pain has persisted despite Norco, activity modifications, and intermittent use of sling.  Describes her current pain as moderate, constant, and aching.  Denies any shooting pain down the arm, distal weakness, numbness or paresthesias.  Denies any new issues or concerns.    Vitals:    07/26/24 1003   Temp: 97.8 °F (36.6 °C)   Weight: 70.4 kg (155 lb 3.2 oz)   Height: 152.4 cm (60\")     Exam: Right upper extremity is briefly examined: Sling was in place and removed.  Skin appears benign.  Moderate prominence at the sternoclavicular joint.  Tenderness to palpation in this area as well.  Compartments soft.  Shoulder range of motion is limited and uncomfortable: 95° FE, 50° ER, IR to side pocket.  She did maintain her arm in full forward elevation when I raise it for her.  Good strength distally.  Palpable radial pulse.    Imaging:  AP and AP axial views of the right clavicle are ordered by myself and reviewed to evaluate the patient's complaint.  These are compared to previous x-rays.  The x-rays appear relatively unchanged.  The x-rays demonstrate a well position, well aligned replacement without complicating factors noted.  Show no obvious acute abnormalities, lesions, masses, significant degenerative changes, or other concerning findings.    Assessment: 1.  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  History of right reverse total shoulder replacement    Plan: Again, I reminded her there is no surgical intervention recommended for her injury.  I think we need to give it more time to see how things will progress.  She does tolerate motion of her shoulder better today compared to when I last saw her.  I will consult with Dr. Anne regarding activity restrictions and limitations.  We briefly discussed physical therapy, but she says she absolutely cannot " afford the co-pays for each visit.  She is happy to do home exercises.  Once have had an opportunity to talk with Dr. Anne next week, I will call her with recommendations going forward.  I recommend she postpone starting exercises until she hears back from me.  She verbalized understanding of all we discussed and agreed with this plan.  She will follow-up with Dr. Anne in 1 month.    Orin Maldonado, CHRISTIN    07/26/2024

## 2025-02-15 ENCOUNTER — APPOINTMENT (OUTPATIENT)
Dept: CT IMAGING | Facility: HOSPITAL | Age: 75
End: 2025-02-15
Payer: MEDICARE

## 2025-02-15 ENCOUNTER — APPOINTMENT (OUTPATIENT)
Dept: GENERAL RADIOLOGY | Facility: HOSPITAL | Age: 75
End: 2025-02-15
Payer: MEDICARE

## 2025-02-15 ENCOUNTER — HOSPITAL ENCOUNTER (OUTPATIENT)
Facility: HOSPITAL | Age: 75
Setting detail: OBSERVATION
Discharge: HOME | End: 2025-02-16
Attending: EMERGENCY MEDICINE | Admitting: EMERGENCY MEDICINE
Payer: MEDICARE

## 2025-02-15 DIAGNOSIS — G89.29 CHRONIC PAIN OF RIGHT UPPER EXTREMITY: Primary | ICD-10-CM

## 2025-02-15 DIAGNOSIS — M79.601 CHRONIC PAIN OF RIGHT UPPER EXTREMITY: Primary | ICD-10-CM

## 2025-02-15 DIAGNOSIS — Z79.01 ANTICOAGULATED BY ANTICOAGULATION TREATMENT: ICD-10-CM

## 2025-02-15 PROCEDURE — 73200 CT UPPER EXTREMITY W/O DYE: CPT

## 2025-02-15 PROCEDURE — 96372 THER/PROPH/DIAG INJ SC/IM: CPT

## 2025-02-15 PROCEDURE — 73030 X-RAY EXAM OF SHOULDER: CPT

## 2025-02-15 PROCEDURE — G0378 HOSPITAL OBSERVATION PER HR: HCPCS

## 2025-02-15 PROCEDURE — 25010000002 HYDROMORPHONE 1 MG/ML SOLUTION: Performed by: EMERGENCY MEDICINE

## 2025-02-15 PROCEDURE — 73020 X-RAY EXAM OF SHOULDER: CPT

## 2025-02-15 PROCEDURE — 99285 EMERGENCY DEPT VISIT HI MDM: CPT

## 2025-02-15 RX ORDER — ACETAMINOPHEN 500 MG
1000 TABLET ORAL EVERY 8 HOURS PRN
Status: DISCONTINUED | OUTPATIENT
Start: 2025-02-15 | End: 2025-02-16 | Stop reason: HOSPADM

## 2025-02-15 RX ORDER — IBUPROFEN 600 MG/1
1 TABLET ORAL
Status: DISCONTINUED | OUTPATIENT
Start: 2025-02-15 | End: 2025-02-16 | Stop reason: HOSPADM

## 2025-02-15 RX ORDER — DEXTROSE MONOHYDRATE 25 G/50ML
25 INJECTION, SOLUTION INTRAVENOUS
Status: DISCONTINUED | OUTPATIENT
Start: 2025-02-15 | End: 2025-02-16 | Stop reason: HOSPADM

## 2025-02-15 RX ORDER — SODIUM CHLORIDE 9 MG/ML
40 INJECTION, SOLUTION INTRAVENOUS AS NEEDED
Status: DISCONTINUED | OUTPATIENT
Start: 2025-02-15 | End: 2025-02-16 | Stop reason: HOSPADM

## 2025-02-15 RX ORDER — BISACODYL 5 MG/1
5 TABLET, DELAYED RELEASE ORAL DAILY PRN
Status: DISCONTINUED | OUTPATIENT
Start: 2025-02-15 | End: 2025-02-16 | Stop reason: HOSPADM

## 2025-02-15 RX ORDER — SODIUM CHLORIDE 0.9 % (FLUSH) 0.9 %
10 SYRINGE (ML) INJECTION AS NEEDED
Status: DISCONTINUED | OUTPATIENT
Start: 2025-02-15 | End: 2025-02-16 | Stop reason: HOSPADM

## 2025-02-15 RX ORDER — INSULIN LISPRO 100 [IU]/ML
2-9 INJECTION, SOLUTION INTRAVENOUS; SUBCUTANEOUS
Status: DISCONTINUED | OUTPATIENT
Start: 2025-02-16 | End: 2025-02-16 | Stop reason: HOSPADM

## 2025-02-15 RX ORDER — SODIUM CHLORIDE 0.9 % (FLUSH) 0.9 %
10 SYRINGE (ML) INJECTION EVERY 12 HOURS SCHEDULED
Status: DISCONTINUED | OUTPATIENT
Start: 2025-02-15 | End: 2025-02-16 | Stop reason: HOSPADM

## 2025-02-15 RX ORDER — BISACODYL 10 MG
10 SUPPOSITORY, RECTAL RECTAL DAILY PRN
Status: DISCONTINUED | OUTPATIENT
Start: 2025-02-15 | End: 2025-02-16 | Stop reason: HOSPADM

## 2025-02-15 RX ORDER — KETOROLAC TROMETHAMINE 15 MG/ML
15 INJECTION, SOLUTION INTRAMUSCULAR; INTRAVENOUS ONCE
Status: COMPLETED | OUTPATIENT
Start: 2025-02-15 | End: 2025-02-16

## 2025-02-15 RX ORDER — NICOTINE POLACRILEX 4 MG
15 LOZENGE BUCCAL
Status: DISCONTINUED | OUTPATIENT
Start: 2025-02-15 | End: 2025-02-16 | Stop reason: HOSPADM

## 2025-02-15 RX ORDER — AMOXICILLIN 250 MG
2 CAPSULE ORAL 2 TIMES DAILY PRN
Status: DISCONTINUED | OUTPATIENT
Start: 2025-02-15 | End: 2025-02-16 | Stop reason: HOSPADM

## 2025-02-15 RX ORDER — POLYETHYLENE GLYCOL 3350 17 G/17G
17 POWDER, FOR SOLUTION ORAL DAILY PRN
Status: DISCONTINUED | OUTPATIENT
Start: 2025-02-15 | End: 2025-02-16 | Stop reason: HOSPADM

## 2025-02-15 RX ADMIN — HYDROMORPHONE HYDROCHLORIDE 1 MG: 1 INJECTION, SOLUTION INTRAMUSCULAR; INTRAVENOUS; SUBCUTANEOUS at 19:52

## 2025-02-15 NOTE — ED NOTES
Pt via PV from home with c/o R shoulder injury; pt reports that she bent over to pick something up today and heard a loud pop.

## 2025-02-15 NOTE — ED PROVIDER NOTES
EMERGENCY DEPARTMENT ENCOUNTER  Room Number:  28/28  PCP: Chelly Red APRN  Independent Historians: Patient,, daughter at bedside      HPI:  Chief Complaint: had concerns including Shoulder Injury.     A complete HPI/ROS/PMH/PSH/SH/FH are unobtainable due to:   Chronic or social conditions impacting patient care (Social Determinants of Health):       Context: Kristan Galicia is a 74 y.o. female with a medical history of diabetes, hypertension, hyperlipidemia who presents to the ED c/o acute right shoulder pain.  Pain began abruptly after reaching out for a piece of paper with her right hand.  Pain is in the right shoulder that radiates up towards the neck.  She feels like she may have dislocated her shoulder.  Denies history of similar problems.  Patient is right-handed.  Denies chest pain or trouble breathing.      Review of prior external notes (non-ED) -and- Review of prior external test results outside of this encounter:   I reviewed prior medical records note the patient was hospitalized at [facility] earlier this month with toxic encephalopathy felt to be related to baclofen.  Chronic conditions include diabetes, hypertension and hyperlipidemia.  She is anticoagulated on Eliquis.  She does take Norco 10 for chronic pain.      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/2016    Migraines     Hyperlipidemia     History of transfusion     Heart murmur     Heart attack     GERD (gastroesophageal reflux disease)     CHF (congestive heart failure)     Cardiomyopathy      Asthma     Arthritis of right knee 12/27/2016    Right rotator cuff tear 02/02/2017    Hypothyroidism 02/22/2017    Dyspnea and respiratory abnormalities 03/03/2017    Orthopnea 03/03/2017    IHSS (idiopathic hypertrophic subaortic stenosis) 03/03/2017    AICD (automatic cardioverter/defibrillator) present 03/03/2017    Anticoagulant long-term use 03/03/2017    Hyperglycemia, drug-induced 03/06/2017    Chronic pain of right knee 03/17/2017    Diverticulitis 04/05/2016    CAD (coronary artery disease) 08/07/2017    Hypertrophic nonobstructive cardiomyopathy 06/25/2013    ICD (implantable cardioverter-defibrillator) discharge 04/04/2016    Menopause 08/07/2017    Non-obstructive hypertrophic cardiomyopathy 08/07/2017    Insomnia 03/27/2018    History of total knee arthroplasty, right 05/02/2018    Closed fracture of odontoid process of axis 09/14/2018    Atelectasis 09/16/2018    Chronic diastolic CHF (congestive heart failure) 08/14/2018    Diabetes mellitus 06/18/2019    Chronic gout involving 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 06/18/2019    Other displaced fracture of first cervical vertebra, subsequent encounter for fracture with routine healing 09/27/2018    Left shoulder pain 09/09/2020    Closed displaced fracture of left acromial process 03/29/2021    Cervical spondylosis with radiculopathy 04/25/2022    Diarrhea 05/23/2024    Colon polyps 06/18/2024     Resolved Ambulatory Problems     Diagnosis Date Noted    Depression     Anxiety     RSV bronchiolitis 03/06/2017    Hypoxia 09/16/2018     Past Medical History:   Diagnosis Date    Abnormal ECG 1989    Allergic     Anemia     Angina at rest     Arthritis     Atrial fibrillation     Cardiac defibrillator in place 01/2009    Cervical disc disorder     Cholelithiasis 1975    Chronic pain     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     Coronary artery disease     Diverticulitis of colon 2021    Diverticulosis     Fibrocystic breast  2010    Fracture, femur 1954, 1964, 2011    Fracture, foot 1969    Frozen shoulder 2016    History of bronchitis     History of COVID-19     History of depression     History of pneumonia     History of sleep apnea     History of tumor     Hypertension     Knee swelling 2017    Lumbosacral disc disease 2010    On anticoagulant therapy     Periarthritis of shoulder 2016    Rotator cuff syndrome 2014    Scoliosis     Shoulder pain     Sleep apnea          PAST SURGICAL HISTORY  Past Surgical History:   Procedure Laterality Date    ANTERIOR CERVICAL DISCECTOMY W/ FUSION  2012    C5-7    ANTERIOR CERVICAL DISCECTOMY W/ FUSION N/A 05/05/2022    Procedure: Cervical 3- Cervical 4, Cervical 4- Cervical 5 anterior cervical discectomy and fusion with Zero profile cage and titanuium mesh cage;  Surgeon: Wilfredo Bailey MD;  Location: [facility] MAIN OR;  Service: Orthopedic Spine;  Laterality: N/A;    APPENDECTOMY  1970    BILATERAL BREAST REDUCTION      BREAST BIOPSY      CARDIAC CATHETERIZATION  02/19/2007    CARDIAC DEFIBRILLATOR PLACEMENT Left 1999, 2013    Dr. Ya    CATARACT EXTRACTION Bilateral     CHOLECYSTECTOMY OPEN  1974    COLONOSCOPY  1996    Dr. Herbert Gonsales     COLONOSCOPY  08/16/2016    Procedure: COLONOSCOPY with cecum;  Surgeon: Rosalio Sheikh MD;  Location: [facility] ENDOSCOPY;  Service:     COLONOSCOPY N/A 6/18/2024    Procedure: COLONOSCOPY to with partial cecum viewing with cold biopsies/polypectomy with clip placement x3;  Surgeon: Sabino Arboleda MD;  Location: [facility] ENDOSCOPY;  Service: General;  Laterality: N/A;  pre change in bowel habits  post lipoma clip x2 diverticulosos polyp    ENDOSCOPY N/A 04/27/2017    Procedure: ESOPHAGOGASTRODUODENOSCOPY WITH BIOPSIES;  Surgeon: Rosalio Sheikh MD;  Location: [facility] ENDOSCOPY;  Service:     ENDOSCOPY N/A 6/18/2024    Procedure: ESOPHAGOGASTRODUODENOSCOPY WITH BIOPSiES/cold forcep;  Surgeon: Sabino Arboleda MD;  Location: [facility]  ENDOSCOPY;  Service: General;  Laterality: N/A;  pre reflux post gastritis    FEMUR SURGERY Left     with metal implant  x3    FIRST RIB RESECTION Bilateral     and scalenectomy    FRACTURE SURGERY      Broke left femor 3 times    HARDWARE REMOVAL Left 12/28/2023    Procedure: SHOULDER HARDWARE REMOVAL;  Surgeon: Juan Antonio Anne MD;  Location: [facility] OSC;  Service: Orthopedics;  Laterality: Left;    HYSTERECTOMY  1989    Total    IMPLANTABLE CARDIOVERTER DEFIBRILLATOR LEAD REPLACEMENT/POCKET REVISION      JOINT REPLACEMENT  Right shoulder  and right knee ear    KNEE ARTHROSCOPY Bilateral 2014    Dr. Pascual    KNEE SURGERY Right 06/10/2010    NECK SURGERY  2012, 2018    OOPHORECTOMY      REDUCTION MAMMAPLASTY      SCAPULA OPEN REDUCTION INTERNAL FIXATION Left 04/15/2021    Procedure: Acromion nonunion OPEN REDUCTION INTERNAL FIXATION;  Surgeon: Juan Antonio Anne MD;  Location: [facility] OR;  Service: Orthopedics;  Laterality: Left;    SHOULDER ARTHROSCOPY W/ ROTATOR CUFF REPAIR Bilateral 05/20/2011    SHOULDER SURGERY Right     TONSILLECTOMY      TOTAL KNEE ARTHROPLASTY Right 04/18/2018    Procedure: RIGHT TOTAL KNEE ARTHROPLASTY WITH STEVE NAVIGATION;  Surgeon: Bravo Pascual MD;  Location: [facility] OR;  Service: Orthopedics    TOTAL SHOULDER ARTHROPLASTY W/ DISTAL CLAVICLE EXCISION Right 02/02/2017    Procedure: TOTAL SHOULDER REVERSE ARTHROPLASTY;  Surgeon: Juan Antonio Anne MD;  Location: [facility];  Service:     TOTAL SHOULDER ARTHROPLASTY W/ DISTAL CLAVICLE EXCISION Left 10/22/2020    Procedure: TOTAL SHOULDER REVERSE ARTHROPLASTY;  Surgeon: Juan Antonio Anne MD;  Location: [facility];  Service: Orthopedics;  Laterality: Left;    TRIGGER POINT INJECTION  Starting in 2010 until now         FAMILY HISTORY  Family History   Problem Relation Age of Onset    Heart attack Brother     Heart disease Brother     Hyperthyroidism Mother     Cancer Mother     Heart disease Mother     Osteoporosis Mother      Thyroid disease Mother     Broken bones Mother     No Known Problems Father          car accident    Cirrhosis Maternal Grandmother     No Known Problems Maternal Grandfather     No Known Problems Paternal Grandmother     No Known Problems Paternal Grandfather     Breast cancer Maternal Aunt     Cancer Maternal Aunt     Malig Hyperthermia Neg Hx          SOCIAL HISTORY  Social History     Socioeconomic History    Marital status:     Number of children: 1   Tobacco Use    Smoking status: Former     Current packs/day: 0.00     Average packs/day: 0.3 packs/day for 18.4 years (4.6 ttl pk-yrs)     Types: Cigarettes     Start date: 4/3/1967     Quit date: 1985     Years since quittin.4     Passive exposure: Past    Smokeless tobacco: Never    Tobacco comments:     Haven't smoked in 39 years   Vaping Use    Vaping status: Never Used   Substance and Sexual Activity    Alcohol use: No    Drug use: Never    Sexual activity: Not Currently     Birth control/protection: Post-menopausal, Hysterectomy         ALLERGIES  Adhesive tape, Aminobenzoate, Lorazepam, Baclofen, Aspirin, Biaxin [clarithromycin], Codeine, Darvon [propoxyphene], Levaquin [levofloxacin], Nitroglycerin, Percocet [oxycodone-acetaminophen], and Tequin [gatifloxacin]      REVIEW OF SYSTEMS  Review of Systems   Constitutional:  Negative for fever.   Respiratory:  Negative for shortness of breath.    Cardiovascular:  Negative for chest pain.   Musculoskeletal:  Positive for arthralgias (Right shoulder pain as per HPI).   All other systems reviewed and are negative.    Included in HPI  All systems reviewed and negative except for those discussed in HPI.      PHYSICAL EXAM    I have reviewed the triage vital signs and nursing notes.    ED Triage Vitals [02/15/25 1821]   Temp Heart Rate Resp BP SpO2   98.9 °F (37.2 °C) 79 18 -- 95 %      Temp src Heart Rate Source Patient Position BP Location FiO2 (%)   Tympanic -- -- -- --       Physical  Exam  GENERAL: Alert well-appearing female no obvious distress.  She is wearing a homemade brace around the right arm to prevent movement of the shoulder and elbow.  Triage vitals reviewed and are benign  SKIN: Warm, dry  HENT: Normocephalic, atraumatic  EYES: no scleral icterus  CV: regular rhythm, regular rate-no murmur  RESPIRATORY: normal effort, lungs clear-O2 sats upper 90s room air  ABDOMEN: soft, nontender, nondistended  MUSCULOSKELETAL: Spine-no significant bony tenderness to palpation, no noted  Upper extremities-limited range of motion of the right shoulder, diffuse tenderness to palpation.  Examination of the elbow wrist and hand is unremarkable.  Lower extremities-atraumatic  NEURO: alert, moves all extremities, follows commands      LAB RESULTS  No results found for this or any previous visit (from the past 24 hours).      RADIOLOGY  CT Upper Extremity Right Without Contrast    Result Date: 2/15/2025  CT OF THE RIGHT UPPER EXTREMITY  HISTORY: Possible dislocation  COMPARISON: February 15, 2025  TECHNIQUE: Axial CT imaging was obtained through the right upper extremity. Coronal and sagittal reformatted images were obtained.  FINDINGS: The patient has a right shoulder arthroplasty. I don't see clear evidence of subluxation. No obvious acute fracture is seen, although there is severe streak artifact from the hardware. Limited images through the lungs do not demonstrate any acute abnormalities.      I don't see an obvious subluxation of the patient's right shoulder arthroplasty. No obvious fracture is seen. Scapular Y view may be helpful for further elucidation position of the hardware.  Radiation dose reduction techniques were utilized, including automated exposure control and exposure modulation based on body size.   This report was finalized on 2/15/2025 10:22 PM by Dr. Christi Munson M.D on Workstation: [id number]      XR Shoulder 2+ View Right    Result Date: 2/15/2025  2 VIEWS RIGHT SHOULDER   HISTORY: Right shoulder pain  COMPARISON: February 2, 2017  FINDINGS: Patient was unable to move her arm, so a single view only was obtained. Position of the patient's right shoulder arthroplasty cannot be assessed on this single view. No obvious acute fracture is seen.      Nondiagnostic exam.  This report was finalized on 2/15/2025 7:07 PM by Dr. Christi Munson M.D on Workstation: [id number]         MEDICATIONS GIVEN IN ER  Medications   HYDROmorphone (DILAUDID) injection 1 mg (1 mg Intramuscular Given 2/15/25 1952)         ORDERS PLACED DURING THIS VISIT:  Orders Placed This Encounter   Procedures    XR Shoulder 2+ View Right    CT Upper Extremity Right Without Contrast    Initiate ED Observation Status         OUTPATIENT MEDICATION MANAGEMENT:  No current Epic-ordered facility-administered medications on file.     Current Outpatient Medications Ordered in Epic   Medication Sig Dispense Refill    alendronate (FOSAMAX) 70 MG tablet Take 1 tablet by mouth Every 7 (Seven) Days. (Patient taking differently: Take 1 tablet by mouth Every 7 (Seven) Days. TAKES ON SUNDAYS) 12 tablet 3    allopurinol (ZYLOPRIM) 100 MG tablet Take 1 tablet by mouth Daily. 90 tablet 3    apixaban (Eliquis) 5 MG tablet tablet Take 1 tablet by mouth Every 12 (Twelve) Hours.      atorvastatin (LIPITOR) 80 MG tablet Take 1 tablet by mouth every night at bedtime. 90 tablet 3    baclofen (LIORESAL) 10 MG tablet Take 1 tablet by mouth 3 (Three) Times a Day. 30 tablet 0    Blood Glucose Monitoring Suppl (ACCU-CHEK OTILIA PLUS) W/DEVICE kit Dx e11.9 use to check BS BID, ok to substitute formulary preferred 1 kit 0    dilTIAZem CD (CARDIZEM CD) 120 MG 24 hr capsule Take 1 capsule by mouth Daily.      docusate sodium (COLACE) 100 MG capsule Take 1 capsule by mouth 2 (Two) Times a Day. 60 capsule 0    DULoxetine (CYMBALTA) 60 MG capsule Take 1 capsule by mouth Daily. (Patient taking differently: Take 1 capsule by mouth Every Night.) 90 capsule 3     famotidine (PEPCID) 20 MG tablet Take 1 tablet by mouth 2 (Two) Times a Day. 14 tablet 0    glucose blood (ACCU-CHEK OTILIA PLUS) test strip Dx e11.9 use to check BS BID, ok to substitute formulary preferred 60 each 11    HYDROcodone-acetaminophen (NORCO)  MG per tablet Take 1 tablet by mouth Every 4 (Four) Hours As Needed for Moderate Pain (Pain). 42 tablet 0    Lancets (ACCU-CHEK MULTICLIX) lancets Dx e11.9 use to check BS BID, ok to substitute formulary preferred 60 each 11    levothyroxine (SYNTHROID, LEVOTHROID) 75 MCG tablet TAKE 1 TABLET BY MOUTH EVERY DAY (Patient taking differently: Take 1 tablet by mouth Daily.) 90 tablet 2    Loratadine 10 MG capsule Take 1 capsule by mouth Daily.      metoprolol tartrate (LOPRESSOR) 50 MG tablet       montelukast (SINGULAIR) 10 MG tablet Take 1 tablet by mouth Daily. (Patient taking differently: Take 1 tablet by mouth Every Night.) 90 tablet 3    multivitamin with minerals tablet tablet Take 2 tablets by mouth Every Night.      neomycin-polymyxin-dexamethamethasone (POLYDEX) 3.5-78899-4.1 ointment ophthalmic ointment       omeprazole (priLOSEC) 40 MG capsule Take 1 capsule by mouth Daily. 60 capsule 0    Ozempic, 0.25 or 0.5 MG/DOSE, 2 MG/3ML solution pen-injector Inject 0.5 mg under the skin into the appropriate area as directed.      tamsulosin (FLOMAX) 0.4 MG capsule 24 hr capsule Take 1 capsule by mouth Daily.           PROCEDURES  Procedures            PROGRESS, DATA ANALYSIS, CONSULTS, AND MEDICAL DECISION MAKING  All labs have been independently interpreted by me.  All radiology studies have been reviewed by me. All EKG's have been independently viewed and interpreted by me.  Discussion below represents my analysis of pertinent findings related to patient's condition, differential diagnosis, treatment plan and final disposition.    Differential diagnosis includes but is not limited to shoulder dislocation, shoulder fracture, shoulder sprain.      ED Course as  of 02/15/25 2242   Sat Feb 15, 2025   2239 Patient still has pretty significant pain with the arm despite the Dilaudid.  She is unable to raise her arm up for a scapular Y view.  I did discuss going home with a sling and swath and following up with Dr. Anne in the office.  Patient lives at home alone and feels like she is unable to go home and function well without her right arm.  She would like to be admitted to observation unit for pain control and orthopedic evaluation. [DB]   2240 I did discuss treatment and evaluation with NP Francis Sharma who admit to the observation unit. [DB]      ED Course User Index  [DB] Delvis Ibrahim MD             AS OF 22:42 EST VITALS:    BP - 117/57  HR - 67  TEMP - 98.9 °F (37.2 °C) (Tympanic)  O2 SATS - 95%    COMPLEXITY OF CARE  74-year-old female with history of bilateral shoulder surgeries presents with abrupt onset of right shoulder pain after reaching out for a paper.  She feels her shoulder is dislocated.  Patient has been unable to move her x-ray very well which is limited our ability to get good x-rays.  She is refused to turn her arm or raise the arm for imaging studies.    She was treated with IM Dilaudid.    Plain films independently interpreted by me showed no obvious fracture.  Location of this shoulder was not easily identified.  CT imaging was obtained and again it is difficult to fully assess the location of the humeral prosthetic head.  We did discuss going home with sling and swath and following up with orthopedics as outpatient but patient is uncomfortable with this plan and does not feel she can care for herself readily.  Would like to be admitted for orthopedic evaluation.          DIAGNOSIS  Final diagnoses:   Chronic pain of right upper extremity   Anticoagulated by anticoagulation treatment         DISPOSITION  ED Disposition       ED Disposition   Intended Admit    Condition   --    Comment   --                Please note that portions of this document  were completed with a voice recognition program.    Note Disclaimer: At [facility], we believe that sharing information builds trust and better relationships. You are receiving this note because you recently visited [facility]. It is possible you will see health information before a provider has talked with you about it. This kind of information can be easy to misunderstand. To help you fully understand what it means for your health, we urge you to discuss this note with your provider.         Delvis Ibrahim MD  02/15/25 [id number]

## 2025-02-16 VITALS
SYSTOLIC BLOOD PRESSURE: 94 MMHG | WEIGHT: 158.6 LBS | HEIGHT: 60 IN | HEART RATE: 68 BPM | BODY MASS INDEX: 31.14 KG/M2 | DIASTOLIC BLOOD PRESSURE: 59 MMHG | RESPIRATION RATE: 16 BRPM | TEMPERATURE: 98.2 F | OXYGEN SATURATION: 94 %

## 2025-02-16 LAB
ANION GAP SERPL CALCULATED.3IONS-SCNC: 9.9 MMOL/L (ref 5–15)
APTT PPP: 31.5 SECONDS (ref 22.7–35.4)
BASOPHILS # BLD AUTO: 0.06 10*3/MM3 (ref 0–0.2)
BASOPHILS NFR BLD AUTO: 0.8 % (ref 0–1.5)
BUN SERPL-MCNC: 22 MG/DL (ref 8–23)
BUN/CREAT SERPL: 21.8 (ref 7–25)
CALCIUM SPEC-SCNC: 8.8 MG/DL (ref 8.6–10.5)
CALCIUM SPEC-SCNC: 8.9 MG/DL (ref 8.6–10.5)
CHLORIDE SERPL-SCNC: 105 MMOL/L (ref 98–107)
CO2 SERPL-SCNC: 28.1 MMOL/L (ref 22–29)
CREAT SERPL-MCNC: 1.01 MG/DL (ref 0.57–1)
DEPRECATED RDW RBC AUTO: 43.3 FL (ref 37–54)
EGFRCR SERPLBLD CKD-EPI 2021: 58.9 ML/MIN/1.73
EOSINOPHIL # BLD AUTO: 0.18 10*3/MM3 (ref 0–0.4)
EOSINOPHIL NFR BLD AUTO: 2.5 % (ref 0.3–6.2)
ERYTHROCYTE [DISTWIDTH] IN BLOOD BY AUTOMATED COUNT: 12.6 % (ref 12.3–15.4)
GLUCOSE BLDC GLUCOMTR-MCNC: 106 MG/DL (ref 70–130)
GLUCOSE SERPL-MCNC: 100 MG/DL (ref 65–99)
HCT VFR BLD AUTO: 33.7 % (ref 34–46.6)
HGB BLD-MCNC: 11.1 G/DL (ref 12–15.9)
IMM GRANULOCYTES # BLD AUTO: 0.03 10*3/MM3 (ref 0–0.05)
IMM GRANULOCYTES NFR BLD AUTO: 0.4 % (ref 0–0.5)
INR PPP: 1.43 (ref 0.9–1.1)
LYMPHOCYTES # BLD AUTO: 1.8 10*3/MM3 (ref 0.7–3.1)
LYMPHOCYTES NFR BLD AUTO: 25.3 % (ref 19.6–45.3)
MCH RBC QN AUTO: 30.8 PG (ref 26.6–33)
MCHC RBC AUTO-ENTMCNC: 32.9 G/DL (ref 31.5–35.7)
MCV RBC AUTO: 93.6 FL (ref 79–97)
MONOCYTES # BLD AUTO: 0.71 10*3/MM3 (ref 0.1–0.9)
MONOCYTES NFR BLD AUTO: 10 % (ref 5–12)
NEUTROPHILS NFR BLD AUTO: 4.34 10*3/MM3 (ref 1.7–7)
NEUTROPHILS NFR BLD AUTO: 61 % (ref 42.7–76)
NRBC BLD AUTO-RTO: 0 /100 WBC (ref 0–0.2)
PLATELET # BLD AUTO: 217 10*3/MM3 (ref 140–450)
PMV BLD AUTO: 8.7 FL (ref 6–12)
POTASSIUM SERPL-SCNC: 4.9 MMOL/L (ref 3.5–5.2)
PROTHROMBIN TIME: 17.7 SECONDS (ref 11.7–14.2)
PTH-INTACT SERPL-MCNC: 75.5 PG/ML (ref 15–65)
RBC # BLD AUTO: 3.6 10*6/MM3 (ref 3.77–5.28)
SODIUM SERPL-SCNC: 143 MMOL/L (ref 136–145)
WBC NRBC COR # BLD AUTO: 7.12 10*3/MM3 (ref 3.4–10.8)

## 2025-02-16 PROCEDURE — 97165 OT EVAL LOW COMPLEX 30 MIN: CPT

## 2025-02-16 PROCEDURE — 82948 REAGENT STRIP/BLOOD GLUCOSE: CPT

## 2025-02-16 PROCEDURE — 96374 THER/PROPH/DIAG INJ IV PUSH: CPT

## 2025-02-16 PROCEDURE — 85730 THROMBOPLASTIN TIME PARTIAL: CPT

## 2025-02-16 PROCEDURE — 85025 COMPLETE CBC W/AUTO DIFF WBC: CPT

## 2025-02-16 PROCEDURE — 80048 BASIC METABOLIC PNL TOTAL CA: CPT

## 2025-02-16 PROCEDURE — 85610 PROTHROMBIN TIME: CPT

## 2025-02-16 PROCEDURE — 25010000002 KETOROLAC TROMETHAMINE PER 15 MG

## 2025-02-16 PROCEDURE — 83970 ASSAY OF PARATHORMONE: CPT

## 2025-02-16 PROCEDURE — G0378 HOSPITAL OBSERVATION PER HR: HCPCS

## 2025-02-16 RX ORDER — HYDROCODONE BITARTRATE AND ACETAMINOPHEN 10; 325 MG/1; MG/1
1 TABLET ORAL EVERY 4 HOURS PRN
Status: DISCONTINUED | OUTPATIENT
Start: 2025-02-16 | End: 2025-02-16 | Stop reason: HOSPADM

## 2025-02-16 RX ORDER — DULOXETIN HYDROCHLORIDE 60 MG/1
60 CAPSULE, DELAYED RELEASE ORAL NIGHTLY
Status: DISCONTINUED | OUTPATIENT
Start: 2025-02-16 | End: 2025-02-16 | Stop reason: HOSPADM

## 2025-02-16 RX ORDER — CETIRIZINE HYDROCHLORIDE 10 MG/1
10 TABLET ORAL DAILY
Status: DISCONTINUED | OUTPATIENT
Start: 2025-02-16 | End: 2025-02-16 | Stop reason: HOSPADM

## 2025-02-16 RX ORDER — ATORVASTATIN CALCIUM 80 MG/1
80 TABLET, FILM COATED ORAL DAILY
Status: DISCONTINUED | OUTPATIENT
Start: 2025-02-16 | End: 2025-02-16 | Stop reason: HOSPADM

## 2025-02-16 RX ORDER — DILTIAZEM HYDROCHLORIDE 120 MG/1
120 CAPSULE, COATED, EXTENDED RELEASE ORAL DAILY
Status: DISCONTINUED | OUTPATIENT
Start: 2025-02-16 | End: 2025-02-16 | Stop reason: HOSPADM

## 2025-02-16 RX ORDER — ALLOPURINOL 100 MG/1
100 TABLET ORAL DAILY
Status: DISCONTINUED | OUTPATIENT
Start: 2025-02-16 | End: 2025-02-16 | Stop reason: HOSPADM

## 2025-02-16 RX ORDER — PANTOPRAZOLE SODIUM 40 MG/1
40 TABLET, DELAYED RELEASE ORAL
Status: DISCONTINUED | OUTPATIENT
Start: 2025-02-16 | End: 2025-02-16 | Stop reason: HOSPADM

## 2025-02-16 RX ORDER — METOPROLOL TARTRATE 50 MG
50 TABLET ORAL ONCE
Status: COMPLETED | OUTPATIENT
Start: 2025-02-16 | End: 2025-02-16

## 2025-02-16 RX ORDER — TAMSULOSIN HYDROCHLORIDE 0.4 MG/1
0.4 CAPSULE ORAL DAILY
Status: DISCONTINUED | OUTPATIENT
Start: 2025-02-16 | End: 2025-02-16 | Stop reason: HOSPADM

## 2025-02-16 RX ORDER — MONTELUKAST SODIUM 10 MG/1
10 TABLET ORAL NIGHTLY
Status: DISCONTINUED | OUTPATIENT
Start: 2025-02-16 | End: 2025-02-16 | Stop reason: HOSPADM

## 2025-02-16 RX ORDER — MULTIPLE VITAMINS W/ MINERALS TAB 9MG-400MCG
2 TAB ORAL NIGHTLY
Status: DISCONTINUED | OUTPATIENT
Start: 2025-02-16 | End: 2025-02-16 | Stop reason: HOSPADM

## 2025-02-16 RX ORDER — FAMOTIDINE 20 MG/1
20 TABLET, FILM COATED ORAL 2 TIMES DAILY
Status: DISCONTINUED | OUTPATIENT
Start: 2025-02-16 | End: 2025-02-16 | Stop reason: HOSPADM

## 2025-02-16 RX ADMIN — LEVOTHYROXINE SODIUM 75 MCG: 0.03 TABLET ORAL at 05:50

## 2025-02-16 RX ADMIN — MONTELUKAST 10 MG: 10 TABLET, FILM COATED ORAL at 01:19

## 2025-02-16 RX ADMIN — Medication 10 ML: at 00:36

## 2025-02-16 RX ADMIN — HYDROCODONE BITARTRATE AND ACETAMINOPHEN 1 TABLET: 10; 325 TABLET ORAL at 13:31

## 2025-02-16 RX ADMIN — Medication 2 TABLET: at 01:19

## 2025-02-16 RX ADMIN — HYDROCODONE BITARTRATE AND ACETAMINOPHEN 1 TABLET: 10; 325 TABLET ORAL at 02:09

## 2025-02-16 RX ADMIN — Medication 10 ML: at 08:30

## 2025-02-16 RX ADMIN — DULOXETINE 60 MG: 60 CAPSULE, DELAYED RELEASE ORAL at 01:19

## 2025-02-16 RX ADMIN — APIXABAN 5 MG: 5 TABLET, FILM COATED ORAL at 01:18

## 2025-02-16 RX ADMIN — CETIRIZINE HYDROCHLORIDE 10 MG: 10 TABLET, FILM COATED ORAL at 08:28

## 2025-02-16 RX ADMIN — METOPROLOL TARTRATE 50 MG: 50 TABLET, FILM COATED ORAL at 01:18

## 2025-02-16 RX ADMIN — DILTIAZEM HYDROCHLORIDE 120 MG: 120 CAPSULE, EXTENDED RELEASE ORAL at 08:28

## 2025-02-16 RX ADMIN — ATORVASTATIN CALCIUM 80 MG: 80 TABLET, FILM COATED ORAL at 08:28

## 2025-02-16 RX ADMIN — PANTOPRAZOLE SODIUM 40 MG: 40 TABLET, DELAYED RELEASE ORAL at 05:50

## 2025-02-16 RX ADMIN — KETOROLAC TROMETHAMINE 15 MG: 15 INJECTION, SOLUTION INTRAMUSCULAR; INTRAVENOUS at 00:36

## 2025-02-16 RX ADMIN — HYDROCODONE BITARTRATE AND ACETAMINOPHEN 1 TABLET: 10; 325 TABLET ORAL at 05:50

## 2025-02-16 RX ADMIN — ALLOPURINOL 100 MG: 100 TABLET ORAL at 08:28

## 2025-02-16 RX ADMIN — APIXABAN 5 MG: 5 TABLET, FILM COATED ORAL at 13:31

## 2025-02-16 RX ADMIN — HYDROCODONE BITARTRATE AND ACETAMINOPHEN 1 TABLET: 10; 325 TABLET ORAL at 09:52

## 2025-02-16 RX ADMIN — FAMOTIDINE 20 MG: 20 TABLET, FILM COATED ORAL at 08:28

## 2025-02-16 NOTE — PLAN OF CARE
Goal Outcome Evaluation:      Patient ready for discharge to home

## 2025-02-16 NOTE — H&P
[facility]   HISTORY AND PHYSICAL    Patient Name: Kristan Galicia  : 1950  MRN: [id number]  Primary Care Physician:  Chelly Red APRN  Date of admission: 2/15/2025    Subjective   Subjective     Chief Complaint:   Chief Complaint   Patient presents with    Shoulder Injury         HPI:    Kristan Galicia is a 74 y.o. female with medical history including A-fib, CAD, T2DM, sleep apnea, and remote history of total right shoulder arthroplasty in 2017.  She bent down to  a piece of paper with her right hand and heard a loud pop and sudden onset of sharp right shoulder pain.  She denies any radiation.  Pain is concentrated over the right shoulder, distal radial pulses 1+ equal bilaterally.  She denies falls or trauma to the right shoulder.  She denies numbness or tingling in her right arm.  She took a dose of p.o. Norco at home without relief and in the ED pain has been intractable to IM Dilaudid.     Review of Systems   All systems were reviewed and negative except for: Right shoulder pain      Personal History     Past Medical History:   Diagnosis Date    Abnormal ECG     HOCM    Allergic     Seasonal    Anemia     Angina at rest     history    Arthritis     Asthma     Atrial fibrillation     Cardiac defibrillator in place 2009    medtronic     Cardiomyopathy     Hypertrophic Cardiomyopathy    Cervical disc disorder     CHF (congestive heart failure)     Cholelithiasis     Removed surgically    Chronic pain     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     Coronary artery disease     Diabetes mellitus     Type II    Diverticulitis of colon     Diverticulosis     hx diverticulitis    Fibrocystic breast 2010    Fracture, femur 1954, ,     Fracture, foot 1969    Frozen shoulder 2016    GERD (gastroesophageal reflux disease)     Heart murmur     History of bronchitis     History of COVID-19     History of depression     History of  pneumonia     History of sleep apnea     History of transfusion     no reaction    History of tumor     left ocular tumor, treated with steroids    Hyperlipidemia     Hypertension     Knee swelling 2017    Lumbosacral disc disease 2010    Migraines     On anticoagulant therapy     Osteoporosis     Palpitations     Periarthritis of shoulder 2016    Rotator cuff syndrome 2014    Scoliosis     Shoulder pain     left    Sleep apnea     HX OF SLEEP APNEA AND WORE CPAP. PT STATED SINCE SHE LOST WEIGHT AND WAS RETESTED AND WAS TOLD SHE NO LONGER HAS IT. STOPPED USING CPAP AROUND 2018.    Thyroid disease     HYPOTHYROIDISM    Vasovagal syncope     Vertigo        Past Surgical History:   Procedure Laterality Date    ANTERIOR CERVICAL DISCECTOMY W/ FUSION  2012    C5-7    ANTERIOR CERVICAL DISCECTOMY W/ FUSION N/A 05/05/2022    Procedure: Cervical 3- Cervical 4, Cervical 4- Cervical 5 anterior cervical discectomy and fusion with Zero profile cage and titanuium mesh cage;  Surgeon: Wilfredo Bailey MD;  [place] [facility] [facility];  Service: Orthopedic Spine;  Laterality: N/A;    APPENDECTOMY  1970    BILATERAL BREAST REDUCTION      BREAST BIOPSY      CARDIAC CATHETERIZATION  02/19/2007    CARDIAC DEFIBRILLATOR PLACEMENT Left 1999, 2013    Dr. Ya    CATARACT EXTRACTION Bilateral     CHOLECYSTECTOMY OPEN  1974    COLONOSCOPY  1996    Dr. Herbert Gonsales     COLONOSCOPY  08/16/2016    Procedure: COLONOSCOPY with cecum;  Surgeon: Rosalio Sheikh MD;  [place] [facility] [place] [facility];  Service:     COLONOSCOPY N/A 6/18/2024    Procedure: COLONOSCOPY to with partial cecum viewing with cold biopsies/polypectomy with clip placement x3;  Surgeon: Sabino Arboleda MD;  Location[place] [facility];  Service: General;  Laterality: N/A;  pre change in bowel habits  post lipoma clip x2 diverticulosos polyp    ENDOSCOPY N/A 04/27/2017    Procedure: ESOPHAGOGASTRODUODENOSCOPY WITH BIOPSIES;  Surgeon: Rosalio Sheikh MD;  Location:   [facility];  Service:     ENDOSCOPY N/A 6/18/2024    Procedure: ESOPHAGOGASTRODUODENOSCOPY WITH BIOPSiES/cold forcep;  Surgeon: Sabino Arboleda MD;  Location: [facility] ENDOSCOPY;  Service: General;  Laterality: N/A;  pre reflux post gastritis    FEMUR SURGERY Left     with metal implant  x3    FIRST RIB RESECTION Bilateral     and scalenectomy    FRACTURE SURGERY      Broke left femor 3 times    HARDWARE REMOVAL Left 12/28/2023    Procedure: SHOULDER HARDWARE REMOVAL;  Surgeon: Juan Antonio Anne MD;  Location: [facility] OR [facility];  Service: Orthopedics;  Laterality: Left;    HYSTERECTOMY  1989    Total    IMPLANTABLE CARDIOVERTER DEFIBRILLATOR LEAD REPLACEMENT/POCKET REVISION      JOINT REPLACEMENT  Right shoulder  and right knee ear    KNEE ARTHROSCOPY Bilateral 2014    Dr. Pascual    KNEE SURGERY Right 06/10/2010    NECK SURGERY  2012, 2018    OOPHORECTOMY      REDUCTION MAMMAPLASTY      SCAPULA OPEN REDUCTION INTERNAL FIXATION Left 04/15/2021    Procedure: Acromion nonunion OPEN REDUCTION INTERNAL FIXATION;  Surgeon: Juan Antonio Anne MD;  Location: [facility] OR;  Service: Orthopedics;  Laterality: Left;    SHOULDER ARTHROSCOPY W/ ROTATOR CUFF REPAIR Bilateral 05/20/2011    SHOULDER SURGERY Right     TONSILLECTOMY      TOTAL KNEE ARTHROPLASTY Right 04/18/2018    Procedure: RIGHT TOTAL KNEE ARTHROPLASTY WITH STEVE NAVIGATION;  Surgeon: Bravo Pascual MD;  Location: [facility] OR;  Service: Orthopedics    TOTAL SHOULDER ARTHROPLASTY W/ DISTAL CLAVICLE EXCISION Right 02/02/2017    Procedure: TOTAL SHOULDER REVERSE ARTHROPLASTY;  Surgeon: uJan Antonio Anne MD;  Location: [facility] OR;  Service:     TOTAL SHOULDER ARTHROPLASTY W/ DISTAL CLAVICLE EXCISION Left 10/22/2020    Procedure: TOTAL SHOULDER REVERSE ARTHROPLASTY;  Surgeon: Juan Antonio Anne MD;  Location: [facility] MAIN OR;  Service: Orthopedics;  Laterality: Left;    TRIGGER POINT INJECTION  Starting in 2010 until now       Family History: family history  includes Breast cancer in her maternal aunt; Broken bones in her mother; Cancer in her maternal aunt and mother; Cirrhosis in her maternal grandmother; Heart attack in her brother; Heart disease in her brother and mother; Hyperthyroidism in her mother; No Known Problems in her father, maternal grandfather, paternal grandfather, and paternal grandmother; Osteoporosis in her mother; Thyroid disease in her mother. Otherwise pertinent FHx was reviewed and not pertinent to current issue.    Social History:  reports that she quit smoking about 39 years ago. Her smoking use included cigarettes. She started smoking about 57 years ago. She has a 4.6 pack-year smoking history. She has been exposed to tobacco smoke. She has never used smokeless tobacco. She reports that she does not drink alcohol and does not use drugs.    Home Medications:  Accu-Chek Sangeeta Plus, DULoxetine, HYDROcodone-acetaminophen, Loratadine, Semaglutide(0.25 or 0.5MG/DOS), accu-chek multiclix, alendronate, allopurinol, apixaban, atorvastatin, baclofen, dilTIAZem CD, docusate sodium, famotidine, glucose blood, levothyroxine, metoprolol tartrate, montelukast, multivitamin with minerals, neomycin-polymyxin-dexamethamethasone, omeprazole, and tamsulosin    Allergies:  Allergies   Allergen Reactions    Adhesive Tape Other (See Comments)     SKIN BLISTERS  PAPER TAPE OKAY PER PT     Aminobenzoate Anxiety     restlessness    Lorazepam Anxiety and Shortness Of Breath     Restless legs confusion    Baclofen Delirium    Aspirin Swelling    Biaxin [Clarithromycin] Other (See Comments)     BLISTERS AROUND MOTH  Also known as Bioxen     Codeine Swelling    Darvon [Propoxyphene] Swelling    Levaquin [Levofloxacin] Other (See Comments)     BLISTERS AROUND MOUTH    Nitroglycerin Other (See Comments)     Was told by cardiologist not to take due to heart condition & defibrillator    Percocet [Oxycodone-Acetaminophen] Irritability    Tequin [Gatifloxacin] Other (See  Comments)     BLISTERS AROUND MOUTH  Also known Tequine        Objective   Objective     Vitals:   Temp:  [98.9 °F (37.2 °C)] 98.9 °F (37.2 °C)  Heart Rate:  [65-79] 67  Resp:  [16-18] 16  BP: (108-117)/(43-57) 117/57   Physical Exam:     Constitutional: Awake, alert. Well developed for age. Nontoxic appearing.   Eyes: PERRL x2, sclerae anicteric, no conjunctival injection. No EOM abnormalities.   HENT: NCAT, mucous membranes moist,   Neck: Supple, no thyromegaly, no lymphadenopathy, trachea midline  Respiratory: Clear to auscultation bilaterally, nonlabored respirations   Cardiovascular: RRR, no murmurs, rubs, or gallops, palpable pedal pulses bilaterally. No appreciable edema.   Gastrointestinal: Positive bowel sounds, soft, nontender, not distended.   Musculoskeletal: No bilateral ankle edema, no clubbing or cyanosis to extremities. No obvious deformities.   Psychiatric: Appropriate affect, cooperative. Converses appropriately for age.   Neurologic: Oriented x 3, strength symmetric in all extremities. Cranial nerves grossly intact to confrontation, speech clear  Skin: No rashes, skin intact.     Result Review    Result Review:  I have personally reviewed the results from the time of this admission to 2/15/2025 22:40 EST and agree with these findings:  []  Laboratory list / accordion  []  Microbiology  [x]  Radiology  []  EKG/Telemetry   []  Cardiology/Vascular   []  Pathology  [x]  Old records  []  Other:  Most notable findings include:     2 view right shoulder and CT right arm without evidence for subluxation or obvious fracture.      Assessment & Plan   Assessment / Plan     Brief Patient Summary:  Kristan Galicia is a 74 y.o. female who has remote history of right shoulder arthroplasty presents with acute double right shoulder pain without radiation after hearing a pop while she was trying to  something off the floor.  She denies falls or trauma to the right shoulder.  Plain film of the right  shoulder and CT right arm without evidence for subluxation or fractures.  Her arm is tender to movement and palpation.  There is no bruising noted.  Distal radial pulses are 1+ and equal bilaterally.    Active Hospital Problems:  Active Hospital Problems    Diagnosis     **Right arm pain      Plan:     Right shoulder pain  -Remote history of right shoulder arthroplasty in 2017 with Dr. Juan Antonio Anne  -Denies falls, trauma  -2 view right shoulder x-ray and CT of right upper extremity without evidence for subluxation or fracture  -Neurovascular checks  -Tylenol, Toradol as needed for pain  -Apply cold compress to right shoulder intermittently  -Apply right arm sling  -PT, OT evaluation  -Orthopedic consultation    A-fib  -Continue home Eliquis    VTE Prophylaxis:  Mechanical VTE prophylaxis orders are present.        CODE STATUS: Full code     Admission Status:  I believe this patient meets observation status.    Electronically signed by CHRISTIN Aguila, 02/15/25, 10:40 PM EST.        75 minutes has been spent by [facility] providers in the care of this patient while under observation status      I have worn appropriate PPE during this patient encounter, sanitized my hands both with entering and exiting patient's room.    I have discussed plan of care with patient including advance care plan and/or surrogate decision maker.  Patient advises that their daughter, Steffanie Xavier, will be their primary surrogate decision maker

## 2025-02-16 NOTE — CONSULTS
Orthopaedic & Sports Medicine Surgery  Dr. Juancarlos Arguello MD  [phone]    Orthopaedic Surgery  Consult Note      HPI:  Patient is a 74 y.o. female who presents with right shoulder pain.  She has remote history of reverse total shoulder placement with Dr. Anne.  Overall is doing well and then yesterday she is going to reach out lift something and felt a pop in her shoulder and a lot more pain.  Does not sound like a dislocation.  She came to [facility] x-rays and a CT scan were taken and she was admitted observation with orthopedic surgery consult.  Pain is gotten slightly better since yesterday.    MEDICAL HISTORY  Past Medical History:   Diagnosis Date    Abnormal ECG 1989    HOCM    Allergic     Seasonal    Anemia     Angina at rest     history    Arthritis     Asthma     Atrial fibrillation     Cardiac defibrillator in place 01/2009    medtronic     Cardiomyopathy     Hypertrophic Cardiomyopathy    Cervical disc disorder     CHF (congestive heart failure)     Cholelithiasis 1975    Removed surgically    Chronic pain     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     Coronary artery disease     Diabetes mellitus     Type II    Diverticulitis of colon 2021    Diverticulosis     hx diverticulitis    Fibrocystic breast 2010    Fracture, femur 1954, 1964, 2011    Fracture, foot 1969    Frozen shoulder 2016    GERD (gastroesophageal reflux disease)     Heart murmur     History of bronchitis     History of COVID-19     History of depression     History of pneumonia     History of sleep apnea     History of transfusion     no reaction    History of tumor     left ocular tumor, treated with steroids    Hyperlipidemia     Hypertension     Knee swelling 2017    Lumbosacral disc disease 2010    Migraines     On anticoagulant therapy     Osteoporosis     Palpitations     Periarthritis of shoulder 2016    Rotator cuff syndrome 2014    Scoliosis     Shoulder pain     left     Sleep apnea     HX OF SLEEP APNEA AND WORE CPAP. PT STATED SINCE SHE LOST WEIGHT AND WAS RETESTED AND WAS TOLD SHE NO LONGER HAS IT. STOPPED USING CPAP AROUND 2018.    Thyroid disease     HYPOTHYROIDISM    Vasovagal syncope     Vertigo      Past Surgical History:   Procedure Laterality Date    ANTERIOR CERVICAL DISCECTOMY W/ FUSION  2012    C5-7    ANTERIOR CERVICAL DISCECTOMY W/ FUSION N/A 05/05/2022    Procedure: Cervical 3- Cervical 4, Cervical 4- Cervical 5 anterior cervical discectomy and fusion with Zero profile cage and titanuium mesh cage;  Surgeon: Wilfredo Bailey MD;  Location: [facility] MAIN OR;  Service: Orthopedic Spine;  Laterality: N/A;    APPENDECTOMY  1970    BILATERAL BREAST REDUCTION      BREAST BIOPSY      CARDIAC CATHETERIZATION  02/19/2007    CARDIAC DEFIBRILLATOR PLACEMENT Left 1999, 2013    Dr. Ya    CATARACT EXTRACTION Bilateral     CHOLECYSTECTOMY OPEN  1974    COLONOSCOPY  1996    Dr. Herbert oGnsales     COLONOSCOPY  08/16/2016    Procedure: COLONOSCOPY with cecum;  Surgeon: Rosalio Sheikh MD;  Location: [facility] ENDOSCOPY;  Service:     COLONOSCOPY N/A 6/18/2024    Procedure: COLONOSCOPY to with partial cecum viewing with cold biopsies/polypectomy with clip placement x3;  Surgeon: Sabino Arboleda MD;  Location: [facility] ENDOSCOPY;  Service: General;  Laterality: N/A;  pre change in bowel habits  post lipoma clip x2 diverticulosos polyp    ENDOSCOPY N/A 04/27/2017    Procedure: ESOPHAGOGASTRODUODENOSCOPY WITH BIOPSIES;  Surgeon: Rosalio Sheikh MD;  Location: [facility] ENDOSCOPY;  Service:     ENDOSCOPY N/A 6/18/2024    Procedure: ESOPHAGOGASTRODUODENOSCOPY WITH BIOPSiES/cold forcep;  Surgeon: Sabino Arboleda MD;  Location: [facility] ENDOSCOPY;  Service: General;  Laterality: N/A;  pre reflux post gastritis    FEMUR SURGERY Left     with metal implant  x3    FIRST RIB RESECTION Bilateral     and scalenectomy    FRACTURE SURGERY      Broke left femor 3 times    HARDWARE  REMOVAL Left 12/28/2023    Procedure: SHOULDER HARDWARE REMOVAL;  Surgeon: Juan Antonio Anne MD;  Location: [facility] OR [facility];  Service: Orthopedics;  Laterality: Left;    HYSTERECTOMY  1989    Total    IMPLANTABLE CARDIOVERTER DEFIBRILLATOR LEAD REPLACEMENT/POCKET REVISION      JOINT REPLACEMENT  Right shoulder  and right knee ear    KNEE ARTHROSCOPY Bilateral 2014    Dr. Pascaul    KNEE SURGERY Right 06/10/2010    NECK SURGERY  2012, 2018    OOPHORECTOMY      REDUCTION MAMMAPLASTY      SCAPULA OPEN REDUCTION INTERNAL FIXATION Left 04/15/2021    Procedure: Acromion nonunion OPEN REDUCTION INTERNAL FIXATION;  Surgeon: Juan Antonio Anne MD;  Location: [facility] OR;  Service: Orthopedics;  Laterality: Left;    SHOULDER ARTHROSCOPY W/ ROTATOR CUFF REPAIR Bilateral 05/20/2011    SHOULDER SURGERY Right     TONSILLECTOMY      TOTAL KNEE ARTHROPLASTY Right 04/18/2018    Procedure: RIGHT TOTAL KNEE ARTHROPLASTY WITH STEVE NAVIGATION;  Surgeon: Bravo Pascual MD;  Location: [facility] OR;  Service: Orthopedics    TOTAL SHOULDER ARTHROPLASTY W/ DISTAL CLAVICLE EXCISION Right 02/02/2017    Procedure: TOTAL SHOULDER REVERSE ARTHROPLASTY;  Surgeon: Juan Antonio Anne MD;  Location: [facility] OR;  Service:     TOTAL SHOULDER ARTHROPLASTY W/ DISTAL CLAVICLE EXCISION Left 10/22/2020    Procedure: TOTAL SHOULDER REVERSE ARTHROPLASTY;  Surgeon: Juan Antonio Anne MD;  Location: [facility] OR;  Service: Orthopedics;  Laterality: Left;    TRIGGER POINT INJECTION  Starting in 2010 until now     Prior to Admission medications    Medication Sig Start Date End Date Taking? Authorizing Provider   alendronate (FOSAMAX) 70 MG tablet Take 1 tablet by mouth Every 7 (Seven) Days.  Patient taking differently: Take 1 tablet by mouth Every 7 (Seven) Days. TAKES ON SUNDAYS 4/20/21  Yes Chelly Red APRN   allopurinol (ZYLOPRIM) 100 MG tablet Take 1 tablet by mouth Daily. 10/16/20  Yes Chelly Red APRN   apixaban (Eliquis) 5 MG tablet tablet  Take 1 tablet by mouth Every 12 (Twelve) Hours. 9/4/23  Yes Lisa You MD   atorvastatin (LIPITOR) 80 MG tablet Take 1 tablet by mouth every night at bedtime. 4/20/21  Yes Chelly Red APRN   dilTIAZem CD (CARDIZEM CD) 120 MG 24 hr capsule Take 1 capsule by mouth Daily.   Yes Lisa You MD   DULoxetine (CYMBALTA) 60 MG capsule Take 1 capsule by mouth Daily.  Patient taking differently: Take 1 capsule by mouth Every Night. 10/16/20  Yes Chelly Red APRN   famotidine (PEPCID) 20 MG tablet Take 1 tablet by mouth 2 (Two) Times a Day. 1/25/23  Yes Nomi Pool PA   HYDROcodone-acetaminophen (NORCO)  MG per tablet Take 1 tablet by mouth Every 4 (Four) Hours As Needed for Moderate Pain (Pain). 12/28/23  Yes Juan Antonio Anne MD   levothyroxine (SYNTHROID, LEVOTHROID) 75 MCG tablet TAKE 1 TABLET BY MOUTH EVERY DAY  Patient taking differently: Take 1 tablet by mouth Daily. 5/18/21  Yes Chelly Red APRN   Loratadine 10 MG capsule Take 1 capsule by mouth Daily.   Yes Lisa You MD   metoprolol tartrate (LOPRESSOR) 50 MG tablet Take 1 tablet by mouth 1 (One) Time. 5/5/24  Yes Lisa You MD   montelukast (SINGULAIR) 10 MG tablet Take 1 tablet by mouth Daily.  Patient taking differently: Take 1 tablet by mouth Every Night. 10/16/20  Yes Chelly Red APRN   multivitamin with minerals tablet tablet Take 2 tablets by mouth Every Night.   Yes Lisa You MD   omeprazole (priLOSEC) 40 MG capsule Take 1 capsule by mouth Daily. 6/19/24  Yes Sabino Arboleda MD   tamsulosin (FLOMAX) 0.4 MG capsule 24 hr capsule Take 1 capsule by mouth Daily. 3/29/24 3/29/25 Yes Lisa You MD   Blood Glucose Monitoring Suppl (ACCU-CHEK OTILIA PLUS) W/DEVICE kit Dx e11.9 use to check BS BID, ok to substitute formulary preferred 3/22/17   Chelly Red APRN   docusate sodium (COLACE) 100 MG capsule Take 1 capsule by mouth 2 (Two) Times a Day. 12/28/23    Juan Antonio Anne MD   glucose blood (ACCU-CHEK OTILIA PLUS) test strip Dx e11.9 use to check BS BID, ok to substitute formulary preferred 3/22/17   Chelly Red APRN   Lancets (ACCU-CHEK MULTICLIX) lancets Dx e11.9 use to check BS BID, ok to substitute formulary preferred 3/22/17   Chelly Red APRN   neomycin-polymyxin-dexamethamethasone (POLYDEX) 3.5-77009-4.1 ointment ophthalmic ointment  1/3/24   ProviderLisa MD   Ozempic, 0.25 or 0.5 MG/DOSE, 2 MG/3ML solution pen-injector Inject 0.5 mg under the skin into the appropriate area as directed.  Patient not taking: Reported on 2/16/2025 2/16/24   ProviderLisa MD   baclofen (LIORESAL) 10 MG tablet Take 1 tablet by mouth 3 (Three) Times a Day. 1/31/25 2/16/25  Leni Reyes,      Allergies   Allergen Reactions    Adhesive Tape Other (See Comments)     SKIN BLISTERS  PAPER TAPE OKAY PER PT     Aminobenzoate Anxiety     restlessness    Lorazepam Anxiety and Shortness Of Breath     Restless legs confusion    Baclofen Delirium    Aspirin Swelling    Biaxin [Clarithromycin] Other (See Comments)     BLISTERS AROUND MOTH  Also known as Bioxen     Codeine Swelling    Darvon [Propoxyphene] Swelling    Levaquin [Levofloxacin] Other (See Comments)     BLISTERS AROUND MOUTH    Nitroglycerin Other (See Comments)     Was told by cardiologist not to take due to heart condition & defibrillator    Percocet [Oxycodone-Acetaminophen] Irritability    Tequin [Gatifloxacin] Other (See Comments)     BLISTERS AROUND MOUTH  Also known Tequine      Most Recent Immunizations   Administered Date(s) Administered    COVID-19 (PFIZER) 12YRS+ (COMIRNATY) 10/25/2024    COVID-19 (PFIZER) BIVALENT 12+YRS 10/14/2022    COVID-19 (PFIZER) Purple Cap Monovalent 11/30/2021    FLUAD TRI 65YR+ 11/11/2019    Fluad Quad 65+ 11/30/2021    Fluzone High-Dose 65+YRS 11/11/2019    Influenza, Unspecified 11/17/2021    Pneumococcal Conjugate 13-Valent (PCV13) 11/11/2019    Tdap 09/14/2018  "    Social History     Tobacco Use    Smoking status: Former     Current packs/day: 0.00     Average packs/day: 0.3 packs/day for 18.4 years (4.6 ttl pk-yrs)     Types: Cigarettes     Start date: 4/3/1967     Quit date: 1985     Years since quittin.4     Passive exposure: Past    Smokeless tobacco: Never    Tobacco comments:     Haven't smoked in 39 years   Substance Use Topics    Alcohol use: No      Social History     Substance and Sexual Activity   Drug Use Never       VITALS: BP 94/59 (BP Location: Left arm, Patient Position: Lying)   Pulse 68   Temp 98.2 °F (36.8 °C) (Oral)   Resp 16   Ht 152.4 cm (60\")   Wt 71.9 kg (158 lb 9.6 oz)   SpO2 94%   BMI 30.97 kg/m²  Body mass index is 30.97 kg/m².    PHYSICAL EXAM:   CONSTITUTIONAL: No acute distress  LUNGS: Equal chest rise, no shortness of air  CARDIOVASCULAR: palpable peripheral pulses  EXTREMITY:   Right upper extremity  Tenderness to Palpation:  Mild tenderness palpation at the anterior shoulder.  Incision appears to be well-healed with no signs of infection.  Gross Deformity: No Gross Deformity and clinically appears to be reduced  Pulses:  Brisk Capillary Refill  Sensation: Intact to Radial, Median, Ulnar Nerves and grossly throughout extremity  Motor: 5/5 Radial/Ulnar/Median/AIN/PIN Motor Nerves   Active and passive forward flexion only to about 45 degrees and then stops due to pain.  No obvious grinding or crepitus.      RADIOLOGY REVIEW:   CT Upper Extremity Right Without Contrast    Result Date: 2/15/2025  I don't see an obvious subluxation of the patient's right shoulder arthroplasty. No obvious fracture is seen. Scapular Y view may be helpful for further elucidation position of the hardware.  Radiation dose reduction techniques were utilized, including automated exposure control and exposure modulation based on body size.   This report was finalized on 2/15/2025 10:22 PM by Dr. Christi Munson M.D on Workstation: [id number]      XR " Shoulder 2+ View Right    Result Date: 2/15/2025  Nondiagnostic exam.  This report was finalized on 2/15/2025 7:07 PM by Dr. Christi Munson M.D on Workstation: [id number]       LABS:   Results for the past 24 hours:   Recent Results (from the past 24 hours)   Basic Metabolic Panel    Collection Time: 02/16/25  4:03 AM    Specimen: Blood   Result Value Ref Range    Glucose 100 (H) 65 - 99 mg/dL    BUN 22 8 - 23 mg/dL    Creatinine 1.01 (H) 0.57 - 1.00 mg/dL    Sodium 143 136 - 145 mmol/L    Potassium 4.9 3.5 - 5.2 mmol/L    Chloride 105 98 - 107 mmol/L    CO2 28.1 22.0 - 29.0 mmol/L    Calcium 8.9 8.6 - 10.5 mg/dL    BUN/Creatinine Ratio 21.8 7.0 - 25.0    Anion Gap 9.9 5.0 - 15.0 mmol/L    eGFR 58.9 (L) >60.0 mL/min/1.73   PTH, Intact & Calcium    Collection Time: 02/16/25  4:03 AM    Specimen: Blood   Result Value Ref Range    PTH, Intact 75.5 (H) 15.0 - 65.0 pg/mL    Calcium 8.8 8.6 - 10.5 mg/dL   Protime-INR    Collection Time: 02/16/25  4:03 AM    Specimen: Blood   Result Value Ref Range    Protime 17.7 (H) 11.7 - 14.2 Seconds    INR 1.43 (H) 0.90 - 1.10   aPTT    Collection Time: 02/16/25  4:03 AM    Specimen: Blood   Result Value Ref Range    PTT 31.5 22.7 - 35.4 seconds   CBC Auto Differential    Collection Time: 02/16/25  4:03 AM    Specimen: Blood   Result Value Ref Range    WBC 7.12 3.40 - 10.80 10*3/mm3    RBC 3.60 (L) 3.77 - 5.28 10*6/mm3    Hemoglobin 11.1 (L) 12.0 - 15.9 g/dL    Hematocrit 33.7 (L) 34.0 - 46.6 %    MCV 93.6 79.0 - 97.0 fL    MCH 30.8 26.6 - 33.0 pg    MCHC 32.9 31.5 - 35.7 g/dL    RDW 12.6 12.3 - 15.4 %    RDW-SD 43.3 37.0 - 54.0 fl    MPV 8.7 6.0 - 12.0 fL    Platelets 217 140 - 450 10*3/mm3    Neutrophil % 61.0 42.7 - 76.0 %    Lymphocyte % 25.3 19.6 - 45.3 %    Monocyte % 10.0 5.0 - 12.0 %    Eosinophil % 2.5 0.3 - 6.2 %    Basophil % 0.8 0.0 - 1.5 %    Immature Grans % 0.4 0.0 - 0.5 %    Neutrophils, Absolute 4.34 1.70 - 7.00 10*3/mm3    Lymphocytes, Absolute 1.80 0.70 - 3.10  10*3/mm3    Monocytes, Absolute 0.71 0.10 - 0.90 10*3/mm3    Eosinophils, Absolute 0.18 0.00 - 0.40 10*3/mm3    Basophils, Absolute 0.06 0.00 - 0.20 10*3/mm3    Immature Grans, Absolute 0.03 0.00 - 0.05 10*3/mm3    nRBC 0.0 0.0 - 0.2 /100 WBC   POC Glucose Once    Collection Time: 02/16/25  8:08 AM    Specimen: Blood   Result Value Ref Range    Glucose 106 70 - 130 mg/dL       IMPRESSION:  Patient is a 74 y.o. female with right shoulder pain, remote history of right reverse total shoulder replacement with Dr. Anne.    PLAN:   Admited to: Renato Mcqueen MD  On x-rays and CT scan I do not see any abnormalities with the hardware.  Does not appear to be dislocated.  No obvious loosening.  No signs of infection.  She may have torn her biceps or broke up scar tissue.  Do not see any complications with the hardware.  I think this will likely resolve.  Sling for comfort but can progress with activities as tolerated.  Pain control.  Okay to discharge from orthopedic surgery standpoint.  I would recommend she make an appointment with Dr. Anne's team in the next week or 2 for repeat evaluation and to make sure this is improving.    Jauncarlos Arguello MD  [facility]  Orthopaedic Surgery, Sports Medicine  [phone]

## 2025-02-16 NOTE — PLAN OF CARE
"  Problem: Adult Inpatient Plan of Care  Goal: Plan of Care Review  Outcome: Progressing  Flowsheets (Taken 2/16/2025 0132)  Progress: no change  Outcome Evaluation: Pt admitted with R arm pain after hearing a \"pop\" in the shoulder. Hx of R rotator cuff sx. Xrays/CT are limited but negative. R arm is in a sling with Ice. Standby assist. Ortho consulted  Plan of Care Reviewed With: patient  Goal: Patient-Specific Goal (Individualized)  Outcome: Progressing  Goal: Absence of Hospital-Acquired Illness or Injury  Outcome: Progressing  Intervention: Identify and Manage Fall Risk  Recent Flowsheet Documentation  Taken 2/16/2025 0000 by Taz Murphy RN  Safety Promotion/Fall Prevention:   activity supervised   clutter free environment maintained   fall prevention program maintained   room organization consistent   safety round/check completed  Intervention: Prevent Skin Injury  Recent Flowsheet Documentation  Taken 2/16/2025 0000 by Taz Murphy RN  Body Position: position changed independently  Intervention: Prevent and Manage VTE (Venous Thromboembolism) Risk  Recent Flowsheet Documentation  Taken 2/16/2025 0000 by Taz Murphy RN  VTE Prevention/Management:   SCDs (sequential compression devices) off   patient refused intervention  Intervention: Prevent Infection  Recent Flowsheet Documentation  Taken 2/16/2025 0000 by Taz Murphy RN  Infection Prevention:   single patient room provided   rest/sleep promoted  Goal: Optimal Comfort and Wellbeing  Outcome: Progressing  Intervention: Monitor Pain and Promote Comfort  Recent Flowsheet Documentation  Taken 2/16/2025 0000 by Taz Murphy RN  Pain Management Interventions: pain medication given  Intervention: Provide Person-Centered Care  Recent Flowsheet Documentation  Taken 2/16/2025 0000 by Taz Murphy RN  Trust Relationship/Rapport:   care explained   choices provided  Goal: Readiness for Transition of Care  Outcome: Progressing  Intervention: Mutually Develop Transition " "Plan  Recent Flowsheet Documentation  Taken 2/16/2025 0008 by Taz Murphy RN  Transportation Anticipated: car, drives self  Patient/Family Anticipated Services at Transition: none  Patient/Family Anticipates Transition to: home with family  Taken 2/16/2025 0006 by Taz Murphy RN  Equipment Currently Used at Home: walker, standard     Problem: Fall Injury Risk  Goal: Absence of Fall and Fall-Related Injury  Outcome: Progressing  Intervention: Promote Injury-Free Environment  Recent Flowsheet Documentation  Taken 2/16/2025 0000 by Taz Murphy RN  Safety Promotion/Fall Prevention:   activity supervised   clutter free environment maintained   fall prevention program maintained   room organization consistent   safety round/check completed     Problem: Pain Acute  Goal: Optimal Pain Control and Function  Outcome: Progressing  Intervention: Optimize Psychosocial Wellbeing  Recent Flowsheet Documentation  Taken 2/16/2025 0000 by Taz Murphy RN  Diversional Activities: smartphone  Intervention: Develop Pain Management Plan  Recent Flowsheet Documentation  Taken 2/16/2025 0000 by Taz Murphy RN  Pain Management Interventions: pain medication given     Problem: Mobility Impairment  Goal: Optimal Mobility  Outcome: Progressing  Intervention: Optimize Mobility  Recent Flowsheet Documentation  Taken 2/16/2025 0000 by Taz Murphy RN  Activity Management: ambulated in room   Goal Outcome Evaluation:  Plan of Care Reviewed With: patient        Progress: no change  Outcome Evaluation: Pt admitted with R arm pain after hearing a \"pop\" in the shoulder. Hx of R rotator cuff sx. Xrays/CT are limited but negative. R arm is in a sling with Ice. Standby assist. Ortho consulted                             "

## 2025-02-16 NOTE — PROGRESS NOTES
EBCKY YO Attestation Note     I supervised care provided by the midlevel provider. We have discussed this patient's history, physical exam, and treatment plan. I have reviewed the midlevel provider's note and I agree with the midlevel provider's findings and plan of care.   SHARED VISIT: This visit was performed by BOTH a physician and an APC. The substantive portion of the medical decision making was performed by this attesting physician who made or approved the management plan and takes responsibility for patient management. All studies in the APC note (if performed) were independently interpreted by me.   I have personally had a face to face encounter with the patient.   My personal findings are documented below:      Subjective  Pt is a 74 y.o. female admitted from Emergency Department for evaluation and treatment of right arm pain after reaching to pick at a pencil.  Plain x-rays and CT imaging did not show obvious fracture or dislocation but imaging limited secondary to body habitus and being hardening artifact from shoulder prosthesis.    Physical Exam  GENERAL: Alert and in NAD, Vitals reviewed  HENT: nares patent  EYES: no scleral icterus  CV: regular rhythm, regular rate  RESPIRATORY: normal effort  ABDOMEN: soft  MUSCULOSKELETAL: Spine-no significant bony tenderness palpation  Upper extremities-exam of the left arm is unremarkable.  She does have diffuse tenderness palpation about the right shoulder without noted bony deformity.  She also has mild diffuse tenderness to palpation throughout the right elbow and right wrist.  Compartments are soft.  Distal strength sensation pulses are grossly intact.  NEURO: Strength sensation and coordination are grossly intact.  Speech and mentation are unremarkable  SKIN: warm, dry    Assessment/Plan  I discussed tx and evaluation of this patient with CHRISTIN Taylor.  Plain films and CT imaging are somewhat difficult to interpret and cannot exclude subtle subluxation  but do not see significant fracture.  Patient is continue to have fair amount of pain about the right shoulder with limited mobility.  Awaiting orthopedic evaluation and assessment.

## 2025-02-16 NOTE — PLAN OF CARE
The pt was admitted to [facility] 2/2 to a pop in her shoulder with immediate pain. Orthopedics was consulted and the pt's imaging was (-) for an acute dislocation or fracture. Ortho recommended a sling for comfort and advancing activity as tolerated. The pt reports independence at baseline and uses no AD. Today the pt demonstrated significantly limited ROM in her R shoulder - poor tolerance for AROM. OT provided education and demo on advancing her range over the next several days by utilizing SROM and pendulums. She does not like her sling at bedside and plans to order a new one online today that has velcro for easy donning/doffing. Modifications to be made at home for ADL/functional transfers. OP PT recommended.

## 2025-02-16 NOTE — ED NOTES
"Nursing report ED to floor  Kristan Galicia  74 y.o.  female    HPI :  HPI  Stated Reason for Visit: see note  History Obtained From: patient    Chief Complaint  Chief Complaint   Patient presents with    Shoulder Injury       Admitting doctor:   Renato Mcqueen MD    Admitting diagnosis:   The primary encounter diagnosis was Chronic pain of right upper extremity. A diagnosis of Anticoagulated by anticoagulation treatment was also pertinent to this visit.    Code status:   Current Code Status       Date Active Code Status Order ID Comments User Context       Prior            Allergies:   Adhesive tape, Aminobenzoate, Lorazepam, Baclofen, Aspirin, Biaxin [clarithromycin], Codeine, Darvon [propoxyphene], Levaquin [levofloxacin], Nitroglycerin, Percocet [oxycodone-acetaminophen], and Tequin [gatifloxacin]    Isolation:   No active isolations    Intake and Output  No intake or output data in the 24 hours ending 02/15/25 2242    Weight:       02/15/25  1835   Weight: 68 kg (150 lb)       Most recent vitals:   Vitals:    02/15/25 1821 02/15/25 1835 02/15/25 1924 02/15/25 2238   BP:  115/43 108/46 117/57   BP Location:  Left arm Left arm Left arm   Patient Position:  Sitting Lying Lying   Pulse: 79  65 67   Resp: 18  16 16   Temp: 98.9 °F (37.2 °C)      TempSrc: Tympanic      SpO2: 95%      Weight:  68 kg (150 lb)     Height:  152.4 cm (60\")         Active LDAs/IV Access:   Lines, Drains & Airways       Active LDAs       None                    Labs (abnormal labs have a star):   Labs Reviewed - No data to display    EKG:   No orders to display       Meds given in ED:   Medications   HYDROmorphone (DILAUDID) injection 1 mg (1 mg Intramuscular Given 2/15/25 1952)       Imaging results:  CT Upper Extremity Right Without Contrast    Result Date: 2/15/2025  I don't see an obvious subluxation of the patient's right shoulder arthroplasty. No obvious fracture is seen. Scapular Y view may be helpful for further elucidation " position of the hardware.  Radiation dose reduction techniques were utilized, including automated exposure control and exposure modulation based on body size.   This report was finalized on 2/15/2025 10:22 PM by Dr. Christi Munson M.D on Workstation: [id number]      XR Shoulder 2+ View Right    Result Date: 2/15/2025  Nondiagnostic exam.  This report was finalized on 2/15/2025 7:07 PM by Dr. Christi Munson M.D on Workstation: [employer]       Ambulatory status:   - Stand-by assist    Social issues:   Social History     Socioeconomic History    Marital status:     Number of children: 1   Tobacco Use    Smoking status: Former     Current packs/day: 0.00     Average packs/day: 0.3 packs/day for 18.4 years (4.6 ttl pk-yrs)     Types: Cigarettes     Start date: 4/3/1967     Quit date: 1985     Years since quittin.4     Passive exposure: Past    Smokeless tobacco: Never    Tobacco comments:     Haven't smoked in 39 years   Vaping Use    Vaping status: Never Used   Substance and Sexual Activity    Alcohol use: No    Drug use: Never    Sexual activity: Not Currently     Birth control/protection: Post-menopausal, Hysterectomy       Peripheral Neurovascular  Peripheral Neurovascular (Adult)  Peripheral Neurovascular WDL: .WDL except, pulse assessment  Pulse Assessment: radial  RUE Neurovascular Assessment  Temperature RUE: warm  Sensation RUE: no numbness, tenderness present, no tingling    Neuro Cognitive  Neuro Cognitive (Adult)  Cognitive/Neuro/Behavioral WDL: WDL    Learning  Learning Assessment  Learning Readiness and Ability: no barriers identified  Education Provided  Person Taught: patient, family member/friend    Respiratory  Respiratory WDL  Respiratory WDL: WDL    Abdominal Pain       Pain Assessments  Pain (Adult)  (0-10) Pain Rating: Rest: 7  (0-10) Pain Rating: Activity: 10  Pain Location: shoulder  Pain Side/Orientation: right  Pain Management Interventions: pain medication  given  Response to Pain Interventions: interventions effective per patient    NIH Stroke Scale       Adelina Neal RN  02/15/25 22:42 EST

## 2025-02-16 NOTE — SIGNIFICANT NOTE
02/16/25 1101   OTHER   Discipline physical therapist   Therapy Assessment/Plan (PT)   Criteria for Skilled Interventions Met (PT) no;no problems identified which require skilled intervention  (PT observed pt ambulate on unit independently, independently ambulate to bathroom. Pt has sling to use RUE (however not donned currently, pt reports MD was just in assessing shoulder). Discussed mobility recommendations/precautions. pt denies further PT.)

## 2025-02-16 NOTE — DISCHARGE SUMMARY
ED OBSERVATION PROGRESS/DISCHARGE SUMMARY    Date of Admission: 2/15/2025   LOS: 0 days   PCP: Chelly Red APRN    Final Diagnosis: Right shoulder pain    Hospital Outcome:     Kristan Galicia is a 74 y.o. female who has remote history of right shoulder arthroplasty presents with acute right shoulder pain without radiation after hearing a pop while she was trying to  something off the floor.  She denies falls or trauma to the right shoulder.  Plain film of the right shoulder and CT right arm without evidence for subluxation or fractures.  Her arm is tender to movement and palpation.  There is no bruising noted.  Distal radial pulses are 1+ and equal bilaterally.     CT right upper extremity shows no evidence of subluxation or fracture.  Orthopedic surgery was consulted evaluated the patient.  Hardware appears to be intact.  No evidence of infection.  They advise she may have torn her bicep or broke up scar tissue.  They recommend a sling for comfort, advance activity as tolerated.  She was advised to follow-up with Dr. Anne within the next week or 2.  She declined physical therapy referral at this time, she would prefer to speak with her orthopedic surgeon first.  She will be discharged home.    ROS:  General: no fevers, chills  Respiratory: no cough, dyspnea  Cardiovascular: no chest pain, palpitations  Abdomen: No abdominal pain, nausea, vomiting, or diarrhea  Neurologic: No focal weakness  MS: Right shoulder pain    Objective   Physical Exam:  I have reviewed the vital signs.  Temp:  [97.9 °F (36.6 °C)-98.9 °F (37.2 °C)] 98.2 °F (36.8 °C)  Heart Rate:  [65-85] 68  Resp:  [16-18] 16  BP: ()/(43-68) 94/59  General Appearance:    Alert, cooperative, no distress  Head:    Normocephalic, atraumatic  Eyes:    Sclerae anicteric  Neck:   Supple, no mass  Lungs: Clear to auscultation bilaterally, respirations unlabored  Heart: Regular rate and rhythm, S1 and S2 normal, no murmur, rub or  gallop  Abdomen:  Soft, nontender, bowel sounds active, nondistended  Extremities: No clubbing, cyanosis, or edema to lower extremities  Pulses:  2+ and symmetric in distal lower extremities  Skin: No rashes   Neurologic: Oriented x3, Normal strength to extremities    Results Review:    I have reviewed the labs, radiology results and diagnostic studies.    Results from last 7 days   Lab Units 02/16/25  0403   WBC 10*3/mm3 7.12   HEMOGLOBIN g/dL 11.1*   HEMATOCRIT % 33.7*   PLATELETS 10*3/mm3 217     Results from last 7 days   Lab Units 02/16/25  0403   SODIUM mmol/L 143   POTASSIUM mmol/L 4.9   CHLORIDE mmol/L 105   CO2 mmol/L 28.1   BUN mg/dL 22   CREATININE mg/dL 1.01*   CALCIUM mg/dL 8.8  8.9   GLUCOSE mg/dL 100*     Imaging Results (Last 24 Hours)       Procedure Component Value Units Date/Time    CT Upper Extremity Right Without Contrast [[id number]] Collected: 02/15/25 2213     Updated: 02/15/25 2225    Narrative:      CT OF THE RIGHT UPPER EXTREMITY     HISTORY: Possible dislocation     COMPARISON: February 15, 2025     TECHNIQUE: Axial CT imaging was obtained through the right upper  extremity. Coronal and sagittal reformatted images were obtained.     FINDINGS:  The patient has a right shoulder arthroplasty. I don't see clear  evidence of subluxation. No obvious acute fracture is seen, although  there is severe streak artifact from the hardware. Limited images  through the lungs do not demonstrate any acute abnormalities.       Impression:      I don't see an obvious subluxation of the patient's right shoulder  arthroplasty. No obvious fracture is seen. Scapular Y view may be  helpful for further elucidation position of the hardware.     Radiation dose reduction techniques were utilized, including automated  exposure control and exposure modulation based on body size.        This report was finalized on 2/15/2025 10:22 PM by Dr. Christi Munson M.D on Workstation: [id number]       XR Shoulder 2+ View  Right [[id number]] Collected: 02/15/25 1904     Updated: 02/15/25 1910    Narrative:      2 VIEWS RIGHT SHOULDER     HISTORY: Right shoulder pain     COMPARISON: February 2, 2017     FINDINGS:  Patient was unable to move her arm, so a single view only was obtained.  Position of the patient's right shoulder arthroplasty cannot be assessed  on this single view. No obvious acute fracture is seen.       Impression:      Nondiagnostic exam.     This report was finalized on 2/15/2025 7:07 PM by Dr. Christi Munson M.D on Workstation: [id number]               I have reviewed the medications.     Discharge Medications        Changes to Medications        Instructions Start Date   DULoxetine 60 MG capsule  Commonly known as: CYMBALTA  What changed: when to take this   60 mg, Oral, Daily      montelukast 10 MG tablet  Commonly known as: SINGULAIR  What changed: when to take this   10 mg, Oral, Daily             Continue These Medications        Instructions Start Date   Accu-Chek Sangeeta Plus w/Device kit   Dx e11.9 use to check BS BID, ok to substitute formulary preferred      accu-chek multiclix lancets   Dx e11.9 use to check BS BID, ok to substitute formulary preferred      alendronate 70 MG tablet  Commonly known as: FOSAMAX   70 mg, Oral, Every 7 Days      allopurinol 100 MG tablet  Commonly known as: ZYLOPRIM   100 mg, Oral, Daily      atorvastatin 80 MG tablet  Commonly known as: LIPITOR   80 mg, Oral, Every Night at Bedtime      dilTIAZem  MG 24 hr capsule  Commonly known as: CARDIZEM CD   120 mg, Daily      docusate sodium 100 MG capsule  Commonly known as: COLACE   100 mg, Oral, 2 Times Daily      Eliquis 5 MG tablet tablet  Generic drug: apixaban   5 mg, Every 12 Hours Scheduled      famotidine 20 MG tablet  Commonly known as: PEPCID   20 mg, Oral, 2 Times Daily      glucose blood test strip  Commonly known as: Accu-Chek Sangeeta Plus   Dx e11.9 use to check BS BID, ok to substitute formulary preferred       HYDROcodone-acetaminophen  MG per tablet  Commonly known as: NORCO   1 tablet, Oral, Every 4 Hours PRN      levothyroxine 75 MCG tablet  Commonly known as: SYNTHROID, LEVOTHROID   TAKE 1 TABLET BY MOUTH EVERY DAY      Loratadine 10 MG capsule   1 capsule, Daily      metoprolol tartrate 50 MG tablet  Commonly known as: LOPRESSOR   Take 1 tablet by mouth 1 (One) Time.      multivitamin with minerals tablet tablet   2 tablets, Nightly      neomycin-polymyxin-dexamethamethasone 3.5-26320-7.1 ointment ophthalmic ointment  Commonly known as: POLYDEX       omeprazole 40 MG capsule  Commonly known as: priLOSEC   40 mg, Oral, Daily      tamsulosin 0.4 MG capsule 24 hr capsule  Commonly known as: FLOMAX   0.4 mg, Daily             Stop These Medications      Ozempic (0.25 or 0.5 MG/DOSE) 2 MG/3ML solution pen-injector  Generic drug: Semaglutide(0.25 or 0.5MG/DOS)             ---------------------------------------------------------------------------------------------  Assessment & Plan   Assessment/Problem List    Right arm pain    Plan:    Right shoulder pain  -Remote history of right shoulder arthroplasty in 2017 with Dr. Juan Antonio Anne  -Denies falls, trauma  -2 view right shoulder x-ray and CT of right upper extremity without evidence for subluxation or fracture  -Neurovascular checks  -Tylenol, Toradol as needed for pain  -Apply cold compress to right shoulder intermittently  -PT, OT evaluation  -Orthopedic consultation  -Sling for comfort  -Follow-up with orthopedic surgery in the next 1 to 2 weeks     A-fib  -Continue home Eliquis     VTE Prophylaxis:  Mechanical VTE prophylaxis orders are present.    Disposition: Home    Follow-up after Discharge: Primary care, the [facility]    This note will serve as a discharge summary    Yeni Taylor, APRN 02/16/25 11:31 EST    I have worn appropriate PPE during this patient encounter, sanitized my hands both with entering and exiting patient's room.    32 minutes has  been spent by [facility] providers in the care of this patient while under observation status

## 2025-02-16 NOTE — THERAPY DISCHARGE NOTE
Acute Care - Occupational Therapy Discharge  [facility]    Patient Name: Kristan Galicia  : 1950    MRN: [id number]                              Today's Date: 2025       Admit Date: 2/15/2025    Visit Dx:     ICD-10-CM ICD-9-CM   1. Chronic pain of right upper extremity  M79.601 729.5    G89.29 338.29   2. Anticoagulated by anticoagulation treatment  Z79.01 V58.61     Patient Active Problem List   Diagnosis    Shoulder pain, right    Knee pain, right    Fall down stairs    PAF (paroxysmal atrial fibrillation)    S/P rotator cuff repair    Vitamin D deficiency    Vertigo    Vasovagal syncope    Thyroid disease    Obstructive sleep apnea, adult    Palpitations    Osteoporosis    Ocular tumor    Migraines    Hyperlipidemia    History of transfusion    Heart murmur    Heart attack    GERD (gastroesophageal reflux disease)    CHF (congestive heart failure)    Cardiomyopathy    Asthma    Arthritis of right knee    Right rotator cuff tear    Hypothyroidism    Dyspnea and respiratory abnormalities    Orthopnea    IHSS (idiopathic hypertrophic subaortic stenosis)    AICD (automatic cardioverter/defibrillator) present    Anticoagulant long-term use    Hyperglycemia, drug-induced    Chronic pain of right knee    Diverticulitis    CAD (coronary artery disease)    Hypertrophic nonobstructive cardiomyopathy    ICD (implantable cardioverter-defibrillator) discharge    Menopause    Non-obstructive hypertrophic cardiomyopathy    Insomnia    History of total knee arthroplasty, right    Closed fracture of odontoid process of axis    Atelectasis    Chronic diastolic CHF (congestive heart failure)    Diabetes mellitus    Chronic gout involving 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    Other displaced fracture of first cervical vertebra, subsequent encounter for fracture with routine healing    Left shoulder pain    Closed displaced fracture of left acromial process    Cervical spondylosis with radiculopathy    Diarrhea     Colon polyps    Right arm pain     Past Medical History:   Diagnosis Date    Abnormal ECG 1989    HOCM    Allergic     Seasonal    Anemia     Angina at rest     history    Arthritis     Asthma     Atrial fibrillation     Cardiac defibrillator in place 01/2009    medtronic     Cardiomyopathy     Hypertrophic Cardiomyopathy    Cervical disc disorder     CHF (congestive heart failure)     Cholelithiasis 1975    Removed surgically    Chronic pain     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     Coronary artery disease     Diabetes mellitus     Type II    Diverticulitis of colon 2021    Diverticulosis     hx diverticulitis    Fibrocystic breast 2010    Fracture, femur 1954, 1964, 2011    Fracture, foot 1969    Frozen shoulder 2016    GERD (gastroesophageal reflux disease)     Heart murmur     History of bronchitis     History of COVID-19     History of depression     History of pneumonia     History of sleep apnea     History of transfusion     no reaction    History of tumor     left ocular tumor, treated with steroids    Hyperlipidemia     Hypertension     Knee swelling 2017    Lumbosacral disc disease 2010    Migraines     On anticoagulant therapy     Osteoporosis     Palpitations     Periarthritis of shoulder 2016    Rotator cuff syndrome 2014    Scoliosis     Shoulder pain     left    Sleep apnea     HX OF SLEEP APNEA AND WORE CPAP. PT STATED SINCE SHE LOST WEIGHT AND WAS RETESTED AND WAS TOLD SHE NO LONGER HAS IT. STOPPED USING CPAP AROUND 2018.    Thyroid disease     HYPOTHYROIDISM    Vasovagal syncope     Vertigo      Past Surgical History:   Procedure Laterality Date    ANTERIOR CERVICAL DISCECTOMY W/ FUSION  2012    C5-7    ANTERIOR CERVICAL DISCECTOMY W/ FUSION N/A 05/05/2022    Procedure: Cervical 3- Cervical 4, Cervical 4- Cervical 5 anterior cervical discectomy and fusion with Zero profile cage and titanuium mesh cage;  Surgeon: Wilfredo Bailey MD;  Location: [facility]  OR;  Service: Orthopedic Spine;  Laterality: N/A;    APPENDECTOMY  1970    BILATERAL BREAST REDUCTION      BREAST BIOPSY      CARDIAC CATHETERIZATION  02/19/2007    CARDIAC DEFIBRILLATOR PLACEMENT Left 1999, 2013    Dr. Ya    CATARACT EXTRACTION Bilateral     CHOLECYSTECTOMY OPEN  1974    COLONOSCOPY  1996    Dr. Herbert Gonsales     COLONOSCOPY  08/16/2016    Procedure: COLONOSCOPY with cecum;  Surgeon: Rosalio Sheikh MD;  Location: [facility] ENDOSCOPY;  Service:     COLONOSCOPY N/A 6/18/2024    Procedure: COLONOSCOPY to with partial cecum viewing with cold biopsies/polypectomy with clip placement x3;  Surgeon: Sabino Arboleda MD;  Location: [facility] ENDOSCOPY;  Service: General;  Laterality: N/A;  pre change in bowel habits  post lipoma clip x2 diverticulosos polyp    ENDOSCOPY N/A 04/27/2017    Procedure: ESOPHAGOGASTRODUODENOSCOPY WITH BIOPSIES;  Surgeon: Rosalio Sheikh MD;  Location: [facility] ENDOSCOPY;  Service:     ENDOSCOPY N/A 6/18/2024    Procedure: ESOPHAGOGASTRODUODENOSCOPY WITH BIOPSiES/cold forcep;  Surgeon: Sabino Arboleda MD;  Location: [facility] ENDOSCOPY;  Service: General;  Laterality: N/A;  pre reflux post gastritis    FEMUR SURGERY Left     with metal implant  x3    FIRST RIB RESECTION Bilateral     and scalenectomy    FRACTURE SURGERY      Broke left femor 3 times    HARDWARE REMOVAL Left 12/28/2023    Procedure: SHOULDER HARDWARE REMOVAL;  Surgeon: Juan Antonio Anne MD;  Location: [facility] OR OSC;  Service: Orthopedics;  Laterality: Left;    HYSTERECTOMY  1989    Total    IMPLANTABLE CARDIOVERTER DEFIBRILLATOR LEAD REPLACEMENT/POCKET REVISION      JOINT REPLACEMENT  Right shoulder  and right knee ear    KNEE ARTHROSCOPY Bilateral 2014    Dr. Pascual    KNEE SURGERY Right 06/10/2010    NECK SURGERY  2012, 2018    OOPHORECTOMY      REDUCTION MAMMAPLASTY      SCAPULA OPEN REDUCTION INTERNAL FIXATION Left 04/15/2021    Procedure: Acromion nonunion OPEN REDUCTION INTERNAL FIXATION;   Surgeon: Juan Antonio Anne MD;  Location: [facility] OR;  Service: Orthopedics;  Laterality: Left;    SHOULDER ARTHROSCOPY W/ ROTATOR CUFF REPAIR Bilateral 05/20/2011    SHOULDER SURGERY Right     TONSILLECTOMY      TOTAL KNEE ARTHROPLASTY Right 04/18/2018    Procedure: RIGHT TOTAL KNEE ARTHROPLASTY WITH STEVE NAVIGATION;  Surgeon: Bravo Pascual MD;  Location: [facility] OR;  Service: Orthopedics    TOTAL SHOULDER ARTHROPLASTY W/ DISTAL CLAVICLE EXCISION Right 02/02/2017    Procedure: TOTAL SHOULDER REVERSE ARTHROPLASTY;  Surgeon: Juan Antonio Anne MD;  Location: [facility] OR;  Service:     TOTAL SHOULDER ARTHROPLASTY W/ DISTAL CLAVICLE EXCISION Left 10/22/2020    Procedure: TOTAL SHOULDER REVERSE ARTHROPLASTY;  Surgeon: Juan Antonio Anne MD;  Location: [facility] OR;  Service: Orthopedics;  Laterality: Left;    TRIGGER POINT INJECTION  Starting in 2010 until now      General Information       Row Name 02/16/25 1309          OT Time and Intention    Subjective Information complains of;pain  -RB     Document Type discharge evaluation/summary  -RB     Mode of Treatment individual therapy  -RB       Row Name 02/16/25 1309          General Information    Patient Profile Reviewed yes  -RB     Prior Level of Function independent:;transfer;ADL's  -RB     Existing Precautions/Restrictions no known precautions/restrictions  -RB     Barriers to Rehab none identified  -RB       Row Name 02/16/25 1309          Living Environment    People in Home child(joy), adult  -RB       Row Name 02/16/25 1309          Cognition    Orientation Status (Cognition) oriented x 4  -RB       Row Name 02/16/25 1309          Safety Issues/Impairments Affecting Functional Mobility    Impairments Affecting Function (Mobility) pain;range of motion (ROM);strength  -RB               User Key  (r) = Recorded By, (t) = Taken By, (c) = Cosigned By      Initials Name Provider Type    RB Racheal Harrison OT Occupational Therapist                    Mobility/ADL's       [facility] Name 02/16/25 1310          Bed Mobility    Comment, (Bed Mobility) sitting at the EOB  -[facility] Name 02/16/25 1310          Transfers    Comment, (Transfers) The pt has been mobilizing independently in her hospital room  -[facility] Name 02/16/25 1310          Activities of Daily Living    BADL Assessment/Intervention --  She reports limited use of her RUE during ADL's  -RB               User Key  (r) = Recorded By, (t) = Taken By, (c) = Cosigned By      Initials Name Provider Type    Racheal Son OT Occupational Therapist                   Obj/Interventions       Row Name 02/16/25 1310          Sensory Assessment (Somatosensory)    Sensory Assessment (Somatosensory) sensation intact  -[facility] Name 02/16/25 1310          Vision Assessment/Intervention    Visual Impairment/Limitations WFL  -[facility] Name 02/16/25 1310          Range of Motion Comprehensive    Comment, General Range of Motion R shoulder only tolerating ~25% active movement in shoulder flexion/extension/adb/add. Pain limiting AAROM.  -[facility] Name 02/16/25 1310          Strength Comprehensive (MMT)    Comment, General Manual Muscle Testing (MMT) Assessment RUE generalized weakness 2/2 to her injury, no MMT.  strength WFL.  -[facility] Name 02/16/25 1310          Motor Skills    Therapeutic Exercise --  OT provided demo of slowly advancing her RUE ROM the next several days by utilizing pendulums and SROM.  -RB               User Key  (r) = Recorded By, (t) = Taken By, (c) = Cosigned By      Initials Name Provider Type    Racheal Son OT Occupational Therapist                   Goals/Plan       [facility] Name 02/16/25 1313          Problem Specific Goal 1 (OT)    Problem Specific Goal 1 (OT) The pt will verbalize understanding of her HEP to begin upon D/C.  -RB     Time Frame (Problem Specific Goal 1, OT) short term goal (STG);2 weeks  -RB     Progress/Outcome (Problem Specific Goal 1, OT)  goal met  -RB       Row Name 02/16/25 1313          Therapy Assessment/Plan (OT)    Planned Therapy Interventions (OT) ROM/therapeutic exercise;BADL retraining;patient/caregiver education/training;passive ROM/stretching;occupation/activity based interventions  -RB               User Key  (r) = Recorded By, (t) = Taken By, (c) = Cosigned By      Initials Name Provider Type    RB Racheal Harrison, ARIS Occupational Therapist                   Clinical Impression       Row Name 02/16/25 1312          Pain Assessment    Pretreatment Pain Rating 4/10  -RB     Posttreatment Pain Rating 4/10  -RB     Pain Location shoulder  -RB     Pain Side/Orientation right  -RB       Row Name 02/16/25 1312          Plan of Care Review    Plan of Care Reviewed With patient  -RB     Progress no change  -RB     Outcome Evaluation The pt was admitted to [facility] 2/2 to a pop in her shoulder with immediate pain. Orthopedics was consulted and the pt's imaging was (-) for an acute dislocation or fracture. Ortho recommended a sling for comfort and advancing activity as tolerated. The pt reports independence at baseline and uses no AD. Today the pt demonstrated significantly limited ROM in her R shoulder - poor tolerance for AROM. OT provided education and demo on advancing her range over the next several days by utilizing SROM and pendulums. She does not like her sling at bedside and plans to order a new one online today that has velcro for easy donning/doffing. Modifications to be made at home for ADL/functional transfers. OP PT recommended.  -RB       Row Name 02/16/25 1312          Therapy Assessment/Plan (OT)    Rehab Potential (OT) good  -RB     Criteria for Skilled Therapeutic Interventions Met (OT) other (see comments)  D/C from OBS  -RB       Row Name 02/16/25 1312          Therapy Plan Review/Discharge Plan (OT)    Anticipated Discharge Disposition (OT) home with assist;home with outpatient therapy services  -RB       Row Name 02/16/25 1312           Positioning and Restraints    Pre-Treatment Position in bed  -RB     Post Treatment Position bed  -RB     In Bed sitting EOB;with nsg  -RB               User Key  (r) = Recorded By, (t) = Taken By, (c) = Cosigned By      Initials Name Provider Type    Racheal Son OT Occupational Therapist                   Outcome Measures       Row Name 02/16/25 1314          How much help from another is currently needed...    Putting on and taking off regular lower body clothing? 3  -RB     Bathing (including washing, rinsing, and drying) 3  -RB     Toileting (which includes using toilet bed pan or urinal) 3  -RB     Putting on and taking off regular upper body clothing 3  -RB     Taking care of personal grooming (such as brushing teeth) 3  -RB     Eating meals 4  -RB     AM-PAC 6 Clicks Score (OT) 19  -RB       Row Name 02/16/25 1314          Modified Jeronimo Scale    Modified Jeronimo Scale 3 - Moderate disability.  Requiring some help, but able to walk without assistance.  -RB       Row Name 02/16/25 1314          Functional Assessment    Outcome Measure Options AM-PAC 6 Clicks Daily Activity (OT);Modified [place]  -RB               User Key  (r) = Recorded By, (t) = Taken By, (c) = Cosigned By      Initials Name Provider Type    Racheal Son OT Occupational Therapist                  Occupational Therapy Education       Title: PT OT SLP Therapies (Done)       Topic: Occupational Therapy (Done)       Point: ADL training (Done)       Description:   Instruct learner(s) on proper safety adaptation and remediation techniques during self care or transfers.   Instruct in proper use of assistive devices.                  Learning Progress Summary            Patient Acceptance, E,TB,D, DU,VU by RB at 2/16/2025 1314    Comment: HEP, sling mangement, positioning                      Point: Home exercise program (Done)       Description:   Instruct learner(s) on appropriate technique for monitoring, assisting  and/or progressing therapeutic exercises/activities.                  Learning Progress Summary            Patient Acceptance, E,TB,D, DU,VU by RB at 2/16/2025 1314    Comment: HEP, sling mangement, positioning                      Point: Precautions (Done)       Description:   Instruct learner(s) on prescribed precautions during self-care and functional transfers.                  Learning Progress Summary            Patient Acceptance, E,TB,D, DU,VU by RB at 2/16/2025 1314    Comment: HEP, sling mangement, positioning                      Point: Body mechanics (Done)       Description:   Instruct learner(s) on proper positioning and spine alignment during self-care, functional mobility activities and/or exercises.                  Learning Progress Summary            Patient Acceptance, E,TB,D, DU,VU by RB at 2/16/2025 1314    Comment: HEP, sling mangement, positioning                                      User Key       Initials Effective Dates Name Provider Type Discipline     06/16/21 -  Racheal Harrison OT Occupational Therapist OT                  OT Recommendation and Plan  Planned Therapy Interventions (OT): ROM/therapeutic exercise, BADL retraining, patient/caregiver education/training, passive ROM/stretching, occupation/activity based interventions  Plan of Care Review  Plan of Care Reviewed With: patient  Progress: no change  Outcome Evaluation: The pt was admitted to [facility] 2/2 to a pop in her shoulder with immediate pain. Orthopedics was consulted and the pt's imaging was (-) for an acute dislocation or fracture. Ortho recommended a sling for comfort and advancing activity as tolerated. The pt reports independence at baseline and uses no AD. Today the pt demonstrated significantly limited ROM in her R shoulder - poor tolerance for AROM. OT provided education and demo on advancing her range over the next several days by utilizing SROM and pendulums. She does not like her sling at bedside and plans to  order a new one online today that has velcro for easy donning/doffing. Modifications to be made at home for ADL/functional transfers. OP PT recommended.  Plan of Care Reviewed With: patient  Outcome Evaluation: The pt was admitted to [facility] 2/2 to a pop in her shoulder with immediate pain. Orthopedics was consulted and the pt's imaging was (-) for an acute dislocation or fracture. Ortho recommended a sling for comfort and advancing activity as tolerated. The pt reports independence at baseline and uses no AD. Today the pt demonstrated significantly limited ROM in her R shoulder - poor tolerance for AROM. OT provided education and demo on advancing her range over the next several days by utilizing SROM and pendulums. She does not like her sling at bedside and plans to order a new one online today that has velcro for easy donning/doffing. Modifications to be made at home for ADL/functional transfers. OP PT recommended.     Time Calculation:         Time Calculation- OT       Row Name 02/16/25 1309             Time Calculation- OT    OT Start Time 1139  -RB      OT Stop Time 1149  -RB      OT Time Calculation (min) 10 min  -RB      OT Received On 02/16/25  -RB         Untimed Charges    OT Eval/Re-eval Minutes 10  -RB         Total Minutes    Untimed Charges Total Minutes 10  -RB       Total Minutes 10  -RB                User Key  (r) = Recorded By, (t) = Taken By, (c) = Cosigned By      Initials Name Provider Type    Racheal Son OT Occupational Therapist                  Therapy Charges for Today       Code Description Service Date Service Provider Modifiers Qty    [id number]  OT EVAL LOW COMPLEXITY 2 2/16/2025 Racheal Harrison OT GO 1               OT Discharge Summary  Anticipated Discharge Disposition (OT): home with assist, home with outpatient therapy services    Racheal Harrison OT  2/16/2025

## 2025-02-17 ENCOUNTER — READMISSION MANAGEMENT (OUTPATIENT)
Dept: CALL CENTER | Facility: HOSPITAL | Age: 75
End: 2025-02-17
Payer: MEDICARE

## 2025-02-17 NOTE — OUTREACH NOTE
Prep Survey      Flowsheet Row Responses   [facility] facility patient discharged from? [place]   Is LACE score < 7 ? No   Eligibility Readm Mgmt   Discharge diagnosis Right arm pain   Does the patient have one of the following disease processes/diagnoses(primary or secondary)? Other   Does the patient have Home health ordered? No   Is there a DME ordered? No   Medication alerts for this patient see avs   Prep survey completed? Yes            Samantha LUTZ - Registered Nurse

## 2025-02-20 ENCOUNTER — READMISSION MANAGEMENT (OUTPATIENT)
Dept: CALL CENTER | Facility: HOSPITAL | Age: 75
End: 2025-02-20
Payer: MEDICARE

## 2025-02-20 NOTE — OUTREACH NOTE
Medical Week 1 Survey      Flowsheet Row Responses   [facility] patient discharged from? [place]   Does the patient have one of the following disease processes/diagnoses(primary or secondary)? Other   Week 1 attempt successful? No   Unsuccessful attempts Attempt 1            Tasia Mendez Registered Nurse

## 2025-02-25 ENCOUNTER — READMISSION MANAGEMENT (OUTPATIENT)
Dept: CALL CENTER | Facility: HOSPITAL | Age: 75
End: 2025-02-25
Payer: MEDICARE

## 2025-02-25 NOTE — OUTREACH NOTE
Medical Week 1 Survey      Flowsheet Row Responses   [facility] patient discharged from? [place]   Does the patient have one of the following disease processes/diagnoses(primary or secondary)? Other   Week 1 attempt successful? No   Unsuccessful attempts Attempt 2            Merly LEHMAN - Registered Nurse

## 2025-03-05 ENCOUNTER — READMISSION MANAGEMENT (OUTPATIENT)
Dept: CALL CENTER | Facility: HOSPITAL | Age: 75
End: 2025-03-05
Payer: MEDICARE

## 2025-03-05 NOTE — OUTREACH NOTE
Medical Week 3 Survey      Flowsheet Row Responses   [facility] patient discharged from? [place]   Does the patient have one of the following disease processes/diagnoses(primary or secondary)? Other   Week 3 attempt successful? No   Unsuccessful attempts Attempt 1            SANDIP Mendez Registered Nurse

## 2025-03-10 ENCOUNTER — READMISSION MANAGEMENT (OUTPATIENT)
Dept: CALL CENTER | Facility: HOSPITAL | Age: 75
End: 2025-03-10
Payer: MEDICARE

## 2025-03-10 NOTE — OUTREACH NOTE
Medical Week 3 Survey      Flowsheet Row Responses   [facility] [place] patient discharged from? [place]   Does the patient have one of the following disease processes/diagnoses(primary or secondary)? Other   Week 3 attempt successful? No   Unsuccessful attempts Attempt 2   Revoke Other transitional program            Darlyn Mendez Registered Nurse

## 2025-04-22 ENCOUNTER — HOSPITAL ENCOUNTER (EMERGENCY)
Facility: HOSPITAL | Age: 75
Discharge: HOME | End: 2025-04-22
Attending: EMERGENCY MEDICINE
Payer: MEDICARE

## 2025-04-22 ENCOUNTER — TELEPHONE (OUTPATIENT)
Dept: ORTHOPEDIC SURGERY | Facility: CLINIC | Age: 75
End: 2025-04-22

## 2025-04-22 ENCOUNTER — APPOINTMENT (OUTPATIENT)
Dept: GENERAL RADIOLOGY | Facility: HOSPITAL | Age: 75
End: 2025-04-22
Payer: MEDICARE

## 2025-04-22 VITALS
WEIGHT: 158.73 LBS | RESPIRATION RATE: 17 BRPM | SYSTOLIC BLOOD PRESSURE: 124 MMHG | OXYGEN SATURATION: 97 % | TEMPERATURE: 98.7 F | BODY MASS INDEX: 31.16 KG/M2 | HEIGHT: 60 IN | DIASTOLIC BLOOD PRESSURE: 76 MMHG | HEART RATE: 88 BPM

## 2025-04-22 DIAGNOSIS — S42.331A CLOSED DISPLACED OBLIQUE FRACTURE OF SHAFT OF RIGHT HUMERUS, INITIAL ENCOUNTER: Primary | ICD-10-CM

## 2025-04-22 PROCEDURE — 73060 X-RAY EXAM OF HUMERUS: CPT

## 2025-04-22 PROCEDURE — 96372 THER/PROPH/DIAG INJ SC/IM: CPT

## 2025-04-22 PROCEDURE — 25010000002 FENTANYL CITRATE (PF) 50 MCG/ML SOLUTION: Performed by: EMERGENCY MEDICINE

## 2025-04-22 PROCEDURE — 99283 EMERGENCY DEPT VISIT LOW MDM: CPT

## 2025-04-22 RX ORDER — FENTANYL CITRATE 50 UG/ML
100 INJECTION, SOLUTION INTRAMUSCULAR; INTRAVENOUS ONCE
Refills: 0 | Status: DISCONTINUED | OUTPATIENT
Start: 2025-04-22 | End: 2025-04-22

## 2025-04-22 RX ORDER — SODIUM CHLORIDE 0.9 % (FLUSH) 0.9 %
10 SYRINGE (ML) INJECTION AS NEEDED
Status: DISCONTINUED | OUTPATIENT
Start: 2025-04-22 | End: 2025-04-22

## 2025-04-22 RX ORDER — FENTANYL CITRATE 50 UG/ML
100 INJECTION, SOLUTION INTRAMUSCULAR; INTRAVENOUS ONCE
Refills: 0 | Status: COMPLETED | OUTPATIENT
Start: 2025-04-22 | End: 2025-04-22

## 2025-04-22 RX ORDER — HYDROCODONE BITARTRATE AND ACETAMINOPHEN 5; 325 MG/1; MG/1
1 TABLET ORAL ONCE
Refills: 0 | Status: COMPLETED | OUTPATIENT
Start: 2025-04-22 | End: 2025-04-22

## 2025-04-22 RX ORDER — FENTANYL CITRATE 50 UG/ML
25 INJECTION, SOLUTION INTRAMUSCULAR; INTRAVENOUS
Refills: 0 | Status: DISCONTINUED | OUTPATIENT
Start: 2025-04-22 | End: 2025-04-22

## 2025-04-22 RX ORDER — FENTANYL CITRATE 50 UG/ML
25 INJECTION, SOLUTION INTRAMUSCULAR; INTRAVENOUS ONCE
Refills: 0 | Status: DISCONTINUED | OUTPATIENT
Start: 2025-04-22 | End: 2025-04-22

## 2025-04-22 RX ADMIN — HYDROCODONE BITARTRATE AND ACETAMINOPHEN 1 TABLET: 5; 325 TABLET ORAL at 10:44

## 2025-04-22 RX ADMIN — FENTANYL CITRATE 100 MCG: 50 INJECTION, SOLUTION INTRAMUSCULAR; INTRAVENOUS at 12:36

## 2025-04-22 RX ADMIN — FENTANYL CITRATE 25 MCG: 50 INJECTION, SOLUTION INTRAMUSCULAR; INTRAVENOUS at 10:40

## 2025-04-22 NOTE — ED PROVIDER NOTES
EMERGENCY DEPARTMENT ENCOUNTER  Room Number:  10/10  PCP: Chelly Red APRN  Independent Historians: Patient      HPI:  Chief Complaint: had concerns including Fall and Shoulder Injury (R).     A complete HPI/ROS/PMH/PSH/SH/FH are unobtainable due to: Nothing      Context: Kristan Galicia is a 74 y.o. female with a medical history of paroxysmal atrial fibrillation who presents to the ED c/o acute right shoulder pain after a fall prior to arrival.  She denies head trauma or loss of conscious.  She denies neck pain.      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/2016    Migraines     Hyperlipidemia     History of transfusion     Heart murmur     Heart attack     GERD (gastroesophageal reflux disease)     CHF (congestive heart failure)     Cardiomyopathy     Asthma     Arthritis of right knee 12/27/2016    Right rotator cuff tear 02/02/2017    Hypothyroidism 02/22/2017    Dyspnea and respiratory abnormalities 03/03/2017    Orthopnea 03/03/2017    IHSS (idiopathic hypertrophic subaortic stenosis) 03/03/2017    AICD (automatic cardioverter/defibrillator) present 03/03/2017    Anticoagulant long-term use 03/03/2017    Hyperglycemia, drug-induced 03/06/2017    Chronic pain of right knee 03/17/2017    Diverticulitis 04/05/2016    CAD (coronary artery disease) 08/07/2017    Hypertrophic nonobstructive cardiomyopathy 06/25/2013    ICD (implantable cardioverter-defibrillator) discharge 04/04/2016     Menopause 08/07/2017    Non-obstructive hypertrophic cardiomyopathy 08/07/2017    Insomnia 03/27/2018    History of total knee arthroplasty, right 05/02/2018    Closed fracture of odontoid process of axis 09/14/2018    Atelectasis 09/16/2018    Chronic diastolic CHF (congestive heart failure) 08/14/2018    Diabetes mellitus 06/18/2019    Chronic gout involving 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 06/18/2019    Other displaced fracture of first cervical vertebra, subsequent encounter for fracture with routine healing 09/27/2018    Left shoulder pain 09/09/2020    Closed displaced fracture of left acromial process 03/29/2021    Cervical spondylosis with radiculopathy 04/25/2022    Diarrhea 05/23/2024    Colon polyps 06/18/2024    Right arm pain 02/15/2025     Resolved Ambulatory Problems     Diagnosis Date Noted    Depression     Anxiety     RSV bronchiolitis 03/06/2017    Hypoxia 09/16/2018     Past Medical History:   Diagnosis Date    Abnormal ECG 1989    Allergic     Anemia     Angina at rest     Arthritis     Atrial fibrillation     Cardiac defibrillator in place 01/2009    Cervical disc disorder     Cholelithiasis 1975    Chronic pain     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     Coronary artery disease     Diverticulitis of colon 2021    Diverticulosis     Fibrocystic breast 2010    Fracture, femur 1954, 1964, 2011    Fracture, foot 1969    Frozen shoulder 2016    History of bronchitis     History of COVID-19     History of depression     History of pneumonia     History of sleep apnea     History of tumor     Hypertension     Knee swelling 2017    Lumbosacral disc disease 2010    On anticoagulant therapy     Periarthritis of shoulder 2016    Rotator cuff syndrome 2014    Scoliosis     Shoulder pain     Sleep apnea          PAST SURGICAL HISTORY  Past Surgical History:   Procedure Laterality Date    ANTERIOR CERVICAL DISCECTOMY W/ FUSION  2012    C5-7    ANTERIOR CERVICAL  DISCECTOMY W/ FUSION N/A 05/05/2022    Procedure: Cervical 3- Cervical 4, Cervical 4- Cervical 5 anterior cervical discectomy and fusion with Zero profile cage and titanuium mesh cage;  Surgeon: Wilfredo Bailey MD;  Location: [facility] MAIN OR;  Service: Orthopedic Spine;  Laterality: N/A;    APPENDECTOMY  1970    BILATERAL BREAST REDUCTION      BREAST BIOPSY      CARDIAC CATHETERIZATION  02/19/2007    CARDIAC DEFIBRILLATOR PLACEMENT Left 1999, 2013    Dr. Ya    CATARACT EXTRACTION Bilateral     CHOLECYSTECTOMY OPEN  1974    COLONOSCOPY  1996    Dr. Herbert Gonsales     COLONOSCOPY  08/16/2016    Procedure: COLONOSCOPY with cecum;  Surgeon: Rosalio Sheikh MD;  Location: [facility] ENDOSCOPY;  Service:     COLONOSCOPY N/A 6/18/2024    Procedure: COLONOSCOPY to with partial cecum viewing with cold biopsies/polypectomy with clip placement x3;  Surgeon: Sabino Arboleda MD;  Location: [facility] ENDOSCOPY;  Service: General;  Laterality: N/A;  pre change in bowel habits  post lipoma clip x2 diverticulosos polyp    ENDOSCOPY N/A 04/27/2017    Procedure: ESOPHAGOGASTRODUODENOSCOPY WITH BIOPSIES;  Surgeon: Rosalio Sheikh MD;  Location: [facility] ENDOSCOPY;  Service:     ENDOSCOPY N/A 6/18/2024    Procedure: ESOPHAGOGASTRODUODENOSCOPY WITH BIOPSiES/cold forcep;  Surgeon: Sabino Arboleda MD;  Location: [facility] ENDOSCOPY;  Service: General;  Laterality: N/A;  pre reflux post gastritis    FEMUR SURGERY Left     with metal implant  x3    FIRST RIB RESECTION Bilateral     and scalenectomy    FRACTURE SURGERY      Broke left femor 3 times    HARDWARE REMOVAL Left 12/28/2023    Procedure: SHOULDER HARDWARE REMOVAL;  Surgeon: Juan Antonio Anne MD;  Location: [facility] OR OSC;  Service: Orthopedics;  Laterality: Left;    HYSTERECTOMY  1989    Total    IMPLANTABLE CARDIOVERTER DEFIBRILLATOR LEAD REPLACEMENT/POCKET REVISION      JOINT REPLACEMENT  Right shoulder  and right knee ear    KNEE ARTHROSCOPY Bilateral 2014      Ankur    KNEE SURGERY Right 06/10/2010    NECK SURGERY  2012, 2018    OOPHORECTOMY      REDUCTION MAMMAPLASTY      SCAPULA OPEN REDUCTION INTERNAL FIXATION Left 04/15/2021    Procedure: Acromion nonunion OPEN REDUCTION INTERNAL FIXATION;  Surgeon: Juan Antonio Anne MD;  Location: [facility] MAIN OR;  Service: Orthopedics;  Laterality: Left;    SHOULDER ARTHROSCOPY W/ ROTATOR CUFF REPAIR Bilateral 05/20/2011    SHOULDER SURGERY Right     TONSILLECTOMY      TOTAL KNEE ARTHROPLASTY Right 04/18/2018    Procedure: RIGHT TOTAL KNEE ARTHROPLASTY WITH STEVE NAVIGATION;  Surgeon: Bravo Pascual MD;  Location: [facility] MAIN OR;  Service: Orthopedics    TOTAL SHOULDER ARTHROPLASTY W/ DISTAL CLAVICLE EXCISION Right 02/02/2017    Procedure: TOTAL SHOULDER REVERSE ARTHROPLASTY;  Surgeon: Juan Antonio Anne MD;  Location: [facility] MAIN OR;  Service:     TOTAL SHOULDER ARTHROPLASTY W/ DISTAL CLAVICLE EXCISION Left 10/22/2020    Procedure: TOTAL SHOULDER REVERSE ARTHROPLASTY;  Surgeon: Juan Antonio Anne MD;  Location: [facility] MAIN OR;  Service: Orthopedics;  Laterality: Left;    TRIGGER POINT INJECTION  Starting in 2010 until now         FAMILY HISTORY  Family History   Problem Relation Age of Onset    Heart attack Brother     Heart disease Brother     Hyperthyroidism Mother     Cancer Mother     Heart disease Mother     Osteoporosis Mother     Thyroid disease Mother     Broken bones Mother     No Known Problems Father          car accident    Cirrhosis Maternal Grandmother     No Known Problems Maternal Grandfather     No Known Problems Paternal Grandmother     No Known Problems Paternal Grandfather     Breast cancer Maternal Aunt     Cancer Maternal Aunt     Malig Hyperthermia Neg Hx          SOCIAL HISTORY  Social History     Socioeconomic History    Marital status:     Number of children: 1   Tobacco Use    Smoking status: Former     Current packs/day: 0.00     Average packs/day: 0.3 packs/day for 18.4 years (4.6 ttl pk-yrs)      Types: Cigarettes     Start date: 4/3/1967     Quit date: 1985     Years since quittin.6     Passive exposure: Past    Smokeless tobacco: Never    Tobacco comments:     Haven't smoked in 39 years   Vaping Use    Vaping status: Never Used   Substance and Sexual Activity    Alcohol use: No    Drug use: Never    Sexual activity: Not Currently     Birth control/protection: Post-menopausal, Hysterectomy         ALLERGIES  Adhesive tape, Aminobenzoate, Lorazepam, Baclofen, Aspirin, Biaxin [clarithromycin], Codeine, Darvon [propoxyphene], Levaquin [levofloxacin], Nitroglycerin, Percocet [oxycodone-acetaminophen], and Tequin [gatifloxacin]      REVIEW OF SYSTEMS  Review of all 14 systems is negative other than stated in the HPI above.      PHYSICAL EXAM    I have reviewed the triage vital signs and nursing notes.    ED Triage Vitals   Temp Heart Rate Resp BP SpO2   25 0942 25 0942 25 0942 25 0944 25 0942   98.7 °F (37.1 °C) 109 20 140/71 98 %      Temp src Heart Rate Source Patient Position BP Location FiO2 (%)   25 0942 -- -- -- --   Tympanic             GENERAL: awake and alert, no acute distress  HENT: Normocephalic, atraumatic  EYES: no scleral icterus, pupils 3 mm reactive bilaterally  CV: regular rhythm, regular rate  RESPIRATORY: normal effort  ABDOMEN: soft, nondistended  MUSCULOSKELETAL: no deformity.  There is point tenderness of the right mid humerus.  2+ right radial pulse.  No midline C-spine tenderness or step-off.  NEURO: alert, moves all extremities, follows commands.  Normal motor strength and sensation throughout right upper extremity  PSYCH: calm, cooperative  SKIN: Warm, dry          LAB RESULTS  No results found for this or any previous visit (from the past 24 hours).    The above labs were ordered by me and independently reviewed by me.     RADIOLOGY  XR Humerus Right  Result Date: 2025  XR HUMERUS RIGHT-  INDICATIONS: Trauma  TECHNIQUE: 3 VIEWS OF THE  RIGHT HUMERUS  COMPARISON: 2/15/2025  FINDINGS:  An angulated, obliquely oriented fracture of the mid clavicular shaft, in the region of the distal end of the humeral component of the right shoulder arthroplasty hardware, shows as much as about 2.2 cm medial displacement of distal fracture fragment. No other fractures are identified. No dislocation.       Right humeral shaft fracture.    This report was finalized on 4/22/2025 11:07 AM by Dr. Fabien Lynn M.D on Workstation: [facility]        The above radiology studies were ordered by me.  See ED course for independent interpretations.     MEDICATIONS GIVEN IN ER  Medications   fentaNYL citrate (PF) (SUBLIMAZE) injection 100 mcg (has no administration in time range)   HYDROcodone-acetaminophen (NORCO) 5-325 MG per tablet 1 tablet (1 tablet Oral Given 4/22/25 1044)         ORDERS PLACED DURING THIS VISIT:  Orders Placed This Encounter   Procedures    Splint Cast Strapping    XR Humerus Right    Ortho (on-call MD unless specified)    Insert Peripheral IV         OUTPATIENT MEDICATION MANAGEMENT:  Current Facility-Administered Medications Ordered in Epic   Medication Dose Route Frequency Provider Last Rate Last Admin    fentaNYL citrate (PF) (SUBLIMAZE) injection 100 mcg  100 mcg Intramuscular Once Wilfredo Dnun MD         Current Outpatient Medications Ordered in Epic   Medication Sig Dispense Refill    alendronate (FOSAMAX) 70 MG tablet Take 1 tablet by mouth Every 7 (Seven) Days. (Patient taking differently: Take 1 tablet by mouth Every 7 (Seven) Days. TAKES ON SUNDAYS) 12 tablet 3    allopurinol (ZYLOPRIM) 100 MG tablet Take 1 tablet by mouth Daily. 90 tablet 3    apixaban (Eliquis) 5 MG tablet tablet Take 1 tablet by mouth Every 12 (Twelve) Hours.      atorvastatin (LIPITOR) 80 MG tablet Take 1 tablet by mouth every night at bedtime. 90 tablet 3    Blood Glucose Monitoring Suppl (ACCU-CHEK OTILIA PLUS) W/DEVICE kit Dx e11.9 use to check BS BID, ok to  substitute formulary preferred 1 kit 0    dilTIAZem CD (CARDIZEM CD) 120 MG 24 hr capsule Take 1 capsule by mouth Daily.      docusate sodium (COLACE) 100 MG capsule Take 1 capsule by mouth 2 (Two) Times a Day. 60 capsule 0    DULoxetine (CYMBALTA) 60 MG capsule Take 1 capsule by mouth Daily. (Patient taking differently: Take 1 capsule by mouth Every Night.) 90 capsule 3    famotidine (PEPCID) 20 MG tablet Take 1 tablet by mouth 2 (Two) Times a Day. 14 tablet 0    glucose blood (ACCU-CHEK OTILIA PLUS) test strip Dx e11.9 use to check BS BID, ok to substitute formulary preferred 60 each 11    HYDROcodone-acetaminophen (NORCO)  MG per tablet Take 1 tablet by mouth Every 4 (Four) Hours As Needed for Moderate Pain (Pain). 42 tablet 0    Lancets (ACCU-CHEK MULTICLIX) lancets Dx e11.9 use to check BS BID, ok to substitute formulary preferred 60 each 11    levothyroxine (SYNTHROID, LEVOTHROID) 75 MCG tablet TAKE 1 TABLET BY MOUTH EVERY DAY (Patient taking differently: Take 1 tablet by mouth Daily.) 90 tablet 2    Loratadine 10 MG capsule Take 1 capsule by mouth Daily.      metoprolol tartrate (LOPRESSOR) 50 MG tablet Take 1 tablet by mouth 1 (One) Time.      montelukast (SINGULAIR) 10 MG tablet Take 1 tablet by mouth Daily. (Patient taking differently: Take 1 tablet by mouth Every Night.) 90 tablet 3    multivitamin with minerals tablet tablet Take 2 tablets by mouth Every Night.      neomycin-polymyxin-dexamethamethasone (POLYDEX) 3.5-96306-9.1 ointment ophthalmic ointment       omeprazole (priLOSEC) 40 MG capsule Take 1 capsule by mouth Daily. 60 capsule 0         PROCEDURES  Splint - Cast - Strapping    Date/Time: 4/22/2025 12:32 PM    Performed by: Wilfredo Dunn MD  Authorized by: Wilfredo Dunn MD    Consent:     Consent obtained:  Verbal    Risks discussed:  Pain  Pre-procedure details:     Distal neurologic exam:  Normal    Distal perfusion: distal pulses strong    Procedure details:      Location:  Arm    Arm location:  R upper arm    Upper extremity splint type: coaptation.    Attestation: Splint applied and adjusted personally by me    Post-procedure details:     Distal neurologic exam:  Normal    Distal perfusion: distal pulses strong and brisk capillary refill      Procedure completion:  Tolerated well, no immediate complications    Post-procedure imaging: not applicable    Comments:      Sling also applied              PROGRESS, DATA ANALYSIS, CONSULTS, AND MEDICAL DECISION MAKING  All labs have been independently interpreted by me.  All radiology studies have been reviewed by me. All EKG's have been independently viewed and interpreted by me.  Discussion below represents my analysis of pertinent findings related to patient's condition, differential diagnosis, treatment plan and final disposition.    Differential diagnosis includes but is not limited to:  Proximal humerus fracture  Shoulder dislocation  Humeral shaft fracture      Clinical Scores:                  ED Course as of 04/22/25 1233   Tue Apr 22, 2025   1027 Record review: It appears that patient had a right reverse total shoulder arthroplasty with Dr. Anne in 2017. [JR]   1028 Plain films of the right humerus independently interpreted PACS.  There is a midshaft spiral humeral fracture, just distal to the shoulder arthroplasty stem. [JR]   1105 Discussed with Dr. Mcnair, orthopedic surgery, who recommended coapt splint and sling and follow-up in the office with Dr. Anne to discuss conservative versus operative management. [JR]      ED Course User Index  [JR] Wilfredo Dunn MD       Patient advised to follow-up with orthopedics to discuss further management and also to follow-up closely with her  pain management specialist for management of her acute  pain.  She takes Norco regularly for chronic pain.      AS OF 12:33 EDT VITALS:    BP - 109/75  HR - 94  TEMP - 98.7 °F (37.1 °C) (Tympanic)  O2 SATS - 96%    COMPLEXITY  OF CARE        Chronic or social conditions impacting patient care (Social Determinants of Health):     DIAGNOSIS  Final diagnoses:   Closed displaced oblique fracture of shaft of right humerus, initial encounter           DISPOSITION  DISCHARGE    Patient discharged in stable condition.    Reviewed implications of results, diagnosis, meds, responsibility to follow up, warning signs and symptoms of possible worsening, potential complications and reasons to return to ER.    Patient/Family voiced understanding of above instructions.    Discussed plan for discharge, as there is no emergent indication for admission. Patient referred to primary care provider for BP management due to today's BP. Pt/family is agreeable and understands need for follow up and repeat testing.  Pt is aware that discharge does not mean that nothing is wrong but it indicates no emergency is present that requires admission and they must continue care with follow-up as given below or physician of their choice.     FOLLOW-UP  Juan Antonio Anne MD  [address]  [facility] [id number] [phone]    Schedule an appointment as soon as possible for a visit       Chelly Red, APRN  [address]  [place] [id number] [phone]    Schedule an appointment as soon as possible for a visit       Your pain management provider, for acute pain control               Medication List        Changed      alendronate 70 MG tablet  Commonly known as: FOSAMAX  Take 1 tablet by mouth Every 7 (Seven) Days.  What changed: additional instructions     DULoxetine 60 MG capsule  Commonly known as: CYMBALTA  Take 1 capsule by mouth Daily.  What changed: when to take this     montelukast 10 MG tablet  Commonly known as: SINGULAIR  Take 1 tablet by mouth Daily.  What changed: when to take this                  Prescription drug monitoring program review:           Please note that portions of this document were completed with a voice  recognition program.    Note Disclaimer: At [facility], we believe that sharing information builds trust and better relationships. You are receiving this note because you recently visited [facility]. It is possible you will see health information before a provider has talked with you about it. This kind of information can be easy to misunderstand. To help you fully understand what it means for your health, we urge you to discuss this note with your provider.         Wilfredo Dunn MD  04/22/25 1717
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
## 2025-04-23 ENCOUNTER — PATIENT MESSAGE (OUTPATIENT)
Dept: ORTHOPEDIC SURGERY | Facility: CLINIC | Age: 75
End: 2025-04-23
Payer: MEDICARE

## 2025-04-25 ENCOUNTER — OFFICE VISIT (OUTPATIENT)
Dept: ORTHOPEDIC SURGERY | Facility: CLINIC | Age: 75
End: 2025-04-25
Payer: MEDICARE

## 2025-04-25 VITALS — WEIGHT: 158 LBS | TEMPERATURE: 98.2 F | HEIGHT: 60 IN | BODY MASS INDEX: 31.02 KG/M2

## 2025-04-25 DIAGNOSIS — S42.351A CLOSED DISPLACED COMMINUTED FRACTURE OF SHAFT OF RIGHT HUMERUS, INITIAL ENCOUNTER: Primary | ICD-10-CM

## 2025-04-25 RX ORDER — POLYETHYLENE GLYCOL 3350 17 G/17G
17 POWDER, FOR SOLUTION ORAL
COMMUNITY
Start: 2025-02-07 | End: 2025-04-28

## 2025-04-25 RX ORDER — LOSARTAN POTASSIUM 25 MG/1
25 TABLET ORAL EVERY MORNING
COMMUNITY
Start: 2025-04-07

## 2025-04-25 RX ORDER — PREGABALIN 150 MG/1
150 CAPSULE ORAL 2 TIMES DAILY
COMMUNITY

## 2025-04-25 RX ORDER — ONDANSETRON 4 MG/1
1 TABLET, FILM COATED ORAL EVERY 6 HOURS PRN
COMMUNITY
Start: 2025-02-07

## 2025-04-25 RX ORDER — AMOXICILLIN 250 MG
2 CAPSULE ORAL AS NEEDED
COMMUNITY
Start: 2025-02-07

## 2025-04-25 RX ORDER — AMITRIPTYLINE HYDROCHLORIDE 10 MG/1
10 TABLET ORAL NIGHTLY
COMMUNITY
Start: 2025-02-07

## 2025-04-25 RX ORDER — HYDROCODONE BITARTRATE AND ACETAMINOPHEN 10; 325 MG/1; MG/1
1 TABLET ORAL EVERY 4 HOURS PRN
Qty: 30 TABLET | Refills: 0 | Status: SHIPPED | OUTPATIENT
Start: 2025-04-25

## 2025-04-25 RX ORDER — PANTOPRAZOLE SODIUM 40 MG/1
40 TABLET, DELAYED RELEASE ORAL DAILY
COMMUNITY
Start: 2025-01-23

## 2025-04-25 RX ORDER — BUPROPION HYDROCHLORIDE 300 MG/1
300 TABLET ORAL EVERY MORNING
COMMUNITY
Start: 2024-11-20

## 2025-04-25 NOTE — PROGRESS NOTES
History & Physical     Patient: Kristan Galicia    YOB: 1950    Medical Record Number: [id number]    Chief Complaints: Right arm injury    History of Present Illness: 74 y.o. female presents for evaluation of the right arm.  She is known to me from a previous right shoulder replacement.  The injury was sustained in a twisting injury when she tried to catch herself with the right arm this past week.  She felt something snap immediately.  She says her shoulder was doing fine prior to the injury.  Describes the pain as moderate, constant, and aching.  The pain is worse with movement.  The pain is better with rest, pain medicine and use of the sling.  She does need a refill of the pain medicine.  She denies any other injuries or complaints.      Allergies   Allergen Reactions    Adhesive Tape Other (See Comments)     SKIN BLISTERS  PAPER TAPE OKAY PER PT     Aminobenzoate Anxiety     restlessness    Lorazepam Anxiety and Shortness Of Breath     Restless legs confusion    Baclofen Delirium    Aspirin Swelling    Biaxin [Clarithromycin] Other (See Comments)     BLISTERS AROUND MOTH  Also known as Bioxen     Codeine Swelling    Darvon [Propoxyphene] Swelling    Levaquin [Levofloxacin] Other (See Comments)     BLISTERS AROUND MOUTH    Nitroglycerin Other (See Comments)     Was told by cardiologist not to take due to heart condition & defibrillator    Percocet [Oxycodone-Acetaminophen] Irritability    Tequin [Gatifloxacin] Other (See Comments)     BLISTERS AROUND MOUTH  Also known Tequine        Home Medications:      Current Outpatient Medications:     alendronate (FOSAMAX) 70 MG tablet, Take 1 tablet by mouth Every 7 (Seven) Days. (Patient taking differently: Take 1 tablet by mouth Every 7 (Seven) Days. TAKES ON SUNDAYS), Disp: 12 tablet, Rfl: 3    allopurinol (ZYLOPRIM) 100 MG tablet, Take 1 tablet by mouth Daily., Disp: 90 tablet, Rfl: 3    amitriptyline (ELAVIL) 10 MG tablet, Take 1 tablet by mouth  Daily., Disp: , Rfl:     apixaban (Eliquis) 5 MG tablet tablet, Take 1 tablet by mouth Every 12 (Twelve) Hours., Disp: , Rfl:     atorvastatin (LIPITOR) 80 MG tablet, Take 1 tablet by mouth every night at bedtime., Disp: 90 tablet, Rfl: 3    Blood Glucose Monitoring Suppl (ACCU-CHEK OTILIA PLUS) W/DEVICE kit, Dx e11.9 use to check BS BID, ok to substitute formulary preferred, Disp: 1 kit, Rfl: 0    buPROPion XL (WELLBUTRIN XL) 300 MG 24 hr tablet, Take 1 tablet by mouth Daily., Disp: , Rfl:     dilTIAZem CD (CARDIZEM CD) 120 MG 24 hr capsule, Take 1 capsule by mouth Daily., Disp: , Rfl:     docusate sodium (COLACE) 100 MG capsule, Take 1 capsule by mouth 2 (Two) Times a Day., Disp: 60 capsule, Rfl: 0    DULoxetine (CYMBALTA) 60 MG capsule, Take 1 capsule by mouth Daily. (Patient taking differently: Take 1 capsule by mouth Every Night.), Disp: 90 capsule, Rfl: 3    famotidine (PEPCID) 20 MG tablet, Take 1 tablet by mouth 2 (Two) Times a Day., Disp: 14 tablet, Rfl: 0    glucose blood (ACCU-CHEK OTILIA PLUS) test strip, Dx e11.9 use to check BS BID, ok to substitute formulary preferred, Disp: 60 each, Rfl: 11    HYDROcodone-acetaminophen (NORCO)  MG per tablet, Take 1 tablet by mouth Every 4 (Four) Hours As Needed for Moderate Pain (Pain)., Disp: 42 tablet, Rfl: 0    Lancets (ACCU-CHEK MULTICLIX) lancets, Dx e11.9 use to check BS BID, ok to substitute formulary preferred, Disp: 60 each, Rfl: 11    levothyroxine (SYNTHROID, LEVOTHROID) 75 MCG tablet, TAKE 1 TABLET BY MOUTH EVERY DAY (Patient taking differently: Take 1 tablet by mouth Daily.), Disp: 90 tablet, Rfl: 2    Loratadine 10 MG capsule, Take 1 capsule by mouth Daily., Disp: , Rfl:     losartan (COZAAR) 25 MG tablet, Take 1 tablet by mouth Daily., Disp: , Rfl:     metoprolol tartrate (LOPRESSOR) 50 MG tablet, Take 1 tablet by mouth 1 (One) Time., Disp: , Rfl:     montelukast (SINGULAIR) 10 MG tablet, Take 1 tablet by mouth Daily. (Patient taking differently:  Take 1 tablet by mouth Every Night.), Disp: 90 tablet, Rfl: 3    multivitamin with minerals tablet tablet, Take 2 tablets by mouth Every Night., Disp: , Rfl:     omeprazole (priLOSEC) 40 MG capsule, Take 1 capsule by mouth Daily., Disp: 60 capsule, Rfl: 0    ondansetron (ZOFRAN) 4 MG tablet, Take 1 tablet by mouth Every 6 (Six) Hours As Needed., Disp: , Rfl:     pantoprazole (PROTONIX) 40 MG EC tablet, TAKE 1 TABLET BY MOUTH EVERY DAY **DO NOT CRUSH, CHEW, OR SPLIT**, Disp: , Rfl:     polyethylene glycol (MIRALAX) 17 g packet, Take 17 g by mouth., Disp: , Rfl:     pregabalin (LYRICA) 75 MG capsule, TAKE 1 CAPSULE BY MOUTH 2 (TWO) TIMES DAILY. MAX DAILY AMOUNT: 150 MG, Disp: , Rfl:     sennosides-docusate (PERICOLACE) 8.6-50 MG per tablet, Take 2 tablets by mouth., Disp: , Rfl:     HYDROcodone-acetaminophen (NORCO)  MG per tablet, Take 1 tablet by mouth Every 4 (Four) Hours As Needed for Moderate Pain., Disp: 30 tablet, Rfl: 0    Past Medical History:   Diagnosis Date    Abnormal ECG 1989    HOCM    Allergic     Seasonal    Anemia     Angina at rest     history    Arthritis     Asthma     Atrial fibrillation     Cardiac defibrillator in place 01/2009    medtronic     Cardiomyopathy     Hypertrophic Cardiomyopathy    Cervical disc disorder     CHF (congestive heart failure)     Cholelithiasis 1975    Removed surgically    Chronic pain     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     Coronary artery disease     Diabetes mellitus     Type II    Diverticulitis of colon 2021    Diverticulosis     hx diverticulitis    Fibrocystic breast 2010    Fracture, femur 1954, 1964, 2011    Fracture, foot 1969    Frozen shoulder 2016    GERD (gastroesophageal reflux disease)     Heart murmur     History of bronchitis     History of COVID-19     History of depression     History of pneumonia     History of sleep apnea     History of transfusion     no reaction    History of tumor     left  ocular tumor, treated with steroids    Hyperlipidemia     Hypertension     Knee swelling 2017    Lumbosacral disc disease 2010    Migraines     On anticoagulant therapy     Osteoporosis     Palpitations     Periarthritis of shoulder 2016    Rotator cuff syndrome 2014    Scoliosis     Shoulder pain     left    Sleep apnea     HX OF SLEEP APNEA AND WORE CPAP. PT STATED SINCE SHE LOST WEIGHT AND WAS RETESTED AND WAS TOLD SHE NO LONGER HAS IT. STOPPED USING CPAP AROUND 2018.    Thyroid disease     HYPOTHYROIDISM    Vasovagal syncope     Vertigo           Past Surgical History:   Procedure Laterality Date    ANTERIOR CERVICAL DISCECTOMY W/ FUSION  2012    C5-7    ANTERIOR CERVICAL DISCECTOMY W/ FUSION N/A 05/05/2022    Procedure: Cervical 3- Cervical 4, Cervical 4- Cervical 5 anterior cervical discectomy and fusion with Zero profile cage and titanuium mesh cage;  Surgeon: Wilfredo Bailey MD;  Location: [facility] MAIN OR;  Service: Orthopedic Spine;  Laterality: N/A;    APPENDECTOMY  1970    BILATERAL BREAST REDUCTION      BREAST BIOPSY      CARDIAC CATHETERIZATION  02/19/2007    CARDIAC DEFIBRILLATOR PLACEMENT Left 1999, 2013    Dr. Ya    CATARACT EXTRACTION Bilateral     CHOLECYSTECTOMY OPEN  1974    COLONOSCOPY  1996    Dr. Herbert Gonsales     COLONOSCOPY  08/16/2016    Procedure: COLONOSCOPY with cecum;  Surgeon: Rosalio Sheikh MD;  Location: [facility] ENDOSCOPY;  Service:     COLONOSCOPY N/A 6/18/2024    Procedure: COLONOSCOPY to with partial cecum viewing with cold biopsies/polypectomy with clip placement x3;  Surgeon: Sabino Arboleda MD;  Location: [facility] ENDOSCOPY;  Service: General;  Laterality: N/A;  pre change in bowel habits  post lipoma clip x2 diverticulosos polyp    ENDOSCOPY N/A 04/27/2017    Procedure: ESOPHAGOGASTRODUODENOSCOPY WITH BIOPSIES;  Surgeon: Rosalio Sheikh MD;  Location: [facility] ENDOSCOPY;  Service:     ENDOSCOPY N/A 6/18/2024    Procedure: ESOPHAGOGASTRODUODENOSCOPY WITH  BIOPSiES/cold forcep;  Surgeon: Sabino Arboleda MD;  Location: [facility] ENDOSCOPY;  Service: General;  Laterality: N/A;  pre reflux post gastritis    FEMUR SURGERY Left     with metal implant  x3    FIRST RIB RESECTION Bilateral     and scalenectomy    FRACTURE SURGERY      Broke left femor 3 times    HARDWARE REMOVAL Left 12/28/2023    Procedure: SHOULDER HARDWARE REMOVAL;  Surgeon: Juan Antonio Anne MD;  [facility];  Service: Orthopedics;  Laterality: Left;    HYSTERECTOMY  1989    Total    IMPLANTABLE CARDIOVERTER DEFIBRILLATOR LEAD REPLACEMENT/POCKET REVISION      JOINT REPLACEMENT  Right shoulder  and right knee ear    KNEE ARTHROSCOPY Bilateral 2014    Dr. Pascual    KNEE SURGERY Right 06/10/2010    NECK SURGERY  2012, 2018    OOPHORECTOMY      REDUCTION MAMMAPLASTY      SCAPULA OPEN REDUCTION INTERNAL FIXATION Left 04/15/2021    Procedure: Acromion nonunion OPEN REDUCTION INTERNAL FIXATION;  Surgeon: Juan Antonio Anne MD;  Location: [facility] MAIN OR;  Service: Orthopedics;  Laterality: Left;    SHOULDER ARTHROSCOPY W/ ROTATOR CUFF REPAIR Bilateral 05/20/2011    SHOULDER SURGERY Right     TONSILLECTOMY      TOTAL KNEE ARTHROPLASTY Right 04/18/2018    Procedure: RIGHT TOTAL KNEE ARTHROPLASTY WITH STEVE NAVIGATION;  Surgeon: Bravo Pascual MD;  Location: [facility] MAIN OR;  Service: Orthopedics    TOTAL SHOULDER ARTHROPLASTY W/ DISTAL CLAVICLE EXCISION Right 02/02/2017    Procedure: TOTAL SHOULDER REVERSE ARTHROPLASTY;  Surgeon: Juan Antonio Anne MD;  Location: [facility] MAIN OR;  Service:     TOTAL SHOULDER ARTHROPLASTY W/ DISTAL CLAVICLE EXCISION Left 10/22/2020    Procedure: TOTAL SHOULDER REVERSE ARTHROPLASTY;  Surgeon: Juan Antonio Anne MD;  Location: [facility] MAIN OR;  Service: Orthopedics;  Laterality: Left;    TRIGGER POINT INJECTION  Starting in 2010 until now          Social History     Occupational History    Occupation:    Tobacco Use    Smoking status: Former      Current packs/day: 0.00     Average packs/day: 0.3 packs/day for 18.4 years (4.6 ttl pk-yrs)     Types: Cigarettes     Start date: 4/3/1967     Quit date: 1985     Years since quittin.6     Passive exposure: Past    Smokeless tobacco: Never    Tobacco comments:     Haven't smoked in 39 years   Vaping Use    Vaping status: Never Used   Substance and Sexual Activity    Alcohol use: No    Drug use: Never    Sexual activity: Not Currently     Birth control/protection: Post-menopausal, Hysterectomy      Social History     Social History Narrative    Not on file          Family History   Problem Relation Age of Onset    Heart attack Brother     Heart disease Brother     Hyperthyroidism Mother     Cancer Mother     Heart disease Mother     Osteoporosis Mother     Thyroid disease Mother     Broken bones Mother     No Known Problems Father          car accident    Cirrhosis Maternal Grandmother     No Known Problems Maternal Grandfather     No Known Problems Paternal Grandmother     No Known Problems Paternal Grandfather     Breast cancer Maternal Aunt     Cancer Maternal Aunt     Malig Hyperthermia Neg Hx        Review of Systems:      Constitutional: Denies fever, shaking or chills   Eyes: Denies change in visual acuity   HEENT: Denies nasal congestion or sore throat   Respiratory: Denies cough or shortness of breath   Cardiovascular: Denies chest pain or edema  Endocrine: Denies tremors, palpitations, intolerance of heat or cold, polyuria, polydipsia.  GI: Denies abdominal pain, nausea, vomiting, bloody stools or diarrhea  : Denies frequency, urgency, incontinence, retention, or nocturia.  Musculoskeletal: Denies numbness tingling or loss of motor function except as above  Integument: Denies rash, lesion or ulceration   Neurologic: Denies headache or focal weakness, deficits  Heme: Denies epistaxis, spontaneous or excessive bleeding, epistaxis, hematuria, melena, fatigue, enlarged or tender lymph nodes.     "  All other pertinent positives and negatives as noted above in HPI.    Physical Exam: 74 y.o. female    Vitals:    04/25/25 1259   Temp: 98.2 °F (36.8 °C)   TempSrc: Temporal   Weight: 71.7 kg (158 lb)   Height: 152.4 cm (60\")       General:  Patient is awake and alert.  Appears in no acute distress or discomfort.    Psych:  Affect and demeanor are appropriate.    Cardiovascular:  Regular rate and rhythm.    Lungs:  Good chest expansion.  Breathing unlabored.    Lymph:  No palpable masses or adenopathy in the affected extremity    Right upper extremity:  Sling and splint were in place.  These were partially removed.  Skin appears benign.  Focal tenderness noted over the arm and midshaft of the humerus.  No palpable masses or adenopathy.  Compartments soft.  Painful, limited ROM of the shoulder and elbow.  Could not assess stability.  No tenderness noted at the elbow, forearm, wrist or hand.  She can weakly wiggle her fingers as well as abduct and adduct.  She has weakness with dorsiflexion.  She reports intact albeit diminished sensation in her hand.  Brisk cap refill.  Palpable radial pulse.      Diagnostic Tests:  Lab Results   Component Value Date    GLUCOSE 100 (H) 02/16/2025    CALCIUM 8.9 02/16/2025    CALCIUM 8.8 02/16/2025     02/16/2025    K 4.9 02/16/2025    CO2 28.1 02/16/2025     02/16/2025    BUN 22 02/16/2025    CREATININE 1.01 (H) 02/16/2025    EGFRIFAFRI >60 01/24/2022    EGFRIFNONA 74 04/20/2021    BCR 21.8 02/16/2025    ANIONGAP 9.9 02/16/2025     Lab Results   Component Value Date    WBC 7.12 02/16/2025    HGB 11.1 (L) 02/16/2025    HCT 33.7 (L) 02/16/2025    MCV 93.6 02/16/2025     02/16/2025     Lab Results   Component Value Date    INR 1.43 (H) 02/16/2025    INR 1 08/23/2024    INR 1.2 02/01/2024    PROTIME 17.7 (H) 02/16/2025    PROTIME 10.7 08/23/2024    PROTIME 13.7 (H) 02/01/2024       Imaging:  Outside AP and orthogonal views of the right humerus are reviewed.  No " comparison films are available.  The x-rays show a displaced spiral fracture below her implant.  The implant appears well-fixed.    CT right shoulder is reviewed.  Implants look okay.    Assessment:  Right humerus periprosthetic fracture    Plan: We had a thorough discussion regarding the risks of non-surgical management versus surgery.  I explained that nonsurgical treatment is typically recommended for humerus fractures.  This is still an option in this case.  I explained everything that would entail in terms of the timeframe and expected recovery.  In this particular case, I consider that surgical treatment is reasonable to consider due to the displacement and the periprosthetic nature of the injury.  Both options were presented and she is of the mindset that she would like to get this fixed.  The risks, benefits, and alternatives to surgical fixation of the fracture were discussed in detail.  We talked about the surgery itself, how it is done, and the rehabilitation and recovery.  Specifically, with regards to the risks, we talked about the risk of infection, wound healing problems, nonunion, malunion, persistent pain or deformity which could necessitate further intervention down the road.  We talked about injury to nearby neurovascular structures which could result in permanent weakness, numbness, or dysfunction and potentially necessitate further surgery, particularly the radial nerve.  I did explain that this could result in wrist drop postoperatively.  Last, we did also talk about the risk of RSD, PE, DVT, anesthesia related complications and medical complications as result of surgery potentially resulting in death.  She patient has consented to proceed.  We will try to get this scheduled for next week.  She did request and was given a prescription for hydrocodone.  Risk were discussed.  She will need permission to come off of the Eliqu.  She is going to talk to Dr. Ya about that this  afternoon.    Date: 4/25/2025    Juan Antonio Anne MD    CC to Chelly Red APRN

## 2025-04-25 NOTE — H&P (VIEW-ONLY)
History & Physical     Patient: Kristan Galicia    YOB: 1950    Medical Record Number: [id number]    Chief Complaints: Right arm injury    History of Present Illness: 74 y.o. female presents for evaluation of the right arm.  She is known to me from a previous right shoulder replacement.  The injury was sustained in a twisting injury when she tried to catch herself with the right arm this past week.  She felt something snap immediately.  She says her shoulder was doing fine prior to the injury.  Describes the pain as moderate, constant, and aching.  The pain is worse with movement.  The pain is better with rest, pain medicine and use of the sling.  She does need a refill of the pain medicine.  She denies any other injuries or complaints.      Allergies   Allergen Reactions    Adhesive Tape Other (See Comments)     SKIN BLISTERS  PAPER TAPE OKAY PER PT     Aminobenzoate Anxiety     restlessness    Lorazepam Anxiety and Shortness Of Breath     Restless legs confusion    Baclofen Delirium    Aspirin Swelling    Biaxin [Clarithromycin] Other (See Comments)     BLISTERS AROUND MOTH  Also known as Bioxen     Codeine Swelling    Darvon [Propoxyphene] Swelling    Levaquin [Levofloxacin] Other (See Comments)     BLISTERS AROUND MOUTH    Nitroglycerin Other (See Comments)     Was told by cardiologist not to take due to heart condition & defibrillator    Percocet [Oxycodone-Acetaminophen] Irritability    Tequin [Gatifloxacin] Other (See Comments)     BLISTERS AROUND MOUTH  Also known Tequine        Home Medications:      Current Outpatient Medications:     alendronate (FOSAMAX) 70 MG tablet, Take 1 tablet by mouth Every 7 (Seven) Days. (Patient taking differently: Take 1 tablet by mouth Every 7 (Seven) Days. TAKES ON SUNDAYS), Disp: 12 tablet, Rfl: 3    allopurinol (ZYLOPRIM) 100 MG tablet, Take 1 tablet by mouth Daily., Disp: 90 tablet, Rfl: 3    amitriptyline (ELAVIL) 10 MG tablet, Take 1 tablet by mouth  Daily., Disp: , Rfl:     apixaban (Eliquis) 5 MG tablet tablet, Take 1 tablet by mouth Every 12 (Twelve) Hours., Disp: , Rfl:     atorvastatin (LIPITOR) 80 MG tablet, Take 1 tablet by mouth every night at bedtime., Disp: 90 tablet, Rfl: 3    Blood Glucose Monitoring Suppl (ACCU-CHEK OTILIA PLUS) W/DEVICE kit, Dx e11.9 use to check BS BID, ok to substitute formulary preferred, Disp: 1 kit, Rfl: 0    buPROPion XL (WELLBUTRIN XL) 300 MG 24 hr tablet, Take 1 tablet by mouth Daily., Disp: , Rfl:     dilTIAZem CD (CARDIZEM CD) 120 MG 24 hr capsule, Take 1 capsule by mouth Daily., Disp: , Rfl:     docusate sodium (COLACE) 100 MG capsule, Take 1 capsule by mouth 2 (Two) Times a Day., Disp: 60 capsule, Rfl: 0    DULoxetine (CYMBALTA) 60 MG capsule, Take 1 capsule by mouth Daily. (Patient taking differently: Take 1 capsule by mouth Every Night.), Disp: 90 capsule, Rfl: 3    famotidine (PEPCID) 20 MG tablet, Take 1 tablet by mouth 2 (Two) Times a Day., Disp: 14 tablet, Rfl: 0    glucose blood (ACCU-CHEK OTILIA PLUS) test strip, Dx e11.9 use to check BS BID, ok to substitute formulary preferred, Disp: 60 each, Rfl: 11    HYDROcodone-acetaminophen (NORCO)  MG per tablet, Take 1 tablet by mouth Every 4 (Four) Hours As Needed for Moderate Pain (Pain)., Disp: 42 tablet, Rfl: 0    Lancets (ACCU-CHEK MULTICLIX) lancets, Dx e11.9 use to check BS BID, ok to substitute formulary preferred, Disp: 60 each, Rfl: 11    levothyroxine (SYNTHROID, LEVOTHROID) 75 MCG tablet, TAKE 1 TABLET BY MOUTH EVERY DAY (Patient taking differently: Take 1 tablet by mouth Daily.), Disp: 90 tablet, Rfl: 2    Loratadine 10 MG capsule, Take 1 capsule by mouth Daily., Disp: , Rfl:     losartan (COZAAR) 25 MG tablet, Take 1 tablet by mouth Daily., Disp: , Rfl:     metoprolol tartrate (LOPRESSOR) 50 MG tablet, Take 1 tablet by mouth 1 (One) Time., Disp: , Rfl:     montelukast (SINGULAIR) 10 MG tablet, Take 1 tablet by mouth Daily. (Patient taking differently:  Take 1 tablet by mouth Every Night.), Disp: 90 tablet, Rfl: 3    multivitamin with minerals tablet tablet, Take 2 tablets by mouth Every Night., Disp: , Rfl:     omeprazole (priLOSEC) 40 MG capsule, Take 1 capsule by mouth Daily., Disp: 60 capsule, Rfl: 0    ondansetron (ZOFRAN) 4 MG tablet, Take 1 tablet by mouth Every 6 (Six) Hours As Needed., Disp: , Rfl:     pantoprazole (PROTONIX) 40 MG EC tablet, TAKE 1 TABLET BY MOUTH EVERY DAY **DO NOT CRUSH, CHEW, OR SPLIT**, Disp: , Rfl:     polyethylene glycol (MIRALAX) 17 g packet, Take 17 g by mouth., Disp: , Rfl:     pregabalin (LYRICA) 75 MG capsule, TAKE 1 CAPSULE BY MOUTH 2 (TWO) TIMES DAILY. MAX DAILY AMOUNT: 150 MG, Disp: , Rfl:     sennosides-docusate (PERICOLACE) 8.6-50 MG per tablet, Take 2 tablets by mouth., Disp: , Rfl:     HYDROcodone-acetaminophen (NORCO)  MG per tablet, Take 1 tablet by mouth Every 4 (Four) Hours As Needed for Moderate Pain., Disp: 30 tablet, Rfl: 0    Past Medical History:   Diagnosis Date    Abnormal ECG 1989    HOCM    Allergic     Seasonal    Anemia     Angina at rest     history    Arthritis     Asthma     Atrial fibrillation     Cardiac defibrillator in place 01/2009    medtronic     Cardiomyopathy     Hypertrophic Cardiomyopathy    Cervical disc disorder     CHF (congestive heart failure)     Cholelithiasis 1975    Removed surgically    Chronic pain     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     Coronary artery disease     Diabetes mellitus     Type II    Diverticulitis of colon 2021    Diverticulosis     hx diverticulitis    Fibrocystic breast 2010    Fracture, femur 1954, 1964, 2011    Fracture, foot 1969    Frozen shoulder 2016    GERD (gastroesophageal reflux disease)     Heart murmur     History of bronchitis     History of COVID-19     History of depression     History of pneumonia     History of sleep apnea     History of transfusion     no reaction    History of tumor     left  ocular tumor, treated with steroids    Hyperlipidemia     Hypertension     Knee swelling 2017    Lumbosacral disc disease 2010    Migraines     On anticoagulant therapy     Osteoporosis     Palpitations     Periarthritis of shoulder 2016    Rotator cuff syndrome 2014    Scoliosis     Shoulder pain     left    Sleep apnea     HX OF SLEEP APNEA AND WORE CPAP. PT STATED SINCE SHE LOST WEIGHT AND WAS RETESTED AND WAS TOLD SHE NO LONGER HAS IT. STOPPED USING CPAP AROUND 2018.    Thyroid disease     HYPOTHYROIDISM    Vasovagal syncope     Vertigo           Past Surgical History:   Procedure Laterality Date    ANTERIOR CERVICAL DISCECTOMY W/ FUSION  2012    C5-7    ANTERIOR CERVICAL DISCECTOMY W/ FUSION N/A 05/05/2022    Procedure: Cervical 3- Cervical 4, Cervical 4- Cervical 5 anterior cervical discectomy and fusion with Zero profile cage and titanuium mesh cage;  Surgeon: Wilfredo Bailey MD;  Location: [facility] MAIN OR;  Service: Orthopedic Spine;  Laterality: N/A;    APPENDECTOMY  1970    BILATERAL BREAST REDUCTION      BREAST BIOPSY      CARDIAC CATHETERIZATION  02/19/2007    CARDIAC DEFIBRILLATOR PLACEMENT Left 1999, 2013    Dr. Ya    CATARACT EXTRACTION Bilateral     CHOLECYSTECTOMY OPEN  1974    COLONOSCOPY  1996    Dr. Herbert Gonsales     COLONOSCOPY  08/16/2016    Procedure: COLONOSCOPY with cecum;  Surgeon: Rosalio Sheikh MD;  Location: [facility] ENDOSCOPY;  Service:     COLONOSCOPY N/A 6/18/2024    Procedure: COLONOSCOPY to with partial cecum viewing with cold biopsies/polypectomy with clip placement x3;  Surgeon: Sabino Arboleda MD;  Location: [facility] ENDOSCOPY;  Service: General;  Laterality: N/A;  pre change in bowel habits  post lipoma clip x2 diverticulosos polyp    ENDOSCOPY N/A 04/27/2017    Procedure: ESOPHAGOGASTRODUODENOSCOPY WITH BIOPSIES;  Surgeon: Rosalio Sheikh MD;  Location: [facility] ENDOSCOPY;  Service:     ENDOSCOPY N/A 6/18/2024    Procedure: ESOPHAGOGASTRODUODENOSCOPY WITH  BIOPSiES/cold forcep;  Surgeon: Sabino Arboleda MD;  Location: [facility] ENDOSCOPY;  Service: General;  Laterality: N/A;  pre reflux post gastritis    FEMUR SURGERY Left     with metal implant  x3    FIRST RIB RESECTION Bilateral     and scalenectomy    FRACTURE SURGERY      Broke left femor 3 times    HARDWARE REMOVAL Left 12/28/2023    Procedure: SHOULDER HARDWARE REMOVAL;  Surgeon: Juan Antonio Anne MD;  Location: [facility];  Service: Orthopedics;  Laterality: Left;    HYSTERECTOMY  1989    Total    IMPLANTABLE CARDIOVERTER DEFIBRILLATOR LEAD REPLACEMENT/POCKET REVISION      JOINT REPLACEMENT  Right shoulder  and right knee ear    KNEE ARTHROSCOPY Bilateral 2014    Dr. Pascual    KNEE SURGERY Right 06/10/2010    NECK SURGERY  2012, 2018    OOPHORECTOMY      REDUCTION MAMMAPLASTY      SCAPULA OPEN REDUCTION INTERNAL FIXATION Left 04/15/2021    Procedure: Acromion nonunion OPEN REDUCTION INTERNAL FIXATION;  Surgeon: Juan Antonio Anne MD;  Location: [facility] MAIN OR;  Service: Orthopedics;  Laterality: Left;    SHOULDER ARTHROSCOPY W/ ROTATOR CUFF REPAIR Bilateral 05/20/2011    SHOULDER SURGERY Right     TONSILLECTOMY      TOTAL KNEE ARTHROPLASTY Right 04/18/2018    Procedure: RIGHT TOTAL KNEE ARTHROPLASTY WITH STEVE NAVIGATION;  Surgeon: Bravo Pascual MD;  Location: [facility] MAIN OR;  Service: Orthopedics    TOTAL SHOULDER ARTHROPLASTY W/ DISTAL CLAVICLE EXCISION Right 02/02/2017    Procedure: TOTAL SHOULDER REVERSE ARTHROPLASTY;  Surgeon: Juan Antonio Anne MD;  Location: [facility] MAIN OR;  Service:     TOTAL SHOULDER ARTHROPLASTY W/ DISTAL CLAVICLE EXCISION Left 10/22/2020    Procedure: TOTAL SHOULDER REVERSE ARTHROPLASTY;  Surgeon: Juan Antonio Anne MD;  Location: [facility] MAIN OR;  Service: Orthopedics;  Laterality: Left;    TRIGGER POINT INJECTION  Starting in 2010 until now          Social History     Occupational History    Occupation:    Tobacco Use    Smoking status: Former      Current packs/day: 0.00     Average packs/day: 0.3 packs/day for 18.4 years (4.6 ttl pk-yrs)     Types: Cigarettes     Start date: 4/3/1967     Quit date: 1985     Years since quittin.6     Passive exposure: Past    Smokeless tobacco: Never    Tobacco comments:     Haven't smoked in 39 years   Vaping Use    Vaping status: Never Used   Substance and Sexual Activity    Alcohol use: No    Drug use: Never    Sexual activity: Not Currently     Birth control/protection: Post-menopausal, Hysterectomy      Social History     Social History Narrative    Not on file          Family History   Problem Relation Age of Onset    Heart attack Brother     Heart disease Brother     Hyperthyroidism Mother     Cancer Mother     Heart disease Mother     Osteoporosis Mother     Thyroid disease Mother     Broken bones Mother     No Known Problems Father          car accident    Cirrhosis Maternal Grandmother     No Known Problems Maternal Grandfather     No Known Problems Paternal Grandmother     No Known Problems Paternal Grandfather     Breast cancer Maternal Aunt     Cancer Maternal Aunt     Malig Hyperthermia Neg Hx        Review of Systems:      Constitutional: Denies fever, shaking or chills   Eyes: Denies change in visual acuity   HEENT: Denies nasal congestion or sore throat   Respiratory: Denies cough or shortness of breath   Cardiovascular: Denies chest pain or edema  Endocrine: Denies tremors, palpitations, intolerance of heat or cold, polyuria, polydipsia.  GI: Denies abdominal pain, nausea, vomiting, bloody stools or diarrhea  : Denies frequency, urgency, incontinence, retention, or nocturia.  Musculoskeletal: Denies numbness tingling or loss of motor function except as above  Integument: Denies rash, lesion or ulceration   Neurologic: Denies headache or focal weakness, deficits  Heme: Denies epistaxis, spontaneous or excessive bleeding, epistaxis, hematuria, melena, fatigue, enlarged or tender lymph nodes.     "  All other pertinent positives and negatives as noted above in HPI.    Physical Exam: 74 y.o. female    Vitals:    04/25/25 1259   Temp: 98.2 °F (36.8 °C)   TempSrc: Temporal   Weight: 71.7 kg (158 lb)   Height: 152.4 cm (60\")       General:  Patient is awake and alert.  Appears in no acute distress or discomfort.    Psych:  Affect and demeanor are appropriate.    Cardiovascular:  Regular rate and rhythm.    Lungs:  Good chest expansion.  Breathing unlabored.    Lymph:  No palpable masses or adenopathy in the affected extremity    Right upper extremity:  Sling and splint were in place.  These were partially removed.  Skin appears benign.  Focal tenderness noted over the arm and midshaft of the humerus.  No palpable masses or adenopathy.  Compartments soft.  Painful, limited ROM of the shoulder and elbow.  Could not assess stability.  No tenderness noted at the elbow, forearm, wrist or hand.  She can weakly wiggle her fingers as well as abduct and adduct.  She has weakness with dorsiflexion.  She reports intact albeit diminished sensation in her hand.  Brisk cap refill.  Palpable radial pulse.      Diagnostic Tests:  Lab Results   Component Value Date    GLUCOSE 100 (H) 02/16/2025    CALCIUM 8.9 02/16/2025    CALCIUM 8.8 02/16/2025     02/16/2025    K 4.9 02/16/2025    CO2 28.1 02/16/2025     02/16/2025    BUN 22 02/16/2025    CREATININE 1.01 (H) 02/16/2025    EGFRIFAFRI >60 01/24/2022    EGFRIFNONA 74 04/20/2021    BCR 21.8 02/16/2025    ANIONGAP 9.9 02/16/2025     Lab Results   Component Value Date    WBC 7.12 02/16/2025    HGB 11.1 (L) 02/16/2025    HCT 33.7 (L) 02/16/2025    MCV 93.6 02/16/2025     02/16/2025     Lab Results   Component Value Date    INR 1.43 (H) 02/16/2025    INR 1 08/23/2024    INR 1.2 02/01/2024    PROTIME 17.7 (H) 02/16/2025    PROTIME 10.7 08/23/2024    PROTIME 13.7 (H) 02/01/2024       Imaging:  Outside AP and orthogonal views of the right humerus are reviewed.  No " comparison films are available.  The x-rays show a displaced spiral fracture below her implant.  The implant appears well-fixed.    CT right shoulder is reviewed.  Implants look okay.    Assessment:  Right humerus periprosthetic fracture    Plan: We had a thorough discussion regarding the risks of non-surgical management versus surgery.  I explained that nonsurgical treatment is typically recommended for humerus fractures.  This is still an option in this case.  I explained everything that would entail in terms of the timeframe and expected recovery.  In this particular case, I consider that surgical treatment is reasonable to consider due to the displacement and the periprosthetic nature of the injury.  Both options were presented and she is of the mindset that she would like to get this fixed.  The risks, benefits, and alternatives to surgical fixation of the fracture were discussed in detail.  We talked about the surgery itself, how it is done, and the rehabilitation and recovery.  Specifically, with regards to the risks, we talked about the risk of infection, wound healing problems, nonunion, malunion, persistent pain or deformity which could necessitate further intervention down the road.  We talked about injury to nearby neurovascular structures which could result in permanent weakness, numbness, or dysfunction and potentially necessitate further surgery, particularly the radial nerve.  I did explain that this could result in wrist drop postoperatively.  Last, we did also talk about the risk of RSD, PE, DVT, anesthesia related complications and medical complications as result of surgery potentially resulting in death.  She patient has consented to proceed.  We will try to get this scheduled for next week.  She did request and was given a prescription for hydrocodone.  Risk were discussed.  She will need permission to come off of the Eliqu.  She is going to talk to Dr. Ya about that this  afternoon.    Date: 4/25/2025    Juan Antonio Anne MD    CC to Chelly Red APRN

## 2025-04-28 ENCOUNTER — PRE-ADMISSION TESTING (OUTPATIENT)
Dept: PREADMISSION TESTING | Facility: HOSPITAL | Age: 75
End: 2025-04-28
Payer: MEDICARE

## 2025-04-28 VITALS
OXYGEN SATURATION: 97 % | SYSTOLIC BLOOD PRESSURE: 93 MMHG | TEMPERATURE: 98.3 F | HEIGHT: 60 IN | BODY MASS INDEX: 31.75 KG/M2 | RESPIRATION RATE: 18 BRPM | WEIGHT: 161.7 LBS | DIASTOLIC BLOOD PRESSURE: 58 MMHG | HEART RATE: 93 BPM

## 2025-04-28 LAB
ANION GAP SERPL CALCULATED.3IONS-SCNC: 11.1 MMOL/L (ref 5–15)
BUN SERPL-MCNC: 17 MG/DL (ref 8–23)
BUN/CREAT SERPL: 22.7 (ref 7–25)
CALCIUM SPEC-SCNC: 9.5 MG/DL (ref 8.6–10.5)
CHLORIDE SERPL-SCNC: 104 MMOL/L (ref 98–107)
CO2 SERPL-SCNC: 26.9 MMOL/L (ref 22–29)
CREAT SERPL-MCNC: 0.75 MG/DL (ref 0.57–1)
DEPRECATED RDW RBC AUTO: 47 FL (ref 37–54)
EGFRCR SERPLBLD CKD-EPI 2021: 84.2 ML/MIN/1.73
ERYTHROCYTE [DISTWIDTH] IN BLOOD BY AUTOMATED COUNT: 14.1 % (ref 12.3–15.4)
GLUCOSE SERPL-MCNC: 206 MG/DL (ref 65–99)
HCT VFR BLD AUTO: 36.5 % (ref 34–46.6)
HGB BLD-MCNC: 11.3 G/DL (ref 12–15.9)
MCH RBC QN AUTO: 28.3 PG (ref 26.6–33)
MCHC RBC AUTO-ENTMCNC: 31 G/DL (ref 31.5–35.7)
MCV RBC AUTO: 91.5 FL (ref 79–97)
PLATELET # BLD AUTO: 238 10*3/MM3 (ref 140–450)
PMV BLD AUTO: 9.5 FL (ref 6–12)
POTASSIUM SERPL-SCNC: 4.4 MMOL/L (ref 3.5–5.2)
QT INTERVAL: 407 MS
QTC INTERVAL: 474 MS
RBC # BLD AUTO: 3.99 10*6/MM3 (ref 3.77–5.28)
SODIUM SERPL-SCNC: 142 MMOL/L (ref 136–145)
WBC NRBC COR # BLD AUTO: 5.66 10*3/MM3 (ref 3.4–10.8)

## 2025-04-28 PROCEDURE — 36415 COLL VENOUS BLD VENIPUNCTURE: CPT

## 2025-04-28 PROCEDURE — 93010 ELECTROCARDIOGRAM REPORT: CPT | Performed by: INTERNAL MEDICINE

## 2025-04-28 PROCEDURE — 85027 COMPLETE CBC AUTOMATED: CPT

## 2025-04-28 PROCEDURE — 93005 ELECTROCARDIOGRAM TRACING: CPT

## 2025-04-28 PROCEDURE — 80048 BASIC METABOLIC PNL TOTAL CA: CPT

## 2025-04-28 RX ORDER — LIDOCAINE 50 MG/G
1 PATCH TOPICAL AS NEEDED
COMMUNITY

## 2025-04-28 RX ORDER — LEVOTHYROXINE SODIUM 88 UG/1
88 TABLET ORAL
COMMUNITY

## 2025-04-28 NOTE — DISCHARGE INSTRUCTIONS
Take the following medications the morning of surgery: METOPROLOL, LEVOTHYROXINE, PANTOPRAZOLE, WELLBUTRIN, AND PREGABALIN      If you are on prescription narcotic pain medication to control your pain you may also take that medication the morning of surgery.      General Instructions:     Do not eat solid food after midnight the night before surgery.  Clear liquids day of surgery are allowed but must be stopped at least two hours before your hospital arrival time.       Allowed clear liquids      Water, sodas, and tea or coffee with no cream or milk added.       12 to 20 ounces of a clear liquid that contains carbohydrates is recommended.  If non-diabetic, have Gatorade or Powerade.  If diabetic, have G2 or Powerade Zero.     Do not have liquids red in color.  Do not consume chicken, beef, pork or vegetable broth or bouillon cubes of any variety as they are not considered clear liquids and are not allowed.      Infants may have breast milk up to four hours before surgery.  Infants drinking formula may drink formula up to six hours before surgery.   Patients who avoid smoking, chewing tobacco and alcohol for 4 weeks prior to surgery have a reduced risk of post-operative complications.  Quit smoking as many days before surgery as you can.  Do not smoke, use chewing tobacco or drink alcohol the day of surgery.   If applicable bring your C-PAP/ BI-PAP machine in with you to preop day of surgery.  Bring any papers given to you in the doctor’s office.  Wear clean comfortable clothes.  Do not wear contact lenses, false eyelashes or make-up.  Bring a case for your glasses.   Bring crutches or walker if applicable.  Remove all piercings.  Leave jewelry and any other valuables at home.  Hair extensions with metal clips must be removed prior to surgery.  The Pre-Admission Testing nurse will instruct you to bring medications if unable to obtain an accurate list in Pre-Admission Testing.    Day of surgery you will need to let  the preoperative nurse know the last time you took each of your medications.  To ensure a safe environment for patients and staff, we kindly ask that children under the age of 16 not accompany patients.  If you must bring a dependent child or dependent adult please ensure a responsible adult, other than yourself, is present to supervise them.      If you were given a blood bank ID arm band remember to bring it with you the day of surgery.    Preventing a Surgical Site Infection:  For 2 to 3 days before surgery, avoid shaving with a razor because the razor can irritate skin and make it easier to develop an infection.    Any areas of open skin can increase the risk of a post-operative wound infection by allowing bacteria to enter and travel throughout the body.  Notify your surgeon if you have any skin wounds / rashes even if it is not near the expected surgical site.  The area will need assessed to determine if surgery should be delayed until it is healed.  The night prior to surgery shower using a fresh bar of anti-bacterial soap (such as Dial) and clean washcloth.  Sleep in a clean bed with clean clothing.  Do not allow pets to sleep with you.  Shower on the morning of surgery using a fresh bar of anti-bacterial soap (such as Dial) and clean washcloth.  Dry with a clean towel and dress in clean clothing.  Ask your surgeon if you will be receiving antibiotics prior to surgery.  Make sure you, your family, and all healthcare providers clean their hands with soap and water or an alcohol based hand  before caring for you or your wound.    Day of surgery:  Your arrival time is approximately two hours before your scheduled surgery time.  Please note if you have an early arrival time the surgery doors do not open before 5:00 AM.  Upon arrival, a Pre-op nurse and Anesthesiologist will review your health history, obtain vital signs, and answer questions you may have.  The only belongings needed at this time will be  a list of your home medications and if applicable your C-PAP/BI-PAP machine.  A Pre-op nurse will start an IV and you may receive medication in preparation for surgery, including something to help you relax.     Please be aware that surgery does come with discomfort.  We want to make every effort to control your discomfort so please discuss any uncontrolled symptoms with your nurse.   Your doctor will most likely have prescribed pain medications.      If you are going home after surgery you will receive individualized written care instructions before being discharged.  A responsible adult must drive you to and from the hospital on the day of your surgery and ideally stay with you through the night.   .  Discharge prescriptions can be filled by the hospital pharmacy during regular pharmacy hours.  If you are having surgery late in the day/evening your prescription may be e-prescribed to your pharmacy.  Please verify your pharmacy hours or chose a 24 hour pharmacy to avoid not having access to your prescription because your pharmacy has closed for the day.    If you are staying overnight following surgery, you will be transported to your hospital room following the recovery period.  [facility] has all private rooms.    If you have any questions please call Pre-Admission Testing at [phone].  Deductibles and co-payments are collected on the day of service. Please be prepared to pay the required co-pay, deductible or deposit on the day of service as defined by your plan.    Call your surgeon immediately if you experience any of the following symptoms:  Sore Throat  Shortness of Breath or difficulty breathing  Cough  Chills  Body soreness or muscle pain  Headache  Fever  New loss of taste or smell  Do not arrive for your surgery ill.  Your procedure will need to be rescheduled to another time.  You will need to call your physician before the day of surgery to avoid any unnecessary exposure to hospital  staff as well as other patients.

## 2025-04-29 ENCOUNTER — APPOINTMENT (OUTPATIENT)
Dept: GENERAL RADIOLOGY | Facility: HOSPITAL | Age: 75
End: 2025-04-29
Payer: MEDICARE

## 2025-04-29 ENCOUNTER — ANESTHESIA EVENT (OUTPATIENT)
Dept: PERIOP | Facility: HOSPITAL | Age: 75
End: 2025-04-29
Payer: MEDICARE

## 2025-04-29 ENCOUNTER — ANESTHESIA (OUTPATIENT)
Dept: PERIOP | Facility: HOSPITAL | Age: 75
End: 2025-04-29
Payer: MEDICARE

## 2025-04-29 ENCOUNTER — HOSPITAL ENCOUNTER (OUTPATIENT)
Facility: HOSPITAL | Age: 75
Setting detail: HOSPITAL OUTPATIENT SURGERY
Discharge: HOME | End: 2025-04-29
Attending: ORTHOPAEDIC SURGERY | Admitting: ORTHOPAEDIC SURGERY
Payer: MEDICARE

## 2025-04-29 VITALS
DIASTOLIC BLOOD PRESSURE: 40 MMHG | HEIGHT: 60 IN | BODY MASS INDEX: 31.61 KG/M2 | OXYGEN SATURATION: 93 % | WEIGHT: 161 LBS | RESPIRATION RATE: 16 BRPM | TEMPERATURE: 98.4 F | SYSTOLIC BLOOD PRESSURE: 106 MMHG | HEART RATE: 103 BPM

## 2025-04-29 DIAGNOSIS — S42.351A CLOSED DISPLACED COMMINUTED FRACTURE OF SHAFT OF RIGHT HUMERUS, INITIAL ENCOUNTER: ICD-10-CM

## 2025-04-29 LAB
GLUCOSE BLDC GLUCOMTR-MCNC: 218 MG/DL (ref 70–130)
GLUCOSE BLDC GLUCOMTR-MCNC: 264 MG/DL (ref 70–130)

## 2025-04-29 PROCEDURE — 25010000002 EPINEPHRINE 1 MG/10ML SOLUTION PREFILLED SYRINGE: Performed by: STUDENT IN AN ORGANIZED HEALTH CARE EDUCATION/TRAINING PROGRAM

## 2025-04-29 PROCEDURE — 76000 FLUOROSCOPY <1 HR PHYS/QHP: CPT

## 2025-04-29 PROCEDURE — 25010000002 FENTANYL CITRATE (PF) 50 MCG/ML SOLUTION: Performed by: STUDENT IN AN ORGANIZED HEALTH CARE EDUCATION/TRAINING PROGRAM

## 2025-04-29 PROCEDURE — C1713 ANCHOR/SCREW BN/BN,TIS/BN: HCPCS | Performed by: ORTHOPAEDIC SURGERY

## 2025-04-29 PROCEDURE — 25010000002 LIDOCAINE PF 2% 2 % SOLUTION: Performed by: STUDENT IN AN ORGANIZED HEALTH CARE EDUCATION/TRAINING PROGRAM

## 2025-04-29 PROCEDURE — 25810000003 LACTATED RINGERS PER 1000 ML: Performed by: STUDENT IN AN ORGANIZED HEALTH CARE EDUCATION/TRAINING PROGRAM

## 2025-04-29 PROCEDURE — 25010000002 SUGAMMADEX 200 MG/2ML SOLUTION: Performed by: STUDENT IN AN ORGANIZED HEALTH CARE EDUCATION/TRAINING PROGRAM

## 2025-04-29 PROCEDURE — 82948 REAGENT STRIP/BLOOD GLUCOSE: CPT

## 2025-04-29 PROCEDURE — 25010000002 DEXAMETHASONE SODIUM PHOSPHATE 20 MG/5ML SOLUTION: Performed by: STUDENT IN AN ORGANIZED HEALTH CARE EDUCATION/TRAINING PROGRAM

## 2025-04-29 PROCEDURE — 76000 FLUOROSCOPY <1 HR PHYS/QHP: CPT | Performed by: ORTHOPAEDIC SURGERY

## 2025-04-29 PROCEDURE — 24515 OPTX HUMRL SHFT FX PLATE/SCR: CPT | Performed by: TECHNICIAN, OTHER

## 2025-04-29 PROCEDURE — 24515 OPTX HUMRL SHFT FX PLATE/SCR: CPT | Performed by: ORTHOPAEDIC SURGERY

## 2025-04-29 PROCEDURE — 25010000002 CEFAZOLIN PER 500 MG: Performed by: ORTHOPAEDIC SURGERY

## 2025-04-29 PROCEDURE — 25010000002 MAGNESIUM SULFATE PER 500 MG OF MAGNESIUM: Performed by: STUDENT IN AN ORGANIZED HEALTH CARE EDUCATION/TRAINING PROGRAM

## 2025-04-29 PROCEDURE — 25010000002 ROPIVACAINE PER 1 MG: Performed by: STUDENT IN AN ORGANIZED HEALTH CARE EDUCATION/TRAINING PROGRAM

## 2025-04-29 PROCEDURE — 25010000002 PROPOFOL 10 MG/ML EMULSION: Performed by: STUDENT IN AN ORGANIZED HEALTH CARE EDUCATION/TRAINING PROGRAM

## 2025-04-29 PROCEDURE — 25010000002 ONDANSETRON PER 1 MG: Performed by: STUDENT IN AN ORGANIZED HEALTH CARE EDUCATION/TRAINING PROGRAM

## 2025-04-29 PROCEDURE — 25010000002 VASOPRESSIN 20 UNIT/ML SOLUTION: Performed by: STUDENT IN AN ORGANIZED HEALTH CARE EDUCATION/TRAINING PROGRAM

## 2025-04-29 RX ORDER — ROPIVACAINE HYDROCHLORIDE 5 MG/ML
INJECTION, SOLUTION EPIDURAL; INFILTRATION; PERINEURAL
Status: COMPLETED | OUTPATIENT
Start: 2025-04-29 | End: 2025-04-29

## 2025-04-29 RX ORDER — DIPHENHYDRAMINE HYDROCHLORIDE 50 MG/ML
12.5 INJECTION, SOLUTION INTRAMUSCULAR; INTRAVENOUS
Status: DISCONTINUED | OUTPATIENT
Start: 2025-04-29 | End: 2025-04-29 | Stop reason: HOSPADM

## 2025-04-29 RX ORDER — HYDROCODONE BITARTRATE AND ACETAMINOPHEN 7.5; 325 MG/1; MG/1
1 TABLET ORAL EVERY 4 HOURS PRN
Status: DISCONTINUED | OUTPATIENT
Start: 2025-04-29 | End: 2025-04-29 | Stop reason: HOSPADM

## 2025-04-29 RX ORDER — TRANEXAMIC ACID 100 MG/ML
INJECTION, SOLUTION INTRAVENOUS AS NEEDED
Status: DISCONTINUED | OUTPATIENT
Start: 2025-04-29 | End: 2025-04-29 | Stop reason: SURG

## 2025-04-29 RX ORDER — FENTANYL CITRATE 50 UG/ML
25 INJECTION, SOLUTION INTRAMUSCULAR; INTRAVENOUS
Status: DISCONTINUED | OUTPATIENT
Start: 2025-04-29 | End: 2025-04-29 | Stop reason: HOSPADM

## 2025-04-29 RX ORDER — CALCIUM CHLORIDE 100 MG/ML
INJECTION INTRAVENOUS; INTRAVENTRICULAR AS NEEDED
Status: DISCONTINUED | OUTPATIENT
Start: 2025-04-29 | End: 2025-04-29 | Stop reason: SURG

## 2025-04-29 RX ORDER — PROMETHAZINE HYDROCHLORIDE 25 MG/1
25 TABLET ORAL ONCE AS NEEDED
Status: DISCONTINUED | OUTPATIENT
Start: 2025-04-29 | End: 2025-04-29 | Stop reason: HOSPADM

## 2025-04-29 RX ORDER — FENTANYL CITRATE 50 UG/ML
INJECTION, SOLUTION INTRAMUSCULAR; INTRAVENOUS AS NEEDED
Status: DISCONTINUED | OUTPATIENT
Start: 2025-04-29 | End: 2025-04-29 | Stop reason: SURG

## 2025-04-29 RX ORDER — LIDOCAINE HYDROCHLORIDE 20 MG/ML
INJECTION, SOLUTION EPIDURAL; INFILTRATION; INTRACAUDAL; PERINEURAL AS NEEDED
Status: DISCONTINUED | OUTPATIENT
Start: 2025-04-29 | End: 2025-04-29 | Stop reason: SURG

## 2025-04-29 RX ORDER — PROMETHAZINE HYDROCHLORIDE 25 MG/1
25 SUPPOSITORY RECTAL ONCE AS NEEDED
Status: DISCONTINUED | OUTPATIENT
Start: 2025-04-29 | End: 2025-04-29 | Stop reason: HOSPADM

## 2025-04-29 RX ORDER — LIDOCAINE HYDROCHLORIDE 10 MG/ML
0.5 INJECTION, SOLUTION INFILTRATION; PERINEURAL ONCE AS NEEDED
Status: DISCONTINUED | OUTPATIENT
Start: 2025-04-29 | End: 2025-04-29 | Stop reason: HOSPADM

## 2025-04-29 RX ORDER — ATROPINE SULFATE 0.4 MG/ML
0.4 INJECTION, SOLUTION INTRAMUSCULAR; INTRAVENOUS; SUBCUTANEOUS ONCE AS NEEDED
Status: DISCONTINUED | OUTPATIENT
Start: 2025-04-29 | End: 2025-04-29 | Stop reason: HOSPADM

## 2025-04-29 RX ORDER — IPRATROPIUM BROMIDE AND ALBUTEROL SULFATE 2.5; .5 MG/3ML; MG/3ML
3 SOLUTION RESPIRATORY (INHALATION) ONCE AS NEEDED
Status: DISCONTINUED | OUTPATIENT
Start: 2025-04-29 | End: 2025-04-29 | Stop reason: HOSPADM

## 2025-04-29 RX ORDER — FLUMAZENIL 0.1 MG/ML
0.2 INJECTION INTRAVENOUS AS NEEDED
Status: DISCONTINUED | OUTPATIENT
Start: 2025-04-29 | End: 2025-04-29 | Stop reason: HOSPADM

## 2025-04-29 RX ORDER — HYDROCODONE BITARTRATE AND ACETAMINOPHEN 5; 325 MG/1; MG/1
1 TABLET ORAL ONCE AS NEEDED
Status: DISCONTINUED | OUTPATIENT
Start: 2025-04-29 | End: 2025-04-29 | Stop reason: HOSPADM

## 2025-04-29 RX ORDER — SODIUM CHLORIDE 0.9 % (FLUSH) 0.9 %
3-10 SYRINGE (ML) INJECTION AS NEEDED
Status: DISCONTINUED | OUTPATIENT
Start: 2025-04-29 | End: 2025-04-29 | Stop reason: HOSPADM

## 2025-04-29 RX ORDER — FENTANYL CITRATE 50 UG/ML
50 INJECTION, SOLUTION INTRAMUSCULAR; INTRAVENOUS ONCE AS NEEDED
Status: COMPLETED | OUTPATIENT
Start: 2025-04-29 | End: 2025-04-29

## 2025-04-29 RX ORDER — MAGNESIUM SULFATE HEPTAHYDRATE 500 MG/ML
INJECTION, SOLUTION INTRAMUSCULAR; INTRAVENOUS AS NEEDED
Status: DISCONTINUED | OUTPATIENT
Start: 2025-04-29 | End: 2025-04-29 | Stop reason: SURG

## 2025-04-29 RX ORDER — PHENYLEPHRINE HCL IN 0.9% NACL 1 MG/10 ML
SYRINGE (ML) INTRAVENOUS AS NEEDED
Status: DISCONTINUED | OUTPATIENT
Start: 2025-04-29 | End: 2025-04-29 | Stop reason: SURG

## 2025-04-29 RX ORDER — VASOPRESSIN 20 [USP'U]/ML
INJECTION, SOLUTION INTRAVENOUS AS NEEDED
Status: DISCONTINUED | OUTPATIENT
Start: 2025-04-29 | End: 2025-04-29 | Stop reason: SURG

## 2025-04-29 RX ORDER — HYDRALAZINE HYDROCHLORIDE 20 MG/ML
5 INJECTION INTRAMUSCULAR; INTRAVENOUS
Status: DISCONTINUED | OUTPATIENT
Start: 2025-04-29 | End: 2025-04-29 | Stop reason: HOSPADM

## 2025-04-29 RX ORDER — DOCUSATE SODIUM 100 MG/1
100 CAPSULE, LIQUID FILLED ORAL 2 TIMES DAILY
Qty: 60 CAPSULE | Refills: 0 | Status: SHIPPED | OUTPATIENT
Start: 2025-04-29

## 2025-04-29 RX ORDER — ONDANSETRON 2 MG/ML
4 INJECTION INTRAMUSCULAR; INTRAVENOUS ONCE AS NEEDED
Status: DISCONTINUED | OUTPATIENT
Start: 2025-04-29 | End: 2025-04-29 | Stop reason: HOSPADM

## 2025-04-29 RX ORDER — ONDANSETRON 2 MG/ML
INJECTION INTRAMUSCULAR; INTRAVENOUS AS NEEDED
Status: DISCONTINUED | OUTPATIENT
Start: 2025-04-29 | End: 2025-04-29 | Stop reason: SURG

## 2025-04-29 RX ORDER — EPHEDRINE SULFATE 50 MG/ML
5 INJECTION, SOLUTION INTRAVENOUS ONCE AS NEEDED
Status: DISCONTINUED | OUTPATIENT
Start: 2025-04-29 | End: 2025-04-29 | Stop reason: HOSPADM

## 2025-04-29 RX ORDER — LABETALOL HYDROCHLORIDE 5 MG/ML
5 INJECTION, SOLUTION INTRAVENOUS
Status: DISCONTINUED | OUTPATIENT
Start: 2025-04-29 | End: 2025-04-29 | Stop reason: HOSPADM

## 2025-04-29 RX ORDER — SODIUM CHLORIDE 0.9 % (FLUSH) 0.9 %
3 SYRINGE (ML) INJECTION EVERY 12 HOURS SCHEDULED
Status: DISCONTINUED | OUTPATIENT
Start: 2025-04-29 | End: 2025-04-29 | Stop reason: HOSPADM

## 2025-04-29 RX ORDER — SODIUM CHLORIDE, SODIUM LACTATE, POTASSIUM CHLORIDE, CALCIUM CHLORIDE 600; 310; 30; 20 MG/100ML; MG/100ML; MG/100ML; MG/100ML
INJECTION, SOLUTION INTRAVENOUS CONTINUOUS PRN
Status: DISCONTINUED | OUTPATIENT
Start: 2025-04-29 | End: 2025-04-29 | Stop reason: SURG

## 2025-04-29 RX ORDER — DEXAMETHASONE SODIUM PHOSPHATE 4 MG/ML
INJECTION, SOLUTION INTRA-ARTICULAR; INTRALESIONAL; INTRAMUSCULAR; INTRAVENOUS; SOFT TISSUE
Status: COMPLETED | OUTPATIENT
Start: 2025-04-29 | End: 2025-04-29

## 2025-04-29 RX ORDER — HYDROMORPHONE HYDROCHLORIDE 1 MG/ML
0.25 INJECTION, SOLUTION INTRAMUSCULAR; INTRAVENOUS; SUBCUTANEOUS
Status: DISCONTINUED | OUTPATIENT
Start: 2025-04-29 | End: 2025-04-29 | Stop reason: HOSPADM

## 2025-04-29 RX ORDER — NALOXONE HCL 0.4 MG/ML
0.2 VIAL (ML) INJECTION AS NEEDED
Status: DISCONTINUED | OUTPATIENT
Start: 2025-04-29 | End: 2025-04-29 | Stop reason: HOSPADM

## 2025-04-29 RX ORDER — ONDANSETRON 4 MG/1
4 TABLET, FILM COATED ORAL EVERY 8 HOURS PRN
Qty: 12 TABLET | Refills: 0 | Status: SHIPPED | OUTPATIENT
Start: 2025-04-29

## 2025-04-29 RX ORDER — PROPOFOL 10 MG/ML
VIAL (ML) INTRAVENOUS AS NEEDED
Status: DISCONTINUED | OUTPATIENT
Start: 2025-04-29 | End: 2025-04-29 | Stop reason: SURG

## 2025-04-29 RX ORDER — SODIUM CHLORIDE, SODIUM LACTATE, POTASSIUM CHLORIDE, CALCIUM CHLORIDE 600; 310; 30; 20 MG/100ML; MG/100ML; MG/100ML; MG/100ML
9 INJECTION, SOLUTION INTRAVENOUS CONTINUOUS
Status: DISCONTINUED | OUTPATIENT
Start: 2025-04-29 | End: 2025-04-29 | Stop reason: HOSPADM

## 2025-04-29 RX ORDER — ROCURONIUM BROMIDE 10 MG/ML
INJECTION, SOLUTION INTRAVENOUS AS NEEDED
Status: DISCONTINUED | OUTPATIENT
Start: 2025-04-29 | End: 2025-04-29 | Stop reason: SURG

## 2025-04-29 RX ORDER — MAGNESIUM HYDROXIDE 1200 MG/15ML
LIQUID ORAL AS NEEDED
Status: DISCONTINUED | OUTPATIENT
Start: 2025-04-29 | End: 2025-04-29 | Stop reason: HOSPADM

## 2025-04-29 RX ORDER — HYDROCODONE BITARTRATE AND ACETAMINOPHEN 10; 325 MG/1; MG/1
1 TABLET ORAL EVERY 6 HOURS PRN
Qty: 30 TABLET | Refills: 0 | Status: SHIPPED | OUTPATIENT
Start: 2025-04-29

## 2025-04-29 RX ORDER — DROPERIDOL 2.5 MG/ML
0.62 INJECTION, SOLUTION INTRAMUSCULAR; INTRAVENOUS
Status: DISCONTINUED | OUTPATIENT
Start: 2025-04-29 | End: 2025-04-29 | Stop reason: HOSPADM

## 2025-04-29 RX ADMIN — EPINEPHRINE 10 MCG: 0.1 INJECTION INTRAVENOUS at 17:01

## 2025-04-29 RX ADMIN — DEXAMETHASONE SODIUM PHOSPHATE 4 MG: 4 INJECTION, SOLUTION INTRAMUSCULAR; INTRAVENOUS at 13:53

## 2025-04-29 RX ADMIN — ROCURONIUM BROMIDE 50 MG: 10 INJECTION, SOLUTION INTRAVENOUS at 15:33

## 2025-04-29 RX ADMIN — VASOPRESSIN 2 UNITS: 20 INJECTION, SOLUTION INTRAVENOUS at 16:04

## 2025-04-29 RX ADMIN — EPINEPHRINE 10 MCG: 0.1 INJECTION INTRAVENOUS at 16:25

## 2025-04-29 RX ADMIN — EPINEPHRINE 10 MCG: 0.1 INJECTION INTRAVENOUS at 16:44

## 2025-04-29 RX ADMIN — SUGAMMADEX 200 MG: 100 INJECTION, SOLUTION INTRAVENOUS at 17:12

## 2025-04-29 RX ADMIN — VASOPRESSIN 2 UNITS: 20 INJECTION, SOLUTION INTRAVENOUS at 17:09

## 2025-04-29 RX ADMIN — VASOPRESSIN 1 UNITS: 20 INJECTION, SOLUTION INTRAVENOUS at 17:01

## 2025-04-29 RX ADMIN — ONDANSETRON 4 MG: 2 INJECTION, SOLUTION INTRAMUSCULAR; INTRAVENOUS at 17:01

## 2025-04-29 RX ADMIN — VASOPRESSIN 1 UNITS: 20 INJECTION, SOLUTION INTRAVENOUS at 16:44

## 2025-04-29 RX ADMIN — SODIUM CHLORIDE, POTASSIUM CHLORIDE, SODIUM LACTATE AND CALCIUM CHLORIDE 9 ML/HR: 600; 310; 30; 20 INJECTION, SOLUTION INTRAVENOUS at 13:34

## 2025-04-29 RX ADMIN — ROPIVACAINE HYDROCHLORIDE 30 ML: 5 INJECTION EPIDURAL; INFILTRATION; PERINEURAL at 13:53

## 2025-04-29 RX ADMIN — EPINEPHRINE 10 MCG: 0.1 INJECTION INTRAVENOUS at 16:38

## 2025-04-29 RX ADMIN — CALCIUM CHLORIDE 0.5 G: 100 INJECTION, SOLUTION INTRAVENOUS at 17:07

## 2025-04-29 RX ADMIN — TRANEXAMIC ACID 1000 MG: 100 INJECTION INTRAVENOUS at 15:55

## 2025-04-29 RX ADMIN — EPINEPHRINE 10 MCG: 0.1 INJECTION INTRAVENOUS at 16:52

## 2025-04-29 RX ADMIN — LIDOCAINE HYDROCHLORIDE 60 MG: 20 INJECTION, SOLUTION EPIDURAL; INFILTRATION; INTRACAUDAL; PERINEURAL at 15:32

## 2025-04-29 RX ADMIN — Medication 200 MCG: at 15:43

## 2025-04-29 RX ADMIN — VASOPRESSIN 2 UNITS: 20 INJECTION, SOLUTION INTRAVENOUS at 16:19

## 2025-04-29 RX ADMIN — CALCIUM CHLORIDE 0.5 G: 100 INJECTION, SOLUTION INTRAVENOUS at 16:55

## 2025-04-29 RX ADMIN — SODIUM CHLORIDE, POTASSIUM CHLORIDE, SODIUM LACTATE AND CALCIUM CHLORIDE: 600; 310; 30; 20 INJECTION, SOLUTION INTRAVENOUS at 16:06

## 2025-04-29 RX ADMIN — VASOPRESSIN 2 UNITS: 20 INJECTION, SOLUTION INTRAVENOUS at 16:13

## 2025-04-29 RX ADMIN — Medication 200 MCG: at 16:01

## 2025-04-29 RX ADMIN — CEFAZOLIN 2 G: 2 INJECTION, POWDER, FOR SOLUTION INTRAMUSCULAR; INTRAVENOUS at 15:21

## 2025-04-29 RX ADMIN — Medication 200 MCG: at 15:56

## 2025-04-29 RX ADMIN — DEXAMETHASONE SODIUM PHOSPHATE 10 MG: 4 INJECTION, SOLUTION INTRAMUSCULAR; INTRAVENOUS at 15:37

## 2025-04-29 RX ADMIN — VASOPRESSIN 1 UNITS: 20 INJECTION, SOLUTION INTRAVENOUS at 16:52

## 2025-04-29 RX ADMIN — FENTANYL CITRATE 50 MCG: 50 INJECTION, SOLUTION INTRAMUSCULAR; INTRAVENOUS at 13:45

## 2025-04-29 RX ADMIN — Medication 200 MCG: at 15:50

## 2025-04-29 RX ADMIN — Medication 200 MCG: at 16:19

## 2025-04-29 RX ADMIN — EPINEPHRINE 10 MCG: 0.1 INJECTION INTRAVENOUS at 16:34

## 2025-04-29 RX ADMIN — MAGNESIUM SULFATE HEPTAHYDRATE 2 G: 500 INJECTION, SOLUTION INTRAMUSCULAR; INTRAVENOUS at 15:39

## 2025-04-29 RX ADMIN — SODIUM CHLORIDE, POTASSIUM CHLORIDE, SODIUM LACTATE AND CALCIUM CHLORIDE: 600; 310; 30; 20 INJECTION, SOLUTION INTRAVENOUS at 15:25

## 2025-04-29 RX ADMIN — PROPOFOL 70 MG: 10 INJECTION, EMULSION INTRAVENOUS at 15:32

## 2025-04-29 RX ADMIN — FENTANYL CITRATE 50 MCG: 50 INJECTION, SOLUTION INTRAMUSCULAR; INTRAVENOUS at 15:32

## 2025-04-29 NOTE — ANESTHESIA PROCEDURE NOTES
Airway  Reason: elective    Date/Time: 4/29/2025 3:35 PM  Airway not difficult    General Information and Staff    Patient location during procedure: OR  Anesthesiologist: Angel Milton MD    Indications and Patient Condition  Indications for airway management: airway protection    Preoxygenated: yes    Mask difficulty assessment: 1 - vent by mask    Final Airway Details    Final airway type: endotracheal airway      Successful airway: ETT  Cuffed: yes   Successful intubation technique: direct laryngoscopy  Adjuncts used in placement: intubating stylet  Endotracheal tube insertion site: oral  Blade: Dorado  Blade size: 2  ETT size (mm): 7.0  Cormack-Lehane Classification: grade I - full view of glottis  Placement verified by: chest auscultation   Cuff volume (mL): 6  Measured from: teeth  ETT/EBT  to teeth (cm): 21  Number of attempts at approach: 1  Assessment: lips, teeth, and gum same as pre-op and atraumatic intubation

## 2025-04-29 NOTE — OP NOTE
Orthopaedic Operative Note    Facility: [facility]    Patient: Kristan Galicia    Medical Record Number: [id number]    YOB: 1950    Dictating Surgeon: Juan Antonio Anne M.D.*    Primary Care Physician: Chelly Red APRN    Date of Operation: 4/29/2025    Pre-Operative Diagnosis:  Right humerus periprosthetic fracture    Post-Operative Diagnosis:  Right humerus periprosthetic fracture    Procedure Performed: Open reduction, internal fixation of displaced right humerus periprosthetic fracture    Surgeon: Juan Antonio Anne MD     Assistant: Anabela Andreson CSA, whose assistance was critical for help with patient positioning, suctioning and irrigation, retraction, manipulation of the extremity for insertion of the implants, wound closure and application of the bandages.  Her assistance was critical to the success of this case.      Anesthesia: Regional, general    Complications: None.     Estimated Blood Loss: Less than 50 mL.     Implants: [employer] 12 hole NCB humeral locking plate with multiple 4.0 mm cortical screws and locking caps.  Two 4 mm cortical lag screws.  S Down about 3 inches ix 1.8 mm cables     Specimens: * No orders in the log *     Brief Operative Indication: Ms. Galicia sustained a periprosthetic right humerus fracture.  The risk, benefits and alternatives to surgical fixation of the fracture versus revision arthroplasty were discussed in detail.  I explained the risk of infection, wound healing problems, persistent or recurrent nonunion, malunion, failure of fixation, hardware related complications including loosening or failure of the implants, and metal allergy.  We did discuss the close proximity of the radial and/or axillary nerve and potential for injury to this structure which could result in permanent weakness, numbness or neurologic deficits.  She already had some evidence for compromise of the radial nerve prior to surgery and so we talked about  the possibility of a second hit type phenomenon.  We talked about other risks including DVT, PE, anesthesia related complications and positioning related neuropraxia.  She acknowledged understanding of this information and consented to proceed.     Description of the procedure in detail:  The patient and operative site were identified in the preoperative holding area.  The surgical site was marked.  Adequate regional anesthesia was administered.  The patient was then taken to the operating room.  The patient was placed on the operating table where adequate general anesthesia was administered.  The patient was then repositioned on the operating table in the supine position with the head and neck in neutral alignment.  All bony prominences were carefully padded and protected.     The right upper extremity was then prepped and draped in the standard, sterile fashion.  The extremity was cleaned with an alcohol solution.  A Hibiclens scrub was performed and then the extremity was prepped with 2 ChloraPrep preps.  A timeout was taken and preoperative antibiotics administered.  Transexamic acid was administered at this time as well.  Following this, I began by fashioning an approximately 12 cm incision over the anterior aspect of the arm and shoulder.  I did utilize her previous incision.  This was started as a deltopectoral incision and then extended down for a standard anterior approach to the humerus.  The incision was carried down through the skin and subcutaneous tissues.  Full-thickness medial and lateral skin flaps were developed.  The interval between the deltoid and pectoralis was carefully identified and developed proximally.  The deltoid was mobilized to allow for adequate visualization of the anterior aspect of the proximal humerus for fixation.  The cephalic vein was carefully dissected out and retracted laterally.  This structure was kept protected throughout the case.     More distally, the fascia was split  and the biceps muscle retracted medially.  There was a large hematoma that was encountered at this point.  This was evacuated.  More distally, the brachialis was split in the midline and reflected to expose the more distal humeral shaft.  I then directed my attention to mobilization of the fracture fragments.     This was a very comminuted fracture that extended from the stem down about 3 to 4 inches in a spiral fashion.  I began by examining her stem.  This was well-fixed and solid.  I did not consider that a revision was necessary and determined that we could likely salvage her implant and plan for open reduction, internal fixation of the fracture itself.  First, I carefully dissected out the radial nerve posteriorly and made sure that we had this structure identified and protected .  Once I had the nerve visualized and protected, I cleared out the  hematoma and started piecing the fragments back together.     Fortunately, most of the fragments were very mobile and easy to interdigitate.  The main fragments were directly interdigitated and clamped reduced.  I placed multiple cables all along the shaft to hold this.  These cables were passed circumferentially around the humerus and then tensioned.  I took care to make sure that these cables were directly adjacent to the bone and that the nerve was protected.  These cables seem to hold the fracture well.  I was then able to remove the clamps.  2 interfragmentary 3.5 screws were then placed without complication.  Both screws got good fixation.  Next I directed my attention to the plate fixation.     An appropriate length plate was selected for use, precontoured and then pinned to the anterior aspect of the humerus, just posterior to the biceps groove proximally.  I made sure that the plate was in good position and then checked this fluoroscopically.  The plate was then secured with nonlocking screws both above and below the fracture.  The screws got good purchase and  seemed to hold the plate in good position.  I did place 2 additional cables proximally to further the fixation there.  This cables were very carefully passed directly adjacent to the bone and again I took great caution to try not to entrap the nearby neurovascular structures.     Multiple proximal screws were placed under direct fluoroscopic guidance both above and below the fracture site.  All the screws seem to get good fixation in the bone but I did consider that locking caps were necessary.  Wherever possible, I placed locking caps over the screws.  All the locking caps locked into the plate well.  Once I completed the fixation, final images were taken and saved.  It did appear that we had a good reduction of the fracture and that the hardware was well-positioned.       I could fully elevate, abduct and rotate the shoulder without any mechanical phenomenon or limitation.  The shoulder and elbow both demonstrated excellent motion.  At this point, I irrigated the wound with 500 cc of a Betadine-containing saline solution.  I then irrigated with 3 L of sterile saline.  I made sure that we had good hemostasis.  The wound was sequentially closed in a layered fashion.  Vicryl was used to repair the subcutaneous tissues.  A running subcuticular Monocryl stitch was used to close the skin followed by staples.  Sterile dressings were applied.  The drapes were withdrawn.  The arm was placed in a sling.  She was awakened and taken to the recovery room in good condition.     MD Juan Antonio Hatch MD  04/29/25

## 2025-04-29 NOTE — ANESTHESIA POSTPROCEDURE EVALUATION
Patient: Kristan Galicia    Procedure Summary       Date: 04/29/25 Room / Location: [facility] OR [address] [place] [address] [facility]    Anesthesia Start: 1525 Anesthesia Stop: 1728    Procedure: Open Reduction and Internal Fixation Proximal Humerus (Right: Arm Upper) Diagnosis:       Closed displaced comminuted fracture of shaft of right humerus, initial encounter      (Closed displaced comminuted fracture of shaft of right humerus, initial encounter [S42.351A])    Surgeons: Juan Antonio Anne MD Provider: Angel Milton MD    Anesthesia Type: general ASA Status: 4            Anesthesia Type: general    Vitals  Vitals Value Taken Time   /55 04/29/25 18:15   Temp 36.9 °C (98.4 °F) 04/29/25 18:15   Pulse 101 04/29/25 18:28   Resp 16 04/29/25 18:15   SpO2 92 % 04/29/25 18:28   Vitals shown include unfiled device data.        Post Anesthesia Care and Evaluation    Patient location during evaluation: PHASE II  Patient participation: complete - patient participated  Level of consciousness: awake and alert  Pain management: adequate    Airway patency: patent  Anesthetic complications: No anesthetic complications  PONV Status: none  Cardiovascular status: acceptable and hemodynamically stable  Respiratory status: acceptable, nonlabored ventilation and spontaneous ventilation  Hydration status: acceptable

## 2025-04-29 NOTE — ANESTHESIA PREPROCEDURE EVALUATION
Anesthesia Evaluation     Patient summary reviewed and Nursing notes reviewed   no history of anesthetic complications:   NPO Solid Status: > 8 hours  NPO Liquid Status: > 2 hours           Airway   Mallampati: II  TM distance: >3 FB  Neck ROM: full  Dental      Pulmonary    (+) asthma,sleep apnea  Cardiovascular     PT is on anticoagulation therapy    (+) pacemaker pacemaker, ICD, hypertension, CAD, dysrhythmias Paroxysmal Atrial Fib, CHF Diastolic >=55%    ROS comment: HOCM w/ ICD. Afib ablation x2    Echo 12/2024  Findings   Left Ventricle:               Moderate concentric left ventricular hypertrophy.                                 The estimated ejection fraction is 50-55%.                                 E/A flow reversal noted. Suggestive of diastolic dysfunction.   Left Atrium:                  The left atrial chamber size appears moderately dilated.                                 No evidence of atrial septal defect.                                 Patent foramen ovale with right-to-left shunt was visualized.                                 Faint spontaneous echo contrast noted in the left atrium. Prominent pectinate muscle seen in BRIANNA with                                 small mobile echogenic density on pectinates and at mouth of BRIANNA suspicious for small thrombi.   Right Ventricle:              The right ventricular chamber size and systolic function are within normal limits.                                 A pacemaker wire is visualized in the right ventricle.   Right Atrium:                 The right atrial chamber size appears mildly enlarged. Small mobile calcified echogenic density attached                                 to RA lead likely calcified thrombus.   Aortic Valve:                 The aortic valve appears normal in structure and function. There is no evidence of aortic regurgitation.   Mitral Valve:                 The mitral valve appears grossly normal in structure . There is trace  mitral regurgitation. No mitral                                 stenosis present.   Tricuspid Valve:              The tricuspid valve appears normal in structure and function. There is mild tricuspid regurgitation.   Pulmonic Valve:               The pulmonic valve appears normal in structure and function. No significant pulmonic insufficiency.   Pericardium:                  There is a small pericardial effusion over right ventricle.   Aorta/Great Vessels: The aortic root appears normal.                        There is no dilatation of the ascending aorta.       Neuro/Psych  (+) CVA (during second afib ablation), syncope (vasovagal), psychiatric history Anxiety and Depression  GI/Hepatic/Renal/Endo    (+) obesity, GERD, diabetes mellitus, thyroid problem hypothyroidism    Musculoskeletal     Abdominal   (+) obese   Substance History      OB/GYN          Other   arthritis,                       Anesthesia Plan    ASA 4     general     intravenous induction     Anesthetic plan, risks, benefits, and alternatives have been provided, discussed and informed consent has been obtained with: patient.      CODE STATUS:

## 2025-04-29 NOTE — ANESTHESIA PROCEDURE NOTES
Peripheral Block      Patient reassessed immediately prior to procedure    Patient location during procedure: pre-op  Start time: 4/29/2025 1:49 PM  Stop time: 4/29/2025 1:53 PM  Reason for block: at surgeon's request, post-op pain management and primary anesthetic  Performed by  Anesthesiologist: Dawson Reynolds MD  Preanesthetic Checklist  Completed: patient identified, IV checked, site marked, risks and benefits discussed, surgical consent, monitors and equipment checked, pre-op evaluation and timeout performed  Prep:  Pt Position: supine  Sterile barriers:cap, mask and washed/disinfected hands  Prep: ChloraPrep  Patient monitoring: blood pressure monitoring, continuous pulse oximetry and EKG  Procedure    Sedation: yes  Performed under: local infiltration  Guidance:ultrasound guided    ULTRASOUND INTERPRETATION.  Using ultrasound guidance a 21 G gauge needle was placed in close proximity to the brachial plexus nerve, at which point, under ultrasound guidance anesthetic was injected in the area of the nerve and spread of the anesthesia was seen on ultrasound in close proximity thereto.  There were no abnormalities seen on ultrasound; a digital image was taken; and the patient tolerated the procedure with no complications. Images:still images obtained, printed/placed on chart    Laterality:right  Block Type:supraclavicular  Injection Technique:single-shot  Needle Type:echogenic  Needle Gauge:21 G  Resistance on Injection: none    Medications Used: dexamethasone (DECADRON) injection - Injection   4 mg - 4/29/2025 1:53:00 PM  ropivacaine (NAROPIN) 0.5 % injection - Injection   30 mL - 4/29/2025 1:53:00 PM      Post Assessment  Injection Assessment: negative aspiration for heme, no paresthesia on injection and incremental injection  Patient Tolerance:comfortable throughout block  Complications:no  Additional Notes  Ultrasound guidance used to visualize nerve anatomy, guide needle placement and verify local  anesthetic disbursement.       Performed by: Dawson Reynolds MD

## 2025-04-29 NOTE — BRIEF OP NOTE
HUMERUS PROXIMAL OPEN REDUCTION INTERNAL FIXATION  Progress Note    Kristan Galicia  4/29/2025    Pre-op Diagnosis:   Closed displaced comminuted fracture of shaft of right humerus, initial encounter [S42.351A]       Post-Op Diagnosis Codes:     * Closed displaced comminuted fracture of shaft of right humerus, initial encounter [S42.351A]    Procedure(s):      Procedure(s):  Open Reduction and Internal Fixation periprosthetic humerus fracture              Surgeon(s):  Juan Antonio Anne MD    Anesthesia: General with Block    Staff:   * No surgical staff found *       Estimated Blood Loss: 100ml    Urine Voided: * No values recorded between 4/29/2025 12:00 AM and 4/29/2025  3:20 PM *    Specimens:                None      Drains: * No LDAs found *    Findings: See dictation      Complications: none          Juan Antonio Anne MD     Date: 4/29/2025  Time: 9367

## 2025-05-16 ENCOUNTER — OFFICE VISIT (OUTPATIENT)
Dept: ORTHOPEDIC SURGERY | Facility: CLINIC | Age: 75
End: 2025-05-16
Payer: MEDICARE

## 2025-05-16 VITALS — WEIGHT: 159.8 LBS | BODY MASS INDEX: 31.37 KG/M2 | HEIGHT: 60 IN | TEMPERATURE: 98.4 F

## 2025-05-16 DIAGNOSIS — Z09 SURGERY FOLLOW-UP: ICD-10-CM

## 2025-05-16 DIAGNOSIS — R52 PAIN: Primary | ICD-10-CM

## 2025-05-16 RX ORDER — CEPHALEXIN 500 MG/1
500 CAPSULE ORAL 3 TIMES DAILY
Qty: 30 CAPSULE | Refills: 0 | Status: SHIPPED | OUTPATIENT
Start: 2025-05-16

## 2025-05-16 NOTE — PROGRESS NOTES
Kristan Galicia     : 1950     MRN: [id number]     DATE: 2025    CC:  2 weeks s/p ORIF right periprosthetic humerus fracture    HPI: Patient returns to clinic today stating pain is minimal.  She says that she has not really been using the sling and she has been moving the shoulder quite a bit.    Vitals:    25 1602   Temp: 98.4 °F (36.9 °C)       Exam:  Incision is well-approximated.  He does have some erythema around the margins of the incision.  I cannot tell if this is reaction to the staples or an early cellulitis.  There is no drainage.  No tenderness.  Contour of her arm appears normal.  Skin intact and benign.  Arm and forearm soft.  Shoulder moves very well at this early stage.  She can already raise her arm all the way overhead as well as fully flex and extend her elbow.  Good motor and sensory function in the hand and wrist.  Palpable radial pulse with good cap refill.      Imaginv xrays including AP and scapular Y views of the right humerus are ordered and reviewed by me to evaluate alignment and for comparison purposes.  No new or concerning findings noted.  Fracture appears well-aligned.  Hardware appears in good position.    Impression:   2 weeks s/p ORIF right periprosthetic humerus fracture    Plan:    1.  Discontinue use of sling.  2.  Begin working on progressive ROM.  3.  F/u in 4 weeks with repeat 3v xrays.  4.  Counseled the patient about appropriate activity modifications and restrictions.  5.  I cannot tell if she is developing an early cellulitis or not.  We removed her staples and replaced them with Steri-Strips.  I am going to give her a prescription for Keflex just as a precaution more so than anything.  I want to see her back in approximately 1 week for wound check.  I explained that if anything changes and she starts to develop other evidence for infection then I would want to see her back sooner.  Juan Antonio Anne MD

## 2025-05-20 ENCOUNTER — TELEPHONE (OUTPATIENT)
Dept: ORTHOPEDIC SURGERY | Facility: CLINIC | Age: 75
End: 2025-05-20
Payer: MEDICARE

## 2025-05-20 NOTE — TELEPHONE ENCOUNTER
"Caller: Kristan Galicia    Relationship to patient: Self    Best call back number:  [phone] (home)     Chief complaint: LEFT UPPER ARM WOUND CHECK    Type of visit: POST-OP    Requested date: 5/23/25 - IN THE PROGRESS NOTE FROM 5/16/25 \"I want to see her back in approximately 1 week for wound check\"      "

## 2025-05-23 ENCOUNTER — OFFICE VISIT (OUTPATIENT)
Dept: ORTHOPEDIC SURGERY | Facility: CLINIC | Age: 75
End: 2025-05-23
Payer: MEDICARE

## 2025-05-23 VITALS — BODY MASS INDEX: 31.49 KG/M2 | TEMPERATURE: 98.7 F | HEIGHT: 60 IN | WEIGHT: 160.4 LBS

## 2025-05-23 DIAGNOSIS — Z09 SURGERY FOLLOW-UP: Primary | ICD-10-CM

## 2025-05-23 NOTE — PROGRESS NOTES
Ms. Galicia follows up today for wound check.  She says she has been compliant with taking the antibiotics.  She has another 2 days of antibiotics left at this point.  Her Steri-Strips were removed.  The wound looks great.  All of the erythema is resolved.  Her shoulder and elbow motion are both surprisingly good for this early stage.    I recommend she continue the antibiotics through to completion.  Appropriate activity modifications and restrictions were discussed.  I told her to call me if anything changes but otherwise she can follow-up as scheduled.

## 2025-06-13 ENCOUNTER — OFFICE VISIT (OUTPATIENT)
Dept: ORTHOPEDIC SURGERY | Facility: CLINIC | Age: 75
End: 2025-06-13
Payer: MEDICARE

## 2025-06-13 VITALS — HEIGHT: 60 IN | TEMPERATURE: 98.6 F | BODY MASS INDEX: 30.55 KG/M2 | WEIGHT: 155.6 LBS

## 2025-06-13 DIAGNOSIS — Z09 SURGERY FOLLOW-UP: Primary | ICD-10-CM

## 2025-06-13 RX ORDER — APIXABAN 5 MG/1
TABLET, FILM COATED ORAL
COMMUNITY
Start: 2025-06-04

## 2025-06-13 RX ORDER — METOPROLOL SUCCINATE 50 MG/1
50 TABLET, EXTENDED RELEASE ORAL
COMMUNITY
Start: 2025-06-13 | End: 2025-07-13

## 2025-06-13 RX ORDER — DIGOXIN 125 MCG
125 TABLET ORAL DAILY
COMMUNITY
Start: 2025-06-02 | End: 2025-07-02

## 2025-06-13 RX ORDER — FUROSEMIDE 40 MG/1
40 TABLET ORAL
COMMUNITY
Start: 2025-06-13 | End: 2025-07-13

## 2025-06-13 RX ORDER — SEMAGLUTIDE 0.68 MG/ML
0.5 INJECTION, SOLUTION SUBCUTANEOUS WEEKLY
COMMUNITY
Start: 2025-05-15

## 2025-06-13 RX ORDER — SPIRONOLACTONE 25 MG/1
12.5 TABLET ORAL DAILY
COMMUNITY
Start: 2025-06-03 | End: 2025-07-03

## 2025-06-13 NOTE — PROGRESS NOTES
Kristan Galicia : 1950 MRN: [id number] DATE: 2025    CC:  ORIF right periprosthetic humerus fracture    HPI: Patient returns to clinic today stating pain is improved.  Motion is progressing.  Denies any new concerns or issues.  PT is helping.    Vitals:    25 1424   Temp: 98.6 °F (37 °C)       Exam: Incision is healed.  Contour of shoulder appears normal.  Skin intact and benign.  Arm and forearm soft.  Range of motion:  120° FE.  50° ER.  IR to back pocket.  Good motor and sensory function distally.  Palpable radial pulse with good cap refill.      Imaging: AP, scapular Y and axillary views of the right shoulder are ordered and reviewed by me to evaluate alignment and for comparison purposes.  No new or concerning findings noted. There has been interval callous formation.    Impression:  6 weeks s/p ORIF right periprosthetic humerus fracture    Plan:    1.  Progress ROM and strengthening as tolerated.  2.  Continue PT.  3.  F/U in 6 weeks with repeat 3v xrays    CHRISTIN Zapata    2025     Answers submitted by the patient for this visit:  Post Operative Visit (Submitted on 2025)  Chief Complaint: Follow-up  Pain Control: well controlled  Fever: no fever  Diet: painful swallowing  Activity: returning to normal  Operative Site Issues: No

## 2025-08-01 ENCOUNTER — OFFICE VISIT (OUTPATIENT)
Dept: ORTHOPEDIC SURGERY | Facility: CLINIC | Age: 75
End: 2025-08-01
Payer: MEDICARE

## 2025-08-01 VITALS — BODY MASS INDEX: 30.9 KG/M2 | HEIGHT: 60 IN | TEMPERATURE: 98.7 F | WEIGHT: 157.4 LBS

## 2025-08-01 DIAGNOSIS — Z09 SURGERY FOLLOW-UP: Primary | ICD-10-CM

## 2025-08-01 NOTE — PROGRESS NOTES
"Kristan Galicia : 1950 MRN: [id number] DATE: 2025    CC: 3 months s/p ORIF right periarticular humerus fracture    HPI: Pt. returns to clinic today stating pain is resolved.  She says her motion is nearly back to baseline.  Denies any new concerns or issues.    Vitals:    25 1346   Temp: 98.7 °F (37.1 °C)   Weight: 71.4 kg (157 lb 6.4 oz)   Height: 152.4 cm (60\")       Exam:  Incision is healed.  Arm and forearm soft.  Motion:  150 forward elevation, 45 external rotation, back pocket internal rotation.  Good motor and sensory function in the hand and wrist.  Palpable radial pulse with good cap refill.      Imaging  2 view xrays including AP, lateral views of the right humerus are ordered and reviewed by me to evaluate alignment and for comparison purposes.  No new or concerning findings noted. Fracture appears well-aligned.  Hardware appears in good position.  There has been significant callous formation.    Impression:   3 months s/p ORIF right periarticular humerus fracture    Plan:  1.  Continue working on ROM and strengthening.  Overall my impression is that she is doing great.  2.  No restrictions necessary at this point.  3.  Will release to follow up as needed.    Juan Antonio Anne MD      2025    "

## (undated) DEVICE — DRAPE,U/ SHT,SPLIT,PLAS,STERIL: Brand: MEDLINE

## (undated) DEVICE — GLV SURG SENSICARE MICRO PF LF 8 STRL

## (undated) DEVICE — GLV SURG TRIUMPH CLASSIC PF LTX 9 STRL

## (undated) DEVICE — HANDPIECE SET WITH COAXIAL HIGH FLOW TIP AND SUCTION TUBE: Brand: INTERPULSE

## (undated) DEVICE — SUT SILK 2/0 TIES 18IN A185H

## (undated) DEVICE — Device

## (undated) DEVICE — SYR LUERLOK 30CC

## (undated) DEVICE — ADHS SKIN DERMABOND TOP ADVANCED

## (undated) DEVICE — APPL CHLORAPREP W/TINT 26ML ORNG

## (undated) DEVICE — APPL CHLORAPREP HI/LITE 26ML ORNG

## (undated) DEVICE — TRAP FLD MINIVAC MEGADYNE 100ML

## (undated) DEVICE — DRSNG TELFA PAD NONADH STR 1S 3X8IN

## (undated) DEVICE — DRAPE,REIN 53X77,STERILE: Brand: MEDLINE

## (undated) DEVICE — LN SMPL CO2 SHTRM SD STREAM W/M LUER

## (undated) DEVICE — NEEDLE, QUINCKE, 18GX3.5": Brand: MEDLINE

## (undated) DEVICE — ADHS SKIN SURG TISS VISC PREMIERPRO EXOFIN HI/VISC FAST/DRY

## (undated) DEVICE — SKIN PREP TRAY W/CHG: Brand: MEDLINE INDUSTRIES, INC.

## (undated) DEVICE — MEDI-VAC YANKAUER SUCTION HANDLE W/BULBOUS TIP: Brand: CARDINAL HEALTH

## (undated) DEVICE — PK KN TOTL 40

## (undated) DEVICE — 3M™ STERI-DRAPE™ INSTRUMENT POUCH 1018: Brand: STERI-DRAPE™

## (undated) DEVICE — BIT DRL QC W/STOP 2X14MM

## (undated) DEVICE — PATIENT RETURN ELECTRODE, SINGLE-USE, CONTACT QUALITY MONITORING, ADULT, WITH 9FT CORD, FOR PATIENTS WEIGING OVER 33LBS. (15KG): Brand: MEGADYNE

## (undated) DEVICE — SENSR O2 OXIMAX FNGR A/ 18IN NONSTR

## (undated) DEVICE — PREP SOL POVIDONE/IODINE BT 4OZ

## (undated) DEVICE — GOWN,ECLIPSE,FABRIC-REINFORCED,X-LARGE: Brand: MEDLINE

## (undated) DEVICE — ENCORE® LATEX ORTHO SIZE 8, STERILE LATEX POWDER-FREE SURGICAL GLOVE: Brand: ENCORE

## (undated) DEVICE — KT ORCA ORCAPOD DISP STRL

## (undated) DEVICE — GLV SURG SIGNATURE ESSENTIAL PF LTX SZ8.5

## (undated) DEVICE — GLV SURG BIOGEL LTX PF 8 1/2

## (undated) DEVICE — TUBING, SUCTION, 1/4" X 20', STRAIGHT: Brand: MEDLINE INDUSTRIES, INC.

## (undated) DEVICE — SHEET, DRAPE, SPLIT, STERILE: Brand: MEDLINE

## (undated) DEVICE — ARM SLING: Brand: DEROYAL

## (undated) DEVICE — ANTIBACTERIAL UNDYED BRAIDED (POLYGLACTIN 910), SYNTHETIC ABSORBABLE SUTURE: Brand: COATED VICRYL

## (undated) DEVICE — DRP C/ARM 41X74IN

## (undated) DEVICE — GLV SURG SENSICARE GREEN W/ALOE PF LF 9 STRL

## (undated) DEVICE — INSTRUMENT BATTERY

## (undated) DEVICE — GLV SURG BIOGEL LTX PF 7 1/2

## (undated) DEVICE — TUBING, SUCTION, 1/4" X 10', STRAIGHT: Brand: MEDLINE

## (undated) DEVICE — PK SHLDR OPN 40

## (undated) DEVICE — MAT FLR ABSORBENT LG 4FT 10 2.5FT

## (undated) DEVICE — PAD,ABDOMINAL,8"X10",ST,LF: Brand: MEDLINE

## (undated) DEVICE — SUT MNCRYL PLS ANTIB UD 4/0 PS2 18IN

## (undated) DEVICE — HALTR TRACT HD PAD DLX UNIV

## (undated) DEVICE — DRSNG SURESITE WNDW 4X4.5

## (undated) DEVICE — FRCP BX RADJAW4 NDL 2.8 240CM LG OG BX40

## (undated) DEVICE — BNDG ELAS ELITE V/CLOSE 6IN 5YD LF STRL

## (undated) DEVICE — GLV SURG SIGNATURE ESSENTIAL PF LTX SZ7

## (undated) DEVICE — DUAL CUT SAGITTAL BLADE

## (undated) DEVICE — BANDAGE,GAUZE,BULKEE II,4.5"X4.1YD,STRL: Brand: MEDLINE

## (undated) DEVICE — TOOL MR8-9MC30 MR8 9CM MTL CUT 3MM C: Brand: MIDAS REX MR8

## (undated) DEVICE — CONN TBG Y 5 IN 1 LF STRL

## (undated) DEVICE — GAUZE,SPONGE,FLUFF,6"X6.75",STRL,10/TRAY: Brand: MEDLINE

## (undated) DEVICE — PIN STNMAN 3.2MM 9IN
Type: IMPLANTABLE DEVICE | Site: SHOULDER | Status: NON-FUNCTIONAL
Removed: 2020-10-22

## (undated) DEVICE — GLV SURG PREMIERPRO ORTHO LTX PF SZ8 BRN

## (undated) DEVICE — POOLE SUCTION HANDLE: Brand: CARDINAL HEALTH

## (undated) DEVICE — STPLR SKIN VISISTAT WD 35CT

## (undated) DEVICE — PK NEURO SPINE 40

## (undated) DEVICE — LIMB HOLDER, WRIST/ANKLE: Brand: DEROYAL

## (undated) DEVICE — SOL ISO/ALC 70PCT 4OZ

## (undated) DEVICE — THE STERILE LIGHT HANDLE COVER IS USED WITH STERIS SURGICAL LIGHTING AND VISUALIZATION SYSTEMS.

## (undated) DEVICE — ZIPPERED TOGA, 2X LARGE: Brand: FLYTE, SURGICOOL

## (undated) DEVICE — 3M™ IOBAN™ 2 ANTIMICROBIAL INCISE DRAPE 6640EZ: Brand: IOBAN™ 2

## (undated) DEVICE — NDL SPINE 22G 31/2IN BLK

## (undated) DEVICE — BLCK/BITE BLOX W/DENTL/RIM W/STRAP 54F

## (undated) DEVICE — NDL SPINE 18G 31/2IN PNK

## (undated) DEVICE — ISO/ALC 70PCT 4OZ

## (undated) DEVICE — UNDYED BRAIDED (POLYGLACTIN 910), SYNTHETIC ABSORBABLE SUTURE: Brand: COATED VICRYL

## (undated) DEVICE — DRSNG TELFA PAD NONADH STR 1S 3X4IN

## (undated) DEVICE — DRSNG GZ PETROLTM XEROFORM CURAD 1X8IN STRL

## (undated) DEVICE — SPNG LAP 18X18IN LF STRL PK/5

## (undated) DEVICE — SUT VICRYL 1 CT1 27IN  JJ40G

## (undated) DEVICE — 3M™ STERI-STRIP™ REINFORCED ADHESIVE SKIN CLOSURES, R1547, 1/2 IN X 4 IN (12 MM X 100 MM), 6 STRIPS/ENVELOPE: Brand: 3M™ STERI-STRIP™

## (undated) DEVICE — SUT VIC 2/0 CT2 27IN J269H

## (undated) DEVICE — DRN JP FLT NO TROC SIL FUL/PERF 7MM

## (undated) DEVICE — JACKSON-PRATT 100CC BULB RESERVOIR: Brand: CARDINAL HEALTH

## (undated) DEVICE — SUT VIC 2/0 CT1 CR8 18IN J839D

## (undated) DEVICE — PENCL E/S ULTRAVAC TELESCP NOSE HOLSTR 10FT

## (undated) DEVICE — TOOL MR8-T14MH30M MR8 14CM TL MH 2FL 3MM: Brand: MIDAS REX MR8

## (undated) DEVICE — GLV SURG BIOGEL LTX PF 6 1/2

## (undated) DEVICE — SOL ISO/ALC RUB 70PCT 4OZ

## (undated) DEVICE — CANN O2 ETCO2 FITS ALL CONN CO2 SMPL A/ 7IN DISP LF

## (undated) DEVICE — BIT DRL FAST 2MM

## (undated) DEVICE — 2108 SERIES SAGITTAL BLADE (19.5 X 1.27 X 81.0MM)

## (undated) DEVICE — BNDG ELAS CO-FLEX SLF ADHR 4IN5YD LF STRL

## (undated) DEVICE — BITEBLOCK OMNI BLOC

## (undated) DEVICE — INTENDED FOR TISSUE SEPARATION, AND OTHER PROCEDURES THAT REQUIRE A SHARP SURGICAL BLADE TO PUNCTURE OR CUT.: Brand: BARD-PARKER ® STAINLESS STEEL BLADES

## (undated) DEVICE — COVER,MAYO STAND,STERILE: Brand: MEDLINE

## (undated) DEVICE — ADAPT CLN BIOGUARD AIR/H2O DISP

## (undated) DEVICE — CANNULA,ADULT,SOFT-TOUCH,7TUBE,SC: Brand: MEDLINE

## (undated) DEVICE — SPNG GZ WOVN 4X4IN 12PLY 10/BX STRL

## (undated) DEVICE — TOWEL,OR,DSP,ST,BLUE,STD,4/PK,20PK/CS: Brand: MEDLINE

## (undated) DEVICE — GLV SURG TRIUMPH CLASSIC PF LTX 8.5 STRL

## (undated) DEVICE — SMOKE EVACUATION TUBING WITH 7/8 IN TO 1/4 IN REDUCER: Brand: BUFFALO FILTER

## (undated) DEVICE — FRCP BIOP RADLJAW4 HOT 2.2X240 BX40

## (undated) DEVICE — UNDERCAST PADDING: Brand: DEROYAL

## (undated) DEVICE — ENCORE® LATEX ORTHO SIZE 8.5, STERILE LATEX POWDER-FREE SURGICAL GLOVE: Brand: ENCORE

## (undated) DEVICE — SPNG DISECTOR KTNER XRAY COTN 1/4X9/16IN PK/5

## (undated) DEVICE — P.F.C. DRILL BIT AND STEINMAN PIN PACKET (1 UNIT) .125IN DIA 5IN LGTH: Brand: P.F.C.

## (undated) DEVICE — TBG PENCL TELESCP MEGADYNE SMOKE EVAC 10FT

## (undated) DEVICE — ELECTRD BLD EZ CLN MOD XLNG 2.75IN

## (undated) DEVICE — PREMIUM WET SKIN PREP TRAY: Brand: MEDLINE INDUSTRIES, INC.

## (undated) DEVICE — GLV SURG BIOGEL LTX PF 7

## (undated) DEVICE — GLV SURG TRIUMPH CLASSIC PF LTX 8 STRL

## (undated) DEVICE — OCCLUSIVE GAUZE STRIP,3% BISMUTH TRIBROMOPHENATE IN PETROLATUM BLEND: Brand: XEROFORM

## (undated) DEVICE — DISPOSABLE BIPOLAR CABLE 12FT. (3.6M): Brand: KIRWAN

## (undated) DEVICE — Device: Brand: DEFENDO AIR/WATER/SUCTION AND BIOPSY VALVE